# Patient Record
Sex: MALE | Race: WHITE | Employment: OTHER | URBAN - NONMETROPOLITAN AREA
[De-identification: names, ages, dates, MRNs, and addresses within clinical notes are randomized per-mention and may not be internally consistent; named-entity substitution may affect disease eponyms.]

---

## 2018-10-31 ENCOUNTER — APPOINTMENT (OUTPATIENT)
Dept: GENERAL RADIOLOGY | Age: 53
End: 2018-10-31
Payer: MEDICARE

## 2018-10-31 ENCOUNTER — HOSPITAL ENCOUNTER (EMERGENCY)
Age: 53
Discharge: LEFT BEFORE DISCHARGE | End: 2018-10-31
Payer: MEDICARE

## 2018-10-31 ENCOUNTER — APPOINTMENT (OUTPATIENT)
Dept: CT IMAGING | Age: 53
End: 2018-10-31
Payer: MEDICARE

## 2018-10-31 VITALS
RESPIRATION RATE: 20 BRPM | OXYGEN SATURATION: 95 % | DIASTOLIC BLOOD PRESSURE: 99 MMHG | HEIGHT: 75 IN | WEIGHT: 289 LBS | BODY MASS INDEX: 35.93 KG/M2 | SYSTOLIC BLOOD PRESSURE: 134 MMHG | HEART RATE: 108 BPM | TEMPERATURE: 97.5 F

## 2018-10-31 DIAGNOSIS — R25.1 SHAKINESS: Primary | ICD-10-CM

## 2018-10-31 DIAGNOSIS — G93.0 BRAIN CYST: ICD-10-CM

## 2018-10-31 DIAGNOSIS — F10.29 ALCOHOL DEPENDENCE WITH UNSPECIFIED ALCOHOL-INDUCED DISORDER (HCC): ICD-10-CM

## 2018-10-31 LAB
ALBUMIN SERPL-MCNC: 3 G/DL (ref 3.5–5.1)
ALP BLD-CCNC: 165 U/L (ref 38–126)
ALT SERPL-CCNC: 68 U/L (ref 11–66)
AMORPHOUS: ABNORMAL
ANION GAP SERPL CALCULATED.3IONS-SCNC: 9 MEQ/L (ref 8–16)
AST SERPL-CCNC: 151 U/L (ref 5–40)
BACTERIA: ABNORMAL /HPF
BASOPHILS # BLD: 1.1 %
BASOPHILS ABSOLUTE: 0.1 THOU/MM3 (ref 0–0.1)
BILIRUB SERPL-MCNC: 1 MG/DL (ref 0.3–1.2)
BILIRUBIN DIRECT: 0.3 MG/DL (ref 0–0.3)
BILIRUBIN URINE: ABNORMAL
BLOOD, URINE: NEGATIVE
BUN BLDV-MCNC: 10 MG/DL (ref 7–22)
CALCIUM SERPL-MCNC: 8.5 MG/DL (ref 8.5–10.5)
CASTS UA: ABNORMAL /LPF
CHARACTER, URINE: ABNORMAL
CHLORIDE BLD-SCNC: 103 MEQ/L (ref 98–111)
CO2: 24 MEQ/L (ref 23–33)
COLOR: YELLOW
CREAT SERPL-MCNC: 0.6 MG/DL (ref 0.4–1.2)
CRYSTALS, UA: ABNORMAL
EKG ATRIAL RATE: 99 BPM
EKG P AXIS: 43 DEGREES
EKG P-R INTERVAL: 140 MS
EKG Q-T INTERVAL: 366 MS
EKG QRS DURATION: 110 MS
EKG QTC CALCULATION (BAZETT): 469 MS
EKG R AXIS: 18 DEGREES
EKG T AXIS: 55 DEGREES
EKG VENTRICULAR RATE: 99 BPM
EOSINOPHIL # BLD: 2.1 %
EOSINOPHILS ABSOLUTE: 0.1 THOU/MM3 (ref 0–0.4)
EPITHELIAL CELLS, UA: ABNORMAL /HPF
ERYTHROCYTE [DISTWIDTH] IN BLOOD BY AUTOMATED COUNT: 17.1 % (ref 11.5–14.5)
ERYTHROCYTE [DISTWIDTH] IN BLOOD BY AUTOMATED COUNT: 62.4 FL (ref 35–45)
ETHYL ALCOHOL, SERUM: 0.37 %
GFR SERPL CREATININE-BSD FRML MDRD: > 90 ML/MIN/1.73M2
GLUCOSE BLD-MCNC: 185 MG/DL (ref 70–108)
GLUCOSE URINE: NEGATIVE MG/DL
HCT VFR BLD CALC: 38 % (ref 42–52)
HEMOGLOBIN: 13 GM/DL (ref 14–18)
ICTOTEST: NEGATIVE
IMMATURE GRANS (ABS): 0.02 THOU/MM3 (ref 0–0.07)
IMMATURE GRANULOCYTES: 0.3 %
KETONES, URINE: ABNORMAL
LEUKOCYTE ESTERASE, URINE: ABNORMAL
LIPASE: 68.1 U/L (ref 5.6–51.3)
LYMPHOCYTES # BLD: 47 %
LYMPHOCYTES ABSOLUTE: 3.3 THOU/MM3 (ref 1–4.8)
MAGNESIUM: 1.8 MG/DL (ref 1.6–2.4)
MCH RBC QN AUTO: 33.6 PG (ref 26–33)
MCHC RBC AUTO-ENTMCNC: 34.2 GM/DL (ref 32.2–35.5)
MCV RBC AUTO: 98.2 FL (ref 80–94)
MONOCYTES # BLD: 10.6 %
MONOCYTES ABSOLUTE: 0.8 THOU/MM3 (ref 0.4–1.3)
MUCUS: ABNORMAL
NITRITE, URINE: NEGATIVE
NUCLEATED RED BLOOD CELLS: 0 /100 WBC
OSMOLALITY CALCULATION: 275.8 MOSMOL/KG (ref 275–300)
PH UA: 6
PLATELET # BLD: 100 THOU/MM3 (ref 130–400)
PMV BLD AUTO: 10.9 FL (ref 9.4–12.4)
POTASSIUM SERPL-SCNC: 4.5 MEQ/L (ref 3.5–5.2)
PROTEIN UA: NEGATIVE
RBC # BLD: 3.87 MILL/MM3 (ref 4.7–6.1)
RBC URINE: ABNORMAL /HPF
SEG NEUTROPHILS: 38.9 %
SEGMENTED NEUTROPHILS ABSOLUTE COUNT: 2.8 THOU/MM3 (ref 1.8–7.7)
SODIUM BLD-SCNC: 136 MEQ/L (ref 135–145)
SPECIFIC GRAVITY, URINE: 1.01 (ref 1–1.03)
TOTAL PROTEIN: 8.4 G/DL (ref 6.1–8)
TROPONIN T: < 0.01 NG/ML
UROBILINOGEN, URINE: 2 EU/DL
WBC # BLD: 7.1 THOU/MM3 (ref 4.8–10.8)
WBC UA: ABNORMAL /HPF

## 2018-10-31 PROCEDURE — 71046 X-RAY EXAM CHEST 2 VIEWS: CPT

## 2018-10-31 PROCEDURE — 6370000000 HC RX 637 (ALT 250 FOR IP): Performed by: PHYSICIAN ASSISTANT

## 2018-10-31 PROCEDURE — 87086 URINE CULTURE/COLONY COUNT: CPT

## 2018-10-31 PROCEDURE — G0480 DRUG TEST DEF 1-7 CLASSES: HCPCS

## 2018-10-31 PROCEDURE — 82248 BILIRUBIN DIRECT: CPT

## 2018-10-31 PROCEDURE — 84484 ASSAY OF TROPONIN QUANT: CPT

## 2018-10-31 PROCEDURE — 36415 COLL VENOUS BLD VENIPUNCTURE: CPT

## 2018-10-31 PROCEDURE — 80053 COMPREHEN METABOLIC PANEL: CPT

## 2018-10-31 PROCEDURE — 81001 URINALYSIS AUTO W/SCOPE: CPT

## 2018-10-31 PROCEDURE — 83690 ASSAY OF LIPASE: CPT

## 2018-10-31 PROCEDURE — 2709999900 HC NON-CHARGEABLE SUPPLY

## 2018-10-31 PROCEDURE — 72125 CT NECK SPINE W/O DYE: CPT

## 2018-10-31 PROCEDURE — 6360000002 HC RX W HCPCS: Performed by: PHYSICIAN ASSISTANT

## 2018-10-31 PROCEDURE — 70450 CT HEAD/BRAIN W/O DYE: CPT

## 2018-10-31 PROCEDURE — 85025 COMPLETE CBC W/AUTO DIFF WBC: CPT

## 2018-10-31 PROCEDURE — 93005 ELECTROCARDIOGRAM TRACING: CPT | Performed by: PHYSICIAN ASSISTANT

## 2018-10-31 PROCEDURE — 99284 EMERGENCY DEPT VISIT MOD MDM: CPT

## 2018-10-31 PROCEDURE — 93010 ELECTROCARDIOGRAM REPORT: CPT | Performed by: INTERNAL MEDICINE

## 2018-10-31 PROCEDURE — 94640 AIRWAY INHALATION TREATMENT: CPT

## 2018-10-31 PROCEDURE — 83735 ASSAY OF MAGNESIUM: CPT

## 2018-10-31 RX ORDER — ACETAMINOPHEN 500 MG
1000 TABLET ORAL ONCE
Status: COMPLETED | OUTPATIENT
Start: 2018-10-31 | End: 2018-10-31

## 2018-10-31 RX ORDER — SODIUM CHLORIDE 9 MG/ML
INJECTION, SOLUTION INTRAVENOUS CONTINUOUS
Status: DISCONTINUED | OUTPATIENT
Start: 2018-10-31 | End: 2018-10-31 | Stop reason: HOSPADM

## 2018-10-31 RX ORDER — LORAZEPAM 1 MG/1
1 TABLET ORAL ONCE
Status: COMPLETED | OUTPATIENT
Start: 2018-10-31 | End: 2018-10-31

## 2018-10-31 RX ORDER — THIAMINE MONONITRATE (VIT B1) 100 MG
100 TABLET ORAL ONCE
Status: COMPLETED | OUTPATIENT
Start: 2018-10-31 | End: 2018-10-31

## 2018-10-31 RX ORDER — FOLIC ACID 1 MG/1
1 TABLET ORAL ONCE
Status: COMPLETED | OUTPATIENT
Start: 2018-10-31 | End: 2018-10-31

## 2018-10-31 RX ADMIN — LORAZEPAM 1 MG: 1 TABLET ORAL at 09:35

## 2018-10-31 RX ADMIN — Medication 100 MG: at 09:35

## 2018-10-31 RX ADMIN — ALBUTEROL SULFATE 2.5 MG: 2.5 SOLUTION RESPIRATORY (INHALATION) at 09:35

## 2018-10-31 RX ADMIN — FOLIC ACID 1 MG: 1 TABLET ORAL at 09:35

## 2018-10-31 RX ADMIN — ACETAMINOPHEN 1000 MG: 500 TABLET, FILM COATED ORAL at 09:35

## 2018-10-31 ASSESSMENT — ENCOUNTER SYMPTOMS
SORE THROAT: 0
VOMITING: 0
NAUSEA: 0
DIARRHEA: 0
COUGH: 1
WHEEZING: 1

## 2018-10-31 ASSESSMENT — PAIN SCALES - GENERAL
PAINLEVEL_OUTOF10: 10
PAINLEVEL_OUTOF10: 10

## 2018-10-31 ASSESSMENT — PAIN DESCRIPTION - LOCATION: LOCATION: HEAD

## 2018-10-31 NOTE — ED PROVIDER NOTES
below    Case discussed with attending physician. CRITICAL CARE:  None    CONSULTS:  None    PROCEDURES:  None    FINAL IMPRESSION      1. Shakiness    2. Alcohol dependence with unspecified alcohol-induced disorder ([facility]    3. Brain cyst          DISPOSITION/PLAN   AMA      PATIENT REFERRED TO:  No follow-up provider specified.     DISCHARGE MEDICATIONS:  Discharge Medication List as of 10/31/2018 11:09 AM          (Please note that portions of this note were completed with a voice recognitionprogram.  Efforts were made to edit the dictations but occasionally words are mis-transcribed.)    Mio Leon, [address] GABRIELA Johnson  10/31/18 8805

## 2018-11-01 LAB
ORGANISM: ABNORMAL
URINE CULTURE REFLEX: ABNORMAL

## 2018-11-30 ENCOUNTER — INITIAL CONSULT (OUTPATIENT)
Dept: NEUROLOGY | Age: 53
End: 2018-11-30
Payer: MEDICARE

## 2018-11-30 VITALS
BODY MASS INDEX: 36.04 KG/M2 | HEART RATE: 72 BPM | DIASTOLIC BLOOD PRESSURE: 68 MMHG | WEIGHT: 296 LBS | SYSTOLIC BLOOD PRESSURE: 124 MMHG | HEIGHT: 76 IN

## 2018-11-30 DIAGNOSIS — R56.9 SEIZURE (HCC): Primary | ICD-10-CM

## 2018-11-30 PROCEDURE — 1036F TOBACCO NON-USER: CPT | Performed by: PSYCHIATRY & NEUROLOGY

## 2018-11-30 PROCEDURE — 3017F COLORECTAL CA SCREEN DOC REV: CPT | Performed by: PSYCHIATRY & NEUROLOGY

## 2018-11-30 PROCEDURE — 99204 OFFICE O/P NEW MOD 45 MIN: CPT | Performed by: PSYCHIATRY & NEUROLOGY

## 2018-11-30 PROCEDURE — G8417 CALC BMI ABV UP PARAM F/U: HCPCS | Performed by: PSYCHIATRY & NEUROLOGY

## 2018-11-30 PROCEDURE — G8427 DOCREV CUR MEDS BY ELIG CLIN: HCPCS | Performed by: PSYCHIATRY & NEUROLOGY

## 2018-11-30 PROCEDURE — G8484 FLU IMMUNIZE NO ADMIN: HCPCS | Performed by: PSYCHIATRY & NEUROLOGY

## 2018-11-30 RX ORDER — PRIMIDONE 50 MG/1
50 TABLET ORAL 3 TIMES DAILY
COMMUNITY
End: 2020-09-17 | Stop reason: ALTCHOICE

## 2018-11-30 RX ORDER — CARVEDILOL 6.25 MG/1
6.25 TABLET ORAL 2 TIMES DAILY WITH MEALS
Status: ON HOLD | COMMUNITY
End: 2019-03-26 | Stop reason: HOSPADM

## 2018-11-30 RX ORDER — ALBUTEROL SULFATE 90 UG/1
2 AEROSOL, METERED RESPIRATORY (INHALATION) EVERY 6 HOURS PRN
Status: ON HOLD | COMMUNITY
End: 2020-12-08 | Stop reason: HOSPADM

## 2018-11-30 RX ORDER — LANOLIN ALCOHOL/MO/W.PET/CERES
100 CREAM (GRAM) TOPICAL DAILY
COMMUNITY
End: 2019-03-18

## 2018-11-30 RX ORDER — CIPROFLOXACIN 500 MG/1
500 TABLET, FILM COATED ORAL 2 TIMES DAILY
COMMUNITY
End: 2019-03-18

## 2018-11-30 RX ORDER — SULFAMETHOXAZOLE AND TRIMETHOPRIM 400; 80 MG/1; MG/1
1 TABLET ORAL 2 TIMES DAILY
COMMUNITY
End: 2019-03-18

## 2018-11-30 RX ORDER — DULOXETIN HYDROCHLORIDE 60 MG/1
60 CAPSULE, DELAYED RELEASE ORAL DAILY
Status: ON HOLD | COMMUNITY
End: 2021-08-03

## 2018-11-30 RX ORDER — FOLIC ACID 1 MG/1
1 TABLET ORAL DAILY
COMMUNITY
End: 2021-01-28

## 2018-11-30 RX ORDER — ROSUVASTATIN CALCIUM 20 MG/1
20 TABLET, COATED ORAL NIGHTLY
Status: ON HOLD | COMMUNITY
End: 2022-05-12 | Stop reason: HOSPADM

## 2018-11-30 RX ORDER — TRAZODONE HYDROCHLORIDE 150 MG/1
150 TABLET ORAL NIGHTLY
COMMUNITY
End: 2019-03-18

## 2018-11-30 NOTE — PROGRESS NOTES
reviewed, he will need to follow up at [address] [place] [address] as he was evaluated there, with no imaging studies available for us review here. He does reports no ETOH for at least one month, he continues to smoke however. After detailed discussion with patient and his two friends present with him we agreed on the following plan. Plan    1. Follow up with Neurology at [address] [place] [address] re seizure. 2. Follow up with [address] [place] [address] Neurosurgery re left sylvian fissure cyst.   3. No driving, swimming, operating heavy machinery or compromising heights until cleared. 4. Follow up as needed.      Danae Rdz MD

## 2018-11-30 NOTE — LETTER
[address] [facility]  [address]  Dept: [phone]  Dept Fax: [phone]  Loc: Efrem Ruff MD        11/30/2018      Patient:  Sebas Mckee  MRN:  [id number]  YOB: 1965  Date of Visit:  11/30/2018    Dear Dr. Marii Lacy,    Thank you for referring Sebas Mckee to me for consultation. Please see attached visit summary with my findings. If you have any questions, please do not hesitate to call me.       Sincerely,         Dank Macias MD

## 2019-03-18 ENCOUNTER — APPOINTMENT (OUTPATIENT)
Dept: CT IMAGING | Age: 54
DRG: 912 | End: 2019-03-18
Payer: MEDICARE

## 2019-03-18 ENCOUNTER — APPOINTMENT (OUTPATIENT)
Dept: GENERAL RADIOLOGY | Age: 54
DRG: 912 | End: 2019-03-18
Payer: MEDICARE

## 2019-03-18 ENCOUNTER — HOSPITAL ENCOUNTER (INPATIENT)
Age: 54
LOS: 8 days | Discharge: NURSING, REHAB OR LONG-TERM CARE | DRG: 912 | End: 2019-03-26
Attending: INTERNAL MEDICINE | Admitting: INTERNAL MEDICINE
Payer: MEDICARE

## 2019-03-18 DIAGNOSIS — M54.50 ACUTE MIDLINE LOW BACK PAIN WITHOUT SCIATICA: ICD-10-CM

## 2019-03-18 DIAGNOSIS — R76.8 HCV ANTIBODY POSITIVE: ICD-10-CM

## 2019-03-18 DIAGNOSIS — D69.6 THROMBOCYTOPENIA (HCC): Primary | ICD-10-CM

## 2019-03-18 DIAGNOSIS — M54.6 ACUTE MIDLINE THORACIC BACK PAIN: ICD-10-CM

## 2019-03-18 DIAGNOSIS — E87.1 HYPONATREMIA: ICD-10-CM

## 2019-03-18 DIAGNOSIS — E11.65 UNCONTROLLED TYPE 2 DIABETES MELLITUS WITH HYPERGLYCEMIA (HCC): ICD-10-CM

## 2019-03-18 DIAGNOSIS — R29.6 FALLS FREQUENTLY: ICD-10-CM

## 2019-03-18 DIAGNOSIS — S09.90XA INJURY OF HEAD, INITIAL ENCOUNTER: ICD-10-CM

## 2019-03-18 DIAGNOSIS — F10.921 ACUTE ALCOHOLIC INTOXICATION WITH DELIRIUM (HCC): ICD-10-CM

## 2019-03-18 DIAGNOSIS — S22.42XA CLOSED FRACTURE OF MULTIPLE RIBS OF LEFT SIDE, INITIAL ENCOUNTER: ICD-10-CM

## 2019-03-18 DIAGNOSIS — K70.30 ALCOHOLIC CIRRHOSIS OF LIVER WITHOUT ASCITES (HCC): ICD-10-CM

## 2019-03-18 DIAGNOSIS — R25.1 TREMOR: ICD-10-CM

## 2019-03-18 DIAGNOSIS — S82.832A CLOSED FRACTURE OF DISTAL END OF LEFT FIBULA, UNSPECIFIED FRACTURE MORPHOLOGY, INITIAL ENCOUNTER: ICD-10-CM

## 2019-03-18 PROBLEM — F10.930 ALCOHOL WITHDRAWAL, UNCOMPLICATED (HCC): Status: ACTIVE | Noted: 2019-03-18

## 2019-03-18 PROBLEM — F10.939 ALCOHOL WITHDRAWAL SEIZURE WITH COMPLICATION (HCC): Status: ACTIVE | Noted: 2019-03-18

## 2019-03-18 PROBLEM — J44.9 COPD (CHRONIC OBSTRUCTIVE PULMONARY DISEASE) (HCC): Status: ACTIVE | Noted: 2019-03-18

## 2019-03-18 PROBLEM — R56.9 SEIZURE (HCC): Status: ACTIVE | Noted: 2019-03-18

## 2019-03-18 PROBLEM — E78.5 HLD (HYPERLIPIDEMIA): Status: ACTIVE | Noted: 2019-03-18

## 2019-03-18 PROBLEM — I10 HTN (HYPERTENSION): Status: ACTIVE | Noted: 2019-03-18

## 2019-03-18 PROBLEM — D64.9 ANEMIA: Status: ACTIVE | Noted: 2019-03-18

## 2019-03-18 PROBLEM — E11.9 TYPE 2 DIABETES MELLITUS (HCC): Status: ACTIVE | Noted: 2019-03-18

## 2019-03-18 PROBLEM — D72.829 LEUKOCYTOSIS: Status: ACTIVE | Noted: 2019-03-18

## 2019-03-18 PROBLEM — R56.9 ALCOHOL WITHDRAWAL SEIZURE WITH COMPLICATION (HCC): Status: ACTIVE | Noted: 2019-03-18

## 2019-03-18 LAB
ALBUMIN SERPL-MCNC: 3 G/DL (ref 3.5–5.1)
ALBUMIN SERPL-MCNC: 3.1 G/DL (ref 3.5–5.1)
ALP BLD-CCNC: 215 U/L (ref 38–126)
ALT SERPL-CCNC: 38 U/L (ref 11–66)
AMMONIA: 42 UMOL/L (ref 11–60)
AMPHETAMINE+METHAMPHETAMINE URINE SCREEN: NEGATIVE
ANION GAP SERPL CALCULATED.3IONS-SCNC: 15 MEQ/L (ref 8–16)
ANION GAP SERPL CALCULATED.3IONS-SCNC: 15 MEQ/L (ref 8–16)
AST SERPL-CCNC: 64 U/L (ref 5–40)
BARBITURATE QUANTITATIVE URINE: NEGATIVE
BASOPHILS # BLD: 0.9 %
BASOPHILS ABSOLUTE: 0.1 THOU/MM3 (ref 0–0.1)
BENZODIAZEPINE QUANTITATIVE URINE: NEGATIVE
BILIRUB SERPL-MCNC: 0.8 MG/DL (ref 0.3–1.2)
BUN BLDV-MCNC: 4 MG/DL (ref 7–22)
BUN BLDV-MCNC: 5 MG/DL (ref 7–22)
CALCIUM SERPL-MCNC: 8.3 MG/DL (ref 8.5–10.5)
CALCIUM SERPL-MCNC: 9.1 MG/DL (ref 8.5–10.5)
CANNABINOID QUANTITATIVE URINE: NEGATIVE
CHLORIDE BLD-SCNC: 90 MEQ/L (ref 98–111)
CHLORIDE BLD-SCNC: 98 MEQ/L (ref 98–111)
CO2: 21 MEQ/L (ref 23–33)
CO2: 23 MEQ/L (ref 23–33)
COCAINE METABOLITE QUANTITATIVE URINE: NEGATIVE
CREAT SERPL-MCNC: 0.5 MG/DL (ref 0.4–1.2)
CREAT SERPL-MCNC: 0.5 MG/DL (ref 0.4–1.2)
EKG ATRIAL RATE: 68 BPM
EKG Q-T INTERVAL: 446 MS
EKG QRS DURATION: 114 MS
EKG QTC CALCULATION (BAZETT): 474 MS
EKG R AXIS: 38 DEGREES
EKG T AXIS: 30 DEGREES
EKG VENTRICULAR RATE: 68 BPM
EOSINOPHIL # BLD: 2.2 %
EOSINOPHILS ABSOLUTE: 0.1 THOU/MM3 (ref 0–0.4)
ERYTHROCYTE [DISTWIDTH] IN BLOOD BY AUTOMATED COUNT: 14.5 % (ref 11.5–14.5)
ERYTHROCYTE [DISTWIDTH] IN BLOOD BY AUTOMATED COUNT: 54.4 FL (ref 35–45)
ETHYL ALCOHOL, SERUM: 0.3 %
GFR SERPL CREATININE-BSD FRML MDRD: > 90 ML/MIN/1.73M2
GFR SERPL CREATININE-BSD FRML MDRD: > 90 ML/MIN/1.73M2
GLUCOSE BLD-MCNC: 239 MG/DL (ref 70–108)
GLUCOSE BLD-MCNC: 267 MG/DL (ref 70–108)
GLUCOSE BLD-MCNC: 389 MG/DL (ref 70–108)
HCT VFR BLD CALC: 37 % (ref 42–52)
HEMOGLOBIN: 12.3 GM/DL (ref 14–18)
IMMATURE GRANS (ABS): 0.01 THOU/MM3 (ref 0–0.07)
IMMATURE GRANULOCYTES: 0.2 %
LYMPHOCYTES # BLD: 44.8 %
LYMPHOCYTES ABSOLUTE: 2.6 THOU/MM3 (ref 1–4.8)
MCH RBC QN AUTO: 33.4 PG (ref 26–33)
MCHC RBC AUTO-ENTMCNC: 33.2 GM/DL (ref 32.2–35.5)
MCV RBC AUTO: 100.5 FL (ref 80–94)
MONOCYTES # BLD: 9.5 %
MONOCYTES ABSOLUTE: 0.6 THOU/MM3 (ref 0.4–1.3)
NUCLEATED RED BLOOD CELLS: 0 /100 WBC
OPIATES, URINE: NEGATIVE
OSMOLALITY CALCULATION: 266.8 MOSMOL/KG (ref 275–300)
OSMOLALITY: 347 MOSMOL/KG (ref 275–295)
OXYCODONE: NEGATIVE
PHENCYCLIDINE QUANTITATIVE URINE: NEGATIVE
PHOSPHORUS: 3.6 MG/DL (ref 2.4–4.7)
PLATELET # BLD: 79 THOU/MM3 (ref 130–400)
PMV BLD AUTO: 10.7 FL (ref 9.4–12.4)
POTASSIUM SERPL-SCNC: 4.2 MEQ/L (ref 3.5–5.2)
POTASSIUM SERPL-SCNC: 4.4 MEQ/L (ref 3.5–5.2)
RBC # BLD: 3.68 MILL/MM3 (ref 4.7–6.1)
SCAN OF BLOOD SMEAR: NORMAL
SEG NEUTROPHILS: 42.4 %
SEGMENTED NEUTROPHILS ABSOLUTE COUNT: 2.5 THOU/MM3 (ref 1.8–7.7)
SODIUM BLD-SCNC: 126 MEQ/L (ref 135–145)
SODIUM BLD-SCNC: 136 MEQ/L (ref 135–145)
TOTAL PROTEIN: 8 G/DL (ref 6.1–8)
TROPONIN T: < 0.01 NG/ML
WBC # BLD: 5.8 THOU/MM3 (ref 4.8–10.8)

## 2019-03-18 PROCEDURE — 82140 ASSAY OF AMMONIA: CPT

## 2019-03-18 PROCEDURE — APPSS60 APP SPLIT SHARED TIME 46-60 MINUTES: Performed by: NURSE PRACTITIONER

## 2019-03-18 PROCEDURE — 1200000003 HC TELEMETRY R&B

## 2019-03-18 PROCEDURE — 82948 REAGENT STRIP/BLOOD GLUCOSE: CPT

## 2019-03-18 PROCEDURE — 93010 ELECTROCARDIOGRAM REPORT: CPT | Performed by: INTERNAL MEDICINE

## 2019-03-18 PROCEDURE — 2709999900 HC NON-CHARGEABLE SUPPLY

## 2019-03-18 PROCEDURE — 93005 ELECTROCARDIOGRAM TRACING: CPT | Performed by: PHYSICIAN ASSISTANT

## 2019-03-18 PROCEDURE — 76376 3D RENDER W/INTRP POSTPROCES: CPT

## 2019-03-18 PROCEDURE — 72125 CT NECK SPINE W/O DYE: CPT

## 2019-03-18 PROCEDURE — 36415 COLL VENOUS BLD VENIPUNCTURE: CPT

## 2019-03-18 PROCEDURE — 6370000000 HC RX 637 (ALT 250 FOR IP): Performed by: INTERNAL MEDICINE

## 2019-03-18 PROCEDURE — 70450 CT HEAD/BRAIN W/O DYE: CPT

## 2019-03-18 PROCEDURE — 99253 IP/OBS CNSLTJ NEW/EST LOW 45: CPT | Performed by: SURGERY

## 2019-03-18 PROCEDURE — 2500000003 HC RX 250 WO HCPCS: Performed by: PHYSICIAN ASSISTANT

## 2019-03-18 PROCEDURE — 74177 CT ABD & PELVIS W/CONTRAST: CPT

## 2019-03-18 PROCEDURE — 85025 COMPLETE CBC W/AUTO DIFF WBC: CPT

## 2019-03-18 PROCEDURE — 2580000003 HC RX 258: Performed by: PHYSICIAN ASSISTANT

## 2019-03-18 PROCEDURE — 84484 ASSAY OF TROPONIN QUANT: CPT

## 2019-03-18 PROCEDURE — 73060 X-RAY EXAM OF HUMERUS: CPT

## 2019-03-18 PROCEDURE — 99285 EMERGENCY DEPT VISIT HI MDM: CPT

## 2019-03-18 PROCEDURE — 80069 RENAL FUNCTION PANEL: CPT

## 2019-03-18 PROCEDURE — G0480 DRUG TEST DEF 1-7 CLASSES: HCPCS

## 2019-03-18 PROCEDURE — 6360000004 HC RX CONTRAST MEDICATION: Performed by: PHYSICIAN ASSISTANT

## 2019-03-18 PROCEDURE — 71260 CT THORAX DX C+: CPT

## 2019-03-18 PROCEDURE — 82977 ASSAY OF GGT: CPT

## 2019-03-18 PROCEDURE — 80307 DRUG TEST PRSMV CHEM ANLYZR: CPT

## 2019-03-18 PROCEDURE — 83930 ASSAY OF BLOOD OSMOLALITY: CPT

## 2019-03-18 PROCEDURE — 80053 COMPREHEN METABOLIC PANEL: CPT

## 2019-03-18 RX ORDER — LATANOPROST 50 UG/ML
1 SOLUTION/ DROPS OPHTHALMIC NIGHTLY
Status: ON HOLD | COMMUNITY
End: 2019-03-22 | Stop reason: HOSPADM

## 2019-03-18 RX ORDER — FOLIC ACID 1 MG/1
1 TABLET ORAL DAILY
Status: DISCONTINUED | OUTPATIENT
Start: 2019-03-19 | End: 2019-03-18 | Stop reason: SDUPTHER

## 2019-03-18 RX ORDER — LIDOCAINE 4 G/G
3 PATCH TOPICAL DAILY
Status: DISCONTINUED | OUTPATIENT
Start: 2019-03-19 | End: 2019-03-26 | Stop reason: HOSPADM

## 2019-03-18 RX ORDER — PIOGLITAZONEHYDROCHLORIDE 15 MG/1
15 TABLET ORAL DAILY
Status: ON HOLD | COMMUNITY
End: 2019-03-26 | Stop reason: HOSPADM

## 2019-03-18 RX ORDER — DEXTROSE MONOHYDRATE 50 MG/ML
100 INJECTION, SOLUTION INTRAVENOUS PRN
Status: DISCONTINUED | OUTPATIENT
Start: 2019-03-18 | End: 2019-03-26 | Stop reason: HOSPADM

## 2019-03-18 RX ORDER — SODIUM CHLORIDE 0.9 % (FLUSH) 0.9 %
10 SYRINGE (ML) INJECTION EVERY 12 HOURS SCHEDULED
Status: DISCONTINUED | OUTPATIENT
Start: 2019-03-18 | End: 2019-03-21 | Stop reason: SDUPTHER

## 2019-03-18 RX ORDER — MULTIVITAMIN WITH FOLIC ACID 400 MCG
1 TABLET ORAL DAILY
Status: DISCONTINUED | OUTPATIENT
Start: 2019-03-19 | End: 2019-03-26 | Stop reason: HOSPADM

## 2019-03-18 RX ORDER — AMLODIPINE BESYLATE 5 MG/1
5 TABLET ORAL DAILY
Status: DISCONTINUED | OUTPATIENT
Start: 2019-03-19 | End: 2019-03-26 | Stop reason: HOSPADM

## 2019-03-18 RX ORDER — LORAZEPAM 2 MG/ML
4 INJECTION INTRAMUSCULAR
Status: DISCONTINUED | OUTPATIENT
Start: 2019-03-18 | End: 2019-03-22

## 2019-03-18 RX ORDER — LORAZEPAM 1 MG/1
2 TABLET ORAL
Status: DISCONTINUED | OUTPATIENT
Start: 2019-03-18 | End: 2019-03-22

## 2019-03-18 RX ORDER — ONDANSETRON 2 MG/ML
4 INJECTION INTRAMUSCULAR; INTRAVENOUS EVERY 6 HOURS PRN
Status: DISCONTINUED | OUTPATIENT
Start: 2019-03-18 | End: 2019-03-26 | Stop reason: HOSPADM

## 2019-03-18 RX ORDER — ROSUVASTATIN CALCIUM 20 MG/1
20 TABLET, COATED ORAL DAILY
Status: DISCONTINUED | OUTPATIENT
Start: 2019-03-19 | End: 2019-03-19

## 2019-03-18 RX ORDER — SODIUM CHLORIDE 0.9 % (FLUSH) 0.9 %
10 SYRINGE (ML) INJECTION EVERY 12 HOURS SCHEDULED
Status: DISCONTINUED | OUTPATIENT
Start: 2019-03-18 | End: 2019-03-18 | Stop reason: SDUPTHER

## 2019-03-18 RX ORDER — NICOTINE POLACRILEX 4 MG
15 LOZENGE BUCCAL PRN
Status: DISCONTINUED | OUTPATIENT
Start: 2019-03-18 | End: 2019-03-26 | Stop reason: HOSPADM

## 2019-03-18 RX ORDER — DIAZEPAM 5 MG/1
5 TABLET ORAL EVERY 8 HOURS
Status: DISCONTINUED | OUTPATIENT
Start: 2019-03-18 | End: 2019-03-19

## 2019-03-18 RX ORDER — CYCLOBENZAPRINE HCL 10 MG
10 TABLET ORAL 3 TIMES DAILY PRN
Status: DISCONTINUED | OUTPATIENT
Start: 2019-03-18 | End: 2019-03-26 | Stop reason: HOSPADM

## 2019-03-18 RX ORDER — AMLODIPINE BESYLATE 5 MG/1
5 TABLET ORAL DAILY
COMMUNITY
End: 2020-09-10

## 2019-03-18 RX ORDER — FOLIC ACID 1 MG/1
1 TABLET ORAL DAILY
Status: DISCONTINUED | OUTPATIENT
Start: 2019-03-19 | End: 2019-03-26 | Stop reason: HOSPADM

## 2019-03-18 RX ORDER — IBUPROFEN 800 MG/1
800 TABLET ORAL EVERY 6 HOURS PRN
Status: ON HOLD | COMMUNITY
End: 2019-03-26 | Stop reason: HOSPADM

## 2019-03-18 RX ORDER — LORAZEPAM 2 MG/ML
3 INJECTION INTRAMUSCULAR
Status: DISCONTINUED | OUTPATIENT
Start: 2019-03-18 | End: 2019-03-22

## 2019-03-18 RX ORDER — ASPIRIN 81 MG/1
81 TABLET, CHEWABLE ORAL DAILY
Status: DISCONTINUED | OUTPATIENT
Start: 2019-03-19 | End: 2019-03-26 | Stop reason: HOSPADM

## 2019-03-18 RX ORDER — THIAMINE MONONITRATE (VIT B1) 100 MG
100 TABLET ORAL DAILY
Status: DISCONTINUED | OUTPATIENT
Start: 2019-03-19 | End: 2019-03-26 | Stop reason: HOSPADM

## 2019-03-18 RX ORDER — SODIUM CHLORIDE 0.9 % (FLUSH) 0.9 %
10 SYRINGE (ML) INJECTION PRN
Status: DISCONTINUED | OUTPATIENT
Start: 2019-03-18 | End: 2019-03-21 | Stop reason: SDUPTHER

## 2019-03-18 RX ORDER — DULOXETIN HYDROCHLORIDE 60 MG/1
60 CAPSULE, DELAYED RELEASE ORAL DAILY
Status: DISCONTINUED | OUTPATIENT
Start: 2019-03-19 | End: 2019-03-26 | Stop reason: HOSPADM

## 2019-03-18 RX ORDER — CLONIDINE HYDROCHLORIDE 0.2 MG/1
0.2 TABLET ORAL 2 TIMES DAILY
COMMUNITY
End: 2020-09-10

## 2019-03-18 RX ORDER — DIAZEPAM 5 MG/1
5 TABLET ORAL EVERY 8 HOURS
Status: ON HOLD | COMMUNITY
End: 2019-03-19

## 2019-03-18 RX ORDER — SODIUM CHLORIDE 0.9 % (FLUSH) 0.9 %
10 SYRINGE (ML) INJECTION PRN
Status: DISCONTINUED | OUTPATIENT
Start: 2019-03-18 | End: 2019-03-18 | Stop reason: SDUPTHER

## 2019-03-18 RX ORDER — LORAZEPAM 2 MG/ML
2 INJECTION INTRAMUSCULAR
Status: DISCONTINUED | OUTPATIENT
Start: 2019-03-18 | End: 2019-03-22

## 2019-03-18 RX ORDER — LORAZEPAM 1 MG/1
1 TABLET ORAL
Status: DISCONTINUED | OUTPATIENT
Start: 2019-03-18 | End: 2019-03-22

## 2019-03-18 RX ORDER — LORAZEPAM 1 MG/1
4 TABLET ORAL
Status: DISCONTINUED | OUTPATIENT
Start: 2019-03-18 | End: 2019-03-22

## 2019-03-18 RX ORDER — 0.9 % SODIUM CHLORIDE 0.9 %
1000 INTRAVENOUS SOLUTION INTRAVENOUS ONCE
Status: COMPLETED | OUTPATIENT
Start: 2019-03-18 | End: 2019-03-18

## 2019-03-18 RX ORDER — DEXTROSE MONOHYDRATE 25 G/50ML
12.5 INJECTION, SOLUTION INTRAVENOUS PRN
Status: DISCONTINUED | OUTPATIENT
Start: 2019-03-18 | End: 2019-03-26 | Stop reason: HOSPADM

## 2019-03-18 RX ORDER — ALBUTEROL SULFATE 90 UG/1
2 AEROSOL, METERED RESPIRATORY (INHALATION) EVERY 6 HOURS PRN
Status: DISCONTINUED | OUTPATIENT
Start: 2019-03-18 | End: 2019-03-26 | Stop reason: HOSPADM

## 2019-03-18 RX ORDER — CARVEDILOL 6.25 MG/1
6.25 TABLET ORAL 2 TIMES DAILY WITH MEALS
Status: DISCONTINUED | OUTPATIENT
Start: 2019-03-19 | End: 2019-03-22

## 2019-03-18 RX ORDER — PRIMIDONE 50 MG/1
50 TABLET ORAL 3 TIMES DAILY
Status: DISCONTINUED | OUTPATIENT
Start: 2019-03-18 | End: 2019-03-26 | Stop reason: HOSPADM

## 2019-03-18 RX ORDER — LORAZEPAM 2 MG/ML
1 INJECTION INTRAMUSCULAR
Status: DISCONTINUED | OUTPATIENT
Start: 2019-03-18 | End: 2019-03-22

## 2019-03-18 RX ORDER — LORAZEPAM 1 MG/1
3 TABLET ORAL
Status: DISCONTINUED | OUTPATIENT
Start: 2019-03-18 | End: 2019-03-22

## 2019-03-18 RX ORDER — CLONIDINE HYDROCHLORIDE 0.2 MG/1
0.2 TABLET ORAL 2 TIMES DAILY
Status: DISCONTINUED | OUTPATIENT
Start: 2019-03-18 | End: 2019-03-26 | Stop reason: HOSPADM

## 2019-03-18 RX ORDER — LATANOPROST 50 UG/ML
1 SOLUTION/ DROPS OPHTHALMIC NIGHTLY
Status: DISCONTINUED | OUTPATIENT
Start: 2019-03-18 | End: 2019-03-26 | Stop reason: HOSPADM

## 2019-03-18 RX ADMIN — IOPAMIDOL 80 ML: 755 INJECTION, SOLUTION INTRAVENOUS at 16:16

## 2019-03-18 RX ADMIN — LATANOPROST 1 DROP: 50 SOLUTION OPHTHALMIC at 23:04

## 2019-03-18 RX ADMIN — INSULIN LISPRO 1 UNITS: 100 INJECTION, SOLUTION INTRAVENOUS; SUBCUTANEOUS at 23:05

## 2019-03-18 RX ADMIN — CLONIDINE HYDROCHLORIDE 0.2 MG: 0.2 TABLET ORAL at 23:04

## 2019-03-18 RX ADMIN — PRIMIDONE 50 MG: 50 TABLET ORAL at 23:04

## 2019-03-18 RX ADMIN — DIAZEPAM 5 MG: 5 TABLET ORAL at 23:04

## 2019-03-18 RX ADMIN — FOLIC ACID: 5 INJECTION, SOLUTION INTRAMUSCULAR; INTRAVENOUS; SUBCUTANEOUS at 15:31

## 2019-03-18 RX ADMIN — SODIUM CHLORIDE 1000 ML: 9 INJECTION, SOLUTION INTRAVENOUS at 14:33

## 2019-03-18 ASSESSMENT — PAIN DESCRIPTION - PAIN TYPE: TYPE: ACUTE PAIN

## 2019-03-18 ASSESSMENT — PAIN DESCRIPTION - ORIENTATION: ORIENTATION: LEFT

## 2019-03-18 ASSESSMENT — ENCOUNTER SYMPTOMS
ABDOMINAL DISTENTION: 0
FACIAL SWELLING: 0
BACK PAIN: 1
EYE PAIN: 0
WHEEZING: 0
SORE THROAT: 0
BLOOD IN STOOL: 0
CHOKING: 0
RHINORRHEA: 0
STRIDOR: 0
PHOTOPHOBIA: 0
SHORTNESS OF BREATH: 0
VOMITING: 0
VOICE CHANGE: 0
EYE DISCHARGE: 0
CONSTIPATION: 0
TROUBLE SWALLOWING: 0
EYE ITCHING: 0
ABDOMINAL PAIN: 0
COUGH: 0
SINUS PRESSURE: 0
CHEST TIGHTNESS: 0
EYE REDNESS: 0
DIARRHEA: 0
NAUSEA: 0
APNEA: 0
COLOR CHANGE: 0

## 2019-03-18 ASSESSMENT — PAIN DESCRIPTION - LOCATION: LOCATION: LEG

## 2019-03-18 ASSESSMENT — PAIN DESCRIPTION - FREQUENCY: FREQUENCY: CONTINUOUS

## 2019-03-18 ASSESSMENT — LIFESTYLE VARIABLES: HISTORY_ALCOHOL_USE: YES

## 2019-03-18 ASSESSMENT — PAIN SCALES - GENERAL: PAINLEVEL_OUTOF10: 7

## 2019-03-18 ASSESSMENT — PAIN DESCRIPTION - DESCRIPTORS: DESCRIPTORS: ACHING

## 2019-03-18 ASSESSMENT — PAIN DESCRIPTION - ONSET: ONSET: ON-GOING

## 2019-03-19 ENCOUNTER — APPOINTMENT (OUTPATIENT)
Dept: GENERAL RADIOLOGY | Age: 54
DRG: 912 | End: 2019-03-19
Payer: MEDICARE

## 2019-03-19 LAB
ALBUMIN SERPL-MCNC: 2.9 G/DL (ref 3.5–5.1)
ANION GAP SERPL CALCULATED.3IONS-SCNC: 13 MEQ/L (ref 8–16)
AVERAGE GLUCOSE: 222 MG/DL (ref 70–126)
BUN BLDV-MCNC: 5 MG/DL (ref 7–22)
CALCIUM SERPL-MCNC: 8.4 MG/DL (ref 8.5–10.5)
CHLORIDE BLD-SCNC: 98 MEQ/L (ref 98–111)
CHLORIDE, URINE: 40 MEQ/L
CO2: 24 MEQ/L (ref 23–33)
CREAT SERPL-MCNC: 0.5 MG/DL (ref 0.4–1.2)
ERYTHROCYTE [DISTWIDTH] IN BLOOD BY AUTOMATED COUNT: 14.6 % (ref 11.5–14.5)
ERYTHROCYTE [DISTWIDTH] IN BLOOD BY AUTOMATED COUNT: 53.1 FL (ref 35–45)
GAMMA GLUTAMYL TRANSFERASE: 111 U/L (ref 8–69)
GFR SERPL CREATININE-BSD FRML MDRD: > 90 ML/MIN/1.73M2
GLUCOSE BLD-MCNC: 258 MG/DL (ref 70–108)
GLUCOSE BLD-MCNC: 275 MG/DL (ref 70–108)
GLUCOSE BLD-MCNC: 284 MG/DL (ref 70–108)
GLUCOSE BLD-MCNC: 297 MG/DL (ref 70–108)
GLUCOSE BLD-MCNC: 319 MG/DL (ref 70–108)
HBA1C MFR BLD: 9.4 % (ref 4.4–6.4)
HCT VFR BLD CALC: 35.2 % (ref 42–52)
HEMOGLOBIN: 11.8 GM/DL (ref 14–18)
MCH RBC QN AUTO: 33.1 PG (ref 26–33)
MCHC RBC AUTO-ENTMCNC: 33.5 GM/DL (ref 32.2–35.5)
MCV RBC AUTO: 98.6 FL (ref 80–94)
OSMOLALITY URINE: 440 MOSMOL/KG (ref 250–750)
PHOSPHORUS: 3.1 MG/DL (ref 2.4–4.7)
PLATELET # BLD: 61 THOU/MM3 (ref 130–400)
PMV BLD AUTO: 11.2 FL (ref 9.4–12.4)
POTASSIUM SERPL-SCNC: 4.4 MEQ/L (ref 3.5–5.2)
POTASSIUM, URINE: 41.8 MEQ/L
RBC # BLD: 3.57 MILL/MM3 (ref 4.7–6.1)
SODIUM BLD-SCNC: 135 MEQ/L (ref 135–145)
SODIUM URINE: 30 MEQ/L
TROPONIN T: < 0.01 NG/ML
WBC # BLD: 4.7 THOU/MM3 (ref 4.8–10.8)

## 2019-03-19 PROCEDURE — 73590 X-RAY EXAM OF LOWER LEG: CPT

## 2019-03-19 PROCEDURE — 94640 AIRWAY INHALATION TREATMENT: CPT

## 2019-03-19 PROCEDURE — 84300 ASSAY OF URINE SODIUM: CPT

## 2019-03-19 PROCEDURE — 82948 REAGENT STRIP/BLOOD GLUCOSE: CPT

## 2019-03-19 PROCEDURE — 94761 N-INVAS EAR/PLS OXIMETRY MLT: CPT

## 2019-03-19 PROCEDURE — 85027 COMPLETE CBC AUTOMATED: CPT

## 2019-03-19 PROCEDURE — 83036 HEMOGLOBIN GLYCOSYLATED A1C: CPT

## 2019-03-19 PROCEDURE — 83935 ASSAY OF URINE OSMOLALITY: CPT

## 2019-03-19 PROCEDURE — 6370000000 HC RX 637 (ALT 250 FOR IP): Performed by: INTERNAL MEDICINE

## 2019-03-19 PROCEDURE — 1200000003 HC TELEMETRY R&B

## 2019-03-19 PROCEDURE — 6370000000 HC RX 637 (ALT 250 FOR IP): Performed by: NURSE PRACTITIONER

## 2019-03-19 PROCEDURE — 84484 ASSAY OF TROPONIN QUANT: CPT

## 2019-03-19 PROCEDURE — 73660 X-RAY EXAM OF TOE(S): CPT

## 2019-03-19 PROCEDURE — 99233 SBSQ HOSP IP/OBS HIGH 50: CPT | Performed by: INTERNAL MEDICINE

## 2019-03-19 PROCEDURE — 84133 ASSAY OF URINE POTASSIUM: CPT

## 2019-03-19 PROCEDURE — 82436 ASSAY OF URINE CHLORIDE: CPT

## 2019-03-19 PROCEDURE — 71045 X-RAY EXAM CHEST 1 VIEW: CPT

## 2019-03-19 PROCEDURE — 2709999900 HC NON-CHARGEABLE SUPPLY

## 2019-03-19 PROCEDURE — 2580000003 HC RX 258: Performed by: INTERNAL MEDICINE

## 2019-03-19 PROCEDURE — 36415 COLL VENOUS BLD VENIPUNCTURE: CPT

## 2019-03-19 PROCEDURE — 2700000000 HC OXYGEN THERAPY PER DAY

## 2019-03-19 PROCEDURE — 80069 RENAL FUNCTION PANEL: CPT

## 2019-03-19 PROCEDURE — 6360000002 HC RX W HCPCS: Performed by: INTERNAL MEDICINE

## 2019-03-19 RX ORDER — SENNA PLUS 8.6 MG/1
1 TABLET ORAL NIGHTLY
Status: DISCONTINUED | OUTPATIENT
Start: 2019-03-19 | End: 2019-03-23

## 2019-03-19 RX ORDER — SODIUM CHLORIDE AND POTASSIUM CHLORIDE .9; .15 G/100ML; G/100ML
SOLUTION INTRAVENOUS CONTINUOUS
Status: DISPENSED | OUTPATIENT
Start: 2019-03-19 | End: 2019-03-20

## 2019-03-19 RX ORDER — INSULIN GLARGINE 100 [IU]/ML
8 INJECTION, SOLUTION SUBCUTANEOUS NIGHTLY
Status: DISCONTINUED | OUTPATIENT
Start: 2019-03-19 | End: 2019-03-20

## 2019-03-19 RX ORDER — TADALAFIL 20 MG/1
20 TABLET ORAL PRN
COMMUNITY
End: 2021-02-15 | Stop reason: ALTCHOICE

## 2019-03-19 RX ORDER — POLYETHYLENE GLYCOL 3350 17 G/17G
17 POWDER, FOR SOLUTION ORAL DAILY
Status: DISCONTINUED | OUTPATIENT
Start: 2019-03-19 | End: 2019-03-20

## 2019-03-19 RX ORDER — DOCUSATE SODIUM 100 MG/1
100 CAPSULE, LIQUID FILLED ORAL 2 TIMES DAILY
Status: DISCONTINUED | OUTPATIENT
Start: 2019-03-19 | End: 2019-03-23

## 2019-03-19 RX ORDER — AMOXICILLIN AND CLAVULANATE POTASSIUM 875; 125 MG/1; MG/1
1 TABLET, FILM COATED ORAL 2 TIMES DAILY
Status: ON HOLD | COMMUNITY
Start: 2019-03-15 | End: 2019-03-26 | Stop reason: HOSPADM

## 2019-03-19 RX ADMIN — ROSUVASTATIN CALCIUM 20 MG: 20 TABLET, FILM COATED ORAL at 09:12

## 2019-03-19 RX ADMIN — CYCLOBENZAPRINE HYDROCHLORIDE 10 MG: 10 TABLET, FILM COATED ORAL at 09:15

## 2019-03-19 RX ADMIN — SENNOSIDES 8.6 MG: 8.6 TABLET, FILM COATED ORAL at 21:56

## 2019-03-19 RX ADMIN — LORAZEPAM 3 MG: 1 TABLET ORAL at 09:15

## 2019-03-19 RX ADMIN — INSULIN LISPRO 3 UNITS: 100 INJECTION, SOLUTION INTRAVENOUS; SUBCUTANEOUS at 14:14

## 2019-03-19 RX ADMIN — CLONIDINE HYDROCHLORIDE 0.2 MG: 0.2 TABLET ORAL at 09:12

## 2019-03-19 RX ADMIN — PRIMIDONE 50 MG: 50 TABLET ORAL at 09:12

## 2019-03-19 RX ADMIN — FOLIC ACID 1 MG: 1 TABLET ORAL at 09:12

## 2019-03-19 RX ADMIN — LORAZEPAM 2 MG: 1 TABLET ORAL at 17:16

## 2019-03-19 RX ADMIN — INSULIN LISPRO 4 UNITS: 100 INJECTION, SOLUTION INTRAVENOUS; SUBCUTANEOUS at 18:43

## 2019-03-19 RX ADMIN — LATANOPROST 1 DROP: 50 SOLUTION OPHTHALMIC at 22:03

## 2019-03-19 RX ADMIN — LORAZEPAM 2 MG: 1 TABLET ORAL at 03:38

## 2019-03-19 RX ADMIN — POTASSIUM CHLORIDE AND SODIUM CHLORIDE: 900; 150 INJECTION, SOLUTION INTRAVENOUS at 12:46

## 2019-03-19 RX ADMIN — PRIMIDONE 50 MG: 50 TABLET ORAL at 21:56

## 2019-03-19 RX ADMIN — LORAZEPAM 1 MG: 1 TABLET ORAL at 21:56

## 2019-03-19 RX ADMIN — THERA TABS 1 TABLET: TAB at 09:12

## 2019-03-19 RX ADMIN — AMLODIPINE BESYLATE 5 MG: 5 TABLET ORAL at 09:12

## 2019-03-19 RX ADMIN — INSULIN GLARGINE 8 UNITS: 100 INJECTION, SOLUTION SUBCUTANEOUS at 21:56

## 2019-03-19 RX ADMIN — Medication 100 MG: at 09:12

## 2019-03-19 RX ADMIN — CYCLOBENZAPRINE HYDROCHLORIDE 10 MG: 10 TABLET, FILM COATED ORAL at 17:16

## 2019-03-19 RX ADMIN — INSULIN LISPRO 3 UNITS: 100 INJECTION, SOLUTION INTRAVENOUS; SUBCUTANEOUS at 09:20

## 2019-03-19 RX ADMIN — CARVEDILOL 6.25 MG: 6.25 TABLET, FILM COATED ORAL at 17:12

## 2019-03-19 RX ADMIN — INSULIN LISPRO 2 UNITS: 100 INJECTION, SOLUTION INTRAVENOUS; SUBCUTANEOUS at 21:57

## 2019-03-19 RX ADMIN — CARVEDILOL 6.25 MG: 6.25 TABLET, FILM COATED ORAL at 09:12

## 2019-03-19 RX ADMIN — DOCUSATE SODIUM 100 MG: 100 CAPSULE, LIQUID FILLED ORAL at 21:56

## 2019-03-19 RX ADMIN — POLYETHYLENE GLYCOL 3350 17 G: 17 POWDER, FOR SOLUTION ORAL at 17:17

## 2019-03-19 RX ADMIN — TIOTROPIUM BROMIDE 18 MCG: 18 CAPSULE ORAL; RESPIRATORY (INHALATION) at 09:45

## 2019-03-19 RX ADMIN — ASPIRIN 81 MG 81 MG: 81 TABLET ORAL at 09:15

## 2019-03-19 RX ADMIN — DIAZEPAM 5 MG: 5 TABLET ORAL at 06:14

## 2019-03-19 RX ADMIN — DULOXETINE HYDROCHLORIDE 60 MG: 60 CAPSULE, DELAYED RELEASE ORAL at 09:12

## 2019-03-19 RX ADMIN — CLONIDINE HYDROCHLORIDE 0.2 MG: 0.2 TABLET ORAL at 21:56

## 2019-03-19 RX ADMIN — PRIMIDONE 50 MG: 50 TABLET ORAL at 14:14

## 2019-03-19 RX ADMIN — Medication 10 ML: at 09:13

## 2019-03-19 ASSESSMENT — PAIN DESCRIPTION - ONSET
ONSET: ON-GOING

## 2019-03-19 ASSESSMENT — PAIN DESCRIPTION - DESCRIPTORS
DESCRIPTORS: ACHING;SHARP
DESCRIPTORS: ACHING
DESCRIPTORS: ACHING;SHARP

## 2019-03-19 ASSESSMENT — PAIN DESCRIPTION - LOCATION
LOCATION: LEG

## 2019-03-19 ASSESSMENT — PAIN DESCRIPTION - FREQUENCY
FREQUENCY: CONTINUOUS

## 2019-03-19 ASSESSMENT — PAIN DESCRIPTION - PAIN TYPE
TYPE: ACUTE PAIN

## 2019-03-19 ASSESSMENT — PAIN DESCRIPTION - ORIENTATION
ORIENTATION: LEFT

## 2019-03-19 ASSESSMENT — PAIN SCALES - GENERAL
PAINLEVEL_OUTOF10: 8
PAINLEVEL_OUTOF10: 10
PAINLEVEL_OUTOF10: 8

## 2019-03-20 LAB
AMMONIA: 85 UMOL/L (ref 11–60)
ANION GAP SERPL CALCULATED.3IONS-SCNC: 11 MEQ/L (ref 8–16)
ANISOCYTOSIS: PRESENT
BASOPHILIA: ABNORMAL
BASOPHILS # BLD: 0.8 %
BASOPHILS ABSOLUTE: 0 THOU/MM3 (ref 0–0.1)
BUN BLDV-MCNC: 7 MG/DL (ref 7–22)
CALCIUM SERPL-MCNC: 8.8 MG/DL (ref 8.5–10.5)
CHLORIDE BLD-SCNC: 99 MEQ/L (ref 98–111)
CO2: 23 MEQ/L (ref 23–33)
CREAT SERPL-MCNC: 0.3 MG/DL (ref 0.4–1.2)
EOSINOPHIL # BLD: 2.6 %
EOSINOPHILS ABSOLUTE: 0.1 THOU/MM3 (ref 0–0.4)
ERYTHROCYTE [DISTWIDTH] IN BLOOD BY AUTOMATED COUNT: 14.5 % (ref 11.5–14.5)
ERYTHROCYTE [DISTWIDTH] IN BLOOD BY AUTOMATED COUNT: 14.7 % (ref 11.5–14.5)
ERYTHROCYTE [DISTWIDTH] IN BLOOD BY AUTOMATED COUNT: 53.1 FL (ref 35–45)
ERYTHROCYTE [DISTWIDTH] IN BLOOD BY AUTOMATED COUNT: 53.3 FL (ref 35–45)
GFR SERPL CREATININE-BSD FRML MDRD: > 90 ML/MIN/1.73M2
GLUCOSE BLD-MCNC: 244 MG/DL (ref 70–108)
GLUCOSE BLD-MCNC: 253 MG/DL (ref 70–108)
GLUCOSE BLD-MCNC: 278 MG/DL (ref 70–108)
GLUCOSE BLD-MCNC: 364 MG/DL (ref 70–108)
GLUCOSE BLD-MCNC: 365 MG/DL (ref 70–108)
GLUCOSE BLD-MCNC: 402 MG/DL (ref 70–108)
HCT VFR BLD CALC: 37.3 % (ref 42–52)
HCT VFR BLD CALC: 38 % (ref 42–52)
HEMOGLOBIN: 12.5 GM/DL (ref 14–18)
HEMOGLOBIN: 13 GM/DL (ref 14–18)
IMMATURE GRANS (ABS): 0.01 THOU/MM3 (ref 0–0.07)
IMMATURE GRANULOCYTES: 0.3 %
LYMPHOCYTES # BLD: 36.8 %
LYMPHOCYTES ABSOLUTE: 1.4 THOU/MM3 (ref 1–4.8)
MAGNESIUM: 1.5 MG/DL (ref 1.6–2.4)
MCH RBC QN AUTO: 33.2 PG (ref 26–33)
MCH RBC QN AUTO: 33.4 PG (ref 26–33)
MCHC RBC AUTO-ENTMCNC: 33.5 GM/DL (ref 32.2–35.5)
MCHC RBC AUTO-ENTMCNC: 34.2 GM/DL (ref 32.2–35.5)
MCV RBC AUTO: 96.9 FL (ref 80–94)
MCV RBC AUTO: 99.7 FL (ref 80–94)
MONOCYTES # BLD: 8.4 %
MONOCYTES ABSOLUTE: 0.3 THOU/MM3 (ref 0.4–1.3)
NUCLEATED RED BLOOD CELLS: 0 /100 WBC
PHOSPHORUS: 2.9 MG/DL (ref 2.4–4.7)
PLATELET # BLD: 47 THOU/MM3 (ref 130–400)
PLATELET # BLD: 64 THOU/MM3 (ref 130–400)
PLATELET ESTIMATE: ABNORMAL
PMV BLD AUTO: 10.9 FL (ref 9.4–12.4)
POTASSIUM SERPL-SCNC: 4.1 MEQ/L (ref 3.5–5.2)
RBC # BLD: 3.74 MILL/MM3 (ref 4.7–6.1)
RBC # BLD: 3.92 MILL/MM3 (ref 4.7–6.1)
REASON FOR REJECTION: NORMAL
REJECTED TEST: NORMAL
SCAN OF BLOOD SMEAR: NORMAL
SEG NEUTROPHILS: 51.1 %
SEGMENTED NEUTROPHILS ABSOLUTE COUNT: 1.9 THOU/MM3 (ref 1.8–7.7)
SODIUM BLD-SCNC: 133 MEQ/L (ref 135–145)
WBC # BLD: 3.8 THOU/MM3 (ref 4.8–10.8)
WBC # BLD: 4.5 THOU/MM3 (ref 4.8–10.8)

## 2019-03-20 PROCEDURE — 2709999900 HC NON-CHARGEABLE SUPPLY

## 2019-03-20 PROCEDURE — 6370000000 HC RX 637 (ALT 250 FOR IP): Performed by: INTERNAL MEDICINE

## 2019-03-20 PROCEDURE — 82948 REAGENT STRIP/BLOOD GLUCOSE: CPT

## 2019-03-20 PROCEDURE — 84100 ASSAY OF PHOSPHORUS: CPT

## 2019-03-20 PROCEDURE — 6370000000 HC RX 637 (ALT 250 FOR IP): Performed by: NURSE PRACTITIONER

## 2019-03-20 PROCEDURE — 6360000002 HC RX W HCPCS: Performed by: INTERNAL MEDICINE

## 2019-03-20 PROCEDURE — 80048 BASIC METABOLIC PNL TOTAL CA: CPT

## 2019-03-20 PROCEDURE — 83735 ASSAY OF MAGNESIUM: CPT

## 2019-03-20 PROCEDURE — 94640 AIRWAY INHALATION TREATMENT: CPT

## 2019-03-20 PROCEDURE — 82140 ASSAY OF AMMONIA: CPT

## 2019-03-20 PROCEDURE — 94010 BREATHING CAPACITY TEST: CPT

## 2019-03-20 PROCEDURE — 1200000003 HC TELEMETRY R&B

## 2019-03-20 PROCEDURE — 2580000003 HC RX 258: Performed by: INTERNAL MEDICINE

## 2019-03-20 PROCEDURE — 99232 SBSQ HOSP IP/OBS MODERATE 35: CPT | Performed by: INTERNAL MEDICINE

## 2019-03-20 PROCEDURE — 36415 COLL VENOUS BLD VENIPUNCTURE: CPT

## 2019-03-20 PROCEDURE — 92610 EVALUATE SWALLOWING FUNCTION: CPT

## 2019-03-20 PROCEDURE — 85025 COMPLETE CBC W/AUTO DIFF WBC: CPT

## 2019-03-20 RX ORDER — MAGNESIUM SULFATE IN WATER 40 MG/ML
2 INJECTION, SOLUTION INTRAVENOUS ONCE
Status: COMPLETED | OUTPATIENT
Start: 2019-03-20 | End: 2019-03-20

## 2019-03-20 RX ORDER — INSULIN GLARGINE 100 [IU]/ML
16 INJECTION, SOLUTION SUBCUTANEOUS NIGHTLY
Status: DISCONTINUED | OUTPATIENT
Start: 2019-03-20 | End: 2019-03-22

## 2019-03-20 RX ORDER — CLONIDINE HYDROCHLORIDE 0.1 MG/1
0.1 TABLET ORAL EVERY 4 HOURS PRN
Status: DISCONTINUED | OUTPATIENT
Start: 2019-03-20 | End: 2019-03-22

## 2019-03-20 RX ORDER — LACTULOSE 10 G/15ML
20 SOLUTION ORAL 2 TIMES DAILY
Status: DISCONTINUED | OUTPATIENT
Start: 2019-03-20 | End: 2019-03-25

## 2019-03-20 RX ADMIN — CARVEDILOL 6.25 MG: 6.25 TABLET, FILM COATED ORAL at 17:47

## 2019-03-20 RX ADMIN — Medication 10 ML: at 08:19

## 2019-03-20 RX ADMIN — LACTULOSE 20 G: 10 SOLUTION ORAL at 20:26

## 2019-03-20 RX ADMIN — DULOXETINE HYDROCHLORIDE 60 MG: 60 CAPSULE, DELAYED RELEASE ORAL at 08:19

## 2019-03-20 RX ADMIN — PRIMIDONE 50 MG: 50 TABLET ORAL at 13:37

## 2019-03-20 RX ADMIN — AMLODIPINE BESYLATE 5 MG: 5 TABLET ORAL at 04:46

## 2019-03-20 RX ADMIN — INSULIN GLARGINE 16 UNITS: 100 INJECTION, SOLUTION SUBCUTANEOUS at 20:31

## 2019-03-20 RX ADMIN — CARVEDILOL 6.25 MG: 6.25 TABLET, FILM COATED ORAL at 08:19

## 2019-03-20 RX ADMIN — CLONIDINE HYDROCHLORIDE 0.2 MG: 0.2 TABLET ORAL at 06:31

## 2019-03-20 RX ADMIN — SENNOSIDES 8.6 MG: 8.6 TABLET, FILM COATED ORAL at 20:25

## 2019-03-20 RX ADMIN — Medication 10 ML: at 20:25

## 2019-03-20 RX ADMIN — LATANOPROST 1 DROP: 50 SOLUTION OPHTHALMIC at 20:25

## 2019-03-20 RX ADMIN — CLONIDINE HYDROCHLORIDE 0.1 MG: 0.1 TABLET ORAL at 03:38

## 2019-03-20 RX ADMIN — TIOTROPIUM BROMIDE 18 MCG: 18 CAPSULE ORAL; RESPIRATORY (INHALATION) at 08:45

## 2019-03-20 RX ADMIN — PRIMIDONE 50 MG: 50 TABLET ORAL at 08:18

## 2019-03-20 RX ADMIN — Medication 100 MG: at 08:18

## 2019-03-20 RX ADMIN — THERA TABS 1 TABLET: TAB at 08:18

## 2019-03-20 RX ADMIN — POTASSIUM CHLORIDE AND SODIUM CHLORIDE: 900; 150 INJECTION, SOLUTION INTRAVENOUS at 08:39

## 2019-03-20 RX ADMIN — CLONIDINE HYDROCHLORIDE 0.2 MG: 0.2 TABLET ORAL at 20:25

## 2019-03-20 RX ADMIN — PRIMIDONE 50 MG: 50 TABLET ORAL at 20:25

## 2019-03-20 RX ADMIN — INSULIN LISPRO 10 UNITS: 100 INJECTION, SOLUTION INTRAVENOUS; SUBCUTANEOUS at 13:35

## 2019-03-20 RX ADMIN — MAGNESIUM SULFATE HEPTAHYDRATE 2 G: 40 INJECTION, SOLUTION INTRAVENOUS at 08:47

## 2019-03-20 RX ADMIN — FOLIC ACID 1 MG: 1 TABLET ORAL at 08:19

## 2019-03-20 RX ADMIN — DOCUSATE SODIUM 100 MG: 100 CAPSULE, LIQUID FILLED ORAL at 20:25

## 2019-03-20 RX ADMIN — INSULIN LISPRO 3 UNITS: 100 INJECTION, SOLUTION INTRAVENOUS; SUBCUTANEOUS at 08:19

## 2019-03-20 RX ADMIN — DOCUSATE SODIUM 100 MG: 100 CAPSULE, LIQUID FILLED ORAL at 08:19

## 2019-03-20 RX ADMIN — LACTULOSE 20 G: 10 SOLUTION ORAL at 08:22

## 2019-03-20 RX ADMIN — LORAZEPAM 2 MG: 2 INJECTION INTRAMUSCULAR; INTRAVENOUS at 00:37

## 2019-03-20 RX ADMIN — INSULIN LISPRO 12 UNITS: 100 INJECTION, SOLUTION INTRAVENOUS; SUBCUTANEOUS at 19:01

## 2019-03-20 ASSESSMENT — PAIN SCALES - GENERAL: PAINLEVEL_OUTOF10: 0

## 2019-03-21 ENCOUNTER — ANESTHESIA EVENT (OUTPATIENT)
Dept: OPERATING ROOM | Age: 54
DRG: 912 | End: 2019-03-21
Payer: MEDICARE

## 2019-03-21 ENCOUNTER — APPOINTMENT (OUTPATIENT)
Dept: GENERAL RADIOLOGY | Age: 54
DRG: 912 | End: 2019-03-21
Payer: MEDICARE

## 2019-03-21 ENCOUNTER — ANESTHESIA (OUTPATIENT)
Dept: OPERATING ROOM | Age: 54
DRG: 912 | End: 2019-03-21
Payer: MEDICARE

## 2019-03-21 VITALS
RESPIRATION RATE: 1 BRPM | OXYGEN SATURATION: 100 % | TEMPERATURE: 98.1 F | SYSTOLIC BLOOD PRESSURE: 114 MMHG | DIASTOLIC BLOOD PRESSURE: 68 MMHG

## 2019-03-21 LAB
AMMONIA: 102 UMOL/L (ref 11–60)
ANION GAP SERPL CALCULATED.3IONS-SCNC: 11 MEQ/L (ref 8–16)
BUN BLDV-MCNC: 9 MG/DL (ref 7–22)
CALCIUM SERPL-MCNC: 8.8 MG/DL (ref 8.5–10.5)
CHLORIDE BLD-SCNC: 96 MEQ/L (ref 98–111)
CO2: 26 MEQ/L (ref 23–33)
CREAT SERPL-MCNC: 0.4 MG/DL (ref 0.4–1.2)
GFR SERPL CREATININE-BSD FRML MDRD: > 90 ML/MIN/1.73M2
GLUCOSE BLD-MCNC: 195 MG/DL (ref 70–108)
GLUCOSE BLD-MCNC: 222 MG/DL (ref 70–108)
GLUCOSE BLD-MCNC: 229 MG/DL (ref 70–108)
GLUCOSE BLD-MCNC: 234 MG/DL (ref 70–108)
GLUCOSE BLD-MCNC: 309 MG/DL (ref 70–108)
MAGNESIUM: 1.5 MG/DL (ref 1.6–2.4)
POTASSIUM SERPL-SCNC: 3.7 MEQ/L (ref 3.5–5.2)
SODIUM BLD-SCNC: 133 MEQ/L (ref 135–145)

## 2019-03-21 PROCEDURE — 82140 ASSAY OF AMMONIA: CPT

## 2019-03-21 PROCEDURE — 6360000002 HC RX W HCPCS: Performed by: INTERNAL MEDICINE

## 2019-03-21 PROCEDURE — 2580000003 HC RX 258: Performed by: INTERNAL MEDICINE

## 2019-03-21 PROCEDURE — 83735 ASSAY OF MAGNESIUM: CPT

## 2019-03-21 PROCEDURE — 3600000014 HC SURGERY LEVEL 4 ADDTL 15MIN: Performed by: PODIATRIST

## 2019-03-21 PROCEDURE — 6370000000 HC RX 637 (ALT 250 FOR IP): Performed by: STUDENT IN AN ORGANIZED HEALTH CARE EDUCATION/TRAINING PROGRAM

## 2019-03-21 PROCEDURE — 6360000002 HC RX W HCPCS: Performed by: REGISTERED NURSE

## 2019-03-21 PROCEDURE — 2500000003 HC RX 250 WO HCPCS: Performed by: REGISTERED NURSE

## 2019-03-21 PROCEDURE — 2580000003 HC RX 258: Performed by: REGISTERED NURSE

## 2019-03-21 PROCEDURE — 6370000000 HC RX 637 (ALT 250 FOR IP): Performed by: INTERNAL MEDICINE

## 2019-03-21 PROCEDURE — 6370000000 HC RX 637 (ALT 250 FOR IP): Performed by: NURSE PRACTITIONER

## 2019-03-21 PROCEDURE — 82948 REAGENT STRIP/BLOOD GLUCOSE: CPT

## 2019-03-21 PROCEDURE — 2709999900 HC NON-CHARGEABLE SUPPLY

## 2019-03-21 PROCEDURE — 6370000000 HC RX 637 (ALT 250 FOR IP): Performed by: PHYSICIAN ASSISTANT

## 2019-03-21 PROCEDURE — 99232 SBSQ HOSP IP/OBS MODERATE 35: CPT | Performed by: INTERNAL MEDICINE

## 2019-03-21 PROCEDURE — 3600000004 HC SURGERY LEVEL 4 BASE: Performed by: PODIATRIST

## 2019-03-21 PROCEDURE — 73600 X-RAY EXAM OF ANKLE: CPT

## 2019-03-21 PROCEDURE — 80048 BASIC METABOLIC PNL TOTAL CA: CPT

## 2019-03-21 PROCEDURE — 36415 COLL VENOUS BLD VENIPUNCTURE: CPT

## 2019-03-21 PROCEDURE — 7100000000 HC PACU RECOVERY - FIRST 15 MIN: Performed by: PODIATRIST

## 2019-03-21 PROCEDURE — 94640 AIRWAY INHALATION TREATMENT: CPT

## 2019-03-21 PROCEDURE — 7100000001 HC PACU RECOVERY - ADDTL 15 MIN: Performed by: PODIATRIST

## 2019-03-21 PROCEDURE — 94760 N-INVAS EAR/PLS OXIMETRY 1: CPT

## 2019-03-21 PROCEDURE — 1200000003 HC TELEMETRY R&B

## 2019-03-21 PROCEDURE — C1713 ANCHOR/SCREW BN/BN,TIS/BN: HCPCS | Performed by: PODIATRIST

## 2019-03-21 PROCEDURE — 0QSK04Z REPOSITION LEFT FIBULA WITH INTERNAL FIXATION DEVICE, OPEN APPROACH: ICD-10-PCS | Performed by: PODIATRIST

## 2019-03-21 PROCEDURE — 3700000000 HC ANESTHESIA ATTENDED CARE: Performed by: PODIATRIST

## 2019-03-21 PROCEDURE — 2709999900 HC NON-CHARGEABLE SUPPLY: Performed by: PODIATRIST

## 2019-03-21 PROCEDURE — 87522 HEPATITIS C REVRS TRNSCRPJ: CPT

## 2019-03-21 PROCEDURE — 3700000001 HC ADD 15 MINUTES (ANESTHESIA): Performed by: PODIATRIST

## 2019-03-21 PROCEDURE — 3209999900 FLUORO FOR SURGICAL PROCEDURES

## 2019-03-21 PROCEDURE — 2700000000 HC OXYGEN THERAPY PER DAY

## 2019-03-21 DEVICE — PERI-LOC VLP 2.7MM X 14MM                                    PROVISIONAL FIXATION PIN
Type: IMPLANTABLE DEVICE | Site: ANKLE | Status: FUNCTIONAL
Brand: PERI-LOC VLP

## 2019-03-21 DEVICE — PERI-LOC 2.7MM SELF-TAPPING CORTEX                                    SCREW 20MM
Type: IMPLANTABLE DEVICE | Site: ANKLE | Status: FUNCTIONAL
Brand: PERI-LOC

## 2019-03-21 DEVICE — PERI-LOC 2.7MM SELF-TAPPING CORTEX                                    SCREW 18MM
Type: IMPLANTABLE DEVICE | Site: ANKLE | Status: FUNCTIONAL
Brand: PERI-LOC

## 2019-03-21 DEVICE — PERI-LOC VLP 3.5MM LATERAL DISTAL                                    FIBULA LOCKING PLATE 11H LEFT 155MM
Type: IMPLANTABLE DEVICE | Site: ANKLE | Status: FUNCTIONAL
Brand: PERI-LOC VLP

## 2019-03-21 DEVICE — PERI-LOC VLP 3.5MM X 12MM LOCKING                                    SCREW SELF TAPPING
Type: IMPLANTABLE DEVICE | Site: ANKLE | Status: FUNCTIONAL
Brand: PERI-LOC VLP

## 2019-03-21 RX ORDER — DEXAMETHASONE SODIUM PHOSPHATE 4 MG/ML
INJECTION, SOLUTION INTRA-ARTICULAR; INTRALESIONAL; INTRAMUSCULAR; INTRAVENOUS; SOFT TISSUE PRN
Status: DISCONTINUED | OUTPATIENT
Start: 2019-03-21 | End: 2019-03-21 | Stop reason: SDUPTHER

## 2019-03-21 RX ORDER — FENTANYL CITRATE 50 UG/ML
INJECTION, SOLUTION INTRAMUSCULAR; INTRAVENOUS PRN
Status: DISCONTINUED | OUTPATIENT
Start: 2019-03-21 | End: 2019-03-21 | Stop reason: SDUPTHER

## 2019-03-21 RX ORDER — ROCURONIUM BROMIDE 10 MG/ML
INJECTION, SOLUTION INTRAVENOUS PRN
Status: DISCONTINUED | OUTPATIENT
Start: 2019-03-21 | End: 2019-03-21 | Stop reason: SDUPTHER

## 2019-03-21 RX ORDER — ONDANSETRON 2 MG/ML
INJECTION INTRAMUSCULAR; INTRAVENOUS PRN
Status: DISCONTINUED | OUTPATIENT
Start: 2019-03-21 | End: 2019-03-21 | Stop reason: SDUPTHER

## 2019-03-21 RX ORDER — SODIUM CHLORIDE 9 MG/ML
INJECTION, SOLUTION INTRAVENOUS CONTINUOUS PRN
Status: DISCONTINUED | OUTPATIENT
Start: 2019-03-21 | End: 2019-03-21 | Stop reason: SDUPTHER

## 2019-03-21 RX ORDER — CEFAZOLIN SODIUM 1 G/3ML
INJECTION, POWDER, FOR SOLUTION INTRAMUSCULAR; INTRAVENOUS PRN
Status: DISCONTINUED | OUTPATIENT
Start: 2019-03-21 | End: 2019-03-21 | Stop reason: SDUPTHER

## 2019-03-21 RX ORDER — MEPERIDINE HYDROCHLORIDE 25 MG/ML
12.5 INJECTION INTRAMUSCULAR; INTRAVENOUS; SUBCUTANEOUS EVERY 5 MIN PRN
Status: DISCONTINUED | OUTPATIENT
Start: 2019-03-21 | End: 2019-03-21 | Stop reason: HOSPADM

## 2019-03-21 RX ORDER — MAGNESIUM SULFATE IN WATER 40 MG/ML
2 INJECTION, SOLUTION INTRAVENOUS ONCE
Status: COMPLETED | OUTPATIENT
Start: 2019-03-21 | End: 2019-03-21

## 2019-03-21 RX ORDER — METOCLOPRAMIDE HYDROCHLORIDE 5 MG/ML
10 INJECTION INTRAMUSCULAR; INTRAVENOUS
Status: DISCONTINUED | OUTPATIENT
Start: 2019-03-21 | End: 2019-03-21 | Stop reason: HOSPADM

## 2019-03-21 RX ORDER — PROPOFOL 10 MG/ML
INJECTION, EMULSION INTRAVENOUS PRN
Status: DISCONTINUED | OUTPATIENT
Start: 2019-03-21 | End: 2019-03-21 | Stop reason: SDUPTHER

## 2019-03-21 RX ORDER — KETOROLAC TROMETHAMINE 30 MG/ML
30 INJECTION, SOLUTION INTRAMUSCULAR; INTRAVENOUS ONCE
Status: COMPLETED | OUTPATIENT
Start: 2019-03-21 | End: 2019-03-21

## 2019-03-21 RX ORDER — HYDROCODONE BITARTRATE AND ACETAMINOPHEN 5; 325 MG/1; MG/1
2 TABLET ORAL EVERY 4 HOURS PRN
Status: DISCONTINUED | OUTPATIENT
Start: 2019-03-21 | End: 2019-03-26 | Stop reason: HOSPADM

## 2019-03-21 RX ORDER — NEOSTIGMINE METHYLSULFATE 5 MG/5 ML
SYRINGE (ML) INTRAVENOUS PRN
Status: DISCONTINUED | OUTPATIENT
Start: 2019-03-21 | End: 2019-03-21 | Stop reason: SDUPTHER

## 2019-03-21 RX ORDER — MIDAZOLAM HYDROCHLORIDE 1 MG/ML
INJECTION INTRAMUSCULAR; INTRAVENOUS PRN
Status: DISCONTINUED | OUTPATIENT
Start: 2019-03-21 | End: 2019-03-21 | Stop reason: SDUPTHER

## 2019-03-21 RX ORDER — MORPHINE SULFATE 2 MG/ML
2 INJECTION, SOLUTION INTRAMUSCULAR; INTRAVENOUS
Status: DISCONTINUED | OUTPATIENT
Start: 2019-03-21 | End: 2019-03-26 | Stop reason: HOSPADM

## 2019-03-21 RX ORDER — SODIUM CHLORIDE 0.9 % (FLUSH) 0.9 %
10 SYRINGE (ML) INJECTION PRN
Status: DISCONTINUED | OUTPATIENT
Start: 2019-03-21 | End: 2019-03-26 | Stop reason: HOSPADM

## 2019-03-21 RX ORDER — HYDROCODONE BITARTRATE AND ACETAMINOPHEN 5; 325 MG/1; MG/1
1 TABLET ORAL EVERY 4 HOURS PRN
Status: DISCONTINUED | OUTPATIENT
Start: 2019-03-21 | End: 2019-03-26 | Stop reason: HOSPADM

## 2019-03-21 RX ORDER — FENTANYL CITRATE 50 UG/ML
25 INJECTION, SOLUTION INTRAMUSCULAR; INTRAVENOUS EVERY 5 MIN PRN
Status: DISCONTINUED | OUTPATIENT
Start: 2019-03-21 | End: 2019-03-21 | Stop reason: HOSPADM

## 2019-03-21 RX ORDER — GLYCOPYRROLATE 1 MG/5 ML
SYRINGE (ML) INTRAVENOUS PRN
Status: DISCONTINUED | OUTPATIENT
Start: 2019-03-21 | End: 2019-03-21 | Stop reason: SDUPTHER

## 2019-03-21 RX ORDER — ACETAMINOPHEN 325 MG/1
650 TABLET ORAL EVERY 4 HOURS PRN
Status: DISCONTINUED | OUTPATIENT
Start: 2019-03-21 | End: 2019-03-26 | Stop reason: HOSPADM

## 2019-03-21 RX ORDER — DIPHENHYDRAMINE HYDROCHLORIDE 50 MG/ML
12.5 INJECTION INTRAMUSCULAR; INTRAVENOUS
Status: DISCONTINUED | OUTPATIENT
Start: 2019-03-21 | End: 2019-03-21 | Stop reason: HOSPADM

## 2019-03-21 RX ORDER — PROMETHAZINE HYDROCHLORIDE 25 MG/ML
12.5 INJECTION, SOLUTION INTRAMUSCULAR; INTRAVENOUS
Status: DISCONTINUED | OUTPATIENT
Start: 2019-03-21 | End: 2019-03-21 | Stop reason: HOSPADM

## 2019-03-21 RX ORDER — LABETALOL HYDROCHLORIDE 5 MG/ML
5 INJECTION, SOLUTION INTRAVENOUS EVERY 10 MIN PRN
Status: DISCONTINUED | OUTPATIENT
Start: 2019-03-21 | End: 2019-03-21 | Stop reason: HOSPADM

## 2019-03-21 RX ORDER — SODIUM CHLORIDE 0.9 % (FLUSH) 0.9 %
10 SYRINGE (ML) INJECTION EVERY 12 HOURS SCHEDULED
Status: DISCONTINUED | OUTPATIENT
Start: 2019-03-21 | End: 2019-03-26 | Stop reason: HOSPADM

## 2019-03-21 RX ORDER — FENTANYL CITRATE 50 UG/ML
50 INJECTION, SOLUTION INTRAMUSCULAR; INTRAVENOUS EVERY 5 MIN PRN
Status: DISCONTINUED | OUTPATIENT
Start: 2019-03-21 | End: 2019-03-21 | Stop reason: HOSPADM

## 2019-03-21 RX ADMIN — DEXAMETHASONE SODIUM PHOSPHATE 4 MG: 4 INJECTION, SOLUTION INTRAMUSCULAR; INTRAVENOUS at 17:15

## 2019-03-21 RX ADMIN — PRIMIDONE 50 MG: 50 TABLET ORAL at 15:01

## 2019-03-21 RX ADMIN — FENTANYL CITRATE 100 MCG: 50 INJECTION INTRAMUSCULAR; INTRAVENOUS at 17:13

## 2019-03-21 RX ADMIN — DOCUSATE SODIUM 100 MG: 100 CAPSULE, LIQUID FILLED ORAL at 08:31

## 2019-03-21 RX ADMIN — INSULIN LISPRO 8 UNITS: 100 INJECTION, SOLUTION INTRAVENOUS; SUBCUTANEOUS at 08:35

## 2019-03-21 RX ADMIN — LATANOPROST 1 DROP: 50 SOLUTION OPHTHALMIC at 21:54

## 2019-03-21 RX ADMIN — MIDAZOLAM HYDROCHLORIDE 2 MG: 1 INJECTION, SOLUTION INTRAMUSCULAR; INTRAVENOUS at 17:09

## 2019-03-21 RX ADMIN — PROPOFOL 200 MG: 10 INJECTION, EMULSION INTRAVENOUS at 17:13

## 2019-03-21 RX ADMIN — ONDANSETRON HYDROCHLORIDE 4 MG: 4 INJECTION, SOLUTION INTRAMUSCULAR; INTRAVENOUS at 17:55

## 2019-03-21 RX ADMIN — Medication 100 MG: at 08:30

## 2019-03-21 RX ADMIN — CLONIDINE HYDROCHLORIDE 0.2 MG: 0.2 TABLET ORAL at 08:31

## 2019-03-21 RX ADMIN — Medication 10 ML: at 08:44

## 2019-03-21 RX ADMIN — HYDROCODONE BITARTRATE AND ACETAMINOPHEN 2 TABLET: 5; 325 TABLET ORAL at 21:54

## 2019-03-21 RX ADMIN — DULOXETINE HYDROCHLORIDE 60 MG: 60 CAPSULE, DELAYED RELEASE ORAL at 08:30

## 2019-03-21 RX ADMIN — CLONIDINE HYDROCHLORIDE 0.2 MG: 0.2 TABLET ORAL at 20:49

## 2019-03-21 RX ADMIN — CARVEDILOL 6.25 MG: 6.25 TABLET, FILM COATED ORAL at 08:31

## 2019-03-21 RX ADMIN — Medication 3 MG: at 18:10

## 2019-03-21 RX ADMIN — PRIMIDONE 50 MG: 50 TABLET ORAL at 22:56

## 2019-03-21 RX ADMIN — INSULIN GLARGINE 16 UNITS: 100 INJECTION, SOLUTION SUBCUTANEOUS at 22:56

## 2019-03-21 RX ADMIN — SODIUM CHLORIDE: 9 INJECTION, SOLUTION INTRAVENOUS at 18:06

## 2019-03-21 RX ADMIN — LIDOCAINE HYDROCHLORIDE 100 MG: 20 INJECTION, SOLUTION INTRAVENOUS at 17:13

## 2019-03-21 RX ADMIN — LACTULOSE 20 G: 10 SOLUTION ORAL at 20:51

## 2019-03-21 RX ADMIN — FENTANYL CITRATE 50 MCG: 50 INJECTION INTRAMUSCULAR; INTRAVENOUS at 17:53

## 2019-03-21 RX ADMIN — ROCURONIUM BROMIDE 50 MG: 10 INJECTION INTRAVENOUS at 17:13

## 2019-03-21 RX ADMIN — CEFAZOLIN 2000 MG: 1 INJECTION, POWDER, FOR SOLUTION INTRAMUSCULAR; INTRAVENOUS; PARENTERAL at 17:16

## 2019-03-21 RX ADMIN — FOLIC ACID 1 MG: 1 TABLET ORAL at 08:30

## 2019-03-21 RX ADMIN — TIOTROPIUM BROMIDE 18 MCG: 18 CAPSULE ORAL; RESPIRATORY (INHALATION) at 08:57

## 2019-03-21 RX ADMIN — CLONIDINE HYDROCHLORIDE 0.1 MG: 0.1 TABLET ORAL at 04:29

## 2019-03-21 RX ADMIN — AMLODIPINE BESYLATE 5 MG: 5 TABLET ORAL at 08:30

## 2019-03-21 RX ADMIN — PRIMIDONE 50 MG: 50 TABLET ORAL at 08:30

## 2019-03-21 RX ADMIN — LACTULOSE 20 G: 10 SOLUTION ORAL at 08:31

## 2019-03-21 RX ADMIN — THERA TABS 1 TABLET: TAB at 08:31

## 2019-03-21 RX ADMIN — SODIUM CHLORIDE: 9 INJECTION, SOLUTION INTRAVENOUS at 17:07

## 2019-03-21 RX ADMIN — KETOROLAC TROMETHAMINE 30 MG: 30 INJECTION, SOLUTION INTRAMUSCULAR at 01:57

## 2019-03-21 RX ADMIN — Medication 0.4 MG: at 18:10

## 2019-03-21 RX ADMIN — INSULIN LISPRO 2 UNITS: 100 INJECTION, SOLUTION INTRAVENOUS; SUBCUTANEOUS at 12:03

## 2019-03-21 RX ADMIN — MAGNESIUM SULFATE HEPTAHYDRATE 2 G: 40 INJECTION, SOLUTION INTRAVENOUS at 08:45

## 2019-03-21 ASSESSMENT — PULMONARY FUNCTION TESTS
PIF_VALUE: 19
PIF_VALUE: 19
PIF_VALUE: 17
PIF_VALUE: 0
PIF_VALUE: 20
PIF_VALUE: 23
PIF_VALUE: 1
PIF_VALUE: 20
PIF_VALUE: 1
PIF_VALUE: 27
PIF_VALUE: 7
PIF_VALUE: 23
PIF_VALUE: 22
PIF_VALUE: 19
PIF_VALUE: 3
PIF_VALUE: 19
PIF_VALUE: 23
PIF_VALUE: 1
PIF_VALUE: 27
PIF_VALUE: 1
PIF_VALUE: 1
PIF_VALUE: 33
PIF_VALUE: 19
PIF_VALUE: 19
PIF_VALUE: 21
PIF_VALUE: 19
PIF_VALUE: 31
PIF_VALUE: 24
PIF_VALUE: 19
PIF_VALUE: 20
PIF_VALUE: 21
PIF_VALUE: 1
PIF_VALUE: 23
PIF_VALUE: 23
PIF_VALUE: 1
PIF_VALUE: 19
PIF_VALUE: 19
PIF_VALUE: 0
PIF_VALUE: 20
PIF_VALUE: 32
PIF_VALUE: 19
PIF_VALUE: 23
PIF_VALUE: 31
PIF_VALUE: 1
PIF_VALUE: 20
PIF_VALUE: 19
PIF_VALUE: 31
PIF_VALUE: 1
PIF_VALUE: 31
PIF_VALUE: 1
PIF_VALUE: 19
PIF_VALUE: 20
PIF_VALUE: 27
PIF_VALUE: 0
PIF_VALUE: 19
PIF_VALUE: 34
PIF_VALUE: 13
PIF_VALUE: 28
PIF_VALUE: 20
PIF_VALUE: 27
PIF_VALUE: 1
PIF_VALUE: 4
PIF_VALUE: 19
PIF_VALUE: 19
PIF_VALUE: 20
PIF_VALUE: 20
PIF_VALUE: 0
PIF_VALUE: 20
PIF_VALUE: 20
PIF_VALUE: 1
PIF_VALUE: 20
PIF_VALUE: 23
PIF_VALUE: 32
PIF_VALUE: 22
PIF_VALUE: 19
PIF_VALUE: 20
PIF_VALUE: 23
PIF_VALUE: 23
PIF_VALUE: 22
PIF_VALUE: 20

## 2019-03-21 ASSESSMENT — PAIN DESCRIPTION - LOCATION
LOCATION: LEG
LOCATION: HEAD
LOCATION: LEG

## 2019-03-21 ASSESSMENT — PAIN - FUNCTIONAL ASSESSMENT
PAIN_FUNCTIONAL_ASSESSMENT: ACTIVITIES ARE NOT PREVENTED
PAIN_FUNCTIONAL_ASSESSMENT: PREVENTS OR INTERFERES WITH ALL ACTIVE AND SOME PASSIVE ACTIVITIES
PAIN_FUNCTIONAL_ASSESSMENT: PREVENTS OR INTERFERES WITH ALL ACTIVE AND SOME PASSIVE ACTIVITIES

## 2019-03-21 ASSESSMENT — PAIN DESCRIPTION - ORIENTATION
ORIENTATION: LEFT
ORIENTATION: LEFT

## 2019-03-21 ASSESSMENT — PAIN SCALES - GENERAL
PAINLEVEL_OUTOF10: 8
PAINLEVEL_OUTOF10: 0
PAINLEVEL_OUTOF10: 0
PAINLEVEL_OUTOF10: 8
PAINLEVEL_OUTOF10: 8
PAINLEVEL_OUTOF10: 0

## 2019-03-21 ASSESSMENT — PAIN DESCRIPTION - DESCRIPTORS
DESCRIPTORS: HEADACHE
DESCRIPTORS: ACHING;THROBBING
DESCRIPTORS: ACHING;THROBBING

## 2019-03-21 ASSESSMENT — PAIN DESCRIPTION - PROGRESSION
CLINICAL_PROGRESSION: GRADUALLY WORSENING
CLINICAL_PROGRESSION: GRADUALLY WORSENING
CLINICAL_PROGRESSION: NOT CHANGED

## 2019-03-21 ASSESSMENT — PAIN DESCRIPTION - FREQUENCY
FREQUENCY: CONTINUOUS
FREQUENCY: CONTINUOUS
FREQUENCY: INTERMITTENT

## 2019-03-21 ASSESSMENT — PAIN DESCRIPTION - PAIN TYPE
TYPE: ACUTE PAIN;SURGICAL PAIN
TYPE: ACUTE PAIN;SURGICAL PAIN
TYPE: ACUTE PAIN

## 2019-03-21 ASSESSMENT — PAIN DESCRIPTION - ONSET
ONSET: ON-GOING

## 2019-03-22 LAB
ALBUMIN SERPL-MCNC: 2.7 G/DL (ref 3.5–5.1)
ALP BLD-CCNC: 156 U/L (ref 38–126)
ALT SERPL-CCNC: 38 U/L (ref 11–66)
AMMONIA: 48 UMOL/L (ref 11–60)
ANION GAP SERPL CALCULATED.3IONS-SCNC: 8 MEQ/L (ref 8–16)
AST SERPL-CCNC: 77 U/L (ref 5–40)
BILIRUB SERPL-MCNC: 1.7 MG/DL (ref 0.3–1.2)
BILIRUBIN DIRECT: 0.8 MG/DL (ref 0–0.3)
BUN BLDV-MCNC: 10 MG/DL (ref 7–22)
CALCIUM SERPL-MCNC: 8.4 MG/DL (ref 8.5–10.5)
CHLORIDE BLD-SCNC: 94 MEQ/L (ref 98–111)
CO2: 28 MEQ/L (ref 23–33)
CREAT SERPL-MCNC: 0.5 MG/DL (ref 0.4–1.2)
ERYTHROCYTE [DISTWIDTH] IN BLOOD BY AUTOMATED COUNT: 14 % (ref 11.5–14.5)
ERYTHROCYTE [DISTWIDTH] IN BLOOD BY AUTOMATED COUNT: 51.8 FL (ref 35–45)
GFR SERPL CREATININE-BSD FRML MDRD: > 90 ML/MIN/1.73M2
GLUCOSE BLD-MCNC: 279 MG/DL (ref 70–108)
GLUCOSE BLD-MCNC: 288 MG/DL (ref 70–108)
GLUCOSE BLD-MCNC: 289 MG/DL (ref 70–108)
GLUCOSE BLD-MCNC: 313 MG/DL (ref 70–108)
GLUCOSE BLD-MCNC: 322 MG/DL (ref 70–108)
HCT VFR BLD CALC: 35.5 % (ref 42–52)
HEMOGLOBIN: 11.8 GM/DL (ref 14–18)
INR BLD: 1.43 (ref 0.85–1.13)
MCH RBC QN AUTO: 33.1 PG (ref 26–33)
MCHC RBC AUTO-ENTMCNC: 33.2 GM/DL (ref 32.2–35.5)
MCV RBC AUTO: 99.7 FL (ref 80–94)
PLATELET # BLD: 71 THOU/MM3 (ref 130–400)
PMV BLD AUTO: 10.8 FL (ref 9.4–12.4)
POTASSIUM SERPL-SCNC: 4.5 MEQ/L (ref 3.5–5.2)
RBC # BLD: 3.56 MILL/MM3 (ref 4.7–6.1)
SCAN OF BLOOD SMEAR: NORMAL
SODIUM BLD-SCNC: 130 MEQ/L (ref 135–145)
TOTAL PROTEIN: 7.4 G/DL (ref 6.1–8)
WBC # BLD: 6.3 THOU/MM3 (ref 4.8–10.8)

## 2019-03-22 PROCEDURE — 2709999900 HC NON-CHARGEABLE SUPPLY

## 2019-03-22 PROCEDURE — 6370000000 HC RX 637 (ALT 250 FOR IP): Performed by: PHYSICIAN ASSISTANT

## 2019-03-22 PROCEDURE — 6370000000 HC RX 637 (ALT 250 FOR IP): Performed by: STUDENT IN AN ORGANIZED HEALTH CARE EDUCATION/TRAINING PROGRAM

## 2019-03-22 PROCEDURE — 97166 OT EVAL MOD COMPLEX 45 MIN: CPT

## 2019-03-22 PROCEDURE — 85027 COMPLETE CBC AUTOMATED: CPT

## 2019-03-22 PROCEDURE — 94640 AIRWAY INHALATION TREATMENT: CPT

## 2019-03-22 PROCEDURE — 82948 REAGENT STRIP/BLOOD GLUCOSE: CPT

## 2019-03-22 PROCEDURE — 1200000003 HC TELEMETRY R&B

## 2019-03-22 PROCEDURE — 2580000003 HC RX 258: Performed by: STUDENT IN AN ORGANIZED HEALTH CARE EDUCATION/TRAINING PROGRAM

## 2019-03-22 PROCEDURE — 82248 BILIRUBIN DIRECT: CPT

## 2019-03-22 PROCEDURE — 80053 COMPREHEN METABOLIC PANEL: CPT

## 2019-03-22 PROCEDURE — 97110 THERAPEUTIC EXERCISES: CPT

## 2019-03-22 PROCEDURE — 36415 COLL VENOUS BLD VENIPUNCTURE: CPT

## 2019-03-22 PROCEDURE — 97162 PT EVAL MOD COMPLEX 30 MIN: CPT

## 2019-03-22 PROCEDURE — 85610 PROTHROMBIN TIME: CPT

## 2019-03-22 PROCEDURE — 6370000000 HC RX 637 (ALT 250 FOR IP): Performed by: INTERNAL MEDICINE

## 2019-03-22 PROCEDURE — 94010 BREATHING CAPACITY TEST: CPT

## 2019-03-22 PROCEDURE — 6360000002 HC RX W HCPCS: Performed by: STUDENT IN AN ORGANIZED HEALTH CARE EDUCATION/TRAINING PROGRAM

## 2019-03-22 PROCEDURE — 99232 SBSQ HOSP IP/OBS MODERATE 35: CPT | Performed by: INTERNAL MEDICINE

## 2019-03-22 PROCEDURE — 82140 ASSAY OF AMMONIA: CPT

## 2019-03-22 RX ORDER — INSULIN GLARGINE 100 [IU]/ML
26 INJECTION, SOLUTION SUBCUTANEOUS NIGHTLY
Status: DISCONTINUED | OUTPATIENT
Start: 2019-03-22 | End: 2019-03-24

## 2019-03-22 RX ORDER — SPIRONOLACTONE 25 MG/1
50 TABLET ORAL DAILY
Status: DISCONTINUED | OUTPATIENT
Start: 2019-03-22 | End: 2019-03-26 | Stop reason: HOSPADM

## 2019-03-22 RX ORDER — CARVEDILOL 6.25 MG/1
12.5 TABLET ORAL 2 TIMES DAILY WITH MEALS
Status: DISCONTINUED | OUTPATIENT
Start: 2019-03-22 | End: 2019-03-26 | Stop reason: HOSPADM

## 2019-03-22 RX ADMIN — CLONIDINE HYDROCHLORIDE 0.2 MG: 0.2 TABLET ORAL at 21:44

## 2019-03-22 RX ADMIN — THERA TABS 1 TABLET: TAB at 09:40

## 2019-03-22 RX ADMIN — Medication 10 ML: at 10:01

## 2019-03-22 RX ADMIN — ASPIRIN 81 MG 81 MG: 81 TABLET ORAL at 09:39

## 2019-03-22 RX ADMIN — HYDROCODONE BITARTRATE AND ACETAMINOPHEN 2 TABLET: 5; 325 TABLET ORAL at 02:04

## 2019-03-22 RX ADMIN — CARVEDILOL 12.5 MG: 6.25 TABLET, FILM COATED ORAL at 18:58

## 2019-03-22 RX ADMIN — LATANOPROST 1 DROP: 50 SOLUTION OPHTHALMIC at 21:46

## 2019-03-22 RX ADMIN — SPIRONOLACTONE 50 MG: 25 TABLET ORAL at 12:53

## 2019-03-22 RX ADMIN — PRIMIDONE 50 MG: 50 TABLET ORAL at 21:44

## 2019-03-22 RX ADMIN — INSULIN GLARGINE 26 UNITS: 100 INJECTION, SOLUTION SUBCUTANEOUS at 21:45

## 2019-03-22 RX ADMIN — FOLIC ACID 1 MG: 1 TABLET ORAL at 09:40

## 2019-03-22 RX ADMIN — PRIMIDONE 50 MG: 50 TABLET ORAL at 09:40

## 2019-03-22 RX ADMIN — Medication 100 MG: at 09:40

## 2019-03-22 RX ADMIN — LORAZEPAM 2 MG: 2 INJECTION INTRAMUSCULAR; INTRAVENOUS at 01:22

## 2019-03-22 RX ADMIN — DULOXETINE HYDROCHLORIDE 60 MG: 60 CAPSULE, DELAYED RELEASE ORAL at 09:40

## 2019-03-22 RX ADMIN — HYDROCODONE BITARTRATE AND ACETAMINOPHEN 1 TABLET: 5; 325 TABLET ORAL at 13:50

## 2019-03-22 RX ADMIN — AMLODIPINE BESYLATE 5 MG: 5 TABLET ORAL at 09:48

## 2019-03-22 RX ADMIN — LACTULOSE 20 G: 10 SOLUTION ORAL at 09:39

## 2019-03-22 RX ADMIN — TIOTROPIUM BROMIDE 18 MCG: 18 CAPSULE ORAL; RESPIRATORY (INHALATION) at 08:09

## 2019-03-22 RX ADMIN — Medication 10 ML: at 21:47

## 2019-03-22 RX ADMIN — PRIMIDONE 50 MG: 50 TABLET ORAL at 15:37

## 2019-03-22 RX ADMIN — LACTULOSE 20 G: 10 SOLUTION ORAL at 21:43

## 2019-03-22 RX ADMIN — CARVEDILOL 6.25 MG: 6.25 TABLET, FILM COATED ORAL at 08:32

## 2019-03-22 RX ADMIN — INSULIN LISPRO 8 UNITS: 100 INJECTION, SOLUTION INTRAVENOUS; SUBCUTANEOUS at 09:39

## 2019-03-22 RX ADMIN — INSULIN LISPRO 8 UNITS: 100 INJECTION, SOLUTION INTRAVENOUS; SUBCUTANEOUS at 13:50

## 2019-03-22 RX ADMIN — HYDROCODONE BITARTRATE AND ACETAMINOPHEN 2 TABLET: 5; 325 TABLET ORAL at 08:35

## 2019-03-22 RX ADMIN — CLONIDINE HYDROCHLORIDE 0.2 MG: 0.2 TABLET ORAL at 09:40

## 2019-03-22 RX ADMIN — DOCUSATE SODIUM 100 MG: 100 CAPSULE, LIQUID FILLED ORAL at 09:39

## 2019-03-22 RX ADMIN — INSULIN LISPRO 6 UNITS: 100 INJECTION, SOLUTION INTRAVENOUS; SUBCUTANEOUS at 18:58

## 2019-03-22 ASSESSMENT — PAIN SCALES - GENERAL
PAINLEVEL_OUTOF10: 8
PAINLEVEL_OUTOF10: 5
PAINLEVEL_OUTOF10: 9
PAINLEVEL_OUTOF10: 5
PAINLEVEL_OUTOF10: 6
PAINLEVEL_OUTOF10: 8
PAINLEVEL_OUTOF10: 7

## 2019-03-22 ASSESSMENT — PAIN DESCRIPTION - PROGRESSION: CLINICAL_PROGRESSION: NOT CHANGED

## 2019-03-22 ASSESSMENT — PAIN DESCRIPTION - ORIENTATION
ORIENTATION: LEFT

## 2019-03-22 ASSESSMENT — PAIN DESCRIPTION - PAIN TYPE
TYPE: ACUTE PAIN;SURGICAL PAIN

## 2019-03-22 ASSESSMENT — PAIN - FUNCTIONAL ASSESSMENT: PAIN_FUNCTIONAL_ASSESSMENT: PREVENTS OR INTERFERES WITH ALL ACTIVE AND SOME PASSIVE ACTIVITIES

## 2019-03-22 ASSESSMENT — PAIN DESCRIPTION - DESCRIPTORS: DESCRIPTORS: ACHING;THROBBING

## 2019-03-22 ASSESSMENT — PAIN DESCRIPTION - LOCATION
LOCATION: LEG

## 2019-03-22 ASSESSMENT — PAIN DESCRIPTION - ONSET: ONSET: ON-GOING

## 2019-03-22 ASSESSMENT — PAIN DESCRIPTION - FREQUENCY: FREQUENCY: CONTINUOUS

## 2019-03-23 LAB
GLUCOSE BLD-MCNC: 226 MG/DL (ref 70–108)
GLUCOSE BLD-MCNC: 296 MG/DL (ref 70–108)
GLUCOSE BLD-MCNC: 310 MG/DL (ref 70–108)
GLUCOSE BLD-MCNC: 326 MG/DL (ref 70–108)

## 2019-03-23 PROCEDURE — 94760 N-INVAS EAR/PLS OXIMETRY 1: CPT

## 2019-03-23 PROCEDURE — 1200000003 HC TELEMETRY R&B

## 2019-03-23 PROCEDURE — 99232 SBSQ HOSP IP/OBS MODERATE 35: CPT | Performed by: INTERNAL MEDICINE

## 2019-03-23 PROCEDURE — 6370000000 HC RX 637 (ALT 250 FOR IP): Performed by: STUDENT IN AN ORGANIZED HEALTH CARE EDUCATION/TRAINING PROGRAM

## 2019-03-23 PROCEDURE — 6370000000 HC RX 637 (ALT 250 FOR IP): Performed by: INTERNAL MEDICINE

## 2019-03-23 PROCEDURE — 2709999900 HC NON-CHARGEABLE SUPPLY

## 2019-03-23 PROCEDURE — 82948 REAGENT STRIP/BLOOD GLUCOSE: CPT

## 2019-03-23 PROCEDURE — 94640 AIRWAY INHALATION TREATMENT: CPT

## 2019-03-23 PROCEDURE — 6370000000 HC RX 637 (ALT 250 FOR IP): Performed by: PHYSICIAN ASSISTANT

## 2019-03-23 PROCEDURE — 94010 BREATHING CAPACITY TEST: CPT

## 2019-03-23 PROCEDURE — 2580000003 HC RX 258: Performed by: STUDENT IN AN ORGANIZED HEALTH CARE EDUCATION/TRAINING PROGRAM

## 2019-03-23 RX ADMIN — INSULIN LISPRO 6 UNITS: 100 INJECTION, SOLUTION INTRAVENOUS; SUBCUTANEOUS at 14:23

## 2019-03-23 RX ADMIN — DOCUSATE SODIUM 100 MG: 100 CAPSULE, LIQUID FILLED ORAL at 10:03

## 2019-03-23 RX ADMIN — LACTULOSE 20 G: 10 SOLUTION ORAL at 21:50

## 2019-03-23 RX ADMIN — AMLODIPINE BESYLATE 5 MG: 5 TABLET ORAL at 10:03

## 2019-03-23 RX ADMIN — PRIMIDONE 50 MG: 50 TABLET ORAL at 21:50

## 2019-03-23 RX ADMIN — Medication 100 MG: at 10:03

## 2019-03-23 RX ADMIN — THERA TABS 1 TABLET: TAB at 10:03

## 2019-03-23 RX ADMIN — CARVEDILOL 12.5 MG: 6.25 TABLET, FILM COATED ORAL at 09:24

## 2019-03-23 RX ADMIN — DULOXETINE HYDROCHLORIDE 60 MG: 60 CAPSULE, DELAYED RELEASE ORAL at 10:03

## 2019-03-23 RX ADMIN — INSULIN LISPRO 8 UNITS: 100 INJECTION, SOLUTION INTRAVENOUS; SUBCUTANEOUS at 18:23

## 2019-03-23 RX ADMIN — CARVEDILOL 12.5 MG: 6.25 TABLET, FILM COATED ORAL at 18:24

## 2019-03-23 RX ADMIN — Medication 10 ML: at 10:04

## 2019-03-23 RX ADMIN — PRIMIDONE 50 MG: 50 TABLET ORAL at 14:23

## 2019-03-23 RX ADMIN — LATANOPROST 1 DROP: 50 SOLUTION OPHTHALMIC at 21:50

## 2019-03-23 RX ADMIN — PRIMIDONE 50 MG: 50 TABLET ORAL at 10:03

## 2019-03-23 RX ADMIN — FOLIC ACID 1 MG: 1 TABLET ORAL at 10:03

## 2019-03-23 RX ADMIN — SPIRONOLACTONE 50 MG: 25 TABLET ORAL at 10:03

## 2019-03-23 RX ADMIN — CLONIDINE HYDROCHLORIDE 0.2 MG: 0.2 TABLET ORAL at 10:02

## 2019-03-23 RX ADMIN — CLONIDINE HYDROCHLORIDE 0.2 MG: 0.2 TABLET ORAL at 21:50

## 2019-03-23 RX ADMIN — TIOTROPIUM BROMIDE 18 MCG: 18 CAPSULE ORAL; RESPIRATORY (INHALATION) at 07:53

## 2019-03-23 RX ADMIN — HYDROCODONE BITARTRATE AND ACETAMINOPHEN 2 TABLET: 5; 325 TABLET ORAL at 19:45

## 2019-03-23 RX ADMIN — HYDROCODONE BITARTRATE AND ACETAMINOPHEN 2 TABLET: 5; 325 TABLET ORAL at 09:24

## 2019-03-23 RX ADMIN — ASPIRIN 81 MG 81 MG: 81 TABLET ORAL at 10:03

## 2019-03-23 RX ADMIN — INSULIN GLARGINE 26 UNITS: 100 INJECTION, SOLUTION SUBCUTANEOUS at 22:15

## 2019-03-23 RX ADMIN — INSULIN LISPRO 4 UNITS: 100 INJECTION, SOLUTION INTRAVENOUS; SUBCUTANEOUS at 09:47

## 2019-03-23 RX ADMIN — INSULIN LISPRO 6 UNITS: 100 INJECTION, SOLUTION INTRAVENOUS; SUBCUTANEOUS at 22:16

## 2019-03-23 RX ADMIN — LACTULOSE 20 G: 10 SOLUTION ORAL at 10:01

## 2019-03-23 ASSESSMENT — PAIN SCALES - GENERAL
PAINLEVEL_OUTOF10: 8
PAINLEVEL_OUTOF10: 9

## 2019-03-23 ASSESSMENT — PAIN DESCRIPTION - LOCATION: LOCATION: ANKLE

## 2019-03-23 ASSESSMENT — PAIN DESCRIPTION - ORIENTATION: ORIENTATION: LEFT

## 2019-03-23 ASSESSMENT — PAIN DESCRIPTION - PAIN TYPE: TYPE: ACUTE PAIN;SURGICAL PAIN

## 2019-03-24 LAB
ALBUMIN SERPL-MCNC: 2.7 G/DL (ref 3.5–5.1)
ALP BLD-CCNC: 154 U/L (ref 38–126)
ALT SERPL-CCNC: 36 U/L (ref 11–66)
ANION GAP SERPL CALCULATED.3IONS-SCNC: 8 MEQ/L (ref 8–16)
AST SERPL-CCNC: 54 U/L (ref 5–40)
BILIRUB SERPL-MCNC: 1.1 MG/DL (ref 0.3–1.2)
BUN BLDV-MCNC: 9 MG/DL (ref 7–22)
CALCIUM SERPL-MCNC: 8.7 MG/DL (ref 8.5–10.5)
CHLORIDE BLD-SCNC: 95 MEQ/L (ref 98–111)
CO2: 30 MEQ/L (ref 23–33)
CREAT SERPL-MCNC: 0.5 MG/DL (ref 0.4–1.2)
ERYTHROCYTE [DISTWIDTH] IN BLOOD BY AUTOMATED COUNT: 14.3 % (ref 11.5–14.5)
ERYTHROCYTE [DISTWIDTH] IN BLOOD BY AUTOMATED COUNT: 51.9 FL (ref 35–45)
GFR SERPL CREATININE-BSD FRML MDRD: > 90 ML/MIN/1.73M2
GLUCOSE BLD-MCNC: 200 MG/DL (ref 70–108)
GLUCOSE BLD-MCNC: 245 MG/DL (ref 70–108)
GLUCOSE BLD-MCNC: 258 MG/DL (ref 70–108)
GLUCOSE BLD-MCNC: 344 MG/DL (ref 70–108)
GLUCOSE BLD-MCNC: 384 MG/DL (ref 70–108)
HCT VFR BLD CALC: 35 % (ref 42–52)
HCV QNT BY NAAT INTERPRETATION: DETECTED
HCV QNT BY NAAT IU/ML: ABNORMAL
HCV QNT BY NAAT LOG IU/ML: 6.3 LOG IU/ML
HEMOGLOBIN: 11.5 GM/DL (ref 14–18)
INR BLD: 1.44 (ref 0.85–1.13)
MCH RBC QN AUTO: 33 PG (ref 26–33)
MCHC RBC AUTO-ENTMCNC: 32.9 GM/DL (ref 32.2–35.5)
MCV RBC AUTO: 100.6 FL (ref 80–94)
PLATELET # BLD: 61 THOU/MM3 (ref 130–400)
PMV BLD AUTO: 11.2 FL (ref 9.4–12.4)
POTASSIUM SERPL-SCNC: 4.1 MEQ/L (ref 3.5–5.2)
RBC # BLD: 3.48 MILL/MM3 (ref 4.7–6.1)
SODIUM BLD-SCNC: 133 MEQ/L (ref 135–145)
TOTAL PROTEIN: 7.5 G/DL (ref 6.1–8)
WBC # BLD: 4.8 THOU/MM3 (ref 4.8–10.8)

## 2019-03-24 PROCEDURE — 85027 COMPLETE CBC AUTOMATED: CPT

## 2019-03-24 PROCEDURE — 6370000000 HC RX 637 (ALT 250 FOR IP): Performed by: STUDENT IN AN ORGANIZED HEALTH CARE EDUCATION/TRAINING PROGRAM

## 2019-03-24 PROCEDURE — 94640 AIRWAY INHALATION TREATMENT: CPT

## 2019-03-24 PROCEDURE — 6370000000 HC RX 637 (ALT 250 FOR IP): Performed by: INTERNAL MEDICINE

## 2019-03-24 PROCEDURE — 82948 REAGENT STRIP/BLOOD GLUCOSE: CPT

## 2019-03-24 PROCEDURE — 36415 COLL VENOUS BLD VENIPUNCTURE: CPT

## 2019-03-24 PROCEDURE — 6370000000 HC RX 637 (ALT 250 FOR IP): Performed by: PHYSICIAN ASSISTANT

## 2019-03-24 PROCEDURE — 99232 SBSQ HOSP IP/OBS MODERATE 35: CPT | Performed by: INTERNAL MEDICINE

## 2019-03-24 PROCEDURE — 85610 PROTHROMBIN TIME: CPT

## 2019-03-24 PROCEDURE — 80053 COMPREHEN METABOLIC PANEL: CPT

## 2019-03-24 PROCEDURE — 1200000003 HC TELEMETRY R&B

## 2019-03-24 PROCEDURE — 2709999900 HC NON-CHARGEABLE SUPPLY

## 2019-03-24 PROCEDURE — 94010 BREATHING CAPACITY TEST: CPT

## 2019-03-24 RX ORDER — INSULIN GLARGINE 100 [IU]/ML
30 INJECTION, SOLUTION SUBCUTANEOUS NIGHTLY
Status: DISCONTINUED | OUTPATIENT
Start: 2019-03-24 | End: 2019-03-26

## 2019-03-24 RX ADMIN — AMLODIPINE BESYLATE 5 MG: 5 TABLET ORAL at 08:15

## 2019-03-24 RX ADMIN — LACTULOSE 20 G: 10 SOLUTION ORAL at 08:17

## 2019-03-24 RX ADMIN — DULOXETINE HYDROCHLORIDE 60 MG: 60 CAPSULE, DELAYED RELEASE ORAL at 08:15

## 2019-03-24 RX ADMIN — HYDROCODONE BITARTRATE AND ACETAMINOPHEN 1 TABLET: 5; 325 TABLET ORAL at 22:02

## 2019-03-24 RX ADMIN — HYDROCODONE BITARTRATE AND ACETAMINOPHEN 2 TABLET: 5; 325 TABLET ORAL at 12:02

## 2019-03-24 RX ADMIN — SPIRONOLACTONE 50 MG: 25 TABLET ORAL at 08:15

## 2019-03-24 RX ADMIN — INSULIN LISPRO 15 UNITS: 100 INJECTION, SOLUTION INTRAVENOUS; SUBCUTANEOUS at 17:59

## 2019-03-24 RX ADMIN — INSULIN GLARGINE 30 UNITS: 100 INJECTION, SOLUTION SUBCUTANEOUS at 22:14

## 2019-03-24 RX ADMIN — LACTULOSE 20 G: 10 SOLUTION ORAL at 22:00

## 2019-03-24 RX ADMIN — CLONIDINE HYDROCHLORIDE 0.2 MG: 0.2 TABLET ORAL at 08:15

## 2019-03-24 RX ADMIN — PRIMIDONE 50 MG: 50 TABLET ORAL at 16:55

## 2019-03-24 RX ADMIN — PRIMIDONE 50 MG: 50 TABLET ORAL at 08:14

## 2019-03-24 RX ADMIN — INSULIN LISPRO 6 UNITS: 100 INJECTION, SOLUTION INTRAVENOUS; SUBCUTANEOUS at 09:19

## 2019-03-24 RX ADMIN — CARVEDILOL 12.5 MG: 6.25 TABLET, FILM COATED ORAL at 08:15

## 2019-03-24 RX ADMIN — CLONIDINE HYDROCHLORIDE 0.2 MG: 0.2 TABLET ORAL at 21:50

## 2019-03-24 RX ADMIN — LATANOPROST 1 DROP: 50 SOLUTION OPHTHALMIC at 21:51

## 2019-03-24 RX ADMIN — TIOTROPIUM BROMIDE 18 MCG: 18 CAPSULE ORAL; RESPIRATORY (INHALATION) at 08:18

## 2019-03-24 RX ADMIN — FOLIC ACID 1 MG: 1 TABLET ORAL at 08:15

## 2019-03-24 RX ADMIN — ASPIRIN 81 MG 81 MG: 81 TABLET ORAL at 08:17

## 2019-03-24 RX ADMIN — Medication 100 MG: at 08:15

## 2019-03-24 RX ADMIN — THERA TABS 1 TABLET: TAB at 08:15

## 2019-03-24 RX ADMIN — CARVEDILOL 12.5 MG: 6.25 TABLET, FILM COATED ORAL at 16:55

## 2019-03-24 RX ADMIN — HYDROCODONE BITARTRATE AND ACETAMINOPHEN 2 TABLET: 5; 325 TABLET ORAL at 05:39

## 2019-03-24 RX ADMIN — HYDROCODONE BITARTRATE AND ACETAMINOPHEN 2 TABLET: 5; 325 TABLET ORAL at 01:03

## 2019-03-24 RX ADMIN — PRIMIDONE 50 MG: 50 TABLET ORAL at 21:50

## 2019-03-24 RX ADMIN — INSULIN LISPRO 6 UNITS: 100 INJECTION, SOLUTION INTRAVENOUS; SUBCUTANEOUS at 22:14

## 2019-03-24 ASSESSMENT — PAIN DESCRIPTION - PAIN TYPE
TYPE: ACUTE PAIN;SURGICAL PAIN
TYPE: SURGICAL PAIN
TYPE: ACUTE PAIN;SURGICAL PAIN
TYPE: SURGICAL PAIN
TYPE: SURGICAL PAIN
TYPE: SURGICAL PAIN;ACUTE PAIN

## 2019-03-24 ASSESSMENT — PAIN DESCRIPTION - ONSET
ONSET: ON-GOING

## 2019-03-24 ASSESSMENT — PAIN DESCRIPTION - ORIENTATION
ORIENTATION: LEFT

## 2019-03-24 ASSESSMENT — PAIN DESCRIPTION - DESCRIPTORS
DESCRIPTORS: DULL;SHARP;STABBING
DESCRIPTORS: ACHING

## 2019-03-24 ASSESSMENT — PAIN DESCRIPTION - LOCATION
LOCATION: ANKLE
LOCATION: ANKLE
LOCATION: LEG
LOCATION: ANKLE
LOCATION: LEG

## 2019-03-24 ASSESSMENT — PAIN DESCRIPTION - PROGRESSION
CLINICAL_PROGRESSION: GRADUALLY IMPROVING
CLINICAL_PROGRESSION: NOT CHANGED

## 2019-03-24 ASSESSMENT — PAIN SCALES - GENERAL
PAINLEVEL_OUTOF10: 10
PAINLEVEL_OUTOF10: 10
PAINLEVEL_OUTOF10: 8
PAINLEVEL_OUTOF10: 10
PAINLEVEL_OUTOF10: 6
PAINLEVEL_OUTOF10: 10
PAINLEVEL_OUTOF10: 9

## 2019-03-24 ASSESSMENT — PAIN DESCRIPTION - FREQUENCY
FREQUENCY: CONTINUOUS

## 2019-03-25 LAB
ALBUMIN SERPL-MCNC: 2.7 G/DL (ref 3.5–5.1)
ALP BLD-CCNC: 182 U/L (ref 38–126)
ALT SERPL-CCNC: 45 U/L (ref 11–66)
ANION GAP SERPL CALCULATED.3IONS-SCNC: 9 MEQ/L (ref 8–16)
AST SERPL-CCNC: 75 U/L (ref 5–40)
BILIRUB SERPL-MCNC: 1.2 MG/DL (ref 0.3–1.2)
BUN BLDV-MCNC: 8 MG/DL (ref 7–22)
CALCIUM SERPL-MCNC: 8.6 MG/DL (ref 8.5–10.5)
CHLORIDE BLD-SCNC: 94 MEQ/L (ref 98–111)
CO2: 29 MEQ/L (ref 23–33)
CREAT SERPL-MCNC: 0.4 MG/DL (ref 0.4–1.2)
ERYTHROCYTE [DISTWIDTH] IN BLOOD BY AUTOMATED COUNT: 14.2 % (ref 11.5–14.5)
ERYTHROCYTE [DISTWIDTH] IN BLOOD BY AUTOMATED COUNT: 52.5 FL (ref 35–45)
GFR SERPL CREATININE-BSD FRML MDRD: > 90 ML/MIN/1.73M2
GLUCOSE BLD-MCNC: 227 MG/DL (ref 70–108)
GLUCOSE BLD-MCNC: 247 MG/DL (ref 70–108)
GLUCOSE BLD-MCNC: 284 MG/DL (ref 70–108)
GLUCOSE BLD-MCNC: 307 MG/DL (ref 70–108)
GLUCOSE BLD-MCNC: 324 MG/DL (ref 70–108)
GLUCOSE BLD-MCNC: 324 MG/DL (ref 70–108)
HCT VFR BLD CALC: 33.1 % (ref 42–52)
HEMOGLOBIN: 11 GM/DL (ref 14–18)
INR BLD: 1.42 (ref 0.85–1.13)
MCH RBC QN AUTO: 33.3 PG (ref 26–33)
MCHC RBC AUTO-ENTMCNC: 33.2 GM/DL (ref 32.2–35.5)
MCV RBC AUTO: 100.3 FL (ref 80–94)
PLATELET # BLD: 65 THOU/MM3 (ref 130–400)
PMV BLD AUTO: 11.8 FL (ref 9.4–12.4)
POTASSIUM SERPL-SCNC: 4.6 MEQ/L (ref 3.5–5.2)
RBC # BLD: 3.3 MILL/MM3 (ref 4.7–6.1)
SODIUM BLD-SCNC: 132 MEQ/L (ref 135–145)
TOTAL PROTEIN: 7.3 G/DL (ref 6.1–8)
WBC # BLD: 5.1 THOU/MM3 (ref 4.8–10.8)

## 2019-03-25 PROCEDURE — 97530 THERAPEUTIC ACTIVITIES: CPT

## 2019-03-25 PROCEDURE — 6370000000 HC RX 637 (ALT 250 FOR IP): Performed by: PHYSICIAN ASSISTANT

## 2019-03-25 PROCEDURE — 94010 BREATHING CAPACITY TEST: CPT

## 2019-03-25 PROCEDURE — 94640 AIRWAY INHALATION TREATMENT: CPT

## 2019-03-25 PROCEDURE — 97110 THERAPEUTIC EXERCISES: CPT

## 2019-03-25 PROCEDURE — 94760 N-INVAS EAR/PLS OXIMETRY 1: CPT

## 2019-03-25 PROCEDURE — 85027 COMPLETE CBC AUTOMATED: CPT

## 2019-03-25 PROCEDURE — 1200000003 HC TELEMETRY R&B

## 2019-03-25 PROCEDURE — 6370000000 HC RX 637 (ALT 250 FOR IP): Performed by: STUDENT IN AN ORGANIZED HEALTH CARE EDUCATION/TRAINING PROGRAM

## 2019-03-25 PROCEDURE — 80053 COMPREHEN METABOLIC PANEL: CPT

## 2019-03-25 PROCEDURE — 2580000003 HC RX 258: Performed by: STUDENT IN AN ORGANIZED HEALTH CARE EDUCATION/TRAINING PROGRAM

## 2019-03-25 PROCEDURE — 82948 REAGENT STRIP/BLOOD GLUCOSE: CPT

## 2019-03-25 PROCEDURE — 36415 COLL VENOUS BLD VENIPUNCTURE: CPT

## 2019-03-25 PROCEDURE — 85610 PROTHROMBIN TIME: CPT

## 2019-03-25 PROCEDURE — 99232 SBSQ HOSP IP/OBS MODERATE 35: CPT | Performed by: INTERNAL MEDICINE

## 2019-03-25 PROCEDURE — 6370000000 HC RX 637 (ALT 250 FOR IP): Performed by: INTERNAL MEDICINE

## 2019-03-25 RX ORDER — LACTULOSE 10 G/15ML
20 SOLUTION ORAL 3 TIMES DAILY
Status: DISCONTINUED | OUTPATIENT
Start: 2019-03-25 | End: 2019-03-26 | Stop reason: HOSPADM

## 2019-03-25 RX ADMIN — INSULIN LISPRO 4 UNITS: 100 INJECTION, SOLUTION INTRAVENOUS; SUBCUTANEOUS at 10:10

## 2019-03-25 RX ADMIN — AMLODIPINE BESYLATE 5 MG: 5 TABLET ORAL at 10:08

## 2019-03-25 RX ADMIN — INSULIN GLARGINE 30 UNITS: 100 INJECTION, SOLUTION SUBCUTANEOUS at 21:46

## 2019-03-25 RX ADMIN — PRIMIDONE 50 MG: 50 TABLET ORAL at 14:39

## 2019-03-25 RX ADMIN — ASPIRIN 81 MG 81 MG: 81 TABLET ORAL at 10:06

## 2019-03-25 RX ADMIN — CLONIDINE HYDROCHLORIDE 0.2 MG: 0.2 TABLET ORAL at 20:56

## 2019-03-25 RX ADMIN — SPIRONOLACTONE 50 MG: 25 TABLET ORAL at 10:08

## 2019-03-25 RX ADMIN — CARVEDILOL 12.5 MG: 6.25 TABLET, FILM COATED ORAL at 19:06

## 2019-03-25 RX ADMIN — LATANOPROST 1 DROP: 50 SOLUTION OPHTHALMIC at 20:56

## 2019-03-25 RX ADMIN — TIOTROPIUM BROMIDE 18 MCG: 18 CAPSULE ORAL; RESPIRATORY (INHALATION) at 06:22

## 2019-03-25 RX ADMIN — CARVEDILOL 12.5 MG: 6.25 TABLET, FILM COATED ORAL at 08:11

## 2019-03-25 RX ADMIN — FOLIC ACID 1 MG: 1 TABLET ORAL at 10:08

## 2019-03-25 RX ADMIN — PRIMIDONE 50 MG: 50 TABLET ORAL at 10:08

## 2019-03-25 RX ADMIN — INSULIN LISPRO 5 UNITS: 100 INJECTION, SOLUTION INTRAVENOUS; SUBCUTANEOUS at 10:05

## 2019-03-25 RX ADMIN — Medication 100 MG: at 10:08

## 2019-03-25 RX ADMIN — HYDROCODONE BITARTRATE AND ACETAMINOPHEN 1 TABLET: 5; 325 TABLET ORAL at 10:06

## 2019-03-25 RX ADMIN — INSULIN LISPRO 5 UNITS: 100 INJECTION, SOLUTION INTRAVENOUS; SUBCUTANEOUS at 19:06

## 2019-03-25 RX ADMIN — LACTULOSE 20 G: 20 SOLUTION ORAL at 10:07

## 2019-03-25 RX ADMIN — INSULIN LISPRO 5 UNITS: 100 INJECTION, SOLUTION INTRAVENOUS; SUBCUTANEOUS at 14:40

## 2019-03-25 RX ADMIN — THERA TABS 1 TABLET: TAB at 10:08

## 2019-03-25 RX ADMIN — INSULIN LISPRO 6 UNITS: 100 INJECTION, SOLUTION INTRAVENOUS; SUBCUTANEOUS at 19:07

## 2019-03-25 RX ADMIN — HYDROCODONE BITARTRATE AND ACETAMINOPHEN 1 TABLET: 5; 325 TABLET ORAL at 04:41

## 2019-03-25 RX ADMIN — PRIMIDONE 50 MG: 50 TABLET ORAL at 20:56

## 2019-03-25 RX ADMIN — LACTULOSE 20 G: 20 SOLUTION ORAL at 14:40

## 2019-03-25 RX ADMIN — LACTULOSE 20 G: 20 SOLUTION ORAL at 22:53

## 2019-03-25 RX ADMIN — INSULIN LISPRO 4 UNITS: 100 INJECTION, SOLUTION INTRAVENOUS; SUBCUTANEOUS at 14:41

## 2019-03-25 RX ADMIN — CLONIDINE HYDROCHLORIDE 0.2 MG: 0.2 TABLET ORAL at 10:08

## 2019-03-25 RX ADMIN — DULOXETINE HYDROCHLORIDE 60 MG: 60 CAPSULE, DELAYED RELEASE ORAL at 10:08

## 2019-03-25 RX ADMIN — Medication 10 ML: at 22:53

## 2019-03-25 ASSESSMENT — PAIN DESCRIPTION - LOCATION
LOCATION: LEG

## 2019-03-25 ASSESSMENT — PAIN DESCRIPTION - PROGRESSION
CLINICAL_PROGRESSION: NOT CHANGED

## 2019-03-25 ASSESSMENT — PAIN DESCRIPTION - ONSET: ONSET: ON-GOING

## 2019-03-25 ASSESSMENT — PAIN SCALES - GENERAL
PAINLEVEL_OUTOF10: 7
PAINLEVEL_OUTOF10: 10
PAINLEVEL_OUTOF10: 8
PAINLEVEL_OUTOF10: 10

## 2019-03-25 ASSESSMENT — PAIN DESCRIPTION - DESCRIPTORS
DESCRIPTORS: DULL
DESCRIPTORS: SHARP

## 2019-03-25 ASSESSMENT — PAIN DESCRIPTION - PAIN TYPE
TYPE: SURGICAL PAIN
TYPE: SURGICAL PAIN

## 2019-03-25 ASSESSMENT — PAIN DESCRIPTION - ORIENTATION
ORIENTATION: LEFT

## 2019-03-25 ASSESSMENT — PAIN DESCRIPTION - FREQUENCY: FREQUENCY: CONTINUOUS

## 2019-03-26 VITALS
DIASTOLIC BLOOD PRESSURE: 69 MMHG | HEIGHT: 76 IN | WEIGHT: 275 LBS | RESPIRATION RATE: 18 BRPM | OXYGEN SATURATION: 97 % | SYSTOLIC BLOOD PRESSURE: 133 MMHG | TEMPERATURE: 97.7 F | HEART RATE: 64 BPM | BODY MASS INDEX: 33.49 KG/M2

## 2019-03-26 LAB
GLUCOSE BLD-MCNC: 217 MG/DL (ref 70–108)
GLUCOSE BLD-MCNC: 225 MG/DL (ref 70–108)

## 2019-03-26 PROCEDURE — 94640 AIRWAY INHALATION TREATMENT: CPT

## 2019-03-26 PROCEDURE — 94010 BREATHING CAPACITY TEST: CPT

## 2019-03-26 PROCEDURE — 97530 THERAPEUTIC ACTIVITIES: CPT

## 2019-03-26 PROCEDURE — 6370000000 HC RX 637 (ALT 250 FOR IP): Performed by: INTERNAL MEDICINE

## 2019-03-26 PROCEDURE — 6370000000 HC RX 637 (ALT 250 FOR IP): Performed by: STUDENT IN AN ORGANIZED HEALTH CARE EDUCATION/TRAINING PROGRAM

## 2019-03-26 PROCEDURE — 6370000000 HC RX 637 (ALT 250 FOR IP): Performed by: PHYSICIAN ASSISTANT

## 2019-03-26 PROCEDURE — 97110 THERAPEUTIC EXERCISES: CPT

## 2019-03-26 PROCEDURE — 99238 HOSP IP/OBS DSCHRG MGMT 30/<: CPT | Performed by: INTERNAL MEDICINE

## 2019-03-26 PROCEDURE — 82948 REAGENT STRIP/BLOOD GLUCOSE: CPT

## 2019-03-26 RX ORDER — CYCLOBENZAPRINE HCL 10 MG
10 TABLET ORAL 3 TIMES DAILY PRN
DISCHARGE
Start: 2019-03-26 | End: 2019-04-05

## 2019-03-26 RX ORDER — LIDOCAINE 4 G/G
3 PATCH TOPICAL DAILY
Status: ON HOLD | DISCHARGE
Start: 2019-03-27 | End: 2019-05-02 | Stop reason: HOSPADM

## 2019-03-26 RX ORDER — LACTULOSE 10 G/15ML
20 SOLUTION ORAL 3 TIMES DAILY
Refills: 1 | DISCHARGE
Start: 2019-03-26 | End: 2019-04-22

## 2019-03-26 RX ORDER — INSULIN GLARGINE 100 [IU]/ML
40 INJECTION, SOLUTION SUBCUTANEOUS NIGHTLY
Qty: 1 VIAL | Refills: 3 | DISCHARGE
Start: 2019-03-26 | End: 2019-03-26

## 2019-03-26 RX ORDER — INSULIN GLARGINE 100 [IU]/ML
35 INJECTION, SOLUTION SUBCUTANEOUS NIGHTLY
Qty: 1 VIAL | Refills: 3 | Status: ON HOLD | DISCHARGE
Start: 2019-03-26 | End: 2020-12-08 | Stop reason: HOSPADM

## 2019-03-26 RX ORDER — LATANOPROST 50 UG/ML
1 SOLUTION/ DROPS OPHTHALMIC NIGHTLY
Qty: 1 BOTTLE | Refills: 3 | DISCHARGE
Start: 2019-03-26

## 2019-03-26 RX ORDER — SPIRONOLACTONE 50 MG/1
50 TABLET, FILM COATED ORAL DAILY
Qty: 30 TABLET | Refills: 3 | Status: ON HOLD | DISCHARGE
Start: 2019-03-27 | End: 2021-03-12 | Stop reason: HOSPADM

## 2019-03-26 RX ORDER — HYDROCODONE BITARTRATE AND ACETAMINOPHEN 5; 325 MG/1; MG/1
1 TABLET ORAL EVERY 4 HOURS PRN
Qty: 12 TABLET | Refills: 0 | Status: SHIPPED | OUTPATIENT
Start: 2019-03-26 | End: 2019-03-29

## 2019-03-26 RX ORDER — CARVEDILOL 12.5 MG/1
12.5 TABLET ORAL 2 TIMES DAILY WITH MEALS
Qty: 60 TABLET | Refills: 3 | DISCHARGE
Start: 2019-03-26 | End: 2020-09-17 | Stop reason: ALTCHOICE

## 2019-03-26 RX ORDER — MULTIVITAMIN WITH FOLIC ACID 400 MCG
1 TABLET ORAL DAILY
Refills: 0 | DISCHARGE
Start: 2019-03-27 | End: 2020-09-17 | Stop reason: ALTCHOICE

## 2019-03-26 RX ORDER — LANOLIN ALCOHOL/MO/W.PET/CERES
100 CREAM (GRAM) TOPICAL DAILY
DISCHARGE
Start: 2019-03-26 | End: 2021-02-15 | Stop reason: ALTCHOICE

## 2019-03-26 RX ORDER — INSULIN GLARGINE 100 [IU]/ML
40 INJECTION, SOLUTION SUBCUTANEOUS NIGHTLY
Status: DISCONTINUED | OUTPATIENT
Start: 2019-03-26 | End: 2019-03-26 | Stop reason: HOSPADM

## 2019-03-26 RX ADMIN — Medication 100 MG: at 10:57

## 2019-03-26 RX ADMIN — THERA TABS 1 TABLET: TAB at 10:57

## 2019-03-26 RX ADMIN — INSULIN LISPRO 5 UNITS: 100 INJECTION, SOLUTION INTRAVENOUS; SUBCUTANEOUS at 10:55

## 2019-03-26 RX ADMIN — HYDROCODONE BITARTRATE AND ACETAMINOPHEN 2 TABLET: 5; 325 TABLET ORAL at 03:46

## 2019-03-26 RX ADMIN — PRIMIDONE 50 MG: 50 TABLET ORAL at 14:13

## 2019-03-26 RX ADMIN — INSULIN LISPRO 5 UNITS: 100 INJECTION, SOLUTION INTRAVENOUS; SUBCUTANEOUS at 14:15

## 2019-03-26 RX ADMIN — LACTULOSE 20 G: 20 SOLUTION ORAL at 11:03

## 2019-03-26 RX ADMIN — LACTULOSE 20 G: 20 SOLUTION ORAL at 14:16

## 2019-03-26 RX ADMIN — CARVEDILOL 12.5 MG: 6.25 TABLET, FILM COATED ORAL at 17:51

## 2019-03-26 RX ADMIN — SPIRONOLACTONE 50 MG: 25 TABLET ORAL at 10:57

## 2019-03-26 RX ADMIN — INSULIN LISPRO 4 UNITS: 100 INJECTION, SOLUTION INTRAVENOUS; SUBCUTANEOUS at 10:54

## 2019-03-26 RX ADMIN — DULOXETINE HYDROCHLORIDE 60 MG: 60 CAPSULE, DELAYED RELEASE ORAL at 10:57

## 2019-03-26 RX ADMIN — CARVEDILOL 12.5 MG: 6.25 TABLET, FILM COATED ORAL at 07:41

## 2019-03-26 RX ADMIN — AMLODIPINE BESYLATE 5 MG: 5 TABLET ORAL at 10:57

## 2019-03-26 RX ADMIN — FOLIC ACID 1 MG: 1 TABLET ORAL at 10:57

## 2019-03-26 RX ADMIN — PRIMIDONE 50 MG: 50 TABLET ORAL at 10:57

## 2019-03-26 RX ADMIN — ASPIRIN 81 MG 81 MG: 81 TABLET ORAL at 11:03

## 2019-03-26 RX ADMIN — CLONIDINE HYDROCHLORIDE 0.2 MG: 0.2 TABLET ORAL at 10:57

## 2019-03-26 RX ADMIN — INSULIN LISPRO 4 UNITS: 100 INJECTION, SOLUTION INTRAVENOUS; SUBCUTANEOUS at 14:13

## 2019-03-26 ASSESSMENT — PAIN DESCRIPTION - ONSET: ONSET: ON-GOING

## 2019-03-26 ASSESSMENT — PAIN SCALES - GENERAL
PAINLEVEL_OUTOF10: 8
PAINLEVEL_OUTOF10: 8

## 2019-03-26 ASSESSMENT — PAIN DESCRIPTION - PAIN TYPE: TYPE: SURGICAL PAIN

## 2019-03-26 ASSESSMENT — PAIN DESCRIPTION - PROGRESSION: CLINICAL_PROGRESSION: NOT CHANGED

## 2019-03-26 ASSESSMENT — PAIN DESCRIPTION - FREQUENCY: FREQUENCY: CONTINUOUS

## 2019-03-26 ASSESSMENT — PAIN DESCRIPTION - DESCRIPTORS: DESCRIPTORS: ACHING

## 2019-03-26 ASSESSMENT — PAIN DESCRIPTION - ORIENTATION: ORIENTATION: LEFT

## 2019-03-26 ASSESSMENT — PAIN DESCRIPTION - LOCATION: LOCATION: LEG

## 2019-04-16 RX ORDER — SODIUM CHLORIDE 450 MG/100ML
INJECTION, SOLUTION INTRAVENOUS CONTINUOUS
Status: CANCELLED | OUTPATIENT
Start: 2019-04-16

## 2019-04-16 RX ORDER — FENTANYL CITRATE 50 UG/ML
50 INJECTION, SOLUTION INTRAMUSCULAR; INTRAVENOUS ONCE
Status: CANCELLED | OUTPATIENT
Start: 2019-04-16 | End: 2019-04-16

## 2019-04-16 RX ORDER — MIDAZOLAM HYDROCHLORIDE 1 MG/ML
1 INJECTION INTRAMUSCULAR; INTRAVENOUS ONCE
Status: CANCELLED | OUTPATIENT
Start: 2019-04-16 | End: 2019-04-16

## 2019-04-17 ENCOUNTER — HOSPITAL ENCOUNTER (OUTPATIENT)
Age: 54
Discharge: HOME | End: 2019-04-17
Payer: MEDICARE

## 2019-04-17 DIAGNOSIS — E72.20 HYPERAMMONEMIA (HCC): ICD-10-CM

## 2019-04-17 DIAGNOSIS — K70.30 ALCOHOLIC CIRRHOSIS OF LIVER WITHOUT ASCITES (HCC): ICD-10-CM

## 2019-04-17 DIAGNOSIS — D69.6 THROMBOCYTOPENIA (HCC): ICD-10-CM

## 2019-04-17 DIAGNOSIS — B18.2 CHRONIC HEPATITIS C WITHOUT HEPATIC COMA (HCC): ICD-10-CM

## 2019-04-17 DIAGNOSIS — D64.9 ANEMIA, UNSPECIFIED TYPE: ICD-10-CM

## 2019-04-17 DIAGNOSIS — R63.4 WEIGHT LOSS: ICD-10-CM

## 2019-04-17 LAB
AFP-TUMOR MARKER: 14 UG/L
ALBUMIN SERPL-MCNC: 3.2 G/DL (ref 3.5–5.1)
ALP BLD-CCNC: 194 U/L (ref 38–126)
ALT SERPL-CCNC: 53 U/L (ref 11–66)
AMMONIA: 75 UMOL/L (ref 11–60)
ANION GAP SERPL CALCULATED.3IONS-SCNC: 13 MEQ/L (ref 8–16)
AST SERPL-CCNC: 79 U/L (ref 5–40)
BILIRUB SERPL-MCNC: 1.4 MG/DL (ref 0.3–1.2)
BILIRUBIN DIRECT: 0.6 MG/DL (ref 0–0.3)
BUN BLDV-MCNC: 16 MG/DL (ref 7–22)
CALCIUM SERPL-MCNC: 9.4 MG/DL (ref 8.5–10.5)
CHLORIDE BLD-SCNC: 99 MEQ/L (ref 98–111)
CO2: 22 MEQ/L (ref 23–33)
CREAT SERPL-MCNC: 0.7 MG/DL (ref 0.4–1.2)
ERYTHROCYTE [DISTWIDTH] IN BLOOD BY AUTOMATED COUNT: 13.7 % (ref 11.5–14.5)
ERYTHROCYTE [DISTWIDTH] IN BLOOD BY AUTOMATED COUNT: 48.8 FL (ref 35–45)
ETHYL ALCOHOL, SERUM: < 0.01 %
FERRITIN: 808 NG/ML (ref 22–322)
GFR SERPL CREATININE-BSD FRML MDRD: > 90 ML/MIN/1.73M2
GLUCOSE BLD-MCNC: 168 MG/DL (ref 70–108)
HAV IGM SER IA-ACNC: NEGATIVE
HBV SURFACE AB TITR SER: POSITIVE {TITER}
HCT VFR BLD CALC: 38.7 % (ref 42–52)
HEMOGLOBIN: 13 GM/DL (ref 14–18)
HEPATITIS B CORE IGM ANTIBODY: NEGATIVE
HEPATITIS B SURFACE ANTIGEN: NEGATIVE
INR BLD: 1.18 (ref 0.85–1.13)
MCH RBC QN AUTO: 32.3 PG (ref 26–33)
MCHC RBC AUTO-ENTMCNC: 33.6 GM/DL (ref 32.2–35.5)
MCV RBC AUTO: 96 FL (ref 80–94)
PLATELET # BLD: 129 THOU/MM3 (ref 130–400)
PMV BLD AUTO: 12 FL (ref 9.4–12.4)
POTASSIUM SERPL-SCNC: 4 MEQ/L (ref 3.5–5.2)
RBC # BLD: 4.03 MILL/MM3 (ref 4.7–6.1)
SODIUM BLD-SCNC: 134 MEQ/L (ref 135–145)
TOTAL PROTEIN: 8.5 G/DL (ref 6.1–8)
WBC # BLD: 9.3 THOU/MM3 (ref 4.8–10.8)

## 2019-04-17 PROCEDURE — 86708 HEPATITIS A ANTIBODY: CPT

## 2019-04-17 PROCEDURE — G0480 DRUG TEST DEF 1-7 CLASSES: HCPCS

## 2019-04-17 PROCEDURE — 82105 ALPHA-FETOPROTEIN SERUM: CPT

## 2019-04-17 PROCEDURE — 87340 HEPATITIS B SURFACE AG IA: CPT

## 2019-04-17 PROCEDURE — 86705 HEP B CORE ANTIBODY IGM: CPT

## 2019-04-17 PROCEDURE — 85610 PROTHROMBIN TIME: CPT

## 2019-04-17 PROCEDURE — 86706 HEP B SURFACE ANTIBODY: CPT

## 2019-04-17 PROCEDURE — 80307 DRUG TEST PRSMV CHEM ANLYZR: CPT

## 2019-04-17 PROCEDURE — 87389 HIV-1 AG W/HIV-1&-2 AB AG IA: CPT

## 2019-04-17 PROCEDURE — 82140 ASSAY OF AMMONIA: CPT

## 2019-04-17 PROCEDURE — 36415 COLL VENOUS BLD VENIPUNCTURE: CPT

## 2019-04-17 PROCEDURE — 82728 ASSAY OF FERRITIN: CPT

## 2019-04-17 PROCEDURE — 82248 BILIRUBIN DIRECT: CPT

## 2019-04-17 PROCEDURE — 86709 HEPATITIS A IGM ANTIBODY: CPT

## 2019-04-17 PROCEDURE — 85027 COMPLETE CBC AUTOMATED: CPT

## 2019-04-17 PROCEDURE — 80053 COMPREHEN METABOLIC PANEL: CPT

## 2019-04-17 PROCEDURE — 87522 HEPATITIS C REVRS TRNSCRPJ: CPT

## 2019-04-17 PROCEDURE — 87902 NFCT AGT GNTYP ALYS HEP C: CPT

## 2019-04-19 LAB
HAV AB SERPL IA-ACNC: POSITIVE
HIV-2 AB: NEGATIVE

## 2019-04-20 LAB
HCV QNT BY NAAT INTERPRETATION: DETECTED
HCV QNT BY NAAT IU/ML: ABNORMAL IU/ML
HCV QNT BY NAAT LOG IU/ML: 6.38 LOG IU/ML

## 2019-04-21 LAB — HCV GENOTYPE: NORMAL

## 2019-04-24 LAB — URINE DRUG PROFILE: NORMAL

## 2019-04-25 ENCOUNTER — ANESTHESIA (OUTPATIENT)
Dept: ENDOSCOPY | Age: 54
End: 2019-04-25
Payer: MEDICARE

## 2019-04-25 ENCOUNTER — ANESTHESIA EVENT (OUTPATIENT)
Dept: ENDOSCOPY | Age: 54
End: 2019-04-25
Payer: MEDICARE

## 2019-04-25 ENCOUNTER — HOSPITAL ENCOUNTER (OUTPATIENT)
Age: 54
Setting detail: OUTPATIENT SURGERY
Discharge: HOME | End: 2019-04-25
Attending: INTERNAL MEDICINE | Admitting: INTERNAL MEDICINE
Payer: MEDICARE

## 2019-04-25 VITALS
BODY MASS INDEX: 32.88 KG/M2 | DIASTOLIC BLOOD PRESSURE: 59 MMHG | SYSTOLIC BLOOD PRESSURE: 100 MMHG | HEART RATE: 75 BPM | HEIGHT: 76 IN | OXYGEN SATURATION: 97 % | WEIGHT: 270 LBS | TEMPERATURE: 98 F | RESPIRATION RATE: 20 BRPM

## 2019-04-25 VITALS
SYSTOLIC BLOOD PRESSURE: 101 MMHG | RESPIRATION RATE: 19 BRPM | OXYGEN SATURATION: 98 % | DIASTOLIC BLOOD PRESSURE: 57 MMHG

## 2019-04-25 LAB — GLUCOSE BLD-MCNC: 204 MG/DL (ref 70–108)

## 2019-04-25 PROCEDURE — 3609012400 HC EGD TRANSORAL BIOPSY SINGLE/MULTIPLE: Performed by: INTERNAL MEDICINE

## 2019-04-25 PROCEDURE — 82948 REAGENT STRIP/BLOOD GLUCOSE: CPT

## 2019-04-25 PROCEDURE — 2500000003 HC RX 250 WO HCPCS: Performed by: REGISTERED NURSE

## 2019-04-25 PROCEDURE — 6360000002 HC RX W HCPCS: Performed by: REGISTERED NURSE

## 2019-04-25 PROCEDURE — 88305 TISSUE EXAM BY PATHOLOGIST: CPT

## 2019-04-25 PROCEDURE — 7100000000 HC PACU RECOVERY - FIRST 15 MIN: Performed by: INTERNAL MEDICINE

## 2019-04-25 PROCEDURE — 3700000000 HC ANESTHESIA ATTENDED CARE: Performed by: INTERNAL MEDICINE

## 2019-04-25 PROCEDURE — 2580000003 HC RX 258: Performed by: INTERNAL MEDICINE

## 2019-04-25 PROCEDURE — 7100000001 HC PACU RECOVERY - ADDTL 15 MIN: Performed by: INTERNAL MEDICINE

## 2019-04-25 PROCEDURE — 2709999900 HC NON-CHARGEABLE SUPPLY: Performed by: INTERNAL MEDICINE

## 2019-04-25 PROCEDURE — 3700000001 HC ADD 15 MINUTES (ANESTHESIA): Performed by: INTERNAL MEDICINE

## 2019-04-25 RX ORDER — LIDOCAINE HYDROCHLORIDE 20 MG/ML
INJECTION, SOLUTION INFILTRATION; PERINEURAL PRN
Status: DISCONTINUED | OUTPATIENT
Start: 2019-04-25 | End: 2019-04-25 | Stop reason: SDUPTHER

## 2019-04-25 RX ORDER — SODIUM CHLORIDE 450 MG/100ML
INJECTION, SOLUTION INTRAVENOUS CONTINUOUS
Status: DISCONTINUED | OUTPATIENT
Start: 2019-04-25 | End: 2019-04-25 | Stop reason: HOSPADM

## 2019-04-25 RX ORDER — FENTANYL CITRATE 50 UG/ML
INJECTION, SOLUTION INTRAMUSCULAR; INTRAVENOUS PRN
Status: DISCONTINUED | OUTPATIENT
Start: 2019-04-25 | End: 2019-04-25 | Stop reason: SDUPTHER

## 2019-04-25 RX ADMIN — LIDOCAINE HYDROCHLORIDE 200 MG: 20 INJECTION, SOLUTION INFILTRATION; PERINEURAL at 08:01

## 2019-04-25 RX ADMIN — PROPOFOL 100 MG: 10 INJECTION, EMULSION INTRAVENOUS at 08:01

## 2019-04-25 RX ADMIN — FENTANYL CITRATE 100 MCG: 50 INJECTION INTRAMUSCULAR; INTRAVENOUS at 08:01

## 2019-04-25 RX ADMIN — SODIUM CHLORIDE: 4.5 INJECTION, SOLUTION INTRAVENOUS at 07:00

## 2019-04-25 ASSESSMENT — PAIN - FUNCTIONAL ASSESSMENT: PAIN_FUNCTIONAL_ASSESSMENT: 0-10

## 2019-04-25 ASSESSMENT — COPD QUESTIONNAIRES: CAT_SEVERITY: MODERATE

## 2019-04-25 NOTE — BRIEF OP NOTE
Brief Postoperative Note  ______________________________________________________________    Patient: Checo Albrecht  YOB: 1965  MRN: [id number]  Date of Procedure: 4/25/2019    Pre-Op Diagnosis: 30WEIGHT LOSS, GASTROESLPHAGEAL REFLUX,, ALCOHOLIC CIRRHOSIS OF LIVER WITHOUT ASCITES, CHRONIC HEP C W/O HEPATIC COMA    Post-Op Diagnosis: GERD, gastritis        Procedure(s):  EGD BIOPSY    Anesthesia: Anesthesia type not filed in the log.     Surgeon(s):  Ck Jaimes MD    Assistant: Frances Parra RN     Estimated Blood Loss (mL): none    Complications: none    Specimens:   ID Type Source Tests Collected by Time Destination   A : stomach bx for gastritis Tissue Stomach SURGICAL PATHOLOGY Ck Jaimes MD 4/25/2019 0870        Implants:  * No implants in log *      Drains: * No LDAs found *    Findings: GERD and gastritis     Ck Jaimes MD  Date: 4/25/2019  Time: 8:10 AM

## 2019-04-25 NOTE — ANESTHESIA PRE PROCEDURE
disease) ([facility]     Diabetes mellitus ([facility]     Falling     Hepatitis C     Hyperlipidemia     Hypertension     Seizures ([facility]     Spinal headache        Past Surgical History:        Procedure Laterality Date    ENDOSCOPY, COLON, DIAGNOSTIC      FIBULA FRACTURE SURGERY Left 3/21/2019    LEFT FIBULAR SHAFT ORIF performed by Rosemary Estrada DPM at [address]     Steel pins in place       Social History:    Social History     Tobacco Use    Smoking status: Current Every Day Smoker     Packs/day: 1.00     Years: 30.00     Pack years: 30.00     Types: Cigarettes    Smokeless tobacco: Never Used    Tobacco comment: Currently smoking electronic cigarettes   Substance Use Topics    Alcohol use: Yes     Comment: 8 25 oz cans beer day                                Ready to quit: Not Answered  Counseling given: Not Answered  Comment: Currently smoking electronic cigarettes      Vital Signs (Current):   Vitals:    04/25/19 0637   BP: (!) 103/58   Pulse: 62   Resp: 17   Temp: 36.6 °C (97.8 °F)   TempSrc: Tympanic   SpO2: 97%   Weight: 270 lb (122.5 kg)   Height: 6' 4\" (1.93 m)                                              BP Readings from Last 3 Encounters:   04/25/19 (!) 103/58   04/25/19 121/70   04/10/19 (!) 93/51       NPO Status: Time of last liquid consumption: 0500(sip with pills)                        Time of last solid consumption: 1700                        Date of last liquid consumption: 04/25/19                        Date of last solid food consumption: 04/24/19    BMI:   Wt Readings from Last 3 Encounters:   04/25/19 270 lb (122.5 kg)   04/10/19 270 lb (122.5 kg)   03/26/19 275 lb (124.7 kg)     Body mass index is 32.87 kg/m².     CBC:   Lab Results   Component Value Date    WBC 9.3 04/17/2019    RBC 4.03 04/17/2019    HGB 13.0 04/17/2019    HCT 38.7 04/17/2019    MCV 96.0 04/17/2019     04/17/2019       CMP:   Lab Results   Component Value Date     04/17/2019    K 4.0 04/17/2019    CL 99 04/17/2019    CO2 22 04/17/2019    BUN 16 04/17/2019    CREATININE 0.7 04/17/2019    LABGLOM >90 04/17/2019    GLUCOSE 168 04/17/2019    PROT 8.5 04/17/2019    CALCIUM 9.4 04/17/2019    BILITOT 1.4 04/17/2019    ALKPHOS 194 04/17/2019    AST 79 04/17/2019    ALT 53 04/17/2019       POC Tests:   Recent Labs     04/25/19  0654   POCGLU 204*       Coags:   Lab Results   Component Value Date    INR 1.18 04/17/2019       HCG (If Applicable): No results found for: PREGTESTUR, PREGSERUM, HCG, HCGQUANT     ABGs: No results found for: PHART, PO2ART, LOW4DXY, SBG4MHY, BEART, R8JWOUXY     Type & Screen (If Applicable):  No results found for: LABABO, [address]    Anesthesia Evaluation  Patient summary reviewed and Nursing notes reviewed history of anesthetic complications:   Airway: Mallampati: II  TM distance: >3 FB   Neck ROM: full  Mouth opening: > = 3 FB Dental:    (+) edentulous      Pulmonary:normal exam    (+) COPD: moderate,  asthma: exercise-induced asthma,                            Cardiovascular:  Exercise tolerance: good (>4 METS),   (+) hypertension: moderate, hyperlipidemia      ECG reviewed                        Neuro/Psych:   (+) seizures: well controlled, headaches:, psychiatric history:            GI/Hepatic/Renal:   (+) hepatitis: C, liver disease: hepatic encephalopathy,           Endo/Other:    (+) DiabetesType II DM, poorly controlled, , .                 Abdominal:           Vascular:                                        Anesthesia Plan      MAC     ASA 3             Anesthetic plan and risks discussed with patient. Plan discussed with CRNA and attending.                   MEGHAN Reddy - CRNA   4/25/2019

## 2019-04-25 NOTE — ANESTHESIA POSTPROCEDURE EVALUATION
Department of Anesthesiology  Postprocedure Note    Patient: Abran Leo  MRN: [id number]  YOB: 1965  Date of evaluation: 4/25/2019  Time:  8:14 AM     Procedure Summary     Date:  04/25/19 Room / Location:  [facility] / [facility]    Anesthesia Start:  8925 Anesthesia Stop:  0813    Procedure:  EGD BIOPSY (N/A ) Diagnosis:  (30WEIGHT LOSS, GASTROESLPHAGEAL REFLUX,, ALCOHOLIC CIRRHOSIS OF LIVER WITHOUT ASCITES, CHRONIC HEP C W/O HEPATIC COMA)    Surgeon:  Ayah De La Paz MD Responsible Provider:  Noemy Mckeon DO    Anesthesia Type:  MAC ASA Status:  3          Anesthesia Type: No value filed. Rae Phase I:      Rae Phase II:      Last vitals: Reviewed and per EMR flowsheets.        Anesthesia Post Evaluation    Patient location during evaluation: PACU  Patient participation: complete - patient participated  Level of consciousness: awake and alert  Pain score: 0  Airway patency: patent  Nausea & Vomiting: no vomiting and no nausea  Complications: no  Cardiovascular status: blood pressure returned to baseline  Respiratory status: acceptable, spontaneous ventilation and room air  Hydration status: euvolemic

## 2019-04-25 NOTE — H&P
[address] [address]  Sedation/Analgesia History & Physical    Patient: Francesco McDermitt: 1965  [facility] Rec#: [id number] Acc#: [id number]   Provider Performing Procedure: Sascha George  Primary Care Physician: MEGHAN Lockwood CNP    PRE-PROCEDURE   Full CODE [x]Yes  DNR-CCA/DNR-CC []Yes   Brief History/Pre-Procedure Diagnosis:  GERD, abdominal pain , ETOH abuse          MEDICAL HISTORY  []CAD/Valve  []Liver Disease  []Lung Disease []Diabetes  []Hypertension []Renal Disease  []Additional information:       has a past medical history of Asthma, Brain tumor ([facility] COPD (chronic obstructive pulmonary disease) ([facility] [address] Diabetes mellitus ([facility] [facility] Falling, Hepatitis C, Hyperlipidemia, Hypertension, Seizures ([facility] and Spinal headache. SURGICAL HISTORY   has a past surgical history that includes Foot surgery (Right); Fibula Fracture Surgery (Left, 3/21/2019); and Endoscopy, colon, diagnostic. Additional information:       ALLERGIES   Allergies as of 04/10/2019    (No Known Allergies)     Additional information:       MEDICATIONS   Coumadin Use Last 7 Days [x]No []Yes  Antiplatelet drug therapy use last 7 days  [x]No []Yes  Other anticoagulant use last 7 days  [x]No []Yes    Current Facility-Administered Medications:     0.45 % sodium chloride infusion, , Intravenous, Continuous, Lodema MD Doris, Last Rate: 75 mL/hr at 04/25/19 0700  Prior to Admission medications    Medication Sig Start Date End Date Taking?  Authorizing Provider   lactulose (CHRONULAC) 10 GM/15ML solution Take 45 mLs by mouth 3 times daily 4/22/19 5/22/19 Yes MEGHAN Ascencio CNP   tiotropium (SPIRIVA) 18 MCG inhalation capsule Inhale 1 capsule into the lungs daily 3/27/19  Yes Edgardo Isabel MD   insulin lispro (HUMALOG) 100 UNIT/ML injection vial Inject 0-12 Units into the skin 3 times daily (with meals) 3/26/19  Yes Edgardo Isabel MD   insulin lispro (HUMALOG) 100 UNIT/ML injection vial Inject 5 Units into Status: [x]Alert & Oriented  []Other:      VITAL SIGNS   Patient Vitals for the past 24 hrs:   BP Temp Temp src Pulse Resp SpO2 Height Weight   04/25/19 0637 (!) 103/58 97.8 °F (36.6 °C) Tympanic 62 17 97 % 6' 4\" (1.93 m) 270 lb (122.5 kg)       PLANNED PROCEDURE   [x]EGD  []Colonoscopy []Flex Sigmoid  []ERCP []EUS   []Cystoscopy  [] CATH [] BRONCH   Consent: I have discussed with the patient and/or the patient representative the indication, alternatives, and the possible risks and/or complications of the planned procedure and the anesthesia methods. The patient and/or patient representative appear to understand and agree to proceed. SEDATION  Planned agent:[x]Midazolam []Meperidine [x]Sublimaze []Morphine  []Diazepam  []Other:     ASA Classification: Class 4 - A patient with an incapacitating systemic disease that is a constant threat to life    Airway Assessment: Mallampati Class II - (soft palate, fauces & uvula are visible)    Monitoring and Safety: The patient will be placed on a cardiac monitor and vital signs, pulse oximetry and level of consciousness will be continuously evaluated throughout the procedure. The patient will be closely monitored until recovery from the medications is complete and the patient has returned to baseline status. Respiratory therapy will be on standby during the procedure. [x]Pre-procedure diagnostic studies complete and results available. Comment:    [x]Previous sedation/anesthesia experiences assessed. Comment:    [x]The patient is an appropriate candidate to undergo the planned procedure sedation and anesthesia. (Refer to nursing sedation/analgesia documentation record)  [x]Formulation and discussion of sedation/procedure plan, risks, and expectations with patient and/or responsible adult completed. [x]Patient examined immediately prior to the procedure.  (Refer to nursing sedation/analgesia documentation record)    Erin Whiteside MD   Electronically signed 4/25/2019 at

## 2019-05-01 ENCOUNTER — APPOINTMENT (OUTPATIENT)
Dept: GENERAL RADIOLOGY | Age: 54
End: 2019-05-01
Attending: INTERNAL MEDICINE
Payer: MEDICARE

## 2019-05-01 ENCOUNTER — HOSPITAL ENCOUNTER (OUTPATIENT)
Age: 54
Setting detail: OBSERVATION
Discharge: HOME | End: 2019-05-02
Attending: INTERNAL MEDICINE | Admitting: INTERNAL MEDICINE
Payer: MEDICARE

## 2019-05-01 LAB
ANION GAP SERPL CALCULATED.3IONS-SCNC: 11 MEQ/L (ref 8–16)
BUN BLDV-MCNC: 10 MG/DL (ref 7–22)
CALCIUM SERPL-MCNC: 8.5 MG/DL (ref 8.5–10.5)
CHLORIDE BLD-SCNC: 103 MEQ/L (ref 98–111)
CO2: 23 MEQ/L (ref 23–33)
CREAT SERPL-MCNC: 0.5 MG/DL (ref 0.4–1.2)
GFR SERPL CREATININE-BSD FRML MDRD: > 90 ML/MIN/1.73M2
GLUCOSE BLD-MCNC: 235 MG/DL (ref 70–108)
GLUCOSE BLD-MCNC: 235 MG/DL (ref 70–108)
GLUCOSE BLD-MCNC: 248 MG/DL (ref 70–108)
GLUCOSE BLD-MCNC: 261 MG/DL (ref 70–108)
GLUCOSE BLD-MCNC: 270 MG/DL (ref 70–108)
GLUCOSE BLD-MCNC: 366 MG/DL (ref 70–108)
MAGNESIUM: 1.5 MG/DL (ref 1.6–2.4)
POTASSIUM REFLEX MAGNESIUM: 3.8 MEQ/L (ref 3.5–5.2)
SODIUM BLD-SCNC: 137 MEQ/L (ref 135–145)

## 2019-05-01 PROCEDURE — 2580000003 HC RX 258: Performed by: INTERNAL MEDICINE

## 2019-05-01 PROCEDURE — 90792 PSYCH DIAG EVAL W/MED SRVCS: CPT | Performed by: PSYCHIATRY & NEUROLOGY

## 2019-05-01 PROCEDURE — 99999 PR OFFICE/OUTPT VISIT,PROCEDURE ONLY: CPT | Performed by: INTERNAL MEDICINE

## 2019-05-01 PROCEDURE — 6370000000 HC RX 637 (ALT 250 FOR IP): Performed by: INTERNAL MEDICINE

## 2019-05-01 PROCEDURE — G0378 HOSPITAL OBSERVATION PER HR: HCPCS

## 2019-05-01 PROCEDURE — G0379 DIRECT REFER HOSPITAL OBSERV: HCPCS

## 2019-05-01 PROCEDURE — 73600 X-RAY EXAM OF ANKLE: CPT

## 2019-05-01 PROCEDURE — 82948 REAGENT STRIP/BLOOD GLUCOSE: CPT

## 2019-05-01 PROCEDURE — 80048 BASIC METABOLIC PNL TOTAL CA: CPT

## 2019-05-01 PROCEDURE — 6360000002 HC RX W HCPCS: Performed by: INTERNAL MEDICINE

## 2019-05-01 PROCEDURE — 96361 HYDRATE IV INFUSION ADD-ON: CPT

## 2019-05-01 PROCEDURE — 99222 1ST HOSP IP/OBS MODERATE 55: CPT | Performed by: INTERNAL MEDICINE

## 2019-05-01 PROCEDURE — 36415 COLL VENOUS BLD VENIPUNCTURE: CPT

## 2019-05-01 PROCEDURE — 2709999900 HC NON-CHARGEABLE SUPPLY

## 2019-05-01 PROCEDURE — 83735 ASSAY OF MAGNESIUM: CPT

## 2019-05-01 PROCEDURE — 96366 THER/PROPH/DIAG IV INF ADDON: CPT

## 2019-05-01 PROCEDURE — 96365 THER/PROPH/DIAG IV INF INIT: CPT

## 2019-05-01 PROCEDURE — 2060000000 HC ICU INTERMEDIATE R&B

## 2019-05-01 RX ORDER — SODIUM CHLORIDE 0.9 % (FLUSH) 0.9 %
10 SYRINGE (ML) INJECTION PRN
Status: DISCONTINUED | OUTPATIENT
Start: 2019-05-01 | End: 2019-05-02 | Stop reason: HOSPADM

## 2019-05-01 RX ORDER — DEXTROSE MONOHYDRATE 25 G/50ML
12.5 INJECTION, SOLUTION INTRAVENOUS PRN
Status: DISCONTINUED | OUTPATIENT
Start: 2019-05-01 | End: 2019-05-02 | Stop reason: HOSPADM

## 2019-05-01 RX ORDER — LACTULOSE 10 G/15ML
30 SOLUTION ORAL 3 TIMES DAILY
Status: DISCONTINUED | OUTPATIENT
Start: 2019-05-01 | End: 2019-05-02 | Stop reason: HOSPADM

## 2019-05-01 RX ORDER — ROSUVASTATIN CALCIUM 20 MG/1
20 TABLET, COATED ORAL NIGHTLY
Status: DISCONTINUED | OUTPATIENT
Start: 2019-05-01 | End: 2019-05-02 | Stop reason: HOSPADM

## 2019-05-01 RX ORDER — CYCLOBENZAPRINE HCL 10 MG
10 TABLET ORAL 3 TIMES DAILY PRN
Status: DISCONTINUED | OUTPATIENT
Start: 2019-05-01 | End: 2019-05-02 | Stop reason: HOSPADM

## 2019-05-01 RX ORDER — MULTIVITAMIN WITH FOLIC ACID 400 MCG
1 TABLET ORAL DAILY
Status: DISCONTINUED | OUTPATIENT
Start: 2019-05-01 | End: 2019-05-02 | Stop reason: HOSPADM

## 2019-05-01 RX ORDER — POTASSIUM CHLORIDE 7.45 MG/ML
10 INJECTION INTRAVENOUS PRN
Status: DISCONTINUED | OUTPATIENT
Start: 2019-05-01 | End: 2019-05-02 | Stop reason: HOSPADM

## 2019-05-01 RX ORDER — FAMOTIDINE 20 MG/1
20 TABLET, FILM COATED ORAL 2 TIMES DAILY
Status: DISCONTINUED | OUTPATIENT
Start: 2019-05-01 | End: 2019-05-02 | Stop reason: HOSPADM

## 2019-05-01 RX ORDER — MAGNESIUM SULFATE IN WATER 40 MG/ML
2 INJECTION, SOLUTION INTRAVENOUS ONCE
Status: COMPLETED | OUTPATIENT
Start: 2019-05-01 | End: 2019-05-01

## 2019-05-01 RX ORDER — SODIUM CHLORIDE 9 MG/ML
INJECTION, SOLUTION INTRAVENOUS CONTINUOUS
Status: ACTIVE | OUTPATIENT
Start: 2019-05-01 | End: 2019-05-01

## 2019-05-01 RX ORDER — IBUPROFEN 800 MG/1
800 TABLET ORAL EVERY 8 HOURS PRN
COMMUNITY
End: 2020-09-17 | Stop reason: ALTCHOICE

## 2019-05-01 RX ORDER — DEXTROSE MONOHYDRATE 50 MG/ML
100 INJECTION, SOLUTION INTRAVENOUS PRN
Status: DISCONTINUED | OUTPATIENT
Start: 2019-05-01 | End: 2019-05-02 | Stop reason: HOSPADM

## 2019-05-01 RX ORDER — LORAZEPAM 2 MG/ML
1 INJECTION INTRAMUSCULAR
Status: DISCONTINUED | OUTPATIENT
Start: 2019-05-01 | End: 2019-05-02 | Stop reason: HOSPADM

## 2019-05-01 RX ORDER — LORAZEPAM 2 MG/ML
4 INJECTION INTRAMUSCULAR
Status: DISCONTINUED | OUTPATIENT
Start: 2019-05-01 | End: 2019-05-02 | Stop reason: HOSPADM

## 2019-05-01 RX ORDER — HYDROCODONE BITARTRATE AND ACETAMINOPHEN 5; 325 MG/1; MG/1
1 TABLET ORAL EVERY 6 HOURS PRN
Status: ON HOLD | COMMUNITY
End: 2019-05-02 | Stop reason: HOSPADM

## 2019-05-01 RX ORDER — CLONIDINE HYDROCHLORIDE 0.2 MG/1
0.2 TABLET ORAL 2 TIMES DAILY
Status: DISCONTINUED | OUTPATIENT
Start: 2019-05-01 | End: 2019-05-02 | Stop reason: HOSPADM

## 2019-05-01 RX ORDER — PRIMIDONE 50 MG/1
50 TABLET ORAL 3 TIMES DAILY
Status: DISCONTINUED | OUTPATIENT
Start: 2019-05-01 | End: 2019-05-02 | Stop reason: HOSPADM

## 2019-05-01 RX ORDER — FOLIC ACID 1 MG/1
1 TABLET ORAL DAILY
Status: DISCONTINUED | OUTPATIENT
Start: 2019-05-01 | End: 2019-05-02 | Stop reason: HOSPADM

## 2019-05-01 RX ORDER — ALBUTEROL SULFATE 90 UG/1
2 AEROSOL, METERED RESPIRATORY (INHALATION) EVERY 6 HOURS PRN
Status: DISCONTINUED | OUTPATIENT
Start: 2019-05-01 | End: 2019-05-02 | Stop reason: HOSPADM

## 2019-05-01 RX ORDER — CYCLOBENZAPRINE HCL 10 MG
10 TABLET ORAL 3 TIMES DAILY PRN
COMMUNITY
End: 2020-09-17 | Stop reason: ALTCHOICE

## 2019-05-01 RX ORDER — THIAMINE MONONITRATE (VIT B1) 100 MG
100 TABLET ORAL DAILY
Status: DISCONTINUED | OUTPATIENT
Start: 2019-05-01 | End: 2019-05-02 | Stop reason: HOSPADM

## 2019-05-01 RX ORDER — LORAZEPAM 2 MG/ML
2 INJECTION INTRAMUSCULAR
Status: DISCONTINUED | OUTPATIENT
Start: 2019-05-01 | End: 2019-05-02 | Stop reason: HOSPADM

## 2019-05-01 RX ORDER — DULOXETIN HYDROCHLORIDE 60 MG/1
60 CAPSULE, DELAYED RELEASE ORAL DAILY
Status: DISCONTINUED | OUTPATIENT
Start: 2019-05-01 | End: 2019-05-02 | Stop reason: HOSPADM

## 2019-05-01 RX ORDER — LORAZEPAM 2 MG/1
4 TABLET ORAL
Status: DISCONTINUED | OUTPATIENT
Start: 2019-05-01 | End: 2019-05-02 | Stop reason: HOSPADM

## 2019-05-01 RX ORDER — NICOTINE POLACRILEX 4 MG
15 LOZENGE BUCCAL PRN
Status: DISCONTINUED | OUTPATIENT
Start: 2019-05-01 | End: 2019-05-02 | Stop reason: HOSPADM

## 2019-05-01 RX ORDER — AMLODIPINE BESYLATE 5 MG/1
5 TABLET ORAL DAILY
Status: DISCONTINUED | OUTPATIENT
Start: 2019-05-01 | End: 2019-05-01

## 2019-05-01 RX ORDER — LATANOPROST 50 UG/ML
1 SOLUTION/ DROPS OPHTHALMIC NIGHTLY
Status: DISCONTINUED | OUTPATIENT
Start: 2019-05-01 | End: 2019-05-02 | Stop reason: HOSPADM

## 2019-05-01 RX ORDER — SPIRONOLACTONE 25 MG/1
50 TABLET ORAL DAILY
Status: DISCONTINUED | OUTPATIENT
Start: 2019-05-01 | End: 2019-05-02 | Stop reason: HOSPADM

## 2019-05-01 RX ORDER — CARVEDILOL 6.25 MG/1
12.5 TABLET ORAL 2 TIMES DAILY WITH MEALS
Status: DISCONTINUED | OUTPATIENT
Start: 2019-05-01 | End: 2019-05-02 | Stop reason: HOSPADM

## 2019-05-01 RX ORDER — LORAZEPAM 1 MG/1
1 TABLET ORAL
Status: DISCONTINUED | OUTPATIENT
Start: 2019-05-01 | End: 2019-05-02 | Stop reason: HOSPADM

## 2019-05-01 RX ORDER — ASPIRIN 81 MG/1
81 TABLET ORAL DAILY
Status: DISCONTINUED | OUTPATIENT
Start: 2019-05-01 | End: 2019-05-02 | Stop reason: HOSPADM

## 2019-05-01 RX ORDER — ONDANSETRON 2 MG/ML
4 INJECTION INTRAMUSCULAR; INTRAVENOUS EVERY 6 HOURS PRN
Status: DISCONTINUED | OUTPATIENT
Start: 2019-05-01 | End: 2019-05-02 | Stop reason: HOSPADM

## 2019-05-01 RX ORDER — LORAZEPAM 2 MG/ML
3 INJECTION INTRAMUSCULAR
Status: DISCONTINUED | OUTPATIENT
Start: 2019-05-01 | End: 2019-05-02 | Stop reason: HOSPADM

## 2019-05-01 RX ORDER — INSULIN GLARGINE 100 [IU]/ML
35 INJECTION, SOLUTION SUBCUTANEOUS NIGHTLY
Status: DISCONTINUED | OUTPATIENT
Start: 2019-05-01 | End: 2019-05-02 | Stop reason: HOSPADM

## 2019-05-01 RX ORDER — LIDOCAINE 4 G/G
3 PATCH TOPICAL DAILY
Status: DISCONTINUED | OUTPATIENT
Start: 2019-05-01 | End: 2019-05-02 | Stop reason: HOSPADM

## 2019-05-01 RX ORDER — LORAZEPAM 2 MG/ML
1 INJECTION INTRAMUSCULAR EVERY 4 HOURS PRN
Status: DISCONTINUED | OUTPATIENT
Start: 2019-05-01 | End: 2019-05-02 | Stop reason: HOSPADM

## 2019-05-01 RX ORDER — SODIUM CHLORIDE 0.9 % (FLUSH) 0.9 %
10 SYRINGE (ML) INJECTION EVERY 12 HOURS SCHEDULED
Status: DISCONTINUED | OUTPATIENT
Start: 2019-05-01 | End: 2019-05-02 | Stop reason: HOSPADM

## 2019-05-01 RX ORDER — LORAZEPAM 2 MG/1
2 TABLET ORAL
Status: DISCONTINUED | OUTPATIENT
Start: 2019-05-01 | End: 2019-05-02 | Stop reason: HOSPADM

## 2019-05-01 RX ORDER — IBUPROFEN 800 MG/1
800 TABLET ORAL EVERY 8 HOURS PRN
Status: DISCONTINUED | OUTPATIENT
Start: 2019-05-01 | End: 2019-05-02 | Stop reason: HOSPADM

## 2019-05-01 RX ADMIN — Medication 10 ML: at 20:57

## 2019-05-01 RX ADMIN — FAMOTIDINE 20 MG: 20 TABLET ORAL at 20:57

## 2019-05-01 RX ADMIN — INSULIN LISPRO 6 UNITS: 100 INJECTION, SOLUTION INTRAVENOUS; SUBCUTANEOUS at 17:20

## 2019-05-01 RX ADMIN — ROSUVASTATIN CALCIUM 20 MG: 20 TABLET, FILM COATED ORAL at 20:57

## 2019-05-01 RX ADMIN — LATANOPROST 1 DROP: 50 SOLUTION OPHTHALMIC at 20:59

## 2019-05-01 RX ADMIN — INSULIN LISPRO 5 UNITS: 100 INJECTION, SOLUTION INTRAVENOUS; SUBCUTANEOUS at 17:23

## 2019-05-01 RX ADMIN — INSULIN GLARGINE 35 UNITS: 100 INJECTION, SOLUTION SUBCUTANEOUS at 20:59

## 2019-05-01 RX ADMIN — SODIUM CHLORIDE: 9 INJECTION, SOLUTION INTRAVENOUS at 04:31

## 2019-05-01 RX ADMIN — LACTULOSE 30 G: 20 SOLUTION ORAL at 20:54

## 2019-05-01 RX ADMIN — CLONIDINE HYDROCHLORIDE 0.2 MG: 0.2 TABLET ORAL at 20:57

## 2019-05-01 RX ADMIN — MAGNESIUM SULFATE HEPTAHYDRATE 2 G: 40 INJECTION, SOLUTION INTRAVENOUS at 08:00

## 2019-05-01 RX ADMIN — INSULIN LISPRO 8 UNITS: 100 INJECTION, SOLUTION INTRAVENOUS; SUBCUTANEOUS at 14:24

## 2019-05-01 RX ADMIN — INSULIN LISPRO 5 UNITS: 100 INJECTION, SOLUTION INTRAVENOUS; SUBCUTANEOUS at 14:25

## 2019-05-01 RX ADMIN — INSULIN LISPRO 2 UNITS: 100 INJECTION, SOLUTION INTRAVENOUS; SUBCUTANEOUS at 20:59

## 2019-05-01 RX ADMIN — PRIMIDONE 50 MG: 50 TABLET ORAL at 20:57

## 2019-05-01 RX ADMIN — CARVEDILOL 12.5 MG: 6.25 TABLET, FILM COATED ORAL at 17:18

## 2019-05-01 ASSESSMENT — PAIN DESCRIPTION - ORIENTATION
ORIENTATION: LEFT
ORIENTATION: LEFT

## 2019-05-01 ASSESSMENT — PAIN DESCRIPTION - PAIN TYPE
TYPE: CHRONIC PAIN
TYPE: CHRONIC PAIN

## 2019-05-01 ASSESSMENT — PAIN SCALES - GENERAL
PAINLEVEL_OUTOF10: 5
PAINLEVEL_OUTOF10: 4

## 2019-05-01 ASSESSMENT — PAIN DESCRIPTION - LOCATION
LOCATION: FOOT
LOCATION: FOOT

## 2019-05-01 ASSESSMENT — PAIN DESCRIPTION - ONSET
ONSET: ON-GOING
ONSET: ON-GOING

## 2019-05-01 ASSESSMENT — PAIN DESCRIPTION - FREQUENCY
FREQUENCY: CONTINUOUS
FREQUENCY: CONTINUOUS

## 2019-05-01 ASSESSMENT — PAIN DESCRIPTION - PROGRESSION: CLINICAL_PROGRESSION: NOT CHANGED

## 2019-05-01 NOTE — CARE COORDINATION
5/1/19, 10:47 AM      Epifanio Uriarte       Admitted from: [address] [place] [address] 5/1/2019/ [address] day: 0   Location: [facility]/015-A Reason for admit: Alcohol withdrawal seizure with complication ([facility] [facility] [V64.377, R56.9] Status: IP  Admit order signed?: yes  PMH:  has a past medical history of Asthma, Brain tumor ([facility] COPD (chronic obstructive pulmonary disease) ([facility] [address] [facility] Diabetes mellitus ([facility] Falling, Hepatitis C, Hyperlipidemia, Hypertension, Seizures ([facility] and Spinal headache. Procedure: none  Pertinent abnormal Imaging:none  Medications:  Scheduled Meds:   tiotropium  18 mcg Inhalation Daily    thiamine  100 mg Oral Daily    spironolactone  50 mg Oral Daily    rosuvastatin  20 mg Oral Nightly    primidone  50 mg Oral TID    multivitamin  1 tablet Oral Daily    lidocaine  3 patch Transdermal Daily    latanoprost  1 drop Both Eyes Nightly    lactulose  30 g Oral TID    insulin lispro  5 Units Subcutaneous TID WC    insulin glargine  35 Units Subcutaneous Nightly    folic acid  1 mg Oral Daily    DULoxetine  60 mg Oral Daily    cloNIDine  0.2 mg Oral BID    carvedilol  12.5 mg Oral BID WC    aspirin  81 mg Oral Daily    amLODIPine  5 mg Oral Daily    sodium chloride flush  10 mL Intravenous 2 times per day    famotidine  20 mg Oral BID    enoxaparin  40 mg Subcutaneous Daily    insulin lispro  0-12 Units Subcutaneous TID WC    insulin lispro  0-6 Units Subcutaneous Nightly     Continuous Infusions:   sodium chloride 100 mL/hr at 05/01/19 0431    dextrose        Pertinent Info/Orders/Treatment Plan:  IV fluids, diabetes management, telemetry, seizure precautions, suicide precautions, CIWA scale, Addiction Services consult. Diet: DIET CARB CONTROL; Safety Tray   Smoking status:  reports that he has been smoking cigarettes. He has a 30.00 pack-year smoking history.  He has never used smokeless tobacco.   PCP: MEGHAN Moon CNP  Readmission: no  Readmission Risk Score: 16%    Discharge Planning  Current Residence:  Private Residence  Living Arrangements:  Spouse/Significant Other   Support Systems:  Spouse/Significant Other  Current Services PTA:     Potential Assistance Needed:  [address] Medications:  Yes  Does patient want to participate in local refill/ meds to beds program?  Yes  Type of Home Care Services:  Nursing Services  Patient expects to be discharged to:  home  Expected Discharge date:  05/03/19  Follow Up Appointment: Best Day/ Time: Monday AM    Discharge Plan: Met with [facility]. He currently lives at home with hi wife. Plan is to return home at discharge. He uses a roll about s/p left foot fracture. States he is current with [facility]. Wishes to continue this at home. SW consult placed.      Evaluation: yes

## 2019-05-01 NOTE — PROGRESS NOTES
Confirmed with  office that the patient has been following up with all of his follow up visits and his next scheduled follow up is on May 8th at the [facility] office.

## 2019-05-01 NOTE — CONSULTS
Department of Psychiatry  Consult Service   Psychiatric Assessment      Thank you very much for allowing us to participate in the care of this patient. Reason for Consult:  R/o suicidal ideation, alcohol abuse    HISTORY OF PRESENT ILLNESS:          The patient is a 49 y.o. male with significant history of alcohol abuse who is admitted medically for alcohol withdrawal  Patient reports that he has been drinking fifth of Maylon Maha every day for last several years now. Patient reports yesterday he drank 3, fifths of Maylon Maha before presenting to the ED confused. Patient reports that he had severe withdrawal symptoms including seizures and at times hallucinations when he stops drinking. Patient has been taking larger amount of alcohol  He has desire to cut down drinking  Patient has feelings of guilt, related to drinking alcohol  Patient has been spending time, the getting alcohol or using alcohol  Patient has craving for alcohol. Recurrent use of alcohol has affected patient social life and relationships  Patient also has developed tolerance to alcohol. Patient currently reports going through mild withdrawal symptoms as tremors and shakes. He denies any suicidal or homicidal ideation plan or intent today. He does not remember making any suicidal statements. Patient reports that he might have been confused and drunk if he had made any statements in the ED and does not recollect them.   He denies any       PSYCHIATRIC HISTORY:      · Outpatient psychiatric provider:  DENIES  · Suicide attempts: denies  · Inpatient psychiatric admissions: denies    Past psychiatric medications includes:     denies  Adverse reactions from psychotropic medications:    denies      Lifetime Psychiatric Review of Systems      ·    Obsessions and Compulsions: Denies    ·    Liz or Hypomania: Denies  ·    Hallucinations: Denies  ·    Panic Attacks:  Denies  ·    Delusions:  Denies  ·    Phobias:  Denies  ·    Trauma: Denies    Prior to Admission medications    Medication Sig Start Date End Date Taking? Authorizing Provider   cyclobenzaprine (FLEXERIL) 10 MG tablet Take 10 mg by mouth 3 times daily as needed for Muscle spasms    Historical Provider, MD   HYDROcodone-acetaminophen (NORCO) 5-325 MG per tablet Take 1 tablet by mouth every 6 hours as needed for Pain.     Historical Provider, MD   ibuprofen (ADVIL;MOTRIN) 800 MG tablet Take 800 mg by mouth every 8 hours as needed for Pain    Historical Provider, MD   lactulose (CHRONULAC) 10 GM/15ML solution Take 45 mLs by mouth 3 times daily 4/22/19 5/22/19  Yves Hightower APRN - CNP   tiotropium (SPIRIVA) 18 MCG inhalation capsule Inhale 1 capsule into the lungs daily 3/27/19   Penelope Henao MD   insulin lispro (HUMALOG) 100 UNIT/ML injection vial Inject 0-12 Units into the skin 3 times daily (with meals) 3/26/19   Penelope Henao MD   insulin lispro (HUMALOG) 100 UNIT/ML injection vial Inject 5 Units into the skin 3 times daily (with meals) 3/26/19   Penelope Henao MD   carvedilol (COREG) 12.5 MG tablet Take 1 tablet by mouth 2 times daily (with meals) 3/26/19   Penelope Henao MD   latanoprost (XALATAN) 0.005 % ophthalmic solution Place 1 drop into both eyes nightly 3/26/19   Penelope Henao MD   Multiple Vitamin (MULTIVITAMIN) tablet Take 1 tablet by mouth daily 3/27/19   Penelope Henao MD   spironolactone (ALDACTONE) 50 MG tablet Take 1 tablet by mouth daily 3/27/19   Anisa Reinoso MD   thiamine 100 MG tablet Take 1 tablet by mouth daily 3/26/19   Penelope Henao MD   lidocaine 4 % external patch Place 3 patches onto the skin daily 3/27/19   Anisa Reinoso MD   insulin glargine (LANTUS) 100 UNIT/ML injection vial Inject 35 Units into the skin nightly 3/26/19   Anisa Reinoso MD   tadalafil (CIALIS) 20 MG tablet Take 20 mg by mouth as needed for Erectile Dysfunction    Historical Provider, MD   amLODIPine (NORVASC) 5 MG tablet Take 5 mg by mouth daily Historical Provider, MD   cloNIDine (CATAPRES) 0.2 MG tablet Take 0.2 mg by mouth 2 times daily    Historical Provider, MD   aspirin 81 MG tablet Take 81 mg by mouth daily    Historical Provider, MD   DULoxetine (CYMBALTA) 60 MG extended release capsule Take 60 mg by mouth daily    Historical Provider, MD   primidone (MYSOLINE) 50 MG tablet Take 50 mg by mouth 3 times daily    Historical Provider, MD   rosuvastatin (CRESTOR) 20 MG tablet Take 20 mg by mouth daily    Historical Provider, MD   folic acid (FOLVITE) 1 MG tablet Take 1 mg by mouth daily    Historical Provider, MD   albuterol sulfate  (90 Base) MCG/ACT inhaler Inhale 2 puffs into the lungs every 6 hours as needed for Wheezing    Historical Provider, MD        Medications:    Current Facility-Administered Medications: tiotropium (SPIRIVA) inhalation capsule 18 mcg, 18 mcg, Inhalation, Daily  vitamin B-1 (THIAMINE) tablet 100 mg, 100 mg, Oral, Daily  spironolactone (ALDACTONE) tablet 50 mg, 50 mg, Oral, Daily  rosuvastatin (CRESTOR) tablet 20 mg, 20 mg, Oral, Nightly  primidone (MYSOLINE) tablet 50 mg, 50 mg, Oral, TID  multivitamin 1 tablet, 1 tablet, Oral, Daily  lidocaine 4 % external patch 3 patch, 3 patch, Transdermal, Daily  latanoprost (XALATAN) 0.005 % ophthalmic solution 1 drop, 1 drop, Both Eyes, Nightly  lactulose (CHRONULAC) 10 GM/15ML solution 30 g, 30 g, Oral, TID  insulin lispro (HUMALOG) injection vial 5 Units, 5 Units, Subcutaneous, TID WC  insulin glargine (LANTUS) injection vial 35 Units, 35 Units, Subcutaneous, Nightly  ibuprofen (ADVIL;MOTRIN) tablet 800 mg, 800 mg, Oral, S2A PRN  folic acid (FOLVITE) tablet 1 mg, 1 mg, Oral, Daily  DULoxetine (CYMBALTA) extended release capsule 60 mg, 60 mg, Oral, Daily  cyclobenzaprine (FLEXERIL) tablet 10 mg, 10 mg, Oral, TID PRN  cloNIDine (CATAPRES) tablet 0.2 mg, 0.2 mg, Oral, BID  carvedilol (COREG) tablet 12.5 mg, 12.5 mg, Oral, BID WC  aspirin EC tablet 81 mg, 81 mg, Oral,  ENDOSCOPY, COLON, DIAGNOSTIC      FIBULA FRACTURE SURGERY Left 3/21/2019    LEFT FIBULAR SHAFT ORIF performed by Bryanna Newton DPM at [address]     Steel pins in place    UPPER GASTROINTESTINAL ENDOSCOPY N/A 4/25/2019    EGD BIOPSY performed by Nacni Healy MD at [facility]       Allergies: Adhesive tape      Social History:      RESIDENCE: [facility]  : YEs    CHILDREN: 2  OCCUPATION: unemployed  EDUCATION: GED    SUBSTANCE USE HISTORY: Reports extensive alcohol use, denies any other illicit drug use        Family Medical and Psychiatric History:     Denies any significant psychiatric history in family.  Denies any substance use history in family        Problem Relation Age of Onset    Heart Attack Father     Colon Cancer Neg Hx     Colon Polyps Neg Hx          Physical  BP (!) 154/80   Pulse 100   Temp 98.5 °F (36.9 °C) (Oral)   Resp 20   Ht 6' 4\" (1.93 m)   Wt 274 lb 4.8 oz (124.4 kg)   SpO2 92%   BMI 33.39 kg/m²       Mental Status Examination:  Level of consciousness: somnolent   Appearance: hospital attire, lying in bed, fair grooming  Behavior/Motor:  no abnormalities noted  Attitude toward examiner:  cooperative, attentive and good eye contact  Speech:  Spontaneous, normal rate and volume  Mood:  anxious  Affect: mood congruent  Thought processes:  Linear, goal directed, coherent  Thought content: denies suicidal ideations   denies homicidal ideations    denies hallucinations   denies delusions  Cognition:  Oriented to self, situation, location, date  Concentration clinically adequate  Memory age appropriate  Insight & Judgment:  fair    DSM-5 DIAGNOSIS:      Alcohol use disorder severe dependence     General Medical Condition  Patient Active Problem List   Diagnosis Code    HCV antibody positive R76.8    [facility], [facility] Y13.40    Alcohol withdrawal seizure with complication ([facility] C93.224, R56.9    Alcohol withdrawal, uncomplicated ([facility] F10.230    Type 2 diabetes mellitus (HCC) E11.9    Hyponatremia E87.1    Leukocytosis D72.829    Thrombocytopenia (HCC) D69.6    COPD (chronic obstructive pulmonary disease) (HCC) J44.9    HLD (hyperlipidemia) E78.5    HTN (hypertension) I10    Seizure ([address] R56.9    Recurrent falls R29.6    Fracture of multiple ribs of left side S22.42XA    Anemia D64.9    Alcohol abuse F10.10    Closed fracture of distal end of left fibula with routine healing S82.832D    Hyperammonemia (HCC) E72.20    Essential tremor G25.0       Stressors     Severity of stressors is mild  Source of stressors include: Other psychosocial and environmental stressors    PLAN:      Discontinue 1:1 sitter  Continue CIWA protocol  Recommend social work to connect him with outpatient clinical services   Additional recommendations will follow the clinical course. Thank you very much for allowing us to participate in the care of this patient. Time spent 45 min.       Electronically signed by Shanae Rosenberg MD on 5/1/19 at 12:31 PM

## 2019-05-01 NOTE — PROGRESS NOTES
Patient continues to refuse any medications this morning. Patient states he stopped taking his medications two days ago and doesn't plan on taking any after he is discharged home. Patient is also refusing his morning insulin even after explaining to him that his blood sugar level is 235. Patient is alert and aware of his current situation. Does continue to state that he wants to see  or visit the [facility], so a sitter is at the bedside currently. Patient denies any plan of self harm. Freddie Burt is aware and will see the patient today.

## 2019-05-01 NOTE — PROGRESS NOTES
Patient states he would like to go see the morgue. This RN tells patient to go back to sleep and get some rest.  Constant observer at bedside.

## 2019-05-01 NOTE — PROGRESS NOTES
Spoke to patients family member about delivering his CAM boot so podiatry can potentially remove his cast tomorrow. Sonam Guo is planning to bring it in this evening.

## 2019-05-01 NOTE — PROGRESS NOTES
Seen and examined the patient admitted by my colleague early hours of this morning. Patient transferred from [facility] emergency room for further evaluation and treatment. Patient was recently in hospital from 3/18-3/26 for alcohol withdrawal without complications and had multiple falls from intoxication and had open reduction internal fixation of the left fibula and was discharged to nursing home and subsequently a week ago was sent home. Apparently patient has been drinking at his friend's place more than a bottle of whiskey every day since going home for the last 1 week. Patient buys the alcohol and his friend drives. Patient's wife apparently noticed the patient having shaking and was confused and apparently passed out. There was concern of the patient had a withdrawal seizure or episodic tremors from withdrawal.  Patient was also verbalizing about being with Dasia Short or going to the [facility]. Has not been taking any medications for the last 1 week including insulins.      Labs magnesium 1.5 replaced  Patient complaining of pain in the left ankle and podiatry consulted along with psychiatrist.  Home medications resumed    Medical noncompliance    Alcohol intoxication     Alcohol withdrawal without complications    Hypomagnesemia    Diabetes mellitus type 2 with hyperglycemia,    Current smoker    Essential hypertension    Hyperlipidemia    Essential tremor    Distal left fibular shaft fracture, displaced mildly status post open reduction internal fixation on 3/21    Community displaced impacted angulated fracture of distal proximal phalanx of the right great toe     Multiple rib fractures on the left side- - mildly displaced anterior lateral 6, 7, 8 rib fractures on left, posterior 12th rib fracture on left, healing third and fourth and fifth rib fractures     Cirrhosis of liver, alcohol and hepatitis C, Thrombocytopenia  Hx of Hyperammonemia    Intracranial epidermoid cyst    HCV antibody positive    COPD without exacerbation    History of suspicion of seizure     obesity Body mass index is 33.39 kg/m².

## 2019-05-01 NOTE — CONSULTS
Podiatric Surgery Consult    Reason for Consult:  S/p left ankle ORIF  Requesting Physician:  Dr. Trinidad Born:  Alcohol withdrawal seizures, suicidal ideations    HISTORY OF PRESENT ILLNESS:                The patient is a 49 y.o. male with significant past medical history of COPD, alcoholic cirrhosis, alcohol withdrawal, HTN, HLD, and anemia who is being seen at bedside on behalf of Dr. Justin Joshua. Patient is 6 weeks s/p ORIF of left fibular fracture. Patient has been following up with Dr. Justin Joshua at his [facility] office. Patient currently has a below knee fiberglass cast intact to the left ankle. Cast appears as if patient has been walking on it has it has several areas that are worn down. Patient admits to some intermittent moderate pain to the left ankle. Patient does have a CAM boot at home and states his wife would be able to bring the boot to the hospital. Patient is groggy and tired during visit.      Past Medical History:        Diagnosis Date    Asthma     [facility]     COPD (chronic obstructive pulmonary disease) (HCC)     Diabetes mellitus ([facility]     Falling     Hepatitis C     Hyperlipidemia     Hypertension     Seizures ([facility] [facility]     Spinal headache      Past Surgical History:        Procedure Laterality Date    ENDOSCOPY, COLON, DIAGNOSTIC      FIBULA FRACTURE SURGERY Left 3/21/2019    LEFT FIBULAR SHAFT ORIF performed by Rosemary Estrada DPM at [address] Right     Steel pins in place    UPPER GASTROINTESTINAL ENDOSCOPY N/A 4/25/2019    EGD BIOPSY performed by Haylee Read MD at [address] [facility]     Current Medications:    Current Facility-Administered Medications: tiotropium (SPIRIVA) inhalation capsule 18 mcg, 18 mcg, Inhalation, Daily  vitamin B-1 (THIAMINE) tablet 100 mg, 100 mg, Oral, Daily  spironolactone (ALDACTONE) tablet 50 mg, 50 mg, Oral, Daily  rosuvastatin (CRESTOR) tablet 20 mg, 20 mg, Oral, Nightly  primidone (MYSOLINE) tablet 50 mg, 50 mg, Oral, TID  multivitamin 1 tablet, 1 tablet, Oral, Daily  lidocaine 4 % external patch 3 patch, 3 patch, Transdermal, Daily  latanoprost (XALATAN) 0.005 % ophthalmic solution 1 drop, 1 drop, Both Eyes, Nightly  lactulose (CHRONULAC) 10 GM/15ML solution 30 g, 30 g, Oral, TID  insulin lispro (HUMALOG) injection vial 5 Units, 5 Units, Subcutaneous, TID WC  insulin glargine (LANTUS) injection vial 35 Units, 35 Units, Subcutaneous, Nightly  ibuprofen (ADVIL;MOTRIN) tablet 800 mg, 800 mg, Oral, J9H PRN  folic acid (FOLVITE) tablet 1 mg, 1 mg, Oral, Daily  DULoxetine (CYMBALTA) extended release capsule 60 mg, 60 mg, Oral, Daily  cyclobenzaprine (FLEXERIL) tablet 10 mg, 10 mg, Oral, TID PRN  cloNIDine (CATAPRES) tablet 0.2 mg, 0.2 mg, Oral, BID  carvedilol (COREG) tablet 12.5 mg, 12.5 mg, Oral, BID WC  aspirin EC tablet 81 mg, 81 mg, Oral, Daily  albuterol sulfate  (90 Base) MCG/ACT inhaler 2 puff, 2 puff, Inhalation, Q6H PRN  sodium chloride flush 0.9 % injection 10 mL, 10 mL, Intravenous, 2 times per day  sodium chloride flush 0.9 % injection 10 mL, 10 mL, Intravenous, PRN  potassium chloride 10 mEq/100 mL IVPB (Peripheral Line), 10 mEq, Intravenous, PRN  magnesium sulfate 1 g in dextrose 5 % 100 mL IVPB, 1 g, Intravenous, PRN  ondansetron (ZOFRAN) injection 4 mg, 4 mg, Intravenous, Q6H PRN  famotidine (PEPCID) tablet 20 mg, 20 mg, Oral, BID  LORazepam (ATIVAN) tablet 1 mg, 1 mg, Oral, Q1H PRN **OR** LORazepam (ATIVAN) injection 1 mg, 1 mg, Intravenous, Q1H PRN **OR** LORazepam (ATIVAN) tablet 2 mg, 2 mg, Oral, Q1H PRN **OR** LORazepam (ATIVAN) injection 2 mg, 2 mg, Intravenous, Q1H PRN **OR** LORazepam (ATIVAN) tablet 3 mg, 3 mg, Oral, Q1H PRN **OR** LORazepam (ATIVAN) injection 3 mg, 3 mg, Intravenous, Q1H PRN **OR** LORazepam (ATIVAN) tablet 4 mg, 4 mg, Oral, Q1H PRN **OR** LORazepam (ATIVAN) injection 4 mg, 4 mg, Intravenous, Q1H PRN  LORazepam (ATIVAN) injection 1 mg, 1 mg, Intravenous, Q4H PRN  0.9 % sodium chloride infusion, , Intravenous, Continuous  enoxaparin (LOVENOX) injection 40 mg, 40 mg, Subcutaneous, Daily  glucose (GLUTOSE) 40 % oral gel 15 g, 15 g, Oral, PRN  dextrose 50 % solution 12.5 g, 12.5 g, Intravenous, PRN  glucagon (rDNA) injection 1 mg, 1 mg, Intramuscular, PRN  dextrose 5 % solution, 100 mL/hr, Intravenous, PRN  insulin lispro (HUMALOG) injection vial 0-12 Units, 0-12 Units, Subcutaneous, TID WC  insulin lispro (HUMALOG) injection vial 0-6 Units, 0-6 Units, Subcutaneous, Nightly  Allergies:  Adhesive tape    Social History:    TOBACCO:  Currently smokes. Type of tobacco used:  Cigarettes. Quantity per day:  30 pack-year history  ETOH:  Current alcohol usage. DRUGS:  Never used recreational drugs    Family History:       Problem Relation Age of Onset    Heart Attack Father     Colon Cancer Neg Hx     Colon Polyps Neg Hx      REVIEW OF SYSTEMS:    Review of Systems  Constitutional: Negative for weight change, fatigue, weakness, fever, chills, night sweats, anorexia, malaise  Head: Negative for trauma, headache, confusion, light-headedness  Eyes: Negative for pain, blurriness, itching, redness, acute visual changes  Ears: Negative for hearing loss, tinnitus, vertigo, discharge, earache  Nose/Sinuses: Negative for rhinorrhea, congestion, sneezing, itching, allergies, epistaxis   Mouth/Throat/Neck: Negative for bleeding gums, hoarseness, sore throat, swollen neck  Cardiac: Negative for chest pain, murmurs, angina, palpitations, dyspnea on exertion  Respiratory:  Negative for SOB, wheeze/cough, sputum, hemoptysis  GI: Negative for loss of appetite, nausea, vomiting, indigestion, dysphagia, diarrhea, constipation, abdominal pain, abdominal distension  : Negative for frequency, hesitancy, urgency, polyuria, dysuria, hematuria, nocturia, incontinence  Skin: Negative for rash, subcutaneous lesions  Musculoskeletal: Positive for left ankle pain.  Negative for myalgias, arthralgias, joint swelling, joint

## 2019-05-01 NOTE — H&P
times daily 4/22/19 5/22/19  MEGHAN Geronimo - CNP   tiotropium (SPIRIVA) 18 MCG inhalation capsule Inhale 1 capsule into the lungs daily 3/27/19   Penelope Velez MD   insulin lispro (HUMALOG) 100 UNIT/ML injection vial Inject 0-12 Units into the skin 3 times daily (with meals) 3/26/19   Penelope Velez MD   insulin lispro (HUMALOG) 100 UNIT/ML injection vial Inject 5 Units into the skin 3 times daily (with meals) 3/26/19   Penelope Velez MD   carvedilol (COREG) 12.5 MG tablet Take 1 tablet by mouth 2 times daily (with meals) 3/26/19   Penelope Velez MD   latanoprost (XALATAN) 0.005 % ophthalmic solution Place 1 drop into both eyes nightly 3/26/19   Penelope Velez MD   Multiple Vitamin (MULTIVITAMIN) tablet Take 1 tablet by mouth daily 3/27/19   Penelope Velez MD   spironolactone (ALDACTONE) 50 MG tablet Take 1 tablet by mouth daily 3/27/19   Tara Salas MD   thiamine 100 MG tablet Take 1 tablet by mouth daily 3/26/19   Tara Salas MD   lidocaine 4 % external patch Place 3 patches onto the skin daily 3/27/19   Tara Salas MD   insulin glargine (LANTUS) 100 UNIT/ML injection vial Inject 35 Units into the skin nightly 3/26/19   Tara Salas MD   tadalafil (CIALIS) 20 MG tablet Take 20 mg by mouth as needed for Erectile Dysfunction    Historical Provider, MD   amLODIPine (NORVASC) 5 MG tablet Take 5 mg by mouth daily    Historical Provider, MD   cloNIDine (CATAPRES) 0.2 MG tablet Take 0.2 mg by mouth 2 times daily    Historical Provider, MD   aspirin 81 MG tablet Take 81 mg by mouth daily    Historical Provider, MD   DULoxetine (CYMBALTA) 60 MG extended release capsule Take 60 mg by mouth daily    Historical Provider, MD   primidone (MYSOLINE) 50 MG tablet Take 50 mg by mouth 3 times daily    Historical Provider, MD   rosuvastatin (CRESTOR) 20 MG tablet Take 20 mg by mouth daily    Historical Provider, MD   folic acid (FOLVITE) 1 MG tablet Take 1 mg by mouth daily Historical Provider, MD   albuterol sulfate  (90 Base) MCG/ACT inhaler Inhale 2 puffs into the lungs every 6 hours as needed for Wheezing    Historical Provider, MD       Allergies:  Adhesive tape    Social History:      The patient currently lives at home    TOBACCO:   reports that he has been smoking cigarettes. He has a 30.00 pack-year smoking history. He has never used smokeless tobacco.  ETOH:   reports that he drinks alcohol. Family History:      Reviewed in detail. Positive as follows:        Problem Relation Age of Onset    Heart Attack Father     Colon Cancer Neg Hx     Colon Polyps Neg Hx        REVIEW OF SYSTEMS:   14 point review of system performed, pertinent positives as noted in the HPI, all other systems negative/at baseline. PHYSICAL EXAM:    /76   Pulse 96   Temp 98.2 °F (36.8 °C) (Oral)   Resp 22   Ht 6' 4\" (1.93 m)   Wt 274 lb 4.8 oz (124.4 kg)   SpO2 96%   BMI 33.39 kg/m²       General appearance:  No apparent distress, appears stated age and cooperative. HEENT:  Normal cephalic, atraumatic without obvious deformity. Pupils equal, round, and reactive to light. Extra ocular muscles intact. Conjunctivae/corneas clear. Neck: Supple, with full range of motion. No jugular venous distention. Trachea midline. Respiratory:  Normal respiratory effort. Clear to auscultation, bilaterally without Rales/Wheezes/Rhonchi. Cardiovascular:  Regular rate and rhythm with normal S1/S2 without murmurs, rubs or gallops. Abdomen: Soft, non-tender, non-distended with normal bowel sounds. Musculoskeletal:  No clubbing, cyanosis or edema bilaterally. Full range of motion without deformity. Skin: Skin color, texture, turgor normal.  No rashes or lesions. Has cast on foot  Neurologic:  Neurovascularly intact without any focal sensory/motor deficits.  Cranial nerves: II-XII intact, grossly non-focal.  Psychiatric:  Alert and oriented, thought content appropriate, normal medications      Thank you MEGHAN Camp CNP for the opportunity to be involved in this patient's care.     Electronically signed by Danial Retana DO on 5/1/2019 at 6:57 AM

## 2019-05-01 NOTE — PROGRESS NOTES
Patient had stated at [facility] that he wanted to go see Yuridia James. Patient had said the same statement here to this RN. Reinforced suicide precautions with patient. Reinforced that visitors will be screened and may be limited at the discretion of the nurse. Visitor belongings are subject to be searched. Belongings may not be allowed into the patient room. Reviewed any personal belongings from the previous shift believed to be a threat to patient safety removed from room. Belongings removed include: swim trunks and cell phone  Placed in secure area nurse station . Room screened for safety, items removed to create a safe environment include:   Blood pressure cuff   Loose or extra cords, tubing (not of medical necessity)   Extra Linens   Telephone   Toiletries   Trash Liners   Patient's cell phone and charging cord (must be removed from room)      Safety tray ordered: yes    Expectations were discussed with sitter (unit support aide). Sitter positioned near exit. Sitter reminded that patient should be observed at all times including toileting and bathing. Sitter has security radio and documentation sheet. Patient and sitter included in hourly rounds.

## 2019-05-01 NOTE — PLAN OF CARE
Problem: Pain:  Goal: Pain level will decrease  Description  Pain level will decrease  5/1/2019 1335 by Rizwana Delgado RN  Outcome: Ongoing  Note:   Pain Assessment: Faces  Pain Level: 4   Pain goal:  No pain   Is pain goal met at this time? NA     Additional interventions to be implemented: medications PRN tylenol, position change and rest  After offering a PRN medication for complaints of left ankle pain when returning to the patients room he is sleeping. Problem: Skin Integrity:  Goal: Skin integrity will stabilize  Description  Skin integrity will stabilize  5/1/2019 1335 by Rizwana Delgado RN  Outcome: Ongoing  Note:   No new skin breakdown noted. Left foot cast intact   Turning repositioning every two hours. Problem: Discharge Planning:  Goal: Patients continuum of care needs are met  Description  Patients continuum of care needs are met  5/1/2019 1335 by Rizwana Delgado RN  Outcome: Ongoing  Note:   Patient lives at home with spouse and has [facility]. Next follow up with Candy Bonner is May 8th for possible cast removal.      Problem: Falls - Risk of:  Goal: Will remain free from falls  Description  Will remain free from falls  Outcome: Ongoing  Note:   No falls this shift. Bed alarm in use. Call light within reach. Problem: Health Behavior:  Goal: Ability to manage health-related needs will improve  Description  Ability to manage health-related needs will improve  5/1/2019 1335 by Rizwana Delgado RN  Outcome: Ongoing  Note:   Patient continues to refuse home medications - he states \"its not Gods way\". Patient also stated that he stopped taking any medications two days ago and doesn't plan on taking any medications once he is discharged home.     Problem: Suicide risk  Goal: Provide patient with safe environment  Description  Provide patient with safe environment  5/1/2019 1335 by Rizwana Delgado RN  Outcome: Ongoing  Note:   Bedside sitter was discontinued at 1322 per . Patient has been calm and cooperative other than refusing his medications. No signs or indications of self harm noted.

## 2019-05-02 VITALS
SYSTOLIC BLOOD PRESSURE: 120 MMHG | OXYGEN SATURATION: 95 % | BODY MASS INDEX: 33.36 KG/M2 | WEIGHT: 274 LBS | RESPIRATION RATE: 16 BRPM | DIASTOLIC BLOOD PRESSURE: 70 MMHG | TEMPERATURE: 97.7 F | HEART RATE: 70 BPM | HEIGHT: 76 IN

## 2019-05-02 PROBLEM — F10.121 ALCOHOL INTOXICATION DELIRIUM (HCC): Status: ACTIVE | Noted: 2019-05-02

## 2019-05-02 LAB
ALBUMIN SERPL-MCNC: 2.7 G/DL (ref 3.5–5.1)
ALP BLD-CCNC: 166 U/L (ref 38–126)
ALT SERPL-CCNC: 51 U/L (ref 11–66)
AMMONIA: 54 UMOL/L (ref 11–60)
ANION GAP SERPL CALCULATED.3IONS-SCNC: 10 MEQ/L (ref 8–16)
AST SERPL-CCNC: 85 U/L (ref 5–40)
BILIRUB SERPL-MCNC: 1.4 MG/DL (ref 0.3–1.2)
BILIRUBIN DIRECT: 0.6 MG/DL (ref 0–0.3)
BUN BLDV-MCNC: 11 MG/DL (ref 7–22)
CALCIUM SERPL-MCNC: 8.4 MG/DL (ref 8.5–10.5)
CHLORIDE BLD-SCNC: 104 MEQ/L (ref 98–111)
CO2: 24 MEQ/L (ref 23–33)
CREAT SERPL-MCNC: 0.6 MG/DL (ref 0.4–1.2)
ERYTHROCYTE [DISTWIDTH] IN BLOOD BY AUTOMATED COUNT: 14.4 % (ref 11.5–14.5)
ERYTHROCYTE [DISTWIDTH] IN BLOOD BY AUTOMATED COUNT: 51.2 FL (ref 35–45)
GFR SERPL CREATININE-BSD FRML MDRD: > 90 ML/MIN/1.73M2
GLUCOSE BLD-MCNC: 254 MG/DL (ref 70–108)
GLUCOSE BLD-MCNC: 271 MG/DL (ref 70–108)
HCT VFR BLD CALC: 34 % (ref 42–52)
HEMOGLOBIN: 11.5 GM/DL (ref 14–18)
MAGNESIUM: 1.6 MG/DL (ref 1.6–2.4)
MCH RBC QN AUTO: 32.6 PG (ref 26–33)
MCHC RBC AUTO-ENTMCNC: 33.8 GM/DL (ref 32.2–35.5)
MCV RBC AUTO: 96.3 FL (ref 80–94)
PLATELET # BLD: 74 THOU/MM3 (ref 130–400)
PMV BLD AUTO: 11.4 FL (ref 9.4–12.4)
POTASSIUM SERPL-SCNC: 3.8 MEQ/L (ref 3.5–5.2)
RBC # BLD: 3.53 MILL/MM3 (ref 4.7–6.1)
SODIUM BLD-SCNC: 138 MEQ/L (ref 135–145)
TOTAL PROTEIN: 7.4 G/DL (ref 6.1–8)
WBC # BLD: 5.2 THOU/MM3 (ref 4.8–10.8)

## 2019-05-02 PROCEDURE — 82948 REAGENT STRIP/BLOOD GLUCOSE: CPT

## 2019-05-02 PROCEDURE — G0378 HOSPITAL OBSERVATION PER HR: HCPCS

## 2019-05-02 PROCEDURE — 99217 PR OBSERVATION CARE DISCHARGE MANAGEMENT: CPT | Performed by: INTERNAL MEDICINE

## 2019-05-02 PROCEDURE — 6370000000 HC RX 637 (ALT 250 FOR IP): Performed by: INTERNAL MEDICINE

## 2019-05-02 PROCEDURE — 94640 AIRWAY INHALATION TREATMENT: CPT

## 2019-05-02 PROCEDURE — 80053 COMPREHEN METABOLIC PANEL: CPT

## 2019-05-02 PROCEDURE — 82248 BILIRUBIN DIRECT: CPT

## 2019-05-02 PROCEDURE — 2580000003 HC RX 258: Performed by: INTERNAL MEDICINE

## 2019-05-02 PROCEDURE — 2709999900 HC NON-CHARGEABLE SUPPLY

## 2019-05-02 PROCEDURE — 82140 ASSAY OF AMMONIA: CPT

## 2019-05-02 PROCEDURE — 6360000002 HC RX W HCPCS: Performed by: INTERNAL MEDICINE

## 2019-05-02 PROCEDURE — 83735 ASSAY OF MAGNESIUM: CPT

## 2019-05-02 PROCEDURE — 96372 THER/PROPH/DIAG INJ SC/IM: CPT

## 2019-05-02 PROCEDURE — 36415 COLL VENOUS BLD VENIPUNCTURE: CPT

## 2019-05-02 PROCEDURE — 97530 THERAPEUTIC ACTIVITIES: CPT

## 2019-05-02 PROCEDURE — 96366 THER/PROPH/DIAG IV INF ADDON: CPT

## 2019-05-02 PROCEDURE — 97162 PT EVAL MOD COMPLEX 30 MIN: CPT

## 2019-05-02 PROCEDURE — 85027 COMPLETE CBC AUTOMATED: CPT

## 2019-05-02 RX ORDER — MAGNESIUM SULFATE IN WATER 40 MG/ML
2 INJECTION, SOLUTION INTRAVENOUS ONCE
Status: COMPLETED | OUTPATIENT
Start: 2019-05-02 | End: 2019-05-02

## 2019-05-02 RX ADMIN — Medication 100 MG: at 09:09

## 2019-05-02 RX ADMIN — TIOTROPIUM BROMIDE 18 MCG: 18 CAPSULE ORAL; RESPIRATORY (INHALATION) at 07:58

## 2019-05-02 RX ADMIN — ENOXAPARIN SODIUM 40 MG: 40 INJECTION SUBCUTANEOUS at 09:09

## 2019-05-02 RX ADMIN — Medication 10 ML: at 09:23

## 2019-05-02 RX ADMIN — DULOXETINE HYDROCHLORIDE 60 MG: 60 CAPSULE, DELAYED RELEASE ORAL at 09:09

## 2019-05-02 RX ADMIN — ASPIRIN 81 MG: 81 TABLET, COATED ORAL at 09:09

## 2019-05-02 RX ADMIN — MAGNESIUM SULFATE HEPTAHYDRATE 2 G: 40 INJECTION, SOLUTION INTRAVENOUS at 06:20

## 2019-05-02 RX ADMIN — FOLIC ACID 1 MG: 1 TABLET ORAL at 09:09

## 2019-05-02 RX ADMIN — FAMOTIDINE 20 MG: 20 TABLET ORAL at 09:09

## 2019-05-02 RX ADMIN — CLONIDINE HYDROCHLORIDE 0.2 MG: 0.2 TABLET ORAL at 09:09

## 2019-05-02 RX ADMIN — PRIMIDONE 50 MG: 50 TABLET ORAL at 09:09

## 2019-05-02 RX ADMIN — INSULIN LISPRO 6 UNITS: 100 INJECTION, SOLUTION INTRAVENOUS; SUBCUTANEOUS at 09:13

## 2019-05-02 RX ADMIN — INSULIN LISPRO 5 UNITS: 100 INJECTION, SOLUTION INTRAVENOUS; SUBCUTANEOUS at 09:12

## 2019-05-02 RX ADMIN — CARVEDILOL 12.5 MG: 6.25 TABLET, FILM COATED ORAL at 09:09

## 2019-05-02 RX ADMIN — THERA TABS 1 TABLET: TAB at 09:09

## 2019-05-02 RX ADMIN — SPIRONOLACTONE 50 MG: 25 TABLET ORAL at 09:09

## 2019-05-02 RX ADMIN — LACTULOSE 30 G: 20 SOLUTION ORAL at 09:09

## 2019-05-02 ASSESSMENT — PAIN SCALES - GENERAL: PAINLEVEL_OUTOF10: 0

## 2019-05-02 ASSESSMENT — PATIENT HEALTH QUESTIONNAIRE - PHQ9: SUM OF ALL RESPONSES TO PHQ QUESTIONS 1-9: 11

## 2019-05-02 NOTE — PROGRESS NOTES
Discharge teaching and instructions for diagnosis/procedure of alcohol withdraw with complications completed with patient using teachback method. AVS reviewed. Printed prescriptions given to patient. Patient voiced understanding regarding prescriptions, follow up appointments, and care of self at home. Discharged in a wheelchair to  home with support per family. Restricted activity also discussed, demonstrated and with teach back down with patient. Patient educated on cam boot. Patient voices understanding.

## 2019-05-02 NOTE — PLAN OF CARE
Return home with girlfriend and roommate. Resume current services with [facility] for RN, aid, PT and OT.

## 2019-05-02 NOTE — PROGRESS NOTES
Progress note: Podiatric Surgery    Patient - Guille Mackay,  Age - 49 y.o.    - 1965      Room Number - 4A-15/015-A   MRN -  [id number]   [facility]  Date of Admission -  2019  2:30 AM    SUBJECTIVE:   5..19  Patient seen at bedside on behalf of Dr. Robert Granados this am. XR of left ankle obtained, showing good healing of fibula fracture with stable internal fixation. Cast removed from left lower extremity and patient placed in a CAM walking boot. Patient to remain NWB, toe touch only for transfers to left lower extremity. Patient to follow up with Dr. Robert Granados in office in 2 weeks. Patient no longer wearing surgical shoe on the right foot for right hallux fracture. Planning surgical intervention on the right once left side is completely healed. Denies any N,V,F,C,SOB,CP. Okay for discharge from podiatry standpoint.    19  The patient is a 49 y.o. male with significant past medical history of COPD, alcoholic cirrhosis, alcohol withdrawal, HTN, HLD, and anemia who is being seen at bedside on behalf of Dr. Robert Granados. Patient is 6 weeks s/p ORIF of left fibular fracture. Patient has been following up with Dr. Robert Granados at his [facility] office. Patient currently has a below knee fiberglass cast intact to the left ankle. Cast appears as if patient has been walking on it has it has several areas that are worn down. Patient admits to some intermittent moderate pain to the left ankle. Patient does have a CAM boot at home and states his wife would be able to bring the boot to the hospital. Patient is groggy and tired during visit. OBJECTIVE   VITALS    height is 6' 4\" (1.93 m) and weight is 274 lb (124.3 kg). His oral temperature is 98.1 °F (36.7 °C). His blood pressure is 135/84 and his pulse is 73. His respiration is 18 and oxygen saturation is 94%.        Wt Readings from Last 3 Encounters:   19 274 lb (124.3 kg)   19 270 lb (122.5 kg)   04/10/19 270 lb Subcutaneous TID WC    insulin glargine  35 Units Subcutaneous Nightly    folic acid  1 mg Oral Daily    DULoxetine  60 mg Oral Daily    cloNIDine  0.2 mg Oral BID    carvedilol  12.5 mg Oral BID WC    aspirin  81 mg Oral Daily    sodium chloride flush  10 mL Intravenous 2 times per day    famotidine  20 mg Oral BID    enoxaparin  40 mg Subcutaneous Daily    insulin lispro  0-12 Units Subcutaneous TID WC    insulin lispro  0-6 Units Subcutaneous Nightly      dextrose       ibuprofen, cyclobenzaprine, albuterol sulfate HFA, sodium chloride flush, potassium chloride, magnesium sulfate, ondansetron, LORazepam **OR** LORazepam **OR** LORazepam **OR** LORazepam **OR** LORazepam **OR** LORazepam **OR** LORazepam **OR** LORazepam, LORazepam, glucose, dextrose, glucagon (rDNA), dextrose  LABS:   CBC   Recent Labs     05/02/19 0413   WBC 5.2   HGB 11.5*   HCT 34.0*   MCV 96.3*   PLT 74*     BMP:   Recent Labs     05/02/19 0413      K 3.8      CO2 24   BUN 11   CREATININE 0.6   GLUCOSE 271*   MG 1.6     ABG: No results for input(s): PHART, JLA2SRQ, PO2ART, LKB3CRK, H3CGVREI in the last 72 hours. LIVER PROFILE   Recent Labs     05/02/19 0413   AST 85*   ALT 51   BILIDIR 0.6*   BILITOT 1.4*   ALKPHOS 166*     INR No results for input(s): INR in the last 72 hours. PTT No results found for: APTT    CULTURES:   UA: No results for input(s): SPECGRAV, PHUR, COLORU, CLARITYU, MUCUS, PROTEINU, BLOODU, RBCUA, WBCUA, BACTERIA, NITRU, GLUCOSEU, BILIRUBINUR, UROBILINOGEN, KETUA, LABCAST, LABCASTTY, AMORPHOS in the last 72 hours. Invalid input(s): CRYSTALS  Micro: No results found for: BC      IMAGING:   Left Ankle XR 5.1.19: Impression   1. Evidence of prior open reduction internal fixation of a preceding demonstrated distal left fibular shaft fracture with a lateral surgical plate and surgical screws. The previously demonstrated fracture is not deftly seen which is consistent with    healing.  However, the overlying cast limits evaluation of the underlying fine bony detail. The osseous structures appear satisfactory in alignment. ASSESSMENT   Principle  S/p left ankle fracture  S/p Right hallux fracture    Chronic  Patient Active Problem List   Diagnosis    HCV antibody positive    [facility]osis, [facility] [address]    Alcohol withdrawal seizure with complication (HCC)    Alcohol withdrawal, uncomplicated (HCC)    Type 2 diabetes mellitus (HCC)    Hyponatremia    Leukocytosis    Thrombocytopenia (HCC)    COPD (chronic obstructive pulmonary disease) (HCC)    HLD (hyperlipidemia)    HTN (hypertension)    Seizure ([address]    Recurrent falls    Fracture of multiple ribs of left side    Anemia    Alcohol abuse    Closed fracture of distal end of left fibula with routine healing    Hyperammonemia ([facility] [address]    Essential tremor    Follow up       PLAN  Pt. is a 49 y.o. male   - Patient was examined and evaluated  - Cast removed from left lower extremity. Betadine applied to open wounds of left foot, covered with gauze, kerlix, and ace bandage. Patient placed in a CAM boot. Remain NWB to left lower extremity except toe touch for transfers.  - Okay with patient to work with physical therapy. - Betadine applied to eschars of right foot, covered with a clean dry sock  - Follow up with Dr. Hunter Eldridge in office in 2 weeks. Discharge: Okay for discharge from podiatry standpoint. Saul Miller D.P.M.   Podiatric Surgical Resident  5/2/2019   10:19 AM     Emily Frankel [address]

## 2019-05-02 NOTE — PLAN OF CARE
Problem: Falls - Risk of:  Goal: Will remain free from falls  Description  Will remain free from falls  Outcome: Met This Shift     Problem: Pain:  Goal: Pain level will decrease  Description  Pain level will decrease  Outcome: Ongoing     Problem: Coping:  Goal: Ability to cope will improve  Description  Ability to cope will improve  Outcome: Ongoing     Problem: Health Behavior:  Goal: Ability to manage health-related needs will improve  Description  Ability to manage health-related needs will improve  Outcome: Ongoing     Problem: Safety:  Goal: Ability to remain free from injury will improve  Description  Ability to remain free from injury will improve  Outcome: Ongoing  Note:   Compliant with call light usage. Bed alarm set. Needed items in easy reach. Care plan reviewed with patient. Patient verbalizes understanding of the care plan and contributed to goal setting.

## 2019-05-02 NOTE — PROGRESS NOTES
Multiple attempts to schedule appointment with patient's PCP made, unsuccessful. Patient states that he has an appointment with him on the 6th and that he does not have the phone number with him.

## 2019-05-02 NOTE — PROGRESS NOTES
CLINICAL PHARMACY: DISCHARGE MED RECONCILIATION/REVIEW    [facility]) Select Patient?: No  Total # of Interventions Recommended: 0   -   Total # Interventions Accepted: 0  Intervention Severity:   - Level 1 Intervention Present?: No   - Level 2 #: 0   - Level 3 #: 0   Time Spent (min): 5    Additional Documentation:

## 2019-05-02 NOTE — CONSULTS
Brief Intervention and Referral to Treatment Summary    Patient was provided PHQ-9, AUDIT and DAST Screening:      PHQ-9 Score: 11  AUDIT Score:  29  DAST Score:   N/A     Patients substance use is considered     Low Risk/Healthy  Moderate Risk  Harmful  Dependent  X    Patients depression is considered:     Minimal  Mild   Moderate  X  Moderately Severe  Severe    Brief Education Was Provided    Patient was receptive  X  Patient was not receptive      Brief Intervention Is Provided (Only for AUDIT or DAST)     Patient reports readiness to decrease and/or stop use and a plan was discussed  X  Patient denies readiness to decrease and/or stop use and a plan was not discussed      Recommendations/Referrals for Brief and/or Specialized Treatment Provided to Patient     Patient plans to follow up in his [place]. Patient also provided with inpatient, outpatient, AA and detox resources. Resources discussed.

## 2019-05-02 NOTE — PROGRESS NOTES
[facility]  INPATIENT PHYSICAL THERAPY  EVALUATION  [facility] 4A - 4A-15/015-A    Time In: 6822  Time Out: 1108  Timed Code Treatment Minutes: 11 Minutes  Minutes: 28          Date: 2019  Patient Name: Pablo Telles,  Gender:  male        MRN: [id number]  : 1965  (49 y.o.)      Referring Practitioner: Dr. Christin Jacobsen  Diagnosis: alcohol withdrawal seizure with complication  Additional Pertinent Hx: Patient transferred from [facility] emergency room for further evaluation and treatment. Patient was recently in hospital from 3/18-3/26 for alcohol withdrawal without complications and had multiple falls from intoxication and had open reduction internal fixation of the left fibula and was discharged to nursing home and subsequently a week ago was sent home. Apparently patient has been drinking at his friend's place more than a bottle of whiskey every day since going home for the last 1 week. Patient buys the alcohol and his friend drives. Patient's wife apparently noticed the patient having shaking and was confused and apparently passed out. There was concern of the patient had a withdrawal seizure or episodic tremors from withdrawal.  Patient was also verbalizing about being with [facility]  or going to the [facility]. Has not been taking any medications for the last 1 week including insulins.        Past Medical History:   Diagnosis Date    Asthma     Brain tumor ([address]     COPD (chronic obstructive pulmonary disease) ([address]     Diabetes mellitus ([address]     Falling     Hepatitis C     Hyperlipidemia     Hypertension     Seizures ([address]     Spinal headache      Past Surgical History:   Procedure Laterality Date    ENDOSCOPY, COLON, DIAGNOSTIC      FIBULA FRACTURE SURGERY Left 3/21/2019    LEFT FIBULAR SHAFT ORIF performed by Monika Copeland DPM at [address] Right     Steel pins in place    UPPER GASTROINTESTINAL ENDOSCOPY N/A 2019    EGD BIOPSY stated no questions or concerns for home mobility    Assessment:  Assessment: pt tolerated well and is MOD I with mobility with use of rollabout for NWB LLE in CAM boot, no further PT at this time  Prognosis: Excellent    Clinical Presentation: Moderate - Evolving with Changing Characteristics:    pt tolerated well and is MOD I with mobility with use of rollabout for NWB LLE in CAM boot, no further PT at this time    Decision Making: High Complexitybased on patient assessment and decision making process of determining plan of care and establishing reasonable expectations for measurable functional outcomes    REQUIRES PT FOLLOW UP: No  No Skilled PT: Independent with functional mobility     Discharge Recommendations:  Discharge Recommendations: Home independently    Patient Education:  Patient Education: POC    Equipment Recommendations:  Equipment Needed: No    Safety:  Type of devices: All fall risk precautions in place, Bed alarm in place, Call light within reach, Left in bed, Nurse notified    Plan:  Times per week: NA    Goals:  Patient goals : return home  Short term goals  Time Frame for Short term goals: NA  Long term goals  Time Frame for Long term goals : NA    Evaluation Complexity: Based on the findings of patient history, examination, clinical presentation, and decision making during this evaluation, the evaluation of Vandana Sal  is of medium complexity.             AM-PAC Inpatient Mobility without Stair Climbing Raw Score : 20  AM-PAC Inpatient without Stair Climbing T-Scale Score : 60.57  Mobility Inpatient CMS 0-100% Score: 0  Mobility Inpatient without Stair CMS G-Code Modifier : [address]

## 2019-05-02 NOTE — PROGRESS NOTES
Spoke with patient's cyndi Valdes regarding patient's orders for discharge. She states she is unable to pick patient up but Edward Avelar would be able to. She states she does not forsee anything else patient would need at discharge. 1152 Call placed to Edward Avelar, who states she can pick patient up in \"about 45 minutes to an hour\". Advised ok.

## 2019-05-02 NOTE — DISCHARGE SUMMARY
Hospital Medicine Discharge Summary      Patient Identification:   Iain Torres   : 1965  MRN: [id number]   Account: [de-identified]      Patient's PCP: MEGHAN Kahn CNP    Admit Date: 2019     Discharge Date:   2019     Admitting Physician: Larry Gregorio DO     Discharge Physician: Margarita Gil MD     Discharge Diagnoses:  Alcohol  intoxication with complication, passed out, delirium  Alcohol withdrawal without complications  Medical noncompliance  Hypomagnesemia  Diabetes mellitus type 2 with hyperglycemia,insulin dependant   Current smoker   Essential hypertension   Hyperlipidemia   Essential tremor  Distal left fibular shaft fracture, displaced mildly status post open reduction internal fixation on 3/21- stable on imaging  Community displaced impacted angulated fracture of distal proximal phalanx of the right great toe    Multiple rib fractures on the left side- - mildly displaced anterior lateral 6, 7, 8 rib fractures on left, posterior 12th rib fracture on left, healing third and fourth and fifth rib fractures , 2019  Cirrhosis of liver, alcohol and hepatitis C, Thrombocytopenia  Hx of Hyperammonemia  Intracranial epidermoid cyst  HCV antibody positive  COPD without exacerbation  History of suspicion of seizure  Obesity Body mass index is 33.39 kg/m². The patient was seen and examined on day of discharge and this discharge summary is in conjunction with any daily progress note from day of discharge. Hospital Course:   Iain Torres is a 49 y.o. male admitted to [facility] on 2019 for possible seizure from drinking vs shaking. Patient transferred from [facility] emergency room for further evaluation and treatment.   Patient was recently in hospital from 3/18-3/26 for alcohol withdrawal without complications and had multiple falls from intoxication and had open reduction internal fixation of the left fibula and was discharged to nursing home and subsequently a week ago was sent home.     Apparently patient has been drinking at his friend's place more than a bottle of whiskey every day since going home for the last 1 week. Patient buys the alcohol and his friend drives. Patient's wife apparently noticed the patient having shaking and was confused and apparently passed out. There was concern of the patient had a withdrawal seizure or episodic tremors from withdrawal.  Patient was also verbalizing about being with [facility] or going to the [facility]. Has not been taking any medications for the last 1 week including insulins.      Labs magnesium 1.5 replaced. Patient was also complaining of pain in the left ankle and podiatrist was consulted along with psychiatry as the patient was expressing some suicidal ideations. X-ray of the left fibula did not show any new fractures and is in fact stable and the cast was removed. Psychiatrist also evaluated the patient and did not feel any need for inpatient treatment and once the patient's alcohol was out of the system, patient wanted to go home. Patient denied any suicidal ideation once he was more alert and was able to tolerate food. Patient states that he was actually taking the medications only one night he didn't take and has all medications and doesn't need any prescriptions. Home health care has been arranged and red recommend the patient to abstain from drinking. Patient had very minimal withdrawal symptoms with only tremors and during the last admission to did not have any significant withdrawal symptoms. Was determined that the patient does not have seizures and was more of tremors.          Exam:     Vitals:  Vitals:    05/01/19 2356 05/02/19 0415 05/02/19 0438 05/02/19 0857   BP: (!) 140/83 134/72  135/84   Pulse: 82 67  73   Resp: 16 16  18   Temp: 98 °F (36.7 °C) 98 °F (36.7 °C)  98.1 °F (36.7 °C)   TempSrc: Oral Oral  Oral   SpO2: 94% 93%  94%   Weight:   274 lb (124.3 kg)    Height: Weight: Weight: 274 lb (124.3 kg)     24 hour intake/output:    Intake/Output Summary (Last 24 hours) at 5/2/2019 1140  Last data filed at 5/2/2019 0204  Gross per 24 hour   Intake 1050 ml   Output 1200 ml   Net -150 ml         General appearance:  No apparent distress, appears stated age and cooperative. HEENT:  Normal cephalic, atraumatic without obvious deformity. Pupils equal, round, and reactive to light. Extra ocular muscles intact. Conjunctivae/corneas clear. Neck: Supple, with full range of motion. No jugular venous distention. Trachea midline. Respiratory:  Normal respiratory effort. Clear to auscultation, bilaterally without Rales/Wheezes/Rhonchi. Cardiovascular:  Regular rate and rhythm with normal S1/S2 without murmurs, rubs or gallops. Abdomen: Soft, non-tender, non-distended with normal bowel sounds. Musculoskeletal:  No clubbing, cyanosis or edema bilaterally. Full range of motion without deformity. Skin: Skin color, texture, turgor normal.  No rashes or lesions. Neurologic:  Neurovascularly intact without any focal sensory/motor deficits. Cranial nerves: II-XII intact, grossly non-focal.  Psychiatric:  Alert and oriented, thought content appropriate, normal insight  Capillary Refill: Brisk,< 3 seconds   Peripheral Pulses: +2 palpable, equal bilaterally       Labs: For convenience and continuity at follow-up the following most recent labs are provided:      CBC:    Lab Results   Component Value Date    WBC 5.2 05/02/2019    HGB 11.5 05/02/2019    HCT 34.0 05/02/2019    PLT 74 05/02/2019       Renal:    Lab Results   Component Value Date     05/02/2019    K 3.8 05/02/2019    K 3.8 05/01/2019     05/02/2019    CO2 24 05/02/2019    BUN 11 05/02/2019    CREATININE 0.6 05/02/2019    CALCIUM 8.4 05/02/2019    PHOS 2.9 03/20/2019         Significant Diagnostic Studies    Radiology:   XR ANKLE LEFT (2 VIEWS)   Final Result   1.  Evidence of prior open reduction internal fixation of a preceding demonstrated distal left fibular shaft fracture with a lateral surgical plate and surgical screws. The previously demonstrated fracture is not deftly seen which is consistent with    healing. However, the overlying cast limits evaluation of the underlying fine bony detail. The osseous structures appear satisfactory in alignment. **This report has been created using voice recognition software. It may contain minor errors which are inherent in voice recognition technology. **      Final report electronically signed by Dr. Penny Zaman on 5/1/2019 3:28 PM             Consults:     [address] TO SOCIAL WORK  IP CONSULT TO PODIATRY  IP CONSULT TO PSYCHIATRY  IP CONSULT TO HOME CARE NEEDS    Disposition: Home  Condition at Discharge: Stable    Code Status:  Full Code     Patient Instructions:    Discharge lab work: non  Activity: activity as tolerated  Diet: DIET CARB CONTROL; Carb Control: 4 carb choices (60 gms)/meal; Safety Tray      Follow-up visits:   MEGHAN Stubbs CNP  [address] [place] [address] 600 [facility]  [phone]    In 1 week      [facility]  [address] [facility] [address]  [phone]    In 2 weeks           Discharge Medications:      Saadia Sheffield [facility]"   Home Medication Instructions KEYONNA:[id number]    Printed on:05/02/19 1140   Medication Information                      albuterol sulfate  (90 Base) MCG/ACT inhaler  Inhale 2 puffs into the lungs every 6 hours as needed for Wheezing             amLODIPine (NORVASC) 5 MG tablet  Take 5 mg by mouth daily             aspirin 81 MG tablet  Take 81 mg by mouth daily             carvedilol (COREG) 12.5 MG tablet  Take 1 tablet by mouth 2 times daily (with meals)             cloNIDine (CATAPRES) 0.2 MG tablet  Take 0.2 mg by mouth 2 times daily             cyclobenzaprine (FLEXERIL) 10 MG tablet  Take 10 mg by mouth 3 times daily as needed for Muscle spasms             DULoxetine (CYMBALTA) 60 MG extended release capsule  Take 60 mg by mouth daily             folic acid (FOLVITE) 1 MG tablet  Take 1 mg by mouth daily             ibuprofen (ADVIL;MOTRIN) 800 MG tablet  Take 800 mg by mouth every 8 hours as needed for Pain             insulin glargine (LANTUS) 100 UNIT/ML injection vial  Inject 35 Units into the skin nightly             insulin lispro (HUMALOG) 100 UNIT/ML injection vial  Inject 0-12 Units into the skin 3 times daily (with meals)             insulin lispro (HUMALOG) 100 UNIT/ML injection vial  Inject 5 Units into the skin 3 times daily (with meals)             lactulose (CHRONULAC) 10 GM/15ML solution  Take 45 mLs by mouth 3 times daily             latanoprost (XALATAN) 0.005 % ophthalmic solution  Place 1 drop into both eyes nightly             Multiple Vitamin (MULTIVITAMIN) tablet  Take 1 tablet by mouth daily             primidone (MYSOLINE) 50 MG tablet  Take 50 mg by mouth 3 times daily             rosuvastatin (CRESTOR) 20 MG tablet  Take 20 mg by mouth daily             spironolactone (ALDACTONE) 50 MG tablet  Take 1 tablet by mouth daily             tadalafil (CIALIS) 20 MG tablet  Take 20 mg by mouth as needed for Erectile Dysfunction             thiamine 100 MG tablet  Take 1 tablet by mouth daily             tiotropium (SPIRIVA) 18 MCG inhalation capsule  Inhale 1 capsule into the lungs daily                 Time Spent on discharge is more than 28 min in the examination, evaluation, counseling and review of medications and discharge plan. Signed: Thank you MEGHAN Smith CNP for the opportunity to be involved in this patient's care.     Electronically signed by Elva Rios MD on 5/2/2019 at 11:40 AM

## 2019-05-02 NOTE — CARE COORDINATION
5/2/19, 12:01 PM    Discharge plan discussed by  and . Discharge plan reviewed with patient/ family. Patient/ family verbalize understanding of discharge plan and are in agreement with plan. Understanding was demonstrated using the teach back method. Services After Discharge  Services At/After Discharge: Nursing Services, Skilled Therapy, Aide Services([facility])      Full assessment deferred as Felecia Zuniga will be discharged today. He will return home where he lives with his girlfriend Keisha and roommate Marco Antonio Sterling. They are his main support system. He has a ramp, wheelchair, 4 wheeled walker, seat in shower, high toilet with rails. He is current with [facility] for RN, PT, OT and aid services. KENZIE called Satish Payton at [facility] to make him aware of discharge. Felecia Zuniga denies other needs at this time.

## 2019-05-14 RX ORDER — SODIUM CHLORIDE 450 MG/100ML
INJECTION, SOLUTION INTRAVENOUS CONTINUOUS
Status: CANCELLED | OUTPATIENT
Start: 2019-05-14

## 2019-05-15 ENCOUNTER — HOSPITAL ENCOUNTER (OUTPATIENT)
Dept: ULTRASOUND IMAGING | Age: 54
Discharge: HOME | End: 2019-05-15
Payer: MEDICARE

## 2019-05-15 VITALS
OXYGEN SATURATION: 99 % | TEMPERATURE: 98.2 F | BODY MASS INDEX: 32.87 KG/M2 | SYSTOLIC BLOOD PRESSURE: 123 MMHG | RESPIRATION RATE: 16 BRPM | HEART RATE: 79 BPM | DIASTOLIC BLOOD PRESSURE: 66 MMHG | WEIGHT: 270 LBS

## 2019-05-15 LAB
APTT: 36.9 SECONDS (ref 22–38)
ERYTHROCYTE [DISTWIDTH] IN BLOOD BY AUTOMATED COUNT: 13.5 % (ref 11.5–14.5)
ERYTHROCYTE [DISTWIDTH] IN BLOOD BY AUTOMATED COUNT: 46.7 FL (ref 35–45)
HCT VFR BLD CALC: 38.9 % (ref 42–52)
HEMOGLOBIN: 13.5 GM/DL (ref 14–18)
INR BLD: 1.33 (ref 0.85–1.13)
MCH RBC QN AUTO: 32.6 PG (ref 26–33)
MCHC RBC AUTO-ENTMCNC: 34.7 GM/DL (ref 32.2–35.5)
MCV RBC AUTO: 94 FL (ref 80–94)
PLATELET # BLD: 151 THOU/MM3 (ref 130–400)
PMV BLD AUTO: 10.9 FL (ref 9.4–12.4)
RBC # BLD: 4.14 MILL/MM3 (ref 4.7–6.1)
WBC # BLD: 8.5 THOU/MM3 (ref 4.8–10.8)

## 2019-05-15 PROCEDURE — 85610 PROTHROMBIN TIME: CPT

## 2019-05-15 PROCEDURE — 85027 COMPLETE CBC AUTOMATED: CPT

## 2019-05-15 PROCEDURE — 76942 ECHO GUIDE FOR BIOPSY: CPT

## 2019-05-15 PROCEDURE — 2580000003 HC RX 258: Performed by: RADIOLOGY

## 2019-05-15 PROCEDURE — 36415 COLL VENOUS BLD VENIPUNCTURE: CPT

## 2019-05-15 PROCEDURE — 2709999900 HC NON-CHARGEABLE SUPPLY

## 2019-05-15 PROCEDURE — 6370000000 HC RX 637 (ALT 250 FOR IP)

## 2019-05-15 PROCEDURE — 85730 THROMBOPLASTIN TIME PARTIAL: CPT

## 2019-05-15 PROCEDURE — 88307 TISSUE EXAM BY PATHOLOGIST: CPT

## 2019-05-15 PROCEDURE — 6360000002 HC RX W HCPCS

## 2019-05-15 PROCEDURE — 88313 SPECIAL STAINS GROUP 2: CPT

## 2019-05-15 RX ORDER — DIAPER,BRIEF,INFANT-TODD,DISP
EACH MISCELLANEOUS ONCE
Status: COMPLETED | OUTPATIENT
Start: 2019-05-15 | End: 2019-05-15

## 2019-05-15 RX ORDER — MIDAZOLAM HYDROCHLORIDE 1 MG/ML
1 INJECTION INTRAMUSCULAR; INTRAVENOUS ONCE
Status: COMPLETED | OUTPATIENT
Start: 2019-05-15 | End: 2019-05-15

## 2019-05-15 RX ORDER — FENTANYL CITRATE 50 UG/ML
50 INJECTION, SOLUTION INTRAMUSCULAR; INTRAVENOUS ONCE
Status: COMPLETED | OUTPATIENT
Start: 2019-05-15 | End: 2019-05-15

## 2019-05-15 RX ORDER — SODIUM CHLORIDE 450 MG/100ML
INJECTION, SOLUTION INTRAVENOUS CONTINUOUS
Status: DISCONTINUED | OUTPATIENT
Start: 2019-05-15 | End: 2019-05-16 | Stop reason: HOSPADM

## 2019-05-15 RX ADMIN — SODIUM CHLORIDE: 4.5 INJECTION, SOLUTION INTRAVENOUS at 09:28

## 2019-05-15 RX ADMIN — Medication 1 G: at 11:18

## 2019-05-15 RX ADMIN — FENTANYL CITRATE 50 MCG: 50 INJECTION, SOLUTION INTRAMUSCULAR; INTRAVENOUS at 11:02

## 2019-05-15 RX ADMIN — MIDAZOLAM HYDROCHLORIDE 1 MG: 1 INJECTION INTRAMUSCULAR; INTRAVENOUS at 11:02

## 2019-05-15 ASSESSMENT — PAIN SCALES - GENERAL
PAINLEVEL_OUTOF10: 0
PAINLEVEL_OUTOF10: 2
PAINLEVEL_OUTOF10: 0
PAINLEVEL_OUTOF10: 8
PAINLEVEL_OUTOF10: 0

## 2019-05-15 NOTE — PROGRESS NOTES
1130:  BACK IN OPN. DENIES PAIN. GAUZE DRESSING TO RUQ DRY AND INTACT. 1150:  LUNCH TAKEN. RESTING ON RIGHT SIDE WITH EYES CLOSED.

## 2019-05-15 NOTE — PROGRESS NOTES
1040 Pt to ultrasound. Skin natural for race, warm, dry. Respirations even and unlabored. Pain  8/10 to left leg, chronic pain. 1054 Dr. Capo Pak in to evaluate pt and explain procedure. 1111 Procedure started. 1118 Procedure complete. Core samples x 3 . Paper tape and gauze to RUQ puncture site. 1123 Pt transported to [facility] via cart. Skin natural for race, warm, dry. Respirations even and unlabored. Pt denies c/o pain at this time. Report called to Mejia Olson RN.

## 2019-05-15 NOTE — H&P
[facility]  Sedation/Analgesia History & Physical    Pt Name: Zachariah Harris  MRN: [id number]  Date of Birth: @birthdate@  Provider Performing Procedure: SUPRIYA Mckeon MD  Primary Care Physician: MEGHAN Coronado CNP    PRE-PROCEDURE   DNR-CCA/DNR-CC []Yes []No  Brief History/Pre-Procedure Diagnosis: Hepatitis C          MEDICAL HISTORY  []CAD/Valve  [x]Liver Disease  []Lung Disease []Diabetes  [x]Hypertension []Renal Disease  []Additional information:       has a past medical history of Asthma, Brain tumor ([facility] [address] COPD (chronic obstructive pulmonary disease) ([facility] [address] Diabetes mellitus ([facility] [address] Falling, Hepatitis C, Hyperlipidemia, Hypertension, Seizures ([facility] [address] and Spinal headache. SURGICAL HISTORY   has a past surgical history that includes Foot surgery (Right); Fibula Fracture Surgery (Left, 3/21/2019); Endoscopy, colon, diagnostic; and Upper gastrointestinal endoscopy (N/A, 4/25/2019).   Additional information:       ALLERGIES   Allergies as of 05/15/2019 - Review Complete 05/15/2019   Allergen Reaction Noted    Adhesive tape  04/25/2019     Additional information:       MEDICATIONS   Coumadin Use Last 5 Days [x]No []Yes  Antiplatelet drug therapy use last 5 days  [x]No []Yes  Other anticoagulant use last 5 days  [x]No []Yes    Current Outpatient Medications:     amoxicillin (AMOXIL) 125 MG chewable tablet, Take 125 mg by mouth 2 times daily, Disp: , Rfl:     cyclobenzaprine (FLEXERIL) 10 MG tablet, Take 10 mg by mouth 3 times daily as needed for Muscle spasms, Disp: , Rfl:     ibuprofen (ADVIL;MOTRIN) 800 MG tablet, Take 800 mg by mouth every 8 hours as needed for Pain, Disp: , Rfl:     lactulose (CHRONULAC) 10 GM/15ML solution, Take 45 mLs by mouth 3 times daily, Disp: 4050 mL, Rfl: 6    tiotropium (SPIRIVA) 18 MCG inhalation capsule, Inhale 1 capsule into the lungs daily, Disp: 30 capsule, Rfl: 3    insulin lispro (HUMALOG) 100 UNIT/ML injection vial, Inject 0-12 Units into the skin 3 times daily (with meals), Disp: 1 vial, Rfl: 3    insulin lispro (HUMALOG) 100 UNIT/ML injection vial, Inject 5 Units into the skin 3 times daily (with meals), Disp: 1 vial, Rfl: 3    carvedilol (COREG) 12.5 MG tablet, Take 1 tablet by mouth 2 times daily (with meals), Disp: 60 tablet, Rfl: 3    latanoprost (XALATAN) 0.005 % ophthalmic solution, Place 1 drop into both eyes nightly, Disp: 1 Bottle, Rfl: 3    Multiple Vitamin (MULTIVITAMIN) tablet, Take 1 tablet by mouth daily, Disp: , Rfl: 0    spironolactone (ALDACTONE) 50 MG tablet, Take 1 tablet by mouth daily, Disp: 30 tablet, Rfl: 3    thiamine 100 MG tablet, Take 1 tablet by mouth daily, Disp: , Rfl:     insulin glargine (LANTUS) 100 UNIT/ML injection vial, Inject 35 Units into the skin nightly, Disp: 1 vial, Rfl: 3    tadalafil (CIALIS) 20 MG tablet, Take 20 mg by mouth as needed for Erectile Dysfunction, Disp: , Rfl:     amLODIPine (NORVASC) 5 MG tablet, Take 5 mg by mouth daily, Disp: , Rfl:     cloNIDine (CATAPRES) 0.2 MG tablet, Take 0.2 mg by mouth 2 times daily, Disp: , Rfl:     aspirin 81 MG tablet, Take 81 mg by mouth daily, Disp: , Rfl:     DULoxetine (CYMBALTA) 60 MG extended release capsule, Take 60 mg by mouth daily, Disp: , Rfl:     primidone (MYSOLINE) 50 MG tablet, Take 50 mg by mouth 3 times daily, Disp: , Rfl:     rosuvastatin (CRESTOR) 20 MG tablet, Take 20 mg by mouth daily, Disp: , Rfl:     folic acid (FOLVITE) 1 MG tablet, Take 1 mg by mouth daily, Disp: , Rfl:     albuterol sulfate  (90 Base) MCG/ACT inhaler, Inhale 2 puffs into the lungs every 6 hours as needed for Wheezing, Disp: , Rfl:     Current Facility-Administered Medications:     0.45 % sodium chloride infusion, , Intravenous, Continuous, BCatrachito Ortiz Gather, DO, Last Rate: 20 mL/hr at 05/15/19 9810  Prior to Admission medications    Medication Sig Start Date End Date Taking?  Authorizing Provider   amoxicillin (AMOXIL) 125 MG chewable tablet Take 125 mg by mouth 2 times daily    Historical Provider, MD   cyclobenzaprine (FLEXERIL) 10 MG tablet Take 10 mg by mouth 3 times daily as needed for Muscle spasms    Historical Provider, MD   ibuprofen (ADVIL;MOTRIN) 800 MG tablet Take 800 mg by mouth every 8 hours as needed for Pain    Historical Provider, MD   lactulose (CHRONULAC) 10 GM/15ML solution Take 45 mLs by mouth 3 times daily 4/22/19 5/22/19  Cristina Cutting, APRN - CNP   tiotropium (SPIRIVA) 18 MCG inhalation capsule Inhale 1 capsule into the lungs daily 3/27/19   Penelope Kamara MD   insulin lispro (HUMALOG) 100 UNIT/ML injection vial Inject 0-12 Units into the skin 3 times daily (with meals) 3/26/19   Penelope Kamara MD   insulin lispro (HUMALOG) 100 UNIT/ML injection vial Inject 5 Units into the skin 3 times daily (with meals) 3/26/19   Penelope Kamara MD   carvedilol (COREG) 12.5 MG tablet Take 1 tablet by mouth 2 times daily (with meals) 3/26/19   Penelope Kamara MD   latanoprost (XALATAN) 0.005 % ophthalmic solution Place 1 drop into both eyes nightly 3/26/19   Penelope Kamara MD   Multiple Vitamin (MULTIVITAMIN) tablet Take 1 tablet by mouth daily 3/27/19   Penelope Kamara MD   spironolactone (ALDACTONE) 50 MG tablet Take 1 tablet by mouth daily 3/27/19   Betty Louis MD   thiamine 100 MG tablet Take 1 tablet by mouth daily 3/26/19   Betty Louis MD   insulin glargine (LANTUS) 100 UNIT/ML injection vial Inject 35 Units into the skin nightly 3/26/19   Betty Louis MD   tadalafil (CIALIS) 20 MG tablet Take 20 mg by mouth as needed for Erectile Dysfunction    Historical Provider, MD   amLODIPine (NORVASC) 5 MG tablet Take 5 mg by mouth daily    Historical Provider, MD   cloNIDine (CATAPRES) 0.2 MG tablet Take 0.2 mg by mouth 2 times daily    Historical Provider, MD   aspirin 81 MG tablet Take 81 mg by mouth daily    Historical Provider, MD   DULoxetine (CYMBALTA) 60 MG extended release capsule Take 60 mg by mouth daily Historical Provider, MD   primidone (MYSOLINE) 50 MG tablet Take 50 mg by mouth 3 times daily    Historical Provider, MD   rosuvastatin (CRESTOR) 20 MG tablet Take 20 mg by mouth daily    Historical Provider, MD   folic acid (FOLVITE) 1 MG tablet Take 1 mg by mouth daily    Historical Provider, MD   albuterol sulfate  (90 Base) MCG/ACT inhaler Inhale 2 puffs into the lungs every 6 hours as needed for Wheezing    Historical Provider, MD     Additional information:       VITAL SIGNS   Vitals:    05/15/19 1048   BP: (!) 140/94   Pulse: 69   Resp: 17   Temp:    SpO2: 98%       PHYSICAL:   Heart:  [x]Regular rate and rhythm  []Other:    Lungs:  [x]Clear    []Other:    Abdomen: [x]Soft    []Other:    Mental Status: [x]Alert & Oriented  []Other:      PLANNED PROCEDURE   [x]Biospy []Arteriogram    []Drainage  []Fistulogram []IV access       []Dialysis catheter  []IVC filter []Dialysis catheter []Biliary drainage  []Other:  SEDATION  Planned agent:[x]Midazolam []Meperidine []Sublimaze []Morphine  []Diazepam  [x]Other: Fentanyl     ASA Classification:  []1 [x]2 []3 []4 []5  Class 1: A normal healthy patient  Class 2: Pt with mild to moderate systemic disease  Class 3: Severe systemic disease or disturbance  Class 4: Severe systemic disorders that are already life threatening. Class 5: Moribund pt with little chances of survival, for more than 24 hours. Mallampati I Airway Classification:   []1 [x]2 []3 []4    [x]Pre-procedure diagnostic studies complete and results available. Comment:    [x]Previous sedation/anesthesia experiences assessed. Comment:    [x]The patient is an appropriate candidate to undergo the planned procedure sedation and anesthesia. (Refer to nursing sedation/analgesia documentation record)  [x]Formulation and discussion of sedation/procedure plan, risks, and expectations with patient and/or responsible adult completed. [x]Patient examined immediately prior to the procedure.  (Refer to nursing sedation/analgesia documentation record)    SUPRIYA Ramos Chi, DO  Electronically signed 5/15/2019 at 11:03 AM

## 2019-05-16 NOTE — PROGRESS NOTES
5/16  1317 pt denies any bleeding, swelling to puncture site. Sonya Baez Resting quietly at home with minimal discomfort . bandaid dry and intact .

## 2019-06-21 ENCOUNTER — HOSPITAL ENCOUNTER (OUTPATIENT)
Dept: MRI IMAGING | Age: 54
Discharge: HOME | End: 2019-06-21
Payer: MEDICARE

## 2019-06-21 DIAGNOSIS — F10.10 ALCOHOL ABUSE: ICD-10-CM

## 2019-06-21 DIAGNOSIS — R77.2 ELEVATED AFP: ICD-10-CM

## 2019-06-21 DIAGNOSIS — B18.2 CHRONIC HEPATITIS C WITHOUT HEPATIC COMA (HCC): ICD-10-CM

## 2019-06-21 DIAGNOSIS — K70.30 ALCOHOLIC CIRRHOSIS OF LIVER WITHOUT ASCITES (HCC): ICD-10-CM

## 2019-06-21 DIAGNOSIS — Z80.0 FAMILY HISTORY OF LIVER CANCER: ICD-10-CM

## 2019-06-21 PROCEDURE — 74183 MRI ABD W/O CNTR FLWD CNTR: CPT

## 2019-06-21 PROCEDURE — A9579 GAD-BASE MR CONTRAST NOS,1ML: HCPCS | Performed by: NURSE PRACTITIONER

## 2019-06-21 PROCEDURE — 6360000004 HC RX CONTRAST MEDICATION: Performed by: NURSE PRACTITIONER

## 2019-06-21 RX ADMIN — GADOTERIDOL 20 ML: 279.3 INJECTION, SOLUTION INTRAVENOUS at 14:26

## 2020-06-03 LAB
METER GLUCOSE: 196 MG/DL (ref 70–110)
METER GLUCOSE: 198 MG/DL (ref 70–110)

## 2020-09-10 ENCOUNTER — APPOINTMENT (OUTPATIENT)
Dept: GENERAL RADIOLOGY | Age: 55
End: 2020-09-10
Payer: MEDICARE

## 2020-09-10 ENCOUNTER — HOSPITAL ENCOUNTER (EMERGENCY)
Age: 55
Discharge: HOME | End: 2020-09-10
Payer: MEDICARE

## 2020-09-10 VITALS
HEIGHT: 75 IN | TEMPERATURE: 97.7 F | DIASTOLIC BLOOD PRESSURE: 90 MMHG | SYSTOLIC BLOOD PRESSURE: 132 MMHG | RESPIRATION RATE: 18 BRPM | HEART RATE: 93 BPM | BODY MASS INDEX: 39.17 KG/M2 | OXYGEN SATURATION: 98 % | WEIGHT: 315 LBS

## 2020-09-10 LAB
ANION GAP SERPL CALCULATED.3IONS-SCNC: 10 MEQ/L (ref 8–16)
BASOPHILS # BLD: 1.2 %
BASOPHILS ABSOLUTE: 0.1 THOU/MM3 (ref 0–0.1)
BUN BLDV-MCNC: 21 MG/DL (ref 7–22)
C-REACTIVE PROTEIN: 0.06 MG/DL (ref 0–1)
CALCIUM SERPL-MCNC: 8.8 MG/DL (ref 8.5–10.5)
CHLORIDE BLD-SCNC: 103 MEQ/L (ref 98–111)
CO2: 22 MEQ/L (ref 23–33)
CREAT SERPL-MCNC: 1.3 MG/DL (ref 0.4–1.2)
EOSINOPHIL # BLD: 2.2 %
EOSINOPHILS ABSOLUTE: 0.1 THOU/MM3 (ref 0–0.4)
ERYTHROCYTE [DISTWIDTH] IN BLOOD BY AUTOMATED COUNT: 15.6 % (ref 11.5–14.5)
ERYTHROCYTE [DISTWIDTH] IN BLOOD BY AUTOMATED COUNT: 59.7 FL (ref 35–45)
GFR SERPL CREATININE-BSD FRML MDRD: 57 ML/MIN/1.73M2
GLUCOSE BLD-MCNC: 163 MG/DL (ref 70–108)
HCT VFR BLD CALC: 28.5 % (ref 42–52)
HEMOGLOBIN: 9.3 GM/DL (ref 14–18)
IMMATURE GRANS (ABS): 0.01 THOU/MM3 (ref 0–0.07)
IMMATURE GRANULOCYTES: 0.2 %
LYMPHOCYTES # BLD: 42 %
LYMPHOCYTES ABSOLUTE: 1.8 THOU/MM3 (ref 1–4.8)
MAGNESIUM: 1.6 MG/DL (ref 1.6–2.4)
MCH RBC QN AUTO: 34.3 PG (ref 26–33)
MCHC RBC AUTO-ENTMCNC: 32.6 GM/DL (ref 32.2–35.5)
MCV RBC AUTO: 105.2 FL (ref 80–94)
MONOCYTES # BLD: 10.3 %
MONOCYTES ABSOLUTE: 0.4 THOU/MM3 (ref 0.4–1.3)
NUCLEATED RED BLOOD CELLS: 0 /100 WBC
OSMOLALITY CALCULATION: 276.7 MOSMOL/KG (ref 275–300)
PLATELET # BLD: 72 THOU/MM3 (ref 130–400)
PMV BLD AUTO: 11.1 FL (ref 9.4–12.4)
POTASSIUM SERPL-SCNC: 4.5 MEQ/L (ref 3.5–5.2)
RBC # BLD: 2.71 MILL/MM3 (ref 4.7–6.1)
SCAN OF BLOOD SMEAR: NORMAL
SEDIMENTATION RATE, ERYTHROCYTE: 113 MM/HR (ref 0–10)
SEG NEUTROPHILS: 44.1 %
SEGMENTED NEUTROPHILS ABSOLUTE COUNT: 1.9 THOU/MM3 (ref 1.8–7.7)
SODIUM BLD-SCNC: 135 MEQ/L (ref 135–145)
WBC # BLD: 4.2 THOU/MM3 (ref 4.8–10.8)

## 2020-09-10 PROCEDURE — 85651 RBC SED RATE NONAUTOMATED: CPT

## 2020-09-10 PROCEDURE — 87070 CULTURE OTHR SPECIMN AEROBIC: CPT

## 2020-09-10 PROCEDURE — 87040 BLOOD CULTURE FOR BACTERIA: CPT

## 2020-09-10 PROCEDURE — 96365 THER/PROPH/DIAG IV INF INIT: CPT

## 2020-09-10 PROCEDURE — 87205 SMEAR GRAM STAIN: CPT

## 2020-09-10 PROCEDURE — 99284 EMERGENCY DEPT VISIT MOD MDM: CPT

## 2020-09-10 PROCEDURE — 83735 ASSAY OF MAGNESIUM: CPT

## 2020-09-10 PROCEDURE — 36415 COLL VENOUS BLD VENIPUNCTURE: CPT

## 2020-09-10 PROCEDURE — 87077 CULTURE AEROBIC IDENTIFY: CPT

## 2020-09-10 PROCEDURE — 85025 COMPLETE CBC W/AUTO DIFF WBC: CPT

## 2020-09-10 PROCEDURE — 86140 C-REACTIVE PROTEIN: CPT

## 2020-09-10 PROCEDURE — 87186 SC STD MICRODIL/AGAR DIL: CPT

## 2020-09-10 PROCEDURE — 73630 X-RAY EXAM OF FOOT: CPT

## 2020-09-10 PROCEDURE — 2580000003 HC RX 258: Performed by: PHYSICIAN ASSISTANT

## 2020-09-10 PROCEDURE — 80048 BASIC METABOLIC PNL TOTAL CA: CPT

## 2020-09-10 PROCEDURE — 6360000002 HC RX W HCPCS: Performed by: PHYSICIAN ASSISTANT

## 2020-09-10 PROCEDURE — 6370000000 HC RX 637 (ALT 250 FOR IP)

## 2020-09-10 PROCEDURE — 99283 EMERGENCY DEPT VISIT LOW MDM: CPT

## 2020-09-10 RX ORDER — HYDROCODONE BITARTRATE AND ACETAMINOPHEN 5; 325 MG/1; MG/1
1 TABLET ORAL ONCE
Status: COMPLETED | OUTPATIENT
Start: 2020-09-10 | End: 2020-09-10

## 2020-09-10 RX ORDER — TAMSULOSIN HYDROCHLORIDE 0.4 MG/1
0.4 CAPSULE ORAL DAILY
COMMUNITY
End: 2020-09-17 | Stop reason: ALTCHOICE

## 2020-09-10 RX ORDER — HYDROCODONE BITARTRATE AND ACETAMINOPHEN 5; 325 MG/1; MG/1
TABLET ORAL
Status: COMPLETED
Start: 2020-09-10 | End: 2020-09-10

## 2020-09-10 RX ADMIN — CEFTRIAXONE SODIUM 1 G: 1 INJECTION, POWDER, FOR SOLUTION INTRAMUSCULAR; INTRAVENOUS at 11:04

## 2020-09-10 RX ADMIN — HYDROCODONE BITARTRATE AND ACETAMINOPHEN 1 TABLET: 5; 325 TABLET ORAL at 11:14

## 2020-09-10 ASSESSMENT — PAIN DESCRIPTION - ORIENTATION
ORIENTATION: RIGHT
ORIENTATION: RIGHT

## 2020-09-10 ASSESSMENT — PAIN DESCRIPTION - LOCATION
LOCATION: FOOT
LOCATION: FOOT

## 2020-09-10 ASSESSMENT — ENCOUNTER SYMPTOMS
VOMITING: 0
SORE THROAT: 0
BLOOD IN STOOL: 0
COUGH: 0
CONSTIPATION: 0
ABDOMINAL PAIN: 0
NAUSEA: 1
RHINORRHEA: 0
SHORTNESS OF BREATH: 0
EYE PAIN: 0
DIARRHEA: 0

## 2020-09-10 ASSESSMENT — PAIN DESCRIPTION - PAIN TYPE
TYPE: ACUTE PAIN
TYPE: ACUTE PAIN

## 2020-09-10 ASSESSMENT — PAIN SCALES - GENERAL
PAINLEVEL_OUTOF10: 7
PAINLEVEL_OUTOF10: 10

## 2020-09-10 ASSESSMENT — PAIN SCALES - WONG BAKER: WONGBAKER_NUMERICALRESPONSE: 6

## 2020-09-10 NOTE — CONSULTS
Podiatric Surgery Consult    Reason for Consult:  Right great toe pain  Requesting Physician:  GABRIELA Frances    CHIEF COMPLAINT:  Right great toe and foot pain    HISTORY OF PRESENT ILLNESS:                The patient is a 48 y.o. male with significant past medical history of diabetes mellitus, blindness, COPD, hyperlipidemia, hypertension, and brain tumor who is being seen at bedside on behalf of Dr. Sue Pierre with complaints of right great toe and foot pain. Patient and his wife are present for interview. Patient states that about 3 months ago, he had a procedure on his left hallux to correct a crooked hallux toe. In the post-operative period, the wound had trouble closing, and the patient and his wife elected to continue care with another physician. The patient was instructed by this other physician to come to the ER today for evaluation of this foot for infection. As of today, patient states that he has pain in his left foot from his hallux extending over the entire foot excluding the ankle joint. He states that the pain has been present for roughly the past week. Patient is neuropathic in the bilateral lower extremities, so pain is abnormal for him. Patient denies any nausea, vomiting, fever, or chills. No other pedal complaints at this time.      Past Medical History:        Diagnosis Date    Asthma     Brain tumor ([address]     COPD (chronic obstructive pulmonary disease) ([address]     Diabetes mellitus ([address]     Falling     Hepatitis C     Hyperlipidemia     Hypertension     Seizures ([address]     Spinal headache      Past Surgical History:        Procedure Laterality Date    ENDOSCOPY, COLON, DIAGNOSTIC      FIBULA FRACTURE SURGERY Left 3/21/2019    LEFT FIBULAR SHAFT ORIF performed by Satnam Manuel DPM at [address] Right     Steel pins in place    UPPER GASTROINTESTINAL ENDOSCOPY N/A 4/25/2019    EGD BIOPSY performed by Oneil Duran MD at [facility]     Current Medications:    No current facility-administered medications for this encounter. Allergies:  Adhesive tape    Social History:    TOBACCO:   reports that he has been smoking cigarettes. He has a 30.00 pack-year smoking history. He has never used smokeless tobacco.  ETOH:   reports current alcohol use. DRUGS:  Patient endorses use of medical marijuana for pain control  Family History:       Problem Relation Age of Onset    Heart Attack Father     Colon Cancer Neg Hx     Colon Polyps Neg Hx      REVIEW OF SYSTEMS:    Pertinent ROS in HPI  PHYSICAL EXAM:      Vitals:    BP (!) 132/90   Pulse 93   Temp 97.7 °F (36.5 °C) (Oral)   Resp 18   Ht 6' 3\" (1.905 m)   Wt (!) 321 lb (145.6 kg)   SpO2 98%   BMI 40.12 kg/m²     Exam:     Vascular: Dorsalis pedis and posterior tibial pulses are palpable bilaterally. Skin temperature is warm to warm from proximal tibial tuberosity to distal digits. CFT brisk to exposed digits. Edema present in right foot. Hair growth negative. Quality of skin normal    Dermatologic: Patient lacks toenail on right hallux. At proximal edge of nail bed, small ulceration is noted, and scant amount of pus is able to be expressed from wound. Hardware from previous surgery visible at base of wound. Right hallux is edematous, erythema extends to the 1st metatarsophalangeal joint. Malodor present. Previous incision has healed. Swelling present to right foot. Neurovascular: Light touch sensation absent to the level of the distal tibia     Musculoskeletal: Muscle strength testing deferred due to pain in right foot. Patient has pain on palpation to right hallux, pain is more mild on palpation to rest of foot. No gross osseous abnormalities in right foot or ankle. XRAY: Xr Foot Right (min 3 Views)    Result Date: 9/10/2020  PROCEDURE: XR FOOT RIGHT (MIN 3 VIEWS) CLINICAL INFORMATION: infection possible osteo. COMPARISON: No prior study. TECHNIQUE: 4 views of the left foot.  FINDINGS:  Postsurgical changes first digit. Postsurgical changes with hardware distal tibia and fibula. Soft tissue swelling over the dorsum of the foot. Interphalangeal joint spaces are preserved. No acute fracture or dislocation. Soft tissue swelling over the dorsum of the foot. No acute fracture or dislocation. **This report has been created using voice recognition software. It may contain minor errors which are inherent in voice recognition technology. ** Final report electronically signed by Dr. Salo Lee on 9/10/2020 12:19 PM      LABS:    Recent Labs     09/10/20  1035   WBC 4.2*   HGB 9.3*   HCT 28.5*   PLT 72*        Recent Labs     09/10/20  1035      K 4.5      CO2 22*   BUN 21   CREATININE 1.3*      No results for input(s): PROT, INR, APTT in the last 72 hours. No results for input(s): CKTOTAL, CKMB, CKMBINDEX, TROPONINI in the last 72 hours. Assessment  Principle  1) Cellulitis, right foot  2) Hardware failure, right hallux    Plan  - Patient initially examined and evaluated  - WBC 4.2, patient afebrile  - X-rays reviewed, see report above  - Wound dressed with betadine, 4x4 gauze, cely, and ACE wrap  - WBAT to right foot in surgical shoe  - Discussed with patient that this condition did not require admission, although future surgical management for hardware removal would be necessary. Patient and wife verbalize understanding and are in agreement with this plan. - Patient will follow-up with Dr. Sissy Boogie outpatient this afternoon for culture of wound and antibiotic selection    Michelle Colunga, thank you for the consultation, allowing podiatry to assist in the medical welfare of this patient. Podiatry will continue to follow this patient throughout the duration of hospitalization.    [address] DPM  9/10/2020 2:48 PM

## 2020-09-10 NOTE — ED PROVIDER NOTES
Vadim Butler 13 COMPLAINT       Chief Complaint   Patient presents with    Foot Problem     right foot infection       Nurses Notes reviewed and I agree except as notedin the HPI. HISTORY OF PRESENT ILLNESS    Elisha Schafer is a 48 y.o. male who presents R foot pain following a surgery due to fracture 2 months ago. This injury was sustained from a fall injury of 4 feet height. Pt reports constant pain following surgery, described as sharp. The pain is located in surgical site of R great toe and extends diffusely coming up to his R knee. He also noted swelling and foul odor coming from  his R great toe and mild swelling in bilateral ankles since the last week. Pt reports decreased sensation to his lower extremities due to the neuropathy. Pt normally unable to feel pain due to his neuropathy but pain is significant. Pt mentions percocet does not work at all while medical [place] provides great relief. He admits standing on his R foot or bearing weight gives \"pain through the roof\" while standing off alleviate symptoms mildly. Pt's foot surgery was performed by orthopedic surgeon while pt sees other regular foot doctor, Dr. Tori Kwon. Pt visited Dr. Aydee Douglas yesterday and was told that his toe with surgery \"looks like a mess with staples hanging loose with probable infection\". Pt was further advised to come in to the ER for evaluation, stating potential necessity to start IV antibiotic treatment. Pt reports recently finishing 2 oral antibiotics in which is unable to recall at this time. Pt takes baby aspirin once daily. Admits nausea and vomiting where he was given prescription for it as well as chronic muscle spasm. Denies fever, chills, numbness, tingling, or weakness.      Location/Symptom: R great toe   Timing/Onset: post surgery  Context/Setting: falling a fall injury  Quality: sharp  Duration: consatnt  Modifying Factors: medical codie, standing off  Severity: 10/10 with standing     REVIEW OF SYSTEMS     Review of Systems   Constitutional: Negative for chills and fever. HENT: Negative for congestion, ear pain, rhinorrhea and sore throat. Eyes: Negative for pain. Respiratory: Negative for cough and shortness of breath. Cardiovascular: Positive for leg swelling. Negative for chest pain. Gastrointestinal: Positive for nausea. Negative for abdominal pain, blood in stool, constipation, diarrhea and vomiting. Genitourinary: Negative for difficulty urinating and frequency. Musculoskeletal: Positive for arthralgias and joint swelling. Skin: Negative for rash. Neurological: Negative for dizziness and headaches. PAST MEDICAL HISTORY    has a past medical history of Asthma, Brain tumor ([facility] [address] COPD (chronic obstructive pulmonary disease) ([facility] [address] Diabetes mellitus ([facility] [address] Falling, Hepatitis C, Hyperlipidemia, Hypertension, Seizures ([facility] [address] and Spinal headache. SURGICAL HISTORY      has a past surgical history that includes Foot surgery (Right); Fibula Fracture Surgery (Left, 3/21/2019); Endoscopy, colon, diagnostic; and Upper gastrointestinal endoscopy (N/A, 4/25/2019). CURRENT MEDICATIONS       Discharge Medication List as of 9/10/2020  2:06 PM      CONTINUE these medications which have NOT CHANGED    Details   tamsulosin (FLOMAX) 0.4 MG capsule Take 0.4 mg by mouth dailyHistorical Med      LORazepam (ATIVAN) 1 MG tablet TAKE 1 TABLET ONE HOUR PRIOR TO MRI FOR CLAUSTROPHOBIA-DO NOT DRINK ALCOHOL WITH THIS MEDICATION MAY CAUSE DROWSINESS, R-0Historical Med      metFORMIN (GLUCOPHAGE) 500 MG tablet TAKE ONE TABLET TWO (2) TIMES A DAY WITH MEALS TO HELP CONTROL BLOOD SUGAR.  TAKE WITH FOOD, R-5Historical Med      pioglitazone (ACTOS) 15 MG tablet TAKE ONE TABLET EACH DAY TO HELP CONTROL BLOOD SUGAR., R-5Historical Med      cyclobenzaprine (FLEXERIL) 10 MG tablet Take 10 mg by mouth 3 times daily as needed for Muscle spasmsHistorical Med      tiotropium (SPIRIVA) 18 MCG inhalation capsule Inhale 1 capsule into the lungs daily, Disp-30 capsule, R-3DC to SNF      carvedilol (COREG) 12.5 MG tablet Take 1 tablet by mouth 2 times daily (with meals), Disp-60 tablet, R-3DC to SNF      latanoprost (XALATAN) 0.005 % ophthalmic solution Place 1 drop into both eyes nightly, Disp-1 Bottle, R-3DC to SNF      spironolactone (ALDACTONE) 50 MG tablet Take 1 tablet by mouth daily, Disp-30 tablet, R-3DC to SNF      thiamine 100 MG tablet Take 1 tablet by mouth dailyDC to SNF      insulin glargine (LANTUS) 100 UNIT/ML injection vial Inject 35 Units into the skin nightly, Disp-1 vial, R-3DC to SNF      aspirin 81 MG tablet Take 81 mg by mouth dailyHistorical Med      DULoxetine (CYMBALTA) 60 MG extended release capsule Take 60 mg by mouth dailyHistorical Med      rosuvastatin (CRESTOR) 20 MG tablet Take 20 mg by mouth dailyHistorical Med      folic acid (FOLVITE) 1 MG tablet Take 1 mg by mouth dailyHistorical Med      albuterol sulfate  (90 Base) MCG/ACT inhaler Inhale 2 puffs into the lungs every 6 hours as needed for WheezingHistorical Med      Blood Glucose Monitoring Suppl (PRODIGY VOICE BLOOD GLUCOSE) w/Device KIT Historical Med      Elastic Bandages & Supports (MEDICAL COMPRESSION STOCKINGS) MISC Historical Med      TRUETRACK TEST strip TEAST UP TO 4 TIMES A DAY AS INSTRUCTED, R-3, DAWHistorical Med      !! Insulin Pen Needle (ULTICARE MICRO PEN NEEDLES) 31G X 6 MM MISC Historical Med      !! ULTICARE MINI PEN NEEDLES 31G X 6 MM MISC R-11, JEFE, Historical Med      !! B-D UF III MINI PEN NEEDLES 31G X 5 MM MISC R-11, JEFE, Historical Med      TRUEPLUS LANCETS 28G MISC R-3, Historical Med      traMADol (ULTRAM) 50 MG tablet Take 50 mg by mouth. Historical Med      oxyCODONE-acetaminophen (PERCOCET) 7.5-325 MG per tablet TAKE ONE TABLET UP TO EVERY EIGHT (8) HOURS WHEN NEEDED TO HELP CONTROL PAIN.  MAY CAUSE DROWSINESS, R-0Historical Med ibuprofen (ADVIL;MOTRIN) 800 MG tablet Take 800 mg by mouth every 8 hours as needed for PainHistorical Med      Multiple Vitamin (MULTIVITAMIN) tablet Take 1 tablet by mouth daily, R-0DC to SNF      tadalafil (CIALIS) 20 MG tablet Take 20 mg by mouth as needed for Erectile DysfunctionHistorical Med      primidone (MYSOLINE) 50 MG tablet Take 50 mg by mouth 3 times dailyHistorical Med       !! - Potential duplicate medications found. Please discuss with provider. ALLERGIES     is allergic to adhesive tape. HISTORY     He indicated that his father is . He indicated that his brother is alive. He indicated that the status of his neg hx is unknown.   family history includes Heart Attack in his father. SOCIALHISTORY      reports that he has been smoking cigarettes. He has a 30.00 pack-year smoking history. He has never used smokeless tobacco. He reports current alcohol use. He reports that he does not use drugs. PHYSICAL EXAM     INITIAL VITALS:  height is 6' 3\" (1.905 m) and weight is 321 lb (145.6 kg) (abnormal). His oral temperature is 97.7 °F (36.5 °C). His blood pressure is 132/90 (abnormal) and his pulse is 93. His respiration is 18 and oxygen saturation is 98%. Physical Exam  Vitals signs and nursing note reviewed. Constitutional:       Appearance: He is well-developed. Comments: Non Toxic   HENT:      Head: Normocephalic and atraumatic. Right Ear: External ear normal.      Left Ear: External ear normal.   Eyes:      Pupils: Pupils are equal, round, and reactive to light. Neck:      Musculoskeletal: Normal range of motion and neck supple. Cardiovascular:      Rate and Rhythm: Normal rate and regular rhythm. Pulses: Normal pulses. Heart sounds: Normal heart sounds. Pulmonary:      Effort: Pulmonary effort is normal.      Breath sounds: Normal breath sounds. No wheezing. Abdominal:      General: Bowel sounds are normal. There is no distension. Palpations: Abdomen is soft. Tenderness: There is no abdominal tenderness. Musculoskeletal: Normal range of motion. Feet:       Comments: Tenderness to palpation in R great toe radiating to diffusely throughout his R foot and calf. No palpable cord noted. FROM. Dorsalis pedis 2 +b/l. Strength 5+ b/l. Sensation decreased consistent with reported history of neuropathy. Skin:     General: Skin is warm and dry. Neurological:      Mental Status: He is alert and oriented to person, place, and time. Cranial Nerves: No cranial nerve deficit. Sensory: Sensory deficit present. Motor: No weakness. Coordination: Coordination normal.      Deep Tendon Reflexes: Reflexes normal.   Psychiatric:         Behavior: Behavior normal.         DIFFERENTIAL DIAGNOSIS:   Surgical complication, infection in R great toe, osteomyelitis. DIAGNOSTIC RESULTS     EKG: All EKG's are interpreted by the Emergency Department Physician who either signs or Co-signs this chart in the absence of a cardiologist.      RADIOLOGY: non-plain film images(s) such as CT, Ultrasound and MRI are read by the radiologist.  XR FOOT RIGHT (MIN 3 VIEWS) (Final result)   Result time 09/10/20 12:19:30   Final result by Howard Patrick MD (09/10/20 12:19:30)                 Impression:     Soft tissue swelling over the dorsum of the foot. No acute fracture or dislocation. **This report has been created using voice recognition software. It may contain minor errors which are inherent in voice recognition technology. **     Final report electronically signed by Dr. Monserrat Banda on 9/10/2020 12:19 PM             Narrative:     PROCEDURE: XR FOOT RIGHT (MIN 3 VIEWS)     CLINICAL INFORMATION: infection possible osteo. COMPARISON: No prior study. TECHNIQUE: 4 views of the left foot. FINDINGS:    Postsurgical changes first digit. Postsurgical changes with hardware distal tibia and fibula.  Soft tissue swelling over the dorsum of the foot. Interphalangeal joint spaces are preserved. No acute fracture or dislocation. LABS:   Labs Reviewed   CBC WITH AUTO DIFFERENTIAL - Abnormal; Notable for the following components:       Result Value    WBC 4.2 (*)     RBC 2.71 (*)     Hemoglobin 9.3 (*)     Hematocrit 28.5 (*)     .2 (*)     MCH 34.3 (*)     RDW-CV 15.6 (*)     RDW-SD 59.7 (*)     Platelets 72 (*)     All other components within normal limits   BASIC METABOLIC PANEL - Abnormal; Notable for the following components:    CO2 22 (*)     Glucose 163 (*)     CREATININE 1.3 (*)     All other components within normal limits   SEDIMENTATION RATE - Abnormal; Notable for the following components:    Sed Rate 113 (*)     All other components within normal limits   GLOMERULAR FILTRATION RATE, ESTIMATED - Abnormal; Notable for the following components:    Est, Glom Filt Rate 57 (*)     All other components within normal limits   CULTURE, BLOOD 1   CULTURE, BLOOD 2   CULTURE, AEROBIC   MAGNESIUM   C-REACTIVE PROTEIN   ANION GAP   OSMOLALITY   SCAN OF BLOOD SMEAR       EMERGENCY DEPARTMENT COURSE:   :    Vitals:    09/10/20 1016 09/10/20 1254 09/10/20 1358   BP: 124/75 (!) 127/91 (!) 132/90   Pulse: 94 93 93   Resp: 20 16 18   Temp: 97.7 °F (36.5 °C)     TempSrc: Oral     SpO2: 94% 96% 98%   Weight: (!) 321 lb (145.6 kg)     Height: 6' 3\" (1.905 m)       Patient was seen history physical exam was performed. We did ask podiatrist to come see the patient. He did evaluate the patient. They will arrange for outpatient antibiotic therapy for the patient. Dr. Uriel Buck so to see the patient in the office today. See disposition below    CRITICAL CARE:  None    CONSULTS:  None    PROCEDURES:  None    FINAL IMPRESSION      1.  Right foot pain          DISPOSITION/PLAN   Discharge    PATIENT REFERRED TO:  MEGHAN Onofre - CNP  [address] [facility] [address]  [phone]    In 2 days      [address] [place] [id number]  [phone]    Today  Go to office now      DISCHARGE MEDICATIONS:  Discharge Medication List as of 9/10/2020  2:06 PM          (Please note that portions of this note were completed with a voice recognitionprogram.  Efforts were made to edit the dictations but occasionally words are mis-transcribed.)    GABRIELA Quiroz [place], [place]  09/10/20 1558

## 2020-09-10 NOTE — ED NOTES
Patient presents to ED with complaint of possible right foot infection. Patient states foot surgery about 2 months ago for fracture. Patient states he has increased swelling and odor from right great toe with some brown discharge noted on right foot bandaging. GABRIELA Frances at bedside.      Kevin Ortiz RN  09/10/20 8210

## 2020-09-10 NOTE — ED NOTES
Patient resting on cart. Patient denies right foot pain at this time. Right foot has dry dressing and ace wrap in place at this time. Patient updated on plan of care. Respirations unlabored. Call light in reach.      Neri Simms RN  09/10/20 8611

## 2020-09-13 LAB
AEROBIC CULTURE: ABNORMAL
AEROBIC CULTURE: ABNORMAL
GRAM STAIN RESULT: ABNORMAL
ORGANISM: ABNORMAL

## 2020-09-14 NOTE — PROGRESS NOTES
Pharmacy Note  ED Culture Follow-up    Winston Sung is a 48 y.o. male. Allergies: Adhesive tape     Labs:  Lab Results   Component Value Date    BUN 21 09/10/2020    CREATININE 1.3 (H) 09/10/2020    WBC 4.2 (L) 09/10/2020     Estimated Creatinine Clearance: 100 mL/min (A) (based on SCr of 1.3 mg/dL (H)). Current antimicrobials:   Per podiatry    ASSESSMENT:  Micro results:   Wound culture: positive for morganella morganii     PLAN:  Need for intervention: Yes, but will defer to specialist  Discussed with: Toy Jin PA-C  Chosen treatment:    Patient will be treated by specialist; Fax result to Podiatry    Patient response:   No need to contact patient    Called/sent in prescription to: Not applicable    Please call with any questions.  Ruddy Box, PharmD 7:54 PM 9/14/2020

## 2020-09-15 ENCOUNTER — HOSPITAL ENCOUNTER (OUTPATIENT)
Age: 55
Discharge: HOME | End: 2020-09-15
Payer: MEDICARE

## 2020-09-15 LAB
BLOOD CULTURE, ROUTINE: NORMAL
BLOOD CULTURE, ROUTINE: NORMAL
EKG ATRIAL RATE: 113 BPM
EKG P AXIS: 59 DEGREES
EKG P-R INTERVAL: 136 MS
EKG Q-T INTERVAL: 336 MS
EKG QRS DURATION: 100 MS
EKG QTC CALCULATION (BAZETT): 460 MS
EKG R AXIS: 12 DEGREES
EKG T AXIS: 48 DEGREES
EKG VENTRICULAR RATE: 113 BPM

## 2020-09-15 PROCEDURE — 93005 ELECTROCARDIOGRAM TRACING: CPT | Performed by: PODIATRIST

## 2020-09-15 PROCEDURE — 93010 ELECTROCARDIOGRAM REPORT: CPT | Performed by: NUCLEAR MEDICINE

## 2020-09-17 ENCOUNTER — HOSPITAL ENCOUNTER (OUTPATIENT)
Age: 55
Discharge: HOME | End: 2020-09-17
Payer: COMMERCIAL

## 2020-09-17 PROCEDURE — U0003 INFECTIOUS AGENT DETECTION BY NUCLEIC ACID (DNA OR RNA); SEVERE ACUTE RESPIRATORY SYNDROME CORONAVIRUS 2 (SARS-COV-2) (CORONAVIRUS DISEASE [COVID-19]), AMPLIFIED PROBE TECHNIQUE, MAKING USE OF HIGH THROUGHPUT TECHNOLOGIES AS DESCRIBED BY CMS-2020-01-R: HCPCS

## 2020-09-17 RX ORDER — ONDANSETRON 4 MG/1
4 TABLET, FILM COATED ORAL EVERY 8 HOURS PRN
Status: ON HOLD | COMMUNITY
End: 2021-08-03

## 2020-09-17 RX ORDER — TAMSULOSIN HYDROCHLORIDE 0.4 MG/1
0.4 CAPSULE ORAL DAILY
Status: ON HOLD | COMMUNITY
End: 2021-08-03

## 2020-09-17 RX ORDER — PENICILLIN V POTASSIUM 500 MG/1
500 TABLET ORAL 4 TIMES DAILY
Status: ON HOLD | COMMUNITY
End: 2020-12-08 | Stop reason: HOSPADM

## 2020-09-17 NOTE — PROGRESS NOTES
EKG clearance form given to Dr. Rhona Allen  for review. OK to proceed with surgery as planned at surgery center.

## 2020-09-18 LAB — SARS-COV-2: NOT DETECTED

## 2020-09-23 ENCOUNTER — HOSPITAL ENCOUNTER (OUTPATIENT)
Age: 55
Setting detail: OUTPATIENT SURGERY
Discharge: HOME | End: 2020-09-23
Attending: PODIATRIST | Admitting: PODIATRIST
Payer: COMMERCIAL

## 2020-09-23 ENCOUNTER — ANESTHESIA (OUTPATIENT)
Dept: OPERATING ROOM | Age: 55
End: 2020-09-23
Payer: COMMERCIAL

## 2020-09-23 ENCOUNTER — ANESTHESIA EVENT (OUTPATIENT)
Dept: OPERATING ROOM | Age: 55
End: 2020-09-23
Payer: COMMERCIAL

## 2020-09-23 VITALS
RESPIRATION RATE: 11 BRPM | SYSTOLIC BLOOD PRESSURE: 114 MMHG | OXYGEN SATURATION: 96 % | DIASTOLIC BLOOD PRESSURE: 61 MMHG

## 2020-09-23 VITALS
TEMPERATURE: 97.7 F | SYSTOLIC BLOOD PRESSURE: 137 MMHG | WEIGHT: 315 LBS | RESPIRATION RATE: 22 BRPM | OXYGEN SATURATION: 96 % | HEIGHT: 75 IN | HEART RATE: 102 BPM | DIASTOLIC BLOOD PRESSURE: 65 MMHG | BODY MASS INDEX: 39.17 KG/M2

## 2020-09-23 LAB — GLUCOSE BLD-MCNC: 195 MG/DL (ref 70–108)

## 2020-09-23 PROCEDURE — 2500000003 HC RX 250 WO HCPCS: Performed by: PODIATRIST

## 2020-09-23 PROCEDURE — 6370000000 HC RX 637 (ALT 250 FOR IP): Performed by: STUDENT IN AN ORGANIZED HEALTH CARE EDUCATION/TRAINING PROGRAM

## 2020-09-23 PROCEDURE — 3600000003 HC SURGERY LEVEL 3 BASE: Performed by: PODIATRIST

## 2020-09-23 PROCEDURE — 7100000011 HC PHASE II RECOVERY - ADDTL 15 MIN: Performed by: PODIATRIST

## 2020-09-23 PROCEDURE — 82948 REAGENT STRIP/BLOOD GLUCOSE: CPT

## 2020-09-23 PROCEDURE — 2709999900 HC NON-CHARGEABLE SUPPLY: Performed by: PODIATRIST

## 2020-09-23 PROCEDURE — 2580000003 HC RX 258: Performed by: STUDENT IN AN ORGANIZED HEALTH CARE EDUCATION/TRAINING PROGRAM

## 2020-09-23 PROCEDURE — 6360000002 HC RX W HCPCS: Performed by: NURSE ANESTHETIST, CERTIFIED REGISTERED

## 2020-09-23 PROCEDURE — 7100000000 HC PACU RECOVERY - FIRST 15 MIN: Performed by: PODIATRIST

## 2020-09-23 PROCEDURE — 7100000010 HC PHASE II RECOVERY - FIRST 15 MIN: Performed by: PODIATRIST

## 2020-09-23 PROCEDURE — 3600000013 HC SURGERY LEVEL 3 ADDTL 15MIN: Performed by: PODIATRIST

## 2020-09-23 PROCEDURE — 7100000001 HC PACU RECOVERY - ADDTL 15 MIN: Performed by: PODIATRIST

## 2020-09-23 PROCEDURE — 3700000001 HC ADD 15 MINUTES (ANESTHESIA): Performed by: PODIATRIST

## 2020-09-23 PROCEDURE — 3700000000 HC ANESTHESIA ATTENDED CARE: Performed by: PODIATRIST

## 2020-09-23 RX ORDER — SODIUM CHLORIDE 0.9 % (FLUSH) 0.9 %
10 SYRINGE (ML) INJECTION PRN
Status: DISCONTINUED | OUTPATIENT
Start: 2020-09-23 | End: 2020-09-23 | Stop reason: HOSPADM

## 2020-09-23 RX ORDER — CEFAZOLIN SODIUM 1 G/3ML
INJECTION, POWDER, FOR SOLUTION INTRAMUSCULAR; INTRAVENOUS PRN
Status: DISCONTINUED | OUTPATIENT
Start: 2020-09-23 | End: 2020-09-23 | Stop reason: SDUPTHER

## 2020-09-23 RX ORDER — ACETAMINOPHEN 325 MG/1
650 TABLET ORAL EVERY 6 HOURS PRN
Status: DISCONTINUED | OUTPATIENT
Start: 2020-09-23 | End: 2020-09-23 | Stop reason: HOSPADM

## 2020-09-23 RX ORDER — POLYETHYLENE GLYCOL 3350 17 G/17G
17 POWDER, FOR SOLUTION ORAL DAILY PRN
Status: DISCONTINUED | OUTPATIENT
Start: 2020-09-23 | End: 2020-09-23 | Stop reason: HOSPADM

## 2020-09-23 RX ORDER — SODIUM CHLORIDE 9 MG/ML
INJECTION, SOLUTION INTRAVENOUS CONTINUOUS
Status: DISCONTINUED | OUTPATIENT
Start: 2020-09-23 | End: 2020-09-23 | Stop reason: HOSPADM

## 2020-09-23 RX ORDER — OXYCODONE HYDROCHLORIDE AND ACETAMINOPHEN 5; 325 MG/1; MG/1
1 TABLET ORAL EVERY 4 HOURS PRN
Status: DISCONTINUED | OUTPATIENT
Start: 2020-09-23 | End: 2020-09-23 | Stop reason: HOSPADM

## 2020-09-23 RX ORDER — PROPOFOL 10 MG/ML
INJECTION, EMULSION INTRAVENOUS PRN
Status: DISCONTINUED | OUTPATIENT
Start: 2020-09-23 | End: 2020-09-23 | Stop reason: SDUPTHER

## 2020-09-23 RX ORDER — BUPIVACAINE HYDROCHLORIDE 5 MG/ML
INJECTION, SOLUTION PERINEURAL PRN
Status: DISCONTINUED | OUTPATIENT
Start: 2020-09-23 | End: 2020-09-23 | Stop reason: ALTCHOICE

## 2020-09-23 RX ORDER — FENTANYL CITRATE 50 UG/ML
INJECTION, SOLUTION INTRAMUSCULAR; INTRAVENOUS PRN
Status: DISCONTINUED | OUTPATIENT
Start: 2020-09-23 | End: 2020-09-23 | Stop reason: SDUPTHER

## 2020-09-23 RX ORDER — SODIUM CHLORIDE 0.9 % (FLUSH) 0.9 %
10 SYRINGE (ML) INJECTION EVERY 12 HOURS SCHEDULED
Status: DISCONTINUED | OUTPATIENT
Start: 2020-09-23 | End: 2020-09-23 | Stop reason: HOSPADM

## 2020-09-23 RX ADMIN — PROPOFOL 150 MG: 10 INJECTION, EMULSION INTRAVENOUS at 14:07

## 2020-09-23 RX ADMIN — CEFAZOLIN 3000 MG: 1 INJECTION, POWDER, FOR SOLUTION INTRAMUSCULAR; INTRAVENOUS; PARENTERAL at 14:11

## 2020-09-23 RX ADMIN — FENTANYL CITRATE 100 MCG: 50 INJECTION, SOLUTION INTRAMUSCULAR; INTRAVENOUS at 14:07

## 2020-09-23 RX ADMIN — SODIUM CHLORIDE: 9 INJECTION, SOLUTION INTRAVENOUS at 14:03

## 2020-09-23 RX ADMIN — OXYCODONE HYDROCHLORIDE AND ACETAMINOPHEN 1 TABLET: 5; 325 TABLET ORAL at 15:27

## 2020-09-23 ASSESSMENT — PULMONARY FUNCTION TESTS
PIF_VALUE: 4
PIF_VALUE: 4
PIF_VALUE: 8
PIF_VALUE: 3
PIF_VALUE: 1
PIF_VALUE: 1
PIF_VALUE: 5
PIF_VALUE: 3
PIF_VALUE: 4
PIF_VALUE: 5
PIF_VALUE: 4
PIF_VALUE: 3
PIF_VALUE: 2
PIF_VALUE: 2
PIF_VALUE: 5
PIF_VALUE: 5
PIF_VALUE: 4
PIF_VALUE: 5
PIF_VALUE: 4
PIF_VALUE: 4
PIF_VALUE: 2
PIF_VALUE: 4
PIF_VALUE: 2
PIF_VALUE: 5
PIF_VALUE: 5
PIF_VALUE: 3
PIF_VALUE: 4
PIF_VALUE: 4
PIF_VALUE: 5
PIF_VALUE: 5
PIF_VALUE: 3
PIF_VALUE: 4
PIF_VALUE: 3
PIF_VALUE: 5
PIF_VALUE: 5
PIF_VALUE: 3
PIF_VALUE: 3
PIF_VALUE: 0
PIF_VALUE: 4
PIF_VALUE: 2
PIF_VALUE: 3
PIF_VALUE: 5
PIF_VALUE: 3
PIF_VALUE: 5
PIF_VALUE: 21
PIF_VALUE: 2
PIF_VALUE: 4

## 2020-09-23 ASSESSMENT — PAIN - FUNCTIONAL ASSESSMENT: PAIN_FUNCTIONAL_ASSESSMENT: 0-10

## 2020-09-23 ASSESSMENT — PAIN DESCRIPTION - DESCRIPTORS: DESCRIPTORS: ACHING

## 2020-09-23 ASSESSMENT — PAIN SCALES - GENERAL: PAINLEVEL_OUTOF10: 4

## 2020-09-23 NOTE — BRIEF OP NOTE
Brief Postoperative Note      Patient: Heather Connor  YOB: 1965  MRN: [id number]    Date of Procedure: 9/23/2020    Pre-Op Diagnosis: CHRONIC OSTEOMYELITIS WITH DRAINING SINUS, RIGHT ANKLE AND FOOT, NON-PRESSURE CHRONIC ULCER WITH NECROSIS OF BONE    Post-Op Diagnosis: Same       Procedure(s):  AMPUTATION DISTAL APSECT RIGHT HALLUX, REMOVAL OF HARDWARE RIGHT HALLUX    Surgeon(s):  Jordan Edmonds DPM    Assistant:  Resident: Stephanie Ch DPM    Anesthesia: General    Estimated Blood Loss (mL): Minimal    Complications: None    Hemostasis: 250 mmHg    Materials: 3-0 and 4-0 prolene    Injectables: 18 cc 1:1 0.5% marcaine plain and 1% lidocaine with epi    Specimens:   * No specimens in log *    Implants:  * No implants in log *      Drains: * No LDAs found *    Findings: inflammatory tissue    Electronically signed by Stephanie Ch DPM on 9/23/2020 at 2:59 PM

## 2020-09-23 NOTE — PROGRESS NOTES
1457- Pt arrived to PACU, Lissette Chin CRNA and Winston Courtney at bedside for report. Pt hooked up to monitor HR tachy at first begins to settle down. Pt has ace wrap to right foot dressing dry and intact post op shoe on. Whole foot is wrapped unable to assess color or cap refill. 1504- PT denies pain, numbness and tingling states doesn't feel anything. 1505- Pt sitting up wide awake, pt states  he's starved updated has about 20 min left. 1510- Pt given cleaned hardware. 1- Dr Naz Vale updated pt wide awake, VSS, Dr Naz Vale ok with early discharge from phase 1. Pt meets criteria. Dressing remains dry and intact. 1516- Pt to phase 2  1517- Pt given snack pepsi and muffin he was starving he states. 1526- Pt trying to take his gown off to get dressed and leave, pt updated needs to stay little bit longer  1527- PT given 1 percocet minimal pain of 4 in his foot, pt has chronic back pain which he states is a 10.  1551- Pt and saranya POA given discharge instructions. Verbalized understanding, stressed pt does have a drinking problem warned dangers of alcohol and anesthesia.    1552-Pt IV removed, Chris Bazan helping get dressed  [phone]- Pt discharged taken to car in his own wheelchair with this RN

## 2020-09-23 NOTE — OP NOTE
Operative Note  Operative Report  Patient: Francisco Solis  YOB: 1965  MRN: [id number]     Date of Procedure: 9/23/2020     Pre-Op Diagnosis: CHRONIC OSTEOMYELITIS WITH DRAINING SINUS, RIGHT ANKLE AND FOOT, NON-PRESSURE CHRONIC ULCER WITH NECROSIS OF BONE     Post-Op Diagnosis: Same       Procedure(s):  AMPUTATION DISTAL APSECT RIGHT HALLUX, REMOVAL OF HARDWARE RIGHT HALLUX     Surgeon(s):  Jordan Johnson DPM     Assistant:  Resident: Cheryle Dc DPM     Anesthesia: General     Estimated Blood Loss (mL): Minimal     Complications: None     Hemostasis: 250 mmHg     Materials: 3-0 and 4-0 prolene     Injectables: 18 cc 1:1 0.5% marcaine plain and 1% lidocaine with epi     Specimens:   * No specimens in log *     Implants:  * No implants in log *      Drains: * No LDAs found *     Findings: inflammatory tissue     Electronically signed by Cheryle Dc DPM on 9/23/2020 at 2:59 PM                Indications: Patient is a 48 y.o. male with a chief complaint of right great toe nonunion who is being seen by Dr. Josiane Gaitan and being treated for said great toe. At this time all conservative options have been exhausted and surgical intervention is necessary. The procedure has been explained to the patient and they understand the risks, benefits and possible complications including infection, dehiscence, loss of limb, loss of life. No promises have been made as to surgical outcome. Procedure: The patient was transported from the pre-op holding to the operating room and placed in a supine position. A pneumatic ankle tourniquet was applied to the right ankle. The right foot was then prepped and draped in the normal aspetic manner. The right foot was then exsanguinated manually with her hands and the tourniquet was inflated to 250 mmHg. Attention was directed to the IPJ of the right great toe.   A skin marker was used to make a fishmouth type incision with the apices located medially and laterally. Perforating towel clamp was used to clasp the distal phalanx of the hallux. A 10 blade was used to make a full-thickness incision along the skin marker lines down to bone and metal.  A key elevator was used to elevate soft tissue off of the dorsal plate. Austin Nan was then placed underneath the plate and the proximal screw within the proximal portion of the proximal phalanx was lifted out. Is important to note that this shows that there was significant loosening of the hardware. At this time a 15 blade was used to stay against the plantar aspect of the distal fragment of the proximal phalanx and the distal toe was disarticulated at the nonunion site. A 15 blade was used to clean out any inflammatory tissue and nonviable appearing tissue. Bone rongeur was used to remove any soft appearing inflammatory tissue that was not excised with the blade. A Tyesha forcep was used to grasp the long flexor tendon and cut which retracted proximally. It is important to note at this time that the tissue remaining was healthy and viable appearing. The incision was flushed with copious amounts of Irricept. A post-op injection of 18 cc's of one-to-one mixture of 0.5% Marcaine plain and 1% lidocaine with epinephrine was injected. Skin was closed using 3-0 Prolene. Upon closing from lateral to medial a dogear is noted in a 15 blade with iris scissors was used to fix the dogear so that the skin was coapted together well. The incision was dressed with adaptic, 4x4's, kerlix, and Ace bandage. The pneumatic ankle tourniquet was then deflated and an immediate hyperemic response was noted to all digits of the right foot. A postop shoe was then applied. The patient was transported to the PACU with VSS and NVS intact to all digits of the right foot. No complications were noted throughout the procedure. The patient is to be discharged per PACU protocol.   The patient is to follow-up with Dr. Shelley Gaviria in the office.     MACY Carcamo Arm, MACY  Foot and Ankle Surgical Resident  9/23/2020  3:02 PM

## 2020-09-23 NOTE — ANESTHESIA PRE PROCEDURE
Department of Anesthesiology  Preprocedure Note       Name:  Chance Lui   Age:  48 y.o.  :  1965                                          MRN:  [id number]         Date:  2020      Surgeon: Francois Buckley):  Jordan Cronin DPM    Procedure: Procedure(s):  AMPUTATION DISTAL APSECT RIGHT HALLUX, REMOVAL OF HARDWARE RIGHT HALLUX    Medications prior to admission:   Prior to Admission medications    Medication Sig Start Date End Date Taking? Authorizing Provider   penicillin v potassium (VEETID) 500 MG tablet Take 500 mg by mouth 4 times daily   Yes Historical Provider, MD   MIRTAZAPINE PO Take 1 tablet by mouth nightly   Yes Historical Provider, MD   ondansetron (ZOFRAN) 4 MG tablet Take 4 mg by mouth every 8 hours as needed for Nausea or Vomiting   Yes Historical Provider, MD   tamsulosin (FLOMAX) 0.4 MG capsule Take 0.4 mg by mouth daily   Yes Historical Provider, MD Dimas Greene TEST strip TEAST UP TO 4 TIMES A DAY AS INSTRUCTED 19  Yes Historical Provider, MD   metFORMIN (GLUCOPHAGE) 500 MG tablet TAKE ONE TABLET TWO (2) TIMES A DAY WITH MEALS TO HELP CONTROL BLOOD SUGAR. TAKE WITH FOOD 19  Yes Historical Provider, MD   pioglitazone (ACTOS) 15 MG tablet TAKE ONE TABLET EACH DAY TO HELP CONTROL BLOOD SUGAR. 19  Yes Historical Provider, MD   oxyCODONE-acetaminophen (PERCOCET) 7.5-325 MG per tablet TAKE ONE TABLET UP TO EVERY EIGHT (8) HOURS WHEN NEEDED TO HELP CONTROL PAIN.  MAY CAUSE DROWSINESS 19  Yes Historical Provider, MD   tiotropium (SPIRIVA) 18 MCG inhalation capsule Inhale 1 capsule into the lungs daily 3/27/19  Yes Jahaira Vazquez MD   latanoprost (XALATAN) 0.005 % ophthalmic solution Place 1 drop into both eyes nightly 3/26/19  Yes Jahaira Vazquez MD   spironolactone (ALDACTONE) 50 MG tablet Take 1 tablet by mouth daily 3/27/19  Yes Jahaira Vazquez MD   thiamine 100 MG tablet Take 1 tablet by mouth daily 3/26/19  Yes Jahaira Vazquez MD   insulin glargine (LANTUS) 100 UNIT/ML injection vial Inject 35 Units into the skin nightly  Patient taking differently: Inject 60 Units into the skin nightly  3/26/19  Yes Trey Mishra MD   aspirin 81 MG tablet Take 81 mg by mouth daily   Yes Historical Provider, MD   DULoxetine (CYMBALTA) 60 MG extended release capsule Take 60 mg by mouth daily   Yes Historical Provider, MD   rosuvastatin (CRESTOR) 20 MG tablet Take 20 mg by mouth daily   Yes Historical Provider, MD   folic acid (FOLVITE) 1 MG tablet Take 1 mg by mouth daily   Yes Historical Provider, MD   albuterol sulfate  (90 Base) MCG/ACT inhaler Inhale 2 puffs into the lungs every 6 hours as needed for Wheezing   Yes Historical Provider, MD   Blood Glucose Monitoring Suppl (PRODIGY VOICE BLOOD GLUCOSE) w/Device KIT by NOT APPLICABLE route    Historical Provider, MD   Elastic Bandages & Supports (MEDICAL COMPRESSION STOCKINGS) MISC Provide jopstock waist-high compression stockings. To be worn while ambulating to prevent syncope. Thigh high insufficient.  Dx Code I95.1 11/16/18   Historical Provider, MD   Insulin Pen Needle (ULTICARE MICRO PEN NEEDLES) 31G X 6 MM MISC by NOT APPLICABLE route    Historical Provider, MD   ULTICARE MINI PEN NEEDLES 31G X 6 MM MISC USE TO INJECT INSULINS PER INSTRUCTION UP TO 3 TIMES A DAY 4/15/19   Historical ProviderMD MORRISON UF III MINI PEN NEEDLES 31G X 5 MM MISC USE TO INJECT INSULINS PER INSTRUCTION UP TO 3 TIMES A DAY 6/12/19   Historical Provider, MD   TRUEPLUS LANCETS 28G MISC TEST AS INSTRUCTED UP TO 4 TIMES A DAY 4/19/19   Historical Provider, MD   tadalafil (CIALIS) 20 MG tablet Take 20 mg by mouth as needed for Erectile Dysfunction    Historical Provider, MD       Current medications:    Current Facility-Administered Medications   Medication Dose Route Frequency Provider Last Rate Last Dose    0.9 % sodium chloride infusion   Intravenous Continuous Israel Hewitt DPM        sodium chloride flush 0.9 % injection 10 mL  10 mL Intravenous 2 times per day Migue Holcomb DPM        sodium chloride flush 0.9 % injection 10 mL  10 mL Intravenous PRN Migue Holcomb DPM        ceFAZolin (ANCEF) 3 g in dextrose 5 % 100 mL IVPB  3 g Intravenous On Call to Price Day DPM           Allergies:     Allergies   Allergen Reactions    Adhesive Tape Rash     Bandaid       Problem List:    Patient Active Problem List   Diagnosis Code    HCV antibody positive R76.8    Cirrhosis, alcoholic ([facility] N05.57    Alcohol withdrawal seizure with complication ([facility] C75.078, R56.9    Alcohol withdrawal, uncomplicated ([facility] D82.453    Type 2 diabetes mellitus ([facility] E11.9    Hyponatremia E87.1    Leukocytosis D72.829    Thrombocytopenia (HCC) D69.6    COPD (chronic obstructive pulmonary disease) (HCC) J44.9    HLD (hyperlipidemia) E78.5    HTN (hypertension) I10    Seizure ([facility] R56.9    Recurrent falls R29.6    Fracture of multiple ribs of left side S22.42XA    Anemia D64.9    Alcohol abuse F10.10    Closed fracture of distal end of left fibula with routine healing S82.832D    Hyperammonemia (HCC) E72.20    Essential tremor G25.0    Alcohol intoxication delirium (HCC) F10.121       Past Medical History:        Diagnosis Date    Asthma     Brain tumor ([facility]     Cirrhosis ([facility]     COPD (chronic obstructive pulmonary disease) ([facility]     Diabetes mellitus ([facility]     Falling     Hepatitis C     Hyperlipidemia     Hypertension     Seizures ([facility]        Past Surgical History:        Procedure Laterality Date    ENDOSCOPY, COLON, DIAGNOSTIC      FIBULA FRACTURE SURGERY Left 3/21/2019    LEFT FIBULAR SHAFT ORIF performed by Cleo Mohan DPM at [address] [place] [address] Rd Right     Steel pins in place    NECK SURGERY  01/2020    UPPER GASTROINTESTINAL ENDOSCOPY N/A 4/25/2019    EGD BIOPSY performed by Armando Joseph MD at [facility]       Social History:    Social History     Tobacco Use    Smoking status: Current Every Day Smoker     Packs/day: 1.00     Years: 30.00     Pack years: 30.00     Types: Cigarettes    Smokeless tobacco: Never Used    Tobacco comment: Currently smoking electronic cigarettes   Substance Use Topics    Alcohol use: Yes     Comment: 8   25oz cans nightly                                Ready to quit: Not Answered  Counseling given: Not Answered  Comment: Currently smoking electronic cigarettes      Vital Signs (Current):   Vitals:    09/17/20 0929 09/23/20 1303   BP:  (!) 145/91   Pulse:  113   Resp:  16   Temp:  96.7 °F (35.9 °C)   TempSrc:  Temporal   SpO2:  99%   Weight: (!) 321 lb (145.6 kg) (!) 319 lb (144.7 kg)   Height: 6' 4\" (1.93 m) 6' 3\" (1.905 m)                                              BP Readings from Last 3 Encounters:   09/23/20 (!) 145/91   09/10/20 (!) 132/90   07/12/19 118/81       NPO Status: Time of last liquid consumption: 2330                        Time of last solid consumption: 2330                        Date of last liquid consumption: 09/22/20                        Date of last solid food consumption: 09/22/20    BMI:   Wt Readings from Last 3 Encounters:   09/23/20 (!) 319 lb (144.7 kg)   09/10/20 (!) 321 lb (145.6 kg)   07/12/19 270 lb (122.5 kg)     Body mass index is 39.87 kg/m².     CBC:   Lab Results   Component Value Date    WBC 4.2 09/10/2020    RBC 2.71 09/10/2020    HGB 9.3 09/10/2020    HCT 28.5 09/10/2020    .2 09/10/2020    RDW 16.6 06/03/2020    PLT 72 09/10/2020       CMP:   Lab Results   Component Value Date     09/10/2020    K 4.5 09/10/2020    K 3.8 05/01/2019     09/10/2020    CO2 22 09/10/2020    BUN 21 09/10/2020    CREATININE 1.3 09/10/2020    LABGLOM 57 09/10/2020    GLUCOSE 163 09/10/2020    PROT 7.4 05/02/2019    CALCIUM 8.8 09/10/2020    BILITOT 1.4 05/02/2019    ALKPHOS 166 05/02/2019    AST 85 05/02/2019    ALT 51 05/02/2019       POC Tests: No results for input(s): POCGLU, POCNA, POCK, POCCL, POCBUN, POCHEMO, POCHCT in the last 72 hours.    Coags:   Lab Results   Component Value Date    INR 1.33 05/15/2019    APTT 36.9 05/15/2019       HCG (If Applicable): No results found for: PREGTESTUR, PREGSERUM, HCG, HCGQUANT     ABGs: No results found for: PHART, PO2ART, YMD2LGM, FSM2FXE, BEART, I3AJADQN     Type & Screen (If Applicable):  No results found for: LABABO, LABRH    Drug/Infectious Status (If Applicable):  No results found for: HIV, HEPCAB    COVID-19 Screening (If Applicable):   Lab Results   Component Value Date    COVID19 Not Detected 09/17/2020         Anesthesia Evaluation    Airway: Mallampati: III       Mouth opening: > = 3 FB Dental:          Pulmonary:   (+) COPD:  asthma:                            Cardiovascular:    (+) hypertension:,       ECG reviewed                        Neuro/Psych:   (+) psychiatric history:            GI/Hepatic/Renal:   (+) hepatitis: C, liver disease:,           Endo/Other:    (+) Diabetes, . Abdominal:   (+) obese,         Vascular:                                        Anesthesia Plan      general     ASA 4       Induction: intravenous. Anesthetic plan and risks discussed with patient. Plan discussed with CRNA.                   Evita Ratliff MD   9/23/2020

## 2020-09-23 NOTE — ANESTHESIA POSTPROCEDURE EVALUATION
Department of Anesthesiology  Postprocedure Note    Patient: Winston Sung  MRN: [id number]  YOB: 1965  Date of evaluation: 9/23/2020  Time:  3:40 PM     Procedure Summary     Date:  09/23/20 Room / Location:  [facility] / [facility]    Anesthesia Start:  1524 Anesthesia Stop:  0652    Procedure:  AMPUTATION DISTAL APSECT RIGHT HALLUX, REMOVAL OF HARDWARE RIGHT HALLUX (Right Toes) Diagnosis:  (CHRONIC OSTEOMYELITIS WITH DRAINING SINUS, RIGHT ANKLE AND FOOT, NON-PRESSURE CHRONIC ULCER WITH NECROSIS OF BONE)    Surgeon:  Luis Alberto Perez DPM Responsible Provider:  Zina Palma MD    Anesthesia Type:  general ASA Status:  4          Anesthesia Type: general    Rae Phase I: Rae Score: 10    Rae Phase II: Rae Score: 10    Last vitals: Reviewed and per EMR flowsheets.        Anesthesia Post Evaluation

## 2020-11-25 ENCOUNTER — HOSPITAL ENCOUNTER (INPATIENT)
Age: 55
LOS: 13 days | Discharge: HOME HEALTH | DRG: 720 | End: 2020-12-08
Attending: PHYSICIAN ASSISTANT | Admitting: INTERNAL MEDICINE
Payer: COMMERCIAL

## 2020-11-25 PROBLEM — N17.9 AKI (ACUTE KIDNEY INJURY) (HCC): Status: ACTIVE | Noted: 2020-11-25

## 2020-11-25 LAB — GLUCOSE BLD-MCNC: 305 MG/DL (ref 70–108)

## 2020-11-25 PROCEDURE — 82948 REAGENT STRIP/BLOOD GLUCOSE: CPT

## 2020-11-25 PROCEDURE — 1200000003 HC TELEMETRY R&B

## 2020-11-25 NOTE — PROGRESS NOTES
Transfer  Report from [facility]   Referring Physician Dr. Judy Bains  Accepting Physician [address]  Patient Condition:54yo at [facility] unit X 3 days. He came in with a 3 days history of n/v/d which Dr. Judy Bains has attributed to alcohol withdraw (was on day 7 of no ETOH on day admitted). He also complained of neck pain. CT cervical was negative. The reason for transfer is need for nephrology consult. He has been given nearly 6L IVF. Creat has continued to climb. Was 1.24 now 2.29. Also noted was blood cultures positive with gram + cocci. Given vanc and Zosyn. CXR showed pneumonia. Pt has wheezing but no cough or rhonchi. COVID neg x 2. Vitals stable.  97.6, 95, 18, 137/87, 95%RA    Code Status          Potential floor/room:  Vitals B/P              P             R            P02             T            Weight if greater than 500 pounds  Oxygen needs  IV Drips  Fax Face Sheet

## 2020-11-26 ENCOUNTER — APPOINTMENT (OUTPATIENT)
Dept: ULTRASOUND IMAGING | Age: 55
DRG: 720 | End: 2020-11-26
Attending: PHYSICIAN ASSISTANT
Payer: COMMERCIAL

## 2020-11-26 LAB
ANION GAP SERPL CALCULATED.3IONS-SCNC: 12 MEQ/L (ref 8–16)
AVERAGE GLUCOSE: 117 MG/DL (ref 70–126)
BUN BLDV-MCNC: 26 MG/DL (ref 7–22)
CALCIUM SERPL-MCNC: 7.4 MG/DL (ref 8.5–10.5)
CHLORIDE BLD-SCNC: 103 MEQ/L (ref 98–111)
CO2: 17 MEQ/L (ref 23–33)
CREAT SERPL-MCNC: 2.2 MG/DL (ref 0.4–1.2)
CREATININE URINE: 42.5 MG/DL
ERYTHROCYTE [DISTWIDTH] IN BLOOD BY AUTOMATED COUNT: 13.8 % (ref 11.5–14.5)
ERYTHROCYTE [DISTWIDTH] IN BLOOD BY AUTOMATED COUNT: 53.8 FL (ref 35–45)
GFR SERPL CREATININE-BSD FRML MDRD: 31 ML/MIN/1.73M2
GLUCOSE BLD-MCNC: 259 MG/DL (ref 70–108)
GLUCOSE BLD-MCNC: 263 MG/DL (ref 70–108)
GLUCOSE BLD-MCNC: 265 MG/DL (ref 70–108)
GLUCOSE BLD-MCNC: 285 MG/DL (ref 70–108)
GLUCOSE BLD-MCNC: 404 MG/DL (ref 70–108)
HBA1C MFR BLD: 5.9 % (ref 4.4–6.4)
HCT VFR BLD CALC: 26 % (ref 42–52)
HEMOGLOBIN: 8.3 GM/DL (ref 14–18)
MCH RBC QN AUTO: 34 PG (ref 26–33)
MCHC RBC AUTO-ENTMCNC: 31.9 GM/DL (ref 32.2–35.5)
MCV RBC AUTO: 106.6 FL (ref 80–94)
PLATELET # BLD: 61 THOU/MM3 (ref 130–400)
PMV BLD AUTO: 11.6 FL (ref 9.4–12.4)
POTASSIUM REFLEX MAGNESIUM: 4.1 MEQ/L (ref 3.5–5.2)
PROTEIN, URINE: 11.4 MG/DL
RBC # BLD: 2.44 MILL/MM3 (ref 4.7–6.1)
SCAN OF BLOOD SMEAR: NORMAL
SODIUM BLD-SCNC: 132 MEQ/L (ref 135–145)
VANCOMYCIN RANDOM: 24.7 UG/ML (ref 0.1–39.9)
VANCOMYCIN TROUGH: 21.5 UG/ML (ref 5–15)
WBC # BLD: 7.3 THOU/MM3 (ref 4.8–10.8)

## 2020-11-26 PROCEDURE — 6370000000 HC RX 637 (ALT 250 FOR IP): Performed by: INTERNAL MEDICINE

## 2020-11-26 PROCEDURE — 99254 IP/OBS CNSLTJ NEW/EST MOD 60: CPT | Performed by: INTERNAL MEDICINE

## 2020-11-26 PROCEDURE — 2580000003 HC RX 258: Performed by: INTERNAL MEDICINE

## 2020-11-26 PROCEDURE — 6360000002 HC RX W HCPCS: Performed by: INTERNAL MEDICINE

## 2020-11-26 PROCEDURE — 6370000000 HC RX 637 (ALT 250 FOR IP): Performed by: FAMILY MEDICINE

## 2020-11-26 PROCEDURE — 84156 ASSAY OF PROTEIN URINE: CPT

## 2020-11-26 PROCEDURE — 94760 N-INVAS EAR/PLS OXIMETRY 1: CPT

## 2020-11-26 PROCEDURE — 99223 1ST HOSP IP/OBS HIGH 75: CPT | Performed by: INTERNAL MEDICINE

## 2020-11-26 PROCEDURE — 85027 COMPLETE CBC AUTOMATED: CPT

## 2020-11-26 PROCEDURE — 83036 HEMOGLOBIN GLYCOSYLATED A1C: CPT

## 2020-11-26 PROCEDURE — 1200000003 HC TELEMETRY R&B

## 2020-11-26 PROCEDURE — 82948 REAGENT STRIP/BLOOD GLUCOSE: CPT

## 2020-11-26 PROCEDURE — 94640 AIRWAY INHALATION TREATMENT: CPT

## 2020-11-26 PROCEDURE — 80048 BASIC METABOLIC PNL TOTAL CA: CPT

## 2020-11-26 PROCEDURE — 82570 ASSAY OF URINE CREATININE: CPT

## 2020-11-26 PROCEDURE — 80202 ASSAY OF VANCOMYCIN: CPT

## 2020-11-26 PROCEDURE — 36415 COLL VENOUS BLD VENIPUNCTURE: CPT

## 2020-11-26 PROCEDURE — 76770 US EXAM ABDO BACK WALL COMP: CPT

## 2020-11-26 RX ORDER — ACETAMINOPHEN 325 MG/1
650 TABLET ORAL EVERY 6 HOURS PRN
Status: DISCONTINUED | OUTPATIENT
Start: 2020-11-26 | End: 2020-12-08 | Stop reason: HOSPADM

## 2020-11-26 RX ORDER — POTASSIUM CHLORIDE 7.45 MG/ML
10 INJECTION INTRAVENOUS PRN
Status: DISCONTINUED | OUTPATIENT
Start: 2020-11-26 | End: 2020-12-08 | Stop reason: HOSPADM

## 2020-11-26 RX ORDER — NICOTINE POLACRILEX 4 MG
15 LOZENGE BUCCAL PRN
Status: DISCONTINUED | OUTPATIENT
Start: 2020-11-26 | End: 2020-12-08 | Stop reason: HOSPADM

## 2020-11-26 RX ORDER — DEXTROSE MONOHYDRATE 50 MG/ML
100 INJECTION, SOLUTION INTRAVENOUS PRN
Status: DISCONTINUED | OUTPATIENT
Start: 2020-11-26 | End: 2020-12-08 | Stop reason: HOSPADM

## 2020-11-26 RX ORDER — POLYETHYLENE GLYCOL 3350 17 G/17G
17 POWDER, FOR SOLUTION ORAL DAILY PRN
Status: DISCONTINUED | OUTPATIENT
Start: 2020-11-26 | End: 2020-12-08 | Stop reason: HOSPADM

## 2020-11-26 RX ORDER — THIAMINE MONONITRATE (VIT B1) 100 MG
100 TABLET ORAL DAILY
Status: DISCONTINUED | OUTPATIENT
Start: 2020-11-26 | End: 2020-12-08 | Stop reason: HOSPADM

## 2020-11-26 RX ORDER — ALBUTEROL SULFATE 90 UG/1
2 AEROSOL, METERED RESPIRATORY (INHALATION) EVERY 6 HOURS PRN
Status: DISCONTINUED | OUTPATIENT
Start: 2020-11-26 | End: 2020-12-08 | Stop reason: HOSPADM

## 2020-11-26 RX ORDER — POTASSIUM CHLORIDE 20 MEQ/1
40 TABLET, EXTENDED RELEASE ORAL PRN
Status: DISCONTINUED | OUTPATIENT
Start: 2020-11-26 | End: 2020-12-08 | Stop reason: HOSPADM

## 2020-11-26 RX ORDER — SODIUM BICARBONATE 650 MG/1
1300 TABLET ORAL 3 TIMES DAILY
Status: DISCONTINUED | OUTPATIENT
Start: 2020-11-26 | End: 2020-12-01

## 2020-11-26 RX ORDER — HYDRALAZINE HYDROCHLORIDE 25 MG/1
25 TABLET, FILM COATED ORAL EVERY 8 HOURS SCHEDULED
Status: DISCONTINUED | OUTPATIENT
Start: 2020-11-26 | End: 2020-12-01

## 2020-11-26 RX ORDER — PROMETHAZINE HYDROCHLORIDE 25 MG/1
12.5 TABLET ORAL EVERY 6 HOURS PRN
Status: DISCONTINUED | OUTPATIENT
Start: 2020-11-26 | End: 2020-11-29 | Stop reason: SDUPTHER

## 2020-11-26 RX ORDER — ONDANSETRON 2 MG/ML
4 INJECTION INTRAMUSCULAR; INTRAVENOUS EVERY 6 HOURS PRN
Status: DISCONTINUED | OUTPATIENT
Start: 2020-11-26 | End: 2020-11-29 | Stop reason: SDUPTHER

## 2020-11-26 RX ORDER — LATANOPROST 50 UG/ML
1 SOLUTION/ DROPS OPHTHALMIC NIGHTLY
Status: DISCONTINUED | OUTPATIENT
Start: 2020-11-26 | End: 2020-12-08 | Stop reason: HOSPADM

## 2020-11-26 RX ORDER — FOLIC ACID 1 MG/1
1 TABLET ORAL DAILY
Status: DISCONTINUED | OUTPATIENT
Start: 2020-11-26 | End: 2020-12-08 | Stop reason: HOSPADM

## 2020-11-26 RX ORDER — DULOXETIN HYDROCHLORIDE 60 MG/1
60 CAPSULE, DELAYED RELEASE ORAL DAILY
Status: DISCONTINUED | OUTPATIENT
Start: 2020-11-26 | End: 2020-12-08 | Stop reason: HOSPADM

## 2020-11-26 RX ORDER — ONDANSETRON 4 MG/1
4 TABLET, FILM COATED ORAL EVERY 8 HOURS PRN
Status: DISCONTINUED | OUTPATIENT
Start: 2020-11-26 | End: 2020-11-26 | Stop reason: SDUPTHER

## 2020-11-26 RX ORDER — ASPIRIN 81 MG/1
81 TABLET ORAL DAILY
Status: DISCONTINUED | OUTPATIENT
Start: 2020-11-26 | End: 2020-12-08 | Stop reason: HOSPADM

## 2020-11-26 RX ORDER — SODIUM CHLORIDE 0.9 % (FLUSH) 0.9 %
10 SYRINGE (ML) INJECTION EVERY 12 HOURS SCHEDULED
Status: DISCONTINUED | OUTPATIENT
Start: 2020-11-26 | End: 2020-11-29 | Stop reason: SDUPTHER

## 2020-11-26 RX ORDER — ACETAMINOPHEN 650 MG/1
650 SUPPOSITORY RECTAL EVERY 6 HOURS PRN
Status: DISCONTINUED | OUTPATIENT
Start: 2020-11-26 | End: 2020-12-08 | Stop reason: HOSPADM

## 2020-11-26 RX ORDER — AMLODIPINE BESYLATE 10 MG/1
10 TABLET ORAL DAILY
Status: DISCONTINUED | OUTPATIENT
Start: 2020-11-26 | End: 2020-12-08 | Stop reason: HOSPADM

## 2020-11-26 RX ORDER — DEXTROSE MONOHYDRATE 25 G/50ML
12.5 INJECTION, SOLUTION INTRAVENOUS PRN
Status: DISCONTINUED | OUTPATIENT
Start: 2020-11-26 | End: 2020-12-08 | Stop reason: HOSPADM

## 2020-11-26 RX ORDER — TAMSULOSIN HYDROCHLORIDE 0.4 MG/1
0.4 CAPSULE ORAL DAILY
Status: DISCONTINUED | OUTPATIENT
Start: 2020-11-26 | End: 2020-12-08 | Stop reason: HOSPADM

## 2020-11-26 RX ORDER — HYDRALAZINE HYDROCHLORIDE 20 MG/ML
10 INJECTION INTRAMUSCULAR; INTRAVENOUS EVERY 6 HOURS PRN
Status: DISCONTINUED | OUTPATIENT
Start: 2020-11-26 | End: 2020-11-26 | Stop reason: RX

## 2020-11-26 RX ORDER — ROSUVASTATIN CALCIUM 20 MG/1
20 TABLET, COATED ORAL DAILY
Status: DISCONTINUED | OUTPATIENT
Start: 2020-11-26 | End: 2020-12-08 | Stop reason: HOSPADM

## 2020-11-26 RX ORDER — FUROSEMIDE 10 MG/ML
60 INJECTION INTRAMUSCULAR; INTRAVENOUS ONCE
Status: COMPLETED | OUTPATIENT
Start: 2020-11-26 | End: 2020-11-26

## 2020-11-26 RX ORDER — OXYCODONE AND ACETAMINOPHEN 7.5; 325 MG/1; MG/1
1 TABLET ORAL EVERY 8 HOURS PRN
Status: DISCONTINUED | OUTPATIENT
Start: 2020-11-26 | End: 2020-12-08 | Stop reason: HOSPADM

## 2020-11-26 RX ORDER — MIRTAZAPINE 15 MG/1
15 TABLET, FILM COATED ORAL NIGHTLY
Status: DISCONTINUED | OUTPATIENT
Start: 2020-11-26 | End: 2020-12-08 | Stop reason: HOSPADM

## 2020-11-26 RX ORDER — SPIRONOLACTONE 25 MG/1
50 TABLET ORAL DAILY
Status: DISCONTINUED | OUTPATIENT
Start: 2020-11-26 | End: 2020-12-08 | Stop reason: HOSPADM

## 2020-11-26 RX ORDER — SODIUM CHLORIDE 0.9 % (FLUSH) 0.9 %
10 SYRINGE (ML) INJECTION PRN
Status: DISCONTINUED | OUTPATIENT
Start: 2020-11-26 | End: 2020-11-29 | Stop reason: SDUPTHER

## 2020-11-26 RX ADMIN — CEFEPIME 2 G: 2 INJECTION, POWDER, FOR SOLUTION INTRAVENOUS at 17:13

## 2020-11-26 RX ADMIN — HYDRALAZINE HYDROCHLORIDE 25 MG: 25 TABLET, FILM COATED ORAL at 08:49

## 2020-11-26 RX ADMIN — Medication 100 MG: at 07:57

## 2020-11-26 RX ADMIN — OXYCODONE HYDROCHLORIDE AND ACETAMINOPHEN 1 TABLET: 7.5; 325 TABLET ORAL at 15:16

## 2020-11-26 RX ADMIN — ROSUVASTATIN CALCIUM 20 MG: 20 TABLET, FILM COATED ORAL at 07:57

## 2020-11-26 RX ADMIN — SODIUM BICARBONATE 1300 MG: 650 TABLET ORAL at 21:39

## 2020-11-26 RX ADMIN — SPIRONOLACTONE 50 MG: 25 TABLET ORAL at 07:56

## 2020-11-26 RX ADMIN — ASPIRIN 81 MG: 81 TABLET, COATED ORAL at 07:57

## 2020-11-26 RX ADMIN — TAMSULOSIN HYDROCHLORIDE 0.4 MG: 0.4 CAPSULE ORAL at 07:56

## 2020-11-26 RX ADMIN — LATANOPROST 1 DROP: 50 SOLUTION OPHTHALMIC at 21:39

## 2020-11-26 RX ADMIN — FOLIC ACID 1 MG: 1 TABLET ORAL at 07:57

## 2020-11-26 RX ADMIN — TIOTROPIUM BROMIDE INHALATION SPRAY 2 PUFF: 3.12 SPRAY, METERED RESPIRATORY (INHALATION) at 08:38

## 2020-11-26 RX ADMIN — AMLODIPINE BESYLATE 10 MG: 10 TABLET ORAL at 08:04

## 2020-11-26 RX ADMIN — INSULIN LISPRO 3 UNITS: 100 INJECTION, SOLUTION INTRAVENOUS; SUBCUTANEOUS at 21:40

## 2020-11-26 RX ADMIN — MIRTAZAPINE 15 MG: 15 TABLET, FILM COATED ORAL at 21:39

## 2020-11-26 RX ADMIN — SODIUM BICARBONATE 1300 MG: 650 TABLET ORAL at 17:12

## 2020-11-26 RX ADMIN — FUROSEMIDE 60 MG: 10 INJECTION, SOLUTION INTRAMUSCULAR; INTRAVENOUS at 07:57

## 2020-11-26 RX ADMIN — ALBUTEROL SULFATE 2 PUFF: 90 AEROSOL, METERED RESPIRATORY (INHALATION) at 21:26

## 2020-11-26 RX ADMIN — SODIUM CHLORIDE, PRESERVATIVE FREE 10 ML: 5 INJECTION INTRAVENOUS at 07:57

## 2020-11-26 RX ADMIN — CEFEPIME 2 G: 2 INJECTION, POWDER, FOR SOLUTION INTRAVENOUS at 07:57

## 2020-11-26 RX ADMIN — SODIUM CHLORIDE, PRESERVATIVE FREE 10 ML: 5 INJECTION INTRAVENOUS at 21:57

## 2020-11-26 RX ADMIN — INSULIN LISPRO 12 UNITS: 100 INJECTION, SOLUTION INTRAVENOUS; SUBCUTANEOUS at 17:23

## 2020-11-26 RX ADMIN — INSULIN LISPRO 6 UNITS: 100 INJECTION, SOLUTION INTRAVENOUS; SUBCUTANEOUS at 12:00

## 2020-11-26 RX ADMIN — INSULIN LISPRO 3 UNITS: 100 INJECTION, SOLUTION INTRAVENOUS; SUBCUTANEOUS at 04:21

## 2020-11-26 RX ADMIN — DULOXETINE HYDROCHLORIDE 60 MG: 60 CAPSULE, DELAYED RELEASE ORAL at 07:57

## 2020-11-26 ASSESSMENT — PAIN SCALES - GENERAL: PAINLEVEL_OUTOF10: 8

## 2020-11-26 ASSESSMENT — PAIN DESCRIPTION - LOCATION: LOCATION: BACK

## 2020-11-26 ASSESSMENT — PAIN DESCRIPTION - ORIENTATION: ORIENTATION: MID;LOWER

## 2020-11-26 ASSESSMENT — PAIN DESCRIPTION - PAIN TYPE: TYPE: CHRONIC PAIN

## 2020-11-26 NOTE — PROGRESS NOTES
Pt admitted to  6K53 via direct admit and via cart/stretcher. Complaints: None. IV none infusing into the forearm left, condition patent and no redness at a rate of 0 mls/ hour. IV site free of s/s of infection or infiltration. Vital signs obtained. Assessment and data collection initiated. Two nurse skin assessment performed by Nkechi ALLAN and Angela العلي. Oriented to room. Policies and procedures for 6K explained. Nkechi ALLAN discussed hourly rounding with patient addressing 5 P's. Fall prevention and safety brochure discussed with patient. Bed alarm on. Call light in reach.

## 2020-11-26 NOTE — PLAN OF CARE
Patient admitted after midnight. Examined at bedside. Labs, vitals reviewed. Per notes, pt was on vanc and zosyn. Only vanc ordered. Will discuss with RN and start zosyn for PNA  G+cocci bacteremia, continue vanco. Final culture pending and needs to be followed up on.

## 2020-11-26 NOTE — H&P
History & Physical        Patient:  Terra Mata  YOB: 1965    MRN: [id number]     Acct: [de-identified]    PCP: MEGHAN Lewis CNP    Date of Admission: 11/25/2020    Date of Service: Pt seen/examined on 11/26/20  and Admitted to Inpatient with expected LOS greater than two midnights due to medical therapy. Chief Complaint:   Acute renal failure      History Of Present Illness:       48 y.o. male who presented to [facility] with with above complain. Patient is a transfer from [place] where he had been admitted for about 4 days after complain of vomiting, incontinence, left shoulder pain and diarrhea. He was found to have sepsis secondary to pneumonia and during fluid resuscitation, it was noted that patient had MATTIE. He did develop fever during stay and blood cultures obtained during triage grew gram + cocci. Patient was started on Zosyn and vanco only for renal function to continue worsening from normal to 2.29. renal US was done showing no hydronephrosis or other kidney abnormalities. It was felt that patient needed nephrology consult to review worsening kidney status despite getting clinically better with abx. At bedside, patient reports feeling well and no longer confused.  He dose have diabetes that is poorly controlled per records    Past Medical History:          Diagnosis Date    Asthma     Brain tumor ([address]     Cirrhosis ([address]     COPD (chronic obstructive pulmonary disease) ([address]     Diabetes mellitus ([address]     Falling     Hepatitis C     Hyperlipidemia     Hypertension     Seizures ([address]        Past Surgical History:          Procedure Laterality Date    ENDOSCOPY, COLON, DIAGNOSTIC      FIBULA FRACTURE SURGERY Left 3/21/2019    LEFT FIBULAR SHAFT ORIF performed by Cinthia Le DPM at [address] Right     Steel pins in place    NECK SURGERY  01/2020    TOE AMPUTATION Right 9/23/2020    AMPUTATION DISTAL APSECT RIGHT HALLUX, REMOVAL OF HARDWARE RIGHT HALLUX performed by Genesis Heredia DPM at [facility] N/A 4/25/2019    EGD BIOPSY performed by Renny Aguirre MD at [facility]       Medications Prior to Admission:      Prior to Admission medications    Medication Sig Start Date End Date Taking? Authorizing Provider   MIRTAZAPINE PO Take 1 tablet by mouth nightly   Yes Historical Provider, MD   pioglitazone (ACTOS) 15 MG tablet TAKE ONE TABLET EACH DAY TO HELP CONTROL BLOOD SUGAR. 6/14/19  Yes Historical Provider, MD   spironolactone (ALDACTONE) 50 MG tablet Take 1 tablet by mouth daily 3/27/19  Yes Ludwig Montes MD   thiamine 100 MG tablet Take 1 tablet by mouth daily 3/26/19  Yes Ludwig Montes MD   insulin glargine (LANTUS) 100 UNIT/ML injection vial Inject 35 Units into the skin nightly  Patient taking differently: Inject 80 Units into the skin nightly  3/26/19  Yes Ludwig Montes MD   aspirin 81 MG tablet Take 81 mg by mouth daily   Yes Historical Provider, MD   DULoxetine (CYMBALTA) 60 MG extended release capsule Take 60 mg by mouth daily   Yes Historical Provider, MD   rosuvastatin (CRESTOR) 20 MG tablet Take 20 mg by mouth daily   Yes Historical Provider, MD   folic acid (FOLVITE) 1 MG tablet Take 1 mg by mouth daily   Yes Historical Provider, MD   penicillin v potassium (VEETID) 500 MG tablet Take 500 mg by mouth 4 times daily    Historical Provider, MD   ondansetron (ZOFRAN) 4 MG tablet Take 4 mg by mouth every 8 hours as needed for Nausea or Vomiting    Historical Provider, MD   tamsulosin (FLOMAX) 0.4 MG capsule Take 0.4 mg by mouth daily    Historical Provider, MD   Blood Glucose Monitoring Suppl (PRODIGY VOICE BLOOD GLUCOSE) w/Device KIT by NOT APPLICABLE route    Historical Provider, MD   Elastic Bandages & Supports (MEDICAL COMPRESSION STOCKINGS) MISC Provide jopstock waist-high compression stockings. To be worn while ambulating to prevent syncope. Thigh high insufficient. Dx Code I95.1 11/16/18   Historical Provider, MD [place] Median TEST strip TEAST UP TO 4 TIMES A DAY AS INSTRUCTED 4/19/19   Historical Provider, MD   Insulin Pen Needle (ULTICARE MICRO PEN NEEDLES) 31G X 6 MM MISC by NOT APPLICABLE route    Historical Provider, MD   ULTICARE MINI PEN NEEDLES 31G X 6 MM MISC USE TO INJECT INSULINS PER INSTRUCTION UP TO 3 TIMES A DAY 4/15/19   Historical Provider, MD MORRISON UF III MINI PEN NEEDLES 31G X 5 MM MISC USE TO INJECT INSULINS PER INSTRUCTION UP TO 3 TIMES A DAY 6/12/19   Historical Provider, MD   TRUEPLUS LANCETS 28G MISC TEST AS INSTRUCTED UP TO 4 TIMES A DAY 4/19/19   Historical Provider, MD   metFORMIN (GLUCOPHAGE) 500 MG tablet TAKE ONE TABLET TWO (2) TIMES A DAY WITH MEALS TO HELP CONTROL BLOOD SUGAR. TAKE WITH FOOD 6/17/19   Historical Provider, MD   oxyCODONE-acetaminophen (PERCOCET) 7.5-325 MG per tablet TAKE ONE TABLET UP TO EVERY EIGHT (8) HOURS WHEN NEEDED TO HELP CONTROL PAIN. MAY CAUSE DROWSINESS 6/17/19   Historical Provider, MD   tiotropium (SPIRIVA) 18 MCG inhalation capsule Inhale 1 capsule into the lungs daily 3/27/19   Johan Aguirre MD   latanoprost (XALATAN) 0.005 % ophthalmic solution Place 1 drop into both eyes nightly 3/26/19   Johan Aguirre MD   tadalafil (CIALIS) 20 MG tablet Take 20 mg by mouth as needed for Erectile Dysfunction    Historical Provider, MD   albuterol sulfate  (90 Base) MCG/ACT inhaler Inhale 2 puffs into the lungs every 6 hours as needed for Wheezing    Historical Provider, MD       Allergies:  Adhesive tape    Social History:      The patient currently lives wife    TOBACCO:   reports that he has been smoking cigarettes. He has a 30.00 pack-year smoking history. He has never used smokeless tobacco.  ETOH:   reports current alcohol use. Family History:      Reviewed in detail and negative for DM, CAD, Cancer, CVA.  Positive as follows:        Problem Relation Age of Onset    Heart Attack Father    Roberts Self Colon Cancer Neg Hx     Colon Polyps Neg Hx        Diet:  No diet orders on file    REVIEW OF SYSTEMS:   Pertinent positives as noted in the HPI. All other systems reviewed and negative. PHYSICAL EXAM:    /65   Pulse 94   Temp 97.5 °F (36.4 °C) (Oral)   Resp 16   Wt (!) 348 lb (157.9 kg)   SpO2 98%   BMI 43.50 kg/m²     General appearance:  morbidly obese  HEENT:  Normal cephalic, atraumatic without obvious deformity. Pupils equal, round, and reactive to light. Extra ocular muscles intact. Conjunctivae/corneas clear. Neck: Supple, with full range of motion. No jugular venous distention. Trachea midline. Respiratory:  Normal respiratory effort. Clear to auscultation, bilaterally without Rales/Wheezes/Rhonchi. Cardiovascular:  Regular rate and rhythm with normal S1/S2 without murmurs, rubs or gallops. Abdomen: Soft, non-tender, non-distended with normal bowel sounds. Musculoskeletal:  No clubbing, cyanosis or edema bilaterally. Full range of motion without deformity. Skin: Skin color, texture, turgor normal.  No rashes or lesions. Pedal edema  Neurologic:  Neurovascularly intact without any focal sensory/motor deficits. Cranial nerves: II-XII intact, grossly non-focal.  Psychiatric:  Alert and oriented, thought content appropriate, normal insight  Capillary Refill: Brisk,< 3 seconds   Peripheral Pulses: +2 palpable, equal bilaterally       Labs:       Labs from [facility]    Creatine 2.29    Hb- 8.9  Sodium 128    No results for input(s): WBC, HGB, HCT, PLT in the last 72 hours. No results for input(s): NA, K, CL, CO2, BUN, CREATININE, CALCIUM, PHOS in the last 72 hours. Invalid input(s): MAGNES  No results for input(s): AST, ALT, BILIDIR, BILITOT, ALKPHOS in the last 72 hours. No results for input(s): INR in the last 72 hours. No results for input(s): Martha Belts in the last 72 hours.     Urinalysis:      Lab Results   Component Value Date    NITRU NEGATIVE 10/31/2018    WBCUA 0-2 10/31/2018    BACTERIA FEW 10/31/2018    RBCUA NONE 10/31/2018    BLOODU NEGATIVE 10/31/2018    GLUCOSEU NEGATIVE 10/31/2018       Intake & Output:  No intake/output data recorded. No intake/output data recorded. Radiology:     CXR: I have reviewed the CXR with the following interpretation:   EKG:  I have reviewed the EKG with the following interpretation:     No orders to display        DVT prophylaxis: lovenox    Code Status: Prior      PT/OT Eval Status: yes    [place] Problems    Diagnosis Date Noted    MATTIE (acute kidney injury) ([facility] [N17.9] 11/25/2020       PLAN:    1. Acute kidney injury- most likely post sepsis ATN that is on progress to getting better. Renal US report from MR kevin. Will consult nephrology to review and make recommendations. Continue IVF  2. Gram positive cocci bacteriemia final speciation pending. patient started on abx on 11/24. To continue during this admission  3. Anemia- possibly from chronic disease. Start iron supplementation. Check iron profile  4. L Pneumonia - on zosyn and vanco. Continue and monitor level  5. Diabetes mellitus- poorly controlled. Diet and check HA1c. ISS for now  6. PAD-         Thank you MEGHAN Rodriguez - RONALDO for the opportunity to be involved in this patient's care.     Electronically signed by Henry Eastman MD on 11/26/2020 at 12:45 AM

## 2020-11-26 NOTE — CONSULTS
and was able to actually sleep very well last night. No chest pain. No shortness of breath. No more nausea vomiting. No more diarrhea. No abdominal pain. No fever or chills. Kidney ultrasound was said to be normal at the another facility. I could not find the report. Past Medical History:   Diagnosis Date    Asthma     [facility] ([facility]     Cirrhosis ([facility]     COPD (chronic obstructive pulmonary disease) ([facility]     [facility]     Falling     Hepatitis C     Hyperlipidemia     Hypertension     Seizures ([facility]        Past Surgical History:   Procedure Laterality Date    ENDOSCOPY, COLON, DIAGNOSTIC      FIBULA FRACTURE SURGERY Left 3/21/2019    LEFT FIBULAR SHAFT ORIF performed by Guille Asencio DPM at [address] Right     Steel pins in place    NECK SURGERY  01/2020    TOE AMPUTATION Right 9/23/2020    AMPUTATION DISTAL APSECT RIGHT HALLUX, REMOVAL OF HARDWARE RIGHT HALLUX performed by Otilia Quiñonez DPM at [facility] N/A 4/25/2019    EGD BIOPSY performed by Daily Ayoub MD at [facility]       Family History   Problem Relation Age of Onset    Heart Attack Father     Colon Cancer Neg Hx     Colon Polyps Neg Hx         reports that he has been smoking cigarettes. He has a 30.00 pack-year smoking history. He has never used smokeless tobacco. He reports current alcohol use. He reports current drug use. Drug: Marijuana.     Allergies:  Adhesive tape    Current Medications:    albuterol sulfate  (90 Base) MCG/ACT inhaler 2 puff, Q6H PRN  aspirin EC tablet 81 mg, Daily  DULoxetine (CYMBALTA) extended release capsule 60 mg, Daily  folic acid (FOLVITE) tablet 1 mg, Daily  latanoprost (XALATAN) 0.005 % ophthalmic solution 1 drop, Nightly  mirtazapine (REMERON) tablet 15 mg, Nightly  rosuvastatin (CRESTOR) tablet 20 mg, Daily  [Held by provider] spironolactone (ALDACTONE) tablet 50 mg, Daily  tamsulosin (FLOMAX) capsule 0.4 mg, Daily  vitamin B-1 (THIAMINE) tablet 100 mg, Daily  sodium chloride flush 0.9 % injection 10 mL, 2 times per day  sodium chloride flush 0.9 % injection 10 mL, PRN  acetaminophen (TYLENOL) tablet 650 mg, Q6H PRN    Or  acetaminophen (TYLENOL) suppository 650 mg, Q6H PRN  polyethylene glycol (GLYCOLAX) packet 17 g, Daily PRN  promethazine (PHENERGAN) tablet 12.5 mg, Q6H PRN    Or  ondansetron (ZOFRAN) injection 4 mg, Q6H PRN  potassium chloride (KLOR-CON M) extended release tablet 40 mEq, PRN    Or  potassium bicarb-citric acid (EFFER-K) effervescent tablet 40 mEq, PRN    Or  potassium chloride 10 mEq/100 mL IVPB (Peripheral Line), PRN  glucose (GLUTOSE) 40 % oral gel 15 g, PRN  dextrose 50 % IV solution, PRN  glucagon (rDNA) injection 1 mg, PRN  dextrose 5 % solution, PRN  insulin lispro (HUMALOG) injection vial 0-12 Units, TID WC  insulin lispro (HUMALOG) injection vial 0-6 Units, Nightly  cefepime (MAXIPIME) 2 g IVPB minibag, Q12H  tiotropium (SPIRIVA RESPIMAT) 2.5 MCG/ACT inhaler 2 puff, Daily  vancomycin (VANCOCIN) intermittent dosing (placeholder), RX Placeholder  amLODIPine (NORVASC) tablet 10 mg, Daily  hydrALAZINE (APRESOLINE) tablet 25 mg, 3 times per day  oxyCODONE-acetaminophen (PERCOCET) 7.5-325 MG per tablet 1 tablet, Q8H PRN        Review of Systems:   Pertinent positives stated above in HPI. All other systems were reviewed and were negative.   ROS:Constitutional: negative  Eyes: negative  Ears, nose, mouth, throat, and face: negative  Respiratory: positive for dyspnea on exertion  Cardiovascular: positive for lower extremity edema  Gastrointestinal: positive for diarrhea, nausea and vomiting  Genitourinary:negative  Integument/breast: negative  Hematologic/lymphatic: positive for easy bruising  Musculoskeletal:positive for arthralgias and stiff joints  Neurological: negative  Behavioral/Psych: positive for anxiety  Endocrine: negative  Allergic/Immunologic: negative    Physical exam:   Constitutional: Well-developed middle-aged gentleman in no obvious distress. Vitals:   Vitals:    11/26/20 1313   BP: 115/69   Pulse:    Resp:    Temp:    SpO2:        Skin: no rash, turgor is normal  Heent: Pupils are reactive to light. Throat is clear. Oral mucosa is moist.  Neck: no bruits or jvd noted   Cardiovascular:  S1, S2 without murmur   Respiratory: Decreased breath sound  Abdomen: soft,non tender,good bowel sound and no palpable mass. Obese. Ext: 2+ bilateral lower extremity edema  Psychiatric: mood and affect appropriate  Musculoskeletal:  Rom, muscular strength intact   CNS: Very awake and very alert. Well-oriented. Normal speech. Good motor strength. No obvious focal deficit.     Data:   Labs:  Lab Results   Component Value Date     (L) 11/26/2020     09/10/2020     05/02/2019    K 4.1 11/26/2020    K 4.5 09/10/2020    K 4.3 06/03/2020     11/26/2020    CO2 17 (L) 11/26/2020    CO2 22 (L) 09/10/2020    CO2 24 05/02/2019    CREATININE 2.2 (H) 11/26/2020    CREATININE 1.3 (H) 09/10/2020    CREATININE 0.6 05/02/2019    BUN 26 (H) 11/26/2020    BUN 21 09/10/2020    BUN 11 05/02/2019    GLUCOSE 263 (H) 11/26/2020    GLUCOSE 163 (H) 09/10/2020    GLUCOSE 271 (H) 05/02/2019    PHOS 2.9 03/20/2019    PHOS 3.1 03/19/2019    PHOS 3.6 03/18/2019    WBC 7.3 11/26/2020    WBC 4.2 (L) 09/10/2020    WBC 5.9 06/03/2020    HGB 8.3 (L) 11/26/2020    HGB 9.3 (L) 09/10/2020    HGB 12.4 (L) 06/03/2020    HCT 26.0 (L) 11/26/2020    HCT 28.5 (L) 09/10/2020    HCT 36.8 (L) 06/03/2020    .6 (H) 11/26/2020    PLT 61 (L) 11/26/2020     {Labs reviewed    Imaging:  CXR results:  @ studies        Thank you Dr. Chandler Steele, APRN - CNP for allowing us to participate in care of Juan Kothari       Electronically signed by Stanley Wolf MD on 11/26/2020 at 3:01 PM

## 2020-11-27 LAB
ANION GAP SERPL CALCULATED.3IONS-SCNC: 13 MEQ/L (ref 8–16)
BASOPHILIA: ABNORMAL
BASOPHILS # BLD: 0.6 %
BASOPHILS ABSOLUTE: 0 THOU/MM3 (ref 0–0.1)
BUN BLDV-MCNC: 28 MG/DL (ref 7–22)
CALCIUM SERPL-MCNC: 7.6 MG/DL (ref 8.5–10.5)
CHLORIDE BLD-SCNC: 103 MEQ/L (ref 98–111)
CO2: 19 MEQ/L (ref 23–33)
CREAT SERPL-MCNC: 2.3 MG/DL (ref 0.4–1.2)
EOSINOPHIL # BLD: 2.1 %
EOSINOPHILS ABSOLUTE: 0.1 THOU/MM3 (ref 0–0.4)
ERYTHROCYTE [DISTWIDTH] IN BLOOD BY AUTOMATED COUNT: 14.5 % (ref 11.5–14.5)
ERYTHROCYTE [DISTWIDTH] IN BLOOD BY AUTOMATED COUNT: 56.2 FL (ref 35–45)
GFR SERPL CREATININE-BSD FRML MDRD: 30 ML/MIN/1.73M2
GLUCOSE BLD-MCNC: 324 MG/DL (ref 70–108)
GLUCOSE BLD-MCNC: 333 MG/DL (ref 70–108)
GLUCOSE BLD-MCNC: 334 MG/DL (ref 70–108)
GLUCOSE BLD-MCNC: 355 MG/DL (ref 70–108)
GLUCOSE BLD-MCNC: 370 MG/DL (ref 70–108)
HCT VFR BLD CALC: 26.9 % (ref 42–52)
HEMOGLOBIN: 8.6 GM/DL (ref 14–18)
IMMATURE GRANS (ABS): 0.07 THOU/MM3 (ref 0–0.07)
IMMATURE GRANULOCYTES: 1 %
LV EF: 55 %
LVEF MODALITY: NORMAL
LYMPHOCYTES # BLD: 35.1 %
LYMPHOCYTES ABSOLUTE: 2.4 THOU/MM3 (ref 1–4.8)
MCH RBC QN AUTO: 34.3 PG (ref 26–33)
MCHC RBC AUTO-ENTMCNC: 32 GM/DL (ref 32.2–35.5)
MCV RBC AUTO: 107.2 FL (ref 80–94)
MONOCYTES # BLD: 10.3 %
MONOCYTES ABSOLUTE: 0.7 THOU/MM3 (ref 0.4–1.3)
NUCLEATED RED BLOOD CELLS: 0 /100 WBC
PLATELET # BLD: 87 THOU/MM3 (ref 130–400)
PLATELET ESTIMATE: ABNORMAL
PMV BLD AUTO: 11.5 FL (ref 9.4–12.4)
POTASSIUM REFLEX MAGNESIUM: 4.4 MEQ/L (ref 3.5–5.2)
POTASSIUM SERPL-SCNC: 4.4 MEQ/L (ref 3.5–5.2)
PRO-BNP: 2824 PG/ML (ref 0–900)
RBC # BLD: 2.51 MILL/MM3 (ref 4.7–6.1)
SCAN OF BLOOD SMEAR: NORMAL
SEG NEUTROPHILS: 50.9 %
SEGMENTED NEUTROPHILS ABSOLUTE COUNT: 3.4 THOU/MM3 (ref 1.8–7.7)
SODIUM BLD-SCNC: 135 MEQ/L (ref 135–145)
VANCOMYCIN RANDOM: 17.4 UG/ML (ref 0.1–39.9)
WBC # BLD: 6.7 THOU/MM3 (ref 4.8–10.8)

## 2020-11-27 PROCEDURE — 99232 SBSQ HOSP IP/OBS MODERATE 35: CPT | Performed by: INTERNAL MEDICINE

## 2020-11-27 PROCEDURE — 85025 COMPLETE CBC W/AUTO DIFF WBC: CPT

## 2020-11-27 PROCEDURE — 6370000000 HC RX 637 (ALT 250 FOR IP): Performed by: INTERNAL MEDICINE

## 2020-11-27 PROCEDURE — 80048 BASIC METABOLIC PNL TOTAL CA: CPT

## 2020-11-27 PROCEDURE — 97110 THERAPEUTIC EXERCISES: CPT

## 2020-11-27 PROCEDURE — 6370000000 HC RX 637 (ALT 250 FOR IP): Performed by: FAMILY MEDICINE

## 2020-11-27 PROCEDURE — 6360000002 HC RX W HCPCS: Performed by: INTERNAL MEDICINE

## 2020-11-27 PROCEDURE — 87040 BLOOD CULTURE FOR BACTERIA: CPT

## 2020-11-27 PROCEDURE — 1200000003 HC TELEMETRY R&B

## 2020-11-27 PROCEDURE — 94640 AIRWAY INHALATION TREATMENT: CPT

## 2020-11-27 PROCEDURE — 99233 SBSQ HOSP IP/OBS HIGH 50: CPT | Performed by: FAMILY MEDICINE

## 2020-11-27 PROCEDURE — 94760 N-INVAS EAR/PLS OXIMETRY 1: CPT

## 2020-11-27 PROCEDURE — 82948 REAGENT STRIP/BLOOD GLUCOSE: CPT

## 2020-11-27 PROCEDURE — 93306 TTE W/DOPPLER COMPLETE: CPT

## 2020-11-27 PROCEDURE — 36415 COLL VENOUS BLD VENIPUNCTURE: CPT

## 2020-11-27 PROCEDURE — 80202 ASSAY OF VANCOMYCIN: CPT

## 2020-11-27 PROCEDURE — 97162 PT EVAL MOD COMPLEX 30 MIN: CPT

## 2020-11-27 PROCEDURE — 2580000003 HC RX 258: Performed by: INTERNAL MEDICINE

## 2020-11-27 PROCEDURE — 6360000002 HC RX W HCPCS: Performed by: FAMILY MEDICINE

## 2020-11-27 PROCEDURE — 2580000003 HC RX 258: Performed by: FAMILY MEDICINE

## 2020-11-27 PROCEDURE — 83880 ASSAY OF NATRIURETIC PEPTIDE: CPT

## 2020-11-27 RX ORDER — INSULIN GLARGINE 100 [IU]/ML
35 INJECTION, SOLUTION SUBCUTANEOUS NIGHTLY
Status: DISCONTINUED | OUTPATIENT
Start: 2020-11-27 | End: 2020-12-05

## 2020-11-27 RX ADMIN — TIOTROPIUM BROMIDE INHALATION SPRAY 2 PUFF: 3.12 SPRAY, METERED RESPIRATORY (INHALATION) at 10:37

## 2020-11-27 RX ADMIN — INSULIN LISPRO 8 UNITS: 100 INJECTION, SOLUTION INTRAVENOUS; SUBCUTANEOUS at 11:12

## 2020-11-27 RX ADMIN — SODIUM BICARBONATE 1300 MG: 650 TABLET ORAL at 08:55

## 2020-11-27 RX ADMIN — SODIUM BICARBONATE 1300 MG: 650 TABLET ORAL at 14:14

## 2020-11-27 RX ADMIN — ALBUTEROL SULFATE 2 PUFF: 90 AEROSOL, METERED RESPIRATORY (INHALATION) at 04:03

## 2020-11-27 RX ADMIN — INSULIN LISPRO 8 UNITS: 100 INJECTION, SOLUTION INTRAVENOUS; SUBCUTANEOUS at 16:53

## 2020-11-27 RX ADMIN — ASPIRIN 81 MG: 81 TABLET, COATED ORAL at 08:54

## 2020-11-27 RX ADMIN — AMPICILLIN SODIUM 2 G: 2 INJECTION, POWDER, FOR SOLUTION INTRAMUSCULAR; INTRAVENOUS at 17:01

## 2020-11-27 RX ADMIN — HYDRALAZINE HYDROCHLORIDE 25 MG: 25 TABLET, FILM COATED ORAL at 20:31

## 2020-11-27 RX ADMIN — AMLODIPINE BESYLATE 10 MG: 10 TABLET ORAL at 08:54

## 2020-11-27 RX ADMIN — FOLIC ACID 1 MG: 1 TABLET ORAL at 08:55

## 2020-11-27 RX ADMIN — TAMSULOSIN HYDROCHLORIDE 0.4 MG: 0.4 CAPSULE ORAL at 08:54

## 2020-11-27 RX ADMIN — HYDRALAZINE HYDROCHLORIDE 25 MG: 25 TABLET, FILM COATED ORAL at 05:25

## 2020-11-27 RX ADMIN — OXYCODONE HYDROCHLORIDE AND ACETAMINOPHEN 1 TABLET: 7.5; 325 TABLET ORAL at 22:06

## 2020-11-27 RX ADMIN — SODIUM CHLORIDE, PRESERVATIVE FREE 10 ML: 5 INJECTION INTRAVENOUS at 20:37

## 2020-11-27 RX ADMIN — INSULIN LISPRO 4 UNITS: 100 INJECTION, SOLUTION INTRAVENOUS; SUBCUTANEOUS at 20:29

## 2020-11-27 RX ADMIN — INSULIN LISPRO 10 UNITS: 100 INJECTION, SOLUTION INTRAVENOUS; SUBCUTANEOUS at 08:54

## 2020-11-27 RX ADMIN — LATANOPROST 1 DROP: 50 SOLUTION OPHTHALMIC at 20:31

## 2020-11-27 RX ADMIN — ROSUVASTATIN CALCIUM 20 MG: 20 TABLET, FILM COATED ORAL at 08:55

## 2020-11-27 RX ADMIN — ALBUTEROL SULFATE 2 PUFF: 90 AEROSOL, METERED RESPIRATORY (INHALATION) at 17:10

## 2020-11-27 RX ADMIN — DULOXETINE HYDROCHLORIDE 60 MG: 60 CAPSULE, DELAYED RELEASE ORAL at 08:54

## 2020-11-27 RX ADMIN — SODIUM BICARBONATE 1300 MG: 650 TABLET ORAL at 20:31

## 2020-11-27 RX ADMIN — HYDRALAZINE HYDROCHLORIDE 25 MG: 25 TABLET, FILM COATED ORAL at 14:14

## 2020-11-27 RX ADMIN — INSULIN GLARGINE 35 UNITS: 100 INJECTION, SOLUTION SUBCUTANEOUS at 20:29

## 2020-11-27 RX ADMIN — ACETAMINOPHEN 650 MG: 325 TABLET ORAL at 23:34

## 2020-11-27 RX ADMIN — AMPICILLIN SODIUM 2 G: 2 INJECTION, POWDER, FOR SOLUTION INTRAMUSCULAR; INTRAVENOUS at 23:51

## 2020-11-27 RX ADMIN — AMPICILLIN SODIUM 2 G: 2 INJECTION, POWDER, FOR SOLUTION INTRAMUSCULAR; INTRAVENOUS at 11:12

## 2020-11-27 RX ADMIN — SODIUM CHLORIDE, PRESERVATIVE FREE 10 ML: 5 INJECTION INTRAVENOUS at 08:59

## 2020-11-27 RX ADMIN — AMPICILLIN SODIUM 2 G: 2 INJECTION, POWDER, FOR SOLUTION INTRAMUSCULAR; INTRAVENOUS at 20:31

## 2020-11-27 RX ADMIN — OXYCODONE HYDROCHLORIDE AND ACETAMINOPHEN 1 TABLET: 7.5; 325 TABLET ORAL at 03:27

## 2020-11-27 RX ADMIN — OXYCODONE HYDROCHLORIDE AND ACETAMINOPHEN 1 TABLET: 7.5; 325 TABLET ORAL at 14:17

## 2020-11-27 RX ADMIN — MIRTAZAPINE 15 MG: 15 TABLET, FILM COATED ORAL at 22:06

## 2020-11-27 RX ADMIN — CEFEPIME 2 G: 2 INJECTION, POWDER, FOR SOLUTION INTRAVENOUS at 05:25

## 2020-11-27 RX ADMIN — ALBUTEROL SULFATE 2 PUFF: 90 AEROSOL, METERED RESPIRATORY (INHALATION) at 10:37

## 2020-11-27 RX ADMIN — Medication 100 MG: at 08:55

## 2020-11-27 ASSESSMENT — PAIN SCALES - GENERAL
PAINLEVEL_OUTOF10: 3
PAINLEVEL_OUTOF10: 8
PAINLEVEL_OUTOF10: 0
PAINLEVEL_OUTOF10: 0
PAINLEVEL_OUTOF10: 7
PAINLEVEL_OUTOF10: 7
PAINLEVEL_OUTOF10: 4
PAINLEVEL_OUTOF10: 0
PAINLEVEL_OUTOF10: 6

## 2020-11-27 NOTE — PROGRESS NOTES
Pharmacy Vancomycin Consult     Vancomycin Day: 2  Current Dosing: last dose 11/25 at 1913 was 1250mg    Temp max:  afebrile    Recent Labs     11/26/20  0334   BUN 26*       Recent Labs     11/26/20  0334   CREATININE 2.2*       Recent Labs     11/26/20  0334   WBC 7.3         Intake/Output Summary (Last 24 hours) at 11/26/2020 1959  Last data filed at 11/26/2020 1535  Gross per 24 hour   Intake 1221.56 ml   Output 2400 ml   Net -1178.44 ml       No labs recorded in Epic    Ht Readings from Last 1 Encounters:   09/23/20 6' 3\" (1.905 m)        Wt Readings from Last 1 Encounters:   11/25/20 (!) 348 lb (157.9 kg)         Body mass index is 43.5 kg/m². CrCl cannot be calculated (Unknown ideal weight.). Trough: 21.5 (approx 24 hours after last dose recorded)    Assessment/Plan:  Will continue to hold vancomycin and check level tomorrow am.  Patient is here for sepsis so I would not wait until trough falls too low to restart.     Jaqueline Lynn [facility], BCPS, BCGP  11/26/2020     8:02 PM

## 2020-11-27 NOTE — PROGRESS NOTES
Hospitalist Progress Note    Patient:  Rodger Macedo      Unit/Bed:6K-18/018-A    YOB: 1965    MRN: [id number]       Acct: [de-identified]     PCP: MEGHAN Linares CNP    Date of Admission: 11/25/2020    Assessment/Plan:    1. Enterococcus Bacteremia / Left sided pneumonia  - blood cx sensitive to ampicillin --> vanc/zosyn- cefepime abx switched over to amp. ID consulted for G+ bacteremia  - repeat blood cultures ordered    2. MATTIE  - IVF, Likely 2/2 vaco/zosyn vs sepsis vs diabetic nephropathy  - nephro consulted and following    3. Chronic pain  - continue home regimen  -pt/ot    4. DM II/PAD  - continue home meds, Hyperglycemia as long acting insulin was held on admit  - resume at lower dose of 35u and titrate up as needed. - ISS, DM diet, hypoglycemia protocol. 5. Pitting edema  - states he should be 300lbs and not 347  - sig b/l lower ext edema with rales on exam  - bnp, echo, cxr ordered. 6. Macrocytic Anemia  - b12 and folate ordered      Expected discharge date:  2-3 days    Disposition:    [x] Home       [] TCU       [x] Rehab       [] Psych       [] SNF       [] [place]       [] Other-    Chief Complaint: MATTIE    Hospital Course: Transferred from [facility] for worsening MATTIE     Subjective (past 24 hours):   Doing well.  No acute complaints overnight      Medications:  Reviewed    Infusion Medications    dextrose       Scheduled Medications    ampicillin IV  2 g Intravenous 6 times per day    aspirin  81 mg Oral Daily    DULoxetine  60 mg Oral Daily    folic acid  1 mg Oral Daily    latanoprost  1 drop Both Eyes Nightly    mirtazapine  15 mg Oral Nightly    rosuvastatin  20 mg Oral Daily    [Held by provider] spironolactone  50 mg Oral Daily    tamsulosin  0.4 mg Oral Daily    thiamine  100 mg Oral Daily    sodium chloride flush  10 mL Intravenous 2 times per day    insulin lispro  0-12 Units Subcutaneous TID     insulin lispro  0-6 Units Subcutaneous Nightly    tiotropium  2 puff Inhalation Daily    amLODIPine  10 mg Oral Daily    hydrALAZINE  25 mg Oral 3 times per day    sodium bicarbonate  1,300 mg Oral TID     PRN Meds: albuterol sulfate HFA, sodium chloride flush, acetaminophen **OR** acetaminophen, polyethylene glycol, promethazine **OR** ondansetron, potassium chloride **OR** potassium alternative oral replacement **OR** potassium chloride, glucose, dextrose, glucagon (rDNA), dextrose, oxyCODONE-acetaminophen      Intake/Output Summary (Last 24 hours) at 11/27/2020 1735  Last data filed at 11/27/2020 1646  Gross per 24 hour   Intake 439.52 ml   Output 750 ml   Net -310.48 ml       Diet:  DIET GENERAL; No Added Salt (3-4 GM)    Exam:  BP (!) 120/57   Pulse 98   Temp 97.5 °F (36.4 °C) (Oral)   Resp 22   Wt (!) 347 lb 4.8 oz (157.5 kg)   SpO2 98%   BMI 43.41 kg/m²     General appearance: No apparent distress, appears stated age and cooperative. HEENT: Pupils equal, round, and reactive to light. Conjunctivae/corneas clear. Neck: Supple, with full range of motion. No jugular venous distention. Trachea midline. Respiratory:  Rales on exam LLL  Cardiovascular: Regular rate and rhythm , b/l LE pitting edema R>L  Abdomen: Soft, non-tender, non-distended with normal bowel sounds. Musculoskeletal: passive and active ROM x 4 extremities. Skin: Skin color, texture, turgor normal.    Neurologic: grossly non-focal.  Psychiatric: Alert and oriented, thought content appropriate  Capillary Refill: Brisk,< 3 seconds   Peripheral Pulses: +2 palpable, equal bilaterally       Labs:   Recent Labs     11/26/20  0334 11/27/20  1023   WBC 7.3 6.7   HGB 8.3* 8.6*   HCT 26.0* 26.9*   PLT 61* 87*     Recent Labs     11/26/20  0334 11/27/20  1023   * 135   K 4.1 4.4  4.4    103   CO2 17* 19*   BUN 26* 28*   CREATININE 2.2* 2.3*   CALCIUM 7.4* 7.6*     No results for input(s): AST, ALT, BILIDIR, BILITOT, ALKPHOS in the last 72 hours.   No results for input(s): INR in the last 72 hours. No results for input(s): Yoan Seed in the last 72 hours. No results for input(s): PROCAL in the last 72 hours. Microbiology:      Urinalysis:      Lab Results   Component Value Date    NITRU NEGATIVE 10/31/2018    WBCUA 0-2 10/31/2018    BACTERIA FEW 10/31/2018    RBCUA NONE 10/31/2018    BLOODU NEGATIVE 10/31/2018    GLUCOSEU NEGATIVE 10/31/2018       Radiology:  US RENAL COMPLETE   Final Result   1. Possible left renal cyst but otherwise unremarkable renal ultrasound. 2. Unremarkable urinary bladder.       Final report electronically signed by Dr. Lolita Bridges on 11/26/2020 5:12 PM      XR CHEST (2 VW)    (Results Pending)       DVT prophylaxis: [] Lovenox                                 [x] SCDs                                 [] SQ Heparin                                 [] Encourage ambulation           [] Already on Anticoagulation     Code Status: Full Code    Tele:   [x] yes             [] no    Active Hospital Problems    Diagnosis Date Noted    MATT[facility] [N17.9] 11/25/2020       Electronically signed by Lashanda Delgado MD on 11/27/2020 at 5:35 PM

## 2020-11-27 NOTE — PROGRESS NOTES
Renal Progress Note    Assessment and Plan:    1. Acute kidney injury with serum creatinine slightly increased today. 2.  Metabolic acidosis improved  3. Diabetes mellitus type 2  4. Hepatic cirrhosis  5. Alcohol misuse  6. Hypertension primary  7. Macrocytic anemia  8.   Thrombocytopenia  PLAN:   Labs reviewed  Serum creatinine slightly increased today  Kidney ultrasound report reviewed  No hydronephrosis  Medications reviewed  No changes  Check urine eosinophils in view of antibiotic  Labs in the morning  We will follow      Patient Active Problem List:     HCV antibody positive     Cirrhosis, alcoholic ([facility]     Alcohol withdrawal seizure with complication ([facility]     Alcohol withdrawal, uncomplicated (HCC)     Type 2 diabetes mellitus ([facility]     Hyponatremia     Leukocytosis     Thrombocytopenia (HCC)     COPD (chronic obstructive pulmonary disease) (HCC)     HLD (hyperlipidemia)     HTN (hypertension)     Seizure (HCC)     Recurrent falls     Fracture of multiple ribs of left side     Anemia     Alcohol abuse     Closed fracture of distal end of left fibula with routine healing     Hyperammonemia ([facility]     Essential tremor     Alcohol intoxication delirium ([facility]     [facility]      Subjective:   Admit Date: 11/25/2020    Interval History:   Seen for acute kidney injury  Comfortably asleep this morning  Updated by the staff  No issues  Stable blood pressure  Good urine output      Medications:   Scheduled Meds:   ampicillin IV  2 g Intravenous 6 times per day    aspirin  81 mg Oral Daily    DULoxetine  60 mg Oral Daily    folic acid  1 mg Oral Daily    latanoprost  1 drop Both Eyes Nightly    mirtazapine  15 mg Oral Nightly    rosuvastatin  20 mg Oral Daily    [Held by provider] spironolactone  50 mg Oral Daily    tamsulosin  0.4 mg Oral Daily    thiamine  100 mg Oral Daily    sodium chloride flush  10 mL Intravenous 2 times per day    insulin lispro  0-12 Units Subcutaneous TID WC    insulin lispro  0-6 Units Subcutaneous Nightly    tiotropium  2 puff Inhalation Daily    amLODIPine  10 mg Oral Daily    hydrALAZINE  25 mg Oral 3 times per day    sodium bicarbonate  1,300 mg Oral TID     Continuous Infusions:   dextrose         CBC:   Recent Labs     11/26/20  0334 11/27/20  1023   WBC 7.3 6.7   HGB 8.3* 8.6*   PLT 61* 87*     CMP:    Recent Labs     11/26/20  0334 11/27/20  1023   * 135   K 4.1 4.4  4.4    103   CO2 17* 19*   BUN 26* 28*   CREATININE 2.2* 2.3*   GLUCOSE 263* 355*   CALCIUM 7.4* 7.6*   LABGLOM 31* 30*     Troponin: No results for input(s): TROPONINI in the last 72 hours. BNP: No results for input(s): BNP in the last 72 hours. INR: No results for input(s): INR in the last 72 hours. Lipids: No results for input(s): CHOL, LDLDIRECT, TRIG, HDL, AMYLASE, LIPASE in the last 72 hours. Liver: No results for input(s): AST, ALT, ALKPHOS, PROT, LABALBU, BILITOT in the last 72 hours. Invalid input(s): BILDIR  Iron:  No results for input(s): IRONS, FERRITIN in the last 72 hours. Invalid input(s): LABIRONS  US RENAL COMPLETE   Final Result   1. Possible left renal cyst but otherwise unremarkable renal ultrasound. 2. Unremarkable urinary bladder. Final report electronically signed by Dr. Harmony Joseph on 11/26/2020 5:12 PM      XR CHEST (2 VW)    (Results Pending)         Objective:   Vitals: /72   Pulse 98   Temp 97.7 °F (36.5 °C) (Oral)   Resp 20   Wt (!) 347 lb 4.8 oz (157.5 kg)   SpO2 100%   BMI 43.41 kg/m²    Wt Readings from Last 3 Encounters:   11/27/20 (!) 347 lb 4.8 oz (157.5 kg)   09/23/20 (!) 319 lb (144.7 kg)   09/10/20 (!) 321 lb (145.6 kg)      24HR INTAKE/OUTPUT:      Intake/Output Summary (Last 24 hours) at 11/27/2020 1443  Last data filed at 11/27/2020 0902  Gross per 24 hour   Intake 1081.56 ml   Output 1450 ml   Net -368.44 ml       Constitutional: Comfortably asleep  Skin:normal with no rash or lesions.   HEENT:Pupils are reactive . Throat is clear . Oral mucosa is moist   Neck:supple with no carotid bruit  Cardiovascular:  S1, S2 without murmur or rubs   Respiratory:  Clear to ausculation without wheezes, rhonchi or rales in the anterior  Abdomen: Soft. Obese. Good bowel sounds.   Ext: 2+ bilateral LE edema  Musculoskeletal:Intact  Neuro: Deferred      Electronically signed by Lili Rivera MD on 11/27/2020 at 2:43 PM

## 2020-11-27 NOTE — PROGRESS NOTES
[facility]  INPATIENT PHYSICAL THERAPY  EVALUATION  [facility] RENAL TELEMETRY 6K - 6K-18/018-A    Time In: 9459  Time Out: 1028  Timed Code Treatment Minutes: 10 Minutes  Minutes: 30          Date: 2020  Patient Name: Gill Westbrook,  Gender:  male        MRN: [id number]  : 1965  (48 y.o.)      Referring Practitioner: Karleen Favre, MD  Diagnosis: MATTIE  Additional Pertinent Hx: 48 y.o. male who presented to [facility] with with above complain. Patient is a transfer from [facility] where he had been admitted for about 4 days after complain of vomiting, incontinence, left shoulder pain and diarrhea. He was found to have sepsis secondary to pneumonia and during fluid resuscitation, it was noted that patient had MATTIE. He did develop fever during stay and blood cultures obtained during triage grew gram + cocci. Patient was started on Zosyn and vanco only for renal function to continue worsening from normal to 2.29. renal US was done showing no hydronephrosis or other kidney abnormalities. It was felt that patient needed nephrology consult to review worsening kidney status despite getting clinically better with abx. At bedside, patient reports feeling well and no longer confused. He dose have diabetes that is poorly controlled per records. Restrictions/Precautions:  Restrictions/Precautions: Fall Risk    Subjective:  Chart Reviewed: Yes  Patient assessed for rehabilitation services?: Yes  Family / Caregiver Present: No  Subjective: RN approved session, pt is seated on EOB upon arrival to room, pleasant and agreeable to PT.     General:  Overall Orientation Status: Within Functional Limits  Follows Commands: Within Functional Limits    Vision: Impaired  Vision Exceptions: Legally blind    Hearing: Within functional limits         Pain: denies    Social/Functional History:    Lives With: Significant other  Type of Home: House  Home Layout: Two level, Able to Live on Main level with bedroom/bathroom, Bed/Bath upstairs(Full bathroom upstairs, has bedside commode he uses; goes upstairs ~1x/week for shower)  Home Equipment: Rolling walker      Ambulation Assistance: Independent  Transfer Assistance: Independent    Active : No     Additional Comments: Pt amb with RW, significant other home 24/7 to assist    OBJECTIVE:  Range of Motion:  Bilateral Lower Extremity: WFL    Strength:  Bilateral Lower Extremity: Impaired - grossly 4/5    Balance:  Static Standing Balance: Contact Guard Assistance  Dynamic Standing Balance: Contact Guard Assistance, Minimal Assistance    Bed Mobility:  Not Tested    Transfers:  Sit to Stand: Stand By Assistance  Stand to Sit:Stand By Assistance    Ambulation:  Contact Guard Assistance, Minimal Assistance  Distance: 250 feet  Surface: Level Tile  Device:Rolling Walker  Gait Deviations: Forward Flexed Posture, Slow Joanna, Decreased Step Length Bilaterally and Decreased Gait Speed  **Pt with 2 minor LOB episodes requiring min A to correct, pt taking B hands off walker to adjust mask, cues to keep at least 1 hand on walker for balance; amb with slow pace    Exercise:  Patient was guided in 1 set(s) 10 reps of exercise to both lower extremities. Ankle pumps, Seated marches and Long arc quads. Exercises were completed for increased independence with functional mobility. Functional Outcome Measures: Not completed     ASSESSMENT:  Activity Tolerance:  Patient tolerance of  treatment: good. Treatment Initiated: Treatment and education initiated within context of evaluation. Evaluation time included review of current medical information, gathering information related to past medical, social and functional history, completion of standardized testing, formal and informal observation of tasks, assessment of data and development of plan of care and goals.   Treatment time included skilled education and facilitation of tasks to increase safety and independence with functional mobility for improved independence and quality of life. See above exercises. Assessment: Body structures, Functions, Activity limitations: Decreased functional mobility , Decreased balance, Decreased strength, Decreased endurance  Assessment: Pt tolerates session well, limited by generalized weakness, impaired endurance. PT to continue to progress strength and functional mobility. Prognosis: Good    REQUIRES PT FOLLOW UP: Yes    Discharge Recommendations:  Discharge Recommendations: Home with assist PRN    Patient Education:  PT Education: PT Role, Plan of Care    Equipment Recommendations:  Equipment Needed: No  Other: has RW    Plan:  Times per week: 3-5x GM  Current Treatment Recommendations: Strengthening, Home Exercise Program, Endurance Training, Balance Training, Functional Mobility Training, Transfer Training, Gait Training, Stair training, Patient/Caregiver Education & Training    Goals:  Patient goals : to go home by Monday  Short term goals  Time Frame for Short term goals: by discharge  Short term goal 1: Pt to transfer supine <--> sit S to enable pt to get in/out of bed. Short term goal 2: Pt to transfer sit <--> stand S for increased functional mobility. Short term goal 3: Pt to ambulate >250 feet with RW SBA for household ambulation. Long term goals  Time Frame for Long term goals : NA due to short length of stay. Following session, patient left in safe position with all fall risk precautions in place.

## 2020-11-27 NOTE — CONSULTS
[address] [place] [address]                                  CONSULTATION    PATIENT NAME: Cecil Page                  :        1965  MED REC NO:   [id number]                           ROOM:       0018  ACCOUNT NO:   [de-identified]                           ADMIT DATE: 2020  PROVIDER:     Nikita Barrios. Haylee Botello M.D.    Lidia Keys:  2020    REASON FOR CONSULTATION:  Positive blood culture. REASON FOR CONSULTATION:  He is a 78-year-old morbidly obese male  patient, transferred from [facility].  He was admitted to the  hospital two days ago. HISTORY OF PRESENT ILLNESS:  This patient is a 78-year-old male patient  with past medical history significant for alcohol-related liver disease,  who initially presented to [facility] with neck pain after he  had a fall. He had associated nausea, vomiting, diarrhea and urinary  incontinence. Diagnostic tests were negative for head injury or  fracture; however, he was noted to have positive blood culture for  gram-positive cocci, later identified to be Enterococcus. The patient  did report that he had been vomiting at home and had also watery  diarrhea. He had urinary incontinence. He had chest x-ray, which was  negative. He had a stable cervical fusion. He has history of alcohol  abuse. He drinks alcohol every day. He is diabetic with neuropathy. There was not any central line, pacemaker or artificial prosthesis. He  had previous history of right foot surgery. No previous history of  endocarditis. He was also noted to have acute kidney injury. There was  not any hydronephrosis or obstructing stone. Because of his poor  progress, he was transferred here. PAST MEDICAL HISTORY:  Significant for asthma, brain tumor, liver  cirrhosis, COPD, diabetes, hepatitis C, hyperlipidemia, hypertension,  and seizure disorder.     PAST SURGICAL HISTORY: Includes, he had colonoscopy, fibula fracture,  foot surgery, neck surgery. He had right foot surgery. ALLERGIES:  He has allergic to ADHESIVE TAPE. SOCIAL HISTORY:  He lives with family. He has history of smoking and  continues to drink alcohol. FAMILY HISTORY:  Noncontributory. CURRENT MEDICATIONS:  Include Tylenol as needed, albuterol, amlodipine,  ampicillin 2 gm every 4 hours, aspirin, duloxetine, folic acid,  hydralazine, insulin, mirtazapine, Percocet, polyethylene glycol,  rosuvastatin, vitamin B1.    REVIEW OF SYSTEMS:  Noncontributory. PHYSICAL EXAMINATION:  GENERAL:  He is obese. VITAL SIGNS:  Temperature 97.7, respirations 20, pulse 98, blood  pressure 136/72. HEENT:  He has slightly pale conjunctivae. Anicteric sclerae. CHEST:  Bilateral diminished breath sounds. CARDIOVASCULAR SYSTEM:  Regular. ABDOMEN:  Obese, nontender. EXTREMITIES:  He has bruising on his upper arm. Edema was noted on  lower extremities. DIAGNOSTICS:  His chemistry showed sodium of 135, potassium 4.4,  chloride 103, bicarb 19, BUN 28, creatinine 2.3, blood glucose was 355. His ALT was 51, ammonia was 54, AST was 85, total protein was 7.4. His  INR was 1.33. His lab work from outside hospital was reviewed. IMPRESSION:  He is a 78-year-old male patient admitted to the hospital  with acute kidney injury; bacteremia due to Enterococcus, sensitive to  penicillin and ampicillin; history of incontinence; liver cirrhosis. RECOMMENDATION:  We will continue current ampicillin. Vancomycin has  been stopped. We will do limited echocardiogram.  We will send  urinalysis to see if there is any associated urine infection  contributing to the bacteremia. We will continue to follow the patient.         Anthony Best M.D.    D: 11/27/2020 13:56:56       T: 11/27/2020 15:06:49     [email or web address]  Job#: [id number]     Doc#: [id number]    CC:

## 2020-11-28 ENCOUNTER — APPOINTMENT (OUTPATIENT)
Dept: GENERAL RADIOLOGY | Age: 55
DRG: 720 | End: 2020-11-28
Attending: PHYSICIAN ASSISTANT
Payer: COMMERCIAL

## 2020-11-28 LAB
AMORPHOUS: ABNORMAL
ANION GAP SERPL CALCULATED.3IONS-SCNC: 16 MEQ/L (ref 8–16)
BACTERIA: ABNORMAL
BASOPHILS # BLD: 0.7 %
BASOPHILS ABSOLUTE: 0 THOU/MM3 (ref 0–0.1)
BILIRUBIN URINE: NEGATIVE
BLOOD, URINE: ABNORMAL
BUN BLDV-MCNC: 29 MG/DL (ref 7–22)
CALCIUM SERPL-MCNC: 7.5 MG/DL (ref 8.5–10.5)
CASTS: ABNORMAL /LPF
CASTS: ABNORMAL /LPF
CHARACTER, URINE: CLEAR
CHLORIDE BLD-SCNC: 102 MEQ/L (ref 98–111)
CO2: 17 MEQ/L (ref 23–33)
COLOR: YELLOW
CREAT SERPL-MCNC: 2.3 MG/DL (ref 0.4–1.2)
CRYSTALS: ABNORMAL
EOSINOPHIL # BLD: 2.1 %
EOSINOPHIL SMEAR: NORMAL
EOSINOPHILS ABSOLUTE: 0.1 THOU/MM3 (ref 0–0.4)
EPITHELIAL CELLS, UA: ABNORMAL /HPF
ERYTHROCYTE [DISTWIDTH] IN BLOOD BY AUTOMATED COUNT: 14.5 % (ref 11.5–14.5)
ERYTHROCYTE [DISTWIDTH] IN BLOOD BY AUTOMATED COUNT: 55.7 FL (ref 35–45)
GFR SERPL CREATININE-BSD FRML MDRD: 30 ML/MIN/1.73M2
GLUCOSE BLD-MCNC: 293 MG/DL (ref 70–108)
GLUCOSE BLD-MCNC: 296 MG/DL (ref 70–108)
GLUCOSE BLD-MCNC: 315 MG/DL (ref 70–108)
GLUCOSE BLD-MCNC: 341 MG/DL (ref 70–108)
GLUCOSE BLD-MCNC: 377 MG/DL (ref 70–108)
GLUCOSE, URINE: 100 MG/DL
HCT VFR BLD CALC: 24.6 % (ref 42–52)
HEMOGLOBIN: 7.9 GM/DL (ref 14–18)
IMMATURE GRANS (ABS): 0.05 THOU/MM3 (ref 0–0.07)
IMMATURE GRANULOCYTES: 0.9 %
KETONES, URINE: NEGATIVE
LEUKOCYTE ESTERASE, URINE: NEGATIVE
LYMPHOCYTES # BLD: 34.9 %
LYMPHOCYTES ABSOLUTE: 2 THOU/MM3 (ref 1–4.8)
MAGNESIUM: 1.6 MG/DL (ref 1.6–2.4)
MCH RBC QN AUTO: 33.9 PG (ref 26–33)
MCHC RBC AUTO-ENTMCNC: 32.1 GM/DL (ref 32.2–35.5)
MCV RBC AUTO: 105.6 FL (ref 80–94)
MISCELLANEOUS LAB TEST RESULT: ABNORMAL
MONOCYTES # BLD: 11.1 %
MONOCYTES ABSOLUTE: 0.6 THOU/MM3 (ref 0.4–1.3)
NITRITE, URINE: NEGATIVE
NUCLEATED RED BLOOD CELLS: 0 /100 WBC
PH UA: 5 (ref 5–9)
PLATELET # BLD: 78 THOU/MM3 (ref 130–400)
PMV BLD AUTO: 11.5 FL (ref 9.4–12.4)
POTASSIUM REFLEX MAGNESIUM: 3.7 MEQ/L (ref 3.5–5.2)
POTASSIUM SERPL-SCNC: 3.7 MEQ/L (ref 3.5–5.2)
PROTEIN UA: 30 MG/DL
RBC # BLD: 2.33 MILL/MM3 (ref 4.7–6.1)
RBC URINE: ABNORMAL /HPF
RENAL EPITHELIAL, UA: ABNORMAL
SEG NEUTROPHILS: 50.3 %
SEGMENTED NEUTROPHILS ABSOLUTE COUNT: 2.8 THOU/MM3 (ref 1.8–7.7)
SODIUM BLD-SCNC: 135 MEQ/L (ref 135–145)
SPECIFIC GRAVITY UA: 1.02 (ref 1–1.03)
SPECIMEN: NORMAL
UROBILINOGEN, URINE: 0.2 EU/DL (ref 0–1)
WBC # BLD: 5.6 THOU/MM3 (ref 4.8–10.8)
WBC UA: ABNORMAL /HPF
YEAST: ABNORMAL

## 2020-11-28 PROCEDURE — 6370000000 HC RX 637 (ALT 250 FOR IP): Performed by: INTERNAL MEDICINE

## 2020-11-28 PROCEDURE — 82948 REAGENT STRIP/BLOOD GLUCOSE: CPT

## 2020-11-28 PROCEDURE — 81001 URINALYSIS AUTO W/SCOPE: CPT

## 2020-11-28 PROCEDURE — 94760 N-INVAS EAR/PLS OXIMETRY 1: CPT

## 2020-11-28 PROCEDURE — 71046 X-RAY EXAM CHEST 2 VIEWS: CPT

## 2020-11-28 PROCEDURE — 85025 COMPLETE CBC W/AUTO DIFF WBC: CPT

## 2020-11-28 PROCEDURE — 6360000002 HC RX W HCPCS: Performed by: FAMILY MEDICINE

## 2020-11-28 PROCEDURE — 83735 ASSAY OF MAGNESIUM: CPT

## 2020-11-28 PROCEDURE — 99232 SBSQ HOSP IP/OBS MODERATE 35: CPT | Performed by: INTERNAL MEDICINE

## 2020-11-28 PROCEDURE — 1200000003 HC TELEMETRY R&B

## 2020-11-28 PROCEDURE — 2580000003 HC RX 258: Performed by: INTERNAL MEDICINE

## 2020-11-28 PROCEDURE — 6370000000 HC RX 637 (ALT 250 FOR IP): Performed by: FAMILY MEDICINE

## 2020-11-28 PROCEDURE — 36415 COLL VENOUS BLD VENIPUNCTURE: CPT

## 2020-11-28 PROCEDURE — 94640 AIRWAY INHALATION TREATMENT: CPT

## 2020-11-28 PROCEDURE — 99233 SBSQ HOSP IP/OBS HIGH 50: CPT | Performed by: FAMILY MEDICINE

## 2020-11-28 PROCEDURE — 6360000002 HC RX W HCPCS: Performed by: INTERNAL MEDICINE

## 2020-11-28 PROCEDURE — 89190 NASAL SMEAR FOR EOSINOPHILS: CPT

## 2020-11-28 PROCEDURE — 2580000003 HC RX 258: Performed by: FAMILY MEDICINE

## 2020-11-28 PROCEDURE — 80048 BASIC METABOLIC PNL TOTAL CA: CPT

## 2020-11-28 RX ORDER — FUROSEMIDE 10 MG/ML
40 INJECTION INTRAMUSCULAR; INTRAVENOUS 2 TIMES DAILY
Status: DISCONTINUED | OUTPATIENT
Start: 2020-11-28 | End: 2020-11-29

## 2020-11-28 RX ORDER — MAGNESIUM SULFATE IN WATER 40 MG/ML
2 INJECTION, SOLUTION INTRAVENOUS ONCE
Status: COMPLETED | OUTPATIENT
Start: 2020-11-28 | End: 2020-11-28

## 2020-11-28 RX ORDER — DIPHENHYDRAMINE HYDROCHLORIDE 50 MG/ML
25 INJECTION INTRAMUSCULAR; INTRAVENOUS EVERY 6 HOURS PRN
Status: DISCONTINUED | OUTPATIENT
Start: 2020-11-28 | End: 2020-12-08 | Stop reason: HOSPADM

## 2020-11-28 RX ORDER — POTASSIUM CHLORIDE 20 MEQ/1
20 TABLET, EXTENDED RELEASE ORAL 2 TIMES DAILY WITH MEALS
Status: COMPLETED | OUTPATIENT
Start: 2020-11-28 | End: 2020-11-29

## 2020-11-28 RX ADMIN — AMPICILLIN SODIUM 2 G: 2 INJECTION, POWDER, FOR SOLUTION INTRAMUSCULAR; INTRAVENOUS at 05:22

## 2020-11-28 RX ADMIN — MIRTAZAPINE 15 MG: 15 TABLET, FILM COATED ORAL at 22:05

## 2020-11-28 RX ADMIN — INSULIN LISPRO 5 UNITS: 100 INJECTION, SOLUTION INTRAVENOUS; SUBCUTANEOUS at 22:10

## 2020-11-28 RX ADMIN — OXYCODONE HYDROCHLORIDE AND ACETAMINOPHEN 1 TABLET: 7.5; 325 TABLET ORAL at 09:00

## 2020-11-28 RX ADMIN — Medication 100 MG: at 09:01

## 2020-11-28 RX ADMIN — MAGNESIUM SULFATE HEPTAHYDRATE 2 G: 40 INJECTION, SOLUTION INTRAVENOUS at 17:08

## 2020-11-28 RX ADMIN — TIOTROPIUM BROMIDE INHALATION SPRAY 2 PUFF: 3.12 SPRAY, METERED RESPIRATORY (INHALATION) at 09:28

## 2020-11-28 RX ADMIN — AMPICILLIN SODIUM 2 G: 2 INJECTION, POWDER, FOR SOLUTION INTRAMUSCULAR; INTRAVENOUS at 22:26

## 2020-11-28 RX ADMIN — AMPICILLIN SODIUM 2 G: 2 INJECTION, POWDER, FOR SOLUTION INTRAMUSCULAR; INTRAVENOUS at 09:01

## 2020-11-28 RX ADMIN — INSULIN LISPRO 6 UNITS: 100 INJECTION, SOLUTION INTRAVENOUS; SUBCUTANEOUS at 09:01

## 2020-11-28 RX ADMIN — AMPICILLIN SODIUM 2 G: 2 INJECTION, POWDER, FOR SOLUTION INTRAMUSCULAR; INTRAVENOUS at 13:20

## 2020-11-28 RX ADMIN — SODIUM BICARBONATE 1300 MG: 650 TABLET ORAL at 09:01

## 2020-11-28 RX ADMIN — DULOXETINE HYDROCHLORIDE 60 MG: 60 CAPSULE, DELAYED RELEASE ORAL at 09:00

## 2020-11-28 RX ADMIN — HYDRALAZINE HYDROCHLORIDE 25 MG: 25 TABLET, FILM COATED ORAL at 13:20

## 2020-11-28 RX ADMIN — HYDRALAZINE HYDROCHLORIDE 25 MG: 25 TABLET, FILM COATED ORAL at 22:05

## 2020-11-28 RX ADMIN — SODIUM BICARBONATE 1300 MG: 650 TABLET ORAL at 13:20

## 2020-11-28 RX ADMIN — ROSUVASTATIN CALCIUM 20 MG: 20 TABLET, FILM COATED ORAL at 09:01

## 2020-11-28 RX ADMIN — OXYCODONE HYDROCHLORIDE AND ACETAMINOPHEN 1 TABLET: 7.5; 325 TABLET ORAL at 22:08

## 2020-11-28 RX ADMIN — DIPHENHYDRAMINE HYDROCHLORIDE 25 MG: 50 INJECTION, SOLUTION INTRAMUSCULAR; INTRAVENOUS at 22:26

## 2020-11-28 RX ADMIN — SODIUM BICARBONATE 1300 MG: 650 TABLET ORAL at 22:05

## 2020-11-28 RX ADMIN — LATANOPROST 1 DROP: 50 SOLUTION OPHTHALMIC at 22:05

## 2020-11-28 RX ADMIN — SODIUM CHLORIDE, PRESERVATIVE FREE 10 ML: 5 INJECTION INTRAVENOUS at 22:27

## 2020-11-28 RX ADMIN — ASPIRIN 81 MG: 81 TABLET, COATED ORAL at 09:00

## 2020-11-28 RX ADMIN — FOLIC ACID 1 MG: 1 TABLET ORAL at 09:00

## 2020-11-28 RX ADMIN — SODIUM CHLORIDE, PRESERVATIVE FREE 10 ML: 5 INJECTION INTRAVENOUS at 10:41

## 2020-11-28 RX ADMIN — POTASSIUM CHLORIDE 20 MEQ: 1500 TABLET, EXTENDED RELEASE ORAL at 17:09

## 2020-11-28 RX ADMIN — INSULIN LISPRO 8 UNITS: 100 INJECTION, SOLUTION INTRAVENOUS; SUBCUTANEOUS at 17:10

## 2020-11-28 RX ADMIN — TAMSULOSIN HYDROCHLORIDE 0.4 MG: 0.4 CAPSULE ORAL at 09:01

## 2020-11-28 RX ADMIN — INSULIN GLARGINE 35 UNITS: 100 INJECTION, SOLUTION SUBCUTANEOUS at 22:10

## 2020-11-28 RX ADMIN — AMLODIPINE BESYLATE 10 MG: 10 TABLET ORAL at 09:01

## 2020-11-28 RX ADMIN — HYDRALAZINE HYDROCHLORIDE 25 MG: 25 TABLET, FILM COATED ORAL at 05:23

## 2020-11-28 RX ADMIN — FUROSEMIDE 40 MG: 10 INJECTION, SOLUTION INTRAMUSCULAR; INTRAVENOUS at 17:09

## 2020-11-28 RX ADMIN — INSULIN LISPRO 8 UNITS: 100 INJECTION, SOLUTION INTRAVENOUS; SUBCUTANEOUS at 13:20

## 2020-11-28 RX ADMIN — AMPICILLIN SODIUM 2 G: 2 INJECTION, POWDER, FOR SOLUTION INTRAMUSCULAR; INTRAVENOUS at 17:08

## 2020-11-28 ASSESSMENT — PAIN DESCRIPTION - ORIENTATION
ORIENTATION: MID
ORIENTATION: MID

## 2020-11-28 ASSESSMENT — PAIN DESCRIPTION - DESCRIPTORS: DESCRIPTORS: SHARP;STABBING

## 2020-11-28 ASSESSMENT — PAIN DESCRIPTION - FREQUENCY: FREQUENCY: CONTINUOUS

## 2020-11-28 ASSESSMENT — PAIN DESCRIPTION - LOCATION
LOCATION: NECK
LOCATION: NECK

## 2020-11-28 ASSESSMENT — PAIN SCALES - GENERAL
PAINLEVEL_OUTOF10: 0
PAINLEVEL_OUTOF10: 8
PAINLEVEL_OUTOF10: 8

## 2020-11-28 ASSESSMENT — PAIN DESCRIPTION - PAIN TYPE
TYPE: CHRONIC PAIN
TYPE: CHRONIC PAIN

## 2020-11-28 ASSESSMENT — PAIN DESCRIPTION - ONSET: ONSET: ON-GOING

## 2020-11-28 NOTE — PROGRESS NOTES
Hospitalist Progress Note    Patient:  Jonny Dickerson      Unit/Bed:6K-18/018-A    YOB: 1965    MRN: [id number]       Acct: [de-identified]     PCP: Debbrah Hamman, APRN - CNP    Date of Admission: 11/25/2020    Assessment/Plan:    1. Enterococcus Bacteremia / Left sided pneumonia  - blood cx sensitive to ampicillin --> vanc/zosyn- cefepime abx switched over to amp. ID consulted for G+ bacteremia  - continue abx  - repeat blood cultures ordered    2. MATTIE  - IVF, Likely 2/2 vaco/zosyn vs sepsis vs diabetic nephropathy  - nephro consulted and following    3. Chronic pain  - continue home regimen  -pt/ot    4. DM II/PAD  - continue home meds, Hyperglycemia as long acting insulin was held on admit  - resume at lower dose of 35u and titrate up as needed. - ISS, DM diet, hypoglycemia protocol. 5. Pitting edema  - states he should be 300lbs and not 347  - sig b/l lower ext edema with rales on exam  - bnp 2824, cxr with ? Fibrotic changes vs early pulm edema, echo pending. 6. Macrocytic Anemia  - b12 and folate ordered    7. Hypomagnesemia  - 2gm iv given  - recheck in am    Expected discharge date:  2-3 days    Disposition:    [x] Home       [] TCU       [x] Rehab       [] Psych       [] SNF       [] [facility]       [] Other-    Chief Complaint: MATTIE    Hospital Course: Transferred from [facility] for worsening MATTIE     Subjective (past 24 hours):   Doing well.  No acute complaints overnight      Medications:  Reviewed    Infusion Medications    dextrose       Scheduled Medications    ampicillin IV  2 g Intravenous 6 times per day    insulin glargine  35 Units Subcutaneous Nightly    aspirin  81 mg Oral Daily    DULoxetine  60 mg Oral Daily    folic acid  1 mg Oral Daily    latanoprost  1 drop Both Eyes Nightly    mirtazapine  15 mg Oral Nightly    rosuvastatin  20 mg Oral Daily    [Held by provider] spironolactone  50 mg Oral Daily    tamsulosin  0.4 mg Oral Daily    thiamine  100 mg Oral Daily    sodium chloride flush  10 mL Intravenous 2 times per day    insulin lispro  0-12 Units Subcutaneous TID WC    insulin lispro  0-6 Units Subcutaneous Nightly    tiotropium  2 puff Inhalation Daily    amLODIPine  10 mg Oral Daily    hydrALAZINE  25 mg Oral 3 times per day    sodium bicarbonate  1,300 mg Oral TID     PRN Meds: albuterol sulfate HFA, sodium chloride flush, acetaminophen **OR** acetaminophen, polyethylene glycol, promethazine **OR** ondansetron, potassium chloride **OR** potassium alternative oral replacement **OR** potassium chloride, glucose, dextrose, glucagon (rDNA), dextrose, oxyCODONE-acetaminophen      Intake/Output Summary (Last 24 hours) at 11/28/2020 1216  Last data filed at 11/28/2020 0658  Gross per 24 hour   Intake 699.52 ml   Output 1000 ml   Net -300.48 ml       Diet:  DIET GENERAL; No Added Salt (3-4 GM)    Exam:  /60   Pulse 100   Temp 97.8 °F (36.6 °C) (Axillary)   Resp 18   Wt (!) 353 lb 1.6 oz (160.2 kg)   SpO2 100%   BMI 44.13 kg/m²     General appearance: No apparent distress, appears stated age and cooperative. HEENT: Pupils equal, round, and reactive to light. Conjunctivae/corneas clear. Neck: Supple, with full range of motion. No jugular venous distention. Trachea midline. Respiratory:  Rales on exam LLL  Cardiovascular: Regular rate and rhythm , b/l LE pitting edema R>L  Abdomen: Soft, non-tender, non-distended with normal bowel sounds. Musculoskeletal: passive and active ROM x 4 extremities.   Skin: Skin color, texture, turgor normal.    Neurologic: grossly non-focal.  Psychiatric: Alert and oriented, thought content appropriate  Capillary Refill: Brisk,< 3 seconds   Peripheral Pulses: +2 palpable, equal bilaterally       Labs:   Recent Labs     11/26/20 0334 11/27/20  1023 11/28/20  0616   WBC 7.3 6.7 5.6   HGB 8.3* 8.6* 7.9*   HCT 26.0* 26.9* 24.6*   PLT 61* 87* 78*     Recent Labs     11/26/20 0334 11/27/20  1023

## 2020-11-28 NOTE — PROGRESS NOTES
Renal Progress Note  [facility]    Patient :  Mariama Cooper; 48 y.o. MRN# [id number]  [facility]  Attending:  Don Holguin MD  Admit Date:  11/25/2020   Hospital Day: 3      Subjective:     Nephrology is following the patient for MATTIE. Patient seen and examined. Overall feeling ok but feels very swollen. Outpatient Medications:     Medications Prior to Admission: MIRTAZAPINE PO, Take 1 tablet by mouth nightly  pioglitazone (ACTOS) 15 MG tablet, TAKE ONE TABLET EACH DAY TO HELP CONTROL BLOOD SUGAR.  spironolactone (ALDACTONE) 50 MG tablet, Take 1 tablet by mouth daily  thiamine 100 MG tablet, Take 1 tablet by mouth daily  insulin glargine (LANTUS) 100 UNIT/ML injection vial, Inject 35 Units into the skin nightly (Patient taking differently: Inject 80 Units into the skin nightly )  aspirin 81 MG tablet, Take 81 mg by mouth daily  DULoxetine (CYMBALTA) 60 MG extended release capsule, Take 60 mg by mouth daily  rosuvastatin (CRESTOR) 20 MG tablet, Take 20 mg by mouth daily  folic acid (FOLVITE) 1 MG tablet, Take 1 mg by mouth daily  penicillin v potassium (VEETID) 500 MG tablet, Take 500 mg by mouth 4 times daily  ondansetron (ZOFRAN) 4 MG tablet, Take 4 mg by mouth every 8 hours as needed for Nausea or Vomiting  tamsulosin (FLOMAX) 0.4 MG capsule, Take 0.4 mg by mouth daily  Blood Glucose Monitoring Suppl (PRODIGY VOICE BLOOD GLUCOSE) w/Device KIT, by NOT APPLICABLE route  Elastic Bandages & Supports (MEDICAL COMPRESSION STOCKINGS) MISC, Provide jopstock waist-high compression stockings. To be worn while ambulating to prevent syncope. Thigh high insufficient.  Dx Code I95.1  TRUETRACK TEST strip, TEAST UP TO 4 TIMES A DAY AS INSTRUCTED  Insulin Pen Needle (ULTICARE MICRO PEN NEEDLES) 31G X 6 MM MISC, by NOT APPLICABLE route  ULTICARE MINI PEN NEEDLES 31G X 6 MM MISC, USE TO INJECT INSULINS PER INSTRUCTION UP TO 3 TIMES A DAY  B-D UF III MINI PEN NEEDLES 31G X 5 MM MISC, USE TO INJECT INSULINS PER INSTRUCTION UP TO 3 TIMES A DAY  TRUEPLUS LANCETS 28G MISC, TEST AS INSTRUCTED UP TO 4 TIMES A DAY  metFORMIN (GLUCOPHAGE) 500 MG tablet, TAKE ONE TABLET TWO (2) TIMES A DAY WITH MEALS TO HELP CONTROL BLOOD SUGAR. TAKE WITH FOOD  oxyCODONE-acetaminophen (PERCOCET) 7.5-325 MG per tablet, TAKE ONE TABLET UP TO EVERY EIGHT (8) HOURS WHEN NEEDED TO HELP CONTROL PAIN.  MAY CAUSE DROWSINESS  tiotropium (SPIRIVA) 18 MCG inhalation capsule, Inhale 1 capsule into the lungs daily  latanoprost (XALATAN) 0.005 % ophthalmic solution, Place 1 drop into both eyes nightly  tadalafil (CIALIS) 20 MG tablet, Take 20 mg by mouth as needed for Erectile Dysfunction  albuterol sulfate  (90 Base) MCG/ACT inhaler, Inhale 2 puffs into the lungs every 6 hours as needed for Wheezing    Current Medications:     Scheduled Meds:    magnesium sulfate  2 g Intravenous Once    ampicillin IV  2 g Intravenous 6 times per day    insulin glargine  35 Units Subcutaneous Nightly    aspirin  81 mg Oral Daily    DULoxetine  60 mg Oral Daily    folic acid  1 mg Oral Daily    latanoprost  1 drop Both Eyes Nightly    mirtazapine  15 mg Oral Nightly    rosuvastatin  20 mg Oral Daily    [Held by provider] spironolactone  50 mg Oral Daily    tamsulosin  0.4 mg Oral Daily    thiamine  100 mg Oral Daily    sodium chloride flush  10 mL Intravenous 2 times per day    insulin lispro  0-12 Units Subcutaneous TID WC    insulin lispro  0-6 Units Subcutaneous Nightly    tiotropium  2 puff Inhalation Daily    amLODIPine  10 mg Oral Daily    hydrALAZINE  25 mg Oral 3 times per day    sodium bicarbonate  1,300 mg Oral TID     Continuous Infusions:    dextrose       PRN Meds:  albuterol sulfate HFA, sodium chloride flush, acetaminophen **OR** acetaminophen, polyethylene glycol, promethazine **OR** ondansetron, potassium chloride **OR** potassium alternative oral replacement **OR** potassium chloride, glucose, dextrose, glucagon (rDNA), dextrose, oxyCODONE-acetaminophen    Input/Output:       I/O last 3 completed shifts: In: 699.5 [P.O.:420; I.V.:279.5]  Out: 1250 [Urine:1250]. Patient Vitals for the past 96 hrs (Last 3 readings):   Weight   20 0304 (!) 353 lb 1.6 oz (160.2 kg)   20 0316 (!) 347 lb 4.8 oz (157.5 kg)   20 204 (!) 348 lb (157.9 kg)       Vital Signs:   Temperature:  Temp: 97.8 °F (36.6 °C)  TMax:   Temp (24hrs), Av.6 °F (36.4 °C), Min:97.3 °F (36.3 °C), Max:97.9 °F (36.6 °C)    Respirations:  Resp: 18  Pulse:   Pulse: 100  BP:    BP: 132/60  BP Range: Systolic (95QBL), CHANTEL:832 , Min:112 , YYM:659       Diastolic (95EOK), PZU:38, Min:57, Max:95      Physical Examination:     General:  Awake, alert, no acute distress  HEENT: NC/AT/ MMM  Chest:               Clear B/L no W/R/R  Cardiac:  S1 S2   Abdomen: Soft, non-tender,  Neuro:  No facial droop, No Asterixis  SKIN:  No rashes, good skin turgor. Extremities:  3+ LE edema    Labs:       Recent Labs     20  0334 20  1023 20  0616   WBC 7.3 6.7 5.6   RBC 2.44* 2.51* 2.33*   HGB 8.3* 8.6* 7.9*   HCT 26.0* 26.9* 24.6*   .6* 107.2* 105.6*   MCH 34.0* 34.3* 33.9*   MCHC 31.9* 32.0* 32.1*   PLT 61* 87* 78*   MPV 11.6 11.5 11.5      BMP:   Recent Labs     20  0334 20  1023 20  0616   * 135 135   K 4.1 4.4  4.4 3.7  3.7    103 102   CO2 17* 19* 17*   BUN 26* 28* 29*   CREATININE 2.2* 2.3* 2.3*   GLUCOSE 263* 355* 293*   CALCIUM 7.4* 7.6* 7.5*      Phosphorus:   No results for input(s): PHOS in the last 72 hours. Magnesium:    Recent Labs     20  0616   MG 1.6     Albumin:  No results for input(s): LABALBU in the last 72 hours.   BNP:    No results found for: BNP  JANA:    No results found for: JANA  SPEP:  Lab Results   Component Value Date    PROT 7.4 2019     UPEP:   No results found for: LABPE  C3:   No results found for: C3  C4:   No results found for: C4  MPO ANCA:   No results found for: MPO  PR3 ANCA:   No results found for: PR3  Anti-GBM:   No results found for: GBMABIGG  Hep BsAg:         Lab Results   Component Value Date    HEPBSAG Negative 2019     Hep C AB:        No results found for: HEPCAB    Urinalysis/Chemistries:      Lab Results   Component Value Date    NITRU NEGATIVE 10/31/2018    COLORU YELLOW 10/31/2018    PHUR 6.0 10/31/2018    WBCUA 0-2 10/31/2018    RBCUA NONE 10/31/2018    MUCUS THREADS 10/31/2018    BACTERIA FEW 10/31/2018    LEUKOCYTESUR SMALL 10/31/2018    UROBILINOGEN 2.0 10/31/2018    BILIRUBINUR SMALL 10/31/2018    BLOODU NEGATIVE 10/31/2018    GLUCOSEU NEGATIVE 10/31/2018    KETUA TRACE 10/31/2018    AMORPHOUS URATES 10/31/2018     Urine Sodium:   No results found for: JENNIFER  Urine Potassium:  No results found for: KUR  Urine Chloride:  No results found for: CLUR  Urine Osmolarity:   Lab Results   Component Value Date    OSMOU 440 2019     Urine Protein:   No components found for: TOTALPROTEIN, URINE   Urine Creatinine:   No results found for: LABCREA  Urine Eosinophils:  No components found for: UEOS        Impression and Plan:  1. MATTIE:   -I don't see labs available from [place] [address] but according to H&P creatinine was \"normal\"  -overall stable within last few days.    -could have infection related GN vs AIN vs prerenal.  Check microscopic UA, urine eos  -for now he is definitely fluid overloaded. Will give IV lasix 40 mg x 2 doses today and monitor response    2. Mild hypokalemia: 20 meq KCl x 2 with lasix  3. Metabolic acidosis: worse. Continue po bicarb, should improve with loop diuretic. Check venous blood gas in the morning  4. Bacteremia  5. Cirrhosis  6. Alcohol use  7. DM  8. Anemia  9. Thrombocytopenia  10. Hypoma grams Mag Sulfate        Please don't hesitate to call with any questions.   Electronically signed by Mike Saleh DO on 2020 at 1:45 PM

## 2020-11-28 NOTE — PLAN OF CARE
Problem: Impaired respiratory status  Goal: Clear lung sounds  Outcome: Ongoing  Note: Spiriva Daily to improve breath sounds.

## 2020-11-29 ENCOUNTER — APPOINTMENT (OUTPATIENT)
Dept: GENERAL RADIOLOGY | Age: 55
DRG: 720 | End: 2020-11-29
Attending: PHYSICIAN ASSISTANT
Payer: COMMERCIAL

## 2020-11-29 ENCOUNTER — ANESTHESIA EVENT (OUTPATIENT)
Dept: OPERATING ROOM | Age: 55
DRG: 720 | End: 2020-11-29
Payer: COMMERCIAL

## 2020-11-29 ENCOUNTER — ANESTHESIA (OUTPATIENT)
Dept: OPERATING ROOM | Age: 55
DRG: 720 | End: 2020-11-29
Payer: COMMERCIAL

## 2020-11-29 ENCOUNTER — CONSENT FORM (OUTPATIENT)
Dept: OTOLARYNGOLOGY | Age: 55
End: 2020-11-29

## 2020-11-29 VITALS
OXYGEN SATURATION: 95 % | SYSTOLIC BLOOD PRESSURE: 116 MMHG | RESPIRATION RATE: 9 BRPM | DIASTOLIC BLOOD PRESSURE: 68 MMHG

## 2020-11-29 PROBLEM — N19 RENAL FAILURE: Status: ACTIVE | Noted: 2020-11-29

## 2020-11-29 LAB
ABO: NORMAL
ANION GAP SERPL CALCULATED.3IONS-SCNC: 10 MEQ/L (ref 8–16)
ANTIBODY SCREEN: NORMAL
BASOPHILS # BLD: 0.6 %
BASOPHILS ABSOLUTE: 0 THOU/MM3 (ref 0–0.1)
BUN BLDV-MCNC: 27 MG/DL (ref 7–22)
CALCIUM SERPL-MCNC: 7.7 MG/DL (ref 8.5–10.5)
CHLORIDE BLD-SCNC: 101 MEQ/L (ref 98–111)
CO2: 22 MEQ/L (ref 23–33)
CREAT SERPL-MCNC: 2.4 MG/DL (ref 0.4–1.2)
EOSINOPHIL # BLD: 2.2 %
EOSINOPHILS ABSOLUTE: 0.1 THOU/MM3 (ref 0–0.4)
ERYTHROCYTE [DISTWIDTH] IN BLOOD BY AUTOMATED COUNT: 14.6 % (ref 11.5–14.5)
ERYTHROCYTE [DISTWIDTH] IN BLOOD BY AUTOMATED COUNT: 56.5 FL (ref 35–45)
FOLATE: 15.2 NG/ML (ref 4.8–24.2)
GFR SERPL CREATININE-BSD FRML MDRD: 28 ML/MIN/1.73M2
GLUCOSE BLD-MCNC: 247 MG/DL (ref 70–108)
GLUCOSE BLD-MCNC: 249 MG/DL (ref 70–108)
GLUCOSE BLD-MCNC: 268 MG/DL (ref 70–108)
GLUCOSE BLD-MCNC: 269 MG/DL (ref 70–108)
GLUCOSE BLD-MCNC: 288 MG/DL (ref 70–108)
GLUCOSE BLD-MCNC: 292 MG/DL (ref 70–108)
HCT VFR BLD CALC: 23.7 % (ref 42–52)
HEMOGLOBIN: 7.5 GM/DL (ref 14–18)
IMMATURE GRANS (ABS): 0.03 THOU/MM3 (ref 0–0.07)
IMMATURE GRANULOCYTES: 0.5 %
LYMPHOCYTES # BLD: 20.1 %
LYMPHOCYTES ABSOLUTE: 1.3 THOU/MM3 (ref 1–4.8)
MAGNESIUM: 1.9 MG/DL (ref 1.6–2.4)
MCH RBC QN AUTO: 33.5 PG (ref 26–33)
MCHC RBC AUTO-ENTMCNC: 31.6 GM/DL (ref 32.2–35.5)
MCV RBC AUTO: 105.8 FL (ref 80–94)
MONOCYTES # BLD: 9.4 %
MONOCYTES ABSOLUTE: 0.6 THOU/MM3 (ref 0.4–1.3)
NUCLEATED RED BLOOD CELLS: 0 /100 WBC
PLATELET # BLD: 84 THOU/MM3 (ref 130–400)
PMV BLD AUTO: 11.2 FL (ref 9.4–12.4)
POTASSIUM SERPL-SCNC: 4.4 MEQ/L (ref 3.5–5.2)
RBC # BLD: 2.24 MILL/MM3 (ref 4.7–6.1)
RH FACTOR: NORMAL
SEG NEUTROPHILS: 67.2 %
SEGMENTED NEUTROPHILS ABSOLUTE COUNT: 4.3 THOU/MM3 (ref 1.8–7.7)
SODIUM BLD-SCNC: 133 MEQ/L (ref 135–145)
VITAMIN B-12: 1205 PG/ML (ref 211–911)
WBC # BLD: 6.4 THOU/MM3 (ref 4.8–10.8)

## 2020-11-29 PROCEDURE — 2580000003 HC RX 258: Performed by: FAMILY MEDICINE

## 2020-11-29 PROCEDURE — 99255 IP/OBS CONSLTJ NEW/EST HI 80: CPT | Performed by: OTOLARYNGOLOGY

## 2020-11-29 PROCEDURE — 86923 COMPATIBILITY TEST ELECTRIC: CPT

## 2020-11-29 PROCEDURE — 87500 VANOMYCIN DNA AMP PROBE: CPT

## 2020-11-29 PROCEDURE — 99232 SBSQ HOSP IP/OBS MODERATE 35: CPT | Performed by: INTERNAL MEDICINE

## 2020-11-29 PROCEDURE — 71046 X-RAY EXAM CHEST 2 VIEWS: CPT

## 2020-11-29 PROCEDURE — 6360000002 HC RX W HCPCS: Performed by: OTOLARYNGOLOGY

## 2020-11-29 PROCEDURE — 6360000002 HC RX W HCPCS: Performed by: FAMILY MEDICINE

## 2020-11-29 PROCEDURE — 71045 X-RAY EXAM CHEST 1 VIEW: CPT

## 2020-11-29 PROCEDURE — 99253 IP/OBS CNSLTJ NEW/EST LOW 45: CPT | Performed by: NURSE PRACTITIONER

## 2020-11-29 PROCEDURE — 3700000000 HC ANESTHESIA ATTENDED CARE: Performed by: OTOLARYNGOLOGY

## 2020-11-29 PROCEDURE — 093K8ZZ CONTROL BLEEDING IN NASAL MUCOSA AND SOFT TISSUE, VIA NATURAL OR ARTIFICIAL OPENING ENDOSCOPIC: ICD-10-PCS | Performed by: OTOLARYNGOLOGY

## 2020-11-29 PROCEDURE — 0BD38ZX EXTRACTION OF RIGHT MAIN BRONCHUS, VIA NATURAL OR ARTIFICIAL OPENING ENDOSCOPIC, DIAGNOSTIC: ICD-10-PCS | Performed by: OTOLARYNGOLOGY

## 2020-11-29 PROCEDURE — 0B9M8ZX DRAINAGE OF BILATERAL LUNGS, VIA NATURAL OR ARTIFICIAL OPENING ENDOSCOPIC, DIAGNOSTIC: ICD-10-PCS | Performed by: OTOLARYNGOLOGY

## 2020-11-29 PROCEDURE — 6370000000 HC RX 637 (ALT 250 FOR IP): Performed by: INTERNAL MEDICINE

## 2020-11-29 PROCEDURE — 2500000003 HC RX 250 WO HCPCS: Performed by: NURSE ANESTHETIST, CERTIFIED REGISTERED

## 2020-11-29 PROCEDURE — 86901 BLOOD TYPING SEROLOGIC RH(D): CPT

## 2020-11-29 PROCEDURE — 3600000012 HC SURGERY LEVEL 2 ADDTL 15MIN: Performed by: OTOLARYNGOLOGY

## 2020-11-29 PROCEDURE — 0BD78ZX EXTRACTION OF LEFT MAIN BRONCHUS, VIA NATURAL OR ARTIFICIAL OPENING ENDOSCOPIC, DIAGNOSTIC: ICD-10-PCS | Performed by: OTOLARYNGOLOGY

## 2020-11-29 PROCEDURE — 7100000001 HC PACU RECOVERY - ADDTL 15 MIN: Performed by: OTOLARYNGOLOGY

## 2020-11-29 PROCEDURE — 94640 AIRWAY INHALATION TREATMENT: CPT

## 2020-11-29 PROCEDURE — 86900 BLOOD TYPING SEROLOGIC ABO: CPT

## 2020-11-29 PROCEDURE — 2580000003 HC RX 258: Performed by: OTOLARYNGOLOGY

## 2020-11-29 PROCEDURE — 3600000002 HC SURGERY LEVEL 2 BASE: Performed by: OTOLARYNGOLOGY

## 2020-11-29 PROCEDURE — 36415 COLL VENOUS BLD VENIPUNCTURE: CPT

## 2020-11-29 PROCEDURE — 82607 VITAMIN B-12: CPT

## 2020-11-29 PROCEDURE — 82948 REAGENT STRIP/BLOOD GLUCOSE: CPT

## 2020-11-29 PROCEDURE — 2000000000 HC ICU R&B

## 2020-11-29 PROCEDURE — 31624 DX BRONCHOSCOPE/LAVAGE: CPT | Performed by: OTOLARYNGOLOGY

## 2020-11-29 PROCEDURE — 6370000000 HC RX 637 (ALT 250 FOR IP): Performed by: FAMILY MEDICINE

## 2020-11-29 PROCEDURE — 85025 COMPLETE CBC W/AUTO DIFF WBC: CPT

## 2020-11-29 PROCEDURE — 87081 CULTURE SCREEN ONLY: CPT

## 2020-11-29 PROCEDURE — 83735 ASSAY OF MAGNESIUM: CPT

## 2020-11-29 PROCEDURE — 87205 SMEAR GRAM STAIN: CPT

## 2020-11-29 PROCEDURE — 30903 CONTROL OF NOSEBLEED: CPT | Performed by: OTOLARYNGOLOGY

## 2020-11-29 PROCEDURE — 7100000000 HC PACU RECOVERY - FIRST 15 MIN: Performed by: OTOLARYNGOLOGY

## 2020-11-29 PROCEDURE — 30905 CONTROL OF NOSEBLEED: CPT | Performed by: OTOLARYNGOLOGY

## 2020-11-29 PROCEDURE — 94667 MNPJ CHEST WALL 1ST: CPT

## 2020-11-29 PROCEDURE — 6370000000 HC RX 637 (ALT 250 FOR IP): Performed by: OTOLARYNGOLOGY

## 2020-11-29 PROCEDURE — 99223 1ST HOSP IP/OBS HIGH 75: CPT | Performed by: INTERNAL MEDICINE

## 2020-11-29 PROCEDURE — 80048 BASIC METABOLIC PNL TOTAL CA: CPT

## 2020-11-29 PROCEDURE — 2709999900 HC NON-CHARGEABLE SUPPLY: Performed by: OTOLARYNGOLOGY

## 2020-11-29 PROCEDURE — 86850 RBC ANTIBODY SCREEN: CPT

## 2020-11-29 PROCEDURE — 2580000003 HC RX 258: Performed by: INTERNAL MEDICINE

## 2020-11-29 PROCEDURE — 2580000003 HC RX 258: Performed by: NURSE ANESTHETIST, CERTIFIED REGISTERED

## 2020-11-29 PROCEDURE — 88300 SURGICAL PATH GROSS: CPT

## 2020-11-29 PROCEDURE — 94760 N-INVAS EAR/PLS OXIMETRY 1: CPT

## 2020-11-29 PROCEDURE — 99233 SBSQ HOSP IP/OBS HIGH 50: CPT | Performed by: FAMILY MEDICINE

## 2020-11-29 PROCEDURE — 94660 CPAP INITIATION&MGMT: CPT

## 2020-11-29 PROCEDURE — 6370000000 HC RX 637 (ALT 250 FOR IP): Performed by: ANESTHESIOLOGY

## 2020-11-29 PROCEDURE — 82746 ASSAY OF FOLIC ACID SERUM: CPT

## 2020-11-29 PROCEDURE — 87070 CULTURE OTHR SPECIMN AEROBIC: CPT

## 2020-11-29 PROCEDURE — 6360000002 HC RX W HCPCS: Performed by: NURSE ANESTHETIST, CERTIFIED REGISTERED

## 2020-11-29 PROCEDURE — 3700000001 HC ADD 15 MINUTES (ANESTHESIA): Performed by: OTOLARYNGOLOGY

## 2020-11-29 PROCEDURE — P9016 RBC LEUKOCYTES REDUCED: HCPCS

## 2020-11-29 PROCEDURE — 87075 CULTR BACTERIA EXCEPT BLOOD: CPT

## 2020-11-29 RX ORDER — FUROSEMIDE 10 MG/ML
40 INJECTION INTRAMUSCULAR; INTRAVENOUS ONCE
Status: COMPLETED | OUTPATIENT
Start: 2020-11-29 | End: 2020-11-29

## 2020-11-29 RX ORDER — SODIUM CHLORIDE, SODIUM LACTATE, POTASSIUM CHLORIDE, CALCIUM CHLORIDE 600; 310; 30; 20 MG/100ML; MG/100ML; MG/100ML; MG/100ML
INJECTION, SOLUTION INTRAVENOUS CONTINUOUS
Status: DISCONTINUED | OUTPATIENT
Start: 2020-11-29 | End: 2020-11-29

## 2020-11-29 RX ORDER — LORAZEPAM 2 MG/ML
0.5 INJECTION INTRAMUSCULAR EVERY 10 MIN PRN
Status: COMPLETED | OUTPATIENT
Start: 2020-11-29 | End: 2020-11-29

## 2020-11-29 RX ORDER — LIDOCAINE HCL/PF 100 MG/5ML
SYRINGE (ML) INJECTION PRN
Status: DISCONTINUED | OUTPATIENT
Start: 2020-11-29 | End: 2020-11-29 | Stop reason: SDUPTHER

## 2020-11-29 RX ORDER — PROMETHAZINE HYDROCHLORIDE 25 MG/ML
12.5 INJECTION, SOLUTION INTRAMUSCULAR; INTRAVENOUS
Status: DISCONTINUED | OUTPATIENT
Start: 2020-11-29 | End: 2020-11-29 | Stop reason: HOSPADM

## 2020-11-29 RX ORDER — ONDANSETRON 2 MG/ML
4 INJECTION INTRAMUSCULAR; INTRAVENOUS EVERY 6 HOURS PRN
Status: DISCONTINUED | OUTPATIENT
Start: 2020-11-29 | End: 2020-12-08 | Stop reason: HOSPADM

## 2020-11-29 RX ORDER — SODIUM CHLORIDE 0.9 % (FLUSH) 0.9 %
10 SYRINGE (ML) INJECTION PRN
Status: DISCONTINUED | OUTPATIENT
Start: 2020-11-29 | End: 2020-12-08 | Stop reason: HOSPADM

## 2020-11-29 RX ORDER — PHENOBARBITAL SODIUM 65 MG/ML
260 INJECTION INTRAMUSCULAR
Status: DISCONTINUED | OUTPATIENT
Start: 2020-11-29 | End: 2020-12-08 | Stop reason: HOSPADM

## 2020-11-29 RX ORDER — FENTANYL CITRATE 50 UG/ML
25 INJECTION, SOLUTION INTRAMUSCULAR; INTRAVENOUS EVERY 5 MIN PRN
Status: DISCONTINUED | OUTPATIENT
Start: 2020-11-29 | End: 2020-11-29 | Stop reason: HOSPADM

## 2020-11-29 RX ORDER — PROPOFOL 10 MG/ML
INJECTION, EMULSION INTRAVENOUS PRN
Status: DISCONTINUED | OUTPATIENT
Start: 2020-11-29 | End: 2020-11-29 | Stop reason: SDUPTHER

## 2020-11-29 RX ORDER — LABETALOL 20 MG/4 ML (5 MG/ML) INTRAVENOUS SYRINGE
10 EVERY 10 MIN PRN
Status: DISCONTINUED | OUTPATIENT
Start: 2020-11-29 | End: 2020-11-29 | Stop reason: HOSPADM

## 2020-11-29 RX ORDER — SUCCINYLCHOLINE/SOD CL,ISO/PF 200MG/10ML
SYRINGE (ML) INTRAVENOUS PRN
Status: DISCONTINUED | OUTPATIENT
Start: 2020-11-29 | End: 2020-11-29 | Stop reason: SDUPTHER

## 2020-11-29 RX ORDER — OXYMETAZOLINE HYDROCHLORIDE 0.05 G/100ML
2 SPRAY NASAL 2 TIMES DAILY PRN
Status: DISCONTINUED | OUTPATIENT
Start: 2020-11-29 | End: 2020-12-01

## 2020-11-29 RX ORDER — FENTANYL CITRATE 50 UG/ML
INJECTION, SOLUTION INTRAMUSCULAR; INTRAVENOUS PRN
Status: DISCONTINUED | OUTPATIENT
Start: 2020-11-29 | End: 2020-11-29 | Stop reason: SDUPTHER

## 2020-11-29 RX ORDER — LIDOCAINE 40 MG/G
CREAM TOPICAL PRN
Status: DISCONTINUED | OUTPATIENT
Start: 2020-11-29 | End: 2020-12-08 | Stop reason: HOSPADM

## 2020-11-29 RX ORDER — MIDAZOLAM HYDROCHLORIDE 1 MG/ML
INJECTION INTRAMUSCULAR; INTRAVENOUS PRN
Status: DISCONTINUED | OUTPATIENT
Start: 2020-11-29 | End: 2020-11-29 | Stop reason: SDUPTHER

## 2020-11-29 RX ORDER — PHENOBARBITAL SODIUM 65 MG/ML
130 INJECTION INTRAMUSCULAR
Status: DISCONTINUED | OUTPATIENT
Start: 2020-11-29 | End: 2020-12-08 | Stop reason: HOSPADM

## 2020-11-29 RX ORDER — SODIUM CHLORIDE 9 MG/ML
INJECTION, SOLUTION INTRAVENOUS CONTINUOUS PRN
Status: DISCONTINUED | OUTPATIENT
Start: 2020-11-29 | End: 2020-11-29 | Stop reason: SDUPTHER

## 2020-11-29 RX ORDER — PROMETHAZINE HYDROCHLORIDE 25 MG/1
12.5 TABLET ORAL EVERY 6 HOURS PRN
Status: DISCONTINUED | OUTPATIENT
Start: 2020-11-29 | End: 2020-12-08 | Stop reason: HOSPADM

## 2020-11-29 RX ORDER — ONDANSETRON 2 MG/ML
INJECTION INTRAMUSCULAR; INTRAVENOUS PRN
Status: DISCONTINUED | OUTPATIENT
Start: 2020-11-29 | End: 2020-11-29 | Stop reason: SDUPTHER

## 2020-11-29 RX ORDER — PREDNISONE 20 MG/1
60 TABLET ORAL DAILY
Status: COMPLETED | OUTPATIENT
Start: 2020-11-29 | End: 2020-12-01

## 2020-11-29 RX ORDER — SODIUM CHLORIDE 0.9 % (FLUSH) 0.9 %
10 SYRINGE (ML) INJECTION EVERY 12 HOURS SCHEDULED
Status: DISCONTINUED | OUTPATIENT
Start: 2020-11-29 | End: 2020-12-08 | Stop reason: HOSPADM

## 2020-11-29 RX ORDER — FENTANYL CITRATE 50 UG/ML
50 INJECTION, SOLUTION INTRAMUSCULAR; INTRAVENOUS EVERY 5 MIN PRN
Status: DISCONTINUED | OUTPATIENT
Start: 2020-11-29 | End: 2020-11-29 | Stop reason: HOSPADM

## 2020-11-29 RX ORDER — FUROSEMIDE 10 MG/ML
40 INJECTION INTRAMUSCULAR; INTRAVENOUS 2 TIMES DAILY
Status: DISCONTINUED | OUTPATIENT
Start: 2020-11-29 | End: 2020-11-30

## 2020-11-29 RX ORDER — IPRATROPIUM BROMIDE AND ALBUTEROL SULFATE 2.5; .5 MG/3ML; MG/3ML
1 SOLUTION RESPIRATORY (INHALATION)
Status: DISCONTINUED | OUTPATIENT
Start: 2020-11-29 | End: 2020-11-29

## 2020-11-29 RX ORDER — AMPICILLIN AND SULBACTAM 2; 1 G/1; G/1
INJECTION, POWDER, FOR SOLUTION INTRAMUSCULAR; INTRAVENOUS PRN
Status: DISCONTINUED | OUTPATIENT
Start: 2020-11-29 | End: 2020-11-29 | Stop reason: SDUPTHER

## 2020-11-29 RX ORDER — SODIUM CHLORIDE FOR INHALATION 3 %
3 VIAL, NEBULIZER (ML) INHALATION 2 TIMES DAILY
Status: DISCONTINUED | OUTPATIENT
Start: 2020-11-29 | End: 2020-11-30

## 2020-11-29 RX ADMIN — FENTANYL CITRATE 100 MCG: 50 INJECTION, SOLUTION INTRAMUSCULAR; INTRAVENOUS at 16:48

## 2020-11-29 RX ADMIN — SODIUM CHLORIDE, POTASSIUM CHLORIDE, SODIUM LACTATE AND CALCIUM CHLORIDE: 600; 310; 30; 20 INJECTION, SOLUTION INTRAVENOUS at 23:01

## 2020-11-29 RX ADMIN — PREDNISONE 60 MG: 20 TABLET ORAL at 14:21

## 2020-11-29 RX ADMIN — SODIUM CHLORIDE 3 G: 900 INJECTION INTRAVENOUS at 23:01

## 2020-11-29 RX ADMIN — INSULIN LISPRO 3 UNITS: 100 INJECTION, SOLUTION INTRAVENOUS; SUBCUTANEOUS at 23:23

## 2020-11-29 RX ADMIN — PROPOFOL 50 MG: 10 INJECTION, EMULSION INTRAVENOUS at 17:32

## 2020-11-29 RX ADMIN — SODIUM CHLORIDE 3 G: 900 INJECTION INTRAVENOUS at 11:31

## 2020-11-29 RX ADMIN — PROPOFOL 100 MG: 10 INJECTION, EMULSION INTRAVENOUS at 16:48

## 2020-11-29 RX ADMIN — LORAZEPAM 0.5 MG: 2 INJECTION INTRAMUSCULAR; INTRAVENOUS at 19:30

## 2020-11-29 RX ADMIN — ONDANSETRON HYDROCHLORIDE 4 MG: 4 INJECTION, SOLUTION INTRAMUSCULAR; INTRAVENOUS at 17:05

## 2020-11-29 RX ADMIN — FOLIC ACID 1 MG: 1 TABLET ORAL at 10:44

## 2020-11-29 RX ADMIN — LORAZEPAM 0.5 MG: 2 INJECTION INTRAMUSCULAR; INTRAVENOUS at 19:55

## 2020-11-29 RX ADMIN — AMPICILLIN SODIUM AND SULBACTAM SODIUM 3 G: 2; 1 INJECTION, POWDER, FOR SOLUTION INTRAMUSCULAR; INTRAVENOUS at 17:01

## 2020-11-29 RX ADMIN — MIDAZOLAM HYDROCHLORIDE 2 MG: 1 INJECTION, SOLUTION INTRAMUSCULAR; INTRAVENOUS at 16:48

## 2020-11-29 RX ADMIN — ROSUVASTATIN CALCIUM 20 MG: 20 TABLET, FILM COATED ORAL at 10:43

## 2020-11-29 RX ADMIN — SODIUM BICARBONATE 1300 MG: 650 TABLET ORAL at 10:44

## 2020-11-29 RX ADMIN — Medication 180 MG: at 16:48

## 2020-11-29 RX ADMIN — AMPICILLIN SODIUM 2 G: 2 INJECTION, POWDER, FOR SOLUTION INTRAMUSCULAR; INTRAVENOUS at 05:53

## 2020-11-29 RX ADMIN — SODIUM CHLORIDE: 9 INJECTION, SOLUTION INTRAVENOUS at 16:43

## 2020-11-29 RX ADMIN — POTASSIUM CHLORIDE 20 MEQ: 1500 TABLET, EXTENDED RELEASE ORAL at 10:48

## 2020-11-29 RX ADMIN — FUROSEMIDE 40 MG: 10 INJECTION, SOLUTION INTRAMUSCULAR; INTRAVENOUS at 10:39

## 2020-11-29 RX ADMIN — PROPOFOL 50 MG: 10 INJECTION, EMULSION INTRAVENOUS at 17:24

## 2020-11-29 RX ADMIN — OXYCODONE HYDROCHLORIDE AND ACETAMINOPHEN 1 TABLET: 7.5; 325 TABLET ORAL at 10:51

## 2020-11-29 RX ADMIN — LATANOPROST 1 DROP: 50 SOLUTION OPHTHALMIC at 23:21

## 2020-11-29 RX ADMIN — INSULIN GLARGINE 35 UNITS: 100 INJECTION, SOLUTION SUBCUTANEOUS at 23:23

## 2020-11-29 RX ADMIN — SODIUM CHLORIDE SOLN NEBU 3% 3 ML: 3 NEBU SOLN at 12:52

## 2020-11-29 RX ADMIN — DULOXETINE HYDROCHLORIDE 60 MG: 60 CAPSULE, DELAYED RELEASE ORAL at 10:45

## 2020-11-29 RX ADMIN — SODIUM BICARBONATE 1300 MG: 650 TABLET ORAL at 14:22

## 2020-11-29 RX ADMIN — Medication 100 MG: at 10:44

## 2020-11-29 RX ADMIN — Medication 100 MG: at 16:48

## 2020-11-29 RX ADMIN — INSULIN LISPRO 4 UNITS: 100 INJECTION, SOLUTION INTRAVENOUS; SUBCUTANEOUS at 11:40

## 2020-11-29 RX ADMIN — AMPICILLIN SODIUM 2 G: 2 INJECTION, POWDER, FOR SOLUTION INTRAMUSCULAR; INTRAVENOUS at 03:01

## 2020-11-29 RX ADMIN — AMLODIPINE BESYLATE 10 MG: 10 TABLET ORAL at 10:43

## 2020-11-29 RX ADMIN — INSULIN HUMAN 6 UNITS: 100 INJECTION, SOLUTION PARENTERAL at 20:37

## 2020-11-29 RX ADMIN — LORAZEPAM 0.5 MG: 2 INJECTION INTRAMUSCULAR; INTRAVENOUS at 18:25

## 2020-11-29 RX ADMIN — LORAZEPAM 0.5 MG: 2 INJECTION INTRAMUSCULAR; INTRAVENOUS at 19:00

## 2020-11-29 RX ADMIN — IPRATROPIUM BROMIDE AND ALBUTEROL SULFATE 1 AMPULE: .5; 3 SOLUTION RESPIRATORY (INHALATION) at 12:52

## 2020-11-29 RX ADMIN — HYDRALAZINE HYDROCHLORIDE 25 MG: 25 TABLET, FILM COATED ORAL at 05:01

## 2020-11-29 RX ADMIN — SODIUM CHLORIDE, PRESERVATIVE FREE 10 ML: 5 INJECTION INTRAVENOUS at 22:00

## 2020-11-29 RX ADMIN — TAMSULOSIN HYDROCHLORIDE 0.4 MG: 0.4 CAPSULE ORAL at 10:43

## 2020-11-29 RX ADMIN — SODIUM CHLORIDE, PRESERVATIVE FREE 10 ML: 5 INJECTION INTRAVENOUS at 10:42

## 2020-11-29 ASSESSMENT — PAIN SCALES - GENERAL
PAINLEVEL_OUTOF10: 0
PAINLEVEL_OUTOF10: 8
PAINLEVEL_OUTOF10: 0
PAINLEVEL_OUTOF10: 0
PAINLEVEL_OUTOF10: 8
PAINLEVEL_OUTOF10: 0

## 2020-11-29 ASSESSMENT — PULMONARY FUNCTION TESTS
PIF_VALUE: 32
PIF_VALUE: 29
PIF_VALUE: 30
PIF_VALUE: 1
PIF_VALUE: 26
PIF_VALUE: 26
PIF_VALUE: 29
PIF_VALUE: 26
PIF_VALUE: 30
PIF_VALUE: 17
PIF_VALUE: 24
PIF_VALUE: 27
PIF_VALUE: 31
PIF_VALUE: 5
PIF_VALUE: 26
PIF_VALUE: 34
PIF_VALUE: 18
PIF_VALUE: 19
PIF_VALUE: 32
PIF_VALUE: 28
PIF_VALUE: 27
PIF_VALUE: 28
PIF_VALUE: 26
PIF_VALUE: 29
PIF_VALUE: 26
PIF_VALUE: 15
PIF_VALUE: 2
PIF_VALUE: 27
PIF_VALUE: 26
PIF_VALUE: 15
PIF_VALUE: 31
PIF_VALUE: 26
PIF_VALUE: 27
PIF_VALUE: 1
PIF_VALUE: 35
PIF_VALUE: 29
PIF_VALUE: 31
PIF_VALUE: 33
PIF_VALUE: 29
PIF_VALUE: 15
PIF_VALUE: 0
PIF_VALUE: 30
PIF_VALUE: 22
PIF_VALUE: 26
PIF_VALUE: 31
PIF_VALUE: 26
PIF_VALUE: 26
PIF_VALUE: 28
PIF_VALUE: 26
PIF_VALUE: 9
PIF_VALUE: 30
PIF_VALUE: 32
PIF_VALUE: 29
PIF_VALUE: 24
PIF_VALUE: 2
PIF_VALUE: 33
PIF_VALUE: 33
PIF_VALUE: 31
PIF_VALUE: 29
PIF_VALUE: 26
PIF_VALUE: 33
PIF_VALUE: 31
PIF_VALUE: 4
PIF_VALUE: 0
PIF_VALUE: 3
PIF_VALUE: 30

## 2020-11-29 ASSESSMENT — PAIN DESCRIPTION - LOCATION: LOCATION: NECK

## 2020-11-29 ASSESSMENT — PAIN DESCRIPTION - ORIENTATION: ORIENTATION: MID

## 2020-11-29 ASSESSMENT — PAIN DESCRIPTION - FREQUENCY: FREQUENCY: CONTINUOUS

## 2020-11-29 ASSESSMENT — PAIN - FUNCTIONAL ASSESSMENT: PAIN_FUNCTIONAL_ASSESSMENT: ACTIVITIES ARE NOT PREVENTED

## 2020-11-29 ASSESSMENT — PAIN DESCRIPTION - PROGRESSION: CLINICAL_PROGRESSION: NOT CHANGED

## 2020-11-29 ASSESSMENT — PAIN DESCRIPTION - DESCRIPTORS: DESCRIPTORS: SHARP

## 2020-11-29 ASSESSMENT — PAIN DESCRIPTION - PAIN TYPE: TYPE: CHRONIC PAIN

## 2020-11-29 ASSESSMENT — PAIN DESCRIPTION - ONSET: ONSET: ON-GOING

## 2020-11-29 NOTE — PROGRESS NOTES
COMPARISON: Chest x-ray 11/29/2020. TECHNIQUE: AP upright view of the chest and a lateral view were obtained. FINDINGS: The heart size is at the upper limits of normal. This is stable. The mediastinum is not widened. On the lateral view, there is blunting of both costophrenic angles consistent with small bilateral pleural effusions. There is some fluid along the minor fissure. There is mild interstitial infiltrates in the mid and lower lung zones. This was not present on a chest CT dated 10/31/2018. The pulmonary vessels are prominent. Mild pulmonary edema versus interstitial infiltrates with very small bilateral pleural effusions. **This report has been created using voice recognition software. It may contain minor errors which are inherent in voice recognition technology. ** Final report electronically signed by Dr. Mason Yee on 11/29/2020 2:27 PM    Xr Chest (2 Vw)    Result Date: 11/28/2020  PROCEDURE: XR CHEST (2 VW) CLINICAL INFORMATION: rales. Hypertension. Abnormal lung sounds. COMPARISON: Chest x-ray 3/19/2019. Chest CT 3/18/2019 TECHNIQUE: PA and lateral views the chest. FINDINGS: The heart size is normal.  The mediastinum is not widened. There is some mild thickening along the minor fissure. There are no focal infiltrates. There are no effusions. There is some mild prominence of the interstitium in the lung bases. The pulmonary vascularity is normal. There is a new compression fracture of an upper thoracic vertebral body. This is age indeterminate. 1. Mild prominence of the basilar interstitium which can represent some fibrotic changes versus early pulmonary edema. 2. New age indeterminate compression fracture of an upper thoracic vertebral body. **This report has been created using voice recognition software. It may contain minor errors which are inherent in voice recognition technology. ** Final report electronically signed by Dr. Mason Yee on 11/28/2020 8:47 AM    Xr Chest Portable    Result Date: 11/29/2020  PROCEDURE: XR CHEST PORTABLE CLINICAL INFORMATION: hypoxia. COPD. Asthma. Hypertension. COMPARISON: Chest x-ray 11/28/2020. TECHNIQUE: AP upright view of the chest. FINDINGS: There is stable mild cardiomegaly. The mediastinum is not widened. There is increased density in the pulmonary interstitium in the left mid and lower lung zones. This is also present in the right lung base. The pulmonary vessels are prominent. There are no pleural effusions. 1. Engorged pulmonary vessels. 2. Prominent lung markings in the mid and lower lung zones. This can represent early pulmonary edema. Interstitial infiltrates are not excluded. **This report has been created using voice recognition software. It may contain minor errors which are inherent in voice recognition technology. ** Final report electronically signed by Dr. Rayne Lundborg on 11/29/2020 8:39 AM    Assessment//Plan           Hospital Problems           Last Modified POA    [facility]2020 Yes        Assessment & Plan     The patient's history and these physical findings, I recommended to the patient that we proceed to the operating room sometime this afternoon to perform a flexible bronchoscopy with bronchoalveolar lavage to remove as much of the aspirated blood and oral secretions as possible and take cultures that may guide further antibiotic therapy. In addition I recommended that I perform a nasal endoscopy to look for sources of bleeding and to cauterize them followed by repacking with hopes of controlling his epistaxis as definitively as possible. I had his nurse hold the speaker phone in his room to go over the indications benefits limitations and risks of proceeding in this manner to his satisfaction.   The risks I described include but not limited to continued nasal bleeding, continued tendency towards aspiration pneumonia, need for further surgical treatment of both, and medical complications from his medical problems including congestive heart failure and renal failure among others. This was the basis of his informed consent. I have contacted the operating room to post this case. I will verify the consent process through the EMR and will see the patient in holding to contact his family while at his bedside. Lakesha Hubbard.  Peg Roland MD  Cell: [phone]    Electronically signed by Althea Elam MD on 11/29/20 at 2:53 PM EST

## 2020-11-29 NOTE — PROGRESS NOTES
Renal Progress Note  [facility]    Patient :  Sidney Castillo; 48 y.o. MRN# [id number]  Location:  [facility]18/018-A  Attending:  Arnold Guzman MD  Admit Date:  11/25/2020   Hospital Day: 4      Subjective:     Nephrology is following the patient for MATTIE. Patient seen and examined. Had severe nosebleed overnight. He was seen by ENT and had packing. Likely aspirated some blood. His breathing is worse. CXR shows pulmonary edema. Outpatient Medications:     Medications Prior to Admission: MIRTAZAPINE PO, Take 1 tablet by mouth nightly  pioglitazone (ACTOS) 15 MG tablet, TAKE ONE TABLET EACH DAY TO HELP CONTROL BLOOD SUGAR.  spironolactone (ALDACTONE) 50 MG tablet, Take 1 tablet by mouth daily  thiamine 100 MG tablet, Take 1 tablet by mouth daily  insulin glargine (LANTUS) 100 UNIT/ML injection vial, Inject 35 Units into the skin nightly (Patient taking differently: Inject 80 Units into the skin nightly )  aspirin 81 MG tablet, Take 81 mg by mouth daily  DULoxetine (CYMBALTA) 60 MG extended release capsule, Take 60 mg by mouth daily  rosuvastatin (CRESTOR) 20 MG tablet, Take 20 mg by mouth daily  folic acid (FOLVITE) 1 MG tablet, Take 1 mg by mouth daily  penicillin v potassium (VEETID) 500 MG tablet, Take 500 mg by mouth 4 times daily  ondansetron (ZOFRAN) 4 MG tablet, Take 4 mg by mouth every 8 hours as needed for Nausea or Vomiting  tamsulosin (FLOMAX) 0.4 MG capsule, Take 0.4 mg by mouth daily  Blood Glucose Monitoring Suppl (PRODIGY VOICE BLOOD GLUCOSE) w/Device KIT, by NOT APPLICABLE route  Elastic Bandages & Supports (MEDICAL COMPRESSION STOCKINGS) MISC, Provide jopstock waist-high compression stockings. To be worn while ambulating to prevent syncope. Thigh high insufficient.  Dx Code I95.1  TRUETRACK TEST strip, TEAST UP TO 4 TIMES A DAY AS INSTRUCTED  Insulin Pen Needle (ULTICARE MICRO PEN NEEDLES) 31G X 6 MM MISC, by NOT APPLICABLE route  ULTICARE MINI PEN NEEDLES 31G X 6 MM MISC, USE TO INJECT INSULINS PER INSTRUCTION UP TO 3 TIMES A DAY  B-D UF III MINI PEN NEEDLES 31G X 5 MM MISC, USE TO INJECT INSULINS PER INSTRUCTION UP TO 3 TIMES A DAY  TRUEPLUS LANCETS 28G MISC, TEST AS INSTRUCTED UP TO 4 TIMES A DAY  metFORMIN (GLUCOPHAGE) 500 MG tablet, TAKE ONE TABLET TWO (2) TIMES A DAY WITH MEALS TO HELP CONTROL BLOOD SUGAR. TAKE WITH FOOD  oxyCODONE-acetaminophen (PERCOCET) 7.5-325 MG per tablet, TAKE ONE TABLET UP TO EVERY EIGHT (8) HOURS WHEN NEEDED TO HELP CONTROL PAIN.  MAY CAUSE DROWSINESS  tiotropium (SPIRIVA) 18 MCG inhalation capsule, Inhale 1 capsule into the lungs daily  latanoprost (XALATAN) 0.005 % ophthalmic solution, Place 1 drop into both eyes nightly  tadalafil (CIALIS) 20 MG tablet, Take 20 mg by mouth as needed for Erectile Dysfunction  albuterol sulfate  (90 Base) MCG/ACT inhaler, Inhale 2 puffs into the lungs every 6 hours as needed for Wheezing    Current Medications:     Scheduled Meds:    furosemide  40 mg Intravenous BID    sodium chloride (Inhalant)  3 mL Nebulization BID    ampicillin-sulbactam  3 g Intravenous Q6H    predniSONE  60 mg Oral Daily    insulin glargine  35 Units Subcutaneous Nightly    [Held by provider] aspirin  81 mg Oral Daily    DULoxetine  60 mg Oral Daily    folic acid  1 mg Oral Daily    latanoprost  1 drop Both Eyes Nightly    mirtazapine  15 mg Oral Nightly    rosuvastatin  20 mg Oral Daily    [Held by provider] spironolactone  50 mg Oral Daily    tamsulosin  0.4 mg Oral Daily    thiamine  100 mg Oral Daily    sodium chloride flush  10 mL Intravenous 2 times per day    insulin lispro  0-12 Units Subcutaneous TID     insulin lispro  0-6 Units Subcutaneous Nightly    tiotropium  2 puff Inhalation Daily    amLODIPine  10 mg Oral Daily    hydrALAZINE  25 mg Oral 3 times per day    sodium bicarbonate  1,300 mg Oral TID     Continuous Infusions:    dextrose       PRN Meds:  lidocaine, oxymetazoline, diphenhydrAMINE, albuterol sulfate HFA, sodium chloride flush, acetaminophen **OR** acetaminophen, polyethylene glycol, promethazine **OR** ondansetron, potassium chloride **OR** potassium alternative oral replacement **OR** potassium chloride, glucose, dextrose, glucagon (rDNA), dextrose, oxyCODONE-acetaminophen    Input/Output:       I/O last 3 completed shifts: In: 2673 [P.O.:1610; I.V.:1063]  Out: 1700 [Urine:1700]. Patient Vitals for the past 96 hrs (Last 3 readings):   Weight   20 0447 (!) 357 lb 4.8 oz (162.1 kg)   20 0304 (!) 353 lb 1.6 oz (160.2 kg)   20 0316 (!) 347 lb 4.8 oz (157.5 kg)       Vital Signs:   Temperature:  Temp: 97.8 °F (36.6 °C)  TMax:   Temp (24hrs), Av.9 °F (36.6 °C), Min:97 °F (36.1 °C), Max:98.4 °F (36.9 °C)    Respirations:  Resp: 18  Pulse:   Pulse: 101  BP:    BP: 124/60  BP Range: Systolic (03IUS), NVB:468 , Min:102 , XET:534       Diastolic (61VQP), UET:76, Min:53, Max:72      Physical Examination:     General:  Awake, alert, no acute distress  HEENT: NC/AT/ MMM  Chest:              Tachypneic, B/L rales and wheezes  Cardiac:  S1 S2   Abdomen: Soft, non-tender,  Neuro:  No facial droop, No Asterixis  SKIN:  No rashes, good skin turgor. Extremities:  3+ LE edema    Labs:       Recent Labs     20  1023 20  0616 20  0946   WBC 6.7 5.6 6.4   RBC 2.51* 2.33* 2.24*   HGB 8.6* 7.9* 7.5*   HCT 26.9* 24.6* 23.7*   .2* 105.6* 105.8*   MCH 34.3* 33.9* 33.5*   MCHC 32.0* 32.1* 31.6*   PLT 87* 78* 84*   MPV 11.5 11.5 11.2      BMP:   Recent Labs     20  1023 20  0616 20  0946    135 133*   K 4.4  4.4 3.7  3.7 4.4    102 101   CO2 19* 17* 22*   BUN 28* 29* 27*   CREATININE 2.3* 2.3* 2.4*   GLUCOSE 355* 293* 268*   CALCIUM 7.6* 7.5* 7.7*      Phosphorus:   No results for input(s): PHOS in the last 72 hours.   Magnesium:    Recent Labs     20  0616 20  0946   MG 1.6 1.9     Albumin:  No results for input(s): LABALBU in the last questions.   Electronically signed by Thad Rao DO on 11/29/2020 at 11:17 AM

## 2020-11-29 NOTE — BRIEF OP NOTE
Brief Postoperative Note      Patient: Luis Longo  YOB: 1965  MRN: [id number]    Date of Procedure: 11/29/2020    Pre-Op Diagnosis: Epistaxis    Post-Op Diagnosis: Same and clinical coagulopathy; alcoholism; COPD; EDAC; pneumonia       Procedure(s): FLEXIBLE BRONCHOSCOPY WITH BRONCHOALVEOLAR LAVAGE WITH CULTURES. NASAL ENDOSCOPY WITH POSSIBLE CAUTERY ADN NASAL PACKING    Surgeon(s):  Yulissa Durant MD    Assistant:  Surgical Assistant: Tammy Foote    Anesthesia: General    Estimated Blood Loss (mL): less than 50     Complications: None    Specimens:   ID Type Source Tests Collected by Time Destination   1 : Bilateral Bronchoelveolar Lavage Body Fluid Lung GRAM STAIN, CULTURE, AEROBIC Yulissa Durant MD 11/29/2020 1728    A : Nasal Foreign Body Hardware [facility] Yulissa Durant MD 11/29/2020 1730        Implants:  * No implants in log *      Drains:   NG/OG/NJ/NE Tube Orogastric 18 fr Center mouth (Active)       Findings: 1. Blood obstructing both mainstem and multiple lobar/segmental bronchi; thoroughly cleaned out  2. Bronchoalveolar lavage for cultures taken from both mainstem  3. Numerous bleeding sites in left nasal airway detected and coagulated; thrombin paste instilled into left nasal airway with Merisel Nair nasal pack  4. Needed BiPAP in recovery to sustain oxygenation; needed Ativan to tolerate BiPAP  5.   According to wife, patient only stopped alcohol between 3 and 4 days ago so may be going into DTs    Electronically signed by Yulissa Durant MD on 11/29/2020 at 6:45 PM

## 2020-11-29 NOTE — CONSULTS
NEEDLES) 31G X 6 MM MISC by NOT APPLICABLE route    Historical Provider, MD   ULTICARE MINI PEN NEEDLES 31G X 6 MM MISC USE TO INJECT INSULINS PER INSTRUCTION UP TO 3 TIMES A DAY 4/15/19   Historical Provider, MD MORRISON UF III MINI PEN NEEDLES 31G X 5 MM MISC USE TO INJECT INSULINS PER INSTRUCTION UP TO 3 TIMES A DAY 6/12/19   Historical Provider, MD   TRUEPLUS LANCETS 28G MISC TEST AS INSTRUCTED UP TO 4 TIMES A DAY 4/19/19   Historical Provider, MD   metFORMIN (GLUCOPHAGE) 500 MG tablet TAKE ONE TABLET TWO (2) TIMES A DAY WITH MEALS TO HELP CONTROL BLOOD SUGAR. TAKE WITH FOOD 6/17/19   Historical Provider, MD   oxyCODONE-acetaminophen (PERCOCET) 7.5-325 MG per tablet TAKE ONE TABLET UP TO EVERY EIGHT (8) HOURS WHEN NEEDED TO HELP CONTROL PAIN.  MAY CAUSE DROWSINESS 6/17/19   Historical Provider, MD   tiotropium (SPIRIVA) 18 MCG inhalation capsule Inhale 1 capsule into the lungs daily 3/27/19   Vishal Alexandre MD   latanoprost (XALATAN) 0.005 % ophthalmic solution Place 1 drop into both eyes nightly 3/26/19   Vishal Alexandre MD   tadalafil (CIALIS) 20 MG tablet Take 20 mg by mouth as needed for Erectile Dysfunction    Historical Provider, MD   albuterol sulfate  (90 Base) MCG/ACT inhaler Inhale 2 puffs into the lungs every 6 hours as needed for Wheezing    Historical Provider, MD        furosemide (LASIX) injection 40 mg, Once  lidocaine (LMX) 4 % cream, PRN  oxymetazoline (AFRIN) 0.05 % nasal spray 2 spray, BID PRN  furosemide (LASIX) injection 40 mg, BID  sodium chloride (Inhalant) 3 % nebulizer solution 3 mL, BID  potassium chloride (KLOR-CON M) extended release tablet 20 mEq, BID WC  diphenhydrAMINE (BENADRYL) injection 25 mg, Q6H PRN  ampicillin 2 g ivpb mini bag, 6 times per day  insulin glargine (LANTUS) injection vial 35 Units, Nightly  albuterol sulfate  (90 Base) MCG/ACT inhaler 2 puff, Q6H PRN  [Held by provider] aspirin EC tablet 81 mg, Daily  DULoxetine (CYMBALTA) extended release capsule 60 mg, Daily  folic acid (FOLVITE) tablet 1 mg, Daily  latanoprost (XALATAN) 0.005 % ophthalmic solution 1 drop, Nightly  mirtazapine (REMERON) tablet 15 mg, Nightly  rosuvastatin (CRESTOR) tablet 20 mg, Daily  [Held by provider] spironolactone (ALDACTONE) tablet 50 mg, Daily  tamsulosin (FLOMAX) capsule 0.4 mg, Daily  vitamin B-1 (THIAMINE) tablet 100 mg, Daily  sodium chloride flush 0.9 % injection 10 mL, 2 times per day  sodium chloride flush 0.9 % injection 10 mL, PRN  acetaminophen (TYLENOL) tablet 650 mg, Q6H PRN    Or  acetaminophen (TYLENOL) suppository 650 mg, Q6H PRN  polyethylene glycol (GLYCOLAX) packet 17 g, Daily PRN  promethazine (PHENERGAN) tablet 12.5 mg, Q6H PRN    Or  ondansetron (ZOFRAN) injection 4 mg, Q6H PRN  potassium chloride (KLOR-CON M) extended release tablet 40 mEq, PRN    Or  potassium bicarb-citric acid (EFFER-K) effervescent tablet 40 mEq, PRN    Or  potassium chloride 10 mEq/100 mL IVPB (Peripheral Line), PRN  glucose (GLUTOSE) 40 % oral gel 15 g, PRN  dextrose 50 % IV solution, PRN  glucagon (rDNA) injection 1 mg, PRN  dextrose 5 % solution, PRN  insulin lispro (HUMALOG) injection vial 0-12 Units, TID WC  insulin lispro (HUMALOG) injection vial 0-6 Units, Nightly  tiotropium (SPIRIVA RESPIMAT) 2.5 MCG/ACT inhaler 2 puff, Daily  amLODIPine (NORVASC) tablet 10 mg, Daily  hydrALAZINE (APRESOLINE) tablet 25 mg, 3 times per day  oxyCODONE-acetaminophen (PERCOCET) 7.5-325 MG per tablet 1 tablet, Q8H PRN  sodium bicarbonate tablet 1,300 mg, TID        Allergies   Adhesive tape    Social History     Social History     Tobacco History     Smoking Status  Current Every Day Smoker Smoking Frequency  1 pack/day for 30 years (30 pk yrs) Smoking Tobacco Type  Cigarettes    Smokeless Tobacco Use  Never Used    Tobacco Comment  Currently smoking electronic cigarettes          Alcohol History     Alcohol Use Status  Yes Comment  8   25oz cans nightly          Drug Use     Drug Use Status  Yes Types  Marijuana Comment  medical marijuana 2 times weekly last used 3 weeks ago          Sexual Activity     Sexually Active  Not Asked                Family History     Family History   Problem Relation Age of Onset    Heart Attack Father     Colon Cancer Neg Hx     Colon Polyps Neg Hx        Review of Systems   Review of Systems   Noncontributory except as stated in history of present illness    Physical Exam   BP (!) 154/72   Pulse 118   Temp 98 °F (36.7 °C) (Oral)   Resp 18   Wt (!) 357 lb 4.8 oz (162.1 kg)   SpO2 95%   BMI 44.66 kg/m²      Physical Exam   General physical exam the patient is a morbidly obese man with a BMI of 44.6. He was breathing primarily through his mouth and had a slow flow of relatively dark blood from his left nostril. None from the right. His right nostril appeared clear. He had blood staining of his tongue of his pharynx. Periodic coughing would bring up relatively bright red blood from his lungs in the form of a mixture with saliva and respiratory secretions. I set up a suction canister for him to allow me to suction him and for him to use the Yankauer to help him bring up posterior secretions. I leaning forward in preparation for my nasal packing. On auscultation the patient's lungs were diffusely abnormal for loud polyphonic exhalational wheezing and rhonchi. His heart had distant tones but appeared to have a regular rate and rhythm with occasional tachycardia. No murmur or gallops were heard. Procedure: Control of epistaxis with left-sided nasal packing  After getting the patient's verbal consent for me to proceed I sprayed his left nostril with atomized Afrin followed by atomized 4% lidocaine. I then obtained a 10 cm Nair Merocel nasal pack and coated it with surgical lube. I then passed it through his left nasal airway until the sponge was all the way in so that the tip was at the nostril.   I then instilled additional amounts of Afrin to promote vasoconstriction. Labs    CBC:  Recent Labs     11/27/20  1023 11/28/20  0616   WBC 6.7 5.6   RBC 2.51* 2.33*   HGB 8.6* 7.9*   HCT 26.9* 24.6*   .2* 105.6*   PLT 87* 78*     CHEMISTRIES:  Recent Labs     11/27/20  1023 11/28/20  0616    135   K 4.4  4.4 3.7  3.7    102   CO2 19* 17*   BUN 28* 29*   CREATININE 2.3* 2.3*   GLUCOSE 355* 293*   MG  --  1.6     PT/INR:No results for input(s): PROTIME, INR in the last 72 hours. APTT:No results for input(s): APTT in the last 72 hours. LIVER PROFILE:No results for input(s): AST, ALT, BILIDIR, BILITOT, ALKPHOS in the last 72 hours. Imaging/Diagnostics   Xr Chest (2 Vw)    Result Date: 11/28/2020  1. Mild prominence of the basilar interstitium which can represent some fibrotic changes versus early pulmonary edema. 2. New age indeterminate compression fracture of an upper thoracic vertebral body. **This report has been created using voice recognition software. It may contain minor errors which are inherent in voice recognition technology. ** Final report electronically signed by Dr. Mason Yee on 11/28/2020 8:47 AM    Us Renal Complete    Result Date: 11/26/2020  1. Possible left renal cyst but otherwise unremarkable renal ultrasound. 2. Unremarkable urinary bladder. Final report electronically signed by Dr. Itzel Joyner on 11/26/2020 5:12 PM    Xr Chest Portable    Result Date: 11/29/2020  1. Engorged pulmonary vessels. 2. Prominent lung markings in the mid and lower lung zones. This can represent early pulmonary edema. Interstitial infiltrates are not excluded. **This report has been created using voice recognition software. It may contain minor errors which are inherent in voice recognition technology. ** Final report electronically signed by Dr. Mason Yee on 11/29/2020 8:39 AM      Assessment      Hospital Problems           Last Modified POA    MATTIE (acute kidney injury) ([facility] [address] 11/25/2020 Yes        I observed the patient for approximately 30 minutes following the packing looking posteriorly with a headlight and having him lean forward with no additional bleeding for the present. I did observe him bring up additional amounts of fresh looking blood in his sputum prompted me to order a stat portable chest x-ray. The reason to make it portable was to avoid having the patient go through multiple postural changes while his nasal packing is still very fresh and is hemostasis potentially tenuous. His chest x-ray was very abnormal with vascular congestion and the potential for lingular and right middle lobe infiltrates though they were not overtly called on the radiology read. The patient has new onset epistaxis with his only likely risk factor being his renal failure and platelet dysfunction. His hypertension may also be a cofactor although it seems as diastolic values have been within normal limits and his systolics have not been particularly high. Plan   1. I will make the patient n.p.o. with the possibility that he will need a therapeutic bronchoscopy sometime later today to remove aspirated blood and oral secretions. If his wheezing clears and a PA and lateral chest x-ray later this afternoon shows no infiltrates, we may be able to avoid this. I have taken the liberty to order nebulizer therapy and chest physiotherapy. The 3% saline being used while hypertonic is a very small volume so hopefully will not add meaningfully to his salt burden relative to his MATTIE. If that is a problem I would accept changing it to 0.9% saline but the effect of the hypertonic saline is to produce a therapeutic bronchorrhea which will help wash his tracheobronchial tree and may avoid the need for general anesthetic and bronchoscopy. I believe he needs empiric antibiotic therapy for blood aspiration. I will start Unasyn every 6 hours with his first dose stat.     Please do not hesitate to contact me with any questions comments or course corrections as you wish. Sanjay Shay.  Jose Fulton MD  Cell: [phone]    Electronically signed by Pasha Plunkett MD on 11/29/20 at 9:30 AM EST

## 2020-11-29 NOTE — PROGRESS NOTES
Pt arrives to the pre-op holding area. Nasal swabbing is performed one right nostril, left has packing in so it can't be done. Pt's temperature is 98.2°F. Pt's tele cords from the floor are placed in a bag and will go with him to PACU.

## 2020-11-29 NOTE — ANESTHESIA POSTPROCEDURE EVALUATION
Department of Anesthesiology  Postprocedure Note    Patient: Mariama Cooper  MRN: [id number]  YOB: 1965  Date of evaluation: 11/29/2020  Time:  6:36 PM     Procedure Summary     Date:  11/29/20 Room / Location:  [address]    Anesthesia Start:  1642 Anesthesia Stop:  1274    Procedure:  FLEXIBLE BRONCHOSCOPY WITH BRONCHOALVEOLAR LAVAGE WITH CULTURES. NASAL ENDOSCOPY WITH POSSIBLE CAUTERY ADN NASAL PACKING (Bilateral ) Diagnosis:  (Epistaxis)    Surgeon:  Melissa Aleman MD Responsible Provider:  Medardo Silva DO    Anesthesia Type:  general ASA Status:  3 - Emergent          Anesthesia Type: No value filed. Rae Phase I:      Rae Phase II:      Last vitals: Reviewed and per EMR flowsheets. Anesthesia Post Evaluation    Patient location during evaluation: PACU  Patient participation: complete - patient participated  Level of consciousness: responsive to verbal stimuli  Airway patency: patent  Nausea & Vomiting: no vomiting and no nausea  Cardiovascular status: hemodynamically stable  Respiratory status: BIPAP  Hydration status: stable  Comments: Going to monitored floor with BiPap.

## 2020-11-29 NOTE — PROGRESS NOTES
OR called about COVID test needed prior to procedure. Noted patient had negative COVID test at [facility] on 11/22/20 on transfer paperwork.

## 2020-11-29 NOTE — FLOWSHEET NOTE
11/29/20 0843   Provider Notification   Reason for Communication Evaluate   Provider Name Dr. Sd Saenz    Provider Notification Physician   Method of Communication Call   Response See orders   Notification Time 0740     Consult for epistaxis.  See new orders

## 2020-11-29 NOTE — PROGRESS NOTES
Hospitalist Progress Note    Patient:  Radha Mercado      Unit/Bed:6K-18/018-A    YOB: 1965    MRN: [id number]       Acct: [de-identified]     PCP: MEGHAN Payne CNP    Date of Admission: 11/25/2020    Assessment/Plan:    Enterococcus Bacteremia / Left sided pneumonia  - blood cx sensitive to ampicillin --> vanc/zosyn- cefepime abx switched over to amp. ID consulted for G+ bacteremia  - continue abx, some rash after amp dose yesterday, b/l upper arms. Benadryl given. Doing well. Continue amp and monitor  - repeat blood cultures NTD    MATTIE/Metabolic Acidosis  - IVF, Likely 2/2 vaco/zosyn vs sepsis vs diabetic nephropathy  - nephro consulted and following  - bicarb     Epistaxis  - overnight pt had multiple episodes of nose bleeding  - has had some bleeds in the past, but last night was worse  - nasal packing, ENT consult. Hold ASA. CBC stat. - sounds congested on exam. 40mg iv lasix ordered. - chest xray ordered. Will need to monitor for aspiration. Chronic pain  - continue home regimen  -pt/ot    DM II/PAD  - continue home meds, Hyperglycemia as long acting insulin was held on admit  - resume at lower dose of 35u and titrate up as needed. - ISS, DM diet, hypoglycemia protocol. Acute Hypoxic Respiratory Failure 2/2 Diastolic CHF exacerbation  - overnight found to be hypoxic, put on NC.   - states he should be 300lbs and not 347  - sig b/l lower ext edema with rales on exam  - bnp 2824, cxr with ? Fibrotic changes vs early pulm edema, with  Normal left ventricle size and systolic function. Ejection fraction was   estimated at 55 %. There were no regional left ventricular wall motion   abnormalities and wall thickness was within normal limits. The left atrium is Mildly dilated. - continue IV lasix 40mg bid. - monitor daily weights, fluids restriction. Moderate Aortic Stenosis  -There is moderate aortic stenosis with valve area of 1.2 sq cm.   -The maximum aortic valve gradient is 48 mmHg, the mean gradient is 33 mmHg, and the peak velocity is 3.5 cm/s.  - will require outpatient follow up with cardio. Macrocytic Anemia  - b12 and folate pending    Hypomagnesemia  - 2gm iv given  - recheck in am    Expected discharge date:  2-3 days    Disposition:    [x] Home       [] TCU       [x] Rehab       [] Psych       [] SNF       [] [place]       [] Other-    Chief Complaint: MATTIE    Hospital Course: Transferred from [facility] for worsening MATTIE     Subjective (past 24 hours):   Patient with nosebleed overnight, currently bleeding from nose, nurse holding pressure.      Medications:  Reviewed    Infusion Medications    dextrose       Scheduled Medications    furosemide  40 mg Intravenous Once    furosemide  40 mg Intravenous BID    potassium chloride  20 mEq Oral BID WC    ampicillin IV  2 g Intravenous 6 times per day    insulin glargine  35 Units Subcutaneous Nightly    [Held by provider] aspirin  81 mg Oral Daily    DULoxetine  60 mg Oral Daily    folic acid  1 mg Oral Daily    latanoprost  1 drop Both Eyes Nightly    mirtazapine  15 mg Oral Nightly    rosuvastatin  20 mg Oral Daily    [Held by provider] spironolactone  50 mg Oral Daily    tamsulosin  0.4 mg Oral Daily    thiamine  100 mg Oral Daily    sodium chloride flush  10 mL Intravenous 2 times per day    insulin lispro  0-12 Units Subcutaneous TID WC    insulin lispro  0-6 Units Subcutaneous Nightly    tiotropium  2 puff Inhalation Daily    amLODIPine  10 mg Oral Daily    hydrALAZINE  25 mg Oral 3 times per day    sodium bicarbonate  1,300 mg Oral TID     PRN Meds: diphenhydrAMINE, albuterol sulfate HFA, sodium chloride flush, acetaminophen **OR** acetaminophen, polyethylene glycol, promethazine **OR** ondansetron, potassium chloride **OR** potassium alternative oral replacement **OR** potassium chloride, glucose, dextrose, glucagon (rDNA), dextrose, oxyCODONE-acetaminophen      Intake/Output Summary (Last 24 hours) at 11/29/2020 0752  Last data filed at 11/29/2020 0447  Gross per 24 hour   Intake 2673.03 ml   Output 1700 ml   Net 973.03 ml       Diet:  DIET GENERAL; No Added Salt (3-4 GM)    Exam:  BP (!) 154/72   Pulse 118   Temp 98 °F (36.7 °C) (Oral)   Resp 18   Wt (!) 357 lb 4.8 oz (162.1 kg)   SpO2 95%   BMI 44.66 kg/m²     General appearance: +ve distress, appears stated age and cooperative. HEENT: EOMI, Nose with pressure on it, Nosebleeds overnight,   Neck: Supple, with full range of motion. No jugular venous distention. Trachea midline. Respiratory:  Rales on exam   Cardiovascular: Regular rate and rhythm , b/l LE pitting edema R>L  Abdomen: Soft, non-tender, non-distended  Musculoskeletal: passive and active ROM x 4 extremities. Skin: Skin color, texture, turgor normal.    Neurologic: grossly non-focal.  Psychiatric: Alert and oriented, thought content appropriate  Capillary Refill: Brisk,< 3 seconds   Peripheral Pulses: +2 palpable, equal bilaterally       Labs:   Recent Labs     11/27/20  1023 11/28/20  0616   WBC 6.7 5.6   HGB 8.6* 7.9*   HCT 26.9* 24.6*   PLT 87* 78*     Recent Labs     11/27/20  1023 11/28/20  0616    135   K 4.4  4.4 3.7  3.7    102   CO2 19* 17*   BUN 28* 29*   CREATININE 2.3* 2.3*   CALCIUM 7.6* 7.5*     No results for input(s): AST, ALT, BILIDIR, BILITOT, ALKPHOS in the last 72 hours. No results for input(s): INR in the last 72 hours. No results for input(s): Henok Guillermo in the last 72 hours. No results for input(s): PROCAL in the last 72 hours. Microbiology:      Urinalysis:      Lab Results   Component Value Date    NITRU NEGATIVE 11/28/2020    WBCUA 0-2 11/28/2020    BACTERIA FEW 11/28/2020    RBCUA 5-10 11/28/2020    BLOODU LARGE 11/28/2020    SPECGRAV 1.018 11/28/2020    GLUCOSEU NEGATIVE 10/31/2018       Radiology:  XR CHEST (2 VW)   Final Result   1.  Mild prominence of the basilar interstitium which can represent some fibrotic changes versus early pulmonary edema. 2. New age indeterminate compression fracture of an upper thoracic vertebral body. **This report has been created using voice recognition software. It may contain minor errors which are inherent in voice recognition technology. **      Final report electronically signed by Dr. Mimi Sin on 11/28/2020 8:47 AM      US RENAL COMPLETE   Final Result   1. Possible left renal cyst but otherwise unremarkable renal ultrasound. 2. Unremarkable urinary bladder.       Final report electronically signed by Dr. Kristi Payan on 11/26/2020 5:12 PM      XR CHEST 1 VIEW    (Results Pending)       DVT prophylaxis: [] Lovenox                                 [x] SCDs                                 [] SQ Heparin                                 [] Encourage ambulation           [] Already on Anticoagulation     Code Status: Full Code    Tele:   [x] yes             [] no    Active Hospital Problems    Diagnosis Date Noted    MATTIE (acute kidney injury) ([facility] [address] [N17.9] 11/25/2020       Electronically signed by Rogerio Nicole MD on 11/29/2020 at 7:52 AM

## 2020-11-29 NOTE — OP NOTE
Operative Note      Patient: Juan Kothari  YOB: 1965  MRN: [id number]    Date of Procedure: 11/29/2020    Pre-Op Diagnosis: Epistaxis    Post-Op Diagnosis: Same; aspiration pneumonia; coagulopathy; COPD; EDAC; alcoholism; liver failure; MATTIE       Procedure(s): FLEXIBLE BRONCHOSCOPY WITH BRONCHOALVEOLAR LAVAGE WITH CULTURES. NASAL ENDOSCOPY WITH POSSIBLE CAUTERY ADN NASAL PACKING    Surgeon(s):  Kristen Luna MD    Assistant:   Surgical Assistant: Steven Braga    Anesthesia: General    Estimated Blood Loss (mL): less than 50     Complications: None    Specimens:   ID Type Source Tests Collected by Time Destination   1 : Bilateral Bronchoelveolar Lavage Body Fluid Lung GRAM STAIN, CULTURE, AEROBIC Kristen Luna MD 11/29/2020 1728    A : Nasal Foreign Body Hardware [facility] Kristen Luna MD 11/29/2020 1730        Implants:  * No implants in log *      Drains:   NG/OG/NJ/NE Tube Orogastric 18 fr Center mouth (Active)       Findings: 1. Blood obstructing both mainstem and multiple lobar/segmental bronchi; thoroughly cleaned out  2. Bronchoalveolar lavage for cultures taken from both mainstem  3. Numerous bleeding sites in left nasal airway detected and coagulated; thrombin paste instilled into left nasal airway with Merisel Nair nasal pack  4. Needed BiPAP in recovery to sustain oxygenation; needed Ativan to tolerate BiPAP  5. According to wife, patient only stopped alcohol between 3 and 4 days ago so may be going into DTs    Detailed Description of Procedure: The patient was taken to the operating room on an emergency basis for bronchoscopy with bronchoalveolar lavage to clean out aspirated blood and to get cultures for anticipated aspiration pneumonia. At the same time next dictation was to endoscopically examine his left nasal airway and coagulate any areas that needed hemorrhage control and repacking.   I reviewed with him and with his wife by phone the indications benefits limitations and risks of proceeding in this manner. Risks of continued bleeding, continued aspiration pneumonia, need for reintubation, need for repeat cautery and packing, and respiratory complications of general anesthesia were all carefully discussed with the patient and reviewed with his wife by me prior to gaining the patient's informed consent. He reported understanding these and other risks and wished to proceed. Informed consent was based on these conversations. He was then taken to the operating room awake and placed in supine position. General anesthesia was induced and the patient was intubated with a MAC 3 blade and a grade 1 view. No vital sign instability was encountered at the time. No active bleeding was noted in the pharynx. An orogastric tube was placed by anesthesia in his stomach was aspirated finding bilious fluid but no blood. I then performed a timeout verifying the patient's identity and planned procedure. Therapeutic bronchoscopy with cleanout of extensive obstructive blood matter and bronchial alveolar lavage for cultures: I turned the bed 90 degrees for the procedure. Attaching a side-port to the patient's endotracheal tube, I passed a video bronchoscope through the endotracheal tube and immediately found blood within the trachea. Just passed this I saw nearly obstructed blood in both mainstem bronchi. It was still quite liquid and I was able to suction it away using saline to ease its passage into the suction port. I entered both mainstem and examined all his lobar bronchi and primary segmental bronchi cleaning each out followed by a saline lavage of both mainstem and segmental bronchi which I captured into a Luken's trap for Gram stain and aerobic cultures. I ran through all of his segmental bronchi again and cleaned out small amounts of additional bloody mucus and then consider this portion the operation concluded.     Left nasal airway endoscopy for coagulation of anterior and posterior bleeding; complex: Following the removal of the Rodriguez packing that I had placed on the floor, I passed a 30 degree optical magnifying telescope through his nostril and used suction cautery to remove blood clots finding bleeding sources on the septum and primarily the inferior turbinate through its entire length. I cauterized numerous of these sites but most required multiple cautery efforts to gain hemostasis. I then found bleeding sites all the way into the nasopharynx including the end of the septum and the posterior nasopharyngeal wall. I suction cauterized every bleeding source that I found. I suctioned away the blood in the nasopharynx and found no active bleeding. I placed fibrin/thrombin paste into the nasal vault and then passed a 10 cm Rodriguez nasal pack into the nasopharynx. I placed Afrin liquid into the rodriguez followed by additional amounts of the thrombin/fibrin. No active bleeding was encountered posteriorly or anteriorly after this was completed. As such I consider the operation concluded. Return the patient back to anesthesia for reversal and extubation. This was carried out without incident and patient was taken recovery. In recovery the patient desaturated and was having trouble maintaining his oxygenation even with high oxygen flow rates. As such respiratory therapy was requested to bring BiPAP to the bedside. It was set up at a pressure differential of 14/8 and this markedly improved the patient's breathing ability. He was somewhat combative so 0.5 mg of Ativan was given at 2 intervals to calm the patient down. A postop portable chest was taken and demonstrated bilateral infiltrates that were significantly worse than his preop films indicating that he was clinically progressing from the blood aspiration to his current condition. I will place him in the ICU if a bed can be found for him for critical management.     Electronically signed by Jeffrey Baca

## 2020-11-29 NOTE — FLOWSHEET NOTE
11/29/20 0839   Provider Notification   Reason for Communication Evaluate   Provider Name Dr. Arun Jackson    Provider Notification Physician   Method of Communication Face to face   Response See orders   Notification Time 0715     2 small nose bleeds overnight and one large nose bleed this morning. hgb down from 8.6 to 7.9 this am. Lungs from diminished to rhonchi throughout.  See new orders

## 2020-11-29 NOTE — ANESTHESIA PRE PROCEDURE
Department of Anesthesiology  Preprocedure Note       Name:  Ortiz Harris   Age:  48 y.o.  :  1965                                          MRN:  [id number]         Date:  2020      Surgeon: Valery Melgar):  Izzy Zaldivar MD    Procedure: Procedure(s):  NASAL CAUTERY ENDOSCOPIC    Medications prior to admission:   Prior to Admission medications    Medication Sig Start Date End Date Taking?  Authorizing Provider   MIRTAZAPINE PO Take 1 tablet by mouth nightly   Yes Historical Provider, MD   pioglitazone (ACTOS) 15 MG tablet TAKE ONE TABLET EACH DAY TO HELP CONTROL BLOOD SUGAR. 19  Yes Historical Provider, MD   spironolactone (ALDACTONE) 50 MG tablet Take 1 tablet by mouth daily 3/27/19  Yes Vishal Alexandre MD   thiamine 100 MG tablet Take 1 tablet by mouth daily 3/26/19  Yes Vishal Alexandre MD   insulin glargine (LANTUS) 100 UNIT/ML injection vial Inject 35 Units into the skin nightly  Patient taking differently: Inject 80 Units into the skin nightly  3/26/19  Yes Vishal Alexandre MD   aspirin 81 MG tablet Take 81 mg by mouth daily   Yes Historical Provider, MD   DULoxetine (CYMBALTA) 60 MG extended release capsule Take 60 mg by mouth daily   Yes Historical Provider, MD   rosuvastatin (CRESTOR) 20 MG tablet Take 20 mg by mouth daily   Yes Historical Provider, MD   folic acid (FOLVITE) 1 MG tablet Take 1 mg by mouth daily   Yes Historical Provider, MD   penicillin v potassium (VEETID) 500 MG tablet Take 500 mg by mouth 4 times daily    Historical Provider, MD   ondansetron (ZOFRAN) 4 MG tablet Take 4 mg by mouth every 8 hours as needed for Nausea or Vomiting    Historical Provider, MD   tamsulosin (FLOMAX) 0.4 MG capsule Take 0.4 mg by mouth daily    Historical Provider, MD   Blood Glucose Monitoring Suppl (PRODIGY VOICE BLOOD GLUCOSE) w/Device KIT by NOT APPLICABLE route    Historical Provider, MD   Elastic Bandages & Supports ([address]) [facility] Provide jopstock waist-high compression stockings. To be worn while ambulating to prevent syncope. Thigh high insufficient. Dx Code I95.1 11/16/18   Historical Provider, MD Kj Resendez TEST strip TEAST UP TO 4 TIMES A DAY AS INSTRUCTED 4/19/19   Historical Provider, MD   Insulin Pen Needle (ULTICARE MICRO PEN NEEDLES) 31G X 6 MM MISC by NOT APPLICABLE route    Historical Provider, MD   ULTICARE MINI PEN NEEDLES 31G X 6 MM MISC USE TO INJECT INSULINS PER INSTRUCTION UP TO 3 TIMES A DAY 4/15/19   Historical Provider, MD MORRISON UF III MINI PEN NEEDLES 31G X 5 MM MISC USE TO INJECT INSULINS PER INSTRUCTION UP TO 3 TIMES A DAY 6/12/19   Historical Provider, MD   TRUEPLUS LANCETS 28G MISC TEST AS INSTRUCTED UP TO 4 TIMES A DAY 4/19/19   Historical Provider, MD   metFORMIN (GLUCOPHAGE) 500 MG tablet TAKE ONE TABLET TWO (2) TIMES A DAY WITH MEALS TO HELP CONTROL BLOOD SUGAR. TAKE WITH FOOD 6/17/19   Historical Provider, MD   oxyCODONE-acetaminophen (PERCOCET) 7.5-325 MG per tablet TAKE ONE TABLET UP TO EVERY EIGHT (8) HOURS WHEN NEEDED TO HELP CONTROL PAIN.  MAY CAUSE DROWSINESS 6/17/19   Historical Provider, MD   tiotropium (SPIRIVA) 18 MCG inhalation capsule Inhale 1 capsule into the lungs daily 3/27/19   Lilo Dinh MD   latanoprost (XALATAN) 0.005 % ophthalmic solution Place 1 drop into both eyes nightly 3/26/19   Lilo Dinh MD   tadalafil (CIALIS) 20 MG tablet Take 20 mg by mouth as needed for Erectile Dysfunction    Historical Provider, MD   albuterol sulfate  (90 Base) MCG/ACT inhaler Inhale 2 puffs into the lungs every 6 hours as needed for Wheezing    Historical Provider, MD       Current medications:    Current Facility-Administered Medications   Medication Dose Route Frequency Provider Last Rate Last Dose    lidocaine (LMX) 4 % cream   Topical PRN Romero Crawford MD        oxymetazoline (AFRIN) 0.05 % nasal spray 2 spray  2 spray Each Nostril BID PRN Romero Crawford MD        furosemide (LASIX) injection 40 mg  40 mg injection 10 mL  10 mL Intravenous 2 times per day Chata Crowe MD   10 mL at 11/29/20 1042    sodium chloride flush 0.9 % injection 10 mL  10 mL Intravenous PRN Chata Crowe MD        acetaminophen (TYLENOL) tablet 650 mg  650 mg Oral Q6H PRN Chata Crowe MD   650 mg at 11/27/20 2334    Or    acetaminophen (TYLENOL) suppository 650 mg  650 mg Rectal Q6H PRN Chata Crowe MD        polyethylene glycol [facility]) packet 17 g  17 g Oral Daily PRN Chata Crowe MD        promethazine (PHENERGAN) tablet 12.5 mg  12.5 mg Oral Q6H PRN Chata Crowe MD        Or    ondansetron [facility]) injection 4 mg  4 mg Intravenous Q6H PRN Chata Crowe MD        potassium chloride (KLOR-CON M) extended release tablet 40 mEq  40 mEq Oral PRN Chata Crowe MD        Or    potassium bicarb-citric acid (EFFER-K) effervescent tablet 40 mEq  40 mEq Oral PRN Chata Crowe MD        Or    potassium chloride 10 mEq/100 mL IVPB (Peripheral Line)  10 mEq Intravenous PRN Chata Crowe MD        glucose (GLUTOSE) 40 % oral gel 15 g  15 g Oral PRN Chata Crowe MD        dextrose 50 % IV solution  12.5 g Intravenous PRN Chata Crowe MD        glucagon (rDNA) injection 1 mg  1 mg Intramuscular PRN Chata Crowe MD        dextrose 5 % solution  100 mL/hr Intravenous PRN Chata Crowe MD        insulin lispro (HUMALOG) injection vial 0-12 Units  0-12 Units Subcutaneous TID WC Chata Crowe MD   4 Units at 11/29/20 1140    insulin lispro (HUMALOG) injection vial 0-6 Units  0-6 Units Subcutaneous Nightly Chata Crowe MD   5 Units at 11/28/20 2210    amLODIPine (NORVASC) tablet 10 mg  10 mg Oral Daily Chata Crowe MD   10 mg at 11/29/20 1043    hydrALAZINE (APRESOLINE) tablet 25 mg  25 mg Oral 3 times per day Chata Crowe MD   25 mg at 11/29/20 0501    oxyCODONE-acetaminophen (PERCOCET) 7.5-325 MG per tablet 1 tablet  1 tablet Oral Q8H PRN Rachel Wilkins MD   1 tablet at 11/29/20 1051    sodium bicarbonate tablet 1,300 mg  1,300 mg Oral TID Peace Durant MD   1,300 mg at 11/29/20 1422       Allergies:     Allergies   Allergen Reactions    Adhesive Tape Rash     Bandaid       Problem List:    Patient Active Problem List   Diagnosis Code    HCV antibody positive R76.8    Cirrhosis, alcoholic ([facility] Q49.05    Alcohol withdrawal seizure with complication ([facility] U88.469, R56.9    Alcohol withdrawal, uncomplicated ([address] W03.287    Type 2 diabetes mellitus ([address] E11.9    Hyponatremia E87.1    Leukocytosis D72.829    Thrombocytopenia (HCC) D69.6    COPD (chronic obstructive pulmonary disease) (HCC) J44.9    HLD (hyperlipidemia) E78.5    HTN (hypertension) I10    Seizure ([facility] R56.9    Recurrent falls R29.6    Fracture of multiple ribs of left side S22.42XA    Anemia D64.9    Alcohol abuse F10.10    Closed fracture of distal end of left fibula with routine healing S82.832D    Hyperammonemia (HCC) E72.20    Essential tremor G25.0    Alcohol intoxication delirium (HCC) F10.121    MATTIE (acute kidney injury) ([facility] N17.9       Past Medical History:        Diagnosis Date    Asthma     Brain tumor ([facility]     Cirrhosis ([facility]     COPD (chronic obstructive pulmonary disease) ([facility]     Diabetes mellitus ([facility]     Falling     Hepatitis C     Hyperlipidemia     Hypertension     Seizures ([facility]        Past Surgical History:        Procedure Laterality Date    ENDOSCOPY, COLON, DIAGNOSTIC      FIBULA FRACTURE SURGERY Left 3/21/2019    LEFT FIBULAR SHAFT ORIF performed by Tong Ha DPM at [address] Right     Steel pins in place    NECK SURGERY  01/2020    TOE AMPUTATION Right 9/23/2020    AMPUTATION DISTAL APSECT RIGHT HALLUX, REMOVAL OF HARDWARE RIGHT HALLUX performed by Brett Leos DPM at [address] [facility] [facility] N/A 4/25/2019    EGD BIOPSY performed by Teetee Kidd MD at [facility]       Social History: Social History     Tobacco Use    Smoking status: Current Every Day Smoker     Packs/day: 1.00     Years: 30.00     Pack years: 30.00     Types: Cigarettes    Smokeless tobacco: Never Used    Tobacco comment: Currently smoking electronic cigarettes   Substance Use Topics    Alcohol use: Yes     Comment: 8   25oz cans nightly                                Ready to quit: Not Answered  Counseling given: Not Answered  Comment: Currently smoking electronic cigarettes      Vital Signs (Current):   Vitals:    11/29/20 1026 11/29/20 1203 11/29/20 1252 11/29/20 1423   BP: 124/60 (!) 115/59  (!) 100/50   Pulse: 101 100     Resp: 18 18     Temp: 97.8 °F (36.6 °C) 97.9 °F (36.6 °C)     TempSrc: Axillary Axillary     SpO2: 96% 93% 93%    Weight:                                                  BP Readings from Last 3 Encounters:   11/29/20 (!) 100/50   09/23/20 137/65   09/23/20 114/61       NPO Status:                                                                                 BMI:   Wt Readings from Last 3 Encounters:   11/29/20 (!) 357 lb 4.8 oz (162.1 kg)   09/23/20 (!) 319 lb (144.7 kg)   09/10/20 (!) 321 lb (145.6 kg)     Body mass index is 44.66 kg/m².     CBC:   Lab Results   Component Value Date    WBC 6.4 11/29/2020    RBC 2.24 11/29/2020    HGB 7.5 11/29/2020    HCT 23.7 11/29/2020    .8 11/29/2020    RDW 16.6 06/03/2020    PLT 84 11/29/2020       CMP:   Lab Results   Component Value Date     11/29/2020    K 4.4 11/29/2020    K 3.7 11/28/2020     11/29/2020    CO2 22 11/29/2020    BUN 27 11/29/2020    CREATININE 2.4 11/29/2020    LABGLOM 28 11/29/2020    GLUCOSE 268 11/29/2020    PROT 7.4 05/02/2019    CALCIUM 7.7 11/29/2020    BILITOT 1.4 05/02/2019    ALKPHOS 166 05/02/2019    AST 85 05/02/2019    ALT 51 05/02/2019       POC Tests:   Recent Labs     11/29/20  1115   POCGLU 249*       Coags:   Lab Results   Component Value Date    INR 1.33 05/15/2019    APTT 36.9 05/15/2019       HCG (If Applicable): No results found for: PREGTESTUR, PREGSERUM, HCG, HCGQUANT     ABGs: No results found for: PHART, PO2ART, NCC1DBR, ZXS5OJB, BEART, K8OBXHBN     Type & Screen (If Applicable):  Lab Results   Component Value Date    LABRH POS 11/29/2020       Drug/Infectious Status (If Applicable):  No results found for: HIV, HEPCAB    COVID-19 Screening (If Applicable):   Lab Results   Component Value Date    COVID19 Not Detected 09/17/2020         Anesthesia Evaluation  Patient summary reviewed  Airway: Mallampati: III  TM distance: >3 FB   Neck ROM: full  Mouth opening: > = 3 FB Dental:    (+) edentulous      Pulmonary:   (+) COPD:  asthma:                            Cardiovascular:    (+) hypertension:,       ECG reviewed      Echocardiogram reviewed                  Neuro/Psych:   (+) psychiatric history:            GI/Hepatic/Renal:   (+) hepatitis:, liver disease:,           Endo/Other:    (+) Diabetes, . Abdominal:           Vascular:                                        Anesthesia Plan      general     ASA 3 - emergent       Induction: intravenous. MIPS: Postoperative opioids intended and Prophylactic antiemetics administered. Anesthetic plan and risks discussed with patient. Plan discussed with APOLONIA. Diedra Fleischer.  [facility]   11/29/2020

## 2020-11-30 ENCOUNTER — APPOINTMENT (OUTPATIENT)
Dept: CT IMAGING | Age: 55
DRG: 720 | End: 2020-11-30
Attending: PHYSICIAN ASSISTANT
Payer: COMMERCIAL

## 2020-11-30 ENCOUNTER — APPOINTMENT (OUTPATIENT)
Dept: INTERVENTIONAL RADIOLOGY/VASCULAR | Age: 55
DRG: 720 | End: 2020-11-30
Attending: PHYSICIAN ASSISTANT
Payer: COMMERCIAL

## 2020-11-30 ENCOUNTER — APPOINTMENT (OUTPATIENT)
Dept: ULTRASOUND IMAGING | Age: 55
DRG: 720 | End: 2020-11-30
Attending: PHYSICIAN ASSISTANT
Payer: COMMERCIAL

## 2020-11-30 LAB
ALBUMIN SERPL-MCNC: 2.6 G/DL (ref 3.5–5.1)
ALP BLD-CCNC: 60 U/L (ref 38–126)
ALT SERPL-CCNC: 31 U/L (ref 11–66)
ANION GAP SERPL CALCULATED.3IONS-SCNC: 11 MEQ/L (ref 8–16)
ANION GAP SERPL CALCULATED.3IONS-SCNC: 12 MEQ/L (ref 8–16)
ANION GAP SERPL CALCULATED.3IONS-SCNC: 12 MEQ/L (ref 8–16)
ANION GAP SERPL CALCULATED.3IONS-SCNC: 15 MEQ/L (ref 8–16)
APTT: 28.5 SECONDS (ref 22–38)
APTT: 31.2 SECONDS (ref 22–38)
AST SERPL-CCNC: 45 U/L (ref 5–40)
BASOPHILS # BLD: 0.2 %
BASOPHILS ABSOLUTE: 0 THOU/MM3 (ref 0–0.1)
BILIRUB SERPL-MCNC: 0.8 MG/DL (ref 0.3–1.2)
BILIRUBIN DIRECT: 0.5 MG/DL (ref 0–0.3)
BUN BLDV-MCNC: 27 MG/DL (ref 7–22)
BUN BLDV-MCNC: 29 MG/DL (ref 7–22)
BUN BLDV-MCNC: 30 MG/DL (ref 7–22)
BUN BLDV-MCNC: 30 MG/DL (ref 7–22)
CALCIUM IONIZED: 1.03 MMOL/L (ref 1.12–1.32)
CALCIUM SERPL-MCNC: 7.3 MG/DL (ref 8.5–10.5)
CALCIUM SERPL-MCNC: 7.3 MG/DL (ref 8.5–10.5)
CALCIUM SERPL-MCNC: 7.7 MG/DL (ref 8.5–10.5)
CALCIUM SERPL-MCNC: 7.8 MG/DL (ref 8.5–10.5)
CHLORIDE BLD-SCNC: 101 MEQ/L (ref 98–111)
CHLORIDE BLD-SCNC: 103 MEQ/L (ref 98–111)
CO2: 19 MEQ/L (ref 23–33)
CO2: 20 MEQ/L (ref 23–33)
CO2: 21 MEQ/L (ref 23–33)
CO2: 21 MEQ/L (ref 23–33)
CREAT SERPL-MCNC: 2.3 MG/DL (ref 0.4–1.2)
CREAT SERPL-MCNC: 2.4 MG/DL (ref 0.4–1.2)
CREAT SERPL-MCNC: 2.5 MG/DL (ref 0.4–1.2)
CREAT SERPL-MCNC: 2.7 MG/DL (ref 0.4–1.2)
CREATININE URINE: 86.6 MG/DL
DIFFERENTIAL TYPE: ABNORMAL
EOSINOPHIL # BLD: 0 %
EOSINOPHIL SMEAR: NORMAL
EOSINOPHILS ABSOLUTE: 0 THOU/MM3 (ref 0–0.4)
ERYTHROCYTE [DISTWIDTH] IN BLOOD BY AUTOMATED COUNT: 14.6 % (ref 11.5–14.5)
ERYTHROCYTE [DISTWIDTH] IN BLOOD BY AUTOMATED COUNT: 14.6 % (ref 11.5–14.5)
ERYTHROCYTE [DISTWIDTH] IN BLOOD BY AUTOMATED COUNT: 56.5 FL (ref 35–45)
ERYTHROCYTE [DISTWIDTH] IN BLOOD BY AUTOMATED COUNT: 56.9 FL (ref 35–45)
GFR SERPL CREATININE-BSD FRML MDRD: 25 ML/MIN/1.73M2
GFR SERPL CREATININE-BSD FRML MDRD: 27 ML/MIN/1.73M2
GFR SERPL CREATININE-BSD FRML MDRD: 28 ML/MIN/1.73M2
GFR SERPL CREATININE-BSD FRML MDRD: 30 ML/MIN/1.73M2
GLUCOSE BLD-MCNC: 132 MG/DL (ref 70–108)
GLUCOSE BLD-MCNC: 133 MG/DL (ref 70–108)
GLUCOSE BLD-MCNC: 158 MG/DL (ref 70–108)
GLUCOSE BLD-MCNC: 166 MG/DL (ref 70–108)
GLUCOSE BLD-MCNC: 247 MG/DL (ref 70–108)
GLUCOSE BLD-MCNC: 274 MG/DL (ref 70–108)
GLUCOSE BLD-MCNC: 275 MG/DL (ref 70–108)
GLUCOSE BLD-MCNC: 338 MG/DL (ref 70–108)
GLUCOSE BLD-MCNC: 351 MG/DL (ref 70–108)
HCT VFR BLD CALC: 21.3 % (ref 42–52)
HCT VFR BLD CALC: 22.1 % (ref 42–52)
HCT VFR BLD CALC: 24.1 % (ref 42–52)
HCT VFR BLD CALC: 24.7 % (ref 42–52)
HCT VFR BLD CALC: 26.9 % (ref 42–52)
HEMOGLOBIN: 6.8 GM/DL (ref 14–18)
HEMOGLOBIN: 7 GM/DL (ref 14–18)
HEMOGLOBIN: 7.7 GM/DL (ref 14–18)
HEMOGLOBIN: 7.8 GM/DL (ref 14–18)
HEMOGLOBIN: 8.5 GM/DL (ref 14–18)
IMMATURE GRANS (ABS): 0.02 THOU/MM3 (ref 0–0.07)
IMMATURE GRANULOCYTES: 0.3 %
LYMPHOCYTES # BLD: 8.6 %
LYMPHOCYTES ABSOLUTE: 0.5 THOU/MM3 (ref 1–4.8)
MCH RBC QN AUTO: 33.3 PG (ref 26–33)
MCH RBC QN AUTO: 33.7 PG (ref 26–33)
MCHC RBC AUTO-ENTMCNC: 31.7 GM/DL (ref 32.2–35.5)
MCHC RBC AUTO-ENTMCNC: 31.9 GM/DL (ref 32.2–35.5)
MCV RBC AUTO: 104.4 FL (ref 80–94)
MCV RBC AUTO: 106.3 FL (ref 80–94)
MONOCYTES # BLD: 4.3 %
MONOCYTES ABSOLUTE: 0.2 THOU/MM3 (ref 0.4–1.3)
NUCLEATED RED BLOOD CELLS: 0 /100 WBC
OSMOLALITY URINE: 335 MOSMOL/KG (ref 250–750)
PATHOLOGIST REVIEW: ABNORMAL
PLATELET # BLD: 79 THOU/MM3 (ref 130–400)
PLATELET # BLD: 79 THOU/MM3 (ref 130–400)
PLATELET ESTIMATE: ABNORMAL
PMV BLD AUTO: 11.1 FL (ref 9.4–12.4)
PMV BLD AUTO: 11.1 FL (ref 9.4–12.4)
POTASSIUM SERPL-SCNC: 4.1 MEQ/L (ref 3.5–5.2)
POTASSIUM SERPL-SCNC: 4.2 MEQ/L (ref 3.5–5.2)
POTASSIUM SERPL-SCNC: 4.7 MEQ/L (ref 3.5–5.2)
POTASSIUM SERPL-SCNC: 5 MEQ/L (ref 3.5–5.2)
PRO-BNP: 1769 PG/ML (ref 0–900)
PROCALCITONIN: 0.19 NG/ML (ref 0.01–0.09)
RBC # BLD: 2.04 MILL/MM3 (ref 4.7–6.1)
RBC # BLD: 2.08 MILL/MM3 (ref 4.7–6.1)
SCAN OF BLOOD SMEAR: NORMAL
SEG NEUTROPHILS: 86.6 %
SEGMENTED NEUTROPHILS ABSOLUTE COUNT: 5 THOU/MM3 (ref 1.8–7.7)
SODIUM BLD-SCNC: 133 MEQ/L (ref 135–145)
SODIUM BLD-SCNC: 133 MEQ/L (ref 135–145)
SODIUM BLD-SCNC: 135 MEQ/L (ref 135–145)
SODIUM BLD-SCNC: 136 MEQ/L (ref 135–145)
SODIUM URINE: 48 MEQ/L
SPECIMEN: NORMAL
TOTAL CK: 54 U/L (ref 55–170)
TOTAL PROTEIN: 6.6 G/DL (ref 6.1–8)
TSH SERPL DL<=0.05 MIU/L-ACNC: 0.59 UIU/ML (ref 0.4–4.2)
VANCOMYCIN RESISTANT ENTEROCOCCUS: NEGATIVE
WBC # BLD: 5.4 THOU/MM3 (ref 4.8–10.8)
WBC # BLD: 5.8 THOU/MM3 (ref 4.8–10.8)

## 2020-11-30 PROCEDURE — 6370000000 HC RX 637 (ALT 250 FOR IP): Performed by: INTERNAL MEDICINE

## 2020-11-30 PROCEDURE — 6360000002 HC RX W HCPCS: Performed by: OTOLARYNGOLOGY

## 2020-11-30 PROCEDURE — 80053 COMPREHEN METABOLIC PANEL: CPT

## 2020-11-30 PROCEDURE — 6370000000 HC RX 637 (ALT 250 FOR IP): Performed by: NURSE PRACTITIONER

## 2020-11-30 PROCEDURE — 97530 THERAPEUTIC ACTIVITIES: CPT

## 2020-11-30 PROCEDURE — 6360000002 HC RX W HCPCS: Performed by: NURSE PRACTITIONER

## 2020-11-30 PROCEDURE — 99233 SBSQ HOSP IP/OBS HIGH 50: CPT | Performed by: INTERNAL MEDICINE

## 2020-11-30 PROCEDURE — P9016 RBC LEUKOCYTES REDUCED: HCPCS

## 2020-11-30 PROCEDURE — 2580000003 HC RX 258: Performed by: NURSE PRACTITIONER

## 2020-11-30 PROCEDURE — 97110 THERAPEUTIC EXERCISES: CPT

## 2020-11-30 PROCEDURE — 87086 URINE CULTURE/COLONY COUNT: CPT

## 2020-11-30 PROCEDURE — 30233N1 TRANSFUSION OF NONAUTOLOGOUS RED BLOOD CELLS INTO PERIPHERAL VEIN, PERCUTANEOUS APPROACH: ICD-10-PCS | Performed by: INTERNAL MEDICINE

## 2020-11-30 PROCEDURE — 83880 ASSAY OF NATRIURETIC PEPTIDE: CPT

## 2020-11-30 PROCEDURE — 2580000003 HC RX 258: Performed by: OTOLARYNGOLOGY

## 2020-11-30 PROCEDURE — 94761 N-INVAS EAR/PLS OXIMETRY MLT: CPT

## 2020-11-30 PROCEDURE — 85025 COMPLETE CBC W/AUTO DIFF WBC: CPT

## 2020-11-30 PROCEDURE — 76705 ECHO EXAM OF ABDOMEN: CPT

## 2020-11-30 PROCEDURE — 82248 BILIRUBIN DIRECT: CPT

## 2020-11-30 PROCEDURE — 2700000000 HC OXYGEN THERAPY PER DAY

## 2020-11-30 PROCEDURE — 83935 ASSAY OF URINE OSMOLALITY: CPT

## 2020-11-30 PROCEDURE — 97166 OT EVAL MOD COMPLEX 45 MIN: CPT

## 2020-11-30 PROCEDURE — 89190 NASAL SMEAR FOR EOSINOPHILS: CPT

## 2020-11-30 PROCEDURE — 71250 CT THORAX DX C-: CPT

## 2020-11-30 PROCEDURE — 84300 ASSAY OF URINE SODIUM: CPT

## 2020-11-30 PROCEDURE — 99232 SBSQ HOSP IP/OBS MODERATE 35: CPT | Performed by: OTOLARYNGOLOGY

## 2020-11-30 PROCEDURE — 85018 HEMOGLOBIN: CPT

## 2020-11-30 PROCEDURE — 85730 THROMBOPLASTIN TIME PARTIAL: CPT

## 2020-11-30 PROCEDURE — 99232 SBSQ HOSP IP/OBS MODERATE 35: CPT | Performed by: INTERNAL MEDICINE

## 2020-11-30 PROCEDURE — 82948 REAGENT STRIP/BLOOD GLUCOSE: CPT

## 2020-11-30 PROCEDURE — 82550 ASSAY OF CK (CPK): CPT

## 2020-11-30 PROCEDURE — 84443 ASSAY THYROID STIM HORMONE: CPT

## 2020-11-30 PROCEDURE — 6370000000 HC RX 637 (ALT 250 FOR IP): Performed by: FAMILY MEDICINE

## 2020-11-30 PROCEDURE — 82570 ASSAY OF URINE CREATININE: CPT

## 2020-11-30 PROCEDURE — 36415 COLL VENOUS BLD VENIPUNCTURE: CPT

## 2020-11-30 PROCEDURE — 2060000000 HC ICU INTERMEDIATE R&B

## 2020-11-30 PROCEDURE — 51702 INSERT TEMP BLADDER CATH: CPT

## 2020-11-30 PROCEDURE — 85027 COMPLETE CBC AUTOMATED: CPT

## 2020-11-30 PROCEDURE — 36430 TRANSFUSION BLD/BLD COMPNT: CPT

## 2020-11-30 PROCEDURE — 85014 HEMATOCRIT: CPT

## 2020-11-30 PROCEDURE — 84145 PROCALCITONIN (PCT): CPT

## 2020-11-30 PROCEDURE — 93970 EXTREMITY STUDY: CPT

## 2020-11-30 PROCEDURE — 82330 ASSAY OF CALCIUM: CPT

## 2020-11-30 RX ORDER — 0.9 % SODIUM CHLORIDE 0.9 %
20 INTRAVENOUS SOLUTION INTRAVENOUS ONCE
Status: DISCONTINUED | OUTPATIENT
Start: 2020-11-30 | End: 2020-12-03

## 2020-11-30 RX ORDER — MULTIVITAMIN WITH IRON
1 TABLET ORAL DAILY
Status: DISCONTINUED | OUTPATIENT
Start: 2020-11-30 | End: 2020-12-08 | Stop reason: HOSPADM

## 2020-11-30 RX ADMIN — TAMSULOSIN HYDROCHLORIDE 0.4 MG: 0.4 CAPSULE ORAL at 10:53

## 2020-11-30 RX ADMIN — FUROSEMIDE 20 MG/HR: 10 INJECTION, SOLUTION INTRAMUSCULAR; INTRAVENOUS at 17:14

## 2020-11-30 RX ADMIN — FUROSEMIDE 20 MG/HR: 10 INJECTION, SOLUTION INTRAMUSCULAR; INTRAVENOUS at 09:06

## 2020-11-30 RX ADMIN — SODIUM CHLORIDE 3 G: 900 INJECTION INTRAVENOUS at 23:03

## 2020-11-30 RX ADMIN — SODIUM CHLORIDE 3 G: 900 INJECTION INTRAVENOUS at 17:34

## 2020-11-30 RX ADMIN — HYDRALAZINE HYDROCHLORIDE 25 MG: 25 TABLET, FILM COATED ORAL at 05:17

## 2020-11-30 RX ADMIN — SODIUM BICARBONATE 1300 MG: 650 TABLET ORAL at 14:24

## 2020-11-30 RX ADMIN — DULOXETINE HYDROCHLORIDE 60 MG: 60 CAPSULE, DELAYED RELEASE ORAL at 10:53

## 2020-11-30 RX ADMIN — SODIUM CHLORIDE 3 G: 900 INJECTION INTRAVENOUS at 10:53

## 2020-11-30 RX ADMIN — INSULIN GLARGINE 35 UNITS: 100 INJECTION, SOLUTION SUBCUTANEOUS at 20:51

## 2020-11-30 RX ADMIN — PHENOBARBITAL SODIUM 260 MG: 65 INJECTION INTRAMUSCULAR; INTRAVENOUS at 05:24

## 2020-11-30 RX ADMIN — FUROSEMIDE 20 MG/HR: 10 INJECTION, SOLUTION INTRAMUSCULAR; INTRAVENOUS at 03:17

## 2020-11-30 RX ADMIN — SODIUM BICARBONATE 1300 MG: 650 TABLET ORAL at 20:48

## 2020-11-30 RX ADMIN — MIRTAZAPINE 15 MG: 15 TABLET, FILM COATED ORAL at 20:48

## 2020-11-30 RX ADMIN — OXYCODONE HYDROCHLORIDE AND ACETAMINOPHEN 1 TABLET: 7.5; 325 TABLET ORAL at 20:52

## 2020-11-30 RX ADMIN — LATANOPROST 1 DROP: 50 SOLUTION OPHTHALMIC at 20:48

## 2020-11-30 RX ADMIN — SPIRONOLACTONE 50 MG: 25 TABLET ORAL at 09:04

## 2020-11-30 RX ADMIN — ROSUVASTATIN CALCIUM 20 MG: 20 TABLET, FILM COATED ORAL at 09:04

## 2020-11-30 RX ADMIN — PHENOBARBITAL SODIUM 260 MG: 65 INJECTION INTRAMUSCULAR; INTRAVENOUS at 01:38

## 2020-11-30 RX ADMIN — PREDNISONE 60 MG: 20 TABLET ORAL at 10:52

## 2020-11-30 RX ADMIN — SODIUM CHLORIDE 3 G: 900 INJECTION INTRAVENOUS at 05:16

## 2020-11-30 RX ADMIN — HYDRALAZINE HYDROCHLORIDE 25 MG: 25 TABLET, FILM COATED ORAL at 14:24

## 2020-11-30 RX ADMIN — FOLIC ACID 1 MG: 1 TABLET ORAL at 10:53

## 2020-11-30 RX ADMIN — Medication 100 MG: at 09:04

## 2020-11-30 RX ADMIN — THERA TABS 1 TABLET: TAB at 09:05

## 2020-11-30 RX ADMIN — AMLODIPINE BESYLATE 10 MG: 10 TABLET ORAL at 09:05

## 2020-11-30 RX ADMIN — SODIUM BICARBONATE 1300 MG: 650 TABLET ORAL at 09:05

## 2020-11-30 RX ADMIN — OXYCODONE HYDROCHLORIDE AND ACETAMINOPHEN 1 TABLET: 7.5; 325 TABLET ORAL at 05:19

## 2020-11-30 ASSESSMENT — PAIN SCALES - GENERAL
PAINLEVEL_OUTOF10: 8
PAINLEVEL_OUTOF10: 8

## 2020-11-30 NOTE — PROGRESS NOTES
Anabelle Sales [address]  INPATIENT OCCUPATIONAL THERAPY  STR[facility] ICU STEPDOWN TELEMETRY 4K  EVALUATION    Time:    Time In: 8743  Time Out: 2300  Timed Code Treatment Minutes: 24 Minutes  Minutes: 503          Date: 2020  Patient Name: Binh Trejo,   Gender: male      MRN: [id number]  : 1965  (48 y.o.)  Referring Practitioner: Dr. Geetha Fernandes  Diagnosis: MATTIE  Additional Pertinent Hx: Hitesh Guzman was admitted under the hospitalist service on 2020 with chief complaint of acute kidney injury. Patient was transferred from [facility] after having been at their facility for 4 days. Patient's chief complaint was vomiting incontinence left shoulder pain and diarrhea. Pain patient was found to have sepsis secondary to pneumonia and acute kidney injury. Blood cultures were positive for gram-positive cocci-Enterococcus. With worsening in kidney function patient was transferred to [facility] for further care. 2020 Positive for nose bleed with drop in H/H 8.6-7.9-ENT Dr. Edwina Quintanilla was consulted. Patient required OR intervention. Patient was transferred to the ICU post procedure    Restrictions/Precautions:  Restrictions/Precautions: Fall Risk  Position Activity Restriction  Other position/activity restrictions: 6L of O2     Subjective  Chart Reviewed: Yes, Orders, Progress Notes, History and Physical  Patient assessed for rehabilitation services?: Yes    Subjective: Pt just transfrred floors and agreeable to OT session. Pt alkative. RT swithcd pt from high flow to 6L of O2. Pt very particular on placement of objects in room and how things are completed.     Pain:  Pain Assessment  Patient Currently in Pain: Denies    Social/Functional History:  Lives With: Significant other  Type of Home: House  Home Layout: Two level, Bed/Bath upstairs, Able to Live on Main level with bedroom/bathroom  Home Access: Stairs to enter with rails  Home Equipment: Rolling walker, Reacher   Bathroom Shower/Tub: Tub/Shower

## 2020-11-30 NOTE — CARE COORDINATION
DISASTER CHARTING    11/30/20, 1:01 PM EST    DISCHARGE ONGOING EVALUATION:     [facility] day: 5  Location: -05/005-A Reason for admit: MATTIE (acute kidney injury) ([facility] [N17.9]  Renal failure [N19]     11/29 FLEXIBLE BRONCHOSCOPY WITH BRONCHOALVEOLAR LAVAGE WITH CULTURES. NASAL ENDOSCOPY WITH POSSIBLE CAUTERY ADN NASAL PACKING    Barriers to Discharge: on vapotherm  PCP: MEGHAN Mcneal - CNP  Patient Goals/Plan/Treatment Preferences: Phani Dilling to Surgery yesterday with ENT - Dr. Johnson Bottom - see procedure above. ICU post-op on bipap and then switched to vapotherm on ICU. Intensivist, Nephrology, and ID also following. Received 1 PRBC today. Remains on vapotherm 15L, 40% FIO2, sats 94%. Afebrile. NSR. Ox4. PT/OT. [place]. Lasix @ 20 mg/hr, IV unasyn, lantus, SSI Q4H, prn percocet, prn phenobarbital, prednisone, sodium bicarb tabs, Electrolyte replacement protocols. CO2 21, BUN 30, Creat 2.7, CK 54, pro-bnp 1769, alb 2.6, ast 45, direct bili 0.5, Hgb 7.8, plt 79. Bronch washings sent. Urine culture sent. Home with S.O. Has DME. Declines HH. Pt transferring to 4K20. Handoff report given to Maureen Mohan, 4K CM.

## 2020-11-30 NOTE — CONSULTS
CRITICAL CARE CONSULT NOTE      Patient:  Radha Mercado    Unit/Bed:4D-05/005-A  YOB: 1965  MRN: [id number]   PCP: MEGHAN Payne CNP  Date of Admission: 11/25/2020  Chief Complaint:-Acute respiratory failure    Assessment and Plan:    1. Respiratory failure, hypoxia: Patient is currently on BiPAP, this will be switched to HFNC and monitor response. 11/30/2020 Bipap was required to sustain oxygenation in PACU. 2. Severe pneumonia likely secondary to aspiration:  Unasyn will be continued, patient is currently on Prednisone will continue and monitor. Documentation 11/29/2020 ? rash upper arms improved with Benadryl? None noted at time of admission to the ICU ? monitor. 3. Epistaxis: 11/29/2020 status post flexible bronchoscopy with bronchoalveolar lavage with cultures. Nasal endoscopy with possible cautery and nasal packing. Blood obstructing both mainstem and multiple lobar segmental bronchi thoroughly cleaned out. Numerous bleeding sites in left nasal airway detected and coagulated thrombin paste instilled into left nasal area with nasal pack. ENT, Dr. Rhona Bennett will manage. ASA was placed on Hold prior to OR ? restart  4. Sepsis:  Currently hemodynamically stable, will monitor need for Levophed support., maintain MAP>65  5. Acute kidney injury:  Nonoliguric, Urine was positive for large amount of blood and 30 protein. Patient is volume expanded. Nephrology is following ? AIN secondary to Zosyn and Vancomycin given at outlying facility, empiric steroids were started 11/29/2020. ? Cardiorenal syndrome. 6. Acute alcohol withdrawal:  PAWSS=2, Wife identified last ETOH 4-5 days ago. Phenobarbital PRN has been started. MVI, Thiamine and Folic Acid will be added. 7. Bacteremia:  Blood cultures from outlying facility positive for Enterococcus-Ampicillin sensitivity Unasyn will be continued.  ECHO 11/27/2020 LVEF 55% with moderate AS with a valve area 1.2.   8. HFpEF, acute on chronic: Patient is volume overloaded, anasarca. Home Aldactone was stopped at time of admission will restart. Noted ECHO with moderate AS, Lasix gtt will be started to decongest. Patient identifies weight gain approximately 40-45#.   9. Hyponatremia: Pseudo recalculated for glucose=137.   10. Morbid Obesity:  BMI 45.82      INITIAL H AND P AND ICU COURSE:  Amada Medrano is a 20-year-old  male transferred to [address] ICU on 2020 with acute respiratory failure currently on BiPAP. Patient has past medical history significant for current everyday smoker, essential hypertension, dyslipidemia, alcohol abuse, hepatic cirrhosis, nicotine dependency, diabetes mellitus type 2, COPD, anemia, hepatitis C, brain tumor,    Orma Lat was admitted under the hospitalist service on 2020 with chief complaint of acute kidney injury. Patient was transferred from [facility] after having been at their facility for 4 days. Patient's chief complaint was vomiting incontinence left shoulder pain and diarrhea. Pain patient was found to have sepsis secondary to pneumonia and acute kidney injury. Blood cultures were positive for gram-positive cocci-Enterococcus. With worsening in kidney function patient was transferred to [facility] for further care. 2020 Nephrology Dr Alejo Scales did evaluate the patient. 2020 ID Dr. Gloria Malcolm did evaluate secondary to positive blood culture Unasyn has been started. 2020 IVP Loop was started per Nephrology  2020 Positive for nose bleed with drop in H/H 8.6-7.9-ENT Dr. Ashkan Caicedo was consulted. Patient required OR intervention. Patient was transferred to the ICU post procedure. Past Medical History: See HPI. Family History: Mother unknown, father -CAD. Social History: Current everyday smoker, positive for alcohol use, positive for marijuana use    ROS   GENERAL: Positive for chills fatigue, rigors  SKN: No lesions or rashes.   HEAD: No headaches or recent ill  HEENT:  normocephalic and atraumatic. No scleral icterus. PERR  Neck: supple. No Thyromegaly. Lungs: clear to auscultation, diminished in bases, . No retractions  Cardiac: RRR-tachycardia. No JVD. Abdomen: soft. Nontender, large bowel sounds present  Extremities:  No clubbing, cyanosis, positive for anasarca. Vasculature: capillary refill < 3 seconds. Palpable dorsalis pedis pulses. Skin:  warm and dry. Psych:  Alert and oriented x3. Affect appropriate  Lymph:  No supraclavicular adenopathy. Neurologic:  No focal deficit. No seizures. Positive for agitation    Data: (All radiographs, tracings, PFTs, and imaging are personally viewed and interpreted unless otherwise noted).  11/29/2020 0940 sodium 133 potassium 3.4 chlorides 101 CO2 22 BUN is 27 creatinine is 2.4 magnesium is 1.9 glucose is 268   White count 6.4 hemoglobin 7.5 hematocrit 23.7 platelet count 84   B12 1/2/2005 folate 15.2   11/28/2020 urinalysis large amount of blood, 30 of protein negative for leukocytes 0-2 WBCs few bacteria   11/29/2020 2211 chest x-ray-patchy bilateral infiltrates no pneumothorax   1/26/2020 renal ultrasound possible left renal cyst otherwise unremarkable. No hydronephrosis or renal calculi. There is suggestion of a avascular anechoic structure measuring 1.7 cm at the upper left kidney   11/27/2020 echo EF 55%, moderate AS with valve area 1.2 cm   Cardiac telemetry normal sinus rhythm      Seen with multidisciplinary ICU team yes. Meets Continued ICU Level Care Criteria:    [x] Yes   [] No - Transfer Planned to listed location:  [] HOSPITALIST CONTACTED-      Case and plan discussed with Dr. Jaylan Oh. Electronically signed by MEGHAN Manzo - CNP  CRITICAL CARE SPECIALIST  Patient seen by me. Case discussed with nurse practitioner. Case discussed with Dr. Jules Rivero. Patient gone through withdrawal from alcohol. Patient hypoxemic secondary to aspiration of epistaxis. Coagulopathy secondary to liver failure from alcoholic liver disease. .  Time was discontiguous. Time does not include procedures. Time does include my direct assessment of the patient and coordination of care.   Electronically signed by Cole Eid MD on 11/30/2020 at 7:32 AM

## 2020-11-30 NOTE — PROGRESS NOTES
Nightly    [Held by provider] aspirin  81 mg Oral Daily    DULoxetine  60 mg Oral Daily    folic acid  1 mg Oral Daily    latanoprost  1 drop Both Eyes Nightly    mirtazapine  15 mg Oral Nightly    rosuvastatin  20 mg Oral Daily    spironolactone  50 mg Oral Daily    tamsulosin  0.4 mg Oral Daily    thiamine  100 mg Oral Daily    amLODIPine  10 mg Oral Daily    hydrALAZINE  25 mg Oral 3 times per day    sodium bicarbonate  1,300 mg Oral TID      furosemide (LASIX) 1mg/ml infusion 20 mg/hr (11/30/20 0906)    dextrose       lidocaine, oxymetazoline, sodium chloride flush, promethazine **OR** ondansetron, PHENobarbital **OR** PHENobarbital **OR** PHENobarbital IVPB, diphenhydrAMINE, albuterol sulfate HFA, acetaminophen **OR** acetaminophen, polyethylene glycol, potassium chloride **OR** potassium alternative oral replacement **OR** potassium chloride, glucose, dextrose, glucagon (rDNA), dextrose, oxyCODONE-acetaminophen      LABS:     CBC:   Recent Labs     11/29/20  0946 11/30/20  0025 11/30/20  0330 11/30/20  0757   WBC 6.4 5.4 5.8  --    HGB 7.5* 7.0* 6.8* 7.8*   PLT 84* 79* 79*  --      BMP:    Recent Labs     11/30/20  0025 11/30/20  0330 11/30/20  0757   * 133* 135   K 4.7 5.0 4.2    101 101   CO2 20* 21* 19*   BUN 27* 29* 30*   CREATININE 2.4* 2.3* 2.5*   GLUCOSE 275* 247* 166*     Calcium:  Recent Labs     11/30/20  0757   CALCIUM 7.7*     Ionized Calcium:No results for input(s): IONCA in the last 72 hours. Magnesium:  Recent Labs     11/29/20  0946   MG 1.9     Phosphorus:No results for input(s): PHOS in the last 72 hours. BNP:No results for input(s): BNP in the last 72 hours. Glucose:  Recent Labs     11/29/20  2031 11/29/20  2323 11/30/20  0847   POCGLU 288* 292* 158*     HgbA1C: No results for input(s): LABA1C in the last 72 hours. INR: No results for input(s): INR in the last 72 hours.   Hepatic: No results for input(s): ALKPHOS, ALT, AST, PROT, BILITOT, BILIDIR, LABALBU in the last 72 hours. Amylase and Lipase:No results for input(s): LACTA, AMYLASE in the last 72 hours. Lactic Acid: No results for input(s): LACTA in the last 72 hours. Troponin:   Recent Labs     11/30/20  0330   CKTOTAL 54*     BNP: No results for input(s): BNP in the last 72 hours. CULTURES:   UA:   Recent Labs     11/28/20  1340   SPECGRAV 1.018   PHUR 5.0   COLORU YELLOW   PROTEINU 30*   BLOODU LARGE*   RBCUA 5-10   WBCUA 0-2   BACTERIA FEW   NITRU NEGATIVE   BILIRUBINUR NEGATIVE   UROBILINOGEN 0.2   KETUA NEGATIVE   LABCAST 0-4 C. GRAN  NONE SEEN     Micro:   Lab Results   Component Value Date    BC No growth-preliminary  11/27/2020         IMAGING:         Problem list of patient:     Patient Active Problem List   Diagnosis Code    HCV antibody positive R76.8    Cirrhosis, alcoholic ([facility] Z42.37    Alcohol withdrawal seizure with complication ([facility] [facility] G18.511, R56.9    Alcohol withdrawal, uncomplicated ([address] P65.491    Type 2 diabetes mellitus ([address] E11.9    Hyponatremia E87.1    Leukocytosis D72.829    Thrombocytopenia ([address] D69.6    COPD (chronic obstructive pulmonary disease) ([facility] [facility] J44.9    HLD (hyperlipidemia) E78.5    HTN (hypertension) I10    Seizure ([facility] [address] [facility] R56.9    Recurrent falls R29.6    Fracture of multiple ribs of left side S22.42XA    Anemia D64.9    Alcohol abuse F10.10    Closed fracture of distal end of left fibula with routine healing S82.832D    Hyperammonemia (HCC) E72.20    Essential tremor G25.0    Alcohol intoxication delirium (HCC) F10.121    MATTIE (acute kidney injury) ([address] N17.9    Renal failure N19    Epistaxis R04.0    Pneumonitis due to aspiration of blood (HCC) J69.8         ASSESSMENT/PLAN   Enterococcus bacteremia on treatment  Acute kidney injury  Epistaxis with possible aspiration of blood  Liver cirrhosis  History of alcohol abuse  Continue current supportive treatment.       Jade Diallo MD, [address] 11/30/2020 9:21 AM

## 2020-11-30 NOTE — PROGRESS NOTES
Renal Progress Note    Assessment and Plan:    1. Acute kidney injury with serum creatinine progressively worsening. One has to be concerned with the possibility of type I hepatorenal syndrome. 2.  Diabetes mellitus type 2  3. Alcohol misuse  4. Hepatic cirrhosis from alcohol misuse  5. Hepatitis C positive antibody  6. Alcohol withdrawal seizures  7. Metabolic acidosis improving  8. Hypocalcemia corrects for low serum   albumin level  9. Hypertension with blood pressure on the low end of normal sometimes  PLAN:  Labs reviewed  Serum creatinine is increased today  Medications reviewed  Hold hydralazine for now  Labs in the morning  We will continue to follow        Patient Active Problem List:     HCV antibody positive     Cirrhosis, alcoholic ([address]     Alcohol withdrawal seizure with complication ([address]     Alcohol withdrawal, uncomplicated ([address]     Type 2 diabetes mellitus ([address]     Hyponatremia     Leukocytosis     Thrombocytopenia (HCC)     COPD (chronic obstructive pulmonary disease) (HCC)     HLD (hyperlipidemia)     HTN (hypertension)     Seizure (HCC)     Recurrent falls     Fracture of multiple ribs of left side     Anemia     Alcohol abuse     Closed fracture of distal end of left fibula with routine healing     Hyperammonemia ([address]     Essential tremor     Alcohol intoxication delirium ([address]     MATTIE (acute kidney injury) ([address]     Renal failure     Epistaxis     Pneumonitis due to aspiration of blood ([address]      Subjective:   Admit Date: 11/25/2020    Interval History:   Follow-up visit for Mr. Luis Longo. Seen for acute kidney injury on chronic kidney disease. Comfortably asleep this morning. Tolerated by the staff. No issues  Transferred to critical care block due to possible alcohol withdrawal seizures and after bronchoscopy  Blood pressure is stable. Urine output is good.       Medications:   Scheduled Meds:   multivitamin  1 tablet Oral Daily    insulin lispro  0-12 Units Subcutaneous Q4H    sodium chloride  20 mL Intravenous Once    ampicillin-sulbactam  3 g Intravenous Q6H    predniSONE  60 mg Oral Daily    sodium chloride flush  10 mL Intravenous 2 times per day    insulin glargine  35 Units Subcutaneous Nightly    [Held by provider] aspirin  81 mg Oral Daily    DULoxetine  60 mg Oral Daily    folic acid  1 mg Oral Daily    latanoprost  1 drop Both Eyes Nightly    mirtazapine  15 mg Oral Nightly    rosuvastatin  20 mg Oral Daily    spironolactone  50 mg Oral Daily    tamsulosin  0.4 mg Oral Daily    thiamine  100 mg Oral Daily    amLODIPine  10 mg Oral Daily    hydrALAZINE  25 mg Oral 3 times per day    sodium bicarbonate  1,300 mg Oral TID     Continuous Infusions:   furosemide (LASIX) 1mg/ml infusion 20 mg/hr (11/30/20 0906)    dextrose         CBC:   Recent Labs     11/29/20  0946 11/30/20  0025 11/30/20  0330 11/30/20  0757   WBC 6.4 5.4 5.8  --    HGB 7.5* 7.0* 6.8* 7.8*   PLT 84* 79* 79*  --      CMP:    Recent Labs     11/30/20  0330 11/30/20  0757 11/30/20  1053   * 135 136   K 5.0 4.2 4.1    101 103   CO2 21* 19* 21*   BUN 29* 30* 30*   CREATININE 2.3* 2.5* 2.7*   GLUCOSE 247* 166* 133*   CALCIUM 7.3* 7.7* 7.8*   LABGLOM 30* 27* 25*     Troponin: No results for input(s): TROPONINI in the last 72 hours. BNP: No results for input(s): BNP in the last 72 hours. INR: No results for input(s): INR in the last 72 hours. Lipids: No results for input(s): CHOL, LDLDIRECT, TRIG, HDL, AMYLASE, LIPASE in the last 72 hours. Liver: No results for input(s): AST, ALT, ALKPHOS, PROT, LABALBU, BILITOT in the last 72 hours. Invalid input(s): BILDIR  Iron:  No results for input(s): IRONS, FERRITIN in the last 72 hours. Invalid input(s): LABIRONS  VL DUP LOWER EXTREMITY VENOUS BILATERAL   Final Result   No sonographic evidence of lower extremity DVT. **This report has been created using voice recognition software.   It may contain minor errors which are inherent in voice recognition technology. **      Final report electronically signed by Dr. Alejandro Magaña on 11/30/2020 9:25 AM      US GALLBLADDER RUQ   Final Result   Hepatomegaly. Otherwise limited but unremarkable study. This document has been electronically signed by: Rob Hernandez MD    on 11/30/2020 07:21 AM         CT CHEST WO CONTRAST   Final Result   Bibasilar atelectasis and trace pleural effusions. Superimposed right    lower lobe pneumonia is not excluded. Sequelae of prior granulomatous infection. Cholelithiasis. Cirrhosis. This document has been electronically signed by: Talib Reynolds MD on    11/30/2020 01:31 AM      All CT scans at this facility use dose modulation, iterative    reconstruction, and/or weight-based   dosing when appropriate to reduce radiation dose to as low as reasonably    achievable. XR CHEST PORTABLE   Final Result   Impression:   Patchy bilateral infiltrates, mildly improved. No pneumothorax. This document has been electronically signed by: Talib Reynolds MD on    11/29/2020 10:33 PM         XR CHEST PORTABLE   Final Result   1. Worsening bilateral lower lung atelectasis/infiltrate. 2. Pulmonary edema. 3. Possible small bilateral pleural effusions. 4. Mild stable cardiomegaly. **This report has been created using voice recognition software. It may contain minor errors which are inherent in voice recognition technology. **      Final report electronically signed by Dr. Harmony Joseph on 11/29/2020 6:43 PM      XR CHEST (2 VW)   Final Result   Mild pulmonary edema versus interstitial infiltrates with very small bilateral pleural effusions. **This report has been created using voice recognition software. It may contain minor errors which are inherent in voice recognition technology. **      Final report electronically signed by Dr. Grace Mathias on 11/29/2020 2:27 PM      XR CHEST PORTABLE   Final Result 1. Engorged pulmonary vessels. 2. Prominent lung markings in the mid and lower lung zones. This can represent early pulmonary edema. Interstitial infiltrates are not excluded. **This report has been created using voice recognition software. It may contain minor errors which are inherent in voice recognition technology. **      Final report electronically signed by Dr. Bryan Jarquin on 11/29/2020 8:39 AM      XR CHEST (2 VW)   Final Result   1. Mild prominence of the basilar interstitium which can represent some fibrotic changes versus early pulmonary edema. 2. New age indeterminate compression fracture of an upper thoracic vertebral body. **This report has been created using voice recognition software. It may contain minor errors which are inherent in voice recognition technology. **      Final report electronically signed by Dr. Bryan Jarquin on 11/28/2020 8:47 AM      US RENAL COMPLETE   Final Result   1. Possible left renal cyst but otherwise unremarkable renal ultrasound. 2. Unremarkable urinary bladder. Final report electronically signed by Dr. Jenelle Jones on 11/26/2020 5:12 PM            Objective:   Vitals: /75   Pulse 78   Temp 98.2 °F (36.8 °C) (Bladder)   Resp 19   Ht 6' 3\" (1.905 m)   Wt (!) 366 lb 10 oz (166.3 kg)   SpO2 95%   BMI 45.82 kg/m²    Wt Readings from Last 3 Encounters:   11/30/20 (!) 366 lb 10 oz (166.3 kg)   09/23/20 (!) 319 lb (144.7 kg)   09/10/20 (!) 321 lb (145.6 kg)      24HR INTAKE/OUTPUT:      Intake/Output Summary (Last 24 hours) at 11/30/2020 1123  Last data filed at 11/30/2020 0700  Gross per 24 hour   Intake 1173 ml   Output 850 ml   Net 323 ml       Constitutional: Comfortably asleep  Skin:normal with no rash or lesions. HEENT:Pupils are reactive . Throat is clear . Oral mucosa is moist   Neck:supple with no thyromegaly or carotid bruit  Cardiovascular:  S1, S2 without murmur or rubs   Respiratory: Decreased sounds in the anterior  Abdomen: Soft. Good bowel sounds. Obese.   Ext: 2-3+ bilateral LE edema  Musculoskeletal:Intact  Neuro: Deferred      Electronically signed by Laine Solis MD on 11/30/2020 at 11:23 AM

## 2020-11-30 NOTE — PROGRESS NOTES
Patient arrived to unit from PACU via bed. Patient transferred to ICU bed and placed on continuous ICU bedside monitor. Patient admitted for MATTIE (acute kidney injury) ([facility] [N17.9]  Renal failure [N19]. Vitals obtained. See flowsheets. Patient's IV access includes 20g RFA. Current infusions and rates of infusion include NS @ 100  Ml/hr. Assessment completed by Renée Becker RN. Two nurse skin assessment completed by Link Main and Chata Bansal RN. See flowsheets for assessment details. Policies and procedures of ICU able to be explained to patient at this time. Family member(s)/representative(s) present at time of admission include N/A. Patient rights explained to family member(s)/representatives and patient, as able. Patient/patient's family member(s)/representative(s) Declined to have physician notified of their admission. All questions posed by patient's family member(s)/representative(s) and patient answered at this time.

## 2020-11-30 NOTE — PROGRESS NOTES
CRITICAL CARE PROGRESS NOTE      Patient:  Sidney Castillo    Unit/Bed:4D-05/005-A  YOB: 1965  MRN: [id number]   PCP: MEGHAN Copeland CNP  Date of Admission: 11/25/2020  Chief Complaint:- Acute respiratory failure    Assessment and Plan:    Acute hypoxic respiratory failure: Patient does not use oxygen at home and needed BiPAP to maintain adequate oxygen saturation. He was switched to HFNC however BiPAP was restarted after postsurgical nasal cautery in the PACU on 11/30/2020. Within the ICU the patient was weaned to HFNC.  - Continue to wean HFNC and maintain SpO2 >90%     Severe pneumonia likely secondary to aspiration: Chart review 11/29/2020 ? rash upper arms improved with Benadryl? None noted at time of admission to the ICU.  - Continue Unasyn & Prednisone  - Continue to monitor for rash     Liver cirrhosis: Seen on CT chest 11/30/2020. Secondary to EtOH abuse and large contributor to coagulopathy. ALT=31, AST=45    Epistaxis: Secondary to liver failure from alcoholic liver disease and cirrhosis. 11/29/2020 status post flexible bronchoscopy with bronchoalveolar lavage with cultures.  Nasal endoscopy with cautery and nasal packing.  Blood obstructing both mainstem and multiple lobar segmental bronchi thoroughly cleaned out.  Numerous bleeding sites in left nasal airway detected and coagulated thrombin paste instilled into left nasal area with nasal pack followed by cautery by Dr. Madai Valencia. ASA was placed on Hold prior to OR.  - Continue holding ASA  - H&H Q6H     Sepsis:  Currently hemodynamically stable, will monitor need for Levophed support.  - maintain MAP>65     Acute kidney injury:  Nonoliguric, Urine was positive for large amount of blood and 30 protein. Patient is volume expanded. Nephrology is following. ?AIN secondary to Zosyn and Vancomycin given at outlying facility, empiric steroids were started 11/29/2020. ? Cardiorenal syndrome.  - Avoid nephrotoxic agents  - Renally dose medications  - Continue Lasix drip     Acute alcohol withdrawal:  PAWSS=2, Wife identified last ETOH 4-5 days ago. - Continue Phenobarbitol  - Continue Thiamine, Folic acid     Bacteremia:  Blood cultures from outlying facility positive for Enterococcus; - Ampicillin sensitivity. ECHO 11/27/2020 LVEF 55% with moderate AS with a valve area 1.2.  - Continue Unasyn     HFpEF, acute on chronic:  Patient is volume overloaded, anasarca. Home Aldactone was stopped at time of admission will restart. Noted ECHO with moderate AS, Lasix gtt will be started to decongest. Patient identifies weight gain approximately 40-45#. - Continue spironolactone     Hyponatremia: Pseudo recalculated for glucose=137. Morbid Obesity:  BMI 45.82    INITIAL H AND P AND ICU COURSE:  Brittny Zavaleta is a 48 y.o. male with PMHx of everyday smoker, HTN, HLD, EtOH abuse, hepatic cirrhosis, tobacco abuse, diabetes mellitus type 2, COPD, anemia, hepatitis C, and brain tumor who transferred to [facility] ICU on 11/29/2020 with acute respiratory failure while on BiPAP. The patient was admitted under the hospitalist service on 11/25/2020 with complaint of acute kidney injury. Patient was transferred from [facility] after having been at their facility for 4 days. Prior to admission to [facility] the patient had complaint of vomiting, incontinence, left shoulder pain, and diarrhea. The patient was found to have sepsis secondary to pneumonia and MATTIE. Blood cultures were positive for gram-positive cocci Enterococcus. Patient continued to have worsening kidney function and was transferred to [facility] for further care. 11/26/2020 - nephrology (Dr. Chance Anglin) evaluated the patient  11/27/2020 -infectious disease (Dr. Becca Cruz) evaluated secondary to positive blood cultures and Unasyn was started. 11/28/2020 - IV Loop diuretic started  11/29/2020 - required ENT OR intervention secondary to epistaxis.  The patient was transferred to the ICU postprocedure Score: 15      Input/Output  In: 1173   Out: 50     BMI:  Body mass index is 45.82 kg/m². Oxygenation/Ventilation  Vent Information  FiO2 : 40 %     Vent Patient Data  Vt Exhaled: 1254 mL  Minute Volume: 25.6 Liters      General: Obese, acutely-ill  HEENT: Normocephalic and atraumatic. No scleral icterus. PERR  Neck: Supple. No Thyromegaly. Lungs: clear to auscultation. No retractions  Cardiac: RRR. No JVD. Abdomen: Soft. RUQ tender to palpation. BS present x4  Extremities:  No clubbing, cyanosis, and pitting edema x 4. Vasculature: Capillary refill < 3 seconds. Palpable dorsalis pedis pulses. Skin: Warm and dry. Psych: Alert and oriented to self only. Lymph: No supraclavicular adenopathy. Neurologic: No focal deficit. No seizures. Data: (All radiographs, tracings, PFTs, and imaging are personally viewed and interpreted unless otherwise noted).  Na= 136, K= 4.1, Chloride= 103, CO2= 21, BUN= 30, Cr= 2.7, Glucose= 133, Anion Gap= 12   WBC= 5.8, Hgb= 7.8, HCT= 94.7, Platelets= 79   Telemetry shows NSR   11/29/2020 22:33 -patchy bilateral infiltrates, mildly improved compared to previous. No pneumothorax.  11/30/2020 CT chest -basilar atelectasis and trace pleural effusions. Superimposed right lower lobe pneumonia, earlier prior granulomatous infection, cholelithiasis, cirrhosis.  11/30/2020 US gallbladder -hepatomegaly   11/30/2020 venous duplex lower extremity bilateral - no evidence of lower extremity DVT. Seen with multidisciplinary ICU team.  Meets Continued ICU Level Care Criteria:    [] Yes   [x] No - Transfer Planned to listed location: ICU step down  [] HOSPITALIST CONTACTED-      Case and plan discussed with Dr. Kalia Griffin. Electronically signed by Patti Galaviz DO on 11/30/2020 at 11:07 AM  Josesito Howard    Patient seen by me. Case discussed with resident physician. Case discussed with Dr. Cathleen Benson.   Patient on as needed phenobarbital for alcohol withdrawal.  CT scan chest shows significant improvement in infiltrates with only mild atelectasis. Hypoxemia significantly improved. Okay to transfer to medical floor. Patient awake and interactive. He is pleasant. He complains of cough with bloody mucus. He has mild shortness of breath and wheezing. No fever. Denies any alcohol consumption in 6 weeks  Time was discontiguous. Time does not include procedures. Time does include my direct assessment of the patient and coordination of care.   Electronically signed by Petra Sanders MD on 11/30/2020 at 12:39 PM

## 2020-12-01 LAB
ANION GAP SERPL CALCULATED.3IONS-SCNC: 10 MEQ/L (ref 8–16)
BASOPHILS # BLD: 0.2 %
BASOPHILS ABSOLUTE: 0 THOU/MM3 (ref 0–0.1)
BUN BLDV-MCNC: 36 MG/DL (ref 7–22)
CALCIUM SERPL-MCNC: 7.6 MG/DL (ref 8.5–10.5)
CHLORIDE BLD-SCNC: 99 MEQ/L (ref 98–111)
CO2: 24 MEQ/L (ref 23–33)
CREAT SERPL-MCNC: 3.1 MG/DL (ref 0.4–1.2)
EOSINOPHIL # BLD: 0.3 %
EOSINOPHILS ABSOLUTE: 0 THOU/MM3 (ref 0–0.4)
ERYTHROCYTE [DISTWIDTH] IN BLOOD BY AUTOMATED COUNT: 15.2 % (ref 11.5–14.5)
ERYTHROCYTE [DISTWIDTH] IN BLOOD BY AUTOMATED COUNT: 59.9 FL (ref 35–45)
GFR SERPL CREATININE-BSD FRML MDRD: 21 ML/MIN/1.73M2
GLUCOSE BLD-MCNC: 292 MG/DL (ref 70–108)
GLUCOSE BLD-MCNC: 306 MG/DL (ref 70–108)
GLUCOSE BLD-MCNC: 310 MG/DL (ref 70–108)
GLUCOSE BLD-MCNC: 324 MG/DL (ref 70–108)
GLUCOSE BLD-MCNC: 392 MG/DL (ref 70–108)
GLUCOSE BLD-MCNC: 443 MG/DL (ref 70–108)
HCT VFR BLD CALC: 24.2 % (ref 42–52)
HCT VFR BLD CALC: 24.5 % (ref 42–52)
HEMOGLOBIN: 7.6 GM/DL (ref 14–18)
HEMOGLOBIN: 7.7 GM/DL (ref 14–18)
IMMATURE GRANS (ABS): 0.07 THOU/MM3 (ref 0–0.07)
IMMATURE GRANULOCYTES: 0.8 %
INR BLD: 1.23 (ref 0.85–1.13)
LYMPHOCYTES # BLD: 11.2 %
LYMPHOCYTES ABSOLUTE: 1 THOU/MM3 (ref 1–4.8)
MCH RBC QN AUTO: 33.5 PG (ref 26–33)
MCHC RBC AUTO-ENTMCNC: 31.4 GM/DL (ref 32.2–35.5)
MCV RBC AUTO: 106.5 FL (ref 80–94)
MONOCYTES # BLD: 9.8 %
MONOCYTES ABSOLUTE: 0.8 THOU/MM3 (ref 0.4–1.3)
NUCLEATED RED BLOOD CELLS: 0 /100 WBC
PLATELET # BLD: 100 THOU/MM3 (ref 130–400)
PMV BLD AUTO: 11.1 FL (ref 9.4–12.4)
POTASSIUM REFLEX MAGNESIUM: 4.5 MEQ/L (ref 3.5–5.2)
RBC # BLD: 2.3 MILL/MM3 (ref 4.7–6.1)
SEG NEUTROPHILS: 77.7 %
SEGMENTED NEUTROPHILS ABSOLUTE COUNT: 6.7 THOU/MM3 (ref 1.8–7.7)
SODIUM BLD-SCNC: 133 MEQ/L (ref 135–145)
WBC # BLD: 8.6 THOU/MM3 (ref 4.8–10.8)

## 2020-12-01 PROCEDURE — 99232 SBSQ HOSP IP/OBS MODERATE 35: CPT | Performed by: NURSE PRACTITIONER

## 2020-12-01 PROCEDURE — 2700000000 HC OXYGEN THERAPY PER DAY

## 2020-12-01 PROCEDURE — 6370000000 HC RX 637 (ALT 250 FOR IP): Performed by: STUDENT IN AN ORGANIZED HEALTH CARE EDUCATION/TRAINING PROGRAM

## 2020-12-01 PROCEDURE — 82948 REAGENT STRIP/BLOOD GLUCOSE: CPT

## 2020-12-01 PROCEDURE — 85018 HEMOGLOBIN: CPT

## 2020-12-01 PROCEDURE — 2580000003 HC RX 258: Performed by: OTOLARYNGOLOGY

## 2020-12-01 PROCEDURE — 36415 COLL VENOUS BLD VENIPUNCTURE: CPT

## 2020-12-01 PROCEDURE — 6370000000 HC RX 637 (ALT 250 FOR IP): Performed by: INTERNAL MEDICINE

## 2020-12-01 PROCEDURE — 97530 THERAPEUTIC ACTIVITIES: CPT

## 2020-12-01 PROCEDURE — 6370000000 HC RX 637 (ALT 250 FOR IP): Performed by: NURSE PRACTITIONER

## 2020-12-01 PROCEDURE — 85025 COMPLETE CBC W/AUTO DIFF WBC: CPT

## 2020-12-01 PROCEDURE — 2580000003 HC RX 258: Performed by: NURSE PRACTITIONER

## 2020-12-01 PROCEDURE — 6370000000 HC RX 637 (ALT 250 FOR IP): Performed by: FAMILY MEDICINE

## 2020-12-01 PROCEDURE — 99233 SBSQ HOSP IP/OBS HIGH 50: CPT | Performed by: INTERNAL MEDICINE

## 2020-12-01 PROCEDURE — 2060000000 HC ICU INTERMEDIATE R&B

## 2020-12-01 PROCEDURE — 85610 PROTHROMBIN TIME: CPT

## 2020-12-01 PROCEDURE — 6360000002 HC RX W HCPCS: Performed by: STUDENT IN AN ORGANIZED HEALTH CARE EDUCATION/TRAINING PROGRAM

## 2020-12-01 PROCEDURE — P9047 ALBUMIN (HUMAN), 25%, 50ML: HCPCS | Performed by: STUDENT IN AN ORGANIZED HEALTH CARE EDUCATION/TRAINING PROGRAM

## 2020-12-01 PROCEDURE — 85014 HEMATOCRIT: CPT

## 2020-12-01 PROCEDURE — 80048 BASIC METABOLIC PNL TOTAL CA: CPT

## 2020-12-01 PROCEDURE — 6360000002 HC RX W HCPCS: Performed by: OTOLARYNGOLOGY

## 2020-12-01 PROCEDURE — 94761 N-INVAS EAR/PLS OXIMETRY MLT: CPT

## 2020-12-01 PROCEDURE — 6360000002 HC RX W HCPCS: Performed by: NURSE PRACTITIONER

## 2020-12-01 PROCEDURE — 99232 SBSQ HOSP IP/OBS MODERATE 35: CPT | Performed by: PHYSICIAN ASSISTANT

## 2020-12-01 PROCEDURE — 6370000000 HC RX 637 (ALT 250 FOR IP): Performed by: PHYSICIAN ASSISTANT

## 2020-12-01 RX ORDER — ALBUMIN (HUMAN) 12.5 G/50ML
12.5 SOLUTION INTRAVENOUS EVERY 6 HOURS
Status: COMPLETED | OUTPATIENT
Start: 2020-12-01 | End: 2020-12-02

## 2020-12-01 RX ORDER — INSULIN GLARGINE 100 [IU]/ML
10 INJECTION, SOLUTION SUBCUTANEOUS ONCE
Status: COMPLETED | OUTPATIENT
Start: 2020-12-01 | End: 2020-12-01

## 2020-12-01 RX ORDER — OXYMETAZOLINE HYDROCHLORIDE 0.05 G/100ML
2 SPRAY NASAL 2 TIMES DAILY
Status: COMPLETED | OUTPATIENT
Start: 2020-12-01 | End: 2020-12-02

## 2020-12-01 RX ADMIN — ALBUMIN (HUMAN) 12.5 G: 0.25 INJECTION, SOLUTION INTRAVENOUS at 13:50

## 2020-12-01 RX ADMIN — Medication 100 MG: at 09:24

## 2020-12-01 RX ADMIN — FOLIC ACID 1 MG: 1 TABLET ORAL at 09:24

## 2020-12-01 RX ADMIN — AMLODIPINE BESYLATE 10 MG: 10 TABLET ORAL at 09:24

## 2020-12-01 RX ADMIN — MIRTAZAPINE 15 MG: 15 TABLET, FILM COATED ORAL at 22:03

## 2020-12-01 RX ADMIN — FUROSEMIDE 20 MG/HR: 10 INJECTION, SOLUTION INTRAMUSCULAR; INTRAVENOUS at 14:53

## 2020-12-01 RX ADMIN — PREDNISONE 60 MG: 20 TABLET ORAL at 09:24

## 2020-12-01 RX ADMIN — HYDRALAZINE HYDROCHLORIDE 25 MG: 25 TABLET, FILM COATED ORAL at 05:31

## 2020-12-01 RX ADMIN — SPIRONOLACTONE 50 MG: 25 TABLET ORAL at 09:27

## 2020-12-01 RX ADMIN — FUROSEMIDE 20 MG/HR: 10 INJECTION, SOLUTION INTRAMUSCULAR; INTRAVENOUS at 04:52

## 2020-12-01 RX ADMIN — LATANOPROST 1 DROP: 50 SOLUTION OPHTHALMIC at 22:03

## 2020-12-01 RX ADMIN — DULOXETINE HYDROCHLORIDE 60 MG: 60 CAPSULE, DELAYED RELEASE ORAL at 09:24

## 2020-12-01 RX ADMIN — INSULIN GLARGINE 10 UNITS: 100 INJECTION, SOLUTION SUBCUTANEOUS at 12:50

## 2020-12-01 RX ADMIN — OXYCODONE HYDROCHLORIDE AND ACETAMINOPHEN 1 TABLET: 7.5; 325 TABLET ORAL at 09:24

## 2020-12-01 RX ADMIN — THERA TABS 1 TABLET: TAB at 09:24

## 2020-12-01 RX ADMIN — FUROSEMIDE 20 MG/HR: 10 INJECTION, SOLUTION INTRAMUSCULAR; INTRAVENOUS at 10:15

## 2020-12-01 RX ADMIN — SODIUM CHLORIDE 3 G: 900 INJECTION INTRAVENOUS at 05:30

## 2020-12-01 RX ADMIN — INSULIN GLARGINE 35 UNITS: 100 INJECTION, SOLUTION SUBCUTANEOUS at 22:10

## 2020-12-01 RX ADMIN — TAMSULOSIN HYDROCHLORIDE 0.4 MG: 0.4 CAPSULE ORAL at 09:27

## 2020-12-01 RX ADMIN — OXYMETAZOLINE HYDROCHLORIDE 2 SPRAY: 0.05 SPRAY NASAL at 22:03

## 2020-12-01 RX ADMIN — ROSUVASTATIN CALCIUM 20 MG: 20 TABLET, FILM COATED ORAL at 09:24

## 2020-12-01 RX ADMIN — SODIUM CHLORIDE 3 G: 900 INJECTION INTRAVENOUS at 14:49

## 2020-12-01 ASSESSMENT — PAIN SCALES - GENERAL
PAINLEVEL_OUTOF10: 8
PAINLEVEL_OUTOF10: 8

## 2020-12-01 ASSESSMENT — PAIN DESCRIPTION - LOCATION: LOCATION: NECK

## 2020-12-01 ASSESSMENT — PAIN DESCRIPTION - PAIN TYPE: TYPE: CHRONIC PAIN

## 2020-12-01 ASSESSMENT — PAIN DESCRIPTION - ORIENTATION: ORIENTATION: MID

## 2020-12-01 NOTE — PROGRESS NOTES
Nephrology Progress Note    Patient Sena Menendez   MRN -  [id number]   Acct # - [de-identified]      - 1965    48 y.o. Admit Date: 2020  Hospital Day: 6  Location: --A  Date of evaluation -  2020    Subjective:   CC: vomiting,   Shortness of breath with talking  I don't feel good  UOP 4650/24h  BP borderline low  BP Range: Systolic (78MQC), UMY:402 , Min:110 , NICOL:829      Diastolic (63MLO), ELISA:75, Min:63, Max:78    Objective:   VITALS:  /72   Pulse 106   Temp 98.3 °F (36.8 °C) (Oral)   Resp 18   Ht 6' 3\" (1.905 m)   Wt (!) 363 lb 9 oz (164.9 kg)   SpO2 100%   BMI 45.44 kg/m²    Patient Vitals for the past 24 hrs:   BP Temp Temp src Pulse Resp SpO2 Weight   20 0445 130/72 98.3 °F (36.8 °C) Oral 106 18 100 % (!) 363 lb 9 oz (164.9 kg)   20 2302 110/64 97.6 °F (36.4 °C) Oral 109 18 95 % --   20 2039 110/72 98.1 °F (36.7 °C) Axillary 111 20 96 % --   20 1726 -- -- -- -- -- 95 % --   20 1545 138/72 98.1 °F (36.7 °C) Axillary 107 14 98 % --   20 1440 -- -- -- -- -- 99 % --   20 1400 (!) 144/78 -- -- 100 15 95 % --   20 1300 134/72 -- -- 105 19 -- --   20 1254 -- -- -- -- 14 94 % --   20 1200 127/63 97.9 °F (36.6 °C) Bladder 99 13 90 % --   20 1023 -- -- -- -- 19 95 % --   20 0908 134/75 -- -- 78 20 100 % --     6 L/min    Intake/Output Summary (Last 24 hours) at 2020 0816  Last data filed at 2020 0445  Gross per 24 hour   Intake 1911.99 ml   Output 4650 ml   Net -2738.01 ml       Admission weight: (!) 348 lb (157.9 kg)  Patient Vitals for the past 96 hrs (Last 3 readings):   Weight   20 0445 (!) 363 lb 9 oz (164.9 kg)   20 0100 (!) 366 lb 10 oz (166.3 kg)   20 0447 (!) 357 lb 4.8 oz (162.1 kg)     Body mass index is 45.44 kg/m². EXAM:  CONSTITUTIONAL:  No acute distress. Pleasant  HEENT:  Head is normocephalic, Extraocular movement intact. Neck is supple.  Voice is clear.  CARDIOVASCULAR:  S1, S2  regular rate and rhythm. RESPIRATORY: Clear to ausculation bilaterally. Equal breath sounds. No wheezes. No shortness of breath noted at rest.  ABDOMEN: soft, non tender  NEUROLOGICAL: Patient is alert and oriented to person, place, and time. Recent and remote memory intact. Thought is coherant. SKIN: no rash, No significant bruises on exposed surfaces  MUSCULOSKELETAL: Movement is coordinated. Moves all extremities   EXTREMITIES: Distal lower extremity temp is warm, Generalized body edema  PSYCHIATRIC: mood and affect appropriate.     Medications:   Med reviewed  Scheduled Meds:   insulin lispro  0-12 Units Subcutaneous 4x Daily AC & HS    insulin glargine  10 Units Subcutaneous Once    multivitamin  1 tablet Oral Daily    sodium chloride  20 mL Intravenous Once    ampicillin-sulbactam  3 g Intravenous Q6H    predniSONE  60 mg Oral Daily    sodium chloride flush  10 mL Intravenous 2 times per day    insulin glargine  35 Units Subcutaneous Nightly    [Held by provider] aspirin  81 mg Oral Daily    DULoxetine  60 mg Oral Daily    folic acid  1 mg Oral Daily    latanoprost  1 drop Both Eyes Nightly    mirtazapine  15 mg Oral Nightly    rosuvastatin  20 mg Oral Daily    spironolactone  50 mg Oral Daily    tamsulosin  0.4 mg Oral Daily    thiamine  100 mg Oral Daily    amLODIPine  10 mg Oral Daily    hydrALAZINE  25 mg Oral 3 times per day    sodium bicarbonate  1,300 mg Oral TID     Continuous Infusions:   furosemide (LASIX) 1mg/ml infusion 20 mg/hr (12/01/20 1038)    dextrose       PRN Meds lidocaine, oxymetazoline, sodium chloride flush, promethazine **OR** ondansetron, PHENobarbital **OR** PHENobarbital **OR** PHENobarbital IVPB, diphenhydrAMINE, albuterol sulfate HFA, acetaminophen **OR** acetaminophen, polyethylene glycol, potassium chloride **OR** potassium alternative oral replacement **OR** potassium chloride, glucose, dextrose, glucagon (rDNA), dextrose, lasix drip and spironolactone. Check BMP, I lyla and Mag in AM  2. Metabolic acidosis, resolved. Stop POP bicarb  3. Acute on chronic HFpEF with anasarca  4. Hypoxic resp failure, s/p HFNC  5. Pseudohyponatremia 2nd to hyperglycemia  6. Pneumonia  7. Sepsis with Enterococcus bacteremia  8. Liver cirrhosis with history of alcohol abuse  9. Epistaxis s/p nasal endoscopy with cautery, nasal pkg  10. Morbid obesity    Active Problems:    MATTIE (acute kidney injury) ([facility] [address]    Renal failure    Epistaxis    Pneumonitis due to aspiration of blood ([address]  Resolved Problems:    * No resolved hospital problems.  Jenna Bartlett, MEGHAN - CNP 8:16 AM 12/1/2020

## 2020-12-01 NOTE — PROGRESS NOTES
Hospitalist Progress Note    Patient:  Faheem Rangel      Unit/Bed:4K-20/020-A    YOB: 1965    MRN: [id number]       Acct: [de-identified]     PCP: MEGHAN Basilio - CNP    Date of Admission: 11/25/2020    Assessment/Plan:  1. Acute kidney injury, worsening: Likely multifactorial AIN vs. Sepsis vs. Hypovolemia  - Continue management per nephrology: Lasix drip and spironolactone  - Started Albumin 25% IV 12.5g q6hrs x4 doses  2. Severe Pneumonia 2/2 Aspiration, improving  - Continue Unasyn  3. Acute hypoxic respiratory failure, improving: Postsurgical nasal cautery requiring respiratory support. - Was transferred to ICU on 11/29/20 after procedure and was put on BiPAP, then subsequently HFNC. He was transferred out of ICU on 11/30/20 after he was able to be weaned to HFNC. Has been tolerating 6L O2 via NC.  4. Liver Cirrhosis 2/2 EtOH abuse: Significant other states 1-2 weeks before hospitalization he drinks 6-8, 24 oz beers per day  - Check INR  - Phenobarbital PRN for alcohol withdrawal  5. Fluid overload: Multifactorial due to MATTIE and liver cirrhosis. Questionable diastolic heart dysfunction, however not noted on recent echocardiogram.  - Plan as above  6. Epistaxis s/p left nasal cautery and packing, stable - POD #2: No further episodes of epistaxis. Nasal packing in place.  - Monitor  7. Enterococcus Bacteremia: No growth on recent blood cultures  - Continue Unasyn  8. Moderate Aortic Stenosis: Valve area 1.2 sq cm, mean gradient 33 mmHg, peak velocity 3.5 cm/s  - Outpatient cardiology follow up  9. IDDM Type 2: HgbA1C 5.9% on 11/26/2020.   - Changed SSI frequency to four times daily ACHS  - Continue Lantus 35 units QHS  - Given Lantus 10 units once today due to -300s in past few days. Patient has been receiving prednisone. Will reassess need to change insulin dosing once prednisone complete.     Chief Complaint: MATTIE    Hospital Course:  Per HPI, \"49 y.o. male who presented to [facility] Nightly    mirtazapine  15 mg Oral Nightly    rosuvastatin  20 mg Oral Daily    spironolactone  50 mg Oral Daily    tamsulosin  0.4 mg Oral Daily    thiamine  100 mg Oral Daily    amLODIPine  10 mg Oral Daily     PRN Meds: lidocaine, oxymetazoline, sodium chloride flush, promethazine **OR** ondansetron, PHENobarbital **OR** PHENobarbital **OR** PHENobarbital IVPB, diphenhydrAMINE, albuterol sulfate HFA, acetaminophen **OR** acetaminophen, polyethylene glycol, potassium chloride **OR** potassium alternative oral replacement **OR** potassium chloride, glucose, dextrose, glucagon (rDNA), dextrose, oxyCODONE-acetaminophen      Intake/Output Summary (Last 24 hours) at 12/1/2020 1327  Last data filed at 12/1/2020 1131  Gross per 24 hour   Intake 2151.99 ml   Output 4650 ml   Net -2498.01 ml       Diet:  DIET GENERAL; Carb Control: 3 carb choices (45 gms)/meal; No Added Salt (3-4 GM); Dental Soft    Exam:  /65   Pulse 104   Temp 97.9 °F (36.6 °C) (Oral)   Resp 20   Ht 6' 3\" (1.905 m)   Wt (!) 363 lb 9 oz (164.9 kg)   SpO2 95%   BMI 45.44 kg/m²     General appearance: No apparent distress, appears stated age and cooperative. HEENT: Pupils equal, round, and reactive to light. Conjunctivae/corneas clear. Nasal packing to left nare. Neck: Supple, with full range of motion. No jugular venous distention. Trachea midline. Respiratory:  Normal respiratory effort. Diminished lung sounds, but clear to auscultation, bilaterally without Rales/Wheezes/Rhonchi. Cardiovascular: Regular rate and rhythm with normal S1/S2 without murmurs, rubs or gallops. Abdomen: Soft, non-tender, non-distended with normal bowel sounds. Musculoskeletal: passive and active ROM x 4 extremities. +3 pitting edema to BLE, nontender to palpation  Skin: Skin color, texture, turgor normal.  No rashes or lesions. Neurologic:  Neurovascularly intact without any focal sensory/motor deficits.  Grossly non-focal.  Psychiatric: Alert and signed by: Lesly Scott MD on    11/30/2020 01:31 AM      All CT scans at this facility use dose modulation, iterative    reconstruction, and/or weight-based   dosing when appropriate to reduce radiation dose to as low as reasonably    achievable. XR CHEST PORTABLE   Final Result   Impression:   Patchy bilateral infiltrates, mildly improved. No pneumothorax. This document has been electronically signed by: Lesly Scott MD on    11/29/2020 10:33 PM         XR CHEST PORTABLE   Final Result   1. Worsening bilateral lower lung atelectasis/infiltrate. 2. Pulmonary edema. 3. Possible small bilateral pleural effusions. 4. Mild stable cardiomegaly. **This report has been created using voice recognition software. It may contain minor errors which are inherent in voice recognition technology. **      Final report electronically signed by Dr. Anthony Leonard on 11/29/2020 6:43 PM      XR CHEST (2 VW)   Final Result   Mild pulmonary edema versus interstitial infiltrates with very small bilateral pleural effusions. **This report has been created using voice recognition software. It may contain minor errors which are inherent in voice recognition technology. **      Final report electronically signed by Dr. Andrade Hylton on 11/29/2020 2:27 PM      XR CHEST PORTABLE   Final Result   1. Engorged pulmonary vessels. 2. Prominent lung markings in the mid and lower lung zones. This can represent early pulmonary edema. Interstitial infiltrates are not excluded. **This report has been created using voice recognition software. It may contain minor errors which are inherent in voice recognition technology. **      Final report electronically signed by Dr. Andrade Hylton on 11/29/2020 8:39 AM      XR CHEST (2 VW)   Final Result   1. Mild prominence of the basilar interstitium which can represent some fibrotic changes versus early pulmonary edema.    2. New age indeterminate compression fracture of an upper thoracic vertebral body. **This report has been created using voice recognition software. It may contain minor errors which are inherent in voice recognition technology. **      Final report electronically signed by Dr. Amanda Trejo on 11/28/2020 8:47 AM      US RENAL COMPLETE   Final Result   1. Possible left renal cyst but otherwise unremarkable renal ultrasound. 2. Unremarkable urinary bladder.       Final report electronically signed by Dr. Lazaro Beard on 11/26/2020 5:12 PM        DVT prophylaxis: [] Lovenox                                 [x] SCDs                                 [] SQ Heparin                                 [x] Encourage ambulation           [] Already on Anticoagulation     Code Status: Full Code    PT/OT Eval Status: Started    Tele:   [x] yes             [] no    Electronically signed by Wagner , DO on 12/1/2020 at 1:27 PM

## 2020-12-01 NOTE — PROGRESS NOTES
Progress note: Infectious diseases    Patient - Ortiz Harris,  Age - 48 y.o.    - 1965      Room Number - 4K-20/020-A   MRN -  [id number]   Acct # - [de-identified]  Date of Admission -  2020  9:04 PM    SUBJECTIVE:   No new issues. No bleeding. OBJECTIVE   VITALS    height is 6' 3\" (1.905 m) and weight is 363 lb 9 oz (164.9 kg) (abnormal). His oral temperature is 98.3 °F (36.8 °C). His blood pressure is 130/72 and his pulse is 106. His respiration is 18 and oxygen saturation is 100%. Wt Readings from Last 3 Encounters:   20 (!) 363 lb 9 oz (164.9 kg)   20 (!) 319 lb (144.7 kg)   09/10/20 (!) 321 lb (145.6 kg)       I/O (24 Hours)    Intake/Output Summary (Last 24 hours) at 2020 0843  Last data filed at 2020 0445  Gross per 24 hour   Intake 1911.99 ml   Output 4650 ml   Net -2738.01 ml       General Appearance  Awake, alert, oriented. HEENT - normocephalic, atraumatic, pale conjunctiva,  anicteric sclera, packed nostrils  Neck - Supple, no mass  Lungs -  Bilateral  air entry, diminished breath sounds. Rales. Cardiovascular - Heart sounds are normal.  Regular rate and rhythm without murmur, gallop or rub. Abdomen - soft, not distended, nontender,   Neurologic -oriented to person place and time. Skin - No bruising or bleeding.   Extremities -+ edema, no cyanosis, clubbing     MEDICATIONS:      insulin lispro  0-12 Units Subcutaneous 4x Daily AC & HS    insulin glargine  10 Units Subcutaneous Once    multivitamin  1 tablet Oral Daily    sodium chloride  20 mL Intravenous Once    ampicillin-sulbactam  3 g Intravenous Q6H    predniSONE  60 mg Oral Daily    sodium chloride flush  10 mL Intravenous 2 times per day    insulin glargine  35 Units Subcutaneous Nightly    [Held by provider] aspirin  81 mg Oral Daily    DULoxetine  60 mg Oral Daily    folic acid  1 mg Oral Daily  latanoprost  1 drop Both Eyes Nightly    mirtazapine  15 mg Oral Nightly    rosuvastatin  20 mg Oral Daily    spironolactone  50 mg Oral Daily    tamsulosin  0.4 mg Oral Daily    thiamine  100 mg Oral Daily    amLODIPine  10 mg Oral Daily      furosemide (LASIX) 1mg/ml infusion 20 mg/hr (12/01/20 0452)    dextrose       lidocaine, oxymetazoline, sodium chloride flush, promethazine **OR** ondansetron, PHENobarbital **OR** PHENobarbital **OR** PHENobarbital IVPB, diphenhydrAMINE, albuterol sulfate HFA, acetaminophen **OR** acetaminophen, polyethylene glycol, potassium chloride **OR** potassium alternative oral replacement **OR** potassium chloride, glucose, dextrose, glucagon (rDNA), dextrose, oxyCODONE-acetaminophen      LABS:     CBC:   Recent Labs     11/30/20  0025 11/30/20  0330  11/30/20  1553 11/30/20  2237 12/01/20  0523   WBC 5.4 5.8  --   --   --  8.6   HGB 7.0* 6.8*   < > 8.5* 7.7* 7.7*   PLT 79* 79*  --   --   --  100*    < > = values in this interval not displayed. BMP:    Recent Labs     11/30/20  0757 11/30/20  1053 12/01/20  0523    136 133*   K 4.2 4.1 4.5    103 99   CO2 19* 21* 24   BUN 30* 30* 36*   CREATININE 2.5* 2.7* 3.1*   GLUCOSE 166* 133* 324*     Calcium:  Recent Labs     12/01/20  0523   CALCIUM 7.6*     Ionized Calcium:No results for input(s): IONCA in the last 72 hours. Magnesium:  Recent Labs     11/29/20  0946   MG 1.9     Phosphorus:No results for input(s): PHOS in the last 72 hours. BNP:No results for input(s): BNP in the last 72 hours. Glucose:  Recent Labs     11/30/20  2307 12/01/20  0452 12/01/20  0802   POCGLU 338* 310* 306*     HgbA1C: No results for input(s): LABA1C in the last 72 hours. INR: No results for input(s): INR in the last 72 hours.   Hepatic:   Recent Labs     11/30/20  1053   ALKPHOS 60   ALT 31   AST 45*   PROT 6.6   BILITOT 0.8   BILIDIR 0.5*   LABALBU 2.6*     Amylase and Lipase:No results for input(s): LACTA, AMYLASE in the last 72

## 2020-12-01 NOTE — PROGRESS NOTES
Department of Otolaryngology  Progress Note    Chief Complaint:  Epistaxis    SUBJECTIVE: Patient reports that he has been doing relatively well since the packing was placed. He does report that he continues to have issues breathing through his right nare. He states he feels slightly short of breath with ambulation around the unit, which he just did prior to my arrival.  He denies any further episodes of bleeding, dizziness/lightheadedness. He reports that he has coughed up a very mild amount of pink sputum intermittently. He states his biggest concern is getting the swelling down in his legs at this point. OBJECTIVE      Physical  VITALS:  /78   Pulse 105   Temp 98.2 °F (36.8 °C) (Oral)   Resp 20   Ht 6' 3\" (1.905 m)   Wt (!) 363 lb 9 oz (164.9 kg)   SpO2 95%   BMI 45.44 kg/m²     This is a 48 y.o. male. Patient is alert and oriented to person, place and time. Patient is obese. Mood is happy with normal affect. Not obviously hearing impaired. Head:   Normocephalic, atraumatic. No obvious masses or lesions noted. Nose:    External nose: Appears midline. No obvious deformity or masses. Septum:  deviated. Packing remains in place in the left nare. The packing does not appear significantly saturated. No evidence of recent bleeding. No septal hematoma. No perforation. Mucosa:  clear  Turbinates: normal and pink            Discharge: There is a mild amount of dried crusting and old blood in the right nare which is the likely source of his congestion on the right. Mouth/Throat:  Lips, tongue and oral cavity: Normal. No masses or lesions noted   Dentition: Edentulous, no malocclusion  Oral mucosa: moist  Tonsils: present, not acutely enlarged and no erythema or exudates  Oropharynx: normal-appearing mucosa. No bloody post nasal drainage noted  Hard and soft palates: symmetrical and intact. Salivary glands: not enlarged and no tenderness to palpation.   Uvula: midline, no obvious lesions   Gag reflex is present. Neck: Trachea midline. Thyroid not enlarged, no palpable masses or tenderness. Lymphatic: No cervical lymphadenopathy noted. Eyes: CINTIA, EOM intact. Conjunctiva moist without discharge. Lungs: Patient receiving supplemental oxygen via nasal cannula. Normal effort of breathing, not obviously distressed. Neuro: Cranial nerves II-XII grossly intact. Extremities: Obvious edema of lower extremities.   No cyanosis of upper and lower extremities noted    Data  CBC:   Lab Results   Component Value Date    WBC 8.6 12/01/2020    RBC 2.30 12/01/2020    HGB 7.7 12/01/2020    HCT 24.5 12/01/2020    .5 12/01/2020    MCH 33.5 12/01/2020    MCHC 31.4 12/01/2020    RDW 16.6 06/03/2020     12/01/2020    MPV 11.1 12/01/2020     CMP:    Lab Results   Component Value Date     12/01/2020    K 4.5 12/01/2020    CL 99 12/01/2020    CO2 24 12/01/2020    BUN 36 12/01/2020    CREATININE 3.1 12/01/2020    LABGLOM 21 12/01/2020    GLUCOSE 324 12/01/2020    PROT 6.6 11/30/2020    LABALBU 2.6 11/30/2020    CALCIUM 7.6 12/01/2020    BILITOT 0.8 11/30/2020    ALKPHOS 60 11/30/2020    AST 45 11/30/2020    ALT 31 11/30/2020     PT/INR:    Lab Results   Component Value Date    INR 1.23 12/01/2020       Inpatient Medications  Current Facility-Administered Medications: insulin lispro (HUMALOG) injection vial 0-12 Units, 0-12 Units, Subcutaneous, 4x Daily AC & HS  albumin human 25 % IV solution 12.5 g, 12.5 g, Intravenous, Q6H  furosemide (LASIX) 100 mg in dextrose 5 % 100 mL infusion, 20 mg/hr, Intravenous, Continuous  multivitamin 1 tablet, 1 tablet, Oral, Daily  0.9 % sodium chloride bolus, 20 mL, Intravenous, Once  lidocaine (LMX) 4 % cream, , Topical, PRN  oxymetazoline (AFRIN) 0.05 % nasal spray 2 spray, 2 spray, Each Nostril, BID PRN  ampicillin-sulbactam (UNASYN) 3 g ivpb minibag, 3 g, Intravenous, Q6H  sodium chloride flush 0.9 % injection 10 mL, 10 mL, Intravenous, 2 times per day  sodium chloride flush 0.9 % injection 10 mL, 10 mL, Intravenous, PRN  promethazine (PHENERGAN) tablet 12.5 mg, 12.5 mg, Oral, Q6H PRN **OR** ondansetron (ZOFRAN) injection 4 mg, 4 mg, Intravenous, Q6H PRN  PHENobarbital (LUMINAL) injection 130 mg, 130 mg, Intravenous, Q1H PRN **OR** PHENobarbital (LUMINAL) injection 260 mg, 260 mg, Intravenous, Q1H PRN **OR** PHENobarbital (LUMINAL) 1,040 mg in sodium chloride 0.9 % 100 mL IVPB, 1,040 mg, Intravenous, Daily PRN  diphenhydrAMINE (BENADRYL) injection 25 mg, 25 mg, Intravenous, Q6H PRN  insulin glargine (LANTUS) injection vial 35 Units, 35 Units, Subcutaneous, Nightly  albuterol sulfate  (90 Base) MCG/ACT inhaler 2 puff, 2 puff, Inhalation, Q6H PRN  [Held by provider] aspirin EC tablet 81 mg, 81 mg, Oral, Daily  DULoxetine (CYMBALTA) extended release capsule 60 mg, 60 mg, Oral, Daily  folic acid (FOLVITE) tablet 1 mg, 1 mg, Oral, Daily  latanoprost (XALATAN) 0.005 % ophthalmic solution 1 drop, 1 drop, Both Eyes, Nightly  mirtazapine (REMERON) tablet 15 mg, 15 mg, Oral, Nightly  rosuvastatin (CRESTOR) tablet 20 mg, 20 mg, Oral, Daily  spironolactone (ALDACTONE) tablet 50 mg, 50 mg, Oral, Daily  tamsulosin (FLOMAX) capsule 0.4 mg, 0.4 mg, Oral, Daily  vitamin B-1 (THIAMINE) tablet 100 mg, 100 mg, Oral, Daily  acetaminophen (TYLENOL) tablet 650 mg, 650 mg, Oral, Q6H PRN **OR** acetaminophen (TYLENOL) suppository 650 mg, 650 mg, Rectal, Q6H PRN  polyethylene glycol (GLYCOLAX) packet 17 g, 17 g, Oral, Daily PRN  potassium chloride (KLOR-CON M) extended release tablet 40 mEq, 40 mEq, Oral, PRN **OR** potassium bicarb-citric acid (EFFER-K) effervescent tablet 40 mEq, 40 mEq, Oral, PRN **OR** potassium chloride 10 mEq/100 mL IVPB (Peripheral Line), 10 mEq, Intravenous, PRN  glucose (GLUTOSE) 40 % oral gel 15 g, 15 g, Oral, PRN  dextrose 50 % IV solution, 12.5 g, Intravenous, PRN  glucagon (rDNA) injection 1 mg, 1 mg, Intramuscular, PRN  dextrose 5 % solution, 100 mL/hr, Intravenous, PRN  amLODIPine (NORVASC) tablet 10 mg, 10 mg, Oral, Daily  oxyCODONE-acetaminophen (PERCOCET) 7.5-325 MG per tablet 1 tablet, 1 tablet, Oral, Q8H PRN    ASSESSMENT AND PLAN    Epistaxis    -Packing appears appropriately in place without evidence of further bleeding  -Order for Afrin (BID scheduled for 2 days) and nasal saline (PRN) placed to assist with nasal congestion on the right  -Discussed the plan with Dr. Rogelio Phillips.   He would like to remove the packing bedside on 12/3/2020.   -Dr. Rogelio Phillips would like the patient to receive FFP the morning of 12/3/2020 in hopes of limiting chance of rebleed with packing removal  -Contact ENT with further bleeding or other concerns    Electronically signed by GABRIELA Nielsen on 12/1/2020 at 4:14 PM

## 2020-12-01 NOTE — PROGRESS NOTES
[facility]  Occupational Therapy  Daily Note  Time:   Time In: 3958  Time Out: 1211  Timed Code Treatment Minutes: 45 Minutes  Minutes: 38          Date: 2020  Patient Name: Sidney Castillo,   Gender: male      Room: [facility]020-A  MRN: [id number]  : 1965  (48 y.o.)  Referring Practitioner: Dr. Miguel Damon  Diagnosis: MATTIE  Additional Pertinent Hx: Judy Burkett was admitted under the hospitalist service on 2020 with chief complaint of acute kidney injury. Patient was transferred from [facility] after having been at their facility for 4 days. Patient's chief complaint was vomiting incontinence left shoulder pain and diarrhea. Pain patient was found to have sepsis secondary to pneumonia and acute kidney injury. Blood cultures were positive for gram-positive cocci-Enterococcus. With worsening in kidney function patient was transferred to [facility] for further care. 2020 Positive for nose bleed with drop in H/H 8.6-7.9-ENT Dr. Madai Valencia was consulted. Patient required OR intervention. Patient was transferred to the ICU post procedure    Restrictions/Precautions:  Restrictions/Precautions: Fall Risk  Position Activity Restriction  Other position/activity restrictions: 6L of O2     SUBJECTIVE: Pt seated in bed upon arrival, agreeable to OT session and motivated to participate. Pt stating \"I want to walk, but the doctor was just in and said I need to be on strict bedrest for 2 days until the swelling in my legs goes down. \"   Spoke with RN who then clarified with MD that pt does not need to remain on bedrest and encouraged activity. PAIN: 0/10:     COGNITION: Decreased Insight, Impaired Memory, Decreased Problem Solving and Impulsive   Pt initially reported did not need to wear O2 for mobility in hallway (despite being on 6L), although with education was agreeable to keeping 6L O2 on throughout. ADL:   Feeding: Contact Guard Assistance.   for taking drink of water while standing. BALANCE:  Sitting Balance:  Supervision. Standing Balance: Contact Guard Assistance. Occasionally minimal assist    BED MOBILITY:  Supine to Sit: Stand By Assistance, with head of bed raised, with increased time for completion    Sit to Supine: Stand By Assistance, with head of bed raised, with increased time for completion    Scooting: Stand By Assistance advancing hips forward to EOB    TRANSFERS:  Sit to Stand:  [employer], with increased time for completion, cues for hand placement. from EOB  Stand to Sit: Stand By Assistance, cues for hand placement. to EOB (pt declined sitting up in chair in prep for lunch, despite encouragement)    FUNCTIONAL MOBILITY:  Assistive Device: Rolling Walker  Assist Level:  Contact Guard Assistance and Minimal Assistance. Distance: in room, around nursing module  Slightly unsteady throughout, requires moderate cues for pursed lip breathing throughout. Minimal cues for upright gaze/posture and safety with RW. 2 standing rest breaks required with pt demoing increased fatigue upon returning to room. Functional mobility completed to increase overall activity tolerance needed for ADL tasks. Sp02 95% on 6L on arrival, remained 92-93% on 6L O2 during mobility although increased SOB was noted. Increased time to complete all tasks throughout session. ASSESSMENT:     Activity Tolerance:  Patient tolerance of  treatment: fair. Discharge Recommendations: Subacute/Skilled Nursing Facility(if declines, recommend 24/7 assist with [place] OT)    Equipment Recommendations:  Other: continue to assess  Plan: Times per week: 5x  Current Treatment Recommendations: Strengthening, Patient/Caregiver Education & Training, Balance Training, Functional Mobility Training, Endurance Training, Safety Education & Training, Self-Care / ADL    Patient Education  Patient Education: Plan of Care, Energy Conservation, Importance of Increasing

## 2020-12-01 NOTE — PROGRESS NOTES
Progress Note  Date:2020       [id number]  Patient Name:Scottie Lizarraga     YOB: 1965     Age:55 y.o. Subjective    Subjective: Patient reports feeling and breathing much better today  He is complaining that \"I cannot swallow\"  Review of Systems  Objective         Vitals Last 24 Hours:  TEMPERATURE:  Temp  Av °F (36.7 °C)  Min: 97.5 °F (36.4 °C)  Max: 98.2 °F (36.8 °C)  RESPIRATIONS RANGE: Resp  Av.4  Min: 13  Max: 30  PULSE OXIMETRY RANGE: SpO2  Av.4 %  Min: 90 %  Max: 100 %  PULSE RANGE: Pulse  Av.5  Min: 78  Max: 115  BLOOD PRESSURE RANGE: Systolic (85AKB), QJV:174 , Min:108 , OOZ:425   ; Diastolic (20AAV), FEA:20, Min:58, Max:93    I/O (24Hr): Intake/Output Summary (Last 24 hours) at 2020 1908  Last data filed at 2020 1603  Gross per 24 hour   Intake 1528.94 ml   Output 2000 ml   Net -471.06 ml     Objective:     Nose: Nasal packing in place on the left side without signs of any fresh bleeding  8 mm nasal trumpet in the right side with no air flowing within it; contains high flow nasal cannula  Lungs: The patient is breathing without labor  He has diffuse scattered faint wheezing  No rhonchi or rales were heard    I removed his nasal trumpet and had him swallow ice chips. He did so without difficulty.     Labs/Imaging/Diagnostics    Labs:  CBC:  Recent Labs     20  0946 20  0025 20  0330 20  0757 20  1553   WBC 6.4 5.4 5.8  --   --    RBC 2.24* 2.08* 2.04*  --   --    HGB 7.5* 7.0* 6.8* 7.8* 8.5*   HCT 23.7* 22.1* 21.3* 24.7* 26.9*   .8* 106.3* 104.4*  --   --    PLT 84* 79* 79*  --   --      CHEMISTRIES:  Recent Labs     20  0616 20  0946  20  0330 20  0757 20  1053    133*   < > 133* 135 136   K 3.7  3.7 4.4   < > 5.0 4.2 4.1    101   < > 101 101 103   CO2 17* 22*   < > 21* 19* 21*   BUN 29* 27*   < > 29* 30* 30*   CREATININE 2.3* 2.4*   < > 2.3* 2.5* 2.7* GLUCOSE 293* 268*   < > 247* 166* 133*   MG 1.6 1.9  --   --   --   --     < > = values in this interval not displayed. PT/INR:No results for input(s): PROTIME, INR in the last 72 hours. APTT:  Recent Labs     11/30/20  0050 11/30/20  1053   APTT 31.2 28.5     LIVER PROFILE:  Recent Labs     11/30/20  1053   AST 45*   ALT 31   BILIDIR 0.5*   BILITOT 0.8   ALKPHOS 60       Imaging Last 24 Hours:  Xr Chest (2 Vw)    Result Date: 11/29/2020  PROCEDURE: XR CHEST (2 VW) CLINICAL INFORMATION: Image for evaluation of possible infiltrates seen on PCXR. COMPARISON: Chest x-ray 11/29/2020. TECHNIQUE: AP upright view of the chest and a lateral view were obtained. FINDINGS: The heart size is at the upper limits of normal. This is stable. The mediastinum is not widened. On the lateral view, there is blunting of both costophrenic angles consistent with small bilateral pleural effusions. There is some fluid along the minor fissure. There is mild interstitial infiltrates in the mid and lower lung zones. This was not present on a chest CT dated 10/31/2018. The pulmonary vessels are prominent. Mild pulmonary edema versus interstitial infiltrates with very small bilateral pleural effusions. **This report has been created using voice recognition software. It may contain minor errors which are inherent in voice recognition technology. ** Final report electronically signed by Dr. Bryan Jarquin on 11/29/2020 2:27 PM    Ct Chest Wo Contrast    Result Date: 11/30/2020  CT chest without contrast: Comparison:  CR,SR  - XR CHEST PORTABLE  - 11/29/2020 09:58 PM EST FINDINGS: Lungs/pleura: Decreased lung volumes. Bilateral lower lobe atelectasis with air bronchograms. Mild lingular atelectasis or scarring. Trace pleural effusions. Clear upper lobes. Mediastinum/heart/aorta: Heart size is normal.  No pericardial effusion. No enlarged mediastinal lymph nodes. Calcified azygoesophageal recess lymph node.   Calcified right hilar lymph lung fields, worse at the medial left base. Heart size is normal.  No mass or congestion. No pneumothorax or sizable pleural effusion. Impression: Patchy bilateral infiltrates, mildly improved. No pneumothorax. This document has been electronically signed by: Jose Sheikh MD on 11/29/2020 10:33 PM     Xr Chest Portable    Result Date: 11/29/2020  PROCEDURE: XR CHEST PORTABLE CLINICAL INFORMATION: Hypoxemia TECHNIQUE: Mobile AP chest radiograph. COMPARISON: AP and lateral chest radiographs 11/29/2020 at 2:18 PM FINDINGS: There is mild stable enlargement of the cardiac silhouette. There is suggestion of bilateral costophrenic angle blunting. Pulmonary interstitium is prominent. Alveolar and reticular opacities in the bilateral lower lobes are more extensive. Degenerative changes in the thoracic spine are poorly visualized. 1. Worsening bilateral lower lung atelectasis/infiltrate. 2. Pulmonary edema. 3. Possible small bilateral pleural effusions. 4. Mild stable cardiomegaly. **This report has been created using voice recognition software. It may contain minor errors which are inherent in voice recognition technology. ** Final report electronically signed by Dr. Carmen Tidwell on 11/29/2020 6:43 PM    Xr Chest Portable    Result Date: 11/29/2020  PROCEDURE: XR CHEST PORTABLE CLINICAL INFORMATION: hypoxia. COPD. Asthma. Hypertension. COMPARISON: Chest x-ray 11/28/2020. TECHNIQUE: AP upright view of the chest. FINDINGS: There is stable mild cardiomegaly. The mediastinum is not widened. There is increased density in the pulmonary interstitium in the left mid and lower lung zones. This is also present in the right lung base. The pulmonary vessels are prominent. There are no pleural effusions. 1. Engorged pulmonary vessels. 2. Prominent lung markings in the mid and lower lung zones. This can represent early pulmonary edema. Interstitial infiltrates are not excluded.  **This report has been created using voice recognition software. It may contain minor errors which are inherent in voice recognition technology. ** Final report electronically signed by Dr. Veto Kaplan on 11/29/2020 8:39 AM    Vl Dup Lower Extremity Venous Bilateral    Result Date: 11/30/2020  PROCEDURE: Bilateral lower extremity venous duplex examination CLINICAL INFORMATION: r/o dvt COMPARISON: No prior study. TECHNIQUE: Grayscale, color-flow, and spectral waveform ultrasound images were obtained through the bilateral lower extremity venous structures. Augmentation and compression maneuvers were performed at multiple levels. FINDINGS: RIGHT SIDE: The common femoral vein demonstrates normal flow, augmentation and compressibility. The saphenofemoral junction appears patent and compressible. The greater saphenous vein demonstrates normal flow proximally. The superficial femoral and popliteal veins demonstrate normal flow, compressibility and augmentation. The deep veins of the calf appear patent including the gastrocnemius, posterior tibial, peroneal and anterior tibial veins. LEFT SIDE: The common femoral vein demonstrates normal flow, augmentation and compressibility. The saphenofemoral junction appears patent and compressible. The greater saphenous vein demonstrates normal flow proximally. The superficial femoral and popliteal veins demonstrate normal flow, compressibility and augmentation. The deep veins of the calf appear patent including the gastrocnemius, posterior tibial, peroneal and anterior tibial veins. No sonographic evidence of lower extremity DVT. **This report has been created using voice recognition software. It may contain minor errors which are inherent in voice recognition technology. ** Final report electronically signed by Dr. Viral Almaraz on 11/30/2020 9:25 AM    Assessment//Plan           Hospital Problems           Last Modified POA    MATTIE (acute kidney injury) ([facility] [address] 11/25/2020 Yes    Renal failure 11/29/2020 Yes    Epistaxis 11/29/2020 Yes    Pneumonitis due to aspiration of blood ([address] 11/29/2020 Yes        Assessment & Plan  The patient is cleared for advancement of his diet. His nasal trumpet was blocking his esophageal inlet and not providing for improved respiration. I removed it and replaced his high flow nasal cannula into his right nasal airway which is now patent. I discussed his care with Dr. Washington Muse. I appreciate very much the critical care at the patient received last night and today. I agree with his inclination to advance the patient's diet and see about transferring him back to stepdown bed on the floor when he feels it is appropriate. I will continue to follow the patient in-house.       Electronically signed by Velvet Blake MD on 11/30/20 at 7:08 PM EST

## 2020-12-02 ENCOUNTER — APPOINTMENT (OUTPATIENT)
Dept: INTERVENTIONAL RADIOLOGY/VASCULAR | Age: 55
DRG: 720 | End: 2020-12-02
Attending: PHYSICIAN ASSISTANT
Payer: COMMERCIAL

## 2020-12-02 LAB
ABO: NORMAL
ALBUMIN SERPL-MCNC: 2.9 G/DL (ref 3.5–5.1)
ALP BLD-CCNC: 95 U/L (ref 38–126)
ALT SERPL-CCNC: 37 U/L (ref 11–66)
ANION GAP SERPL CALCULATED.3IONS-SCNC: 13 MEQ/L (ref 8–16)
ANTIBODY SCREEN: NORMAL
AST SERPL-CCNC: 56 U/L (ref 5–40)
BILIRUB SERPL-MCNC: 0.7 MG/DL (ref 0.3–1.2)
BILIRUBIN DIRECT: 0.4 MG/DL (ref 0–0.3)
BLOOD CULTURE, ROUTINE: NORMAL
BUN BLDV-MCNC: 41 MG/DL (ref 7–22)
CALCIUM IONIZED: 0.95 MMOL/L (ref 1.12–1.32)
CALCIUM SERPL-MCNC: 7.8 MG/DL (ref 8.5–10.5)
CHLORIDE BLD-SCNC: 97 MEQ/L (ref 98–111)
CO2: 23 MEQ/L (ref 23–33)
CREAT SERPL-MCNC: 3 MG/DL (ref 0.4–1.2)
ERYTHROCYTE [DISTWIDTH] IN BLOOD BY AUTOMATED COUNT: 14.7 % (ref 11.5–14.5)
ERYTHROCYTE [DISTWIDTH] IN BLOOD BY AUTOMATED COUNT: 56.4 FL (ref 35–45)
GFR SERPL CREATININE-BSD FRML MDRD: 22 ML/MIN/1.73M2
GLUCOSE BLD-MCNC: 217 MG/DL (ref 70–108)
GLUCOSE BLD-MCNC: 247 MG/DL (ref 70–108)
GLUCOSE BLD-MCNC: 252 MG/DL (ref 70–108)
GLUCOSE BLD-MCNC: 257 MG/DL (ref 70–108)
GLUCOSE BLD-MCNC: 274 MG/DL (ref 70–108)
GLUCOSE BLD-MCNC: 297 MG/DL (ref 70–108)
HCT VFR BLD CALC: 22.9 % (ref 42–52)
HEMOGLOBIN: 7.3 GM/DL (ref 14–18)
INR BLD: 1.27 (ref 0.85–1.13)
MAGNESIUM: 1.9 MG/DL (ref 1.6–2.4)
MCH RBC QN AUTO: 33.2 PG (ref 26–33)
MCHC RBC AUTO-ENTMCNC: 31.9 GM/DL (ref 32.2–35.5)
MCV RBC AUTO: 104.1 FL (ref 80–94)
MRSA SCREEN: NORMAL
PLATELET # BLD: 86 THOU/MM3 (ref 130–400)
PMV BLD AUTO: 11 FL (ref 9.4–12.4)
POTASSIUM SERPL-SCNC: 3.5 MEQ/L (ref 3.5–5.2)
RBC # BLD: 2.2 MILL/MM3 (ref 4.7–6.1)
RH FACTOR: NORMAL
SODIUM BLD-SCNC: 133 MEQ/L (ref 135–145)
TOTAL PROTEIN: 6.8 G/DL (ref 6.1–8)
URINE CULTURE, ROUTINE: NORMAL
WBC # BLD: 7.6 THOU/MM3 (ref 4.8–10.8)

## 2020-12-02 PROCEDURE — 99232 SBSQ HOSP IP/OBS MODERATE 35: CPT | Performed by: NURSE PRACTITIONER

## 2020-12-02 PROCEDURE — 82248 BILIRUBIN DIRECT: CPT

## 2020-12-02 PROCEDURE — 97530 THERAPEUTIC ACTIVITIES: CPT

## 2020-12-02 PROCEDURE — 86900 BLOOD TYPING SEROLOGIC ABO: CPT

## 2020-12-02 PROCEDURE — 6360000002 HC RX W HCPCS: Performed by: NURSE PRACTITIONER

## 2020-12-02 PROCEDURE — 2700000000 HC OXYGEN THERAPY PER DAY

## 2020-12-02 PROCEDURE — 94669 MECHANICAL CHEST WALL OSCILL: CPT

## 2020-12-02 PROCEDURE — 6370000000 HC RX 637 (ALT 250 FOR IP): Performed by: PHYSICIAN ASSISTANT

## 2020-12-02 PROCEDURE — 83735 ASSAY OF MAGNESIUM: CPT

## 2020-12-02 PROCEDURE — 6370000000 HC RX 637 (ALT 250 FOR IP): Performed by: FAMILY MEDICINE

## 2020-12-02 PROCEDURE — 2580000003 HC RX 258: Performed by: OTOLARYNGOLOGY

## 2020-12-02 PROCEDURE — 6370000000 HC RX 637 (ALT 250 FOR IP): Performed by: NURSE PRACTITIONER

## 2020-12-02 PROCEDURE — 82330 ASSAY OF CALCIUM: CPT

## 2020-12-02 PROCEDURE — 94760 N-INVAS EAR/PLS OXIMETRY 1: CPT

## 2020-12-02 PROCEDURE — 2580000003 HC RX 258: Performed by: NURSE PRACTITIONER

## 2020-12-02 PROCEDURE — 2060000000 HC ICU INTERMEDIATE R&B

## 2020-12-02 PROCEDURE — P9017 PLASMA 1 DONOR FRZ W/IN 8 HR: HCPCS

## 2020-12-02 PROCEDURE — 6370000000 HC RX 637 (ALT 250 FOR IP): Performed by: STUDENT IN AN ORGANIZED HEALTH CARE EDUCATION/TRAINING PROGRAM

## 2020-12-02 PROCEDURE — 93970 EXTREMITY STUDY: CPT

## 2020-12-02 PROCEDURE — 80053 COMPREHEN METABOLIC PANEL: CPT

## 2020-12-02 PROCEDURE — 82948 REAGENT STRIP/BLOOD GLUCOSE: CPT

## 2020-12-02 PROCEDURE — 6360000002 HC RX W HCPCS: Performed by: STUDENT IN AN ORGANIZED HEALTH CARE EDUCATION/TRAINING PROGRAM

## 2020-12-02 PROCEDURE — 85027 COMPLETE CBC AUTOMATED: CPT

## 2020-12-02 PROCEDURE — 86901 BLOOD TYPING SEROLOGIC RH(D): CPT

## 2020-12-02 PROCEDURE — 6360000002 HC RX W HCPCS: Performed by: OTOLARYNGOLOGY

## 2020-12-02 PROCEDURE — 85610 PROTHROMBIN TIME: CPT

## 2020-12-02 PROCEDURE — 99231 SBSQ HOSP IP/OBS SF/LOW 25: CPT | Performed by: PHYSICIAN ASSISTANT

## 2020-12-02 PROCEDURE — 86850 RBC ANTIBODY SCREEN: CPT

## 2020-12-02 PROCEDURE — 94640 AIRWAY INHALATION TREATMENT: CPT

## 2020-12-02 PROCEDURE — P9047 ALBUMIN (HUMAN), 25%, 50ML: HCPCS | Performed by: STUDENT IN AN ORGANIZED HEALTH CARE EDUCATION/TRAINING PROGRAM

## 2020-12-02 PROCEDURE — 30233L1 TRANSFUSION OF NONAUTOLOGOUS FRESH PLASMA INTO PERIPHERAL VEIN, PERCUTANEOUS APPROACH: ICD-10-PCS | Performed by: INTERNAL MEDICINE

## 2020-12-02 PROCEDURE — 36415 COLL VENOUS BLD VENIPUNCTURE: CPT

## 2020-12-02 PROCEDURE — 6370000000 HC RX 637 (ALT 250 FOR IP): Performed by: INTERNAL MEDICINE

## 2020-12-02 PROCEDURE — 99233 SBSQ HOSP IP/OBS HIGH 50: CPT | Performed by: INTERNAL MEDICINE

## 2020-12-02 RX ORDER — LATANOPROST 50 UG/ML
1 SOLUTION/ DROPS OPHTHALMIC 3 TIMES DAILY
Status: DISCONTINUED | OUTPATIENT
Start: 2020-12-02 | End: 2020-12-02

## 2020-12-02 RX ORDER — ALBUMIN (HUMAN) 12.5 G/50ML
12.5 SOLUTION INTRAVENOUS EVERY 6 HOURS
Status: DISCONTINUED | OUTPATIENT
Start: 2020-12-02 | End: 2020-12-02

## 2020-12-02 RX ORDER — LATANOPROST 50 UG/ML
1 SOLUTION/ DROPS OPHTHALMIC NIGHTLY
Status: DISCONTINUED | OUTPATIENT
Start: 2020-12-16 | End: 2020-12-02

## 2020-12-02 RX ORDER — IPRATROPIUM BROMIDE AND ALBUTEROL SULFATE 2.5; .5 MG/3ML; MG/3ML
1 SOLUTION RESPIRATORY (INHALATION)
Status: DISCONTINUED | OUTPATIENT
Start: 2020-12-02 | End: 2020-12-03

## 2020-12-02 RX ORDER — 0.9 % SODIUM CHLORIDE 0.9 %
20 INTRAVENOUS SOLUTION INTRAVENOUS ONCE
Status: DISCONTINUED | OUTPATIENT
Start: 2020-12-02 | End: 2020-12-03

## 2020-12-02 RX ORDER — PREDNISOLONE ACETATE 10 MG/ML
1 SUSPENSION/ DROPS OPHTHALMIC 3 TIMES DAILY
Status: DISPENSED | OUTPATIENT
Start: 2020-12-02 | End: 2020-12-08

## 2020-12-02 RX ORDER — LATANOPROST 50 UG/ML
1 SOLUTION/ DROPS OPHTHALMIC 2 TIMES DAILY
Status: DISCONTINUED | OUTPATIENT
Start: 2020-12-08 | End: 2020-12-02

## 2020-12-02 RX ORDER — ALBUMIN (HUMAN) 12.5 G/50ML
12.5 SOLUTION INTRAVENOUS EVERY 6 HOURS
Status: COMPLETED | OUTPATIENT
Start: 2020-12-02 | End: 2020-12-03

## 2020-12-02 RX ADMIN — FUROSEMIDE 20 MG/HR: 10 INJECTION, SOLUTION INTRAMUSCULAR; INTRAVENOUS at 21:41

## 2020-12-02 RX ADMIN — FUROSEMIDE 20 MG/HR: 10 INJECTION, SOLUTION INTRAMUSCULAR; INTRAVENOUS at 16:28

## 2020-12-02 RX ADMIN — ALBUTEROL SULFATE 2 PUFF: 90 AEROSOL, METERED RESPIRATORY (INHALATION) at 08:45

## 2020-12-02 RX ADMIN — ALBUMIN (HUMAN) 12.5 G: 0.25 INJECTION, SOLUTION INTRAVENOUS at 06:50

## 2020-12-02 RX ADMIN — SPIRONOLACTONE 50 MG: 25 TABLET ORAL at 08:14

## 2020-12-02 RX ADMIN — ALBUMIN (HUMAN) 12.5 G: 0.25 INJECTION, SOLUTION INTRAVENOUS at 11:30

## 2020-12-02 RX ADMIN — PREDNISOLONE ACETATE 1 DROP: 10 SUSPENSION/ DROPS OPHTHALMIC at 17:27

## 2020-12-02 RX ADMIN — SODIUM CHLORIDE 3 G: 900 INJECTION INTRAVENOUS at 00:11

## 2020-12-02 RX ADMIN — FOLIC ACID 1 MG: 1 TABLET ORAL at 08:15

## 2020-12-02 RX ADMIN — SODIUM CHLORIDE 3 G: 900 INJECTION INTRAVENOUS at 06:11

## 2020-12-02 RX ADMIN — AMLODIPINE BESYLATE 10 MG: 10 TABLET ORAL at 08:14

## 2020-12-02 RX ADMIN — ALBUMIN (HUMAN) 12.5 G: 0.25 INJECTION, SOLUTION INTRAVENOUS at 21:44

## 2020-12-02 RX ADMIN — POTASSIUM BICARBONATE 40 MEQ: 782 TABLET, EFFERVESCENT ORAL at 11:23

## 2020-12-02 RX ADMIN — INSULIN GLARGINE 35 UNITS: 100 INJECTION, SOLUTION SUBCUTANEOUS at 21:50

## 2020-12-02 RX ADMIN — TAMSULOSIN HYDROCHLORIDE 0.4 MG: 0.4 CAPSULE ORAL at 08:15

## 2020-12-02 RX ADMIN — THERA TABS 1 TABLET: TAB at 08:15

## 2020-12-02 RX ADMIN — SALINE NASAL SPRAY 2 SPRAY: 1.5 SOLUTION NASAL at 16:30

## 2020-12-02 RX ADMIN — FUROSEMIDE 20 MG/HR: 10 INJECTION, SOLUTION INTRAMUSCULAR; INTRAVENOUS at 02:10

## 2020-12-02 RX ADMIN — LATANOPROST 1 DROP: 50 SOLUTION/ DROPS OPHTHALMIC at 15:13

## 2020-12-02 RX ADMIN — ROSUVASTATIN CALCIUM 20 MG: 20 TABLET, FILM COATED ORAL at 08:15

## 2020-12-02 RX ADMIN — IPRATROPIUM BROMIDE AND ALBUTEROL SULFATE 1 AMPULE: .5; 3 SOLUTION RESPIRATORY (INHALATION) at 17:06

## 2020-12-02 RX ADMIN — OXYMETAZOLINE HYDROCHLORIDE 2 SPRAY: 0.05 SPRAY NASAL at 08:16

## 2020-12-02 RX ADMIN — ALBUMIN (HUMAN) 12.5 G: 0.25 INJECTION, SOLUTION INTRAVENOUS at 14:36

## 2020-12-02 RX ADMIN — DULOXETINE HYDROCHLORIDE 60 MG: 60 CAPSULE, DELAYED RELEASE ORAL at 08:15

## 2020-12-02 RX ADMIN — SODIUM CHLORIDE, PRESERVATIVE FREE 10 ML: 5 INJECTION INTRAVENOUS at 08:16

## 2020-12-02 RX ADMIN — LATANOPROST 1 DROP: 50 SOLUTION OPHTHALMIC at 21:43

## 2020-12-02 RX ADMIN — SALINE NASAL SPRAY 2 SPRAY: 1.5 SOLUTION NASAL at 21:44

## 2020-12-02 RX ADMIN — SODIUM CHLORIDE 3 G: 900 INJECTION INTRAVENOUS at 11:24

## 2020-12-02 RX ADMIN — SODIUM CHLORIDE, PRESERVATIVE FREE 10 ML: 5 INJECTION INTRAVENOUS at 21:45

## 2020-12-02 RX ADMIN — IPRATROPIUM BROMIDE AND ALBUTEROL SULFATE 1 AMPULE: .5; 3 SOLUTION RESPIRATORY (INHALATION) at 20:55

## 2020-12-02 RX ADMIN — SODIUM CHLORIDE 3 G: 900 INJECTION INTRAVENOUS at 17:15

## 2020-12-02 RX ADMIN — OXYCODONE HYDROCHLORIDE AND ACETAMINOPHEN 1 TABLET: 7.5; 325 TABLET ORAL at 08:22

## 2020-12-02 RX ADMIN — PREDNISOLONE ACETATE 1 DROP: 10 SUSPENSION/ DROPS OPHTHALMIC at 21:44

## 2020-12-02 RX ADMIN — MIRTAZAPINE 15 MG: 15 TABLET, FILM COATED ORAL at 21:45

## 2020-12-02 RX ADMIN — FUROSEMIDE 20 MG/HR: 10 INJECTION, SOLUTION INTRAMUSCULAR; INTRAVENOUS at 11:25

## 2020-12-02 RX ADMIN — Medication 100 MG: at 08:14

## 2020-12-02 RX ADMIN — IPRATROPIUM BROMIDE AND ALBUTEROL SULFATE 1 AMPULE: .5; 3 SOLUTION RESPIRATORY (INHALATION) at 13:38

## 2020-12-02 RX ADMIN — ALBUMIN (HUMAN) 12.5 G: 0.25 INJECTION, SOLUTION INTRAVENOUS at 00:06

## 2020-12-02 ASSESSMENT — PAIN SCALES - GENERAL: PAINLEVEL_OUTOF10: 8

## 2020-12-02 NOTE — PROGRESS NOTES
[facility] ICU STEPDOWN TELEMETRY 4K  Occupational Therapy  Daily Note  Time:   Time In: 4818  Time Out: 1220  Timed Code Treatment Minutes: 27 Minutes  Minutes: 27          Date: 2020  Patient Name: Roddie Landau,   Gender: male      Room: [facility]-A  MRN: [id number]  : 1965  (48 y.o.)  Referring Practitioner: Dr. Era Hammans  Diagnosis: MATTIE  Additional Pertinent Hx: Wendy Garcia was admitted under the hospitalist service on 2020 with chief complaint of acute kidney injury. Patient was transferred from [facility] after having been at their facility for 4 days. Patient's chief complaint was vomiting incontinence left shoulder pain and diarrhea. Pain patient was found to have sepsis secondary to pneumonia and acute kidney injury. Blood cultures were positive for gram-positive cocci-Enterococcus. With worsening in kidney function patient was transferred to [facility] for further care. 2020 Positive for nose bleed with drop in H/H 8.6-7.9-ENT Dr. Clementine Love was consulted. Patient required OR intervention. Patient was transferred to the ICU post procedure    Restrictions/Precautions:  Restrictions/Precautions: Fall Risk  Position Activity Restriction  Other position/activity restrictions: 6L of O2      SUBJECTIVE: Pt. Lying supine in bed agreeable to OT treatment session  Pleasant and cooperative. Nursing okay'd OOB treatment date. PAIN: Pt. Reports pain in UB/LB did not rate pain at this time. COGNITION: Decreased Insight and Decreased Safety Awareness    ADL:   No ADL's completed this session. Shell Yuan BALANCE:  Sitting Balance:  Stand By Assistance. Pt. sat at EOB with SBA for 5 mins prior to mobility and seated 5 mins after fxl  mobility task. Standing Balance: CGA      BED MOBILITY:  Supine to Sit: Stand By Assistance    Sit to Supine: Stand By Assistance      TRANSFERS:  Sit to Stand:  Minimal Assistance. VC's for hand placement.    Stand to Sit: Bassam Granger Assistance, X 1, cues for hand placement, with verbal cues. FUNCTIONAL MOBILITY:  Assistive Device: Rolling Walker  Assist Level:  Contact Guard Assistance and with verbal cues . Distance: Completed functional mobility within unit gillette at slow pace, no LOB noted. Pt requires min cues for walker safety-  Extended seated rest break after trial of mobility,  moderate fatigue noted. Pt.s O2 greater than 90% throughout session. ASSESSMENT:     Activity Tolerance:  Patient tolerance of  treatment: fair. Pt. Limited by fatigue. Discharge Recommendations: Subacute/Skilled Nursing Facility(if declines, recommend 24/7 assist with [place] OT) Equipment Recommendations: Other: continue to assess  Plan: Times per week: 5x  Current Treatment Recommendations: Strengthening, Patient/Caregiver Education & Training, Balance Training, Functional Mobility Training, Endurance Training, Safety Education & Training, Self-Care / ADL    Patient Education  Patient Education: Importance of Increasing Activity, Assistive Device Safety and Safety with transfer and fxl mobility with AD. Goals  Short term goals  Time Frame for Short term goals: by discharge  Short term goal 1: Pt to complete LB ADL tasks using LHAE with SBA to be indep wtih dressing tasks  Short term goal 2: Pt to complete toileting tasks and tranfser wit CGA and no vcs for safety  Short term goal 3: Pt to navigate to/from bathroom using RW with CGA and no increase in SOB or decrease in O2 sats during to complete ADL tasks  Short term goal 4: pt to increase standing tolerance > 3 min with CGA and 0-1 UE support to allow for completion of ADL tasks    Following session, patient left in safe position with all fall risk precautions in place.

## 2020-12-02 NOTE — PROGRESS NOTES
rhythm. RESPIRATORY: Clear to ausculation bilaterally. Equal breath sounds. No wheezes. No shortness of breath noted at rest.  ABDOMEN: soft, non tender  NEUROLOGICAL: Patient is alert and oriented to person, place, and time. Recent and remote memory intact. Thought is coherant. SKIN: no rash, No significant bruises on exposed surfaces  MUSCULOSKELETAL: Movement is coordinated. Moves all extremities   EXTREMITIES: Distal lower extremity temp is warm, Generalized body edema. Ecchymosis bilateral posterior upper extremites   PSYCHIATRIC: mood and affect appropriate.     Medications:   Med reviewed  Scheduled Meds:   ipratropium-albuterol  1 ampule Inhalation Q4H WA    albumin human  12.5 g Intravenous Q6H    insulin lispro  0-18 Units Subcutaneous TID WC    insulin lispro  0-9 Units Subcutaneous Nightly    multivitamin  1 tablet Oral Daily    sodium chloride  20 mL Intravenous Once    ampicillin-sulbactam  3 g Intravenous Q6H    sodium chloride flush  10 mL Intravenous 2 times per day    insulin glargine  35 Units Subcutaneous Nightly    [Held by provider] aspirin  81 mg Oral Daily    DULoxetine  60 mg Oral Daily    folic acid  1 mg Oral Daily    latanoprost  1 drop Both Eyes Nightly    mirtazapine  15 mg Oral Nightly    rosuvastatin  20 mg Oral Daily    spironolactone  50 mg Oral Daily    tamsulosin  0.4 mg Oral Daily    thiamine  100 mg Oral Daily    amLODIPine  10 mg Oral Daily     Continuous Infusions:   furosemide (LASIX) 1mg/ml infusion 20 mg/hr (12/02/20 0210)    dextrose       PRN Meds sodium chloride, lidocaine, sodium chloride flush, promethazine **OR** ondansetron, PHENobarbital **OR** PHENobarbital **OR** PHENobarbital IVPB, diphenhydrAMINE, albuterol sulfate HFA, acetaminophen **OR** acetaminophen, polyethylene glycol, potassium chloride **OR** potassium alternative oral replacement **OR** potassium chloride, glucose, dextrose, glucagon (rDNA), dextrose, oxyCODONE-acetaminophen Labs:   Labs reviewed  Recent Labs     11/30/20  0330  12/01/20  0523 12/01/20 2121 12/02/20  0449   WBC 5.8  --  8.6  --  7.6   RBC 2.04*  --  2.30*  --  2.20*   HGB 6.8*   < > 7.7* 7.6* 7.3*   HCT 21.3*   < > 24.5* 24.2* 22.9*   .4*  --  106.5*  --  104.1*   MCH 33.3*  --  33.5*  --  33.2*   PLT 79*  --  100*  --  86*    < > = values in this interval not displayed. Lab Results   Component Value Date    LABA1C 5.9 11/26/2020     Recent Labs     11/29/20  0946  11/30/20 0330 11/30/20  1053 12/01/20 0523 12/02/20 0449   *   < > 133*   < > 136 133* 133*   K 4.4   < > 5.0   < > 4.1 4.5 3.5      < > 101   < > 103 99 97*   CO2 22*   < > 21*   < > 21* 24 23   BUN 27*   < > 29*   < > 30* 36* 41*   CREATININE 2.4*   < > 2.3*   < > 2.7* 3.1* 3.0*   LABGLOM 28*   < > 30*   < > 25* 21* 22*   GLUCOSE 268*   < > 247*   < > 133* 324* 297*   MG 1.9  --   --   --   --   --  1.9   CALCIUM 7.7*   < > 7.3*   < > 7.8* 7.6* 7.8*   CAION  --   --  1.03*  --   --   --  0.95*    < > = values in this interval not displayed. Summary 11/27/2020   Technically difficult examination. Normal left ventricle size and systolic function. Ejection fraction was   estimated at 55 %. There were no regional left ventricular wall motion   abnormalities and wall thickness was within normal limits. The left atrium is Mildly dilated. There is moderate aortic stenosis with valve area of 1.2 sq cm. The maximum aortic valve gradient is 48 mmHg, the mean gradient is 33   mmHg, and the peak velocity is 3.5 cm/s. ASSESSMENT:  1. Acute Kidney Injury likely multifactorial including sepsis, borderline hypotension and diuretic therapy. Ok to continue lasix drip and spironolactone. K 3.5. Give K 40 meq PO x 1. Check BMP, I lyla and Mag in AM. Discussed fluid restriction with pt and spouse. 2. Acute on chronic HFpEF with anasarca  3. Hypoxic resp failure, s/p HFNC  4.  Pseudohyponatremia 2nd to hyperglycemia  5. Pneumonia  6. Sepsis with Enterococcus bacteremia  7. Liver cirrhosis with history of alcohol abuse  8. Epistaxis s/p nasal endoscopy with cautery, Planned removal nasal pkg 12/3  9. Morbid obesity    Active Problems:    MATTIE (acute kidney injury) ([facility] [address]    Renal failure    Epistaxis    Pneumonitis due to aspiration of blood (HCC)    Hepatic cirrhosis (HCC)    Hyperglycemia  Resolved Problems:    * No resolved hospital problems.  Ulyess Cushing, APRN - CNP 9:16 AM 12/2/2020

## 2020-12-02 NOTE — PROGRESS NOTES
Progress note: Infectious diseases    Patient - Marita Ramirez,  Age - 48 y.o.    - 1965      Room Number - 4K-20/020-A   MRN -  [id number]   Acct # - [de-identified]  Date of Admission -  2020  9:04 PM    SUBJECTIVE:   No new issues. No bleeding. OBJECTIVE   VITALS    height is 6' 3\" (1.905 m) and weight is 370 lb 3 oz (167.9 kg) (abnormal). His oral temperature is 97.4 °F (36.3 °C). His blood pressure is 113/61 and his pulse is 101. His respiration is 20 and oxygen saturation is 93%. Wt Readings from Last 3 Encounters:   20 (!) 370 lb 3 oz (167.9 kg)   20 (!) 319 lb (144.7 kg)   09/10/20 (!) 321 lb (145.6 kg)       I/O (24 Hours)    Intake/Output Summary (Last 24 hours) at 2020 1308  Last data filed at 2020 1031  Gross per 24 hour   Intake 1770.82 ml   Output 7300 ml   Net -5529.18 ml       General Appearance  Awake, alert, oriented. HEENT - normocephalic, atraumatic, pale conjunctiva,  anicteric sclera, packed left nostril. Neck - Supple, no mass  Lungs -  Bilateral  air entry, diminished breath sounds. gynecomastia   Cardiovascular - Heart sounds are normal.    Abdomen - soft, not distended, nontender,   Neurologic -oriented to person place and time. Skin - No bruising or bleeding.   Extremities -+ edema, spider angiomas     MEDICATIONS:      ipratropium-albuterol  1 ampule Inhalation Q4H WA    sodium chloride  2 spray Nasal BID    insulin lispro  0-18 Units Subcutaneous TID WC    insulin lispro  0-9 Units Subcutaneous Nightly    multivitamin  1 tablet Oral Daily    sodium chloride  20 mL Intravenous Once    ampicillin-sulbactam  3 g Intravenous Q6H    sodium chloride flush  10 mL Intravenous 2 times per day    insulin glargine  35 Units Subcutaneous Nightly    [Held by provider] aspirin  81 mg Oral Daily    DULoxetine  60 mg Oral Daily    folic acid  1 mg Oral Daily  latanoprost  1 drop Both Eyes Nightly    mirtazapine  15 mg Oral Nightly    rosuvastatin  20 mg Oral Daily    spironolactone  50 mg Oral Daily    tamsulosin  0.4 mg Oral Daily    thiamine  100 mg Oral Daily    amLODIPine  10 mg Oral Daily      furosemide (LASIX) 1mg/ml infusion 20 mg/hr (12/02/20 1125)    dextrose       lidocaine, sodium chloride flush, promethazine **OR** ondansetron, PHENobarbital **OR** PHENobarbital **OR** PHENobarbital IVPB, diphenhydrAMINE, albuterol sulfate HFA, acetaminophen **OR** acetaminophen, polyethylene glycol, potassium chloride **OR** potassium alternative oral replacement **OR** potassium chloride, glucose, dextrose, glucagon (rDNA), dextrose, oxyCODONE-acetaminophen      LABS:     CBC:   Recent Labs     11/30/20  0330  12/01/20  0523 12/01/20  2121 12/02/20  0449   WBC 5.8  --  8.6  --  7.6   HGB 6.8*   < > 7.7* 7.6* 7.3*   PLT 79*  --  100*  --  86*    < > = values in this interval not displayed. BMP:    Recent Labs     11/30/20  1053 12/01/20  0523 12/02/20  0449    133* 133*   K 4.1 4.5 3.5    99 97*   CO2 21* 24 23   BUN 30* 36* 41*   CREATININE 2.7* 3.1* 3.0*   GLUCOSE 133* 324* 297*     Calcium:  Recent Labs     12/02/20  0449   CALCIUM 7.8*     Ionized Calcium:No results for input(s): IONCA in the last 72 hours. Magnesium:  Recent Labs     12/02/20  0449   MG 1.9      Recent Labs     12/01/20  2201 12/02/20  0629 12/02/20  1053   POCGLU 443* 252* 257*     HgbA1C: No results for input(s): LABA1C in the last 72 hours.   INR:   Recent Labs     12/01/20  1024 12/02/20  0448   INR 1.23* 1.27*     Hepatic:   Recent Labs     11/30/20  1053 12/02/20  0449   ALKPHOS 60 95   ALT 31 37   AST 45* 56*   PROT 6.6 6.8   BILITOT 0.8 0.7   BILIDIR 0.5* 0.4*   LABALBU 2.6* 2.9*      Recent Labs     11/30/20  0330   CKTOTAL 54*           Problem list of patient:     Patient Active Problem List   Diagnosis Code    HCV antibody positive R76.8    [facility] K70.30    Alcohol withdrawal seizure with complication (HCC) U66.466, R56.9    Alcohol withdrawal, uncomplicated ([facility] H48.641    Type 2 diabetes mellitus ([facility] E11.9    Hyponatremia E87.1    Leukocytosis D72.829    Thrombocytopenia (HCC) D69.6    COPD (chronic obstructive pulmonary disease) (HCC) J44.9    HLD (hyperlipidemia) E78.5    HTN (hypertension) I10    Seizure ([facility] [address] R56.9    Recurrent falls R29.6    Fracture of multiple ribs of left side S22.42XA    Anemia D64.9    Alcohol abuse F10.10    Closed fracture of distal end of left fibula with routine healing S82.832D    Hyperammonemia (HCC) E72.20    Essential tremor G25.0    Alcohol intoxication delirium (HCC) F10.121    MATTIE (acute kidney injury) ([facility] N17.9    Renal failure N19    Epistaxis R04.0    Pneumonitis due to aspiration of blood (HCC) J69.8    Hepatic cirrhosis (HCC) K74.60    Hyperglycemia R73.9         ASSESSMENT/PLAN   Enterococcus bacteremia on treatment  Acute kidney injury  Epistaxis :packing to be removed today  Liver cirrhosis  History of alcohol abuse  Continue current treatment.       Renato Hu MD, FACP 12/2/2020 1:08 PM

## 2020-12-02 NOTE — PROGRESS NOTES
Hospitalist Progress Note    Patient:  Abby Close      Unit/Bed:4K-20/020-A    YOB: 1965    MRN: [id number]       Acct: [de-identified]     PCP: MEGHAN Leung - CNP    Date of Admission: 11/25/2020    Assessment/Plan:  1. Acute kidney injury: Likely multifactorial AIN vs. Sepsis vs. Hypovolemia. Creatinine very mildly improved from yesterday after IV Albumin. Will plan to do four more doses of IV Albumin and monitor BMP tomorrow. - Continue management per nephrology: Lasix drip and spironolactone  - Albumin 25% IV 12.5g q6hrs x4 doses  2. Severe Pneumonia 2/2 Aspiration, improving  - Continue Unasyn  3. Acute hypoxic respiratory failure: Postsurgical nasal cautery requiring respiratory support. - Was transferred to ICU on 11/29/20 after procedure and was put on BiPAP, then subsequently HFNC. He was transferred out of ICU on 11/30/20 after he was able to be weaned to HFNC. Has been tolerating 6L O2 via NC.  - 12/2: Started scheduled Duonebs q4hrs WA and acapella as patient as increasing expiratory wheezing and bibasilar rhonchi  4. Liver Cirrhosis 2/2 EtOH abuse: Significant other states 1-2 weeks before hospitalization he drinks 6-8, 24 oz beers per day  - Phenobarbital PRN for alcohol withdrawal  5. Fluid overload: Multifactorial due to MATTIE and liver cirrhosis. Questionable diastolic heart dysfunction, however not noted on recent echocardiogram. Improved output yesterday after IV albumin.  - Concerned for upper extremity DVT overnight (12/1-12/2) - Bilateral venous ultrasound negative for DVT  - Plan as above  6. Epistaxis s/p left nasal cautery and packing, stable - POD #2: No further episodes of epistaxis. Nasal packing in place.  - Plan for FFP 1 unit in the tomorrow AM per ENT with nasal packing removal tomorrow  7. Enterococcus Bacteremia: No growth on recent blood cultures  - Continue Unasyn  8.  Moderate Aortic Stenosis: Valve area 1.2 sq cm, mean gradient 33 mmHg, peak velocity 3.5 cm/s  - Outpatient cardiology follow up  9. IDDM Type 2: HgbA1C 5.9% on 11/26/2020.   - High dose SSI ACHS  - Continue Lantus 35 units QHS  - Given Lantus 10 units once today due to -300s in past few days. Patient has been receiving prednisone. Will reassess need to change insulin dosing once prednisone complete. Chief Complaint: MATTIE    Hospital Course:  Per HPI, \"49 y.o. male who presented to [facility] with with above complain. Patient is a transfer from [place] where he had been admitted for about 4 days after complain of vomiting, incontinence, left shoulder pain and diarrhea. He was found to have sepsis secondary to pneumonia and during fluid resuscitation, it was noted that patient had MATTIE. He did develop fever during stay and blood cultures obtained during triage grew gram + cocci. Patient was started on Zosyn and vanco only for renal function to continue worsening from normal to 2.29. renal US was done showing no hydronephrosis or other kidney abnormalities. It was felt that patient needed nephrology consult to review worsening kidney status despite getting clinically better with abx. At bedside, patient reports feeling well and no longer confused. He dose have diabetes that is poorly controlled per records\"    Subjective (past 24 hours):   Overnight patient was complaining of bilateral pain. A bilateral extremity venous ultrasound was ordered for concern of DVT - results show no evidence of DVT. Patient states he is not doing well today. He states his full body swelling is worsening. Nursing states patient's wife has been bringing in outside food high in sodium - [employer] chicken, chips - and patient has been drinking a lot of Gatorade as well. Patient was educated on importance of low salt diet. He agrees to stop drinking gatoride and eating outside food. ROS (12 point review of systems completed. Pertinent positives noted. Otherwise ROS is negative).     Medications: Reviewed    Infusion Medications    furosemide (LASIX) 1mg/ml infusion 20 mg/hr (12/02/20 0210)    dextrose       Scheduled Medications    ipratropium-albuterol  1 ampule Inhalation Q4H WA    albumin human  12.5 g Intravenous Q6H    insulin lispro  0-18 Units Subcutaneous TID WC    insulin lispro  0-9 Units Subcutaneous Nightly    multivitamin  1 tablet Oral Daily    sodium chloride  20 mL Intravenous Once    ampicillin-sulbactam  3 g Intravenous Q6H    sodium chloride flush  10 mL Intravenous 2 times per day    insulin glargine  35 Units Subcutaneous Nightly    [Held by provider] aspirin  81 mg Oral Daily    DULoxetine  60 mg Oral Daily    folic acid  1 mg Oral Daily    latanoprost  1 drop Both Eyes Nightly    mirtazapine  15 mg Oral Nightly    rosuvastatin  20 mg Oral Daily    spironolactone  50 mg Oral Daily    tamsulosin  0.4 mg Oral Daily    thiamine  100 mg Oral Daily    amLODIPine  10 mg Oral Daily     PRN Meds: sodium chloride, lidocaine, sodium chloride flush, promethazine **OR** ondansetron, PHENobarbital **OR** PHENobarbital **OR** PHENobarbital IVPB, diphenhydrAMINE, albuterol sulfate HFA, acetaminophen **OR** acetaminophen, polyethylene glycol, potassium chloride **OR** potassium alternative oral replacement **OR** potassium chloride, glucose, dextrose, glucagon (rDNA), dextrose, oxyCODONE-acetaminophen      Intake/Output Summary (Last 24 hours) at 12/2/2020 0927  Last data filed at 12/2/2020 0320  Gross per 24 hour   Intake 2250.82 ml   Output 4700 ml   Net -2449.18 ml     Diet:  DIET GENERAL; Carb Control: 3 carb choices (45 gms)/meal; No Added Salt (3-4 GM); Dental Soft    Exam:  BP (!) 137/45   Pulse 104   Temp 97.7 °F (36.5 °C) (Oral)   Resp 20   Ht 6' 3\" (1.905 m)   Wt (!) 370 lb 3 oz (167.9 kg)   SpO2 97%   BMI 46.27 kg/m²     General appearance: No apparent distress, appears stated age and cooperative. On 6L O2 via NC. Anasarca.   HEENT: Pupils equal, round, and BILATERAL   Final Result   No sonographic evidence for upper extremity DVT. **This report has been created using voice recognition software. It may contain minor errors which are inherent in voice recognition technology. **      Final report electronically signed by Dr. Jordan Fong on 12/2/2020 8:05 AM      VL DUP LOWER EXTREMITY VENOUS BILATERAL   Final Result   No sonographic evidence of lower extremity DVT. **This report has been created using voice recognition software. It may contain minor errors which are inherent in voice recognition technology. **      Final report electronically signed by Dr. Jordan Fong on 11/30/2020 9:25 AM      US GALLBLADDER RUQ   Final Result   Hepatomegaly. Otherwise limited but unremarkable study. This document has been electronically signed by: Dolores Whitehead MD    on 11/30/2020 07:21 AM         CT CHEST WO CONTRAST   Final Result   Bibasilar atelectasis and trace pleural effusions. Superimposed right    lower lobe pneumonia is not excluded. Sequelae of prior granulomatous infection. Cholelithiasis. Cirrhosis. This document has been electronically signed by: Magdalena Rosales MD on    11/30/2020 01:31 AM      All CT scans at this facility use dose modulation, iterative    reconstruction, and/or weight-based   dosing when appropriate to reduce radiation dose to as low as reasonably    achievable. XR CHEST PORTABLE   Final Result   Impression:   Patchy bilateral infiltrates, mildly improved. No pneumothorax. This document has been electronically signed by: Magdalena Rosales MD on    11/29/2020 10:33 PM         XR CHEST PORTABLE   Final Result   1. Worsening bilateral lower lung atelectasis/infiltrate. 2. Pulmonary edema. 3. Possible small bilateral pleural effusions. 4. Mild stable cardiomegaly. **This report has been created using voice recognition software.  It may contain minor errors which are inherent in Started    Tele:   [x] yes             [] no    Electronically signed by Ton Herzog DO on 12/2/2020 at 9:27 AM

## 2020-12-02 NOTE — PROGRESS NOTES
Department of Otolaryngology  Progress Note    Chief Complaint: Epistaxis    SUBJECTIVE: The patient reports that he is doing relatively well today. He reports that he had a very small amount of pink-tinged mucus from the right nare now. He states that his right nare still feels plugged up despite medications. He denies any bloody postnasal drainage and hemoptysis. Patient's INR is stable. OBJECTIVE      Physical  VITALS:  /61   Pulse 101   Temp 97.4 °F (36.3 °C) (Oral)   Resp 20   Ht 6' 3\" (1.905 m)   Wt (!) 370 lb 3 oz (167.9 kg)   SpO2 93%   BMI 46.27 kg/m²     This is a 48 y.o. male. Patient is alert and oriented to person, place and time. Patient is obese. Mood is happy with normal affect. Not obviously hearing impaired. Head:   Normocephalic, atraumatic. No obvious masses or lesions noted. Nose:    External nose: Appears midline. No obvious deformity or masses. Septum:  deviated. Packing in place in the left nare and does not appear significantly saturated. Patient now has a small focal sore spot on the right anterior septum. The area of soreness appears to be directly where the nasal cannula sits. The area is not bleeding  Mucosa:  clear  Turbinates: normal and pink            Discharge:  Overall, less dried blood and crusting in right nare    Mouth/Throat:  Lips, tongue and oral cavity: Normal. No masses or lesions noted   Dentition: Edentulous, no malocclusion  Oral mucosa: moist  Tonsils: present, not acutely enlarged and no erythema or exudates  Oropharynx: normal-appearing mucosa. No bloody postnasal drainage noted  Hard and soft palates: symmetrical and intact. Salivary glands: not enlarged and no tenderness to palpation. Uvula: midline, no obvious lesions    Neck: Trachea midline. Thyroid not enlarged, no palpable masses or tenderness. Lymphatic: No cervical lymphadenopathy noted. Eyes: CINTIA, EOM intact. Conjunctiva moist without discharge.   Lungs: Normal effort of breathing, not obviously distressed. Neuro: Cranial nerves II-XII grossly intact. Extremities: No clubbing, edema, or cyanosis noted.     Data  CBC:   Lab Results   Component Value Date    WBC 7.6 12/02/2020    RBC 2.20 12/02/2020    HGB 7.3 12/02/2020    HCT 22.9 12/02/2020    .1 12/02/2020    MCH 33.2 12/02/2020    MCHC 31.9 12/02/2020    RDW 16.6 06/03/2020    PLT 86 12/02/2020    MPV 11.0 12/02/2020     CMP:    Lab Results   Component Value Date     12/02/2020    K 3.5 12/02/2020    K 4.5 12/01/2020    CL 97 12/02/2020    CO2 23 12/02/2020    BUN 41 12/02/2020    CREATININE 3.0 12/02/2020    LABGLOM 22 12/02/2020    GLUCOSE 297 12/02/2020    PROT 6.8 12/02/2020    LABALBU 2.9 12/02/2020    CALCIUM 7.8 12/02/2020    BILITOT 0.7 12/02/2020    ALKPHOS 95 12/02/2020    AST 56 12/02/2020    ALT 37 12/02/2020     PT/INR:    Lab Results   Component Value Date    INR 1.27 12/02/2020       Inpatient Medications  Current Facility-Administered Medications: ipratropium-albuterol (DUONEB) nebulizer solution 1 ampule, 1 ampule, Inhalation, Q4H WA  sodium chloride (OCEAN, BABY AYR) 0.65 % nasal spray 2 spray, 2 spray, Nasal, PRN  insulin lispro (HUMALOG) injection vial 0-18 Units, 0-18 Units, Subcutaneous, TID WC  insulin lispro (HUMALOG) injection vial 0-9 Units, 0-9 Units, Subcutaneous, Nightly  furosemide (LASIX) 100 mg in dextrose 5 % 100 mL infusion, 20 mg/hr, Intravenous, Continuous  multivitamin 1 tablet, 1 tablet, Oral, Daily  0.9 % sodium chloride bolus, 20 mL, Intravenous, Once  lidocaine (LMX) 4 % cream, , Topical, PRN  ampicillin-sulbactam (UNASYN) 3 g ivpb minibag, 3 g, Intravenous, Q6H  sodium chloride flush 0.9 % injection 10 mL, 10 mL, Intravenous, 2 times per day  sodium chloride flush 0.9 % injection 10 mL, 10 mL, Intravenous, PRN  promethazine (PHENERGAN) tablet 12.5 mg, 12.5 mg, Oral, Q6H PRN **OR** ondansetron (ZOFRAN) injection 4 mg, 4 mg, Intravenous, Q6H PRN  PHENobarbital (LUMINAL) injection 130 mg, 130 mg, Intravenous, Q1H PRN **OR** PHENobarbital (LUMINAL) injection 260 mg, 260 mg, Intravenous, Q1H PRN **OR** PHENobarbital (LUMINAL) 1,040 mg in sodium chloride 0.9 % 100 mL IVPB, 1,040 mg, Intravenous, Daily PRN  diphenhydrAMINE (BENADRYL) injection 25 mg, 25 mg, Intravenous, Q6H PRN  insulin glargine (LANTUS) injection vial 35 Units, 35 Units, Subcutaneous, Nightly  albuterol sulfate  (90 Base) MCG/ACT inhaler 2 puff, 2 puff, Inhalation, Q6H PRN  [Held by provider] aspirin EC tablet 81 mg, 81 mg, Oral, Daily  DULoxetine (CYMBALTA) extended release capsule 60 mg, 60 mg, Oral, Daily  folic acid (FOLVITE) tablet 1 mg, 1 mg, Oral, Daily  latanoprost (XALATAN) 0.005 % ophthalmic solution 1 drop, 1 drop, Both Eyes, Nightly  mirtazapine (REMERON) tablet 15 mg, 15 mg, Oral, Nightly  rosuvastatin (CRESTOR) tablet 20 mg, 20 mg, Oral, Daily  spironolactone (ALDACTONE) tablet 50 mg, 50 mg, Oral, Daily  tamsulosin (FLOMAX) capsule 0.4 mg, 0.4 mg, Oral, Daily  vitamin B-1 (THIAMINE) tablet 100 mg, 100 mg, Oral, Daily  acetaminophen (TYLENOL) tablet 650 mg, 650 mg, Oral, Q6H PRN **OR** acetaminophen (TYLENOL) suppository 650 mg, 650 mg, Rectal, Q6H PRN  polyethylene glycol (GLYCOLAX) packet 17 g, 17 g, Oral, Daily PRN  potassium chloride (KLOR-CON M) extended release tablet 40 mEq, 40 mEq, Oral, PRN **OR** potassium bicarb-citric acid (EFFER-K) effervescent tablet 40 mEq, 40 mEq, Oral, PRN **OR** potassium chloride 10 mEq/100 mL IVPB (Peripheral Line), 10 mEq, Intravenous, PRN  glucose (GLUTOSE) 40 % oral gel 15 g, 15 g, Oral, PRN  dextrose 50 % IV solution, 12.5 g, Intravenous, PRN  glucagon (rDNA) injection 1 mg, 1 mg, Intramuscular, PRN  dextrose 5 % solution, 100 mL/hr, Intravenous, PRN  amLODIPine (NORVASC) tablet 10 mg, 10 mg, Oral, Daily  oxyCODONE-acetaminophen (PERCOCET) 7.5-325 MG per tablet 1 tablet, 1 tablet, Oral, Q8H PRN    ASSESSMENT AND PLAN    Epistaxis    -Packing continues to be appropriately in place without any evidence of persistent bleeding  -The patient appears to have a small sore on the right anterior septum now likely related to supplemental oxygen. Nasal saline spray modified to twice daily scheduled use in the right nare  -Discussed plan with Dr. Chente Armstrong. He would like to remove the packing bedside on 12/3/2020.   Dr. Chente Armstrong would like the patient to receive FFP the morning of 12/3/2020 in hopes of limiting the chance of rebleeding  -Contact ENT with further bleeding or other concerns    Electronically signed by GABRIELA Hugo on 12/2/2020 at 12:43 PM

## 2020-12-03 LAB
ALBUMIN SERPL-MCNC: 3.1 G/DL (ref 3.5–5.1)
ANION GAP SERPL CALCULATED.3IONS-SCNC: 11 MEQ/L (ref 8–16)
BASE EXCESS MIXED: 5.6 MMOL/L (ref -2–3)
BUN BLDV-MCNC: 40 MG/DL (ref 7–22)
CALCIUM IONIZED: 0.99 MMOL/L (ref 1.12–1.32)
CALCIUM SERPL-MCNC: 8.5 MG/DL (ref 8.5–10.5)
CHLORIDE BLD-SCNC: 95 MEQ/L (ref 98–111)
CO2: 29 MEQ/L (ref 23–33)
COLLECTED BY:: ABNORMAL
CREAT SERPL-MCNC: 2.9 MG/DL (ref 0.4–1.2)
GFR SERPL CREATININE-BSD FRML MDRD: 23 ML/MIN/1.73M2
GLUCOSE BLD-MCNC: 136 MG/DL (ref 70–108)
GLUCOSE BLD-MCNC: 144 MG/DL (ref 70–108)
GLUCOSE BLD-MCNC: 213 MG/DL (ref 70–108)
GLUCOSE BLD-MCNC: 218 MG/DL (ref 70–108)
GLUCOSE BLD-MCNC: 258 MG/DL (ref 70–108)
HCO3, MIXED: 30 MMOL/L (ref 23–28)
HCT VFR BLD CALC: 23.7 % (ref 42–52)
HEMOGLOBIN: 7.6 GM/DL (ref 14–18)
INR BLD: 1.34 (ref 0.85–1.13)
MAGNESIUM: 1.6 MG/DL (ref 1.6–2.4)
O2 SAT, MIXED: 80 %
PCO2, MIXED VENOUS: 43 MMHG (ref 41–51)
PH, MIXED: 7.45 (ref 7.31–7.41)
PO2 MIXED: 43 MMHG (ref 25–40)
POTASSIUM SERPL-SCNC: 3.4 MEQ/L (ref 3.5–5.2)
SITE: ABNORMAL
SODIUM BLD-SCNC: 135 MEQ/L (ref 135–145)

## 2020-12-03 PROCEDURE — 51702 INSERT TEMP BLADDER CATH: CPT

## 2020-12-03 PROCEDURE — 97530 THERAPEUTIC ACTIVITIES: CPT

## 2020-12-03 PROCEDURE — P9017 PLASMA 1 DONOR FRZ W/IN 8 HR: HCPCS

## 2020-12-03 PROCEDURE — 2060000000 HC ICU INTERMEDIATE R&B

## 2020-12-03 PROCEDURE — 6370000000 HC RX 637 (ALT 250 FOR IP): Performed by: STUDENT IN AN ORGANIZED HEALTH CARE EDUCATION/TRAINING PROGRAM

## 2020-12-03 PROCEDURE — P9047 ALBUMIN (HUMAN), 25%, 50ML: HCPCS | Performed by: STUDENT IN AN ORGANIZED HEALTH CARE EDUCATION/TRAINING PROGRAM

## 2020-12-03 PROCEDURE — 6370000000 HC RX 637 (ALT 250 FOR IP): Performed by: INTERNAL MEDICINE

## 2020-12-03 PROCEDURE — 85610 PROTHROMBIN TIME: CPT

## 2020-12-03 PROCEDURE — 94760 N-INVAS EAR/PLS OXIMETRY 1: CPT

## 2020-12-03 PROCEDURE — 6370000000 HC RX 637 (ALT 250 FOR IP): Performed by: PHYSICIAN ASSISTANT

## 2020-12-03 PROCEDURE — 2580000003 HC RX 258: Performed by: OTOLARYNGOLOGY

## 2020-12-03 PROCEDURE — 82330 ASSAY OF CALCIUM: CPT

## 2020-12-03 PROCEDURE — 85018 HEMOGLOBIN: CPT

## 2020-12-03 PROCEDURE — 6370000000 HC RX 637 (ALT 250 FOR IP): Performed by: NURSE PRACTITIONER

## 2020-12-03 PROCEDURE — 99233 SBSQ HOSP IP/OBS HIGH 50: CPT | Performed by: INTERNAL MEDICINE

## 2020-12-03 PROCEDURE — 6360000002 HC RX W HCPCS: Performed by: STUDENT IN AN ORGANIZED HEALTH CARE EDUCATION/TRAINING PROGRAM

## 2020-12-03 PROCEDURE — 36430 TRANSFUSION BLD/BLD COMPNT: CPT

## 2020-12-03 PROCEDURE — 83735 ASSAY OF MAGNESIUM: CPT

## 2020-12-03 PROCEDURE — 2700000000 HC OXYGEN THERAPY PER DAY

## 2020-12-03 PROCEDURE — 6360000002 HC RX W HCPCS: Performed by: OTOLARYNGOLOGY

## 2020-12-03 PROCEDURE — 6370000000 HC RX 637 (ALT 250 FOR IP): Performed by: FAMILY MEDICINE

## 2020-12-03 PROCEDURE — 82040 ASSAY OF SERUM ALBUMIN: CPT

## 2020-12-03 PROCEDURE — 82803 BLOOD GASES ANY COMBINATION: CPT

## 2020-12-03 PROCEDURE — 85014 HEMATOCRIT: CPT

## 2020-12-03 PROCEDURE — 36415 COLL VENOUS BLD VENIPUNCTURE: CPT

## 2020-12-03 PROCEDURE — 94640 AIRWAY INHALATION TREATMENT: CPT

## 2020-12-03 PROCEDURE — 99232 SBSQ HOSP IP/OBS MODERATE 35: CPT | Performed by: INTERNAL MEDICINE

## 2020-12-03 PROCEDURE — 6360000002 HC RX W HCPCS: Performed by: NURSE PRACTITIONER

## 2020-12-03 PROCEDURE — APPSS30 APP SPLIT SHARED TIME 16-30 MINUTES: Performed by: NURSE PRACTITIONER

## 2020-12-03 PROCEDURE — 94762 N-INVAS EAR/PLS OXIMTRY CONT: CPT

## 2020-12-03 PROCEDURE — 82948 REAGENT STRIP/BLOOD GLUCOSE: CPT

## 2020-12-03 PROCEDURE — 2580000003 HC RX 258: Performed by: STUDENT IN AN ORGANIZED HEALTH CARE EDUCATION/TRAINING PROGRAM

## 2020-12-03 PROCEDURE — 2580000003 HC RX 258: Performed by: NURSE PRACTITIONER

## 2020-12-03 PROCEDURE — 6360000002 HC RX W HCPCS: Performed by: INTERNAL MEDICINE

## 2020-12-03 PROCEDURE — 97110 THERAPEUTIC EXERCISES: CPT

## 2020-12-03 PROCEDURE — 80048 BASIC METABOLIC PNL TOTAL CA: CPT

## 2020-12-03 RX ORDER — MAGNESIUM SULFATE IN WATER 40 MG/ML
2 INJECTION, SOLUTION INTRAVENOUS ONCE
Status: COMPLETED | OUTPATIENT
Start: 2020-12-03 | End: 2020-12-03

## 2020-12-03 RX ORDER — OXYMETAZOLINE HYDROCHLORIDE 0.05 G/100ML
2 SPRAY NASAL 3 TIMES DAILY
Status: DISPENSED | OUTPATIENT
Start: 2020-12-03 | End: 2020-12-06

## 2020-12-03 RX ORDER — IPRATROPIUM BROMIDE AND ALBUTEROL SULFATE 2.5; .5 MG/3ML; MG/3ML
1 SOLUTION RESPIRATORY (INHALATION) EVERY 4 HOURS
Status: DISCONTINUED | OUTPATIENT
Start: 2020-12-03 | End: 2020-12-05

## 2020-12-03 RX ADMIN — SPIRONOLACTONE 50 MG: 25 TABLET ORAL at 10:09

## 2020-12-03 RX ADMIN — FOLIC ACID 1 MG: 1 TABLET ORAL at 10:09

## 2020-12-03 RX ADMIN — SODIUM CHLORIDE 3 G: 900 INJECTION INTRAVENOUS at 22:28

## 2020-12-03 RX ADMIN — OXYMETAZOLINE HYDROCHLORIDE 2 SPRAY: 0.05 SPRAY NASAL at 23:03

## 2020-12-03 RX ADMIN — POTASSIUM CHLORIDE 40 MEQ: 1500 TABLET, EXTENDED RELEASE ORAL at 06:17

## 2020-12-03 RX ADMIN — SODIUM CHLORIDE 3 G: 900 INJECTION INTRAVENOUS at 17:16

## 2020-12-03 RX ADMIN — THERA TABS 1 TABLET: TAB at 10:09

## 2020-12-03 RX ADMIN — FUROSEMIDE 20 MG/HR: 10 INJECTION, SOLUTION INTRAMUSCULAR; INTRAVENOUS at 03:37

## 2020-12-03 RX ADMIN — IPRATROPIUM BROMIDE AND ALBUTEROL SULFATE 1 AMPULE: .5; 3 SOLUTION RESPIRATORY (INHALATION) at 16:41

## 2020-12-03 RX ADMIN — ALBUMIN (HUMAN) 12.5 G: 0.25 INJECTION, SOLUTION INTRAVENOUS at 03:37

## 2020-12-03 RX ADMIN — FUROSEMIDE 20 MG/HR: 10 INJECTION, SOLUTION INTRAMUSCULAR; INTRAVENOUS at 23:03

## 2020-12-03 RX ADMIN — AMLODIPINE BESYLATE 10 MG: 10 TABLET ORAL at 10:09

## 2020-12-03 RX ADMIN — SODIUM CHLORIDE 3 G: 900 INJECTION INTRAVENOUS at 05:48

## 2020-12-03 RX ADMIN — FUROSEMIDE 20 MG/HR: 10 INJECTION, SOLUTION INTRAMUSCULAR; INTRAVENOUS at 17:26

## 2020-12-03 RX ADMIN — POTASSIUM BICARBONATE 40 MEQ: 782 TABLET, EFFERVESCENT ORAL at 10:14

## 2020-12-03 RX ADMIN — IPRATROPIUM BROMIDE AND ALBUTEROL SULFATE 1 AMPULE: .5; 3 SOLUTION RESPIRATORY (INHALATION) at 20:29

## 2020-12-03 RX ADMIN — SALINE NASAL SPRAY 2 SPRAY: 1.5 SOLUTION NASAL at 10:11

## 2020-12-03 RX ADMIN — MIRTAZAPINE 15 MG: 15 TABLET, FILM COATED ORAL at 21:33

## 2020-12-03 RX ADMIN — IPRATROPIUM BROMIDE AND ALBUTEROL SULFATE 1 AMPULE: .5; 3 SOLUTION RESPIRATORY (INHALATION) at 12:08

## 2020-12-03 RX ADMIN — SODIUM CHLORIDE 3 G: 900 INJECTION INTRAVENOUS at 00:02

## 2020-12-03 RX ADMIN — SODIUM CHLORIDE 20 ML: 9 INJECTION, SOLUTION INTRAVENOUS at 12:09

## 2020-12-03 RX ADMIN — SODIUM CHLORIDE 3 G: 900 INJECTION INTRAVENOUS at 13:00

## 2020-12-03 RX ADMIN — DULOXETINE HYDROCHLORIDE 60 MG: 60 CAPSULE, DELAYED RELEASE ORAL at 10:09

## 2020-12-03 RX ADMIN — LATANOPROST 1 DROP: 50 SOLUTION OPHTHALMIC at 20:04

## 2020-12-03 RX ADMIN — INSULIN GLARGINE 35 UNITS: 100 INJECTION, SOLUTION SUBCUTANEOUS at 20:06

## 2020-12-03 RX ADMIN — TAMSULOSIN HYDROCHLORIDE 0.4 MG: 0.4 CAPSULE ORAL at 10:09

## 2020-12-03 RX ADMIN — POTASSIUM BICARBONATE 40 MEQ: 782 TABLET, EFFERVESCENT ORAL at 20:04

## 2020-12-03 RX ADMIN — ROSUVASTATIN CALCIUM 20 MG: 20 TABLET, FILM COATED ORAL at 10:09

## 2020-12-03 RX ADMIN — OXYCODONE HYDROCHLORIDE AND ACETAMINOPHEN 1 TABLET: 7.5; 325 TABLET ORAL at 10:08

## 2020-12-03 RX ADMIN — PREDNISOLONE ACETATE 1 DROP: 10 SUSPENSION/ DROPS OPHTHALMIC at 10:12

## 2020-12-03 RX ADMIN — PREDNISOLONE ACETATE 1 DROP: 10 SUSPENSION/ DROPS OPHTHALMIC at 20:04

## 2020-12-03 RX ADMIN — SODIUM CHLORIDE, PRESERVATIVE FREE 10 ML: 5 INJECTION INTRAVENOUS at 20:04

## 2020-12-03 RX ADMIN — MAGNESIUM SULFATE HEPTAHYDRATE 2 G: 40 INJECTION, SOLUTION INTRAVENOUS at 14:55

## 2020-12-03 RX ADMIN — PREDNISOLONE ACETATE 1 DROP: 10 SUSPENSION/ DROPS OPHTHALMIC at 17:06

## 2020-12-03 RX ADMIN — IPRATROPIUM BROMIDE AND ALBUTEROL SULFATE 1 AMPULE: .5; 3 SOLUTION RESPIRATORY (INHALATION) at 08:31

## 2020-12-03 RX ADMIN — Medication 100 MG: at 10:09

## 2020-12-03 RX ADMIN — SALINE NASAL SPRAY 2 SPRAY: 1.5 SOLUTION NASAL at 20:04

## 2020-12-03 RX ADMIN — OXYCODONE HYDROCHLORIDE AND ACETAMINOPHEN 1 TABLET: 7.5; 325 TABLET ORAL at 20:12

## 2020-12-03 ASSESSMENT — PAIN SCALES - GENERAL
PAINLEVEL_OUTOF10: 8
PAINLEVEL_OUTOF10: 8

## 2020-12-03 NOTE — CARE COORDINATION
DISCHARGE/PLANNING EVALUATION  12/3/20, 3:37 PM EST    Reason for Referral:  \"discharge planning\"  Mental Status:  Alert and oriented   Decision Making:  Makes decisions with his SO  Family/Social/Home Environment:  SW spoke to patient. He and his girlfriend, Kenrick Floyd, live in a 2 story home without a basement. He sleeps on the first floor but the bathroom is on the second floor. He has a bed side commode for his bathroom needs. He states Keisha does not work and takes care of all housekeeping and is able to drive. He states he is ok to be home alone if she runs errands. He has a walker with wheels, cane and wheelchair. He does not have any children. He never had [facility]  Current Services including food security, transportation and housekeeping:  See above  Current Equipment:  See above  Payment Source: [facility]  Concerns or Barriers to Discharge: We discussed the need for [facility] but did not think he needed this but was ok with SW speaking to Keisha about this  Post acute provider list with quality measures, geographic area and applicable managed care information provided. Questions regarding selection process answered: not a this time    Teach Back Method used with patient regarding care plan and needs  Patient verbalize understanding of the plan of care and contribute to goal setting.        Patient goals, treatment preferences and discharge plan:  Plan discharge to home, may agree to [facility]    Electronically signed by CHON Mathur on 12/3/2020 at 3:37 PM

## 2020-12-03 NOTE — PROGRESS NOTES
Hospitalist Progress Note    Patient:  Brittny Zavaleta      Unit/Bed:4K-20/020-A    YOB: 1965    MRN: [id number]       Acct: [de-identified]     PCP: MEGHAN Weir - RONALDO    Date of Admission: 11/25/2020    Assessment/Plan:  1. Acute kidney injury: Likely multifactorial AIN vs. Sepsis vs. Hypovolemia.   - Continue management per nephrology: Lasix drip and spironolactone  - Albumin 25% IV 12.5g q6hrs x4 doses  - 12/3 - Creatinine very mildly improving and had significant urinary output since yesterday. Has received a total of 7 doses of Albumin IV 12.5g 25%. Will consult dietician for protein malnutrition. 2. Severe Pneumonia 2/2 Aspiration, improving  - Continue Unasyn  3. Acute hypoxic respiratory failure: Postsurgical nasal cautery requiring respiratory support. - Was transferred to ICU on 11/29/20 after procedure and was put on BiPAP, then subsequently HFNC. He was transferred out of ICU on 11/30/20 after he was able to be weaned to HFNC. Has been tolerating 6L O2 via NC.  - 12/2: Started scheduled Duonebs q4hrs WA and acapella as patient as increasing expiratory wheezing and bibasilar rhonchi  - 12/3: Continues to have expiratory wheezing. Continue DuoNebs q4hrs and acapella as tolerated. Patient is currently on 6L NC, however, he's not keeping his cannula in. Will attempt trial of room air today and monitor. 4. Liver Cirrhosis 2/2 EtOH abuse: Significant other states 1-2 weeks before hospitalization he drinks 6-8, 24 oz beers per day  - Phenobarbital PRN for alcohol withdrawal  5. Fluid overload, improving: Multifactorial due to MATTIE and liver cirrhosis. Questionable diastolic heart dysfunction, however not noted on recent echocardiogram. Improved output yesterday after IV albumin.  - Concerned for upper extremity DVT overnight (12/1-12/2) - Bilateral venous ultrasound negative for DVT  - 12/3 - Total of 9.7L urinary output since yesterday. Pitting edema/anasarca mildly improving.  Apply ACE wraps to BLE.  - Plan as above  6. Epistaxis s/p left nasal cautery and packing, stable - POD #2: No further episodes of epistaxis. Nasal packing in place.  - Plan for FFP 1 unit in the today at 12PM per ENT with nasal packing removal in afternoon  7. Enterococcus Bacteremia: No growth on recent blood cultures. - Continue Unasyn  8. Moderate Aortic Stenosis: Valve area 1.2 sq cm, mean gradient 33 mmHg, peak velocity 3.5 cm/s  - Outpatient cardiology follow up  9. IDDM Type 2: HgbA1C 5.9% on 11/26/2020.   - High dose SSI ACHS  - Continue Lantus 35 units QHS  - 12/1 - Given Lantus 10 units once due to -300s in past few days. Patient has been receiving prednisone. Will reassess need to change insulin dosing once prednisone complete.  - 12/3 - Blood sugars steadily improving since finishing prednisone. Chief Complaint: MATTIE    Hospital Course:  Per HPI, \"49 y.o. male who presented to [address] [place] [address] with with above complain. Patient is a transfer from [place] where he had been admitted for about 4 days after complain of vomiting, incontinence, left shoulder pain and diarrhea. He was found to have sepsis secondary to pneumonia and during fluid resuscitation, it was noted that patient had MATTIE. He did develop fever during stay and blood cultures obtained during triage grew gram + cocci. Patient was started on Zosyn and vanco only for renal function to continue worsening from normal to 2.29. renal US was done showing no hydronephrosis or other kidney abnormalities. It was felt that patient needed nephrology consult to review worsening kidney status despite getting clinically better with abx. At bedside, patient reports feeling well and no longer confused. He dose have diabetes that is poorly controlled per records\"    Subjective (past 24 hours):   Patient states he is doing ok today. He still complains of his full body swelling, but his pain from the swelling is improving.  He denies fever, chills, chest pain, nausea, vomiting, abdominal pain. He does endorse shortness of breath and wheezing. ROS (12 point review of systems completed. Pertinent positives noted. Otherwise ROS is negative). Medications:  Reviewed    Infusion Medications    furosemide (LASIX) 1mg/ml infusion 20 mg/hr (12/03/20 0337)    dextrose       Scheduled Medications    potassium bicarb-citric acid  40 mEq Oral BID    ipratropium-albuterol  1 ampule Inhalation Q4H    sodium chloride  2 spray Nasal BID    prednisoLONE acetate  1 drop Left Eye TID    insulin lispro  0-18 Units Subcutaneous TID WC    insulin lispro  0-9 Units Subcutaneous Nightly    multivitamin  1 tablet Oral Daily    ampicillin-sulbactam  3 g Intravenous Q6H    sodium chloride flush  10 mL Intravenous 2 times per day    insulin glargine  35 Units Subcutaneous Nightly    [Held by provider] aspirin  81 mg Oral Daily    DULoxetine  60 mg Oral Daily    folic acid  1 mg Oral Daily    latanoprost  1 drop Both Eyes Nightly    mirtazapine  15 mg Oral Nightly    rosuvastatin  20 mg Oral Daily    spironolactone  50 mg Oral Daily    tamsulosin  0.4 mg Oral Daily    thiamine  100 mg Oral Daily    amLODIPine  10 mg Oral Daily     PRN Meds: lidocaine, sodium chloride flush, promethazine **OR** ondansetron, PHENobarbital **OR** PHENobarbital **OR** PHENobarbital IVPB, diphenhydrAMINE, albuterol sulfate HFA, acetaminophen **OR** acetaminophen, polyethylene glycol, potassium chloride **OR** potassium alternative oral replacement **OR** potassium chloride, glucose, dextrose, glucagon (rDNA), dextrose, oxyCODONE-acetaminophen      Intake/Output Summary (Last 24 hours) at 12/3/2020 0905  Last data filed at 12/3/2020 0349  Gross per 24 hour   Intake 1996.89 ml   Output 9775 ml   Net -7778.11 ml     Diet:  DIET GENERAL; Carb Control: 3 carb choices (45 gms)/meal; No Added Salt (3-4 GM);  Dental Soft; Daily Fluid Restriction: 1500 ml    Exam:  /73   Pulse 100 Temp 98.6 °F (37 °C) (Oral)   Resp 24   Ht 6' 3\" (1.905 m)   Wt (!) 363 lb 7 oz (164.9 kg)   SpO2 95%   BMI 45.43 kg/m²     General appearance: No apparent distress, appears stated age and cooperative. On 6L O2 via NC. Anasarca, mildly improving. HEENT: Pupils equal, round, and reactive to light. Conjunctivae/corneas clear. Nasal packing to left nare. Neck: Supple, with full range of motion. No jugular venous distention. Trachea midline. Respiratory:  Normal respiratory effort. Diminished lung sounds with diffuse expiratory wheezing. No rales. Cardiovascular: Regular rate and rhythm with normal S1/S2 without murmurs, rubs or gallops. Abdomen: Soft, non-tender, non-distended with normal bowel sounds. Musculoskeletal: passive and active ROM x 4 extremities. +3 pitting edema to BLE, pitting up bilateral thighs, nontender to palpation  Skin: Skin color, texture, turgor normal.  No rashes or lesions. Neurologic:  Neurovascularly intact without any focal sensory/motor deficits. Grossly non-focal.  Psychiatric: Alert and oriented, thought content appropriate, normal insight  Capillary Refill: Brisk,< 3 seconds   Peripheral Pulses: +2 palpable, equal bilaterally     Labs:   Recent Labs     12/01/20  0523 12/01/20 2121 12/02/20 0449 12/03/20  0331   WBC 8.6  --  7.6  --    HGB 7.7* 7.6* 7.3* 7.6*   HCT 24.5* 24.2* 22.9* 23.7*   *  --  86*  --      Recent Labs     12/01/20  0523 12/02/20  0449 12/03/20  0331   * 133* 135   K 4.5 3.5 3.4*   CL 99 97* 95*   CO2 24 23 29   BUN 36* 41* 40*   CREATININE 3.1* 3.0* 2.9*   CALCIUM 7.6* 7.8* 8.5     Recent Labs     11/30/20  1053 12/02/20 0449   AST 45* 56*   ALT 31 37   BILIDIR 0.5* 0.4*   BILITOT 0.8 0.7   ALKPHOS 60 95     Recent Labs     12/01/20  1024 12/02/20  0448 12/03/20  0331   INR 1.23* 1.27* 1.34*     No results for input(s): CKTOTAL, TROPONINI in the last 72 hours.   Microbiology:      Urinalysis:      Lab Results   Component Value Date    NITRU NEGATIVE 11/28/2020    WBCUA 0-2 11/28/2020    BACTERIA FEW 11/28/2020    RBCUA 5-10 11/28/2020    BLOODU LARGE 11/28/2020    SPECGRAV 1.018 11/28/2020    GLUCOSEU NEGATIVE 10/31/2018       Radiology:  VL DUP UPPER EXTREMITY VENOUS BILATERAL   Final Result   No sonographic evidence for upper extremity DVT. **This report has been created using voice recognition software. It may contain minor errors which are inherent in voice recognition technology. **      Final report electronically signed by Dr. Errol Bansal on 12/2/2020 8:05 AM      VL DUP LOWER EXTREMITY VENOUS BILATERAL   Final Result   No sonographic evidence of lower extremity DVT. **This report has been created using voice recognition software. It may contain minor errors which are inherent in voice recognition technology. **      Final report electronically signed by Dr. Errol Bansal on 11/30/2020 9:25 AM      US GALLBLADDER RUQ   Final Result   Hepatomegaly. Otherwise limited but unremarkable study. This document has been electronically signed by: Elva Kellogg MD    on 11/30/2020 07:21 AM         CT CHEST WO CONTRAST   Final Result   Bibasilar atelectasis and trace pleural effusions. Superimposed right    lower lobe pneumonia is not excluded. Sequelae of prior granulomatous infection. Cholelithiasis. Cirrhosis. This document has been electronically signed by: Yovana Richardson MD on    11/30/2020 01:31 AM      All CT scans at this facility use dose modulation, iterative    reconstruction, and/or weight-based   dosing when appropriate to reduce radiation dose to as low as reasonably    achievable. XR CHEST PORTABLE   Final Result   Impression:   Patchy bilateral infiltrates, mildly improved. No pneumothorax. This document has been electronically signed by: Yovana Richardson MD on    11/29/2020 10:33 PM         XR CHEST PORTABLE   Final Result   1. Worsening bilateral lower lung atelectasis/infiltrate. 2. Pulmonary edema. 3. Possible small bilateral pleural effusions. 4. Mild stable cardiomegaly. **This report has been created using voice recognition software. It may contain minor errors which are inherent in voice recognition technology. **      Final report electronically signed by Dr. Eladia Tinajero on 11/29/2020 6:43 PM      XR CHEST (2 VW)   Final Result   Mild pulmonary edema versus interstitial infiltrates with very small bilateral pleural effusions. **This report has been created using voice recognition software. It may contain minor errors which are inherent in voice recognition technology. **      Final report electronically signed by Dr. Dino Vincent on 11/29/2020 2:27 PM      XR CHEST PORTABLE   Final Result   1. Engorged pulmonary vessels. 2. Prominent lung markings in the mid and lower lung zones. This can represent early pulmonary edema. Interstitial infiltrates are not excluded. **This report has been created using voice recognition software. It may contain minor errors which are inherent in voice recognition technology. **      Final report electronically signed by Dr. Dino Vincent on 11/29/2020 8:39 AM      XR CHEST (2 VW)   Final Result   1. Mild prominence of the basilar interstitium which can represent some fibrotic changes versus early pulmonary edema. 2. New age indeterminate compression fracture of an upper thoracic vertebral body. **This report has been created using voice recognition software. It may contain minor errors which are inherent in voice recognition technology. **      Final report electronically signed by Dr. Dino Vincent on 11/28/2020 8:47 AM      US RENAL COMPLETE   Final Result   1. Possible left renal cyst but otherwise unremarkable renal ultrasound. 2. Unremarkable urinary bladder.       Final report electronically signed by Dr. Eladia Tinajero on 11/26/2020 5:12 PM        DVT prophylaxis: [] Lovenox [x] SCDs                                 [] SQ Heparin                                 [x] Encourage ambulation           [] Already on Anticoagulation     Code Status: Full Code    PT/OT Eval Status: Started    Tele:   [x] yes             [] no    Electronically signed by Anupam Aleman DO on 12/3/2020 at 9:05 AM

## 2020-12-03 NOTE — CARE COORDINATION
DISASTER CHARTING    12/3/20, 2:02 PM EST    DISCHARGE ONGOING EVALUATION:     [facility] day: 8  Location: -20/020-A Reason for admit: MATTIE (acute kidney injury) ([address] [N17.9]  Renal failure [N19]   Barriers to Discharge: Creatinine 2.9, H 7.6; monitor. Oxygen 6L continued. Nephrology/ID following.  IV AB/Lasix gtt continued  PCP: MEGHAN Mcneal - CNP  Patient Goals/Plan/Treatment Preferences: plans home with SO; therapy recommends SNF versus 24h help and HH (therapy, aide, nursing); collaborated with Micaela, [address]

## 2020-12-03 NOTE — PROGRESS NOTES
Comprehensive Nutrition Assessment    Type and Reason for Visit:  Initial, Consult(protein malnutrition)    Nutrition Recommendations/Plan:   Continue current diet  ONS start (ensure high protein) TID  Continue B-1 as per MD order    Nutrition Assessment:  Pt. nutritionally compromised AEB MATTIE. At risk for further nutrition compromise r/t obese patient, full body swelling, consisently hungry, and underlying medical condition (hx: asthma,brain tumor, cirrhosis, COPD, DM, hepatitis C, HLD, HTN, seizures, bronch 11/29/20, amp of distal right hallux). Nutrition recommendations/interventions as per above. Malnutrition Assessment:  Malnutrition Status: At risk for malnutrition (Comment)(alcohol abuse)    Context:  Acute Illness     Findings of the 6 clinical characteristics of malnutrition:  Energy Intake:  No significant decrease in energy intake  Weight Loss:  No significant weight loss     Body Fat Loss:  No significant body fat loss     Muscle Mass Loss:  No significant muscle mass loss    Fluid Accumulation:  7 - Moderate to Severe Extremities   Strength:  Not Performed    Nutrition Related Findings:  Patient seen and reports that he is hungry all the time. Patient says that he did have a BM this morning and also reports that his full body swelling is down some; both of these are helping him feel better. Offered protein drink (ensure high protein which is low calorie) and patient agrees to try. Labs: potassium: 3.4, BUN: 40, Creatinine: 2.9, POC: 136. Meds: Humalog, Lantus, Humalog, Remeron, B-1, IV Lasix, prn Phenergan, Zofran, Glycolax. Wounds:  None       Current Nutrition Therapies:    DIET GENERAL; Carb Control: 3 carb choices (45 gms)/meal; No Added Salt (3-4 GM);  Dental Soft; Daily Fluid Restriction: 1500 ml  Dietary Nutrition Supplements: Low Calorie High Protein Supplement    Anthropometric Measures:  · Height: 6' 3\" (190.5 cm)  · Current Body Weight: 363 lb (164.7 kg)(12/3 bedscale with

## 2020-12-03 NOTE — PROGRESS NOTES
Progress note: Infectious diseases    Patient - Roddie Landau,  Age - 48 y.o.    - 1965      Room Number - 4K-20/020-A   MRN -  [id number]   Acct # - [de-identified]  Date of Admission -  2020  9:04 PM    SUBJECTIVE:   No new issues. He is still has packing to the left nostril. OBJECTIVE   VITALS    height is 6' 3\" (1.905 m) and weight is 363 lb 7 oz (164.9 kg) (abnormal). His axillary temperature is 98.3 °F (36.8 °C). His blood pressure is 104/55 (abnormal) and his pulse is 98. His respiration is 22 and oxygen saturation is 99%. Wt Readings from Last 3 Encounters:   20 (!) 363 lb 7 oz (164.9 kg)   20 (!) 319 lb (144.7 kg)   09/10/20 (!) 321 lb (145.6 kg)       I/O (24 Hours)    Intake/Output Summary (Last 24 hours) at 12/3/2020 1742  Last data filed at 12/3/2020 1635  Gross per 24 hour   Intake 2886.89 ml   Output 19062 ml   Net -8513.11 ml       General Appearance  Awake, alert, oriented. obese  HEENT - normocephalic, atraumatic, pale conjunctiva,  anicteric sclera, packed left nostril. Neck - Supple, no mass  Lungs -  Bilateral  air entry, diminished breath sounds. gynecomastia   Cardiovascular - Heart sounds are normal.    Abdomen - soft, not distended, nontender,   Neurologic -oriented to person place and time. Skin - No bruising or bleeding.   Extremities -+ edema, spider angiomas     MEDICATIONS:      potassium bicarb-citric acid  40 mEq Oral BID    ipratropium-albuterol  1 ampule Inhalation Q4H    magnesium replacement protocol   Other RX Placeholder    sodium chloride  2 spray Nasal BID    prednisoLONE acetate  1 drop Left Eye TID    insulin lispro  0-18 Units Subcutaneous TID WC    insulin lispro  0-9 Units Subcutaneous Nightly    multivitamin  1 tablet Oral Daily    ampicillin-sulbactam  3 g Intravenous Q6H    sodium chloride flush  10 mL Intravenous 2 times per day    insulin glargine  35 Units Subcutaneous Nightly    [Held by provider] aspirin  81 mg Oral Daily    DULoxetine  60 mg Oral Daily    folic acid  1 mg Oral Daily    latanoprost  1 drop Both Eyes Nightly    mirtazapine  15 mg Oral Nightly    rosuvastatin  20 mg Oral Daily    spironolactone  50 mg Oral Daily    tamsulosin  0.4 mg Oral Daily    thiamine  100 mg Oral Daily    amLODIPine  10 mg Oral Daily      furosemide (LASIX) 1mg/ml infusion 20 mg/hr (12/03/20 1726)    dextrose       lidocaine, sodium chloride flush, promethazine **OR** ondansetron, PHENobarbital **OR** PHENobarbital **OR** PHENobarbital IVPB, diphenhydrAMINE, albuterol sulfate HFA, acetaminophen **OR** acetaminophen, polyethylene glycol, potassium chloride **OR** potassium alternative oral replacement **OR** potassium chloride, glucose, dextrose, glucagon (rDNA), dextrose, oxyCODONE-acetaminophen      LABS:     CBC:   Recent Labs     12/01/20  0523 12/01/20  2121 12/02/20  0449 12/03/20  0331   WBC 8.6  --  7.6  --    HGB 7.7* 7.6* 7.3* 7.6*   *  --  86*  --      BMP:    Recent Labs     12/01/20  0523 12/02/20  0449 12/03/20  0331   * 133* 135   K 4.5 3.5 3.4*   CL 99 97* 95*   CO2 24 23 29   BUN 36* 41* 40*   CREATININE 3.1* 3.0* 2.9*   GLUCOSE 324* 297* 144*     Calcium:  Recent Labs     12/03/20  0331   CALCIUM 8.5     Ionized Calcium:No results for input(s): IONCA in the last 72 hours. Magnesium:  Recent Labs     12/03/20  0331   MG 1.6      Recent Labs     12/03/20  0617 12/03/20  1238 12/03/20  1629   POCGLU 136* 258* 218*     HgbA1C: No results for input(s): LABA1C in the last 72 hours.   INR:   Recent Labs     12/01/20  1024 12/02/20  0448 12/03/20  0331   INR 1.23* 1.27* 1.34*     Hepatic:   Recent Labs     12/02/20  0449 12/03/20  0331   ALKPHOS 95  --    ALT 37  --    AST 56*  --    PROT 6.8  --    BILITOT 0.7  --    BILIDIR 0.4*  --    LABALBU 2.9* 3.1*      No results for input(s): CKTOTAL, CKMB, TROPONINI in the last 72 hours. Problem list of patient:     Patient Active Problem List   Diagnosis Code    HCV antibody positive R76.8    Cirrhosis, alcoholic ([facility] J44.74    Alcohol withdrawal seizure with complication ([facility] X44.655, R56.9    Alcohol withdrawal, uncomplicated ([facility] A48.216    Type 2 diabetes mellitus ([facility] E11.9    Hyponatremia E87.1    Leukocytosis D72.829    Thrombocytopenia ([facility] D69.6    COPD (chronic obstructive pulmonary disease) ([facility] J44.9    HLD (hyperlipidemia) E78.5    HTN (hypertension) I10    Seizure ([facility] R56.9    Recurrent falls R29.6    Fracture of multiple ribs of left side S22.42XA    Anemia D64.9    Alcohol abuse F10.10    Closed fracture of distal end of left fibula with routine healing S82.832D    Hyperammonemia (HCC) E72.20    Essential tremor G25.0    Alcohol intoxication delirium (HCC) F10.121    MATTIE (acute kidney injury) ([facility] N17.9    Renal failure N19    Epistaxis R04.0    Pneumonitis due to aspiration of blood (HCC) J69.8    Hepatic cirrhosis (HCC) K74.60    Hyperglycemia R73.9    Swelling I76.1    Metabolic acidosis J57.9    Hypokalemia E87.6         ASSESSMENT/PLAN   Enterococcus bacteremia on treatment  Acute kidney injury  Epistaxis :still has packing. Will consider removing the packing tomorrow  Liver cirrhosis  History of alcohol abuse  Continue current treatment.       David Finney MD, FACP 12/3/2020 5:42 PM

## 2020-12-03 NOTE — PROGRESS NOTES
Nephrology Progress Note    Patient Delbert Malone   MRN -  [id number]   Acct # - [de-identified]      - 1965    48 y.o. Admit Date: 2020  Hospital Day: 8  Location: --A  Date of evaluation -  12/3/2020    Subjective:   CC: vomiting,   Shortness of breath with talking 6 lpm,   Pt reports complying with fluid restriction  UOP 9750/24h  BP overall improved, Heart rate 100  BP Range: Systolic (91ZZT), VJT:651 , Min:113 , KML:801      Diastolic (39BZS), SAILAJA:01, Min:45, Max:91    Objective:   VITALS:  /73   Pulse 100   Temp 98.6 °F (37 °C) (Oral)   Resp 20   Ht 6' 3\" (1.905 m)   Wt (!) 363 lb 7 oz (164.9 kg)   SpO2 95%   BMI 45.43 kg/m²    Patient Vitals for the past 24 hrs:   BP Temp Temp src Pulse Resp SpO2 Weight   20 0345 132/73 98.6 °F (37 °C) Oral 100 20 95 % (!) 363 lb 7 oz (164.9 kg)   20 2312 (!) 157/73 98.3 °F (36.8 °C) Oral 102 18 95 % --   20 2114 (!) 156/91 98 °F (36.7 °C) Oral 103 20 96 % --   20 2055 -- -- -- -- 22 93 % --   20 1625 (!) 113/55 97.6 °F (36.4 °C) Oral 104 20 92 % --   20 1339 -- -- -- -- -- 96 % --   20 1046 113/61 97.4 °F (36.3 °C) Oral 101 20 93 % --   20 0800 (!) 137/45 97.7 °F (36.5 °C) Oral 104 20 -- --     6 L/min    Intake/Output Summary (Last 24 hours) at 12/3/2020 0758  Last data filed at 12/3/2020 0349  Gross per 24 hour   Intake 2236.89 ml   Output 9775 ml   Net -7538.11 ml       Admission weight: (!) 348 lb (157.9 kg)  Patient Vitals for the past 96 hrs (Last 3 readings):   Weight   20 0345 (!) 363 lb 7 oz (164.9 kg)   20 0320 (!) 370 lb 3 oz (167.9 kg)   20 0445 (!) 363 lb 9 oz (164.9 kg)     Body mass index is 45.43 kg/m². EXAM:  CONSTITUTIONAL:  No acute distress. Pleasant  HEENT:  Head is normocephalic, Extraocular movement intact. Neck is supple. Voice is clear. CARDIOVASCULAR:  S1, S2  regular rate and rhythm. RESPIRATORY: Clear to ausculation bilaterally.  Equal breath sounds. No wheezes. No shortness of breath noted at rest.  ABDOMEN: soft, non tender  NEUROLOGICAL: Patient is alert and oriented to person, place, and time. Recent and remote memory intact. Thought is coherant. SKIN: no rash, No significant bruises on exposed surfaces  MUSCULOSKELETAL: Movement is coordinated. Moves all extremities   EXTREMITIES: Distal lower extremity temp is warm, Generalized body edema. Ecchymosis bilateral posterior upper extremites   PSYCHIATRIC: mood and affect appropriate.     Medications:   Med reviewed  Scheduled Meds:   potassium bicarb-citric acid  40 mEq Oral BID    ipratropium-albuterol  1 ampule Inhalation Q4H WA    sodium chloride  2 spray Nasal BID    sodium chloride  20 mL Intravenous Once    prednisoLONE acetate  1 drop Left Eye TID    insulin lispro  0-18 Units Subcutaneous TID WC    insulin lispro  0-9 Units Subcutaneous Nightly    multivitamin  1 tablet Oral Daily    sodium chloride  20 mL Intravenous Once    ampicillin-sulbactam  3 g Intravenous Q6H    sodium chloride flush  10 mL Intravenous 2 times per day    insulin glargine  35 Units Subcutaneous Nightly    [Held by provider] aspirin  81 mg Oral Daily    DULoxetine  60 mg Oral Daily    folic acid  1 mg Oral Daily    latanoprost  1 drop Both Eyes Nightly    mirtazapine  15 mg Oral Nightly    rosuvastatin  20 mg Oral Daily    spironolactone  50 mg Oral Daily    tamsulosin  0.4 mg Oral Daily    thiamine  100 mg Oral Daily    amLODIPine  10 mg Oral Daily     Continuous Infusions:   furosemide (LASIX) 1mg/ml infusion 20 mg/hr (12/03/20 0337)    dextrose       PRN Meds lidocaine, sodium chloride flush, promethazine **OR** ondansetron, PHENobarbital **OR** PHENobarbital **OR** PHENobarbital IVPB, diphenhydrAMINE, albuterol sulfate HFA, acetaminophen **OR** acetaminophen, polyethylene glycol, potassium chloride **OR** potassium alternative oral replacement **OR** potassium chloride, glucose, dextrose, glucagon (rDNA), dextrose, oxyCODONE-acetaminophen   Labs:   Labs reviewed  Recent Labs     12/01/20 0523 12/01/20 2121 12/02/20 0449 12/03/20  0331   WBC 8.6  --  7.6  --    RBC 2.30*  --  2.20*  --    HGB 7.7* 7.6* 7.3* 7.6*   HCT 24.5* 24.2* 22.9* 23.7*   .5*  --  104.1*  --    MCH 33.5*  --  33.2*  --    *  --  86*  --      Recent Labs     12/01/20 0523 12/02/20 0449 12/03/20 0331   * 133* 135   K 4.5 3.5 3.4*   CL 99 97* 95*   CO2 24 23 29   BUN 36* 41* 40*   CREATININE 3.1* 3.0* 2.9*   LABGLOM 21* 22* 23*   GLUCOSE 324* 297* 144*   MG  --  1.9 1.6   CALCIUM 7.6* 7.8* 8.5   CAION  --  0.95* 0.99*     Summary 11/27/2020   Technically difficult examination. Normal left ventricle size and systolic function. Ejection fraction was   estimated at 55 %. There were no regional left ventricular wall motion   abnormalities and wall thickness was within normal limits. The left atrium is Mildly dilated. There is moderate aortic stenosis with valve area of 1.2 sq cm. The maximum aortic valve gradient is 48 mmHg, the mean gradient is 33   mmHg, and the peak velocity is 3.5 cm/s. ASSESSMENT:  1. Acute Kidney Injury likely multifactorial including sepsis, borderline hypotension and diuretic therapy. Ok to continue lasix drip and spironolactone. K 3.4 Start K 40 meq PO 2x/d. Christina Noel Check BMP, I lyla and Mag in AM. Discussed fluid restriction with pt  2. Chronic Kidney Disease Stage IIIB  3. Acute on chronic HFpEF with anasarca  4. Hypoxic resp failure, s/p HFNC  5. Pneumonia  6. Sepsis with Enterococcus bacteremia  7. Liver cirrhosis with history of alcohol abuse   8. Diabetes Mellitus Type II with nephrosclerosis with long term use of insulin   9.  Epistaxis s/p nasal endoscopy with cautery, Planned removal nasal pkg today  10.  Morbid obesity    Active Problems:    MATTIE (acute kidney injury) ([facility] [address]    Renal failure    Epistaxis    Pneumonitis due to aspiration of blood (HCC)    Hepatic cirrhosis (HCC)    Hyperglycemia    Swelling  Resolved Problems:    * No resolved hospital problems.  Torrey Joseph, APRN - CNP 7:58 AM 12/3/2020

## 2020-12-04 LAB
ANAEROBIC CULTURE: NORMAL
ANION GAP SERPL CALCULATED.3IONS-SCNC: 13 MEQ/L (ref 8–16)
BODY FLUID CULTURE, STERILE: NORMAL
BUN BLDV-MCNC: 41 MG/DL (ref 7–22)
CALCIUM IONIZED: 1.08 MMOL/L (ref 1.12–1.32)
CALCIUM SERPL-MCNC: 8.8 MG/DL (ref 8.5–10.5)
CHLORIDE BLD-SCNC: 92 MEQ/L (ref 98–111)
CO2: 29 MEQ/L (ref 23–33)
CREAT SERPL-MCNC: 2.6 MG/DL (ref 0.4–1.2)
GFR SERPL CREATININE-BSD FRML MDRD: 26 ML/MIN/1.73M2
GLUCOSE BLD-MCNC: 232 MG/DL (ref 70–108)
GLUCOSE BLD-MCNC: 237 MG/DL (ref 70–108)
GLUCOSE BLD-MCNC: 265 MG/DL (ref 70–108)
GLUCOSE BLD-MCNC: 299 MG/DL (ref 70–108)
GLUCOSE BLD-MCNC: 341 MG/DL (ref 70–108)
GRAM STAIN RESULT: NORMAL
HCT VFR BLD CALC: 25.4 % (ref 42–52)
HEMOGLOBIN: 8.2 GM/DL (ref 14–18)
MAGNESIUM: 1.7 MG/DL (ref 1.6–2.4)
POTASSIUM REFLEX MAGNESIUM: 3.9 MEQ/L (ref 3.5–5.2)
SODIUM BLD-SCNC: 134 MEQ/L (ref 135–145)

## 2020-12-04 PROCEDURE — 85018 HEMOGLOBIN: CPT

## 2020-12-04 PROCEDURE — 80048 BASIC METABOLIC PNL TOTAL CA: CPT

## 2020-12-04 PROCEDURE — 36415 COLL VENOUS BLD VENIPUNCTURE: CPT

## 2020-12-04 PROCEDURE — 6360000002 HC RX W HCPCS: Performed by: STUDENT IN AN ORGANIZED HEALTH CARE EDUCATION/TRAINING PROGRAM

## 2020-12-04 PROCEDURE — 99231 SBSQ HOSP IP/OBS SF/LOW 25: CPT | Performed by: PHYSICIAN ASSISTANT

## 2020-12-04 PROCEDURE — 2580000003 HC RX 258: Performed by: OTOLARYNGOLOGY

## 2020-12-04 PROCEDURE — 6370000000 HC RX 637 (ALT 250 FOR IP): Performed by: NURSE PRACTITIONER

## 2020-12-04 PROCEDURE — 82330 ASSAY OF CALCIUM: CPT

## 2020-12-04 PROCEDURE — 6360000002 HC RX W HCPCS: Performed by: NURSE PRACTITIONER

## 2020-12-04 PROCEDURE — 6360000002 HC RX W HCPCS: Performed by: OTOLARYNGOLOGY

## 2020-12-04 PROCEDURE — 6370000000 HC RX 637 (ALT 250 FOR IP): Performed by: STUDENT IN AN ORGANIZED HEALTH CARE EDUCATION/TRAINING PROGRAM

## 2020-12-04 PROCEDURE — 94640 AIRWAY INHALATION TREATMENT: CPT

## 2020-12-04 PROCEDURE — 2580000003 HC RX 258: Performed by: STUDENT IN AN ORGANIZED HEALTH CARE EDUCATION/TRAINING PROGRAM

## 2020-12-04 PROCEDURE — 97535 SELF CARE MNGMENT TRAINING: CPT

## 2020-12-04 PROCEDURE — 82948 REAGENT STRIP/BLOOD GLUCOSE: CPT

## 2020-12-04 PROCEDURE — 6370000000 HC RX 637 (ALT 250 FOR IP): Performed by: INTERNAL MEDICINE

## 2020-12-04 PROCEDURE — 2580000003 HC RX 258: Performed by: NURSE PRACTITIONER

## 2020-12-04 PROCEDURE — 94761 N-INVAS EAR/PLS OXIMETRY MLT: CPT

## 2020-12-04 PROCEDURE — 83735 ASSAY OF MAGNESIUM: CPT

## 2020-12-04 PROCEDURE — 6370000000 HC RX 637 (ALT 250 FOR IP): Performed by: FAMILY MEDICINE

## 2020-12-04 PROCEDURE — 99233 SBSQ HOSP IP/OBS HIGH 50: CPT | Performed by: INTERNAL MEDICINE

## 2020-12-04 PROCEDURE — 2060000000 HC ICU INTERMEDIATE R&B

## 2020-12-04 PROCEDURE — 97530 THERAPEUTIC ACTIVITIES: CPT

## 2020-12-04 PROCEDURE — 99232 SBSQ HOSP IP/OBS MODERATE 35: CPT | Performed by: NURSE PRACTITIONER

## 2020-12-04 PROCEDURE — 85014 HEMATOCRIT: CPT

## 2020-12-04 RX ORDER — AMOXICILLIN AND CLAVULANATE POTASSIUM 875; 125 MG/1; MG/1
1 TABLET, FILM COATED ORAL EVERY 12 HOURS SCHEDULED
Status: DISCONTINUED | OUTPATIENT
Start: 2020-12-05 | End: 2020-12-08 | Stop reason: HOSPADM

## 2020-12-04 RX ADMIN — SODIUM CHLORIDE 3 G: 900 INJECTION INTRAVENOUS at 16:55

## 2020-12-04 RX ADMIN — LATANOPROST 1 DROP: 50 SOLUTION OPHTHALMIC at 21:15

## 2020-12-04 RX ADMIN — SODIUM CHLORIDE 3 G: 900 INJECTION INTRAVENOUS at 04:17

## 2020-12-04 RX ADMIN — TAMSULOSIN HYDROCHLORIDE 0.4 MG: 0.4 CAPSULE ORAL at 09:14

## 2020-12-04 RX ADMIN — POTASSIUM BICARBONATE 40 MEQ: 782 TABLET, EFFERVESCENT ORAL at 21:17

## 2020-12-04 RX ADMIN — SALINE NASAL SPRAY 2 SPRAY: 1.5 SOLUTION NASAL at 21:17

## 2020-12-04 RX ADMIN — PREDNISOLONE ACETATE 1 DROP: 10 SUSPENSION/ DROPS OPHTHALMIC at 09:16

## 2020-12-04 RX ADMIN — IPRATROPIUM BROMIDE AND ALBUTEROL SULFATE 1 AMPULE: .5; 3 SOLUTION RESPIRATORY (INHALATION) at 09:08

## 2020-12-04 RX ADMIN — SPIRONOLACTONE 50 MG: 25 TABLET ORAL at 09:14

## 2020-12-04 RX ADMIN — IPRATROPIUM BROMIDE AND ALBUTEROL SULFATE 1 AMPULE: .5; 3 SOLUTION RESPIRATORY (INHALATION) at 17:22

## 2020-12-04 RX ADMIN — INSULIN GLARGINE 35 UNITS: 100 INJECTION, SOLUTION SUBCUTANEOUS at 23:39

## 2020-12-04 RX ADMIN — SODIUM CHLORIDE, PRESERVATIVE FREE 10 ML: 5 INJECTION INTRAVENOUS at 21:16

## 2020-12-04 RX ADMIN — FUROSEMIDE 20 MG/HR: 10 INJECTION, SOLUTION INTRAMUSCULAR; INTRAVENOUS at 12:26

## 2020-12-04 RX ADMIN — IPRATROPIUM BROMIDE AND ALBUTEROL SULFATE 1 AMPULE: .5; 3 SOLUTION RESPIRATORY (INHALATION) at 04:16

## 2020-12-04 RX ADMIN — IPRATROPIUM BROMIDE AND ALBUTEROL SULFATE 1 AMPULE: .5; 3 SOLUTION RESPIRATORY (INHALATION) at 21:34

## 2020-12-04 RX ADMIN — SODIUM CHLORIDE 3 G: 900 INJECTION INTRAVENOUS at 22:35

## 2020-12-04 RX ADMIN — Medication 100 MG: at 09:14

## 2020-12-04 RX ADMIN — SODIUM CHLORIDE, PRESERVATIVE FREE 10 ML: 5 INJECTION INTRAVENOUS at 09:17

## 2020-12-04 RX ADMIN — DULOXETINE HYDROCHLORIDE 60 MG: 60 CAPSULE, DELAYED RELEASE ORAL at 09:14

## 2020-12-04 RX ADMIN — FUROSEMIDE 20 MG/HR: 10 INJECTION, SOLUTION INTRAMUSCULAR; INTRAVENOUS at 21:13

## 2020-12-04 RX ADMIN — POTASSIUM BICARBONATE 40 MEQ: 782 TABLET, EFFERVESCENT ORAL at 09:13

## 2020-12-04 RX ADMIN — OXYCODONE HYDROCHLORIDE AND ACETAMINOPHEN 1 TABLET: 7.5; 325 TABLET ORAL at 09:14

## 2020-12-04 RX ADMIN — PREDNISOLONE ACETATE 1 DROP: 10 SUSPENSION/ DROPS OPHTHALMIC at 21:17

## 2020-12-04 RX ADMIN — AMLODIPINE BESYLATE 10 MG: 10 TABLET ORAL at 09:14

## 2020-12-04 RX ADMIN — FUROSEMIDE 20 MG/HR: 10 INJECTION, SOLUTION INTRAMUSCULAR; INTRAVENOUS at 04:42

## 2020-12-04 RX ADMIN — SALINE NASAL SPRAY 2 SPRAY: 1.5 SOLUTION NASAL at 09:16

## 2020-12-04 RX ADMIN — OXYMETAZOLINE HYDROCHLORIDE 2 SPRAY: 0.05 SPRAY NASAL at 09:14

## 2020-12-04 RX ADMIN — OXYMETAZOLINE HYDROCHLORIDE 2 SPRAY: 0.05 SPRAY NASAL at 21:17

## 2020-12-04 RX ADMIN — ROSUVASTATIN CALCIUM 20 MG: 20 TABLET, FILM COATED ORAL at 09:14

## 2020-12-04 RX ADMIN — SODIUM CHLORIDE 3 G: 900 INJECTION INTRAVENOUS at 11:16

## 2020-12-04 RX ADMIN — IPRATROPIUM BROMIDE AND ALBUTEROL SULFATE 1 AMPULE: .5; 3 SOLUTION RESPIRATORY (INHALATION) at 13:29

## 2020-12-04 RX ADMIN — THERA TABS 1 TABLET: TAB at 09:13

## 2020-12-04 RX ADMIN — FOLIC ACID 1 MG: 1 TABLET ORAL at 09:14

## 2020-12-04 RX ADMIN — MIRTAZAPINE 15 MG: 15 TABLET, FILM COATED ORAL at 21:15

## 2020-12-04 ASSESSMENT — PAIN SCALES - GENERAL: PAINLEVEL_OUTOF10: 8

## 2020-12-04 NOTE — PROGRESS NOTES
Progress Note  Date:12/3/2020       FYDF:[id number]  Patient Name:Scottie Lizarraga     YOB: 1965     Age:55 y.o. Subjective    Subjective Denies tasting blood  Review of Systems  Objective         Vitals Last 24 Hours:  TEMPERATURE:  Temp  Av.6 °F (37 °C)  Min: 97.9 °F (36.6 °C)  Max: 100.5 °F (38.1 °C)  RESPIRATIONS RANGE: Resp  Av.8  Min: 18  Max: 24  PULSE OXIMETRY RANGE: SpO2  Av.1 %  Min: 91 %  Max: 99 %  PULSE RANGE: Pulse  Av.8  Min: 95  Max: 110  BLOOD PRESSURE RANGE: Systolic (32DLQ), IWN:648 , Min:104 , OMAR:412   ; Diastolic (79CYW), IVQ:26, Min:55, Max:91    I/O (24Hr): Intake/Output Summary (Last 24 hours) at 12/3/2020 2042  Last data filed at 12/3/2020 1940  Gross per 24 hour   Intake 3642.89 ml   Output 06074 ml   Net -9207.11 ml     Objective   No sxs of active or recent bleeding. Labs/Imaging/Diagnostics    Labs:  CBC:  Recent Labs     20  033   WBC 8.6  --  7.6  --    RBC 2.30*  --  2.20*  --    HGB 7.7* 7.6* 7.3* 7.6*   HCT 24.5* 24.2* 22.9* 23.7*   .5*  --  104.1*  --    *  --  86*  --      CHEMISTRIES:  Recent Labs     20  0520  033   * 133* 135   K 4.5 3.5 3.4*   CL 99 97* 95*   CO2 24 23 29   BUN 36* 41* 40*   CREATININE 3.1* 3.0* 2.9*   GLUCOSE 324* 297* 144*   MG  --  1.9 1.6     PT/INR:  Recent Labs     20  1024 20  0331   INR 1.23* 1.27* 1.34*     APTT:No results for input(s): APTT in the last 72 hours. LIVER PROFILE:  Recent Labs     20  0449   AST 56*   ALT 37   BILIDIR 0.4*   BILITOT 0.7   ALKPHOS 95       Imaging Last 24 Hours:  Vl Dup Upper Extremity Venous Bilateral    Result Date: 2020  PROCEDURE: VL DUP UPPER EXTREMITY VENOUS BILATERAL CLINICAL INFORMATION: .Swelling. COMPARISON: No prior study.  TECHNIQUE: Venous doppler ultrasound was performed of the bilateral upper extremities using gray scale, color flow and spectral doppler imaging. FINDINGS: There is normal color flow, spectral analysis and compressibility of the internal jugular vein, axillary vein, brachial and basilic vein bilaterally . There is normal color flow and compressibility in the antecubital vein, radial and ulnar veins. The cephalic vein is patent and compressible bilaterally. There is normal color flow and spectral analysis in both subclavian veins. No sonographic evidence for upper extremity DVT. **This report has been created using voice recognition software. It may contain minor errors which are inherent in voice recognition technology. ** Final report electronically signed by Dr. Kan Gallegos on 12/2/2020 8:05 AM    Assessment//Plan           Hospital Problems           Last Modified POA    MATTIE (acute kidney injury) ([facility] [address] 11/25/2020 Yes    Renal failure 11/29/2020 Yes    Epistaxis 11/29/2020 Yes    Pneumonitis due to aspiration of blood ([address] 11/29/2020 Yes    Hepatic cirrhosis ([address] 12/1/2020 Yes    Hyperglycemia 12/1/2020 Yes    Swelling 24/0/5524 Yes    Metabolic acidosis 06/3/9946 Yes    Hypokalemia 12/3/2020 Yes        Assessment & Plan     Severe epistaxis and coagulopathic    Pulled packing post afrin spray. OK to use Afrin TID for 3 days. Agus Mckinley.  [facility], [address] [place] [address]  Cell [phone]      Electronically signed by Alyce Carrington MD on 12/3/20 at 8:42 PM EST

## 2020-12-04 NOTE — PROGRESS NOTES
Department of Otolaryngology  Progress Note    Chief Complaint:  Lower extremity edema    SUBJECTIVE: The patient is status post packing removal by Dr. Sd Saenz yesterday evening. Patient reports that he has been doing very well since the packing was removed. He denies any active bleeding from his nose, sensation of postnasal drainage, and hemoptysis. He states he blew his nose prior to my arrival and dislodge some \"nasty mucus,\" but he noted no bright red blood. he states his biggest concern is the swelling in his legs and his diet. He reports that he constantly feels hungry and is interested in increasing his diet. I explained that there is concern that his diet is likely related/contributing to his edema. He denies any other concerns at this time. OBJECTIVE      Physical  VITALS:  BP (!) 143/68   Pulse 97   Temp 98 °F (36.7 °C) (Axillary)   Resp 18   Ht 6' 3\" (1.905 m)   Wt (!) 344 lb 11.2 oz (156.4 kg)   SpO2 95%   BMI 43.08 kg/m²     This is a 48 y.o. male. Patient is alert and oriented to person, place and time. Patient is obese. Mood is happy with normal affect. Not obviously hearing impaired. Head:   Normocephalic, atraumatic. No obvious masses or lesions noted. Nose:    External nose: Appears midline. No obvious deformity or masses. Septum:  deviated. Previous area of irritation on the right anterior septum appears mildly improved. No septal hematoma. No perforation. Mucosa:  inflamed  Turbinates: normal and pink            Discharge:  A mild amount of dried blood in left nare. No evidence of active/recent bleeding    Mouth/Throat:  Lips, tongue and oral cavity: Normal. No masses or lesions noted   Dentition: Edentulous, no malocclusion  Oral mucosa: moist  Tonsils: present, not acutely enlarged and no erythema or exudates  Oropharynx: normal-appearing mucosa  Hard and soft palates: symmetrical and intact. Salivary glands: not enlarged and no tenderness to palpation.   Uvula: midline, no obvious lesions   Gag reflex is present. Neck: Trachea midline. Thyroid not enlarged, no palpable masses or tenderness. Lymphatic: No cervical lymphadenopathy noted. Eyes: CINTIA, EOM intact. Conjunctiva moist without discharge. Lungs: Normal effort of breathing, not obviously distressed. Neuro: Cranial nerves II-XII grossly intact. Extremities: Obvious edema of bilateral lower extremities. No cyanosis of upper and lower extremities noted.     Data  CBC:   Lab Results   Component Value Date    WBC 7.6 12/02/2020    RBC 2.20 12/02/2020    HGB 8.2 12/04/2020    HCT 25.4 12/04/2020    .1 12/02/2020    MCH 33.2 12/02/2020    MCHC 31.9 12/02/2020    RDW 16.6 06/03/2020    PLT 86 12/02/2020    MPV 11.0 12/02/2020     BMP:    Lab Results   Component Value Date     12/04/2020    K 3.9 12/04/2020    CL 92 12/04/2020    CO2 29 12/04/2020    BUN 41 12/04/2020    LABALBU 3.1 12/03/2020    CREATININE 2.6 12/04/2020    CALCIUM 8.8 12/04/2020    LABGLOM 26 12/04/2020    GLUCOSE 265 12/04/2020       Inpatient Medications  Current Facility-Administered Medications: [START ON 12/5/2020] amoxicillin-clavulanate (AUGMENTIN) 875-125 MG per tablet 1 tablet, 1 tablet, Oral, 2 times per day  potassium bicarb-citric acid (EFFER-K) effervescent tablet 40 mEq, 40 mEq, Oral, BID  ipratropium-albuterol (DUONEB) nebulizer solution 1 ampule, 1 ampule, Inhalation, Q4H  magnesium replacement protocol, , Other, RX Placeholder  oxymetazoline (AFRIN) 0.05 % nasal spray 2 spray, 2 spray, Each Nostril, TID  sodium chloride (OCEAN, BABY AYR) 0.65 % nasal spray 2 spray, 2 spray, Nasal, BID  prednisoLONE acetate (PRED FORTE) 1 % ophthalmic suspension 1 drop, 1 drop, Left Eye, TID  insulin lispro (HUMALOG) injection vial 0-18 Units, 0-18 Units, Subcutaneous, TID WC  insulin lispro (HUMALOG) injection vial 0-9 Units, 0-9 Units, Subcutaneous, Nightly  furosemide (LASIX) 100 mg in dextrose 5 % 100 mL infusion, 20 mg/hr, Intravenous, Continuous  multivitamin 1 tablet, 1 tablet, Oral, Daily  lidocaine (LMX) 4 % cream, , Topical, PRN  ampicillin-sulbactam (UNASYN) 3 g ivpb minibag, 3 g, Intravenous, Q6H  sodium chloride flush 0.9 % injection 10 mL, 10 mL, Intravenous, 2 times per day  sodium chloride flush 0.9 % injection 10 mL, 10 mL, Intravenous, PRN  promethazine (PHENERGAN) tablet 12.5 mg, 12.5 mg, Oral, Q6H PRN **OR** ondansetron (ZOFRAN) injection 4 mg, 4 mg, Intravenous, Q6H PRN  PHENobarbital (LUMINAL) injection 130 mg, 130 mg, Intravenous, Q1H PRN **OR** PHENobarbital (LUMINAL) injection 260 mg, 260 mg, Intravenous, Q1H PRN **OR** PHENobarbital (LUMINAL) 1,040 mg in sodium chloride 0.9 % 100 mL IVPB, 1,040 mg, Intravenous, Daily PRN  diphenhydrAMINE (BENADRYL) injection 25 mg, 25 mg, Intravenous, Q6H PRN  insulin glargine (LANTUS) injection vial 35 Units, 35 Units, Subcutaneous, Nightly  albuterol sulfate  (90 Base) MCG/ACT inhaler 2 puff, 2 puff, Inhalation, Q6H PRN  [Held by provider] aspirin EC tablet 81 mg, 81 mg, Oral, Daily  DULoxetine (CYMBALTA) extended release capsule 60 mg, 60 mg, Oral, Daily  folic acid (FOLVITE) tablet 1 mg, 1 mg, Oral, Daily  latanoprost (XALATAN) 0.005 % ophthalmic solution 1 drop, 1 drop, Both Eyes, Nightly  mirtazapine (REMERON) tablet 15 mg, 15 mg, Oral, Nightly  rosuvastatin (CRESTOR) tablet 20 mg, 20 mg, Oral, Daily  spironolactone (ALDACTONE) tablet 50 mg, 50 mg, Oral, Daily  tamsulosin (FLOMAX) capsule 0.4 mg, 0.4 mg, Oral, Daily  vitamin B-1 (THIAMINE) tablet 100 mg, 100 mg, Oral, Daily  acetaminophen (TYLENOL) tablet 650 mg, 650 mg, Oral, Q6H PRN **OR** acetaminophen (TYLENOL) suppository 650 mg, 650 mg, Rectal, Q6H PRN  polyethylene glycol (GLYCOLAX) packet 17 g, 17 g, Oral, Daily PRN  potassium chloride (KLOR-CON M) extended release tablet 40 mEq, 40 mEq, Oral, PRN **OR** potassium bicarb-citric acid (EFFER-K) effervescent tablet 40 mEq, 40 mEq, Oral, PRN **OR** potassium

## 2020-12-04 NOTE — CARE COORDINATION
DISASTER CHARTING    12/4/20, 12:24 PM EST    DISCHARGE ONGOING EVALUATION:     [facility] day: 9  Location: -20/020-A Reason for admit: MATTIE (acute kidney injury) ([address] [N17.9]  Renal failure [N19]   Barriers to Discharge: GLUC 341, H 8.2; monitor.  Oxygen 3L, IV AB, Lasix gtt continued  PCP: MEGHAN Friedman CNP  Patient Goals/Plan/Treatment Preferences: client plans home with SO/GF Keisha who is there 24h and new [facility] (nsg, therapy) after list of choices offered, has cane, walker, WC; monitor for home oxygen eval if not weaned off; new payer as of 12/1 is [address]; only DME provider is [facility], left message for Nanci Espinoza

## 2020-12-04 NOTE — PROGRESS NOTES
[facility] ICU STEPDOWN TELEMETRY 4K  Occupational Therapy  Daily Note  Time:   Time In: 08  Time Out: 3029  Timed Code Treatment Minutes: 28 Minutes  Minutes: 32          Date: 2020  Patient Name: Evelia Field,   Gender: male      Room: [facility]020-A  MRN: [id number]  : 1965  (48 y.o.)  Referring Practitioner: Dr. Gigi Lauren  Diagnosis: MATTIE  Additional Pertinent Hx: Louise Reeves was admitted under the hospitalist service on 2020 with chief complaint of acute kidney injury. Patient was transferred from [facility] after having been at their facility for 4 days. Patient's chief complaint was vomiting incontinence left shoulder pain and diarrhea. Pain patient was found to have sepsis secondary to pneumonia and acute kidney injury. Blood cultures were positive for gram-positive cocci-Enterococcus. With worsening in kidney function patient was transferred to [facility] for further care. 2020 Positive for nose bleed with drop in H/H 8.6-7.9-ENT Dr. Marisa Lees was consulted. Patient required OR intervention. Patient was transferred to the ICU post procedure    Restrictions/Precautions:  Restrictions/Precautions: Fall Risk  Position Activity Restriction  Other position/activity restrictions: 3L of O2      SUBJECTIVE: Pt. In bed upon entry in good spirits agreeable to OT treatment session. PAIN: No pain reported at this time. COGNITION: Decreased Safety Awareness and Impulsive    ADL:   Upper Extremity Dressing: Minimal Assistance. Min A to [place]. Lower Extremity Dressing: Minimal Assistance, Moderate Assistance, with set-up and with verbal cues . Min-Mod A with donning socks, set up provided with vcs to doff socks with AE training. [facility] BALANCE:  Sitting Balance:  Supervision. S provided from supine to sit at EOB. Standing Balance: Contact Guard Assistance, with cues for safety. CGA/SBA to complete standing ax. with FWW utilized for UB support. [facility] MOBILITY:  Supine to Sit: Supervision S from supine to sit at EOB. TRANSFERS:  Sit to Stand:  Stand By Assistance. SBA to complete sit to stand from EOB and bedside chair. Stand to Sit: Stand By Assistance. SBA with FWW support to complete stand to sit with min vcs for walker placement. FUNCTIONAL MOBILITY:  Assistive Device: Rolling Walker  Assist Level:  Contact Guard Assistance. Distance: Pt. completed fxl mobility in room and gillette with FWW CGA provided min vcs issued for safe walker placement. Pt. demo some impulsiveness at times when initiating transfer and/or mobility. ADDITIONAL ACTIVITIES:  Pt. Educated on AE use consisting of long handled sponge, sock aid, long handled reacher and LH shoe horn. Pt. Is familiar with long handeled reacher use and already has one for home going. Pt. Reports he will consider purchase of these items prior to returning home or have spouse complete. ASSESSMENT:     Activity Tolerance:  Patient tolerance of  treatment: fair, Pt. Limited by decreased ax. Tolerance. Discharge Recommendations: Subacute/Skilled Nursing Facility(if declines, recommend 24/7 assist with [facility] OT)   Equipment Recommendations: Other: continue to assess  Plan: Times per week: 5x  Current Treatment Recommendations: Strengthening, Patient/Caregiver Education & Training, Balance Training, Functional Mobility Training, Endurance Training, Safety Education & Training, Self-Care / ADL    Patient Education  Patient Education: ADL's, Equipment Education, Importance of Increasing Activity and Assistive Device Safety, and safety with fxl transfers and mobility with AD.      Goals  Short term goals  Time Frame for Short term goals: by discharge  Short term goal 1: Pt to complete LB ADL tasks using LHAE with SBA to be indep wtih dressing tasks  Short term goal 2: Pt to complete toileting tasks and tranfser wit CGA and no vcs for safety  Short term goal 3: Pt to navigate to/from bathroom using RW with CGA and no increase in SOB or decrease in O2 sats during to complete ADL tasks  Short term goal 4: pt to increase standing tolerance > 3 min with CGA and 0-1 UE support to allow for completion of ADL tasks    Following session, patient left in safe position with all fall risk precautions in place.

## 2020-12-04 NOTE — PROGRESS NOTES
Hospitalist Progress Note    Patient:  Binh Trejo      Unit/Bed:4K-20/020-A    YOB: 1965    MRN: [id number]       Acct: [de-identified]     PCP: MEGHAN Madison - CNP    Date of Admission: 11/25/2020    Assessment/Plan:  1. Acute kidney injury, improving: Likely multifactorial AIN vs. Sepsis vs. Hypovolemia vs. Hypoalbuminemia   - Continue management per nephrology: Lasix drip and spironolactone  - Albumin 25% IV 12.5g q6hrs x4 doses  - 12/3 - Creatinine very mildly improving and had significant urinary output since yesterday. Has received a total of 7 doses of Albumin IV 12.5g 25%. Will consult dietician for protein malnutrition. - 12/4 - Creatinine 2.6 this AM. Dietician added high protein supplements to meals. Output of about 7L since yesterday. Disposition possibly Monday if his MATTIE and edema continues to improve. 2. Severe Pneumonia 2/2 Aspiration  - Continue Unasyn  - 12/4 - Weaning off oxygen. On 3L, but patient does not even have it on most of the time. 3. Acute hypoxic respiratory failure, improving: Postsurgical nasal cautery requiring respiratory support. - Was transferred to ICU on 11/29/20 after procedure and was put on BiPAP, then subsequently HFNC. He was transferred out of ICU on 11/30/20 after he was able to be weaned to HFNC. Has been tolerating 6L O2 via NC.  - 12/2: Started scheduled Duonebs q4hrs WA and acapella as patient as increasing expiratory wheezing and bibasilar rhonchi  - 12/3: Continues to have expiratory wheezing. Continue DuoNebs q4hrs and acapella as tolerated. Patient is currently on 6L NC, however, he's not keeping his cannula in. Will attempt trial of room air today and monitor. - 12/4: Still has expiratory wheezing, worse on left. Continue DuoNebs q4hrs and acapella as tolerated.   4. Liver Cirrhosis 2/2 EtOH abuse, stable: Significant other states 1-2 weeks before hospitalization he drinks 6-8, 24 oz beers per day  - Phenobarbital PRN for alcohol withdrawal  5. Fluid overload, improving: Multifactorial due to MATTIE and liver cirrhosis. Questionable diastolic heart dysfunction, however not noted on recent echocardiogram. Improved output yesterday after IV albumin.  - Concerned for upper extremity DVT overnight (12/1-12/2) - Bilateral venous ultrasound negative for DVT  - 12/3 - Total of 9.7L urinary output since yesterday. Pitting edema/anasarca mildly improving. Apply ACE wraps to BLE.  - 12/4 - Total of -7L output since yesterday. Pitting edema to BLE improving. Continue ACE wraps as tolerated. - Plan as above  6. Epistaxis s/p left nasal cautery and packing, stable - POD #3: No further episodes of epistaxis. Nasal packing in place. - 12/3 - Plan for FFP 1 unit at 12PM per ENT with nasal packing removal in afternoon  - 12/4 - Nasal packing removed yesterday. No further episodes of epistaxis. 7. Enterococcus Bacteremia: No growth on recent blood cultures. - Continue Unasyn  8. Moderate Aortic Stenosis: Valve area 1.2 sq cm, mean gradient 33 mmHg, peak velocity 3.5 cm/s  - Outpatient cardiology follow up  9. IDDM Type 2: HgbA1C 5.9% on 11/26/2020.   - High dose SSI ACHS  - Continue Lantus 35 units QHS  - 12/1 - Given Lantus 10 units once due to -300s in past few days. Patient has been receiving prednisone. Will reassess need to change insulin dosing once prednisone complete.  - 12/3 - Blood sugars steadily improving since finishing prednisone. Chief Complaint: MATTIE    Hospital Course:  Per HPI, \"49 y.o. male who presented to [facility] with with above complain. Patient is a transfer from [place] where he had been admitted for about 4 days after complain of vomiting, incontinence, left shoulder pain and diarrhea. He was found to have sepsis secondary to pneumonia and during fluid resuscitation, it was noted that patient had MATTIE. He did develop fever during stay and blood cultures obtained during triage grew gram + cocci.  Patient was started on Zosyn and vanco only for renal function to continue worsening from normal to 2.29. renal US was done showing no hydronephrosis or other kidney abnormalities. It was felt that patient needed nephrology consult to review worsening kidney status despite getting clinically better with abx. At bedside, patient reports feeling well and no longer confused. He dose have diabetes that is poorly controlled per records\"    Subjective (past 24 hours):   Patient states he feels a lot better today. He still has wheezing, but states his breathing treatments helps with it a lot. He has oxygen via NC, but states he does not even keep it on most of the time. He is compliant with his nutritional supplements. He has no concerns or complaints today. ROS (12 point review of systems completed. Pertinent positives noted. Otherwise ROS is negative).     Medications:  Reviewed    Infusion Medications    furosemide (LASIX) 1mg/ml infusion 20 mg/hr (12/04/20 7742)    dextrose       Scheduled Medications    potassium bicarb-citric acid  40 mEq Oral BID    ipratropium-albuterol  1 ampule Inhalation Q4H    magnesium replacement protocol   Other RX Placeholder    oxymetazoline  2 spray Each Nostril TID    sodium chloride  2 spray Nasal BID    prednisoLONE acetate  1 drop Left Eye TID    insulin lispro  0-18 Units Subcutaneous TID WC    insulin lispro  0-9 Units Subcutaneous Nightly    multivitamin  1 tablet Oral Daily    ampicillin-sulbactam  3 g Intravenous Q6H    sodium chloride flush  10 mL Intravenous 2 times per day    insulin glargine  35 Units Subcutaneous Nightly    [Held by provider] aspirin  81 mg Oral Daily    DULoxetine  60 mg Oral Daily    folic acid  1 mg Oral Daily    latanoprost  1 drop Both Eyes Nightly    mirtazapine  15 mg Oral Nightly    rosuvastatin  20 mg Oral Daily    spironolactone  50 mg Oral Daily    tamsulosin  0.4 mg Oral Daily    thiamine  100 mg Oral Daily    amLODIPine  10 mg Oral Daily     PRN Meds: lidocaine, sodium chloride flush, promethazine **OR** ondansetron, PHENobarbital **OR** PHENobarbital **OR** PHENobarbital IVPB, diphenhydrAMINE, albuterol sulfate HFA, acetaminophen **OR** acetaminophen, polyethylene glycol, potassium chloride **OR** potassium alternative oral replacement **OR** potassium chloride, glucose, dextrose, glucagon (rDNA), dextrose, oxyCODONE-acetaminophen      Intake/Output Summary (Last 24 hours) at 12/4/2020 0813  Last data filed at 12/4/2020 0310  Gross per 24 hour   Intake 2282.77 ml   Output 9350 ml   Net -7067.23 ml     Diet:  DIET GENERAL; Carb Control: 3 carb choices (45 gms)/meal; No Added Salt (3-4 GM); Dental Soft; Daily Fluid Restriction: 1500 ml  Dietary Nutrition Supplements: Low Calorie High Protein Supplement    Exam:  /69   Pulse 92   Temp 98 °F (36.7 °C) (Oral)   Resp 18   Ht 6' 3\" (1.905 m)   Wt (!) 344 lb 11.2 oz (156.4 kg)   SpO2 97%   BMI 43.08 kg/m²     General appearance: No apparent distress, appears stated age and cooperative. Anasarca, improving. HEENT: Pupils equal, round, and reactive to light. Conjunctivae/corneas clear. Nasal packing to left nare. Neck: Supple, with full range of motion. No jugular venous distention. Trachea midline. Respiratory:  Normal respiratory effort. Expiratory wheezing to left side and right middle/lower lobe. No rales. Cardiovascular: Regular rate and rhythm with normal S1/S2 without murmurs, rubs or gallops. Abdomen: Soft, non-tender, non-distended with normal bowel sounds. Musculoskeletal: passive and active ROM x 4 extremities. +3 pitting edema to BLE, pitting up bilateral thighs, nontender to palpation  Skin: Skin color, texture, turgor normal.  No rashes or lesions. Neurologic:  Neurovascularly intact without any focal sensory/motor deficits.  Grossly non-focal.  Psychiatric: Alert and oriented, thought content appropriate, normal insight  Capillary Refill: Brisk,< 3 seconds Peripheral Pulses: +2 palpable, equal bilaterally     Labs:   Recent Labs     12/01/20  2121 12/02/20  0449 12/03/20  0331   WBC  --  7.6  --    HGB 7.6* 7.3* 7.6*   HCT 24.2* 22.9* 23.7*   PLT  --  86*  --      Recent Labs     12/02/20  0449 12/03/20  0331 12/04/20  0525   * 135 134*   K 3.5 3.4* 3.9   CL 97* 95* 92*   CO2 23 29 29   BUN 41* 40* 41*   CREATININE 3.0* 2.9* 2.6*   CALCIUM 7.8* 8.5 8.8     Recent Labs     12/02/20 0449   AST 56*   ALT 37   BILIDIR 0.4*   BILITOT 0.7   ALKPHOS 95     Recent Labs     12/01/20  1024 12/02/20  0448 12/03/20  0331   INR 1.23* 1.27* 1.34*     No results for input(s): Juanita Roderick in the last 72 hours. Microbiology:      Urinalysis:      Lab Results   Component Value Date    NITRU NEGATIVE 11/28/2020    WBCUA 0-2 11/28/2020    BACTERIA FEW 11/28/2020    RBCUA 5-10 11/28/2020    BLOODU LARGE 11/28/2020    SPECGRAV 1.018 11/28/2020    GLUCOSEU NEGATIVE 10/31/2018       Radiology:  VL DUP UPPER EXTREMITY VENOUS BILATERAL   Final Result   No sonographic evidence for upper extremity DVT. **This report has been created using voice recognition software. It may contain minor errors which are inherent in voice recognition technology. **      Final report electronically signed by Dr. Helio Reinoso on 12/2/2020 8:05 AM      VL DUP LOWER EXTREMITY VENOUS BILATERAL   Final Result   No sonographic evidence of lower extremity DVT. **This report has been created using voice recognition software. It may contain minor errors which are inherent in voice recognition technology. **      Final report electronically signed by Dr. Helio Reinoso on 11/30/2020 9:25 AM      US GALLBLADDER RUQ   Final Result   Hepatomegaly. Otherwise limited but unremarkable study. This document has been electronically signed by: Chiara Navarrete MD    on 11/30/2020 07:21 AM         CT CHEST WO CONTRAST   Final Result   Bibasilar atelectasis and trace pleural effusions. Superimposed right    lower lobe pneumonia is not excluded. Sequelae of prior granulomatous infection. Cholelithiasis. Cirrhosis. This document has been electronically signed by: Amara Cortes MD on    11/30/2020 01:31 AM      All CT scans at this facility use dose modulation, iterative    reconstruction, and/or weight-based   dosing when appropriate to reduce radiation dose to as low as reasonably    achievable. XR CHEST PORTABLE   Final Result   Impression:   Patchy bilateral infiltrates, mildly improved. No pneumothorax. This document has been electronically signed by: Amara Cortes MD on    11/29/2020 10:33 PM         XR CHEST PORTABLE   Final Result   1. Worsening bilateral lower lung atelectasis/infiltrate. 2. Pulmonary edema. 3. Possible small bilateral pleural effusions. 4. Mild stable cardiomegaly. **This report has been created using voice recognition software. It may contain minor errors which are inherent in voice recognition technology. **      Final report electronically signed by Dr. Macarean Carpenter on 11/29/2020 6:43 PM      XR CHEST (2 VW)   Final Result   Mild pulmonary edema versus interstitial infiltrates with very small bilateral pleural effusions. **This report has been created using voice recognition software. It may contain minor errors which are inherent in voice recognition technology. **      Final report electronically signed by Dr. Perry Clayton on 11/29/2020 2:27 PM      XR CHEST PORTABLE   Final Result   1. Engorged pulmonary vessels. 2. Prominent lung markings in the mid and lower lung zones. This can represent early pulmonary edema. Interstitial infiltrates are not excluded. **This report has been created using voice recognition software. It may contain minor errors which are inherent in voice recognition technology. **      Final report electronically signed by Dr. Perry Clayton on 11/29/2020 8:39 AM      XR CHEST (2 VW)   Final Result   1. Mild prominence of the basilar interstitium which can represent some fibrotic changes versus early pulmonary edema. 2. New age indeterminate compression fracture of an upper thoracic vertebral body. **This report has been created using voice recognition software. It may contain minor errors which are inherent in voice recognition technology. **      Final report electronically signed by Dr. Mason Madsen on 11/28/2020 8:47 AM      US RENAL COMPLETE   Final Result   1. Possible left renal cyst but otherwise unremarkable renal ultrasound. 2. Unremarkable urinary bladder.       Final report electronically signed by Dr. Sanna Montaño on 11/26/2020 5:12 PM        DVT prophylaxis: [] Lovenox                                 [x] SCDs                                 [] SQ Heparin                                 [x] Encourage ambulation           [] Already on Anticoagulation     Code Status: Full Code    PT/OT Eval Status: Started    Tele:   [x] yes             [] no    Electronically signed by Ricky Owens DO on 12/4/2020 at 8:13 AM

## 2020-12-04 NOTE — FLOWSHEET NOTE
12/04/20 1454   Encounter Summary   Services provided to: Patient   Referral/Consult From: Rounding   Continue Visiting Yes  (12/4)   Complexity of Encounter Moderate   Length of Encounter 15 minutes   Routine   Type Follow up   Assessment Approachable   Intervention Nurtured hope   Outcome Comfort   Assessment: In my encounter with the 48 yr old patient, while rounding  the unit 4K, I provided spiritual care to patient through conversation, I also came to assess the patients spiritual needs present. The pt has kidney problems and had breathing treatments earlier today. Interventions:  I provided prayer, emotional support and words of comfort. Outcomes: The patient was encouraged and didnt share any further spiritual needs at this time. The pt remains optimistic and hopeful. The pt shared that they were appreciative for the support. Plan:  1. Chaplains will follow-up at a later time for assessment of any spiritual care needs present.         2.    The Chaplains will be available to provide further emotional support per request.

## 2020-12-04 NOTE — PROGRESS NOTES
Nephrology Progress Note    Patient Roxanna Grace   MRN -  [id number]   Acct # - [de-identified]      - 1965    48 y.o.   Admit Date: 2020  Hospital Day: 9  Location: --A  Date of evaluation -  2020    Subjective:   CC: vomiting,   Less Shortness of breath, O2 weaned down to 4 lpm, Up in chair, States that he walked in the gillette this morning  Pt reports complying with fluid restriction  UOP 9350/24h  BP Range: Systolic (31ILH), HDF:811 , Min:104 , ERS:449      Diastolic (18PRD), GJL:50, Min:55, Max:70    Objective:   VITALS:  /69   Pulse 92   Temp 98 °F (36.7 °C) (Oral)   Resp 18   Ht 6' 3\" (1.905 m)   Wt (!) 344 lb 11.2 oz (156.4 kg)   SpO2 97%   BMI 43.08 kg/m²    Patient Vitals for the past 24 hrs:   BP Temp Temp src Pulse Resp SpO2 Weight   20 0416 -- -- -- -- 18 97 % --   20 0345 127/69 98 °F (36.7 °C) Oral 92 18 96 % (!) 344 lb 11.2 oz (156.4 kg)   20 2313 -- -- -- -- 20 96 % --   20 2300 (!) 115/59 98.1 °F (36.7 °C) Oral 99 22 97 % --   20 -- -- -- -- -- 98 % --   20 1945 114/60 98.5 °F (36.9 °C) Oral 95 22 95 % --   20 1644 -- -- -- -- 22 99 % --   20 1548 (!) 104/55 98.3 °F (36.8 °C) Axillary 98 18 91 % --   20 1431 119/64 98.8 °F (37.1 °C) Axillary 97 20 98 % --   20 1417 -- -- -- -- -- 93 % --   20 1239 (!) 141/70 97.9 °F (36.6 °C) Oral 101 22 96 % --   20 1209 -- -- -- -- 24 95 % --   20 0831 -- -- -- -- 24 95 % --     4 L/min    Intake/Output Summary (Last 24 hours) at 2020 0819  Last data filed at 2020 0310  Gross per 24 hour   Intake 2282.77 ml   Output 9350 ml   Net -7067.23 ml       Admission weight: (!) 348 lb (157.9 kg)  Patient Vitals for the past 96 hrs (Last 3 readings):   Weight   20 0345 (!) 344 lb 11.2 oz (156.4 kg)   20 0345 (!) 363 lb 7 oz (164.9 kg)   20 0320 (!) 370 lb 3 oz (167.9 kg)     Body mass index is 43.08 ondansetron, PHENobarbital **OR** PHENobarbital **OR** PHENobarbital IVPB, diphenhydrAMINE, albuterol sulfate HFA, acetaminophen **OR** acetaminophen, polyethylene glycol, potassium chloride **OR** potassium alternative oral replacement **OR** potassium chloride, glucose, dextrose, glucagon (rDNA), dextrose, oxyCODONE-acetaminophen   Labs:   Labs reviewed  Recent Labs     12/01/20 2121 12/02/20 0449 12/03/20 0331   WBC  --  7.6  --    RBC  --  2.20*  --    HGB 7.6* 7.3* 7.6*   HCT 24.2* 22.9* 23.7*   MCV  --  104.1*  --    MCH  --  33.2*  --    PLT  --  86*  --      Recent Labs     12/02/20 0449 12/03/20 0331 12/04/20  0525   * 135 134*   K 3.5 3.4* 3.9   CL 97* 95* 92*   CO2 23 29 29   BUN 41* 40* 41*   CREATININE 3.0* 2.9* 2.6*   LABGLOM 22* 23* 26*   GLUCOSE 297* 144* 265*   MG 1.9 1.6 1.7   CALCIUM 7.8* 8.5 8.8   CAION 0.95* 0.99* 1.08*     Summary 11/27/2020   Technically difficult examination. Normal left ventricle size and systolic function. Ejection fraction was   estimated at 55 %. There were no regional left ventricular wall motion   abnormalities and wall thickness was within normal limits. The left atrium is Mildly dilated. There is moderate aortic stenosis with valve area of 1.2 sq cm. The maximum aortic valve gradient is 48 mmHg, the mean gradient is 33   mmHg, and the peak velocity is 3.5 cm/s. ASSESSMENT:  1. Acute Kidney Injury likely multifactorial including sepsis, borderline hypotension and diuretic therapy. BUN and Creatinine stable. Ok to continue lasix drip and spironolactone and K supplement. Check BMP, I lyla and Mag in AM. Discussed fluid restriction with pt  2. Chronic Kidney Disease Stage IIIB  3. Acute on chronic HFpEF with anasarca, slowly improving  4. Hypoxic resp failure, s/p HFNC  5. Pneumonia  6. Sepsis with Enterococcus bacteremia  7. Liver cirrhosis with history of alcohol abuse   8.  Diabetes Mellitus Type II with nephrosclerosis with long term use of insulin 9.  Epistaxis s/p nasal endoscopy with cautery. Nasal pkg removed 12/3 per ENT  10. Morbid obesity    Active Problems:    MATTIE (acute kidney injury) ([facility] [address]    Renal failure    Epistaxis    Pneumonitis due to aspiration of blood (HCC)    Hepatic cirrhosis (HCC)    Hyperglycemia    Swelling    Metabolic acidosis    Hypokalemia  Resolved Problems:    * No resolved hospital problems.  Masoud Serna, MEGHAN - CNP 8:19 AM 12/4/2020

## 2020-12-04 NOTE — PROGRESS NOTES
Progress note: Infectious diseases    Patient - German Roa,  Age - 48 y.o.    - 1965      Room Number - 4K-20/020-A   MRN -  [id number]   Acct # - [de-identified]  Date of Admission -  2020  9:04 PM    SUBJECTIVE:   No new issues. No epistaxis. OBJECTIVE   VITALS    height is 6' 3\" (1.905 m) and weight is 344 lb 11.2 oz (156.4 kg) (abnormal). His axillary temperature is 98 °F (36.7 °C). His blood pressure is 143/68 (abnormal) and his pulse is 97. His respiration is 18 and oxygen saturation is 95%. Wt Readings from Last 3 Encounters:   20 (!) 344 lb 11.2 oz (156.4 kg)   20 (!) 319 lb (144.7 kg)   09/10/20 (!) 321 lb (145.6 kg)       I/O (24 Hours)    Intake/Output Summary (Last 24 hours) at 2020 1427  Last data filed at 2020 1228  Gross per 24 hour   Intake 2139.77 ml   Output 9350 ml   Net -7210.23 ml       General Appearance  Awake, alert, oriented. obese  HEENT - normocephalic, atraumatic, pale conjunctiva,  anicteric sclera,    Neck - Supple, no mass  Lungs -  Bilateral  air entry, diminished breath sounds. gynecomastia   Cardiovascular - Heart sounds are normal.    Abdomen - soft, not distended, nontender,   Neurologic -oriented to person place and time. Skin - No bruising or bleeding.   Extremities -+ edema, spider angiomas     MEDICATIONS:      [START ON 2020] amoxicillin-clavulanate  1 tablet Oral 2 times per day    potassium bicarb-citric acid  40 mEq Oral BID    ipratropium-albuterol  1 ampule Inhalation Q4H    magnesium replacement protocol   Other RX Placeholder    oxymetazoline  2 spray Each Nostril TID    sodium chloride  2 spray Nasal BID    prednisoLONE acetate  1 drop Left Eye TID    insulin lispro  0-18 Units Subcutaneous TID WC    insulin lispro  0-9 Units Subcutaneous Nightly    multivitamin  1 tablet Oral Daily    ampicillin-sulbactam  3 g CKTOTAL, CKMB, TROPONINI in the last 72 hours. Problem list of patient:     Patient Active Problem List   Diagnosis Code    HCV antibody positive R76.8    Cirrhosis, alcoholic ([facility] [address] O29.75    [facility] withdrawal seizure [facility] complication [facility] G14.772, R56.9    Alcohol withdrawal, uncomplicated ([facility] [address] Y25.049    Type 2 diabetes mellitus ([facility] [address] E11.9    Hyponatremia E87.1    Leukocytosis D72.829    Thrombocytopenia ([facility] [address] D69.6    COPD (chronic obstructive pulmonary disease) ([facility] [address] J44.9    HLD (hyperlipidemia) E78.5    HTN (hypertension) I10    Seizure ([facility] [address] R56.9    Recurrent falls R29.6    Fracture of multiple ribs of left side S22.42XA    Anemia D64.9    Alcohol abuse F10.10    Closed fracture of distal end of left fibula with routine healing S82.832D    Hyperammonemia (HCC) E72.20    Essential tremor G25.0    Alcohol intoxication delirium (HCC) F10.121    MATTIE (acute kidney injury) ([facility] [address] N17.9    Renal failure N19    Epistaxis R04.0    Pneumonitis due to aspiration of blood (HCC) J69.8    Hepatic cirrhosis (HCC) K74.60    Hyperglycemia R73.9    Swelling A56.6    Metabolic acidosis A72.4    Hypokalemia E87.6         ASSESSMENT/PLAN   -Enterococcus bacteremia: antibiotic chagned to oral augmentin  -Acute kidney injury  -Epistaxis :resolved  -Liver cirrhosis  -History of alcohol abuse: discussed on alcohol cessation  -Continue therapy.       Julissa Beatty MD, Juan Toscano 12/4/2020 2:27 PM

## 2020-12-05 ENCOUNTER — APPOINTMENT (OUTPATIENT)
Dept: GENERAL RADIOLOGY | Age: 55
DRG: 720 | End: 2020-12-05
Attending: PHYSICIAN ASSISTANT
Payer: COMMERCIAL

## 2020-12-05 PROBLEM — I50.31 DIASTOLIC CHF, ACUTE (HCC): Status: ACTIVE | Noted: 2020-12-05

## 2020-12-05 LAB
ANION GAP SERPL CALCULATED.3IONS-SCNC: 10 MEQ/L (ref 8–16)
BUN BLDV-MCNC: 44 MG/DL (ref 7–22)
CALCIUM IONIZED: 1.11 MMOL/L (ref 1.12–1.32)
CALCIUM SERPL-MCNC: 9.5 MG/DL (ref 8.5–10.5)
CHLORIDE BLD-SCNC: 91 MEQ/L (ref 98–111)
CO2: 32 MEQ/L (ref 23–33)
CREAT SERPL-MCNC: 2.4 MG/DL (ref 0.4–1.2)
GFR SERPL CREATININE-BSD FRML MDRD: 28 ML/MIN/1.73M2
GLUCOSE BLD-MCNC: 286 MG/DL (ref 70–108)
GLUCOSE BLD-MCNC: 299 MG/DL (ref 70–108)
GLUCOSE BLD-MCNC: 328 MG/DL (ref 70–108)
GLUCOSE BLD-MCNC: 373 MG/DL (ref 70–108)
GLUCOSE BLD-MCNC: 430 MG/DL (ref 70–108)
MAGNESIUM: 1.8 MG/DL (ref 1.6–2.4)
POTASSIUM REFLEX MAGNESIUM: 4.4 MEQ/L (ref 3.5–5.2)
SODIUM BLD-SCNC: 133 MEQ/L (ref 135–145)

## 2020-12-05 PROCEDURE — 6370000000 HC RX 637 (ALT 250 FOR IP): Performed by: FAMILY MEDICINE

## 2020-12-05 PROCEDURE — 2580000003 HC RX 258: Performed by: NURSE PRACTITIONER

## 2020-12-05 PROCEDURE — 82948 REAGENT STRIP/BLOOD GLUCOSE: CPT

## 2020-12-05 PROCEDURE — 6370000000 HC RX 637 (ALT 250 FOR IP): Performed by: NURSE PRACTITIONER

## 2020-12-05 PROCEDURE — 80048 BASIC METABOLIC PNL TOTAL CA: CPT

## 2020-12-05 PROCEDURE — 6370000000 HC RX 637 (ALT 250 FOR IP): Performed by: STUDENT IN AN ORGANIZED HEALTH CARE EDUCATION/TRAINING PROGRAM

## 2020-12-05 PROCEDURE — 99232 SBSQ HOSP IP/OBS MODERATE 35: CPT | Performed by: INTERNAL MEDICINE

## 2020-12-05 PROCEDURE — 99233 SBSQ HOSP IP/OBS HIGH 50: CPT | Performed by: INTERNAL MEDICINE

## 2020-12-05 PROCEDURE — 94640 AIRWAY INHALATION TREATMENT: CPT

## 2020-12-05 PROCEDURE — 94669 MECHANICAL CHEST WALL OSCILL: CPT

## 2020-12-05 PROCEDURE — 82330 ASSAY OF CALCIUM: CPT

## 2020-12-05 PROCEDURE — 6370000000 HC RX 637 (ALT 250 FOR IP): Performed by: INTERNAL MEDICINE

## 2020-12-05 PROCEDURE — 36415 COLL VENOUS BLD VENIPUNCTURE: CPT

## 2020-12-05 PROCEDURE — 6360000002 HC RX W HCPCS: Performed by: INTERNAL MEDICINE

## 2020-12-05 PROCEDURE — 6360000002 HC RX W HCPCS: Performed by: NURSE PRACTITIONER

## 2020-12-05 PROCEDURE — 83735 ASSAY OF MAGNESIUM: CPT

## 2020-12-05 PROCEDURE — 2060000000 HC ICU INTERMEDIATE R&B

## 2020-12-05 PROCEDURE — 94760 N-INVAS EAR/PLS OXIMETRY 1: CPT

## 2020-12-05 PROCEDURE — 71046 X-RAY EXAM CHEST 2 VIEWS: CPT

## 2020-12-05 PROCEDURE — 2580000003 HC RX 258: Performed by: OTOLARYNGOLOGY

## 2020-12-05 RX ORDER — IPRATROPIUM BROMIDE AND ALBUTEROL SULFATE 2.5; .5 MG/3ML; MG/3ML
1 SOLUTION RESPIRATORY (INHALATION) 4 TIMES DAILY
Status: DISCONTINUED | OUTPATIENT
Start: 2020-12-05 | End: 2020-12-07

## 2020-12-05 RX ORDER — INSULIN GLARGINE 100 [IU]/ML
5 INJECTION, SOLUTION SUBCUTANEOUS ONCE
Status: COMPLETED | OUTPATIENT
Start: 2020-12-05 | End: 2020-12-05

## 2020-12-05 RX ORDER — FUROSEMIDE 40 MG/1
80 TABLET ORAL 2 TIMES DAILY
Status: DISCONTINUED | OUTPATIENT
Start: 2020-12-05 | End: 2020-12-05

## 2020-12-05 RX ORDER — BUMETANIDE 1 MG/1
2 TABLET ORAL 2 TIMES DAILY
Status: DISCONTINUED | OUTPATIENT
Start: 2020-12-05 | End: 2020-12-06

## 2020-12-05 RX ORDER — INSULIN GLARGINE 100 [IU]/ML
40 INJECTION, SOLUTION SUBCUTANEOUS NIGHTLY
Status: DISCONTINUED | OUTPATIENT
Start: 2020-12-05 | End: 2020-12-06

## 2020-12-05 RX ADMIN — AMOXICILLIN AND CLAVULANATE POTASSIUM 1 TABLET: 875; 125 TABLET, FILM COATED ORAL at 05:51

## 2020-12-05 RX ADMIN — POTASSIUM BICARBONATE 40 MEQ: 782 TABLET, EFFERVESCENT ORAL at 08:17

## 2020-12-05 RX ADMIN — BUMETANIDE 2 MG: 1 TABLET ORAL at 16:09

## 2020-12-05 RX ADMIN — OXYMETAZOLINE HYDROCHLORIDE 2 SPRAY: 0.05 SPRAY NASAL at 08:13

## 2020-12-05 RX ADMIN — ENOXAPARIN SODIUM 40 MG: 40 INJECTION SUBCUTANEOUS at 09:49

## 2020-12-05 RX ADMIN — DULOXETINE HYDROCHLORIDE 60 MG: 60 CAPSULE, DELAYED RELEASE ORAL at 08:13

## 2020-12-05 RX ADMIN — OXYMETAZOLINE HYDROCHLORIDE 2 SPRAY: 0.05 SPRAY NASAL at 13:24

## 2020-12-05 RX ADMIN — LATANOPROST 1 DROP: 50 SOLUTION OPHTHALMIC at 20:05

## 2020-12-05 RX ADMIN — SODIUM CHLORIDE, PRESERVATIVE FREE 10 ML: 5 INJECTION INTRAVENOUS at 20:05

## 2020-12-05 RX ADMIN — FUROSEMIDE 20 MG/HR: 10 INJECTION, SOLUTION INTRAMUSCULAR; INTRAVENOUS at 08:20

## 2020-12-05 RX ADMIN — PREDNISOLONE ACETATE 1 DROP: 10 SUSPENSION/ DROPS OPHTHALMIC at 08:14

## 2020-12-05 RX ADMIN — FOLIC ACID 1 MG: 1 TABLET ORAL at 08:13

## 2020-12-05 RX ADMIN — IPRATROPIUM BROMIDE AND ALBUTEROL SULFATE 1 AMPULE: .5; 3 SOLUTION RESPIRATORY (INHALATION) at 09:52

## 2020-12-05 RX ADMIN — OXYCODONE HYDROCHLORIDE AND ACETAMINOPHEN 1 TABLET: 7.5; 325 TABLET ORAL at 03:45

## 2020-12-05 RX ADMIN — ROSUVASTATIN CALCIUM 20 MG: 20 TABLET, FILM COATED ORAL at 08:13

## 2020-12-05 RX ADMIN — TAMSULOSIN HYDROCHLORIDE 0.4 MG: 0.4 CAPSULE ORAL at 08:13

## 2020-12-05 RX ADMIN — PREDNISOLONE ACETATE 1 DROP: 10 SUSPENSION/ DROPS OPHTHALMIC at 13:24

## 2020-12-05 RX ADMIN — POTASSIUM BICARBONATE 40 MEQ: 782 TABLET, EFFERVESCENT ORAL at 20:05

## 2020-12-05 RX ADMIN — PREDNISOLONE ACETATE 1 DROP: 10 SUSPENSION/ DROPS OPHTHALMIC at 20:05

## 2020-12-05 RX ADMIN — IPRATROPIUM BROMIDE AND ALBUTEROL SULFATE 1 AMPULE: .5; 3 SOLUTION RESPIRATORY (INHALATION) at 17:08

## 2020-12-05 RX ADMIN — OXYMETAZOLINE HYDROCHLORIDE 2 SPRAY: 0.05 SPRAY NASAL at 20:05

## 2020-12-05 RX ADMIN — ENOXAPARIN SODIUM 40 MG: 40 INJECTION SUBCUTANEOUS at 20:04

## 2020-12-05 RX ADMIN — INSULIN GLARGINE 5 UNITS: 100 INJECTION, SOLUTION SUBCUTANEOUS at 12:16

## 2020-12-05 RX ADMIN — IPRATROPIUM BROMIDE AND ALBUTEROL SULFATE 1 AMPULE: .5; 3 SOLUTION RESPIRATORY (INHALATION) at 13:30

## 2020-12-05 RX ADMIN — IPRATROPIUM BROMIDE AND ALBUTEROL SULFATE 1 AMPULE: .5; 3 SOLUTION RESPIRATORY (INHALATION) at 20:54

## 2020-12-05 RX ADMIN — THERA TABS 1 TABLET: TAB at 08:13

## 2020-12-05 RX ADMIN — SALINE NASAL SPRAY 2 SPRAY: 1.5 SOLUTION NASAL at 20:05

## 2020-12-05 RX ADMIN — SPIRONOLACTONE 50 MG: 25 TABLET ORAL at 08:12

## 2020-12-05 RX ADMIN — OXYCODONE HYDROCHLORIDE AND ACETAMINOPHEN 1 TABLET: 7.5; 325 TABLET ORAL at 20:04

## 2020-12-05 RX ADMIN — FUROSEMIDE 20 MG/HR: 10 INJECTION, SOLUTION INTRAMUSCULAR; INTRAVENOUS at 03:39

## 2020-12-05 RX ADMIN — AMLODIPINE BESYLATE 10 MG: 10 TABLET ORAL at 08:13

## 2020-12-05 RX ADMIN — IPRATROPIUM BROMIDE AND ALBUTEROL SULFATE 1 AMPULE: .5; 3 SOLUTION RESPIRATORY (INHALATION) at 04:43

## 2020-12-05 RX ADMIN — Medication 100 MG: at 08:13

## 2020-12-05 RX ADMIN — AMOXICILLIN AND CLAVULANATE POTASSIUM 1 TABLET: 875; 125 TABLET, FILM COATED ORAL at 20:05

## 2020-12-05 RX ADMIN — MIRTAZAPINE 15 MG: 15 TABLET, FILM COATED ORAL at 20:05

## 2020-12-05 RX ADMIN — SALINE NASAL SPRAY 2 SPRAY: 1.5 SOLUTION NASAL at 08:13

## 2020-12-05 RX ADMIN — INSULIN GLARGINE 40 UNITS: 100 INJECTION, SOLUTION SUBCUTANEOUS at 20:26

## 2020-12-05 RX ADMIN — IPRATROPIUM BROMIDE AND ALBUTEROL SULFATE 1 AMPULE: .5; 3 SOLUTION RESPIRATORY (INHALATION) at 00:36

## 2020-12-05 ASSESSMENT — PAIN DESCRIPTION - LOCATION: LOCATION: HAND

## 2020-12-05 ASSESSMENT — PAIN SCALES - GENERAL
PAINLEVEL_OUTOF10: 8
PAINLEVEL_OUTOF10: 9
PAINLEVEL_OUTOF10: 8

## 2020-12-05 ASSESSMENT — PAIN DESCRIPTION - ORIENTATION: ORIENTATION: LEFT

## 2020-12-05 ASSESSMENT — PAIN DESCRIPTION - PAIN TYPE: TYPE: ACUTE PAIN

## 2020-12-05 ASSESSMENT — PAIN DESCRIPTION - FREQUENCY: FREQUENCY: INTERMITTENT

## 2020-12-05 ASSESSMENT — PAIN DESCRIPTION - DESCRIPTORS: DESCRIPTORS: SHARP

## 2020-12-05 NOTE — PROGRESS NOTES
Assessment and Plan:        1. MATTIE- creatinine gradually improving; continue to monitor  2. Acute diastolic chf; continues to diurese; he says normal wgt is 320. His wgts here do not correlate with fluid losses; he feels better. Still on 02; will try to wean. 3. Dm- sugars high; adjust insulin. 4. Enterococcal bacteremia- ?source. On po antibiotic now. CC:  mattie  HPI:  Pt transferred from OSH with mattie after rx for sepsis and pneumonia. He is diuresing well and creat improving. ROS (12 point review of systems completed. Pertinent positives noted.  Otherwise ROS is negative) :   PMH:  Per HPI  SHX: single, smoker, heavy etoh use  FHX: colon cancer  Allergies: See above    Medications:     furosemide (LASIX) 1mg/ml infusion 20 mg/hr (12/05/20 0820)    dextrose        insulin glargine  40 Units Subcutaneous Nightly    insulin glargine  5 Units Subcutaneous Once    ipratropium-albuterol  1 ampule Inhalation 4x daily    enoxaparin  40 mg Subcutaneous BID    amoxicillin-clavulanate  1 tablet Oral 2 times per day    potassium bicarb-citric acid  40 mEq Oral BID    magnesium replacement protocol   Other RX Placeholder    oxymetazoline  2 spray Each Nostril TID    sodium chloride  2 spray Nasal BID    prednisoLONE acetate  1 drop Left Eye TID    insulin lispro  0-18 Units Subcutaneous TID WC    insulin lispro  0-9 Units Subcutaneous Nightly    multivitamin  1 tablet Oral Daily    sodium chloride flush  10 mL Intravenous 2 times per day    [Held by provider] aspirin  81 mg Oral Daily    DULoxetine  60 mg Oral Daily    folic acid  1 mg Oral Daily    latanoprost  1 drop Both Eyes Nightly    mirtazapine  15 mg Oral Nightly    rosuvastatin  20 mg Oral Daily    spironolactone  50 mg Oral Daily    tamsulosin  0.4 mg Oral Daily    thiamine  100 mg Oral Daily    amLODIPine  10 mg Oral Daily       Vital Signs:   /76   Pulse 88   Temp 97.7 °F (36.5 °C) (Oral)   Resp 20   Ht 6' 3\" (1.905 m) Wt (!) 336 lb 4.8 oz (152.5 kg)   SpO2 93%   BMI 42.03 kg/m²      Intake/Output Summary (Last 24 hours) at 12/5/2020 0844  Last data filed at 12/5/2020 0515  Gross per 24 hour   Intake 2141.5 ml   Output 9300 ml   Net -7158.5 ml        Physical Examination: General appearance - alert, well appearing, and in no distress and overweight  Mental status - alert, oriented to person, place, and time  Neck - supple, no significant adenopathy, no JVD, or carotid bruits  Chest - clear to auscultation, no wheezes, rales or rhonchi, symmetric air entry  Heart - normal rate, regular rhythm, normal S1, S2, no murmurs, rubs, clicks or gallops  Abdomen - soft, nontender, nondistended, no masses or organomegaly  large  Neurological - alert, oriented, normal speech, no focal findings or movement disorder noted  Musculoskeletal - no muscular tenderness noted  Extremities - legs much improved  Skin - normal coloration and turgor, no rashes, no suspicious skin lesions noted    Data: (All radiographs, tracings, PFTs, and imaging are personally viewed and interpreted unless otherwise noted).     Reviewed labs      Electronically signed by Cristine Hithccock MD on 12/5/2020 at 8:44 AM

## 2020-12-05 NOTE — PROGRESS NOTES
[facility]   Nephrology progress note  12/5/2020, 1:07 PM      Pt Name:    Gilbert Steinberg  MRN:     [id number]     YOB: 1965  Admit Date:    11/25/2020  9:04 PM  Primary Care Physician:  MEGHAN Nesbitt CNP   Room number  4K-20/020-A    Chief Complaint: Nephrology following for MATTIE/CKD    Subjective:  Patient seen and examined  No chest pain or shortness of breath  On Lasix drip  Input and output reviewed  Excellent Urine output  Patient put out 12.8 L of urine in last 24 hours    Objective:  24HR INTAKE/OUTPUT:      Intake/Output Summary (Last 24 hours) at 12/5/2020 1307  Last data filed at 12/5/2020 0515  Gross per 24 hour   Intake 1464.5 ml   Output 7800 ml   Net -6335.5 ml     I/O last 3 completed shifts: In: 2141.5 [P.O.:1430; I.V.:711.5]  Out: 00486 [Urine:87286]  No intake/output data recorded. Admission weight: (!) 348 lb (157.9 kg)  Wt Readings from Last 3 Encounters:   12/05/20 (!) 336 lb 4.8 oz (152.5 kg)   09/23/20 (!) 319 lb (144.7 kg)   09/10/20 (!) 321 lb (145.6 kg)     Body mass index is 42.03 kg/m².     Physical examination  VITALS:     Vitals:    12/05/20 0444 12/05/20 0545 12/05/20 0746 12/05/20 1203   BP:   136/76 132/72   Pulse:   88 94   Resp: 20 20 20   Temp:   97.7 °F (36.5 °C) 97.8 °F (36.6 °C)   TempSrc:   Oral Oral   SpO2: 93%  93% 92%   Weight:  (!) 336 lb 4.8 oz (152.5 kg)     Height:         General Appearance: appears comfortable, no distress  Mouth/Throat: Oral mucosa moist  Neck: No JVD  Lungs: Diminished, no use of accessory muscles  Heart:  S1, S2 heard  GI: soft, non-tender, no guarding      Lab Data  CBC:   Recent Labs     12/03/20  0331 12/04/20  0911   HGB 7.6* 8.2*   HCT 23.7* 25.4*     BMP:  Recent Labs     12/03/20  0331 12/04/20  0525 12/05/20  0506    134* 133*   K 3.4* 3.9 4.4   CL 95* 92* 91*   CO2 29 29 32   BUN 40* 41* 44*   CREATININE 2.9* 2.6* 2.4*   GLUCOSE 144* 265* 299*   CALCIUM 8.5 8.8 9.5   MG 1.6 1.7 1.8 Hepatic:   Recent Labs     12/03/20  0331   LABALBU 3.1*         Meds:  Infusion:    furosemide (LASIX) 1mg/ml infusion 20 mg/hr (12/05/20 0820)    dextrose       Meds:    insulin glargine  40 Units Subcutaneous Nightly    ipratropium-albuterol  1 ampule Inhalation 4x daily    enoxaparin  40 mg Subcutaneous BID    amoxicillin-clavulanate  1 tablet Oral 2 times per day    potassium bicarb-citric acid  40 mEq Oral BID    magnesium replacement protocol   Other RX Placeholder    oxymetazoline  2 spray Each Nostril TID    sodium chloride  2 spray Nasal BID    prednisoLONE acetate  1 drop Left Eye TID    insulin lispro  0-18 Units Subcutaneous TID WC    insulin lispro  0-9 Units Subcutaneous Nightly    multivitamin  1 tablet Oral Daily    sodium chloride flush  10 mL Intravenous 2 times per day    [Held by provider] aspirin  81 mg Oral Daily    DULoxetine  60 mg Oral Daily    folic acid  1 mg Oral Daily    latanoprost  1 drop Both Eyes Nightly    mirtazapine  15 mg Oral Nightly    rosuvastatin  20 mg Oral Daily    spironolactone  50 mg Oral Daily    tamsulosin  0.4 mg Oral Daily    thiamine  100 mg Oral Daily    amLODIPine  10 mg Oral Daily     Meds prn: lidocaine, sodium chloride flush, promethazine **OR** ondansetron, PHENobarbital **OR** PHENobarbital **OR** PHENobarbital IVPB, diphenhydrAMINE, albuterol sulfate HFA, acetaminophen **OR** acetaminophen, polyethylene glycol, potassium chloride **OR** potassium alternative oral replacement **OR** potassium chloride, glucose, dextrose, glucagon (rDNA), dextrose, oxyCODONE-acetaminophen       Impression and Plan:  1. MATTIE on CKD 3  Renal function overall improved  Baseline creatinine appears to be 1.3 as of September 2020  Peak 3.1, down to 2.4 today    2. Volume overload/anasarca. Improved excellent urine output.   In negative fluid balance, weights improving  Okay to stop IV Lasix drip  We will switch him over to Bumex 2 mg twice daily and monitor response    3. Acute diastolic congestive heart failure: Improving  4. Diabetes mellitus with hyperglycemia. 5.  Liver cirrhosis  6. Obesity  7.   Enterococcus bacteremia    D/W patient and JERRELL Quezada MD  [facility]

## 2020-12-06 LAB
ALBUMIN SERPL-MCNC: 3 G/DL (ref 3.5–5.1)
ANION GAP SERPL CALCULATED.3IONS-SCNC: 11 MEQ/L (ref 8–16)
BUN BLDV-MCNC: 46 MG/DL (ref 7–22)
CALCIUM SERPL-MCNC: 10 MG/DL (ref 8.5–10.5)
CHLORIDE BLD-SCNC: 90 MEQ/L (ref 98–111)
CO2: 31 MEQ/L (ref 23–33)
CREAT SERPL-MCNC: 2.9 MG/DL (ref 0.4–1.2)
GFR SERPL CREATININE-BSD FRML MDRD: 23 ML/MIN/1.73M2
GLUCOSE BLD-MCNC: 208 MG/DL (ref 70–108)
GLUCOSE BLD-MCNC: 218 MG/DL (ref 70–108)
GLUCOSE BLD-MCNC: 225 MG/DL (ref 70–108)
GLUCOSE BLD-MCNC: 227 MG/DL (ref 70–108)
GLUCOSE BLD-MCNC: 302 MG/DL (ref 70–108)
GLUCOSE BLD-MCNC: 337 MG/DL (ref 70–108)
GLUCOSE BLD-MCNC: 373 MG/DL (ref 70–108)
POTASSIUM SERPL-SCNC: 4.6 MEQ/L (ref 3.5–5.2)
SODIUM BLD-SCNC: 132 MEQ/L (ref 135–145)

## 2020-12-06 PROCEDURE — 94640 AIRWAY INHALATION TREATMENT: CPT

## 2020-12-06 PROCEDURE — 6370000000 HC RX 637 (ALT 250 FOR IP): Performed by: STUDENT IN AN ORGANIZED HEALTH CARE EDUCATION/TRAINING PROGRAM

## 2020-12-06 PROCEDURE — 2060000000 HC ICU INTERMEDIATE R&B

## 2020-12-06 PROCEDURE — 36415 COLL VENOUS BLD VENIPUNCTURE: CPT

## 2020-12-06 PROCEDURE — 6360000002 HC RX W HCPCS: Performed by: INTERNAL MEDICINE

## 2020-12-06 PROCEDURE — 6370000000 HC RX 637 (ALT 250 FOR IP): Performed by: INTERNAL MEDICINE

## 2020-12-06 PROCEDURE — 99232 SBSQ HOSP IP/OBS MODERATE 35: CPT | Performed by: INTERNAL MEDICINE

## 2020-12-06 PROCEDURE — 80048 BASIC METABOLIC PNL TOTAL CA: CPT

## 2020-12-06 PROCEDURE — 82948 REAGENT STRIP/BLOOD GLUCOSE: CPT

## 2020-12-06 PROCEDURE — 94760 N-INVAS EAR/PLS OXIMETRY 1: CPT

## 2020-12-06 PROCEDURE — 82040 ASSAY OF SERUM ALBUMIN: CPT

## 2020-12-06 PROCEDURE — 6370000000 HC RX 637 (ALT 250 FOR IP): Performed by: FAMILY MEDICINE

## 2020-12-06 PROCEDURE — 2580000003 HC RX 258: Performed by: OTOLARYNGOLOGY

## 2020-12-06 PROCEDURE — 6370000000 HC RX 637 (ALT 250 FOR IP): Performed by: NURSE PRACTITIONER

## 2020-12-06 PROCEDURE — 94669 MECHANICAL CHEST WALL OSCILL: CPT

## 2020-12-06 RX ORDER — INSULIN GLARGINE 100 [IU]/ML
5 INJECTION, SOLUTION SUBCUTANEOUS ONCE
Status: COMPLETED | OUTPATIENT
Start: 2020-12-06 | End: 2020-12-06

## 2020-12-06 RX ORDER — BUMETANIDE 1 MG/1
1 TABLET ORAL 2 TIMES DAILY
Status: DISCONTINUED | OUTPATIENT
Start: 2020-12-06 | End: 2020-12-08 | Stop reason: HOSPADM

## 2020-12-06 RX ORDER — INSULIN GLARGINE 100 [IU]/ML
45 INJECTION, SOLUTION SUBCUTANEOUS NIGHTLY
Status: DISCONTINUED | OUTPATIENT
Start: 2020-12-06 | End: 2020-12-08 | Stop reason: HOSPADM

## 2020-12-06 RX ADMIN — BUMETANIDE 1 MG: 1 TABLET ORAL at 20:05

## 2020-12-06 RX ADMIN — AMLODIPINE BESYLATE 10 MG: 10 TABLET ORAL at 08:56

## 2020-12-06 RX ADMIN — OXYMETAZOLINE HYDROCHLORIDE 2 SPRAY: 0.05 SPRAY NASAL at 08:56

## 2020-12-06 RX ADMIN — POTASSIUM BICARBONATE 40 MEQ: 782 TABLET, EFFERVESCENT ORAL at 20:07

## 2020-12-06 RX ADMIN — ENOXAPARIN SODIUM 40 MG: 40 INJECTION SUBCUTANEOUS at 08:55

## 2020-12-06 RX ADMIN — SODIUM CHLORIDE, PRESERVATIVE FREE 10 ML: 5 INJECTION INTRAVENOUS at 20:01

## 2020-12-06 RX ADMIN — AMOXICILLIN AND CLAVULANATE POTASSIUM 1 TABLET: 875; 125 TABLET, FILM COATED ORAL at 08:56

## 2020-12-06 RX ADMIN — PREDNISOLONE ACETATE 1 DROP: 10 SUSPENSION/ DROPS OPHTHALMIC at 15:11

## 2020-12-06 RX ADMIN — INSULIN GLARGINE 5 UNITS: 100 INJECTION, SOLUTION SUBCUTANEOUS at 11:41

## 2020-12-06 RX ADMIN — SODIUM CHLORIDE, PRESERVATIVE FREE 10 ML: 5 INJECTION INTRAVENOUS at 08:57

## 2020-12-06 RX ADMIN — TAMSULOSIN HYDROCHLORIDE 0.4 MG: 0.4 CAPSULE ORAL at 08:56

## 2020-12-06 RX ADMIN — PREDNISOLONE ACETATE 1 DROP: 10 SUSPENSION/ DROPS OPHTHALMIC at 08:56

## 2020-12-06 RX ADMIN — FOLIC ACID 1 MG: 1 TABLET ORAL at 08:56

## 2020-12-06 RX ADMIN — OXYMETAZOLINE HYDROCHLORIDE 2 SPRAY: 0.05 SPRAY NASAL at 15:11

## 2020-12-06 RX ADMIN — DULOXETINE HYDROCHLORIDE 60 MG: 60 CAPSULE, DELAYED RELEASE ORAL at 08:56

## 2020-12-06 RX ADMIN — IPRATROPIUM BROMIDE AND ALBUTEROL SULFATE 1 AMPULE: .5; 3 SOLUTION RESPIRATORY (INHALATION) at 20:51

## 2020-12-06 RX ADMIN — LATANOPROST 1 DROP: 50 SOLUTION OPHTHALMIC at 20:03

## 2020-12-06 RX ADMIN — IPRATROPIUM BROMIDE AND ALBUTEROL SULFATE 1 AMPULE: .5; 3 SOLUTION RESPIRATORY (INHALATION) at 16:04

## 2020-12-06 RX ADMIN — IPRATROPIUM BROMIDE AND ALBUTEROL SULFATE 1 AMPULE: .5; 3 SOLUTION RESPIRATORY (INHALATION) at 08:14

## 2020-12-06 RX ADMIN — INSULIN GLARGINE 45 UNITS: 100 INJECTION, SOLUTION SUBCUTANEOUS at 21:13

## 2020-12-06 RX ADMIN — IPRATROPIUM BROMIDE AND ALBUTEROL SULFATE 1 AMPULE: .5; 3 SOLUTION RESPIRATORY (INHALATION) at 12:17

## 2020-12-06 RX ADMIN — THERA TABS 1 TABLET: TAB at 08:56

## 2020-12-06 RX ADMIN — ENOXAPARIN SODIUM 40 MG: 40 INJECTION SUBCUTANEOUS at 20:06

## 2020-12-06 RX ADMIN — ROSUVASTATIN CALCIUM 20 MG: 20 TABLET, FILM COATED ORAL at 08:56

## 2020-12-06 RX ADMIN — Medication 100 MG: at 08:56

## 2020-12-06 RX ADMIN — BUMETANIDE 2 MG: 1 TABLET ORAL at 08:56

## 2020-12-06 RX ADMIN — OXYCODONE HYDROCHLORIDE AND ACETAMINOPHEN 1 TABLET: 7.5; 325 TABLET ORAL at 08:56

## 2020-12-06 RX ADMIN — SPIRONOLACTONE 50 MG: 25 TABLET ORAL at 08:56

## 2020-12-06 RX ADMIN — OXYCODONE HYDROCHLORIDE AND ACETAMINOPHEN 1 TABLET: 7.5; 325 TABLET ORAL at 20:05

## 2020-12-06 RX ADMIN — SALINE NASAL SPRAY 2 SPRAY: 1.5 SOLUTION NASAL at 20:03

## 2020-12-06 RX ADMIN — MIRTAZAPINE 15 MG: 15 TABLET, FILM COATED ORAL at 20:04

## 2020-12-06 RX ADMIN — SALINE NASAL SPRAY 2 SPRAY: 1.5 SOLUTION NASAL at 08:56

## 2020-12-06 RX ADMIN — PREDNISOLONE ACETATE 1 DROP: 10 SUSPENSION/ DROPS OPHTHALMIC at 20:12

## 2020-12-06 RX ADMIN — AMOXICILLIN AND CLAVULANATE POTASSIUM 1 TABLET: 875; 125 TABLET, FILM COATED ORAL at 20:05

## 2020-12-06 ASSESSMENT — PAIN SCALES - GENERAL
PAINLEVEL_OUTOF10: 9
PAINLEVEL_OUTOF10: 0
PAINLEVEL_OUTOF10: 8

## 2020-12-06 ASSESSMENT — PAIN DESCRIPTION - PAIN TYPE: TYPE: CHRONIC PAIN

## 2020-12-06 ASSESSMENT — PAIN DESCRIPTION - ONSET: ONSET: ON-GOING

## 2020-12-06 ASSESSMENT — PAIN DESCRIPTION - PROGRESSION: CLINICAL_PROGRESSION: RAPIDLY IMPROVING

## 2020-12-06 ASSESSMENT — PAIN DESCRIPTION - ORIENTATION: ORIENTATION: POSTERIOR

## 2020-12-06 ASSESSMENT — PAIN DESCRIPTION - FREQUENCY: FREQUENCY: INTERMITTENT

## 2020-12-06 ASSESSMENT — PAIN DESCRIPTION - LOCATION: LOCATION: NECK

## 2020-12-06 ASSESSMENT — PAIN DESCRIPTION - DESCRIPTORS: DESCRIPTORS: ACHING

## 2020-12-06 NOTE — PROGRESS NOTES
Hospitalist Progress Note    Patient:  Asya Williamson      Unit/Bed:4K-20/020-A    YOB: 1965    MRN: [id number]       Acct: [de-identified]     PCP: MEGHAN Bear CNP    Date of Admission: 11/25/2020    Assessment/Plan:  1. Acute kidney injury, worsened: Likely multifactorial AIN vs. Sepsis vs. Hypovolemia vs. Hypoalbuminemia. Lasix drip stopped and switched to PO Bumex. Creatinine worsened overnight  - Continue management per nephrology: Lasix drip and spironolactone  - Albumin 25% IV 12.5g q6hrs x4 doses  - 12/3 - Creatinine very mildly improving and had significant urinary output since yesterday. Has received a total of 7 doses of Albumin IV 12.5g 25%. Will consult dietician for protein malnutrition. - 12/4 - Creatinine 2.6 this AM. Dietician added high protein supplements to meals. Output of about 7L since yesterday. Disposition possibly Monday if his MATTIE and edema continues to improve. - 12/5 - Creatinine 2.4. Lasix drip changed to Bumex PO. Unasyn discontinued, switched to PO Augmentin to be completed on 12/14  - 12/6 - Creatinine worsened to 2.9. Still has good urine output. Questionable due to high sugars or from change to PO Bumex. He had significant urinary output overnight. Will decrease Bumex to 1mg and reassess tomorrow. Follow BMP. 2. Severe Pneumonia 2/2 Aspiration, resolved: Lungs CTA. - Continue Augmentin  - Continue Acapella as tolerated  3. Acute hypoxic respiratory failure 2/2 #2, resolved: Postsurgical nasal cautery requiring respiratory support. - Was transferred to ICU on 11/29/20 after procedure and was put on BiPAP, then subsequently HFNC. He was transferred out of ICU on 11/30/20 after he was able to be weaned to HFNC. Has been tolerating 6L O2 via NC.  - 12/2: Started scheduled Duonebs q4hrs WA and acapella as patient as increasing expiratory wheezing and bibasilar rhonchi  - 12/3: Continues to have expiratory wheezing.  Continue DuoNebs q4hrs and acapella as tolerated. Patient is currently on 6L NC, however, he's not keeping his cannula in. Will attempt trial of room air today and monitor. - 12/4: Still has expiratory wheezing, worse on left. Continue DuoNebs q4hrs and acapella as tolerated. - 12/6: Lungs CTA. On room air today. 4. Liver Cirrhosis 2/2 EtOH abuse, stable: Significant other states 1-2 weeks before hospitalization he drinks 6-8, 24 oz beers per day  - Phenobarbital PRN for alcohol withdrawal  5. Fluid overload, improving: Multifactorial due to MATTIE and liver cirrhosis. Questionable diastolic heart dysfunction, however not noted on recent echocardiogram.  - Concerned for upper extremity DVT overnight (12/1-12/2) - Bilateral venous ultrasound negative for DVT  - 12/3 - Total of 9.7L urinary output since yesterday. Pitting edema/anasarca mildly improving. Apply ACE wraps to BLE.  - 12/4 - Total of -7L output since yesterday. Pitting edema to BLE improving. Continue ACE wraps as tolerated. - 12/6 - Anasarca significantly improved. BLE still has +1 pitting edema.  - Plan as above  6. Epistaxis s/p left nasal cautery and packing, improving - POD #5: No further episodes of epistaxis. - 12/3 - Plan for FFP 1 unit at 12PM per ENT with nasal packing removal in afternoon  - 12/4 - Nasal packing removed yesterday. No further episodes of epistaxis. - 12/6 - Had dried, dark red blood to left external nare. Dried blood in left internal nare. He denies hemoptysis. No active epistaxis. Will monitor. 7. Enterococcus Bacteremia: No growth on recent blood cultures. - Unasyn d/c'd - Switched to PO Augmentin to be completed on 12/14  8. Moderate Aortic Stenosis: Valve area 1.2 sq cm, mean gradient 33 mmHg, peak velocity 3.5 cm/s  - Outpatient cardiology follow up  9. IDDM Type 2: HgbA1C 5.9% on 11/26/2020.   - High dose SSI ACHS  - Continue Lantus 45 units QHS  - 12/1 - Given Lantus 10 units once due to -300s in past few days.  Patient has been receiving prednisone. Will reassess need to change insulin dosing once prednisone complete.  - 12/3 - Blood sugars steadily improving since finishing prednisone. - 12/6 - Lantus increased to 45 units QHS as sugars have been high. Patient states he takes Lantus 80 units and Metformin BID at home. Chief Complaint: MATTIE    Hospital Course:  Per HPI, \"49 y.o. male who presented to [address] [place] [address] with with above complain. Patient is a transfer from [place] where he had been admitted for about 4 days after complain of vomiting, incontinence, left shoulder pain and diarrhea. He was found to have sepsis secondary to pneumonia and during fluid resuscitation, it was noted that patient had MATTIE. He did develop fever during stay and blood cultures obtained during triage grew gram + cocci. Patient was started on Zosyn and vanco only for renal function to continue worsening from normal to 2.29. renal US was done showing no hydronephrosis or other kidney abnormalities. It was felt that patient needed nephrology consult to review worsening kidney status despite getting clinically better with abx. At bedside, patient reports feeling well and no longer confused. He dose have diabetes that is poorly controlled per records\"    Subjective (past 24 hours):   Patient states he is doing ok. He reports did he not have much sleep last night and was tossing and turning throughout the night. His swelling has significantly improved and states his legs do not feel as heavy, although they are  to touch. He denies any episodes of epistaxis or hemoptysis, but does have dried blood to left internal and external nare. He has no questions or concerns. He denies fevers, chills, chest pain, shortness of breath, abdominal pain, nausea, vomiting. ROS (12 point review of systems completed. Pertinent positives noted. Otherwise ROS is negative).     Medications:  Reviewed    Infusion Medications    dextrose       Scheduled Medications  insulin glargine  45 Units Subcutaneous Nightly    insulin glargine  5 Units Subcutaneous Once    ipratropium-albuterol  1 ampule Inhalation 4x daily    enoxaparin  40 mg Subcutaneous BID    bumetanide  2 mg Oral BID    amoxicillin-clavulanate  1 tablet Oral 2 times per day    potassium bicarb-citric acid  40 mEq Oral BID    magnesium replacement protocol   Other RX Placeholder    oxymetazoline  2 spray Each Nostril TID    sodium chloride  2 spray Nasal BID    prednisoLONE acetate  1 drop Left Eye TID    insulin lispro  0-18 Units Subcutaneous TID WC    insulin lispro  0-9 Units Subcutaneous Nightly    multivitamin  1 tablet Oral Daily    sodium chloride flush  10 mL Intravenous 2 times per day    [Held by provider] aspirin  81 mg Oral Daily    DULoxetine  60 mg Oral Daily    folic acid  1 mg Oral Daily    latanoprost  1 drop Both Eyes Nightly    mirtazapine  15 mg Oral Nightly    rosuvastatin  20 mg Oral Daily    spironolactone  50 mg Oral Daily    tamsulosin  0.4 mg Oral Daily    thiamine  100 mg Oral Daily    amLODIPine  10 mg Oral Daily     PRN Meds: lidocaine, sodium chloride flush, promethazine **OR** ondansetron, PHENobarbital **OR** PHENobarbital **OR** PHENobarbital IVPB, diphenhydrAMINE, albuterol sulfate HFA, acetaminophen **OR** acetaminophen, polyethylene glycol, potassium chloride **OR** potassium alternative oral replacement **OR** potassium chloride, glucose, dextrose, glucagon (rDNA), dextrose, oxyCODONE-acetaminophen      Intake/Output Summary (Last 24 hours) at 12/6/2020 0906  Last data filed at 12/6/2020 0300  Gross per 24 hour   Intake 803 ml   Output 8100 ml   Net -7297 ml     Diet:  DIET GENERAL; Carb Control: 3 carb choices (45 gms)/meal; No Added Salt (3-4 GM);  Dental Soft; Daily Fluid Restriction: 1500 ml  Dietary Nutrition Supplements: Low Calorie High Protein Supplement    Exam:  /80   Pulse 99   Temp 98.3 °F (36.8 °C) (Oral)   Resp 20   Ht 6' 3\" (1.905 m)   Wt (!) 328 lb 3.2 oz (148.9 kg)   SpO2 91%   BMI 41.02 kg/m²     General appearance: No apparent distress, appears stated age and cooperative. HEENT: Pupils equal, round, and reactive to light. Conjunctivae/corneas clear. Neck: Supple, with full range of motion. No jugular venous distention. Trachea midline. Respiratory:  Normal respiratory effort. Clear to auscultation. No wheezing. No rhonchi. No rales. Cardiovascular: Regular rate and rhythm with normal S1/S2 without murmurs, rubs or gallops. Abdomen: Soft, non-tender, non-distended with normal bowel sounds. Musculoskeletal: passive and active ROM x 4 extremities. +1 pitting edema, tender to palpation. Skin: Skin color, texture, turgor normal.  No rashes or lesions. Neurologic:  Neurovascularly intact without any focal sensory/motor deficits. Grossly non-focal.  Psychiatric: Alert and oriented, thought content appropriate, normal insight  Capillary Refill: Brisk,< 3 seconds   Peripheral Pulses: +2 palpable, equal bilaterally     Labs:   Recent Labs     12/04/20  0911   HGB 8.2*   HCT 25.4*     Recent Labs     12/04/20  0525 12/05/20  0506 12/06/20  0325   * 133* 132*   K 3.9 4.4 4.6   CL 92* 91* 90*   CO2 29 32 31   BUN 41* 44* 46*   CREATININE 2.6* 2.4* 2.9*   CALCIUM 8.8 9.5 10.0     No results for input(s): AST, ALT, BILIDIR, BILITOT, ALKPHOS in the last 72 hours. No results for input(s): INR in the last 72 hours. No results for input(s): [place] Dec in the last 72 hours. Microbiology:      Urinalysis:      Lab Results   Component Value Date    NITRU NEGATIVE 11/28/2020    WBCUA 0-2 11/28/2020    BACTERIA FEW 11/28/2020    RBCUA 5-10 11/28/2020    BLOODU LARGE 11/28/2020    SPECGRAV 1.018 11/28/2020    GLUCOSEU NEGATIVE 10/31/2018     Radiology:  XR CHEST (2 VW)   Final Result   Small bilateral pleural effusions, left greater than right. **This report has been created using voice recognition software.  It may contain minor errors which are inherent in voice recognition technology. **      Final report electronically signed by Dr. Renu Alvarez MD on 12/5/2020 9:34 AM      VL DUP UPPER EXTREMITY VENOUS BILATERAL   Final Result   No sonographic evidence for upper extremity DVT. **This report has been created using voice recognition software. It may contain minor errors which are inherent in voice recognition technology. **      Final report electronically signed by Dr. Inez Aguilar on 12/2/2020 8:05 AM      VL DUP LOWER EXTREMITY VENOUS BILATERAL   Final Result   No sonographic evidence of lower extremity DVT. **This report has been created using voice recognition software. It may contain minor errors which are inherent in voice recognition technology. **      Final report electronically signed by Dr. Inez Aguilar on 11/30/2020 9:25 AM      US GALLBLADDER RUQ   Final Result   Hepatomegaly. Otherwise limited but unremarkable study. This document has been electronically signed by: Roxy Vallejo MD    on 11/30/2020 07:21 AM         CT CHEST WO CONTRAST   Final Result   Bibasilar atelectasis and trace pleural effusions. Superimposed right    lower lobe pneumonia is not excluded. Sequelae of prior granulomatous infection. Cholelithiasis. Cirrhosis. This document has been electronically signed by: Jose Sheikh MD on    11/30/2020 01:31 AM      All CT scans at this facility use dose modulation, iterative    reconstruction, and/or weight-based   dosing when appropriate to reduce radiation dose to as low as reasonably    achievable. XR CHEST PORTABLE   Final Result   Impression:   Patchy bilateral infiltrates, mildly improved. No pneumothorax. This document has been electronically signed by: Jose Sheikh MD on    11/29/2020 10:33 PM         XR CHEST PORTABLE   Final Result   1. Worsening bilateral lower lung atelectasis/infiltrate. 2. Pulmonary edema.    3. Possible small bilateral pleural effusions. 4. Mild stable cardiomegaly. **This report has been created using voice recognition software. It may contain minor errors which are inherent in voice recognition technology. **      Final report electronically signed by Dr. Judit Leung on 11/29/2020 6:43 PM      XR CHEST (2 VW)   Final Result   Mild pulmonary edema versus interstitial infiltrates with very small bilateral pleural effusions. **This report has been created using voice recognition software. It may contain minor errors which are inherent in voice recognition technology. **      Final report electronically signed by Dr. Shankar Rothman on 11/29/2020 2:27 PM      XR CHEST PORTABLE   Final Result   1. Engorged pulmonary vessels. 2. Prominent lung markings in the mid and lower lung zones. This can represent early pulmonary edema. Interstitial infiltrates are not excluded. **This report has been created using voice recognition software. It may contain minor errors which are inherent in voice recognition technology. **      Final report electronically signed by Dr. Shankar Rothman on 11/29/2020 8:39 AM      XR CHEST (2 VW)   Final Result   1. Mild prominence of the basilar interstitium which can represent some fibrotic changes versus early pulmonary edema. 2. New age indeterminate compression fracture of an upper thoracic vertebral body. **This report has been created using voice recognition software. It may contain minor errors which are inherent in voice recognition technology. **      Final report electronically signed by Dr. Shankar Rothman on 11/28/2020 8:47 AM      US RENAL COMPLETE   Final Result   1. Possible left renal cyst but otherwise unremarkable renal ultrasound. 2. Unremarkable urinary bladder.       Final report electronically signed by Dr. Judit Leung on 11/26/2020 5:12 PM        DVT prophylaxis: [] Lovenox

## 2020-12-06 NOTE — PROGRESS NOTES
[facility]   Nephrology progress note  12/6/2020, 1:00 PM      Pt Name:    Jonny Dickerson  MRN:     [id number]     YOB: 1965  Admit Date:    11/25/2020  9:04 PM  Primary Care Physician:  Debbrah Hamman, APRN - CNP   Room number  4K-20/020-A    Chief Complaint: Nephrology following for MATTIE/CKD    Subjective:  Patient seen and examined  No chest pain or shortness of breath  Input and output reviewed  Excellent Urine output  Taken off diuretic drip yesterday    Objective:  24HR INTAKE/OUTPUT:      Intake/Output Summary (Last 24 hours) at 12/6/2020 1300  Last data filed at 12/6/2020 1147  Gross per 24 hour   Intake 563 ml   Output 85719 ml   Net -65233 ml     I/O last 3 completed shifts: In: 5 [P.O.:610; I.V.:193]  Out: 8100 [Urine:8100]  I/O this shift:  In: -   Out: 6676 [Urine:2475]  Admission weight: (!) 348 lb (157.9 kg)  Wt Readings from Last 3 Encounters:   12/06/20 (!) 328 lb 3.2 oz (148.9 kg)   09/23/20 (!) 319 lb (144.7 kg)   09/10/20 (!) 321 lb (145.6 kg)     Body mass index is 41.02 kg/m².     Physical examination  VITALS:     Vitals:    12/06/20 0815 12/06/20 0839 12/06/20 1134 12/06/20 1137   BP:  136/80 120/60    Pulse:  99 92    Resp:  20 20    Temp:  98.3 °F (36.8 °C) 98 °F (36.7 °C)    TempSrc:  Oral Oral    SpO2: 91%  (!) 85% 94%   Weight:       Height:         General Appearance: appears comfortable, no distress  Mouth/Throat: Oral mucosa moist  Neck: No JVD  Lungs: Diminished, no use of accessory muscles  Heart:  S1, S2 heard  GI: soft, non-tender, no guarding      Lab Data  CBC:   Recent Labs     12/04/20  0911   HGB 8.2*   HCT 25.4*     BMP:  Recent Labs     12/04/20  0525 12/05/20  0506 12/06/20  0325   * 133* 132*   K 3.9 4.4 4.6   CL 92* 91* 90*   CO2 29 32 31   BUN 41* 44* 46*   CREATININE 2.6* 2.4* 2.9*   GLUCOSE 265* 299* 218*   CALCIUM 8.8 9.5 10.0   MG 1.7 1.8  --      Hepatic:   Recent Labs     12/06/20  0325   LABALBU 3.0* Meds:  Infusion:    dextrose       Meds:    insulin glargine  45 Units Subcutaneous Nightly    bumetanide  1 mg Oral BID    ipratropium-albuterol  1 ampule Inhalation 4x daily    enoxaparin  40 mg Subcutaneous BID    amoxicillin-clavulanate  1 tablet Oral 2 times per day    potassium bicarb-citric acid  40 mEq Oral BID    magnesium replacement protocol   Other RX Placeholder    oxymetazoline  2 spray Each Nostril TID    sodium chloride  2 spray Nasal BID    prednisoLONE acetate  1 drop Left Eye TID    insulin lispro  0-18 Units Subcutaneous TID WC    insulin lispro  0-9 Units Subcutaneous Nightly    multivitamin  1 tablet Oral Daily    sodium chloride flush  10 mL Intravenous 2 times per day    [Held by provider] aspirin  81 mg Oral Daily    DULoxetine  60 mg Oral Daily    folic acid  1 mg Oral Daily    latanoprost  1 drop Both Eyes Nightly    mirtazapine  15 mg Oral Nightly    rosuvastatin  20 mg Oral Daily    spironolactone  50 mg Oral Daily    tamsulosin  0.4 mg Oral Daily    thiamine  100 mg Oral Daily    amLODIPine  10 mg Oral Daily     Meds prn: lidocaine, sodium chloride flush, promethazine **OR** ondansetron, PHENobarbital **OR** PHENobarbital **OR** PHENobarbital IVPB, diphenhydrAMINE, albuterol sulfate HFA, acetaminophen **OR** acetaminophen, polyethylene glycol, potassium chloride **OR** potassium alternative oral replacement **OR** potassium chloride, glucose, dextrose, glucagon (rDNA), dextrose, oxyCODONE-acetaminophen       Impression and Plan:  1. MATTIE on CKD 3  Renal function overall improved  Baseline creatinine appears to be 1.3 as of September 2020  Peak 3.1, was 2.4 yesterday, 2.9 today due to diuretics  Taken off IV Lasix drip yesterday  On Bumex 2 mg twice a day, dose reduced earlier by hospitalist team to 1 mg twice a day due to MATTIE  Check labs in the morning  Excellent urine output    2. Volume overload/anasarca. Improved excellent urine output.   In negative fluid balance, weights improving  On diuretics    3. Acute diastolic congestive heart failure: Improving  4. Diabetes mellitus with hyperglycemia. 5.  Liver cirrhosis  6. Obesity  7.   Enterococcus bacteremia    D/W patient     Stephanie Worley MD  [facility]

## 2020-12-06 NOTE — PROGRESS NOTES
Progress note: Infectious diseases    Patient - Mina Nissen,  Age - 48 y.o.    - 1965      Room Number - 4K-20/020-A   MRN -  [id number]   Acct # - [de-identified]  Date of Admission -  2020  9:04 PM    SUBJECTIVE:   He has no new complaints  OBJECTIVE   VITALS    height is 6' 3\" (1.905 m) and weight is 328 lb 3.2 oz (148.9 kg) (abnormal). His oral temperature is 98 °F (36.7 °C). His blood pressure is 120/60 and his pulse is 92. His respiration is 20 and oxygen saturation is 94%. Wt Readings from Last 3 Encounters:   20 (!) 328 lb 3.2 oz (148.9 kg)   20 (!) 319 lb (144.7 kg)   09/10/20 (!) 321 lb (145.6 kg)       I/O (24 Hours)    Intake/Output Summary (Last 24 hours) at 2020 1309  Last data filed at 2020 1147  Gross per 24 hour   Intake 563 ml   Output 47683 ml   Net -77025 ml       General Appearance  Awake, alert, oriented. obese  HEENT - normocephalic, atraumatic, pale conjunctiva,  anicteric sclera,  Dry blood on the left nose  Neck - Supple, no mass  Lungs -  Bilateral  air entry, diminished breath sounds. gynecomastia   Cardiovascular - Heart sounds are normal.    Abdomen - soft, not distended, nontender,   Neurologic -oriented to person place and time. Skin - No bruising or bleeding.   Extremities -+ edema, spider angiomas     MEDICATIONS:      insulin glargine  45 Units Subcutaneous Nightly    bumetanide  1 mg Oral BID    ipratropium-albuterol  1 ampule Inhalation 4x daily    enoxaparin  40 mg Subcutaneous BID    amoxicillin-clavulanate  1 tablet Oral 2 times per day    potassium bicarb-citric acid  40 mEq Oral BID    magnesium replacement protocol   Other RX Placeholder    oxymetazoline  2 spray Each Nostril TID    sodium chloride  2 spray Nasal BID    prednisoLONE acetate  1 drop Left Eye TID    insulin lispro  0-18 Units Subcutaneous TID WC    insulin lispro 0-9 Units Subcutaneous Nightly    multivitamin  1 tablet Oral Daily    sodium chloride flush  10 mL Intravenous 2 times per day    [Held by provider] aspirin  81 mg Oral Daily    DULoxetine  60 mg Oral Daily    folic acid  1 mg Oral Daily    latanoprost  1 drop Both Eyes Nightly    mirtazapine  15 mg Oral Nightly    rosuvastatin  20 mg Oral Daily    spironolactone  50 mg Oral Daily    tamsulosin  0.4 mg Oral Daily    thiamine  100 mg Oral Daily    amLODIPine  10 mg Oral Daily      dextrose       lidocaine, sodium chloride flush, promethazine **OR** ondansetron, PHENobarbital **OR** PHENobarbital **OR** PHENobarbital IVPB, diphenhydrAMINE, albuterol sulfate HFA, acetaminophen **OR** acetaminophen, polyethylene glycol, potassium chloride **OR** potassium alternative oral replacement **OR** potassium chloride, glucose, dextrose, glucagon (rDNA), dextrose, oxyCODONE-acetaminophen      LABS:     CBC:   Recent Labs     12/04/20  0911   HGB 8.2*     BMP:    Recent Labs     12/04/20  0525 12/05/20  0506 12/06/20  0325   * 133* 132*   K 3.9 4.4 4.6   CL 92* 91* 90*   CO2 29 32 31   BUN 41* 44* 46*   CREATININE 2.6* 2.4* 2.9*   GLUCOSE 265* 299* 218*     Calcium:  Recent Labs     12/06/20  0325   CALCIUM 10.0     Ionized Calcium:No results for input(s): IONCA in the last 72 hours. Magnesium:  Recent Labs     12/05/20  0506   MG 1.8      Recent Labs     12/06/20  0615 12/06/20  0758 12/06/20  1139   POCGLU 225* 208* 337*     HgbA1C: No results for input(s): LABA1C in the last 72 hours. INR:   No results for input(s): INR in the last 72 hours. Hepatic:   Recent Labs     12/06/20  0325   LABALBU 3.0*      No results for input(s): CKTOTAL, CKMB, TROPONINI in the last 72 hours.         Problem list of patient:     Patient Active Problem List   Diagnosis Code    HCV antibody positive R76.8    Cirrhosis, alcoholic ([facility] [address] W08.67    Alcohol withdrawal seizure with complication ([address] Y23.677, R56.9    Alcohol withdrawal, uncomplicated (HCC) A48.523    Type 2 diabetes mellitus ([facility] [address] E11.9    Hyponatremia E87.1    Leukocytosis D72.829    Thrombocytopenia (HCC) D69.6    COPD (chronic obstructive pulmonary disease) (HCC) J44.9    HLD (hyperlipidemia) E78.5    HTN (hypertension) I10    Seizure ([facility] [address] R56.9    Recurrent falls R29.6    Fracture of multiple ribs of left side S22.42XA    Anemia D64.9    Alcohol abuse F10.10    Closed fracture of distal end of left fibula with routine healing S82.832D    Hyperammonemia (HCC) E72.20    Essential tremor G25.0    Alcohol intoxication delirium ([facility]) F10.121    MATTIE (acute kidney injury) ([facility] [address] N17.9    Renal failure N19    Epistaxis R04.0    Pneumonitis due to aspiration of blood (HCC) J69.8    Hepatic cirrhosis (HCC) K74.60    Hyperglycemia R73.9    Swelling E30.7    Metabolic acidosis X23.5    Hypokalemia X79.9    Diastolic CHF, acute (HCC) I50.31         ASSESSMENT/PLAN   -Enterococcus bacteremia: antibiotic chagned to oral augmentin.  He is tolerating well  -Acute kidney injury  -Epistaxis :resolved  -Liver cirrhosis  -History of alcohol abuse:    Discharge plan in 1-2 days    Luis Escobar MD, FACP 12/6/2020 1:09 PM

## 2020-12-06 NOTE — PROGRESS NOTES
Pt legs wrapped most of night. Requested to take ace wraps off at approximately 0300. Pt stated he would like them back on during the day. Was happy that his swelling had gone down significantly.

## 2020-12-07 LAB
ALBUMIN SERPL-MCNC: 3.1 G/DL (ref 3.5–5.1)
ALP BLD-CCNC: 78 U/L (ref 38–126)
ALT SERPL-CCNC: 27 U/L (ref 11–66)
ANION GAP SERPL CALCULATED.3IONS-SCNC: 12 MEQ/L (ref 8–16)
AST SERPL-CCNC: 48 U/L (ref 5–40)
BACTERIA: ABNORMAL
BASOPHILS # BLD: 0.9 %
BASOPHILS ABSOLUTE: 0.1 THOU/MM3 (ref 0–0.1)
BILIRUB SERPL-MCNC: 0.9 MG/DL (ref 0.3–1.2)
BILIRUBIN DIRECT: 0.4 MG/DL (ref 0–0.3)
BILIRUBIN URINE: NEGATIVE
BLOOD, URINE: ABNORMAL
BUN BLDV-MCNC: 48 MG/DL (ref 7–22)
CALCIUM SERPL-MCNC: 10.1 MG/DL (ref 8.5–10.5)
CASTS: ABNORMAL /LPF
CASTS: ABNORMAL /LPF
CHARACTER, URINE: CLEAR
CHLORIDE BLD-SCNC: 87 MEQ/L (ref 98–111)
CO2: 29 MEQ/L (ref 23–33)
COLOR: YELLOW
CREAT SERPL-MCNC: 2.8 MG/DL (ref 0.4–1.2)
CRYSTALS: ABNORMAL
EOSINOPHIL # BLD: 2.6 %
EOSINOPHILS ABSOLUTE: 0.2 THOU/MM3 (ref 0–0.4)
EPITHELIAL CELLS, UA: ABNORMAL /HPF
ERYTHROCYTE [DISTWIDTH] IN BLOOD BY AUTOMATED COUNT: 13.8 % (ref 11.5–14.5)
ERYTHROCYTE [DISTWIDTH] IN BLOOD BY AUTOMATED COUNT: 50.7 FL (ref 35–45)
GFR SERPL CREATININE-BSD FRML MDRD: 24 ML/MIN/1.73M2
GLUCOSE BLD-MCNC: 192 MG/DL (ref 70–108)
GLUCOSE BLD-MCNC: 196 MG/DL (ref 70–108)
GLUCOSE BLD-MCNC: 297 MG/DL (ref 70–108)
GLUCOSE BLD-MCNC: 306 MG/DL (ref 70–108)
GLUCOSE BLD-MCNC: 307 MG/DL (ref 70–108)
GLUCOSE, URINE: NEGATIVE MG/DL
HCT VFR BLD CALC: 28.7 % (ref 42–52)
HCT VFR BLD CALC: 29.6 % (ref 42–52)
HEMOGLOBIN: 9.4 GM/DL (ref 14–18)
HEMOGLOBIN: 9.5 GM/DL (ref 14–18)
IMMATURE GRANS (ABS): 0.03 THOU/MM3 (ref 0–0.07)
IMMATURE GRANULOCYTES: 0.4 %
INR BLD: 1.26 (ref 0.85–1.13)
KETONES, URINE: NEGATIVE
LEUKOCYTE ESTERASE, URINE: ABNORMAL
LYMPHOCYTES # BLD: 29.3 %
LYMPHOCYTES ABSOLUTE: 2.3 THOU/MM3 (ref 1–4.8)
MCH RBC QN AUTO: 33.1 PG (ref 26–33)
MCHC RBC AUTO-ENTMCNC: 32.8 GM/DL (ref 32.2–35.5)
MCV RBC AUTO: 101.1 FL (ref 80–94)
MISCELLANEOUS LAB TEST RESULT: ABNORMAL
MONOCYTES # BLD: 8.2 %
MONOCYTES ABSOLUTE: 0.6 THOU/MM3 (ref 0.4–1.3)
NITRITE, URINE: NEGATIVE
NUCLEATED RED BLOOD CELLS: 0 /100 WBC
PH UA: >= 9 (ref 5–9)
PLATELET # BLD: 99 THOU/MM3 (ref 130–400)
PMV BLD AUTO: 11.3 FL (ref 9.4–12.4)
POTASSIUM SERPL-SCNC: 4.5 MEQ/L (ref 3.5–5.2)
PROTEIN UA: 30 MG/DL
RBC # BLD: 2.84 MILL/MM3 (ref 4.7–6.1)
RBC URINE: > 100 /HPF
RENAL EPITHELIAL, UA: ABNORMAL
SEG NEUTROPHILS: 58.6 %
SEGMENTED NEUTROPHILS ABSOLUTE COUNT: 4.5 THOU/MM3 (ref 1.8–7.7)
SODIUM BLD-SCNC: 128 MEQ/L (ref 135–145)
SPECIFIC GRAVITY UA: 1.01 (ref 1–1.03)
TOTAL PROTEIN: 8.2 G/DL (ref 6.1–8)
UROBILINOGEN, URINE: 1 EU/DL (ref 0–1)
WBC # BLD: 7.7 THOU/MM3 (ref 4.8–10.8)
WBC UA: ABNORMAL /HPF
YEAST: ABNORMAL

## 2020-12-07 PROCEDURE — 97110 THERAPEUTIC EXERCISES: CPT

## 2020-12-07 PROCEDURE — 6370000000 HC RX 637 (ALT 250 FOR IP): Performed by: INTERNAL MEDICINE

## 2020-12-07 PROCEDURE — 80053 COMPREHEN METABOLIC PANEL: CPT

## 2020-12-07 PROCEDURE — 94760 N-INVAS EAR/PLS OXIMETRY 1: CPT

## 2020-12-07 PROCEDURE — 6370000000 HC RX 637 (ALT 250 FOR IP): Performed by: NURSE PRACTITIONER

## 2020-12-07 PROCEDURE — 99233 SBSQ HOSP IP/OBS HIGH 50: CPT | Performed by: INTERNAL MEDICINE

## 2020-12-07 PROCEDURE — 82948 REAGENT STRIP/BLOOD GLUCOSE: CPT

## 2020-12-07 PROCEDURE — 85610 PROTHROMBIN TIME: CPT

## 2020-12-07 PROCEDURE — 2500000003 HC RX 250 WO HCPCS: Performed by: PHYSICIAN ASSISTANT

## 2020-12-07 PROCEDURE — 99232 SBSQ HOSP IP/OBS MODERATE 35: CPT | Performed by: OTOLARYNGOLOGY

## 2020-12-07 PROCEDURE — 85025 COMPLETE CBC W/AUTO DIFF WBC: CPT

## 2020-12-07 PROCEDURE — 82248 BILIRUBIN DIRECT: CPT

## 2020-12-07 PROCEDURE — 6370000000 HC RX 637 (ALT 250 FOR IP): Performed by: STUDENT IN AN ORGANIZED HEALTH CARE EDUCATION/TRAINING PROGRAM

## 2020-12-07 PROCEDURE — 2580000003 HC RX 258: Performed by: PHYSICIAN ASSISTANT

## 2020-12-07 PROCEDURE — 30906 REPEAT CONTROL OF NOSEBLEED: CPT | Performed by: OTOLARYNGOLOGY

## 2020-12-07 PROCEDURE — APPSS30 APP SPLIT SHARED TIME 16-30 MINUTES: Performed by: NURSE PRACTITIONER

## 2020-12-07 PROCEDURE — 99232 SBSQ HOSP IP/OBS MODERATE 35: CPT | Performed by: INTERNAL MEDICINE

## 2020-12-07 PROCEDURE — 2580000003 HC RX 258: Performed by: OTOLARYNGOLOGY

## 2020-12-07 PROCEDURE — 81001 URINALYSIS AUTO W/SCOPE: CPT

## 2020-12-07 PROCEDURE — 36415 COLL VENOUS BLD VENIPUNCTURE: CPT

## 2020-12-07 PROCEDURE — 85018 HEMOGLOBIN: CPT

## 2020-12-07 PROCEDURE — 2060000000 HC ICU INTERMEDIATE R&B

## 2020-12-07 PROCEDURE — 6360000002 HC RX W HCPCS: Performed by: PHYSICIAN ASSISTANT

## 2020-12-07 PROCEDURE — 94640 AIRWAY INHALATION TREATMENT: CPT

## 2020-12-07 PROCEDURE — 85014 HEMATOCRIT: CPT

## 2020-12-07 PROCEDURE — 6370000000 HC RX 637 (ALT 250 FOR IP): Performed by: FAMILY MEDICINE

## 2020-12-07 RX ORDER — TRANEXAMIC ACID 100 MG/ML
300 INJECTION, SOLUTION INTRAVENOUS ONCE
Status: COMPLETED | OUTPATIENT
Start: 2020-12-07 | End: 2020-12-07

## 2020-12-07 RX ORDER — IPRATROPIUM BROMIDE AND ALBUTEROL SULFATE 2.5; .5 MG/3ML; MG/3ML
1 SOLUTION RESPIRATORY (INHALATION) EVERY 4 HOURS PRN
Status: DISCONTINUED | OUTPATIENT
Start: 2020-12-07 | End: 2020-12-08 | Stop reason: HOSPADM

## 2020-12-07 RX ORDER — LACTOBACILLUS RHAMNOSUS GG 10B CELL
1 CAPSULE ORAL 2 TIMES DAILY WITH MEALS
Status: DISCONTINUED | OUTPATIENT
Start: 2020-12-07 | End: 2020-12-08 | Stop reason: HOSPADM

## 2020-12-07 RX ADMIN — Medication 10 ML: at 11:58

## 2020-12-07 RX ADMIN — SPIRONOLACTONE 50 MG: 25 TABLET ORAL at 08:14

## 2020-12-07 RX ADMIN — PREDNISOLONE ACETATE 1 DROP: 10 SUSPENSION/ DROPS OPHTHALMIC at 21:00

## 2020-12-07 RX ADMIN — IPRATROPIUM BROMIDE AND ALBUTEROL SULFATE 1 AMPULE: .5; 3 SOLUTION RESPIRATORY (INHALATION) at 10:04

## 2020-12-07 RX ADMIN — TRANEXAMIC ACID 300 MG: 1 INJECTION, SOLUTION INTRAVENOUS at 10:30

## 2020-12-07 RX ADMIN — PHYTONADIONE 10 MG: 10 INJECTION, EMULSION INTRAMUSCULAR; INTRAVENOUS; SUBCUTANEOUS at 11:58

## 2020-12-07 RX ADMIN — OXYCODONE HYDROCHLORIDE AND ACETAMINOPHEN 1 TABLET: 7.5; 325 TABLET ORAL at 04:17

## 2020-12-07 RX ADMIN — SODIUM CHLORIDE, PRESERVATIVE FREE 10 ML: 5 INJECTION INTRAVENOUS at 21:00

## 2020-12-07 RX ADMIN — THERA TABS 1 TABLET: TAB at 08:14

## 2020-12-07 RX ADMIN — BUMETANIDE 1 MG: 1 TABLET ORAL at 08:14

## 2020-12-07 RX ADMIN — IPRATROPIUM BROMIDE AND ALBUTEROL SULFATE 1 AMPULE: .5; 3 SOLUTION RESPIRATORY (INHALATION) at 13:45

## 2020-12-07 RX ADMIN — INSULIN GLARGINE 45 UNITS: 100 INJECTION, SOLUTION SUBCUTANEOUS at 21:09

## 2020-12-07 RX ADMIN — AMOXICILLIN AND CLAVULANATE POTASSIUM 1 TABLET: 875; 125 TABLET, FILM COATED ORAL at 21:06

## 2020-12-07 RX ADMIN — AMLODIPINE BESYLATE 10 MG: 10 TABLET ORAL at 08:14

## 2020-12-07 RX ADMIN — OXYCODONE HYDROCHLORIDE AND ACETAMINOPHEN 1 TABLET: 7.5; 325 TABLET ORAL at 23:15

## 2020-12-07 RX ADMIN — DULOXETINE HYDROCHLORIDE 60 MG: 60 CAPSULE, DELAYED RELEASE ORAL at 08:15

## 2020-12-07 RX ADMIN — LATANOPROST 1 DROP: 50 SOLUTION OPHTHALMIC at 21:00

## 2020-12-07 RX ADMIN — MIRTAZAPINE 15 MG: 15 TABLET, FILM COATED ORAL at 21:06

## 2020-12-07 RX ADMIN — IPRATROPIUM BROMIDE AND ALBUTEROL SULFATE 1 AMPULE: .5; 3 SOLUTION RESPIRATORY (INHALATION) at 17:00

## 2020-12-07 RX ADMIN — ROSUVASTATIN CALCIUM 20 MG: 20 TABLET, FILM COATED ORAL at 08:15

## 2020-12-07 RX ADMIN — Medication 100 MG: at 08:14

## 2020-12-07 RX ADMIN — AMOXICILLIN AND CLAVULANATE POTASSIUM 1 TABLET: 875; 125 TABLET, FILM COATED ORAL at 08:14

## 2020-12-07 RX ADMIN — PREDNISOLONE ACETATE 1 DROP: 10 SUSPENSION/ DROPS OPHTHALMIC at 13:36

## 2020-12-07 RX ADMIN — TAMSULOSIN HYDROCHLORIDE 0.4 MG: 0.4 CAPSULE ORAL at 08:14

## 2020-12-07 RX ADMIN — POTASSIUM BICARBONATE 40 MEQ: 782 TABLET, EFFERVESCENT ORAL at 08:14

## 2020-12-07 RX ADMIN — OXYCODONE HYDROCHLORIDE AND ACETAMINOPHEN 1 TABLET: 7.5; 325 TABLET ORAL at 13:35

## 2020-12-07 RX ADMIN — PREDNISOLONE ACETATE 1 DROP: 10 SUSPENSION/ DROPS OPHTHALMIC at 08:13

## 2020-12-07 RX ADMIN — FOLIC ACID 1 MG: 1 TABLET ORAL at 08:15

## 2020-12-07 RX ADMIN — BUMETANIDE 1 MG: 1 TABLET ORAL at 21:06

## 2020-12-07 RX ADMIN — SODIUM CHLORIDE, PRESERVATIVE FREE 10 ML: 5 INJECTION INTRAVENOUS at 08:22

## 2020-12-07 RX ADMIN — Medication 1 CAPSULE: at 16:58

## 2020-12-07 ASSESSMENT — PAIN DESCRIPTION - ORIENTATION
ORIENTATION: POSTERIOR

## 2020-12-07 ASSESSMENT — PAIN DESCRIPTION - DIRECTION: RADIATING_TOWARDS: NON

## 2020-12-07 ASSESSMENT — PAIN SCALES - GENERAL
PAINLEVEL_OUTOF10: 8
PAINLEVEL_OUTOF10: 0
PAINLEVEL_OUTOF10: 8

## 2020-12-07 ASSESSMENT — PAIN DESCRIPTION - LOCATION
LOCATION: NECK

## 2020-12-07 ASSESSMENT — PAIN DESCRIPTION - PROGRESSION
CLINICAL_PROGRESSION: NOT CHANGED

## 2020-12-07 ASSESSMENT — PAIN DESCRIPTION - ONSET
ONSET: ON-GOING

## 2020-12-07 ASSESSMENT — PAIN DESCRIPTION - PAIN TYPE
TYPE: CHRONIC PAIN

## 2020-12-07 ASSESSMENT — PAIN DESCRIPTION - FREQUENCY
FREQUENCY: CONTINUOUS

## 2020-12-07 ASSESSMENT — PAIN DESCRIPTION - DESCRIPTORS
DESCRIPTORS: ACHING

## 2020-12-07 NOTE — PROGRESS NOTES
[facility] ICU STEPDOWN TELEMETRY 4K  Occupational Therapy  Daily Note  Time In: 6258  Time Out: 4680  Timed Code Treatment Minutes: 17 Minutes  Minutes: 17          Date: 2020  Patient Name: Rodger Macedo,   Gender: male      Room: [facility]020-A  MRN: [id number]  : 1965  (48 y.o.)  Referring Practitioner: Dr. Neyda Leong  Diagnosis: MATTIE  Additional Pertinent Hx: Antonio Lee was admitted under the hospitalist service on 2020 with chief complaint of acute kidney injury. Patient was transferred from [facility] after having been at their facility for 4 days. Patient's chief complaint was vomiting incontinence left shoulder pain and diarrhea. Pain patient was found to have sepsis secondary to pneumonia and acute kidney injury. Blood cultures were positive for gram-positive cocci-Enterococcus. With worsening in kidney function patient was transferred to [facility] for further care. 2020 Positive for nose bleed with drop in H/H 8.6-7.9-ENT Dr. Baron Quick was consulted. Patient required OR intervention. Patient was transferred to the ICU post procedure    Restrictions/Precautions:  Restrictions/Precautions: Fall Risk  Position Activity Restriction  Other position/activity restrictions: 6L of O2       SUBJECTIVE: RN okayed session. Pt was in bed upon arrival, pt refused OOB activity, stating he was not allowed to get out of bed. Pt stated he would do arm exercises. PAIN: 8/10: Neck Pain    COGNITION: Decreased Problem Solving and Decreased Safety Awareness    ADL:   No ADL's completed this session. Connie Reynolds BALANCE:  Not Tested    BED MOBILITY:  Not Tested    TRANSFERS:  Not Tested    FUNCTIONAL MOBILITY:  Not Tested     ADDITIONAL ACTIVITIES:  Pt complete BUE strengthening HEP while supine in bed to maintain and increase UB strength and endurance to increase overall indep with ADLs and IADLs. Pt tolerated x15 reps x1 set. Pt with increased SOB during exercises.      ASSESSMENT: Activity Tolerance:  Patient tolerance of  treatment: poor. Pt became very fatigued quickly. Discharge Recommendations: Subacute/Skilled Nursing Facility(if declines[facility]    Equipment Recommendations: Other: continue to assess  Plan: Times per week: 5x  Current Treatment Recommendations: Strengthening, Patient/Caregiver Education & Training, Balance Training, Functional Mobility Training, Endurance Training, Safety Education & Training, Self-Care / ADL    Patient Education  Patient Education: Home Exercise Program and Importance of Increasing Activity    Goals  Short term goals  Time Frame for Short term goals: by discharge  Short term goal 1: Pt to complete LB ADL tasks using LHAE with SBA to be indep wtih dressing tasks  Short term goal 2: Pt to complete toileting tasks and tranfser wit CGA and no vcs for safety  Short term goal 3: Pt to navigate to/from bathroom using RW with CGA and no increase in SOB or decrease in O2 sats during to complete ADL tasks  Short term goal 4: pt to increase standing tolerance > 3 min with CGA and 0-1 UE support to allow for completion of ADL tasks    Following session, patient left in safe position with all fall risk precautions in place.

## 2020-12-07 NOTE — PROGRESS NOTES
bleeding and nasopharynx clear. Returned to see patient at 4:15 PM.  Packing dry. Oropharynx clear. Will recheck in AM and if remains the same, can probably go home with packing in and return to ENT clinic for removal. Will order vitaminK as well.     Electronically signed by Roberto Preciado MD on 12/7/2020 at 4:20 PM

## 2020-12-07 NOTE — PROGRESS NOTES
tolerated  4. Acute hypoxic respiratory failure 2/2 #2, resolved: Postsurgical nasal cautery requiring respiratory support. - Was transferred to ICU on 11/29/20 after procedure and was put on BiPAP, then subsequently HFNC. He was transferred out of ICU on 11/30/20 after he was able to be weaned to HFNC. Has been tolerating 6L O2 via NC.  - 12/2: Started scheduled Duonebs q4hrs WA and acapella as patient as increasing expiratory wheezing and bibasilar rhonchi  - 12/3: Continues to have expiratory wheezing. Continue DuoNebs q4hrs and acapella as tolerated. Patient is currently on 6L NC, however, he's not keeping his cannula in. Will attempt trial of room air today and monitor. - 12/4: Still has expiratory wheezing, worse on left. Continue DuoNebs q4hrs and acapella as tolerated. - 12/6: Lungs CTA. On room air today. 5. Liver Cirrhosis 2/2 EtOH abuse, stable: Significant other states 1-2 weeks before hospitalization he drinks 6-8, 24 oz beers per day  - Phenobarbital PRN for alcohol withdrawal  6. Fluid overload, improving: Multifactorial due to MATTIE and liver cirrhosis. Questionable diastolic heart dysfunction, however not noted on recent echocardiogram.  - Concerned for upper extremity DVT overnight (12/1-12/2) - Bilateral venous ultrasound negative for DVT  - 12/3 - Total of 9.7L urinary output since yesterday. Pitting edema/anasarca mildly improving. Apply ACE wraps to BLE.  - 12/4 - Total of -7L output since yesterday. Pitting edema to BLE improving. Continue ACE wraps as tolerated. - 12/6-12/7 - Anasarca significantly improved. BLE still has +1 pitting edema. Weight has significantly improved from 370s to 313 lbs. - Plan as above  7. Epistaxis s/p left nasal cautery and packing, improving - POD #6  - 12/3 - Plan for FFP 1 unit at 12PM per ENT with nasal packing removal in afternoon  - 12/4 - Nasal packing removed yesterday. No further episodes of epistaxis.   - 12/6 - Had dried, dark red blood to left external nare. Dried blood in left internal nare. He denies hemoptysis. No active epistaxis. Will monitor. - 12/7 - Had episode of epistaxis this morning. ENT saw patient and nasal packing was put in. Plan for outpatient removal. Hgb has remained stable. 8. Enterococcus Bacteremia: No growth on recent blood cultures. - Unasyn d/c'd - Switched to PO Augmentin to be completed on 12/14  9. Moderate Aortic Stenosis: Valve area 1.2 sq cm, mean gradient 33 mmHg, peak velocity 3.5 cm/s  - Outpatient cardiology follow up  10. IDDM Type 2: HgbA1C 5.9% on 11/26/2020.   - High dose SSI ACHS  - Continue Lantus 45 units QHS  - 12/1 - Given Lantus 10 units once due to -300s in past few days. Patient has been receiving prednisone. Will reassess need to change insulin dosing once prednisone complete.  - 12/3 - Blood sugars steadily improving since finishing prednisone. - 12/6 - Lantus increased to 45 units QHS as sugars have been high. Patient states he takes Lantus 80 units and Metformin BID at home. Plan to stop Metformin upon discharge. Chief Complaint: MATTIE    Hospital Course:  Per HPI, \"49 y.o. male who presented to [address] [place] [address] with with above complain. Patient is a transfer from [place] where he had been admitted for about 4 days after complain of vomiting, incontinence, left shoulder pain and diarrhea. He was found to have sepsis secondary to pneumonia and during fluid resuscitation, it was noted that patient had MATTIE. He did develop fever during stay and blood cultures obtained during triage grew gram + cocci. Patient was started on Zosyn and vanco only for renal function to continue worsening from normal to 2.29. renal US was done showing no hydronephrosis or other kidney abnormalities. It was felt that patient needed nephrology consult to review worsening kidney status despite getting clinically better with abx. At bedside, patient reports feeling well and no longer confused.  He dose have chloride **OR** potassium alternative oral replacement **OR** potassium chloride, glucose, dextrose, glucagon (rDNA), dextrose, oxyCODONE-acetaminophen      Intake/Output Summary (Last 24 hours) at 12/7/2020 0856  Last data filed at 12/7/2020 5785  Gross per 24 hour   Intake 1497 ml   Output 6325 ml   Net -4828 ml     Diet:  Dietary Nutrition Supplements: Low Calorie High Protein Supplement  DIET GENERAL; Carb Control: 3 carb choices (45 gms)/meal; Dental Soft; Daily Fluid Restriction: 1500 ml    Exam:  /78   Pulse 89   Temp 98 °F (36.7 °C) (Oral)   Resp 17   Ht 6' 3\" (1.905 m)   Wt (!) 313 lb 3.2 oz (142.1 kg)   SpO2 92%   BMI 39.15 kg/m²     General appearance: No apparent distress, appears stated age and cooperative. HEENT: Pupils equal, round, and reactive to light. Conjunctivae/corneas clear. Bright red blood per left nare. Neck: Supple, with full range of motion. No jugular venous distention. Trachea midline. Respiratory:  Normal respiratory effort. Clear to auscultation. No wheezing. No rhonchi. No rales. Cardiovascular: Regular rate and rhythm with normal S1/S2 without murmurs, rubs or gallops. Abdomen: Soft, non-tender, non-distended with normal bowel sounds. Musculoskeletal: passive and active ROM x 4 extremities. +1 pitting edema, tender to palpation. Skin: Skin color, texture, turgor normal.  No rashes or lesions. Neurologic:  Neurovascularly intact without any focal sensory/motor deficits.  Grossly non-focal.  Psychiatric: Alert and oriented, thought content appropriate, normal insight  Capillary Refill: Brisk,< 3 seconds   Peripheral Pulses: +2 palpable, equal bilaterally     Labs:   Recent Labs     12/04/20  0911 12/07/20  0348   HGB 8.2* 9.5*   HCT 25.4* 29.6*     Recent Labs     12/05/20  0506 12/06/20  0325 12/07/20  0348   * 132* 128*   K 4.4 4.6 4.5   CL 91* 90* 87*   CO2 32 31 29   BUN 44* 46* 48*   CREATININE 2.4* 2.9* 2.8*   CALCIUM 9.5 10.0 10.1     No results for input(s): AST, ALT, BILIDIR, BILITOT, ALKPHOS in the last 72 hours. No results for input(s): INR in the last 72 hours. No results for input(s): Henok Guillermo in the last 72 hours. Microbiology:      Urinalysis:      Lab Results   Component Value Date    NITRU NEGATIVE 11/28/2020    WBCUA 0-2 11/28/2020    BACTERIA FEW 11/28/2020    RBCUA 5-10 11/28/2020    BLOODU LARGE 11/28/2020    SPECGRAV 1.018 11/28/2020    GLUCOSEU NEGATIVE 10/31/2018     Radiology:  XR CHEST (2 VW)   Final Result   Small bilateral pleural effusions, left greater than right. **This report has been created using voice recognition software. It may contain minor errors which are inherent in voice recognition technology. **      Final report electronically signed by Dr. Mohit Cuellar MD on 12/5/2020 9:34 AM      VL DUP UPPER EXTREMITY VENOUS BILATERAL   Final Result   No sonographic evidence for upper extremity DVT. **This report has been created using voice recognition software. It may contain minor errors which are inherent in voice recognition technology. **      Final report electronically signed by Dr. King Rothman on 12/2/2020 8:05 AM      VL DUP LOWER EXTREMITY VENOUS BILATERAL   Final Result   No sonographic evidence of lower extremity DVT. **This report has been created using voice recognition software. It may contain minor errors which are inherent in voice recognition technology. **      Final report electronically signed by Dr. King Rothman on 11/30/2020 9:25 AM      US GALLBLADDER RUQ   Final Result   Hepatomegaly. Otherwise limited but unremarkable study. This document has been electronically signed by: Carmen Mayers MD    on 11/30/2020 07:21 AM         CT CHEST WO CONTRAST   Final Result   Bibasilar atelectasis and trace pleural effusions. Superimposed right    lower lobe pneumonia is not excluded. Sequelae of prior granulomatous infection. Cholelithiasis. early pulmonary edema. 2. New age indeterminate compression fracture of an upper thoracic vertebral body. **This report has been created using voice recognition software. It may contain minor errors which are inherent in voice recognition technology. **      Final report electronically signed by Dr. Tate Doran on 11/28/2020 8:47 AM      US RENAL COMPLETE   Final Result   1. Possible left renal cyst but otherwise unremarkable renal ultrasound. 2. Unremarkable urinary bladder.       Final report electronically signed by Dr. Aileen Eden on 11/26/2020 5:12 PM        DVT prophylaxis: [x] Lovenox                                 [x] SCDs                                 [] SQ Heparin                                 [x] Encourage ambulation           [] Already on Anticoagulation     Code Status: Full Code    PT/OT Eval Status: Started    Tele:   [x] yes             [] no    Electronically signed by Jody Charles DO on 12/7/2020 at 8:56 AM

## 2020-12-07 NOTE — CARE COORDINATION
12/7/20, 5:41 PM EST  DISCHARGE ON GOING EVALUATION    [facility] day: 12  Location: [facility]20/020-A Reason for admit: MATTIE (acute kidney injury) ([facility] [address] [N17.9]  Renal failure [N19]   Barriers to Discharge: Epistaxis packing replaced. [place] out today, UA pending. PCP: MEGHAN Rodriguez CNP  Readmission Risk Score: 33%  Patient Goals/Plan/Treatment Preferences: Possible discharge tomorrow. Plan is for home with S.O. He previously denied discharge needs.

## 2020-12-07 NOTE — PROGRESS NOTES
Progress note: Infectious diseases    Patient - Shaggy Davis,  Age - 48 y.o.    - 1965      Room Number - 4K-20/020-A   MRN -  [id number]   Acct # - [de-identified]  Date of Admission -  2020  9:04 PM    SUBJECTIVE:   No new issues. OBJECTIVE   VITALS    height is 6' 3\" (1.905 m) and weight is 313 lb 3.2 oz (142.1 kg) (abnormal). His oral temperature is 97.6 °F (36.4 °C). His blood pressure is 130/73 and his pulse is 97. His respiration is 18 and oxygen saturation is 95%. Wt Readings from Last 3 Encounters:   20 (!) 313 lb 3.2 oz (142.1 kg)   20 (!) 319 lb (144.7 kg)   09/10/20 (!) 321 lb (145.6 kg)       I/O (24 Hours)    Intake/Output Summary (Last 24 hours) at 2020 1729  Last data filed at 2020 1535  Gross per 24 hour   Intake 2071.1 ml   Output 5025 ml   Net -2953.9 ml       General Appearance  Awake, alert, oriented. obese  HEENT - normocephalic, atraumatic, pale conjunctiva,  anicteric sclera,  Packed left nostril  Neck - Supple, no mass  Lungs -  Bilateral  air entry, diminished breath sounds. gynecomastia   Cardiovascular - Heart sounds are normal.    Abdomen - soft, not distended, nontender,   Neurologic -oriented to person place and time. Skin - No bruising or bleeding.   Extremities -+ edema, spider angiomas     MEDICATIONS:      [START ON 2020] potassium bicarb-citric acid  40 mEq Oral Daily    lactobacillus  1 capsule Oral BID WC    insulin glargine  45 Units Subcutaneous Nightly    bumetanide  1 mg Oral BID    enoxaparin  40 mg Subcutaneous BID    amoxicillin-clavulanate  1 tablet Oral 2 times per day    magnesium replacement protocol   Other RX Placeholder    sodium chloride  2 spray Nasal BID    prednisoLONE acetate  1 drop Left Eye TID    insulin lispro  0-18 Units Subcutaneous TID WC    insulin lispro  0-9 Units Subcutaneous Nightly    multivitamin  1 tablet Oral Daily    sodium chloride flush  10 mL Intravenous 2 times per day    [Held by provider] aspirin  81 mg Oral Daily    DULoxetine  60 mg Oral Daily    folic acid  1 mg Oral Daily    latanoprost  1 drop Both Eyes Nightly    mirtazapine  15 mg Oral Nightly    rosuvastatin  20 mg Oral Daily    spironolactone  50 mg Oral Daily    tamsulosin  0.4 mg Oral Daily    thiamine  100 mg Oral Daily    amLODIPine  10 mg Oral Daily      dextrose       ipratropium-albuterol, lidocaine, sodium chloride flush, promethazine **OR** ondansetron, PHENobarbital **OR** PHENobarbital **OR** PHENobarbital IVPB, diphenhydrAMINE, albuterol sulfate HFA, acetaminophen **OR** acetaminophen, polyethylene glycol, potassium chloride **OR** potassium alternative oral replacement **OR** potassium chloride, glucose, dextrose, glucagon (rDNA), dextrose, oxyCODONE-acetaminophen      LABS:     CBC:   Recent Labs     12/07/20  0348 12/07/20  1005   WBC  --  7.7   HGB 9.5* 9.4*   PLT  --  99*     BMP:    Recent Labs     12/05/20  0506 12/06/20  0325 12/07/20  0348   * 132* 128*   K 4.4 4.6 4.5   CL 91* 90* 87*   CO2 32 31 29   BUN 44* 46* 48*   CREATININE 2.4* 2.9* 2.8*   GLUCOSE 299* 218* 196*     Calcium:  Recent Labs     12/07/20  0348   CALCIUM 10.1     Ionized Calcium:No results for input(s): IONCA in the last 72 hours. Magnesium:  Recent Labs     12/05/20  0506   MG 1.8      Recent Labs     12/07/20  0647 12/07/20  1133 12/07/20  1657   POCGLU 192* 306* 307*     HgbA1C: No results for input(s): LABA1C in the last 72 hours. INR:   Recent Labs     12/07/20  1005   INR 1.26*     Hepatic:   Recent Labs     12/06/20  0325   LABALBU 3.0*      No results for input(s): CKTOTAL, CKMB, TROPONINI in the last 72 hours.         Problem list of patient:     Patient Active Problem List   Diagnosis Code    HCV antibody positive R76.8    Cirrhosis, alcoholic ([address] U26.14    Alcohol withdrawal seizure with complication ([facility] T87.470, R56.9    Alcohol withdrawal, uncomplicated (HCC) E07.494    Type 2 diabetes mellitus ([facility] E11.9    Hyponatremia E87.1    Leukocytosis D72.829    Thrombocytopenia (HCC) D69.6    COPD (chronic obstructive pulmonary disease) (HCC) J44.9    HLD (hyperlipidemia) E78.5    HTN (hypertension) I10    Seizure ([facility] R56.9    Recurrent falls R29.6    Fracture of multiple ribs of left side S22.42XA    Anemia D64.9    Alcohol abuse F10.10    Closed fracture of distal end of left fibula with routine healing S82.832D    Hyperammonemia (HCC) E72.20    Essential tremor G25.0    Alcohol intoxication delirium ([facility]) F10.121    MATTIE (acute kidney injury) ([facility] N17.9    Renal failure N19    Epistaxis R04.0    Pneumonitis due to aspiration of blood (HCC) J69.8    Hepatic cirrhosis (HCC) K74.60    Hyperglycemia R73.9    Swelling Y80.2    Metabolic acidosis X93.6    Hypokalemia T55.3    Diastolic CHF, acute (HCC) I50.31         ASSESSMENT/PLAN   -Enterococcus bacteremia: antibiotic chagned to oral augmentin.  He is tolerating well  -Acute kidney injury  -Epistaxis :new packing placed  -Liver cirrhosis  -History of alcohol abuse:     continue current treatment    Renan Duke MD, FACP 12/7/2020 5:29 PM

## 2020-12-07 NOTE — PROGRESS NOTES
Nephrology Progress Note    Patient Ruthann Calderon   MRN -  [id number]   Acct # - [de-identified]      - 1965    48 y.o. Admit Date: 2020  Hospital Day: 12  Location: --A  Date of evaluation -  2020    Subjective:   CC: vomiting,   Denies SOB on Room air   Epistaxis overnight  UOP 6350/24h  BP Range: Systolic (56MEO), RLX:655 , Min:109 , CRF:297      Diastolic (09RKB), GZC:39, Min:60, Max:82    Objective:   VITALS:  /78   Pulse 89   Temp 98 °F (36.7 °C) (Oral)   Resp 17   Ht 6' 3\" (1.905 m)   Wt (!) 313 lb 3.2 oz (142.1 kg)   SpO2 92%   BMI 39.15 kg/m²    Patient Vitals for the past 24 hrs:   BP Temp Temp src Pulse Resp SpO2 Weight   20 0751 136/78 98 °F (36.7 °C) Oral 89 17 92 % --   20 0330 109/72 98.2 °F (36.8 °C) Oral 99 18 90 % (!) 313 lb 3.2 oz (142.1 kg)   20 2328 127/82 98.2 °F (36.8 °C) Oral 101 18 91 % --   20 -- -- -- -- 18 91 % --   20 1929 118/67 97.7 °F (36.5 °C) Oral 91 14 92 % --   20 1505 135/75 97.6 °F (36.4 °C) Oral 97 18 93 % --   20 1137 -- -- -- -- -- 94 % --   20 1134 120/60 98 °F (36.7 °C) Oral 92 20 (!) 85 % --   20 0839 136/80 98.3 °F (36.8 °C) Oral 99 20 -- --   20 0815 -- -- -- -- -- 91 % --     1 L/min    Intake/Output Summary (Last 24 hours) at 2020 0814  Last data filed at 2020 0330  Gross per 24 hour   Intake 1487 ml   Output 6325 ml   Net -4838 ml       Admission weight: (!) 348 lb (157.9 kg)  Patient Vitals for the past 96 hrs (Last 3 readings):   Weight   20 0330 (!) 313 lb 3.2 oz (142.1 kg)   20 0616 (!) 328 lb 3.2 oz (148.9 kg)   20 0300 (!) 330 lb 8 oz (149.9 kg)     Body mass index is 39.15 kg/m². EXAM:  CONSTITUTIONAL:  No acute distress. Pleasant  HEENT:  Head is normocephalic, Extraocular movement intact. Neck is supple. Voice is clear. CARDIOVASCULAR:  S1, S2  regular rate and rhythm. RESPIRATORY: Clear to ausculation bilaterally.  Equal breath sounds. No wheezes. No shortness of breath noted at rest.  ABDOMEN: soft, non tender  NEUROLOGICAL: Patient is alert and oriented to person, place, and time. Recent and remote memory intact. Thought is coherant. SKIN: no rash, No significant bruises on exposed surfaces  MUSCULOSKELETAL: Movement is coordinated. Moves all extremities   EXTREMITIES: Distal lower extremity temp is warm, Trace body edema. PSYCHIATRIC: mood and affect appropriate.     Medications:   Med reviewed  Scheduled Meds:   insulin glargine  45 Units Subcutaneous Nightly    bumetanide  1 mg Oral BID    ipratropium-albuterol  1 ampule Inhalation 4x daily    enoxaparin  40 mg Subcutaneous BID    amoxicillin-clavulanate  1 tablet Oral 2 times per day    potassium bicarb-citric acid  40 mEq Oral BID    magnesium replacement protocol   Other RX Placeholder    sodium chloride  2 spray Nasal BID    prednisoLONE acetate  1 drop Left Eye TID    insulin lispro  0-18 Units Subcutaneous TID WC    insulin lispro  0-9 Units Subcutaneous Nightly    multivitamin  1 tablet Oral Daily    sodium chloride flush  10 mL Intravenous 2 times per day    [Held by provider] aspirin  81 mg Oral Daily    DULoxetine  60 mg Oral Daily    folic acid  1 mg Oral Daily    latanoprost  1 drop Both Eyes Nightly    mirtazapine  15 mg Oral Nightly    rosuvastatin  20 mg Oral Daily    spironolactone  50 mg Oral Daily    tamsulosin  0.4 mg Oral Daily    thiamine  100 mg Oral Daily    amLODIPine  10 mg Oral Daily     Continuous Infusions:   dextrose       PRN Meds lidocaine, sodium chloride flush, promethazine **OR** ondansetron, PHENobarbital **OR** PHENobarbital **OR** PHENobarbital IVPB, diphenhydrAMINE, albuterol sulfate HFA, acetaminophen **OR** acetaminophen, polyethylene glycol, potassium chloride **OR** potassium alternative oral replacement **OR** potassium chloride, glucose, dextrose, glucagon (rDNA), dextrose, oxyCODONE-acetaminophen   Labs: Labs reviewed  Recent Labs     12/04/20  0911 12/07/20  0348   HGB 8.2* 9.5*   HCT 25.4* 29.6*     Recent Labs     12/05/20  0506 12/06/20  0325 12/07/20  0348   * 132* 128*   K 4.4 4.6 4.5   CL 91* 90* 87*   CO2 32 31 29   BUN 44* 46* 48*   CREATININE 2.4* 2.9* 2.8*   LABGLOM 28* 23* 24*   GLUCOSE 299* 218* 196*   MG 1.8  --   --    CALCIUM 9.5 10.0 10.1   CAION 1.11*  --   --      Summary 11/27/2020   Technically difficult examination. Normal left ventricle size and systolic function. Ejection fraction was   estimated at 55 %. There were no regional left ventricular wall motion   abnormalities and wall thickness was within normal limits. The left atrium is Mildly dilated. There is moderate aortic stenosis with valve area of 1.2 sq cm. The maximum aortic valve gradient is 48 mmHg, the mean gradient is 33   mmHg, and the peak velocity is 3.5 cm/s. ASSESSMENT:  1. Acute Kidney Injury likely multifactorial including sepsis, borderline hypotension and diuretic therapy. BUN and Creatinine stable. Improved from high of 3.1 on 12/1. Ok to continue Bumex 1 mg 2x/d. Decrease K to once daily. Ok to continue Spironolactone. Continue  fluid restriction with ptCatrachito Palma for discharge from renal aspect. FU in 1-2 weeks with BMP  2. Chronic Kidney Disease Stage IIIB. creatinine 1.3 in Sept 2020  3. Acute on chronic HFpEF with resolving anasarca   4. Hyponatremia likely 2nd to diuretic, Dose was decreased 12/6  5. Hypoxic resp failure, s/p HFNC, resolving  6. Pneumonia  7. Sepsis with Enterococcus bacteremia  8. Liver cirrhosis with history of alcohol abuse   9. Diabetes Mellitus Type II with nephrosclerosis with long term use of insulin   10. Epistaxis s/p nasal endoscopy with cautery. Nasal pkg removed 12/3, Now Epistaxis overnight  11.  Morbid obesity      Principal Problem:    MATTIE (acute kidney injury) ([address]  Active Problems:    Type 2 diabetes mellitus (HCC)    COPD (chronic obstructive pulmonary disease) [facility])    Renal failure    Epistaxis    Pneumonitis due to aspiration of blood (HCC)    Hepatic cirrhosis (HCC)    Hyperglycemia    Swelling    Metabolic acidosis    Hypokalemia    Diastolic CHF, acute ([address]  Resolved Problems:    * No resolved hospital problems.  MEGHAN Ramírez - CNP 8:14 AM 12/7/2020

## 2020-12-08 VITALS
OXYGEN SATURATION: 92 % | HEART RATE: 97 BPM | RESPIRATION RATE: 22 BRPM | BODY MASS INDEX: 38.09 KG/M2 | TEMPERATURE: 98 F | HEIGHT: 75 IN | SYSTOLIC BLOOD PRESSURE: 124 MMHG | DIASTOLIC BLOOD PRESSURE: 73 MMHG | WEIGHT: 306.3 LBS

## 2020-12-08 LAB
ANION GAP SERPL CALCULATED.3IONS-SCNC: 11 MEQ/L (ref 8–16)
BUN BLDV-MCNC: 50 MG/DL (ref 7–22)
CALCIUM SERPL-MCNC: 10.1 MG/DL (ref 8.5–10.5)
CHLORIDE BLD-SCNC: 90 MEQ/L (ref 98–111)
CO2: 28 MEQ/L (ref 23–33)
CREAT SERPL-MCNC: 2.9 MG/DL (ref 0.4–1.2)
GFR SERPL CREATININE-BSD FRML MDRD: 23 ML/MIN/1.73M2
GLUCOSE BLD-MCNC: 208 MG/DL (ref 70–108)
GLUCOSE BLD-MCNC: 209 MG/DL (ref 70–108)
GLUCOSE BLD-MCNC: 316 MG/DL (ref 70–108)
INR BLD: 1.18 (ref 0.85–1.13)
POTASSIUM REFLEX MAGNESIUM: 4.6 MEQ/L (ref 3.5–5.2)
SODIUM BLD-SCNC: 129 MEQ/L (ref 135–145)

## 2020-12-08 PROCEDURE — 99231 SBSQ HOSP IP/OBS SF/LOW 25: CPT | Performed by: OTOLARYNGOLOGY

## 2020-12-08 PROCEDURE — 6370000000 HC RX 637 (ALT 250 FOR IP): Performed by: FAMILY MEDICINE

## 2020-12-08 PROCEDURE — 80048 BASIC METABOLIC PNL TOTAL CA: CPT

## 2020-12-08 PROCEDURE — 36415 COLL VENOUS BLD VENIPUNCTURE: CPT

## 2020-12-08 PROCEDURE — 85610 PROTHROMBIN TIME: CPT

## 2020-12-08 PROCEDURE — 97530 THERAPEUTIC ACTIVITIES: CPT

## 2020-12-08 PROCEDURE — 6370000000 HC RX 637 (ALT 250 FOR IP): Performed by: INTERNAL MEDICINE

## 2020-12-08 PROCEDURE — 6370000000 HC RX 637 (ALT 250 FOR IP): Performed by: NURSE PRACTITIONER

## 2020-12-08 PROCEDURE — 94761 N-INVAS EAR/PLS OXIMETRY MLT: CPT

## 2020-12-08 PROCEDURE — 99239 HOSP IP/OBS DSCHRG MGMT >30: CPT | Performed by: INTERNAL MEDICINE

## 2020-12-08 PROCEDURE — 99232 SBSQ HOSP IP/OBS MODERATE 35: CPT | Performed by: INTERNAL MEDICINE

## 2020-12-08 PROCEDURE — 97116 GAIT TRAINING THERAPY: CPT

## 2020-12-08 PROCEDURE — 6370000000 HC RX 637 (ALT 250 FOR IP): Performed by: STUDENT IN AN ORGANIZED HEALTH CARE EDUCATION/TRAINING PROGRAM

## 2020-12-08 PROCEDURE — 82948 REAGENT STRIP/BLOOD GLUCOSE: CPT

## 2020-12-08 PROCEDURE — 2580000003 HC RX 258: Performed by: OTOLARYNGOLOGY

## 2020-12-08 PROCEDURE — 97535 SELF CARE MNGMENT TRAINING: CPT

## 2020-12-08 RX ORDER — INSULIN LISPRO 100 [IU]/ML
5 INJECTION, SOLUTION INTRAVENOUS; SUBCUTANEOUS
Qty: 2 PEN | Refills: 1 | Status: ON HOLD | OUTPATIENT
Start: 2020-12-08 | End: 2021-02-19

## 2020-12-08 RX ORDER — BUMETANIDE 1 MG/1
1 TABLET ORAL 2 TIMES DAILY
Qty: 30 TABLET | Refills: 3 | Status: ON HOLD | OUTPATIENT
Start: 2020-12-08 | End: 2021-03-12 | Stop reason: HOSPADM

## 2020-12-08 RX ORDER — INSULIN GLARGINE 100 [IU]/ML
50 INJECTION, SOLUTION SUBCUTANEOUS NIGHTLY
Qty: 1 VIAL | Refills: 3 | Status: ON HOLD | OUTPATIENT
Start: 2020-12-08 | End: 2021-02-18

## 2020-12-08 RX ORDER — AMLODIPINE BESYLATE 10 MG/1
10 TABLET ORAL DAILY
Qty: 30 TABLET | Refills: 3 | Status: ON HOLD | OUTPATIENT
Start: 2020-12-09 | End: 2021-03-12 | Stop reason: HOSPADM

## 2020-12-08 RX ORDER — OXYCODONE AND ACETAMINOPHEN 7.5; 325 MG/1; MG/1
1 TABLET ORAL EVERY 8 HOURS PRN
Qty: 9 TABLET | Refills: 0 | Status: SHIPPED | OUTPATIENT
Start: 2020-12-08 | End: 2020-12-11

## 2020-12-08 RX ORDER — MIRTAZAPINE 15 MG/1
15 TABLET, FILM COATED ORAL NIGHTLY
Qty: 30 TABLET | Refills: 3 | Status: ON HOLD | OUTPATIENT
Start: 2020-12-08 | End: 2021-02-18

## 2020-12-08 RX ORDER — AMOXICILLIN AND CLAVULANATE POTASSIUM 875; 125 MG/1; MG/1
1 TABLET, FILM COATED ORAL EVERY 12 HOURS SCHEDULED
Qty: 12 TABLET | Refills: 0 | Status: SHIPPED | OUTPATIENT
Start: 2020-12-08 | End: 2020-12-14

## 2020-12-08 RX ORDER — PREDNISOLONE ACETATE 10 MG/ML
SUSPENSION/ DROPS OPHTHALMIC SEE ADMIN INSTRUCTIONS
COMMUNITY
End: 2021-02-15 | Stop reason: ALTCHOICE

## 2020-12-08 RX ORDER — IPRATROPIUM BROMIDE AND ALBUTEROL SULFATE 2.5; .5 MG/3ML; MG/3ML
3 SOLUTION RESPIRATORY (INHALATION) EVERY 4 HOURS PRN
Qty: 360 ML | Refills: 1 | Status: ON HOLD | OUTPATIENT
Start: 2020-12-08 | End: 2022-03-15 | Stop reason: HOSPADM

## 2020-12-08 RX ORDER — ALOGLIPTIN 12.5 MG/1
12.5 TABLET, FILM COATED ORAL DAILY
Qty: 60 TABLET | Refills: 1 | Status: CANCELLED | OUTPATIENT
Start: 2020-12-08

## 2020-12-08 RX ORDER — LACTOBACILLUS RHAMNOSUS GG 10B CELL
1 CAPSULE ORAL 2 TIMES DAILY WITH MEALS
Qty: 12 CAPSULE | Refills: 0 | Status: SHIPPED | OUTPATIENT
Start: 2020-12-08 | End: 2020-12-14

## 2020-12-08 RX ADMIN — THERA TABS 1 TABLET: TAB at 08:30

## 2020-12-08 RX ADMIN — BUMETANIDE 1 MG: 1 TABLET ORAL at 08:31

## 2020-12-08 RX ADMIN — AMLODIPINE BESYLATE 10 MG: 10 TABLET ORAL at 08:31

## 2020-12-08 RX ADMIN — SALINE NASAL SPRAY 2 SPRAY: 1.5 SOLUTION NASAL at 08:41

## 2020-12-08 RX ADMIN — DULOXETINE HYDROCHLORIDE 60 MG: 60 CAPSULE, DELAYED RELEASE ORAL at 08:31

## 2020-12-08 RX ADMIN — POTASSIUM BICARBONATE 40 MEQ: 782 TABLET, EFFERVESCENT ORAL at 08:32

## 2020-12-08 RX ADMIN — SODIUM CHLORIDE, PRESERVATIVE FREE 10 ML: 5 INJECTION INTRAVENOUS at 08:31

## 2020-12-08 RX ADMIN — AMOXICILLIN AND CLAVULANATE POTASSIUM 1 TABLET: 875; 125 TABLET, FILM COATED ORAL at 08:31

## 2020-12-08 RX ADMIN — ROSUVASTATIN CALCIUM 20 MG: 20 TABLET, FILM COATED ORAL at 08:31

## 2020-12-08 RX ADMIN — TAMSULOSIN HYDROCHLORIDE 0.4 MG: 0.4 CAPSULE ORAL at 08:31

## 2020-12-08 RX ADMIN — SPIRONOLACTONE 50 MG: 25 TABLET ORAL at 08:31

## 2020-12-08 RX ADMIN — OXYCODONE HYDROCHLORIDE AND ACETAMINOPHEN 1 TABLET: 7.5; 325 TABLET ORAL at 15:19

## 2020-12-08 RX ADMIN — Medication 100 MG: at 08:30

## 2020-12-08 RX ADMIN — Medication 1 CAPSULE: at 08:30

## 2020-12-08 RX ADMIN — PREDNISOLONE ACETATE 1 DROP: 10 SUSPENSION/ DROPS OPHTHALMIC at 08:41

## 2020-12-08 RX ADMIN — FOLIC ACID 1 MG: 1 TABLET ORAL at 08:31

## 2020-12-08 ASSESSMENT — PAIN DESCRIPTION - ONSET: ONSET: ON-GOING

## 2020-12-08 ASSESSMENT — PAIN DESCRIPTION - DESCRIPTORS: DESCRIPTORS: ACHING

## 2020-12-08 ASSESSMENT — PAIN DESCRIPTION - LOCATION: LOCATION: NECK

## 2020-12-08 ASSESSMENT — PAIN DESCRIPTION - ORIENTATION: ORIENTATION: POSTERIOR

## 2020-12-08 ASSESSMENT — PAIN SCALES - GENERAL
PAINLEVEL_OUTOF10: 8
PAINLEVEL_OUTOF10: 6

## 2020-12-08 ASSESSMENT — PAIN DESCRIPTION - FREQUENCY: FREQUENCY: CONTINUOUS

## 2020-12-08 ASSESSMENT — PAIN DESCRIPTION - PAIN TYPE: TYPE: CHRONIC PAIN

## 2020-12-08 NOTE — DISCHARGE INSTR - DIET
fat foods. Cutting back on your intake of high fat food can help reduce body weight and cholesterol levels. Reduce intake of fried food, rodas, sausage, luncheon meat, gravy, sour cream, cheese, egg yolks and margarine/butter. Limit your intake of alcohol. Drink alcohol only with permission of your doctor. Never drink alcohol on an empty stomach. Be more active. Regular exercise is an important part of your diabetes care as exercise can help lower your blood sugar levels. The type and amount of exercise that is right for you should be discussed with your doctor.

## 2020-12-08 NOTE — PROGRESS NOTES
Renal Progress Note    Assessment and Plan:    1. Acute kidney injury improved and stable. 2.  Hyponatremia likely from  Diuretic  3. Diabetes mellitus type 2  4. Anemia of chronic disease  5. Deconditioning  PLAN:  Labs reviewed  Serum creatinine stable  Medications reviewed  Will not change anything  Labs in the morning  We will continue to follow      Patient Active Problem List:     HCV antibody positive     Cirrhosis, alcoholic ([address]     Alcohol withdrawal seizure with complication ([address]     Alcohol withdrawal, uncomplicated (HCC)     Type 2 diabetes mellitus ([address]     Hyponatremia     Leukocytosis     Thrombocytopenia (HCC)     COPD (chronic obstructive pulmonary disease) (HCC)     HLD (hyperlipidemia)     HTN (hypertension)     Seizure (HCC)     Recurrent falls     Fracture of multiple ribs of left side     Anemia     Alcohol abuse     Closed fracture of distal end of left fibula with routine healing     Hyperammonemia (HCC)     Essential tremor     Alcohol intoxication delirium ([address]     MATTIE (acute kidney injury) ([address]     Renal failure     Epistaxis     Pneumonitis due to aspiration of blood (HCC)     Hepatic cirrhosis (HCC)     Hyperglycemia     Swelling     Metabolic acidosis     Hypokalemia     Diastolic CHF, acute (HCC)      Subjective:   Admit Date: 11/25/2020    Interval History:   Seeing for acute kidney injury  Very sleepy  Updated by the staff  No issues.   Good blood pressure  Urine output is good        Medications:   Scheduled Meds:   potassium bicarb-citric acid  40 mEq Oral Daily    lactobacillus  1 capsule Oral BID     insulin glargine  45 Units Subcutaneous Nightly    bumetanide  1 mg Oral BID    enoxaparin  40 mg Subcutaneous BID    amoxicillin-clavulanate  1 tablet Oral 2 times per day    magnesium replacement protocol   Other RX Placeholder    sodium chloride  2 spray Nasal BID    prednisoLONE acetate  1 drop Left Eye TID    insulin lispro  0-18 Units Subcutaneous TID   insulin lispro  0-9 Units Subcutaneous Nightly    multivitamin  1 tablet Oral Daily    sodium chloride flush  10 mL Intravenous 2 times per day    [Held by provider] aspirin  81 mg Oral Daily    DULoxetine  60 mg Oral Daily    folic acid  1 mg Oral Daily    latanoprost  1 drop Both Eyes Nightly    mirtazapine  15 mg Oral Nightly    rosuvastatin  20 mg Oral Daily    spironolactone  50 mg Oral Daily    tamsulosin  0.4 mg Oral Daily    thiamine  100 mg Oral Daily    amLODIPine  10 mg Oral Daily     Continuous Infusions:   dextrose         CBC:   Recent Labs     12/07/20  0348 12/07/20  1005   WBC  --  7.7   HGB 9.5* 9.4*   PLT  --  99*     CMP:    Recent Labs     12/06/20  0325 12/07/20  0348 12/08/20  0420   * 128* 129*   K 4.6 4.5 4.6   CL 90* 87* 90*   CO2 31 29 28   BUN 46* 48* 50*   CREATININE 2.9* 2.8* 2.9*   GLUCOSE 218* 196* 208*   CALCIUM 10.0 10.1 10.1   LABGLOM 23* 24* 23*     Troponin: No results for input(s): TROPONINI in the last 72 hours. BNP: No results for input(s): BNP in the last 72 hours. INR:   Recent Labs     12/07/20  1005 12/08/20  0420   INR 1.26* 1.18*     Lipids: No results for input(s): CHOL, LDLDIRECT, TRIG, HDL, AMYLASE, LIPASE in the last 72 hours. Liver:   Recent Labs     12/07/20  1005   AST 48*   ALT 27   ALKPHOS 78   PROT 8.2*   LABALBU 3.1*   BILITOT 0.9     Iron:  No results for input(s): IRONS, FERRITIN in the last 72 hours. Invalid input(s): LABIRONS  XR CHEST (2 VW)   Final Result   Small bilateral pleural effusions, left greater than right. **This report has been created using voice recognition software. It may contain minor errors which are inherent in voice recognition technology. **      Final report electronically signed by Dr. Vinicius Joel MD on 12/5/2020 9:34 AM      VL DUP UPPER EXTREMITY VENOUS BILATERAL   Final Result   No sonographic evidence for upper extremity DVT.                **This report has been created using voice recognition software. It may contain minor errors which are inherent in voice recognition technology. **      Final report electronically signed by Dr. Betty Wagner on 12/2/2020 8:05 AM      VL DUP LOWER EXTREMITY VENOUS BILATERAL   Final Result   No sonographic evidence of lower extremity DVT. **This report has been created using voice recognition software. It may contain minor errors which are inherent in voice recognition technology. **      Final report electronically signed by Dr. Betty Wagner on 11/30/2020 9:25 AM      US GALLBLADDER RUQ   Final Result   Hepatomegaly. Otherwise limited but unremarkable study. This document has been electronically signed by: Mannie Vallejo MD    on 11/30/2020 07:21 AM         CT CHEST WO CONTRAST   Final Result   Bibasilar atelectasis and trace pleural effusions. Superimposed right    lower lobe pneumonia is not excluded. Sequelae of prior granulomatous infection. Cholelithiasis. Cirrhosis. This document has been electronically signed by: Hamilton Moss MD on    11/30/2020 01:31 AM      All CT scans at this facility use dose modulation, iterative    reconstruction, and/or weight-based   dosing when appropriate to reduce radiation dose to as low as reasonably    achievable. XR CHEST PORTABLE   Final Result   Impression:   Patchy bilateral infiltrates, mildly improved. No pneumothorax. This document has been electronically signed by: Hamilton Moss MD on    11/29/2020 10:33 PM         XR CHEST PORTABLE   Final Result   1. Worsening bilateral lower lung atelectasis/infiltrate. 2. Pulmonary edema. 3. Possible small bilateral pleural effusions. 4. Mild stable cardiomegaly. **This report has been created using voice recognition software. It may contain minor errors which are inherent in voice recognition technology. **      Final report electronically signed by Dr. Jenelle Jones on 11/29/2020 6:43 PM      XR CHEST (2 VW)   Final Result   Mild pulmonary edema versus interstitial infiltrates with very small bilateral pleural effusions. **This report has been created using voice recognition software. It may contain minor errors which are inherent in voice recognition technology. **      Final report electronically signed by Dr. Riaz Powers on 11/29/2020 2:27 PM      XR CHEST PORTABLE   Final Result   1. Engorged pulmonary vessels. 2. Prominent lung markings in the mid and lower lung zones. This can represent early pulmonary edema. Interstitial infiltrates are not excluded. **This report has been created using voice recognition software. It may contain minor errors which are inherent in voice recognition technology. **      Final report electronically signed by Dr. Riaz Powers on 11/29/2020 8:39 AM      XR CHEST (2 VW)   Final Result   1. Mild prominence of the basilar interstitium which can represent some fibrotic changes versus early pulmonary edema. 2. New age indeterminate compression fracture of an upper thoracic vertebral body. **This report has been created using voice recognition software. It may contain minor errors which are inherent in voice recognition technology. **      Final report electronically signed by Dr. Riaz Powers on 11/28/2020 8:47 AM      US RENAL COMPLETE   Final Result   1. Possible left renal cyst but otherwise unremarkable renal ultrasound. 2. Unremarkable urinary bladder.       Final report electronically signed by Dr. Naina Mariano on 11/26/2020 5:12 PM            Objective:   Vitals: /73   Pulse 97   Temp 98 °F (36.7 °C) (Oral)   Resp 22   Ht 6' 3\" (1.905 m)   Wt (!) 306 lb 4.8 oz (138.9 kg)   SpO2 92%   BMI 38.28 kg/m²    Wt Readings from Last 3 Encounters:   12/08/20 (!) 306 lb 4.8 oz (138.9 kg)   09/23/20 (!) 319 lb (144.7 kg)   09/10/20 (!) 321 lb (145.6 kg)      24HR INTAKE/OUTPUT:      Intake/Output Summary (Last 24 hours) at 12/8/2020 1348  Last data filed at 12/8/2020 0846  Gross per 24 hour   Intake 260 ml   Output 2985 ml   Net -2725 ml       Constitutional: Comfortably asleep  Skin:normal with no rash or lesions. HEENT:Pupils are reactive . Throat is clear . Oral mucosa is moist   Neck:supple with no carotid bruit  Cardiovascular:  S1, S2 without murmur or rubs   Respiratory:  Clear to ausculation without wheezes, rhonchi or rales  Abdomen: Soft. Good bowel sounds.   Ext: Both lower extremities are wrapped  Musculoskeletal:Intact  Neuro: Deferred      Electronically signed by Toy Brownlee MD on 12/8/2020 at 1:48 PM

## 2020-12-08 NOTE — CARE COORDINATION
12/8/20, 2:06 PM EST  Patient is discharged today to home. Referral was made to [facility] to Colletta Hillier. SW updated the nurse  Patient goals/plan/ treatment preferences discussed by  and . Patient goals/plan/ treatment preferences reviewed with patient/ family. Patient/ family verbalize understanding of discharge plan and are in agreement with goal/plan/treatment preferences. Understanding was demonstrated using the teach back method. AVS provided by RN at time of discharge, which includes all necessary medical information pertaining to the patients current course of illness, treatment, post-discharge goals of care, and treatment preferences.     Services After Discharge  Services At/After Discharge: Nursing Services, OT, PT([facility])

## 2020-12-08 NOTE — CARE COORDINATION
Update: plans home with SO, new home oxygen and nebulizer (only DME provider is [facility] @ phone [phone]), per oxygen eval, 1L at rest/ATC, unable to ambulate noted  Electronically signed by Ian Velasco RN on 12/8/2020 at 1:02 PM    12/8/20, 1:16 PM EST    Patient goals/plan/ treatment preferences discussed by  and . Patient goals/plan/ treatment preferences reviewed with patient/ family. Patient/ family verbalize understanding of discharge plan and are in agreement with goal/plan/treatment preferences. Understanding was demonstrated using the teach back method. AVS provided by RN at time of discharge, which includes all necessary medical information pertaining to the patients current course of illness, treatment, post-discharge goals of care, and treatment preferences.             Update: oxygen order in [facility]; faxed to [facility]; collaborated with Steph Koroma, [address] [place] [address], KENZIE Purcell  Electronically signed by Ian Velasco RN on 12/8/2020 at 2:02 PM

## 2020-12-08 NOTE — PROGRESS NOTES
[address] [facility] PHYSICAL THERAPY  DAILY NOTE  [facility] ICU STEPDOWN TELEMETRY 4K - 4K-20/020-A    Time In: 0805  Time Out: 0830  Timed Code Treatment Minutes: 25 Minutes  Minutes: 25          Date: 2020  Patient Name: Mina Nissen,  Gender:  male        MRN: [id number]  : 1965  (48 y.o.)     Referring Practitioner: Christie Daniels MD  Diagnosis: MATTIE  Additional Pertinent Hx: 48 y.o. male who presented to [facility] with with above complain. Patient is a transfer from [place] where he had been admitted for about 4 days after complain of vomiting, incontinence, left shoulder pain and diarrhea. He was found to have sepsis secondary to pneumonia and during fluid resuscitation, it was noted that patient had MATTIE. He did develop fever during stay and blood cultures obtained during triage grew gram + cocci. Patient was started on Zosyn and vanco only for renal function to continue worsening from normal to 2.29. renal US was done showing no hydronephrosis or other kidney abnormalities. It was felt that patient needed nephrology consult to review worsening kidney status despite getting clinically better with abx. At bedside, patient reports feeling well and no longer confused. He dose have diabetes that is poorly controlled per records.      Prior Level of Function:  Lives With: Significant other  Type of Home: House  Home Layout: Two level, Bed/Bath upstairs, Able to Live on Main level with bedroom/bathroom  Home Access: Stairs to enter with rails  Home Equipment: Rolling walker, Reacher   Bathroom Shower/Tub: Tub/Shower unit, Shower chair with back  [employer]: Bedside commode(has been using BSC to be able ot stay on 1st floor.)  Bathroom Accessibility: Accessible    ADL Assistance: Independent  Ambulation Assistance: Independent  Transfer Assistance: Independent  Active : No  Additional Comments: Pt amb with RW, significant other home  to assist. Pt stating his significant other would assist with shower tranfsers but he was abl eto complete ther rest.    Restrictions/Precautions:  Restrictions/Precautions: Fall Risk  Position Activity Restriction  Other position/activity restrictions: 6L of O2 11/30     SUBJECTIVE: RN approved session. Pt resting in bed upon arrival, pleasant and agreeable to therapy. At end of session pt sitting up in chair for just a few minutes before requesting to get back into bed due to not sleeping well last night. PAIN: 0/10: \"Just dont feel well. \"    OBJECTIVE:  Bed Mobility:  Rolling to Left: Modified Independent   Supine to Sit: Modified Independent  Sit to Supine: Modified Independent     Transfers:  Sit to Stand: Stand By Assistance. From EOB and from recliner. Stand to Sit:Stand By Assistance    Ambulation:  Stand By Assistance  Distance: 250 feet with one standing rest break. Surface: Level Tile  Device:Rolling Walker  Gait Deviations: Forward Flexed Posture, Slow Joanna, Decreased Step Length Bilaterally and Decreased Gait Speed    Balance:  Dynamic Sitting Balance: Modified Independent    Exercise:  Patient was guided in 1 set(s) 10 reps of exercise to both lower extremities. Ankle pumps, Quad sets, Heelslides and Hip abduction/adduction. Exercises were completed for increased independence with functional mobility. Functional Outcome Measures: Completed  AM-PAC Inpatient Mobility Raw Score : 19  AM-PAC Inpatient T-Scale Score : 45.44    ASSESSMENT:  Assessment: Patient progressing toward established goals. and Pt tolerated fairly well. Limited by decreased strength and decreased endurance. Would benefit home health therapy at [facility]. Activity Tolerance:  Patient tolerance of  treatment: good.       Equipment Recommendations:Equipment Needed: No  Other: has RW  Discharge Recommendations:  Home with assist PRN    Plan: Times per week: 3-5x GM  Current Treatment Recommendations: Strengthening, Home Exercise Program, Endurance Training, Balance Training, Functional Mobility Training, Transfer Training, Gait Training, Stair training, Patient/Caregiver Education & Training    Patient Education  Patient Education: Plan of Care, [employer] Mobility, Transfers, Gait    Goals:  Patient goals : to go home by Monday  Short term goals  Time Frame for Short term goals: by discharge  Short term goal 1: Pt to transfer supine <--> sit S to enable pt to get in/out of bed. Short term goal 2: Pt to transfer sit <--> stand S for increased functional mobility. Short term goal 3: Pt to ambulate >250 feet with RW SBA for household ambulation. Long term goals  Time Frame for Long term goals : NA due to short length of stay. Following session, patient left in safe position with all fall risk precautions in place.

## 2020-12-08 NOTE — PROGRESS NOTES
A home oxygen evaluation has been completed. [x]Patient is an inpatient. It is expected that the patient will be discharged within the next 48 hours. Qualified provider to write order for home prescription if patient qualifies. Social service/care managers will arrange for home oxygen. If patient is active, arrange for Home Medical supplier to assess for Oxygen Conserving Device per pulse oximetry. []Patient is an outpatient. Results will be faxed to the ordering provider. Qualified provider to write order for home prescription if patient qualifies and arranges for home oxygen. Patientt was placed on room air for 60 minutes. SpO2 was 88 % on room air at rest. Patients SpO2 was below 89% and qualified for home oxygen. Oxygen was applied at 1 lpm via nasal cannula to maintain a SpO2 between 90-92% while at rest. Actual SpO2 was 91 %. Patient cannot ambulate for exercise flow rate. Patient was unable to walk far enough to evaluate o2 with walking, patient was dizzy    Note: For any SpO2 at 62% see policy and procedure for possible qualifications.

## 2020-12-08 NOTE — PROGRESS NOTES
Department of Otolaryngology  Progress Note    Chief Complaint:  Epistaxis, followup      This is a 48 y.o. male. Patient is alert and oriented to person, place and time. Patient appears well developed, well nourished. Mood is happy with normal affect. Not obviously hearing impaired. Head:   Normocephalic, atraumatic. No obvious masses or lesions noted. Ears:  External ears: Normal: no scars, lesions or masses. Mastoid process: No erythema noted. No tenderness to palpation. Nose:    Packing in place, left nostril. Dry  Mouth/Throat:  Lips, tongue and oral cavity: Normal. No masses or lesions noted               Oropharynx clear, no blood posteriorly    ASSESSMENT AND PLAN    Ok to go home. To be seen in office Thursday for removal of packing.      Electronically signed by Rachael Lesches, MD on 12/8/2020 at 8:36 AM

## 2020-12-08 NOTE — PROGRESS NOTES
Informed patient that Oxygen company was not going to be able to deliver O2 until late afternoon, patient does not want to wait, informed patient that will mean he will not have O2 for transport home.  Patient stated \"I will be fine\"

## 2020-12-08 NOTE — PROGRESS NOTES
[facility] ICU STEPDOWN TELEMETRY 4K  Occupational Therapy  Daily Note  Time:   Time In: 1500  Time Out: 1515  Timed Code Treatment Minutes: 15 Minutes  Minutes: 15          Date: 2020  Patient Name: Jana Cordero,   Gender: male      Room: [facility]020-A  MRN: [id number]  : 1965  (48 y.o.)  Referring Practitioner: Dr. Familia Howell  Diagnosis: MATTIE  Additional Pertinent Hx: Yadi Pond was admitted under the hospitalist service on 2020 with chief complaint of acute kidney injury. Patient was transferred from [facility] after having been at their facility for 4 days. Patient's chief complaint was vomiting incontinence left shoulder pain and diarrhea. Pain patient was found to have sepsis secondary to pneumonia and acute kidney injury. Blood cultures were positive for gram-positive cocci-Enterococcus. With worsening in kidney function patient was transferred to [facility] for further care. 2020 Positive for nose bleed with drop in H/H 8.6-7.9-ENT Dr. Olivia Cuello was consulted. Patient required OR intervention. Patient was transferred to the ICU post procedure    Restrictions/Precautions:  Restrictions/Precautions: Fall Risk  Position Activity Restriction  Other position/activity restrictions: 6L of O2      SUBJECTIVE: Reports that he is going home today, [facility] therapy has been arranged, he believes. PAIN: 8/10: neck    COGNITION: Decreased Insight, min unsteady on his feet, denied need for gait belt. Does report that he uses the walker at home. ADL:   Toileting: Stand By Assistance. to pull up his pants  Toilet Transfer: Stand By Assistance. Chandrika Logan BALANCE:  Standing Balance: Stand By Assistance. static standing    BED MOBILITY:  Supine to Sit: Stand By Assistance      TRANSFERS:  Sit to Stand:  Stand By Assistance. Stand to Sit: Stand By Assistance. FUNCTIONAL MOBILITY:  Assistive Device: Rolling Walker  Assist Level:  Contact Guard Assistance. Distance:  To and

## 2020-12-08 NOTE — DISCHARGE SUMMARY
Hospital Medicine Discharge Summary    Patient Identification:   Jana Cordero   : 1965  MRN: [id number]   Account: [de-identified]      Patient's PCP: MEGHAN Jimenez - CNP    Admit Date: 2020     Discharge Date: 2020    Admitting Physician: Guille Sainz MD     Discharge Physician: Simran Lara DO     Discharge Diagnoses:    1. Acute kidney injury, worsened: Likely multifactorial AIN vs. Sepsis vs. Hypovolemia vs. Hypoalbuminemia. Lasix drip stopped and switched to PO Bumex. Creatinine worsened overnight  - Continue management per nephrology: Lasix drip and spironolactone  - Albumin 25% IV 12.5g q6hrs x4 doses  - 12/3 - Creatinine very mildly improving and had significant urinary output since yesterday. Has received a total of 7 doses of Albumin IV 12.5g 25%. Will consult dietician for protein malnutrition. -  - Creatinine 2.6 this AM. Dietician added high protein supplements to meals. Output of about 7L since yesterday. Disposition possibly Monday if his MATTIE and edema continues to improve. -  - Creatinine 2.4. Lasix drip changed to Bumex PO. Unasyn discontinued, switched to PO Augmentin to be completed on   -  - Creatinine worsened to 2.9. Still has good urine output. Questionable due to high sugars or from change to PO Bumex. He had significant urinary output overnight. Will decrease Bumex to 1mg and reassess tomorrow. Follow BMP. -  - Creatinine has slight improvement to 2.8 with new regimen. Still has good urinary output. Will d/c Sevilla catheter today. Ordered U/A due to pink-tinge urine with questionable clots. Did have blood in a previous U/A. Hgb has been stable. Plan for possible discharge tomorrow if creatinine continues to improve. Will follow-up with Nephrology outpatient in 1-2 weeks with BMP. 2. Hyponatremia, mild: Has steadily been declining since . He is asymptomatic. May be due to overdiuresis. Removed salt restrictions from diet.  Will monitor. - 12/8 - Sodium slightly improved today. Recommended patient to continue low salt diet with fluid restrictions of 1.5-1.8L. 3. Severe Pneumonia 2/2 Aspiration, resolved: Lungs CTA. - Continue Augmentin to be completed on 12/14  4. Acute hypoxic respiratory failure 2/2 #3, resolved: Postsurgical nasal cautery requiring respiratory support. - Was transferred to ICU on 11/29/20 after procedure and was put on BiPAP, then subsequently HFNC. He was transferred out of ICU on 11/30/20 after he was able to be weaned to HFNC. Has been tolerating 6L O2 via NC.  - 12/2: Started scheduled Duonebs q4hrs WA and acapella as patient as increasing expiratory wheezing and bibasilar rhonchi  - 12/3: Continues to have expiratory wheezing. Continue DuoNebs q4hrs and acapella as tolerated. Patient is currently on 6L NC, however, he's not keeping his cannula in. Will attempt trial of room air today and monitor. - 12/4: Still has expiratory wheezing, worse on left. Continue DuoNebs q4hrs and acapella as tolerated. - 12/6: Lungs CTA. On room air today. - 12/8: Discharge home with 1L of O2 via NC at rest and activity. 5. Liver Cirrhosis 2/2 EtOH abuse, stable: Significant other states 1-2 weeks before hospitalization he drinks 6-8, 24 oz beers per day  - Counseled on alcohol cessation. 6. Fluid overload, improving: Multifactorial due to MATTIE and liver cirrhosis. Questionable diastolic heart dysfunction, however not noted on recent echocardiogram.  - Concerned for upper extremity DVT overnight (12/1-12/2) - Bilateral venous ultrasound negative for DVT  - 12/3 - Total of 9.7L urinary output since yesterday. Pitting edema/anasarca mildly improving. Apply ACE wraps to BLE.  - 12/4 - Total of -7L output since yesterday. Pitting edema to BLE improving. Continue ACE wraps as tolerated. - 12/6-12/7 - Anasarca significantly improved. BLE still has +1 pitting edema.  Weight has significantly improved from 370s to 313 lbs.  - 12/8 - Trace edema to BLE. Weight 306 lbs. Recommend fluid restriction of 1.5-1.8L with low salt diet. Continue Bumex 1mg and Spironolactone 50mg  7. Epistaxis s/p left nasal cautery and packing, improving - POD #6  - 12/3 - Plan for FFP 1 unit at 12PM per ENT with nasal packing removal in afternoon  - 12/4 - Nasal packing removed yesterday. No further episodes of epistaxis. - 12/6 - Had dried, dark red blood to left external nare. Dried blood in left internal nare. He denies hemoptysis. No active epistaxis. Will monitor. - 12/7 - Had episode of epistaxis this morning. ENT saw patient and nasal packing was put in. Plan for outpatient removal in 2-3 days. Hgb has remained stable. 8. Enterococcus Bacteremia: No growth on recent blood cultures. - Unasyn d/c'd - Switched to PO Augmentin to be completed on 12/14  9. Moderate Aortic Stenosis: Valve area 1.2 sq cm, mean gradient 33 mmHg, peak velocity 3.5 cm/s  - Outpatient cardiology follow up  10. IDDM Type 2: HgbA1C 5.9% on 11/26/2020.   - High dose SSI ACHS  - Continue Lantus 45 units QHS  - 12/1 - Given Lantus 10 units once due to -300s in past few days. Patient has been receiving prednisone. Will reassess need to change insulin dosing once prednisone complete.  - 12/3 - Blood sugars steadily improving since finishing prednisone. - 12/6 - Lantus increased to 45 units QHS as sugars have been high. Patient states he takes Lantus 80 units and Metformin BID at home. Plan to stop Metformin upon discharge. - 12/8 - Discharged with Lantus 50 units QHS, Humalog 5 units with meals, and sliding scale. Recommend PCP follow up for further diabetes management.      Rae Simons evaluated today and a DME order was entered for a nebulizer compressor in order to administer Albuterol due to the diagnosis of COPD.  The need for this equipment and treatment was discussed with the patient and he understands and is in agreement.       Patient was evaluated today for the diagnosis of CHF.  I entered a DME order for home oxygen because the diagnosis and testing requires the patient to have supplemental oxygen.  Condition will improve or be benefited by oxygen use.  The patient is  able to perform good mobility in a home setting and therefore does require the use of a portable oxygen system.  The need for this equipment was discussed with the patient and he understands and is in agreement. The patient was seen and examined on day of discharge and this discharge summary is in conjunction with any daily progress note from day of discharge. Hospital Course:   Per HPI, \"49 y. o. male who presented to [address] [place] [address] with with above complain. Patient is a transfer from [place] where he had been admitted for about 4 days after complain of vomiting, incontinence, left shoulder pain and diarrhea. He was found to have sepsis secondary to pneumonia and during fluid resuscitation, it was noted that patient had MATTIE. He did develop fever during stay and blood cultures obtained during triage grew gram + cocci. Patient was started on Zosyn and vanco only for renal function to continue worsening from normal to 2.29. renal US was done showing no hydronephrosis or other kidney abnormalities. It was felt that patient needed nephrology consult to review worsening kidney status despite getting clinically better with abx. At bedside, patient reports feeling well and no longer confused. He dose have diabetes that is poorly controlled per records\"    During his admission on 11/26/20 he was being treated with IVF for MATTIE likely from post-sepsis ATN. His antibiotics from [facility] was also continued for enterococcus bacteremia. He was found to have left sided pneumonia and was started on Zosyn and Vancomycin. On 11/27/20 his antibiotics were switched to Ampicillin due to sensitivity of his blood cultures and ID was consulted.  His creatinine worsened likely multifactorial due to Vanc/Zosyn, sepsis, and diabetic nephropathy. Nephrology was consulted and following patient. His pitting edema was gradually worsening as well. IVF was continued. 11/28/20 - nephrology saw patient and gave Lasix IV 40mg x2 doses due to his volume overload status. He was also given PO bicarbonate. 11/29/20 - Repeat blood cultures showed no growth. He developed epistaxis overnight. Left nare was packed with ENT consulted. He also had nasal endoscopy with cauterization with repacking. ASA held. Lasix 40mg IV ordered. Started on Unasyn q6hrs for empiric antibiotic therapy for blood aspiration. ENT performed therapeutic bronchoscopy with BAL, which was performed that afternoon. After the procedure he desaturated and was having difficulty maintaining oxygenation even with HFNC. He was started on BiPAP at 14/8 and was transferred to ICU.    11/30/20 - Weaned off BiPAP to HFNC and maintained SpO2 >90%. Continued on Unasyn and Prednisone for severe pneumonia. Started on Lasix drip. 12/1/20 - Transferred to stepdown unit as his respiratory status was improving and was weaned down to 6L O2 via NC. Lasix drip was continued with Unasyn. He also had significant volume overload. Albumin IV q6hrs x4 doses started. His oxygen has been progressively been able to be weaned off to room air. He also had an overnight pulse oximetry study for questionable sleep apnea, which showed some hypoxic episodes. Since starting Albumin IV, his fluid overload status gradually improved. He has lost a significant amount of weight from 370's to 306 lbs on discharge. His creatinine gradually improved, however, it worsened again after switching to oral Bumex 2mg. Worsening was likely due to Bumex and overdiuresis. His Bumex dose was decreased to 1mg. He was also gradually developing hypnoatremia likely from his Bumex and overdiuresis. His creatinine has been leveling off and will be discharged today in stable condition.  Home oxygen evaluation was done and it was recommended of 1L oxygen at rest and activity. For further course changes please refer to above assessment and plan. Exam:     Vitals:  Vitals:    12/08/20 0306 12/08/20 0620 12/08/20 0821 12/08/20 1139   BP: 125/67  (!) 151/82 124/73   Pulse: 93  95 97   Resp: 14  24 22   Temp: 98 °F (36.7 °C)  97.8 °F (36.6 °C) 98 °F (36.7 °C)   TempSrc: Oral  Oral Oral   SpO2: 90%  94% 92%   Weight:  (!) 306 lb 4.8 oz (138.9 kg)     Height:         Weight: Weight: (!) 306 lb 4.8 oz (138.9 kg)     24 hour intake/output:    Intake/Output Summary (Last 24 hours) at 12/8/2020 1704  Last data filed at 12/8/2020 1423  Gross per 24 hour   Intake 430 ml   Output 3085 ml   Net -2655 ml     General appearance:  No apparent distress, appears stated age and cooperative. HEENT:  Normal cephalic, atraumatic without obvious deformity. Pupils equal, round, and reactive to light. Extra ocular muscles intact. Conjunctivae/corneas clear. Neck: Supple, with full range of motion. No jugular venous distention. Trachea midline. Left nasal packing in place. Respiratory:  Normal respiratory effort. Clear to auscultation, bilaterally without Rales/Wheezes/Rhonchi. Cardiovascular:  Regular rate and rhythm with normal S1/S2 without murmurs, rubs or gallops. Abdomen: Soft, non-tender, non-distended with normal bowel sounds. Musculoskeletal:  No clubbing, cyanosis bilaterally. Trace edema to BLE. Full range of motion without deformity. Skin: Skin color, texture, turgor normal.  No rashes or lesions. Neurologic:  Neurovascularly intact without any focal sensory/motor deficits. Grossly non-focal.  Psychiatric:  Alert and oriented, thought content appropriate, normal insight  Capillary Refill: Brisk,< 3 seconds   Peripheral Pulses: +2 palpable, equal bilaterally     Labs:  For convenience and continuity at follow-up the following most recent labs are provided:    CBC:    Lab Results   Component Value Date    WBC 7.7 12/07/2020    HGB 9.4 12/07/2020    HCT 28.7 12/07/2020    PLT 99 12/07/2020     Renal:    Lab Results   Component Value Date     12/08/2020    K 4.6 12/08/2020    CL 90 12/08/2020    CO2 28 12/08/2020    BUN 50 12/08/2020    CREATININE 2.9 12/08/2020    CALCIUM 10.1 12/08/2020    PHOS 2.9 03/20/2019     Significant Diagnostic Studies    Radiology:   XR CHEST (2 VW)   Final Result   Small bilateral pleural effusions, left greater than right. **This report has been created using voice recognition software. It may contain minor errors which are inherent in voice recognition technology. **      Final report electronically signed by Dr. Aissatou Murphy MD on 12/5/2020 9:34 AM      VL DUP UPPER EXTREMITY VENOUS BILATERAL   Final Result   No sonographic evidence for upper extremity DVT. **This report has been created using voice recognition software. It may contain minor errors which are inherent in voice recognition technology. **      Final report electronically signed by Dr. Donny Albright on 12/2/2020 8:05 AM      VL DUP LOWER EXTREMITY VENOUS BILATERAL   Final Result   No sonographic evidence of lower extremity DVT. **This report has been created using voice recognition software. It may contain minor errors which are inherent in voice recognition technology. **      Final report electronically signed by Dr. Donny Albright on 11/30/2020 9:25 AM      US GALLBLADDER RUQ   Final Result   Hepatomegaly. Otherwise limited but unremarkable study. This document has been electronically signed by: Cipriano Leon MD    on 11/30/2020 07:21 AM         CT CHEST WO CONTRAST   Final Result   Bibasilar atelectasis and trace pleural effusions. Superimposed right    lower lobe pneumonia is not excluded. Sequelae of prior granulomatous infection. Cholelithiasis. Cirrhosis.       This document has been electronically signed by: Rikki Pereira MD on    11/30/2020 01:31 AM      All CT scans at this facility use dose modulation, iterative    reconstruction, and/or weight-based   dosing when appropriate to reduce radiation dose to as low as reasonably    achievable. XR CHEST PORTABLE   Final Result   Impression:   Patchy bilateral infiltrates, mildly improved. No pneumothorax. This document has been electronically signed by: Sarah Moctezuma MD on    11/29/2020 10:33 PM         XR CHEST PORTABLE   Final Result   1. Worsening bilateral lower lung atelectasis/infiltrate. 2. Pulmonary edema. 3. Possible small bilateral pleural effusions. 4. Mild stable cardiomegaly. **This report has been created using voice recognition software. It may contain minor errors which are inherent in voice recognition technology. **      Final report electronically signed by Dr. Freddie Perera on 11/29/2020 6:43 PM      XR CHEST (2 VW)   Final Result   Mild pulmonary edema versus interstitial infiltrates with very small bilateral pleural effusions. **This report has been created using voice recognition software. It may contain minor errors which are inherent in voice recognition technology. **      Final report electronically signed by Dr. Tk Villagomez on 11/29/2020 2:27 PM      XR CHEST PORTABLE   Final Result   1. Engorged pulmonary vessels. 2. Prominent lung markings in the mid and lower lung zones. This can represent early pulmonary edema. Interstitial infiltrates are not excluded. **This report has been created using voice recognition software. It may contain minor errors which are inherent in voice recognition technology. **      Final report electronically signed by Dr. Tk Villagomez on 11/29/2020 8:39 AM      XR CHEST (2 VW)   Final Result   1. Mild prominence of the basilar interstitium which can represent some fibrotic changes versus early pulmonary edema. 2. New age indeterminate compression fracture of an upper thoracic vertebral body.                **This report has been created using voice recognition software. It may contain minor errors which are inherent in voice recognition technology. **      Final report electronically signed by Dr. Cherelle Elliott on 11/28/2020 8:47 AM      US RENAL COMPLETE   Final Result   1. Possible left renal cyst but otherwise unremarkable renal ultrasound. 2. Unremarkable urinary bladder. Final report electronically signed by Dr. Sabiha Bridges on 11/26/2020 5:12 PM        Consults:     IP CONSULT TO NEPHROLOGY  IP CONSULT TO PHARMACY  IP CONSULT TO INFECTIOUS DISEASES  IP CONSULT TO OTOLARYNGOLOGY  IP CONSULT TO DIETITIAN  IP CONSULT TO SOCIAL WORK  IP CONSULT TO HOME CARE NEEDS  IP CONSULT TO ADDICTION SERVICES    Disposition: Home  Condition at Discharge: Stable    Code Status:  Full Code     Patient Instructions:    Discharge lab work: BMP in 1 week  Activity: activity as tolerated  Diet: Dietary Nutrition Supplements: Low Calorie High Protein Supplement  DIET GENERAL; Carb Control: 3 carb choices (45 gms)/meal; Dental Soft; Daily Fluid Restriction: 1500 ml      Follow-up visits:   Denise Ramos APRN - CNP  [address] [address]    Go on 12/17/2020  Your appointment time is at 2:40pm, Bring medications, photo ID, & insurance card, Please arrive 15 minutes prior to appointment time    [facility]  [phone]  [address]      As needed    Romero Crawford MD  [address] [address] [phone]    Go on 12/10/2020  Your appointment time is at 10:00am, Bring medications, photo ID, & insurance card, Please arrive 15 minutes prior to appointment time    Lopez Sheriff, APRN - [address] [id number] [phone]    Go on 12/17/2020  Your appointment time is at 12:00pm, Bring medications, photo ID, & insurance card, Please arrive 15 minutes prior to appointment time    Medical Service  Phone [phone]    As needed for nebulizer and oxygen 1L around the clock    [facility]  [address] [facility]  [address]  [phone]               Discharge Medications:      Nataly Butler"   Home Medication Instructions [id number]    Printed on:12/08/20 1704   Medication Information                      amLODIPine (NORVASC) 10 MG tablet  Take 1 tablet by mouth daily             amoxicillin-clavulanate (AUGMENTIN) 875-125 MG per tablet  Take 1 tablet by mouth every 12 hours for 6 days             B-D UF III MINI PEN NEEDLES 31G X 5 MM MISC  USE TO INJECT INSULINS PER INSTRUCTION UP TO 3 TIMES A DAY             Blood Glucose Monitoring Suppl (PRODIGY VOICE BLOOD GLUCOSE) w/Device KIT  by NOT APPLICABLE route             bumetanide (BUMEX) 1 MG tablet  Take 1 tablet by mouth 2 times daily             DULoxetine (CYMBALTA) 60 MG extended release capsule  Take 60 mg by mouth daily             Elastic Bandages & Supports (MEDICAL COMPRESSION STOCKINGS) MISC  Provide jopstock waist-high compression stockings. To be worn while ambulating to prevent syncope. Thigh high insufficient. Dx Code J63.1             folic acid (FOLVITE) 1 MG tablet  Take 1 mg by mouth daily             insulin glargine (LANTUS) 100 UNIT/ML injection vial  Inject 50 Units into the skin nightly             insulin lispro, 1 Unit Dial, (HUMALOG KWIKPEN) 100 UNIT/ML SOPN  Inject 5 Units into the skin 3 times daily (before meals) Add the following sliding scale insulin below depending on your blood sugar.   Glucose: Dose:               No Insulin  140-199           2 Units  200-249 4 Units  250-299 6 Units  300-349 8 Units  350-400 10 Units  Over 400  12 Units             Insulin Pen Needle (ULTICARE MICRO PEN NEEDLES) 31G X 6 MM MISC  by NOT APPLICABLE route             ipratropium-albuterol (DUONEB) 0.5-2.5 (3) MG/3ML SOLN nebulizer solution  Inhale 3 mLs into

## 2020-12-08 NOTE — FLOWSHEET NOTE
12/07/20 2025   Provider Notification   Reason for Communication Review case   Provider Name Dr. Tony Viera   Provider Notification Resident   Method of Communication Secure Message   Response Waiting for response   Notification Time 2026   RN informed Dr. Tony Viera in regards that patient had a bloody nose this morning for almost 2 hours. ENT came to pack left nare this morning. RN asked resident if he would like Lovenox to be held tonight.      2026: Lovenox held per Dr. Tony Viera

## 2020-12-10 ENCOUNTER — OFFICE VISIT (OUTPATIENT)
Dept: ENT CLINIC | Age: 55
End: 2020-12-10

## 2020-12-10 VITALS
SYSTOLIC BLOOD PRESSURE: 122 MMHG | BODY MASS INDEX: 39.53 KG/M2 | TEMPERATURE: 97.6 F | HEIGHT: 74 IN | WEIGHT: 308 LBS | RESPIRATION RATE: 14 BRPM | HEART RATE: 80 BPM | DIASTOLIC BLOOD PRESSURE: 70 MMHG

## 2020-12-10 PROCEDURE — 99024 POSTOP FOLLOW-UP VISIT: CPT | Performed by: OTOLARYNGOLOGY

## 2020-12-10 ASSESSMENT — ENCOUNTER SYMPTOMS
FACIAL SWELLING: 0
APNEA: 0
VOMITING: 0
VOICE CHANGE: 0
DIARRHEA: 0
RHINORRHEA: 0
COLOR CHANGE: 0
NAUSEA: 0
COUGH: 0
SHORTNESS OF BREATH: 0
ABDOMINAL PAIN: 0
CHOKING: 0
STRIDOR: 0
SINUS PRESSURE: 0
WHEEZING: 0
SORE THROAT: 0
CHEST TIGHTNESS: 0
TROUBLE SWALLOWING: 0

## 2020-12-10 NOTE — PROGRESS NOTES
Subjective:      Patient ID: Aye Anderson is a 48 y.o. male. Patient is here for 2 day follow up epistaxis, packing removal     HPI: In for removal of packing that had been inserted while in hospital Monday. Has had no further bleeding. Has not started taking his baby aspiring yet    Review of Systems   Constitutional: Negative for activity change, appetite change, chills, diaphoresis, fatigue, fever and unexpected weight change. HENT: Negative for congestion, dental problem, ear discharge, ear pain, facial swelling, hearing loss, mouth sores, nosebleeds, postnasal drip, rhinorrhea, sinus pressure, sneezing, sore throat, tinnitus, trouble swallowing and voice change. Eyes: Negative for visual disturbance. Respiratory: Negative for apnea, cough, choking, chest tightness, shortness of breath, wheezing and stridor. Cardiovascular: Negative for chest pain, palpitations and leg swelling. Gastrointestinal: Negative for abdominal pain, diarrhea, nausea and vomiting. Endocrine: Negative for cold intolerance, heat intolerance, polydipsia and polyuria. Genitourinary: Negative for difficulty urinating, discharge, dysuria, enuresis, hematuria, penile pain, penile swelling, scrotal swelling, testicular pain and urgency. Musculoskeletal: Negative for arthralgias, gait problem, neck pain and neck stiffness. Skin: Negative for color change, rash and wound. Allergic/Immunologic: Negative for environmental allergies, food allergies and immunocompromised state. Neurological: Negative for dizziness, seizures, syncope, facial asymmetry, speech difficulty, light-headedness and headaches. Hematological: Negative for adenopathy. Does not bruise/bleed easily. Psychiatric/Behavioral: Negative for confusion, sleep disturbance and suicidal ideas. The patient is not nervous/anxious.       Patient Active Problem List   Diagnosis    HCV antibody positive    Cirrhosis, alcoholic ([facility]    Alcohol withdrawal seizure with complication ([address]    Alcohol withdrawal, uncomplicated ([address]    Type 2 diabetes mellitus ([address]    Hyponatremia    Leukocytosis    Thrombocytopenia (HCC)    COPD (chronic obstructive pulmonary disease) (HCC)    HLD (hyperlipidemia)    HTN (hypertension)    Seizure (HCC)    Recurrent falls    Fracture of multiple ribs of left side    Anemia    Alcohol abuse    Closed fracture of distal end of left fibula with routine healing    Hyperammonemia (HCC)    Essential tremor    Alcohol intoxication delirium ([address]    MATTIE (acute kidney injury) ([address]    Renal failure    Epistaxis    Pneumonitis due to aspiration of blood (HCC)    Hepatic cirrhosis (HCC)    Hyperglycemia    Swelling    Metabolic acidosis    Hypokalemia    Diastolic CHF, acute (HCC)       Past Medical History:   Diagnosis Date    Asthma     Brain tumor ([address]     Cirrhosis ([address]     COPD (chronic obstructive pulmonary disease) ([address]     Diabetes mellitus ([address]     Falling     Hepatitis C     Hyperlipidemia     Hypertension     Seizures ([facility]         Past Surgical History:   Procedure Laterality Date    ENDOSCOPY, COLON, DIAGNOSTIC      FIBULA FRACTURE SURGERY Left 3/21/2019    LEFT FIBULAR SHAFT ORIF performed by Travis Daniels DPM at [address] Right     Steel pins in place    NASAL SINUS SURGERY Bilateral 11/29/2020    FLEXIBLE BRONCHOSCOPY WITH BRONCHOALVEOLAR LAVAGE WITH CULTURES.  NASAL ENDOSCOPY WITH POSSIBLE CAUTERY ADN NASAL PACKING performed by Naomi Jones MD at [facility]  01/2020    TOE AMPUTATION Right 9/23/2020    AMPUTATION DISTAL APSECT RIGHT HALLUX, REMOVAL OF HARDWARE RIGHT HALLUX performed by Kamaljit Guidry DPM at [address] [place] [address] ENDOSCOPY N/A 4/25/2019    EGD BIOPSY performed by Karissa Sanders MD at [facility]       Current Outpatient Medications   Medication Sig Dispense Refill    oxyCODONE-acetaminophen (PERCOCET) 7.5-325 MG per tablet Take 1 tablet by mouth every 8 hours as needed for Pain for up to 3 days. 9 tablet 0    ipratropium-albuterol (DUONEB) 0.5-2.5 (3) MG/3ML SOLN nebulizer solution Inhale 3 mLs into the lungs every 4 hours as needed for Shortness of Breath 360 mL 1    mirtazapine (REMERON) 15 MG tablet Take 1 tablet by mouth nightly 30 tablet 3    insulin glargine (LANTUS) 100 UNIT/ML injection vial Inject 50 Units into the skin nightly 1 vial 3    insulin lispro, 1 Unit Dial, (HUMALOG KWIKPEN) 100 UNIT/ML SOPN Inject 5 Units into the skin 3 times daily (before meals) Add the following sliding scale insulin below depending on your blood sugar. Glucose: Dose:               No Insulin  140-199           2 Units  200-249 4 Units  250-299 6 Units  300-349 8 Units  350-400 10 Units  Over 400  12 Units 2 pen 1    prednisoLONE acetate (PRED FORTE) 1 % ophthalmic suspension See Admin Instructions Place 1 drop into the left eye 4 times a day for 1 week, then 3 times a day for 1 week, then 2 times a day for 1 week, then daily for a week then stop.  bumetanide (BUMEX) 1 MG tablet Take 1 tablet by mouth 2 times daily 30 tablet 3    amLODIPine (NORVASC) 10 MG tablet Take 1 tablet by mouth daily 30 tablet 3    amoxicillin-clavulanate (AUGMENTIN) 875-125 MG per tablet Take 1 tablet by mouth every 12 hours for 6 days 12 tablet 0    lactobacillus (CULTURELLE) capsule Take 1 capsule by mouth 2 times daily (with meals) for 6 days 12 capsule 0    ondansetron (ZOFRAN) 4 MG tablet Take 4 mg by mouth every 8 hours as needed for Nausea or Vomiting      tamsulosin (FLOMAX) 0.4 MG capsule Take 0.4 mg by mouth daily      Blood Glucose Monitoring Suppl (PRODIGTSCA [facility] BLOOD GLUCOSE) w/Device KIT by NOT APPLICABLE route      Elastic Bandages & Supports (MEDICAL COMPRESSION STOCKINGS) MISC Provide jopstock waist-high compression stockings. To be worn while ambulating to prevent syncope. Thigh high insufficient.  Dx Code Pack years: 30.00     Types: Cigarettes    Smokeless tobacco: Never Used    Tobacco comment: Currently smoking electronic cigarettes   Substance and Sexual Activity    Alcohol use: Yes     Comment: 8   25oz cans nightly    Drug use: Yes     Types: Marijuana     Comment: medical marijuana 2 times weekly last used 3 weeks ago    Sexual activity: Not on file   Lifestyle    Physical activity     Days per week: Not on file     Minutes per session: Not on file    Stress: Not on file   Relationships    Social connections     Talks on phone: Not on file     Gets together: Not on file     Attends Islam service: Not on file     Active member of club or organization: Not on file     Attends meetings of clubs or organizations: Not on file     Relationship status: Not on file    Intimate partner violence     Fear of current or ex partner: Not on file     Emotionally abused: Not on file     Physically abused: Not on file     Forced sexual activity: Not on file   Other Topics Concern    Not on file   Social History Narrative    Not on file       Objective:   Physical Exam  This is a 48 y.o. male. Patient is in no respiratory distress, alert and oriented to time and place. Not nasal, not hoarse. Not obviously hearing impaired. Vitals:    12/10/20 1007   BP: 122/70   Site: Left Lower Arm   Position: Sitting   Pulse: 80   Resp: 14   Temp: 97.6 °F (36.4 °C)   TempSrc: Infrared   Weight: (!) 308 lb (139.7 kg)   Height: 6' 2\" (1.88 m)       Head is normocephalic. CINTIA, EOM full,  no diplopia, no nystagmus. Conjunctivae pink, no discharge    Pinnae are WNL  R External auditory canal clear and free of any pathology  L  External auditory canal clear and free of any pathology                                                  Nasal bones midline  Nasal pack removed. Nasal mucosa irritated from packing. No active bleeding  No facial redness, tenderness or swelling    No facial weakness.     Salivary glands not enlarged or palpable    Lips, tongue and oral cavity show tongue is midline, mobile. Oropharynx: clear  Uvula midline. Gag reflex present and symmetrical      Data:  All of the past medical history, past surgical history, family history, social history, allergies and current medications were reviewed. Assessment:      1. Epistaxis            Plan:      Reviewed and discussed: Patient advised to pinch nose if starts bleeding. He may apply Affrin at that time as well. If he does not have a nose bleed for 7 days, he can resume his aspirin.      RTC: bari Servin MD

## 2020-12-10 NOTE — PROGRESS NOTES
CLINICAL PHARMACY: DISCHARGE MED RECONCILIATION/REVIEW    [facility]) Select Patient?: No  Total # of Interventions Recommended: 0   -   Total # Interventions Accepted: 0  Intervention Severity:   - Level 1 Intervention Present?: No   - Level 2 #: 0   - Level 3 #: 0   Time Spent (min): 45    Additional Documentation:

## 2020-12-16 NOTE — PROGRESS NOTES
CLINICAL PHARMACY NOTE: MEDS TO [address] Select Patient?: No  Total # of Prescriptions Filled: 10   The following medications were delivered to the patient:  Ipratropium-Albuterol 0.5-2.5  Amoxicillin-pot clav 875-125  Leader Pen Needle 29g 12 mm  Mirtazapine 15 mg  Culturelle   Bumetanide 1 mg  Amlodipine 10 mg  Lantus 100unit/ml  Oxycodone/apap 7.5-325  Humalog Kwikpen   Total # of Interventions Completed: 2  Time Spent (min): 30    Additional Documentation:

## 2020-12-17 ENCOUNTER — OFFICE VISIT (OUTPATIENT)
Dept: NEPHROLOGY | Age: 55
End: 2020-12-17
Payer: MEDICAID

## 2020-12-17 VITALS
OXYGEN SATURATION: 97 % | HEART RATE: 99 BPM | WEIGHT: 311 LBS | DIASTOLIC BLOOD PRESSURE: 74 MMHG | BODY MASS INDEX: 39.93 KG/M2 | SYSTOLIC BLOOD PRESSURE: 126 MMHG | TEMPERATURE: 97.5 F

## 2020-12-17 PROBLEM — D68.9 COAGULOPATHY (HCC): Status: ACTIVE | Noted: 2019-06-05

## 2020-12-17 PROBLEM — E87.8 ELECTROLYTE DISORDER: Status: ACTIVE | Noted: 2019-06-05

## 2020-12-17 PROBLEM — R53.1 ACUTE LEFT-SIDED WEAKNESS: Status: ACTIVE | Noted: 2020-05-23

## 2020-12-17 PROBLEM — E66.9 OBESITY, CLASS II, BMI 35-39.9: Status: ACTIVE | Noted: 2020-05-24

## 2020-12-17 PROBLEM — S82.402A LEFT FIBULAR FRACTURE: Status: ACTIVE | Noted: 2019-06-05

## 2020-12-17 PROBLEM — E83.42 HYPOMAGNESEMIA: Status: ACTIVE | Noted: 2019-06-05
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
## 2020-12-17 PROBLEM — S12.9XXA CERVICAL SPINE FRACTURE (HCC): Status: ACTIVE | Noted: 2019-09-19

## 2020-12-17 PROCEDURE — G8484 FLU IMMUNIZE NO ADMIN: HCPCS | Performed by: NURSE PRACTITIONER

## 2020-12-17 PROCEDURE — 3017F COLORECTAL CA SCREEN DOC REV: CPT | Performed by: NURSE PRACTITIONER

## 2020-12-17 PROCEDURE — 99213 OFFICE O/P EST LOW 20 MIN: CPT | Performed by: NURSE PRACTITIONER

## 2020-12-17 PROCEDURE — G8417 CALC BMI ABV UP PARAM F/U: HCPCS | Performed by: NURSE PRACTITIONER

## 2020-12-17 PROCEDURE — G8427 DOCREV CUR MEDS BY ELIG CLIN: HCPCS | Performed by: NURSE PRACTITIONER

## 2020-12-17 PROCEDURE — 4004F PT TOBACCO SCREEN RCVD TLK: CPT | Performed by: NURSE PRACTITIONER

## 2020-12-17 PROCEDURE — 1111F DSCHRG MED/CURRENT MED MERGE: CPT | Performed by: NURSE PRACTITIONER

## 2020-12-17 NOTE — PROGRESS NOTES
Zoila Renee is a 48 y. o.oldmale came for follow up regarding his hospital admission with fluid overload and Acute Kidney Injury. He has underlying chronic kidney disease, Stage III, of several year duration. Kristinaa Dial most recent creatinine is 2.1 improved from high of 3.1 on 12/1/2020. His creatinine was 2.9 at discharge 12/8/2020. He was aggressively diuresed with significant wt loss and improvement in edema. During his admission, he developed epistaxis and required nasal pkg. States doing well. But has issues with fluid restriction. States he always drank a lot of water all his life. Is recovering alcoholic. State compliance with meds and denies adverse effects. The pt does not check home BP. Does not weigh self at home. Denies dysuria. denies edema. Denies new issues with Diabetes. The pt denies use of NSAIDs. Usual state of health. Denies nausea or diarrhea. Denies shortness of breath. Has a normal appetite. Pleasant    Recent Wts and BP Noted and Reviewed    Wt Readings from Last 3 Encounters:   12/17/20 (!) 311 lb (141.1 kg)   12/10/20 (!) 308 lb (139.7 kg)   12/08/20 (!) 306 lb 4.8 oz (138.9 kg)     BP Readings from Last 3 Encounters:   12/17/20 126/74   12/10/20 122/70   12/08/20 124/73     Body mass index is 39.93 kg/m².   Immunization History   Administered Date(s) Administered    Tdap (Boostrix, Adacel) 09/19/2019     PAST MEDICAL HISTORY:       Diagnosis Date    Asthma     Brain tumor ([address]     Cirrhosis ([address]     COPD (chronic obstructive pulmonary disease) (HCC)     Diabetes mellitus (HCC)     Falling     Hepatitis C     Hyperlipidemia     Hypertension     Seizures ([address]      SURGICAL HISTORY:    Past Surgical History:   Procedure Laterality Date    ENDOSCOPY, COLON, DIAGNOSTIC      FIBULA FRACTURE SURGERY Left 3/21/2019    LEFT FIBULAR SHAFT ORIF performed by Agata King DPM at [address] Right     Steel pins in place    NASAL SINUS SURGERY Bilateral 11/29/2020    FLEXIBLE BRONCHOSCOPY WITH BRONCHOALVEOLAR LAVAGE WITH CULTURES. NASAL ENDOSCOPY WITH POSSIBLE CAUTERY ADN NASAL PACKING performed by Yoel Tan MD at [facility]  01/2020    TOE AMPUTATION Right 9/23/2020    AMPUTATION DISTAL APSECT RIGHT HALLUX, REMOVAL OF HARDWARE RIGHT HALLUX performed by Tez Augustine DPM at [address] [place] [address] ENDOSCOPY N/A 4/25/2019    EGD BIOPSY performed by Migue Johnson MD at [facility]     ALLERGIES:    Allergies   Allergen Reactions    Adhesive Tape Rash     Bandaid     TOBACCO:   reports that he has been smoking cigarettes. He has a 30.00 pack-year smoking history. He has never used smokeless tobacco.     EXAM:  VITALS:  /74 (Site: Right Upper Arm, Position: Sitting, Cuff Size: Large Adult)   Pulse 99   Temp 97.5 °F (36.4 °C)   Wt (!) 311 lb (141.1 kg)   SpO2 97%   BMI 39.93 kg/m²    Body mass index is 39.93 kg/m². CONSTITUTIONAL:  No acute distress. Sitting comfortable in chair  HEENT:  Head is normocephalic, Extraocular movement intact,  Neck is supple  CARDIOVASCULAR:  No lower extremity edema  RESPIRATORY: No shortness of breath. ABDOMEN: soft  NEUROLOGICAL: Patient is alert and oriented to person, place, and time. Recent and remote memory is intact. SKIN: No rash on exposed surfaces  MUSCULOSKELETAL: Movement is coordinated.    EXTREMITIES: Distal lower extremity temp is warm,   PSYCHIATRIC: mood and affect appropriate    Current Outpatient Medications   Medication Sig Dispense Refill    ipratropium-albuterol (DUONEB) 0.5-2.5 (3) MG/3ML SOLN nebulizer solution Inhale 3 mLs into the lungs every 4 hours as needed for Shortness of Breath 360 mL 1    mirtazapine (REMERON) 15 MG tablet Take 1 tablet by mouth nightly 30 tablet 3    insulin glargine (LANTUS) 100 UNIT/ML injection vial Inject 50 Units into the skin nightly (Patient taking differently: Inject 70 Units into the skin nightly ) 1 vial 3    insulin lispro, 1 Unit Dial, (HUMALOG KWIKPEN) 100 UNIT/ML SOPN Inject 5 Units into the skin 3 times daily (before meals) Add the following sliding scale insulin below depending on your blood sugar. Glucose: Dose:               No Insulin  140-199           2 Units  200-249 4 Units  250-299 6 Units  300-349 8 Units  350-400 10 Units  Over 400  12 Units 2 pen 1    prednisoLONE acetate (PRED FORTE) 1 % ophthalmic suspension See Admin Instructions Place 1 drop into the left eye 4 times a day for 1 week, then 3 times a day for 1 week, then 2 times a day for 1 week, then daily for a week then stop.  bumetanide (BUMEX) 1 MG tablet Take 1 tablet by mouth 2 times daily 30 tablet 3    amLODIPine (NORVASC) 10 MG tablet Take 1 tablet by mouth daily 30 tablet 3    ondansetron (ZOFRAN) 4 MG tablet Take 4 mg by mouth every 8 hours as needed for Nausea or Vomiting      tamsulosin (FLOMAX) 0.4 MG capsule Take 0.4 mg by mouth daily      Blood Glucose Monitoring Suppl (PRODIGY VOICE BLOOD GLUCOSE) w/Device KIT by NOT APPLICABLE route      Elastic Bandages & Supports (MEDICAL COMPRESSION STOCKINGS) MISC Provide jopstock waist-high compression stockings. To be worn while ambulating to prevent syncope. Thigh high insufficient.  Dx Code I95.1      TRUETRACK TEST strip TEAST UP TO 4 TIMES A DAY AS INSTRUCTED  3    Insulin Pen Needle (ULTICARE MICRO PEN NEEDLES) 31G X 6 MM MISC by NOT APPLICABLE route      ULTICARE MINI PEN NEEDLES 31G X 6 MM MISC USE TO INJECT INSULINS PER INSTRUCTION UP TO 3 TIMES A DAY  11    B-D UF III MINI PEN NEEDLES 31G X 5 MM MISC USE TO INJECT INSULINS PER INSTRUCTION UP TO 3 TIMES A DAY  11    TRUEPLUS LANCETS 28G MISC TEST AS INSTRUCTED UP TO 4 TIMES A DAY  3    tiotropium (SPIRIVA) 18 MCG inhalation capsule Inhale 1 capsule into the lungs daily 30 capsule 3    latanoprost (XALATAN) 0.005 % ophthalmic solution Place 1 drop into both eyes nightly 1 Bottle 3    spironolactone (ALDACTONE) 50 MG tablet Take 1 tablet by mouth daily 30 tablet 3    thiamine 100 MG tablet Take 1 tablet by mouth daily      tadalafil (CIALIS) 20 MG tablet Take 20 mg by mouth as needed for Erectile Dysfunction      DULoxetine (CYMBALTA) 60 MG extended release capsule Take 60 mg by mouth daily      rosuvastatin (CRESTOR) 20 MG tablet Take 20 mg by mouth daily      folic acid (FOLVITE) 1 MG tablet Take 1 mg by mouth daily       No current facility-administered medications for this visit. Labs and Meds reviewed and discussed with pt    Diagnostic Data:  Lab Results   Component Value Date    CREATININE 2.1 12/15/2020    CREATININE 2.9 12/08/2020    CREATININE 2.8 12/07/2020    CREATININE 2.9 12/06/2020    CREATININE 2.4 12/05/2020    CREATININE 2.6 12/04/2020     Lab Results   Component Value Date     12/15/2020    K 4.7 12/15/2020    K 4.6 12/08/2020    CL 97 12/15/2020    CO2 22 12/15/2020    BUN 40 12/15/2020    CREATININE 2.1 12/15/2020    LABGLOM 33 12/15/2020    GLUCOSE 313 12/15/2020     Lab Results   Component Value Date    ANIONGAP 14.0 12/15/2020     Lab Results   Component Value Date    PHOS 2.9 03/20/2019    CALCIUM 9.6 12/15/2020     Lab Results   Component Value Date    WBC 6.1 12/15/2020    HGB 9.0 (L) 12/15/2020    HCT 27.7 (L) 12/15/2020    .2 (H) 12/15/2020    MCH 33.2 (H) 12/15/2020     (L) 12/15/2020     Lab Results   Component Value Date    FERRITIN 808 04/17/2019    YZEHCTOH82 1205 11/29/2020    FOLATE 15.2 11/29/2020     Lab Results   Component Value Date    LABA1C 5.9 11/26/2020           Assessment:    1. Acute Kidney Injury likely multifactorial including sepsis, borderline hypotension and diuretic therapy. Improved from high of 3.1 on 12/1. Ok to continue Bumex 1 mg 2x/d and Spironolactone. Continue  fluid restriction 5536-5261/24 hr. Daily wt. 2. Chronic Kidney Disease Stage IIIB. Likely at baseline  3. Acute on chronic HFpEF with resolved anasarca   4.

## 2020-12-29 ENCOUNTER — TELEPHONE (OUTPATIENT)
Dept: PHYSICAL MEDICINE AND REHAB | Age: 55
End: 2020-12-29

## 2020-12-29 NOTE — TELEPHONE ENCOUNTER
[address] [place] [address] radiology dept and asked them to send images of thoracic spine to [facility]. Loc Left information in a voice message.
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
## 2021-01-21 ENCOUNTER — HOSPITAL ENCOUNTER (OUTPATIENT)
Age: 56
Discharge: HOME | End: 2021-01-21
Payer: MEDICAID

## 2021-01-21 ENCOUNTER — HOSPITAL ENCOUNTER (OUTPATIENT)
Dept: GENERAL RADIOLOGY | Age: 56
Discharge: HOME | End: 2021-01-21
Payer: MEDICAID

## 2021-01-21 ENCOUNTER — OFFICE VISIT (OUTPATIENT)
Dept: PHYSICAL MEDICINE AND REHAB | Age: 56
End: 2021-01-21
Payer: MEDICAID

## 2021-01-21 VITALS
DIASTOLIC BLOOD PRESSURE: 78 MMHG | HEIGHT: 74 IN | SYSTOLIC BLOOD PRESSURE: 122 MMHG | WEIGHT: 311 LBS | BODY MASS INDEX: 39.91 KG/M2

## 2021-01-21 DIAGNOSIS — G62.9 NEUROPATHY: ICD-10-CM

## 2021-01-21 DIAGNOSIS — M54.50 CHRONIC BILATERAL LOW BACK PAIN WITHOUT SCIATICA: ICD-10-CM

## 2021-01-21 DIAGNOSIS — S22.040A CLOSED WEDGE COMPRESSION FRACTURE OF T4 VERTEBRA, INITIAL ENCOUNTER (HCC): Primary | ICD-10-CM

## 2021-01-21 DIAGNOSIS — M54.9 MID BACK PAIN: ICD-10-CM

## 2021-01-21 DIAGNOSIS — S22.040A CLOSED WEDGE COMPRESSION FRACTURE OF T4 VERTEBRA, INITIAL ENCOUNTER (HCC): ICD-10-CM

## 2021-01-21 DIAGNOSIS — G89.29 CHRONIC BILATERAL LOW BACK PAIN WITHOUT SCIATICA: ICD-10-CM

## 2021-01-21 DIAGNOSIS — G89.4 CHRONIC PAIN SYNDROME: ICD-10-CM

## 2021-01-21 DIAGNOSIS — Z98.1 HISTORY OF FUSION OF CERVICAL SPINE: ICD-10-CM

## 2021-01-21 DIAGNOSIS — N18.4 STAGE 4 CHRONIC KIDNEY DISEASE (HCC): ICD-10-CM

## 2021-01-21 DIAGNOSIS — M54.2 NECK PAIN: ICD-10-CM

## 2021-01-21 PROCEDURE — 72040 X-RAY EXAM NECK SPINE 2-3 VW: CPT

## 2021-01-21 PROCEDURE — 99205 OFFICE O/P NEW HI 60 MIN: CPT | Performed by: NURSE PRACTITIONER

## 2021-01-21 PROCEDURE — 4004F PT TOBACCO SCREEN RCVD TLK: CPT | Performed by: NURSE PRACTITIONER

## 2021-01-21 PROCEDURE — 3017F COLORECTAL CA SCREEN DOC REV: CPT | Performed by: NURSE PRACTITIONER

## 2021-01-21 PROCEDURE — G8484 FLU IMMUNIZE NO ADMIN: HCPCS | Performed by: NURSE PRACTITIONER

## 2021-01-21 PROCEDURE — G8427 DOCREV CUR MEDS BY ELIG CLIN: HCPCS | Performed by: NURSE PRACTITIONER

## 2021-01-21 PROCEDURE — 72100 X-RAY EXAM L-S SPINE 2/3 VWS: CPT

## 2021-01-21 PROCEDURE — G8417 CALC BMI ABV UP PARAM F/U: HCPCS | Performed by: NURSE PRACTITIONER

## 2021-01-21 PROCEDURE — 72072 X-RAY EXAM THORAC SPINE 3VWS: CPT

## 2021-01-21 ASSESSMENT — ENCOUNTER SYMPTOMS
WHEEZING: 1
COUGH: 1
CONSTIPATION: 1
SHORTNESS OF BREATH: 1
BACK PAIN: 1

## 2021-01-21 NOTE — PROGRESS NOTES
[address] [facility] [address]  Dept: [phone]  Dept Fax: [phone]: [phone]    Visit Date: 1/21/2021    Jhonathan Francisco is a 48 y.o. male who is referred for pain management evaluation and treatment per Dr. Ryan Kocher. CAGE and CAGE-[facility] Questions   1. In the last three months, have you felt you should cut down or stop drinking or using drugs? Yes []        No [x]     2. In the last three months, has anyone annoyed you or gotten on your nerves by telling you to cut down or stop drinking or using drugs? Yes []        No [x]     3. In the last three months, have you felt guilty or bad about how much you drink or use drugs? Yes []        No [x]     4. In the last three months, have you been waking up wanting to have an alcoholic drink or use drugs? Yes []        No [x]        Opioid Risk Tool:  Clinician Form       1. Family History of Substance Abuse: Female Male    Alcohol   []1   []3    Illegal drugs   []2   []3    Prescription drugs     []4   []4   2. Personal History of Substance Abuse:          Alcohol   []3   [x]3    Illegal drugs   []4   []4    Prescription drugs     []5   [x]5   3. Age (wil box if between 12 and 39):     []1   []1   4. History of Preadolescent Sexual Abuse:     []3   []0   5. Psychological Disease:      Attention deficit disorder, obsessive-compulsive disorder, bipolar, schizophrenia   []2   []2      Depression     []1   []1    Scoring Totals       Total Score  Low Risk  Moderate Risk  High Risk   Risk Category   0 - 3   4 - 7   8 or Above      Patient states symptoms interfere with:  A.  General Activity:  yes   B. Mood: yes    C. Walking Ability:   yes   D. Normal Work (Includes both work outside the home and housework):   yes    E.  Relations with Other People:  yes   F. Sleep:   yes   G.  Enjoyment of Life:  yes       HPI: The patient  has a past surgical history that includes Foot surgery (Right); Fibula Fracture Surgery (Left, 3/21/2019); Endoscopy, colon, diagnostic; Upper gastrointestinal endoscopy (N/A, 4/25/2019); Neck surgery (01/2020); Toe amputation (Right, 9/23/2020); and Nasal sinus surgery (Bilateral, 11/29/2020). Family History  This patient's family history includes Heart Attack in his father. Social History  Sanjana Valle  reports that he has been smoking cigarettes. He has a 30.00 pack-year smoking history. He has never used smokeless tobacco. He reports current alcohol use. He reports current drug use. Drug: Marijuana. Medications    Current Outpatient Medications:     ipratropium-albuterol (DUONEB) 0.5-2.5 (3) MG/3ML SOLN nebulizer solution, Inhale 3 mLs into the lungs every 4 hours as needed for Shortness of Breath, Disp: 360 mL, Rfl: 1    mirtazapine (REMERON) 15 MG tablet, Take 1 tablet by mouth nightly, Disp: 30 tablet, Rfl: 3    insulin glargine (LANTUS) 100 UNIT/ML injection vial, Inject 50 Units into the skin nightly (Patient taking differently: Inject 70 Units into the skin nightly ), Disp: 1 vial, Rfl: 3    insulin lispro, 1 Unit Dial, (HUMALOG KWIKPEN) 100 UNIT/ML SOPN, Inject 5 Units into the skin 3 times daily (before meals) Add the following sliding scale insulin below depending on your blood sugar.  Glucose: Dose:              No Insulin 140-199           2 Units 200-249 4 Units 250-299 6 Units 300-349 8 Units 350-400 10 Units Over 400  12 Units, Disp: 2 pen, Rfl: 1    prednisoLONE acetate (PRED FORTE) 1 % ophthalmic suspension, See Admin Instructions Place 1 drop into the left eye 4 times a day for 1 week, then 3 times a day for 1 week, then 2 times a day for 1 week, then daily for a week then stop., Disp: , Rfl:     bumetanide (BUMEX) 1 MG tablet, Take 1 tablet by mouth 2 times daily, Disp: 30 tablet, Rfl: 3   amLODIPine (NORVASC) 10 MG tablet, Take 1 tablet by mouth daily, Disp: 30 tablet, Rfl: 3    ondansetron (ZOFRAN) 4 MG tablet, Take 4 mg by mouth every 8 hours as needed for Nausea or Vomiting, Disp: , Rfl:     tamsulosin (FLOMAX) 0.4 MG capsule, Take 0.4 mg by mouth daily, Disp: , Rfl:     Blood Glucose Monitoring Suppl (PRODIGY VOICE BLOOD GLUCOSE) w/Device KIT, by NOT APPLICABLE route, Disp: , Rfl:     Elastic Bandages & Supports (MEDICAL COMPRESSION STOCKINGS) MISC, Provide jopstock waist-high compression stockings. To be worn while ambulating to prevent syncope. Thigh high insufficient.  Dx Code I95.1, Disp: , Rfl:     TRUETRACK TEST strip, TEAST UP TO 4 TIMES A DAY AS INSTRUCTED, Disp: , Rfl: 3    Insulin Pen Needle (ULTICARE MICRO PEN NEEDLES) 31G X 6 MM MISC, by NOT APPLICABLE route, Disp: , Rfl:     ULTICARE MINI PEN NEEDLES 31G X 6 MM MISC, USE TO INJECT INSULINS PER INSTRUCTION UP TO 3 TIMES A DAY, Disp: , Rfl: 11    B-D UF III MINI PEN NEEDLES 31G X 5 MM MISC, USE TO INJECT INSULINS PER INSTRUCTION UP TO 3 TIMES A DAY, Disp: , Rfl: 11    TRUEPLUS LANCETS 28G MISC, TEST AS INSTRUCTED UP TO 4 TIMES A DAY, Disp: , Rfl: 3    tiotropium (SPIRIVA) 18 MCG inhalation capsule, Inhale 1 capsule into the lungs daily, Disp: 30 capsule, Rfl: 3    latanoprost (XALATAN) 0.005 % ophthalmic solution, Place 1 drop into both eyes nightly, Disp: 1 Bottle, Rfl: 3    spironolactone (ALDACTONE) 50 MG tablet, Take 1 tablet by mouth daily, Disp: 30 tablet, Rfl: 3    thiamine 100 MG tablet, Take 1 tablet by mouth daily, Disp: , Rfl:     tadalafil (CIALIS) 20 MG tablet, Take 20 mg by mouth as needed for Erectile Dysfunction, Disp: , Rfl:     DULoxetine (CYMBALTA) 60 MG extended release capsule, Take 60 mg by mouth daily, Disp: , Rfl:     rosuvastatin (CRESTOR) 20 MG tablet, Take 20 mg by mouth daily, Disp: , Rfl:     folic acid (FOLVITE) 1 MG tablet, Take 1 mg by mouth daily, Disp: , Rfl:     Subjective: Review of Systems   Constitutional: Positive for activity change. Negative for appetite change, chills, diaphoresis, fatigue, fever and unexpected weight change. HENT: Negative. Eyes: Positive for visual disturbance. Blind, glaucoma    Respiratory: Positive for cough, shortness of breath and wheezing. Tobacco use, COPD   Cardiovascular: Negative. Gastrointestinal: Positive for constipation. Endocrine: Positive for polydipsia, polyphagia and polyuria. Genitourinary:        Kidney disease    Musculoskeletal: Positive for arthralgias, back pain, gait problem, joint swelling, myalgias, neck pain and neck stiffness. In wheel chair today, ambulates with walker at times    Skin: Negative. Neurological: Positive for weakness, numbness and headaches. Psychiatric/Behavioral: Negative. Objective:     Vitals:    01/21/21 1014   BP: 122/78   Weight: (!) 311 lb (141.1 kg)   Height: 6' 2\" (1.88 m)       Physical Exam  Constitutional:       Appearance: Normal appearance. He is ill-appearing. HENT:      Head: Normocephalic and atraumatic. Mouth/Throat:      Mouth: Mucous membranes are moist.   Neck:      Musculoskeletal: Muscular tenderness present. Cardiovascular:      Rate and Rhythm: Normal rate and regular rhythm. Pulmonary:      Comments: Decraesed   Abdominal:      General: Abdomen is flat. Palpations: Abdomen is soft. Musculoskeletal:         General: Swelling and tenderness present. Right wrist: He exhibits decreased range of motion and tenderness. Left wrist: He exhibits decreased range of motion, tenderness and bony tenderness. Right ankle: He exhibits decreased range of motion. Left ankle: He exhibits decreased range of motion. Cervical back: He exhibits decreased range of motion, tenderness, bony tenderness and pain. Thoracic back: He exhibits decreased range of motion, tenderness, bony tenderness and pain. Lumbar back: He exhibits decreased range of motion, tenderness, bony tenderness and pain. Back:       Right lower leg: He exhibits tenderness. Edema present. Left lower leg: He exhibits tenderness. Edema present. Skin:     Coloration: Skin is pale. Neurological:      Mental Status: He is alert. Sensory: Sensory deficit present. Motor: Weakness present. Coordination: Coordination abnormal.      Deep Tendon Reflexes:      Reflex Scores:       Tricep reflexes are 1+ on the right side and 1+ on the left side. Bicep reflexes are 1+ on the right side and 1+ on the left side. Brachioradialis reflexes are 1+ on the right side and 1+ on the left side. Patellar reflexes are 1+ on the right side and 1+ on the left side. Achilles reflexes are 1+ on the right side and 1+ on the left side. Comments: Decraesed sensation bilateral lower extremity and upper extremity    Psychiatric:         Mood and Affect: Mood normal.       ANJUM test: negative   Yeoman's test: negative   Gaenslen test: negative      Assessment:     1. Closed wedge compression fracture of T4 vertebra, initial encounter ([facility] [address]    2. Mid back pain    3. Chronic bilateral low back pain without sciatica    4. Neuropathy    5. Chronic pain syndrome    6. Neck pain    7. History of fusion of cervical spine            Plan:      · Patient read and signed orientation and opioid agreement. · OARRS reviewed. Current MED: 33.75  · Patient was offered naloxone for home. · Discussed long term side effects of medications, tolerance, dependency and addiction. · UDS preformed today. · Patient told can not receive any pain medications from any other source. · No evidence of abuse, diversion or aberrant behavior. ?  Medications and/or procedures to improve function and quality of life- patient understanding with this and that may not be pain free ? Discussed possible weaning of medication dosing dependent on treatment/procedure results. ? Discussed with patient about safe storage of medications at home  ? Testing:   ? Procedures:   ? Discussed with patient about risks with procedure including infection, reaction to medication, increased pain, or bleeding. ? Reviewed pcp notes in detail  ? Reviewed T-MRI form May 2020 +  T 4 compression fracture but may be chornic now and patient with increased low back, mid back and neck pain from frequent falls  ? Ordered Lumbar, thoracic, and Cervical xray to rule out any new fractures. Discussed possible Kyphoplasty. ? Ordered initial UDS today, continue Percocet 7.5/325 TID prn from pcp, if UDS appropriate will take over but discussed no alcohol or THC. Previous Treatments tried:  · PT: Yes,  any benefit? No  · NSAIDs: Yes,  any benefit? Not a candidate  · Chiropractic: Yes,  any benefit? No  · Muscle relaxants: Yes,  any benefit? Yes  · Narcotics: Yes,  any benefit? Yes  · Spine surgeon consult: yes, had cervical surgery   · Any Implants: No    Meds. Prescribed:   No orders of the defined types were placed in this encounter. Return in about 1 week (around 1/28/2021), or if symptoms worsen or fail to improve, for FU 1-2 weeks to reveiw xrays . Time spent with patient was 65 minutes more than 50% was spent  Counseling/coordinated the patient'scare.     Electronically signed by MEGHAN Caba CNP on 1/21/2021 at 11:01 AM

## 2021-01-28 ENCOUNTER — HOSPITAL ENCOUNTER (OUTPATIENT)
Age: 56
Discharge: HOME | End: 2021-01-28
Payer: MEDICAID

## 2021-01-28 ENCOUNTER — OFFICE VISIT (OUTPATIENT)
Dept: PHYSICAL MEDICINE AND REHAB | Age: 56
End: 2021-01-28
Payer: MEDICAID

## 2021-01-28 VITALS
HEIGHT: 74 IN | WEIGHT: 311 LBS | BODY MASS INDEX: 39.91 KG/M2 | DIASTOLIC BLOOD PRESSURE: 72 MMHG | TEMPERATURE: 97.4 F | SYSTOLIC BLOOD PRESSURE: 124 MMHG

## 2021-01-28 DIAGNOSIS — S22.040A CLOSED WEDGE COMPRESSION FRACTURE OF T4 VERTEBRA, INITIAL ENCOUNTER (HCC): Primary | ICD-10-CM

## 2021-01-28 DIAGNOSIS — M54.2 NECK PAIN: ICD-10-CM

## 2021-01-28 DIAGNOSIS — R52 INTRACTABLE PAIN: ICD-10-CM

## 2021-01-28 DIAGNOSIS — T84.498A LOOSENING OF HARDWARE IN SPINE (HCC): ICD-10-CM

## 2021-01-28 DIAGNOSIS — M54.50 CHRONIC BILATERAL LOW BACK PAIN WITHOUT SCIATICA: ICD-10-CM

## 2021-01-28 DIAGNOSIS — G89.29 CHRONIC BILATERAL LOW BACK PAIN WITHOUT SCIATICA: ICD-10-CM

## 2021-01-28 DIAGNOSIS — M47.816 SPONDYLOSIS OF LUMBAR REGION WITHOUT MYELOPATHY OR RADICULOPATHY: ICD-10-CM

## 2021-01-28 DIAGNOSIS — G89.4 CHRONIC PAIN SYNDROME: ICD-10-CM

## 2021-01-28 DIAGNOSIS — M54.9 MID BACK PAIN: ICD-10-CM

## 2021-01-28 DIAGNOSIS — G62.9 NEUROPATHY: ICD-10-CM

## 2021-01-28 DIAGNOSIS — S22.040A CLOSED WEDGE COMPRESSION FRACTURE OF T4 VERTEBRA, INITIAL ENCOUNTER (HCC): ICD-10-CM

## 2021-01-28 DIAGNOSIS — M47.814 SPONDYLOSIS OF THORACIC REGION WITHOUT MYELOPATHY OR RADICULOPATHY: ICD-10-CM

## 2021-01-28 LAB
EKG ATRIAL RATE: 79 BPM
EKG P AXIS: 72 DEGREES
EKG P-R INTERVAL: 150 MS
EKG Q-T INTERVAL: 388 MS
EKG QRS DURATION: 114 MS
EKG QTC CALCULATION (BAZETT): 444 MS
EKG R AXIS: 48 DEGREES
EKG T AXIS: 65 DEGREES
EKG VENTRICULAR RATE: 79 BPM

## 2021-01-28 PROCEDURE — G8417 CALC BMI ABV UP PARAM F/U: HCPCS | Performed by: NURSE PRACTITIONER

## 2021-01-28 PROCEDURE — 3017F COLORECTAL CA SCREEN DOC REV: CPT | Performed by: NURSE PRACTITIONER

## 2021-01-28 PROCEDURE — 99214 OFFICE O/P EST MOD 30 MIN: CPT | Performed by: NURSE PRACTITIONER

## 2021-01-28 PROCEDURE — 93005 ELECTROCARDIOGRAM TRACING: CPT

## 2021-01-28 PROCEDURE — 4004F PT TOBACCO SCREEN RCVD TLK: CPT | Performed by: NURSE PRACTITIONER

## 2021-01-28 PROCEDURE — G8484 FLU IMMUNIZE NO ADMIN: HCPCS | Performed by: NURSE PRACTITIONER

## 2021-01-28 PROCEDURE — G8427 DOCREV CUR MEDS BY ELIG CLIN: HCPCS | Performed by: NURSE PRACTITIONER

## 2021-01-28 ASSESSMENT — ENCOUNTER SYMPTOMS
BACK PAIN: 1
COUGH: 1
CONSTIPATION: 1
WHEEZING: 1
SHORTNESS OF BREATH: 1

## 2021-01-28 NOTE — PROGRESS NOTES
[address] [facility] [address]  Dept: [phone]  Dept Fax: [phone]: [phone]    Visit Date: 1/28/2021    Functionality Assessment/Goals Worksheet     On a scale of 0 (Does not Interfere) to 10 (Completely Interferes)     1. Which number describes how during the past week pain has interfered with       the following:  A. General Activity:  10  B. Mood: 5  C. Walking Ability:  9  D. Normal Work (Includes both work outside the home and housework):  10  E. Relations with Other People:   2  F. Sleep:   8  G. Enjoyment of Life:   10    2. Patient Prefers to Take their Pain Medications:     [x]  On a regular basis   []  Only when necessary    []  Does not take pain medications    3. What are the Patient's Goals/Expectations for Visiting Pain Management? []  Learn about my pain    [x]  Receive Medication   []  Physical Therapy     []  Treat Depression   []  Receive Injections    []  Treat Sleep   []  Deal with Anxiety and Stress   []  Treat Opoid Dependence/Addiction   []  Other:      HPI:   Edgard Bullard is a 48 y.o. male is here today for    Chief Complaint: Mid back pain, neck pain, low back pain     HPI   1 week FU to review xrays. Main complaint remains pain in mid upper back. Patient reports \"I have not been out of bed for 3 days except to go to the bathroom\" Needs to lay flat and movement or activity increases pain. Pain is described crushing, sharp, stiff pain. Continues to have pain in neck and low back. Neck is next complaint. Xray showed loosening of of hardware. Continues Percocet 7.5 TID prn just helps take the edge off, still increased pain at time   In wheel chair today     Medications reviewed. Patient denies side effects with medications. Patient states he is taking medications as prescribed.  Hedenies receiving pain medications from other sources. He denies any ER visits since last visit. Pain scale with out pain medications or at its worst is 9/10. Pain scale with pain medications or at its best is 4-5/10. Last dose of Percocet  was today  Drug screen reviewed from 2021 and was appropriate    C-xray:  FINDINGS:    LEVEL OF IMAGIN. There is unobscured visualization to the mid C5 vertebral body on the lateral projection and C7-T1 on the swimmer's lateral projection.       POSTOPERATIVE CHANGES:    1. There are intact vertical bars attached to intact pedicle screws anchored within C1 and C2. There is lucency adjacent to both C2 pedicle screws suggesting loosening.       ALIGNMENT: Anatomic.       MINERALIZATION:    1. The bony structures are osteopenic.       FRACTURE: None.       DISC SPACES/FACET JOINTS:    1. There is a small focus of anterior ligamentous mineralization at C2-3.   2. The disc spaces are otherwise maintained. 3. The facet joints are visualized to the C4-5 level and partially obscured by overlying soft tissues at C5-6 and C6-7. Given this limitation no abnormality is seen.       ANTERIOR ATLANTODENTAL DISTANCE/ATLANTOAXIAL RELATIONSHIP/PREVERTEBRAL SOFT TISSUE:   1.  Unremarkable.       OTHER:    1. There are atheromatous calcifications within the neck bilaterally. A carotid ultrasound examination would be helpful to assess for the degree of stenosis.             Impression   1. There is no acute process.       2. There are intact vertical bars attached to intact pedicle screws anchored within C1 and C2. There is lucency adjacent to both C2 pedicle screws suggesting loosening.       3. There is a small focus of anterior ligamentous mineralization at C2-3. The disc spaces are otherwise maintained.       4. The bony structures are osteopenic.       5. There are atheromatous calcifications within the neck bilaterally. A carotid ultrasound examination would be helpful to assess for the degree of stenosis.          L-xray:  FINDINGS:    POSTOPERATIVE FINDINGS: None.       ALIGNMENT:    1. There is mild dextroscoliosis of the lumbar spine.       MINERALIZATION: Normal.       FRACTURE: None.       DISC SPACES/FACET JOINTS:    1. There are mild discogenic degenerative changes at L1-2 with endplate sclerosis and endplate spurs. 2. There is mild facet arthritis at L5-S1 bilaterally.       PEDICLES/SACROILIAC JOINTS: Unremarkable.       OBLIQUE PROJECTIONS: Not performed.       FLEXION/EXTENSION LATERAL: Not performed.       OTHER:    1. Atheromatous calcification abdominal aorta and iliac arteries.               Impression   1. There is no acute process.       2. There is mild dextroscoliosis of the lumbar spine.       3. There are mild discogenic degenerative changes at L1-2.       4. Atheromatous calcification abdominal aorta and iliac arteries. T-xray:  1. Moderate vertebral body spondylosis scattered in the thoracic spine. There is a severe compression fracture of T4 which was present on a prior CT thorax dated 11/30/2020. There appears to have been interval callus deposition since that time. 2. No acute fracture seen. Paravertebral soft tissues unremarkable.           The patientis allergic to adhesive tape. Past Medical History  Brian Sanchez  has a past medical history of Asthma, Brain tumor ([address] Cirrhosis ([address] COPD (chronic obstructive pulmonary disease) ([address] Diabetes mellitus ([address] Falling, Hepatitis C, Hyperlipidemia, Hypertension, and Seizures ([address] Past Surgical History  The patient  has a past surgical history that includes Foot surgery (Right); Fibula Fracture Surgery (Left, 3/21/2019); Endoscopy, colon, diagnostic; Upper gastrointestinal endoscopy (N/A, 4/25/2019); Neck surgery (01/2020); Toe amputation (Right, 9/23/2020); and Nasal sinus surgery (Bilateral, 11/29/2020). Family History  This patient's family history includes Heart Attack in his father.     Social History  Brian Sanchez  reports that he has been smoking cigarettes. He has a 30.00 pack-year smoking history. He has never used smokeless tobacco. He reports current alcohol use. He reports current drug use. Drug: Marijuana. Medications    Current Outpatient Medications:     ipratropium-albuterol (DUONEB) 0.5-2.5 (3) MG/3ML SOLN nebulizer solution, Inhale 3 mLs into the lungs every 4 hours as needed for Shortness of Breath, Disp: 360 mL, Rfl: 1    mirtazapine (REMERON) 15 MG tablet, Take 1 tablet by mouth nightly, Disp: 30 tablet, Rfl: 3    insulin glargine (LANTUS) 100 UNIT/ML injection vial, Inject 50 Units into the skin nightly (Patient taking differently: Inject 70 Units into the skin nightly ), Disp: 1 vial, Rfl: 3    insulin lispro, 1 Unit Dial, (HUMALOG KWIKPEN) 100 UNIT/ML SOPN, Inject 5 Units into the skin 3 times daily (before meals) Add the following sliding scale insulin below depending on your blood sugar.  Glucose: Dose:              No Insulin 140-199           2 Units 200-249 4 Units 250-299 6 Units 300-349 8 Units 350-400 10 Units Over 400  12 Units, Disp: 2 pen, Rfl: 1    prednisoLONE acetate (PRED FORTE) 1 % ophthalmic suspension, See Admin Instructions Place 1 drop into the left eye 4 times a day for 1 week, then 3 times a day for 1 week, then 2 times a day for 1 week, then daily for a week then stop., Disp: , Rfl:     bumetanide (BUMEX) 1 MG tablet, Take 1 tablet by mouth 2 times daily, Disp: 30 tablet, Rfl: 3    amLODIPine (NORVASC) 10 MG tablet, Take 1 tablet by mouth daily, Disp: 30 tablet, Rfl: 3    ondansetron (ZOFRAN) 4 MG tablet, Take 4 mg by mouth every 8 hours as needed for Nausea or Vomiting, Disp: , Rfl:     tamsulosin (FLOMAX) 0.4 MG capsule, Take 0.4 mg by mouth daily, Disp: , Rfl:     Blood Glucose Monitoring Suppl (PRODIGY VOICE BLOOD GLUCOSE) w/Device KIT, by NOT APPLICABLE route, Disp: , Rfl:     Elastic Bandages & Supports (MEDICAL COMPRESSION STOCKINGS) MISC, Provide jopstock waist-high compression stockings. To be worn while ambulating to prevent syncope. Thigh high insufficient. Dx Code I95.1, Disp: , Rfl:     TRUETRACK TEST strip, TEAST UP TO 4 TIMES A DAY AS INSTRUCTED, Disp: , Rfl: 3    Insulin Pen Needle (ULTICARE MICRO PEN NEEDLES) 31G X 6 MM MISC, by NOT APPLICABLE route, Disp: , Rfl:     ULTICARE MINI PEN NEEDLES 31G X 6 MM MISC, USE TO INJECT INSULINS PER INSTRUCTION UP TO 3 TIMES A DAY, Disp: , Rfl: 11    B-D UF III MINI PEN NEEDLES 31G X 5 MM MISC, USE TO INJECT INSULINS PER INSTRUCTION UP TO 3 TIMES A DAY, Disp: , Rfl: 11    TRUEPLUS LANCETS 28G MISC, TEST AS INSTRUCTED UP TO 4 TIMES A DAY, Disp: , Rfl: 3    tiotropium (SPIRIVA) 18 MCG inhalation capsule, Inhale 1 capsule into the lungs daily, Disp: 30 capsule, Rfl: 3    latanoprost (XALATAN) 0.005 % ophthalmic solution, Place 1 drop into both eyes nightly, Disp: 1 Bottle, Rfl: 3    spironolactone (ALDACTONE) 50 MG tablet, Take 1 tablet by mouth daily, Disp: 30 tablet, Rfl: 3    thiamine 100 MG tablet, Take 1 tablet by mouth daily, Disp: , Rfl:     tadalafil (CIALIS) 20 MG tablet, Take 20 mg by mouth as needed for Erectile Dysfunction, Disp: , Rfl:     DULoxetine (CYMBALTA) 60 MG extended release capsule, Take 60 mg by mouth daily, Disp: , Rfl:     rosuvastatin (CRESTOR) 20 MG tablet, Take 20 mg by mouth daily, Disp: , Rfl:     Subjective:      Review of Systems   Constitutional: Positive for activity change. Negative for appetite change, chills, diaphoresis, fatigue, fever and unexpected weight change. HENT: Negative. Eyes: Positive for visual disturbance. Blind, glaucoma    Respiratory: Positive for cough, shortness of breath and wheezing. Tobacco use, COPD   Cardiovascular: Negative. Gastrointestinal: Positive for constipation. Endocrine: Positive for polydipsia, polyphagia and polyuria.    Genitourinary:        Kidney disease    Musculoskeletal: Positive for arthralgias, back pain, gait problem, joint pain, or bleeding.  Reviewed T-MRI, Txray, L-xray, C-xray   T4 compression fracture causing severe pain and patient unable to tolerate any activity   Plan Kyphoplasty @ T4 and possible other levels. Procedure and risks discussed in detail with patient.  Ordered CBC, BMP, and 12 lead EKG pre op   Need medical clearance from pcp prior to procedure    Instructed to FU with ortho spine surgeon for loosening in hardware in cervical spine.  UDS on 1/21/2021 was appropriate continue Percocet 7.5/325 from [facility] but when due for a refill I will take over. Discussed no alcohol or THC      Meds. Prescribed:   No orders of the defined types were placed in this encounter. Return for  Kyphoplasty @ T4 and possible other levels. , follow up after procedure.                Electronically signed by MEGHAN Silva CNP on1/28/2021 at 2:16 PM

## 2021-01-29 ENCOUNTER — TELEPHONE (OUTPATIENT)
Dept: PHYSICAL MEDICINE AND REHAB | Age: 56
End: 2021-01-29

## 2021-01-29 NOTE — TELEPHONE ENCOUNTER
[facility]

## 2021-02-01 ENCOUNTER — TELEPHONE (OUTPATIENT)
Dept: PHYSICAL MEDICINE AND REHAB | Age: 56
End: 2021-02-01

## 2021-02-03 ENCOUNTER — TELEPHONE (OUTPATIENT)
Dept: PHYSICAL MEDICINE AND REHAB | Age: 56
End: 2021-02-03

## 2021-02-03 NOTE — TELEPHONE ENCOUNTER
Spoke with pt and caretaker regarding Kyphoplasty on 2-19-21 in [address]. Procedure instructions reviewed and mailed. Told to get labs and covid swab on 2-12-21. [facility] [place] Faxed over. [address] notified.

## 2021-02-04 ENCOUNTER — TELEPHONE (OUTPATIENT)
Dept: PHYSICAL MEDICINE AND REHAB | Age: 56
End: 2021-02-04

## 2021-02-04 DIAGNOSIS — Z41.9 ELECTIVE SURGICAL PROCEDURE: Primary | ICD-10-CM

## 2021-02-04 NOTE — TELEPHONE ENCOUNTER
Spoke with Keisha, patient will come to [facility] on 2-12-21 to get covid 19 swab and labs for procedure on 2-19-21. Explained to caregiver that [facility]'s point Lab facility they wanted to go to does not do covid swab. Expressed understanding and will [facility].

## 2021-02-04 NOTE — TELEPHONE ENCOUNTER
Spoke with Keisha pt's caretaker, and explained rep was not available for date originally scheduled for procedure (2-19-21). Pt now moved to 2-22-21 @ 3pm at [address]. Follow up with Froy Palumbo CNP on 3-4-2021 @ 1:20pm. And Pt and caretaker told to get covid swab and labs on 2-15-21. Reviewed procedure instructions and told instructions would be mailed out again with new times and would be labeled as New Procedure Times. Mailed. Tejas HERNÁNDEZ Notified.

## 2021-02-15 ENCOUNTER — TELEPHONE (OUTPATIENT)
Dept: PHYSICAL MEDICINE AND REHAB | Age: 56
End: 2021-02-15

## 2021-02-15 ENCOUNTER — HOSPITAL ENCOUNTER (OUTPATIENT)
Age: 56
Discharge: HOME | DRG: 321 | End: 2021-02-15
Payer: MEDICAID

## 2021-02-15 DIAGNOSIS — M54.9 MID BACK PAIN: ICD-10-CM

## 2021-02-15 DIAGNOSIS — Z41.9 ELECTIVE SURGICAL PROCEDURE: ICD-10-CM

## 2021-02-15 DIAGNOSIS — G89.4 CHRONIC PAIN SYNDROME: Primary | ICD-10-CM

## 2021-02-15 DIAGNOSIS — R52 INTRACTABLE PAIN: ICD-10-CM

## 2021-02-15 DIAGNOSIS — S22.040A CLOSED WEDGE COMPRESSION FRACTURE OF T4 VERTEBRA, INITIAL ENCOUNTER (HCC): ICD-10-CM

## 2021-02-15 LAB
ANION GAP SERPL CALCULATED.3IONS-SCNC: 10 MEQ/L (ref 8–16)
BASOPHILS # BLD: 0.8 %
BASOPHILS ABSOLUTE: 0.1 THOU/MM3 (ref 0–0.1)
BUN BLDV-MCNC: 46 MG/DL (ref 7–22)
CALCIUM SERPL-MCNC: 10 MG/DL (ref 8.5–10.5)
CHLORIDE BLD-SCNC: 96 MEQ/L (ref 98–111)
CO2: 25 MEQ/L (ref 23–33)
CREAT SERPL-MCNC: 1.8 MG/DL (ref 0.4–1.2)
EOSINOPHIL # BLD: 2.8 %
EOSINOPHILS ABSOLUTE: 0.2 THOU/MM3 (ref 0–0.4)
ERYTHROCYTE [DISTWIDTH] IN BLOOD BY AUTOMATED COUNT: 14.2 % (ref 11.5–14.5)
ERYTHROCYTE [DISTWIDTH] IN BLOOD BY AUTOMATED COUNT: 49.8 FL (ref 35–45)
GFR SERPL CREATININE-BSD FRML MDRD: 39 ML/MIN/1.73M2
GLUCOSE BLD-MCNC: 265 MG/DL (ref 70–108)
HCT VFR BLD CALC: 33.3 % (ref 42–52)
HEMOGLOBIN: 10.7 GM/DL (ref 14–18)
IMMATURE GRANS (ABS): 0.02 THOU/MM3 (ref 0–0.07)
IMMATURE GRANULOCYTES: 0.3 %
LYMPHOCYTES # BLD: 20.6 %
LYMPHOCYTES ABSOLUTE: 1.6 THOU/MM3 (ref 1–4.8)
MCH RBC QN AUTO: 30.8 PG (ref 26–33)
MCHC RBC AUTO-ENTMCNC: 32.1 GM/DL (ref 32.2–35.5)
MCV RBC AUTO: 96 FL (ref 80–94)
MONOCYTES # BLD: 8.4 %
MONOCYTES ABSOLUTE: 0.7 THOU/MM3 (ref 0.4–1.3)
NUCLEATED RED BLOOD CELLS: 0 /100 WBC
PLATELET # BLD: 135 THOU/MM3 (ref 130–400)
PMV BLD AUTO: 10.7 FL (ref 9.4–12.4)
POTASSIUM SERPL-SCNC: 4.6 MEQ/L (ref 3.5–5.2)
RBC # BLD: 3.47 MILL/MM3 (ref 4.7–6.1)
SEG NEUTROPHILS: 67.1 %
SEGMENTED NEUTROPHILS ABSOLUTE COUNT: 5.4 THOU/MM3 (ref 1.8–7.7)
SODIUM BLD-SCNC: 131 MEQ/L (ref 135–145)
WBC # BLD: 8 THOU/MM3 (ref 4.8–10.8)

## 2021-02-15 PROCEDURE — 80048 BASIC METABOLIC PNL TOTAL CA: CPT

## 2021-02-15 PROCEDURE — U0003 INFECTIOUS AGENT DETECTION BY NUCLEIC ACID (DNA OR RNA); SEVERE ACUTE RESPIRATORY SYNDROME CORONAVIRUS 2 (SARS-COV-2) (CORONAVIRUS DISEASE [COVID-19]), AMPLIFIED PROBE TECHNIQUE, MAKING USE OF HIGH THROUGHPUT TECHNOLOGIES AS DESCRIBED BY CMS-2020-01-R: HCPCS

## 2021-02-15 PROCEDURE — 36415 COLL VENOUS BLD VENIPUNCTURE: CPT

## 2021-02-15 PROCEDURE — 85025 COMPLETE CBC W/AUTO DIFF WBC: CPT

## 2021-02-15 RX ORDER — OXYCODONE AND ACETAMINOPHEN 7.5; 325 MG/1; MG/1
1 TABLET ORAL EVERY 8 HOURS PRN
Qty: 90 TABLET | Refills: 0 | Status: ON HOLD | OUTPATIENT
Start: 2021-02-19 | End: 2021-02-18

## 2021-02-15 NOTE — TELEPHONE ENCOUNTER
OARRS reviewed. UDS: + for  Alcohol from 01/28/2021    Last seen: 1/28/2021.      Follow-up: 03/04/2021

## 2021-02-15 NOTE — TELEPHONE ENCOUNTER
Pt. Needs a refill on the Percocet 7.5/325, He states that you wanted to take over prescribing.  Thanks    Pharmacy-[facility]

## 2021-02-16 LAB — SARS-COV-2: NOT DETECTED

## 2021-02-17 ENCOUNTER — HOSPITAL ENCOUNTER (INPATIENT)
Age: 56
LOS: 7 days | Discharge: NURSING, REHAB OR LONG-TERM CARE | DRG: 321 | End: 2021-02-24
Attending: EMERGENCY MEDICINE | Admitting: SURGERY
Payer: MEDICAID

## 2021-02-17 ENCOUNTER — APPOINTMENT (OUTPATIENT)
Dept: MRI IMAGING | Age: 56
DRG: 321 | End: 2021-02-17
Payer: MEDICAID

## 2021-02-17 ENCOUNTER — APPOINTMENT (OUTPATIENT)
Dept: CT IMAGING | Age: 56
DRG: 321 | End: 2021-02-17
Payer: MEDICAID

## 2021-02-17 DIAGNOSIS — M54.2 NECK PAIN: ICD-10-CM

## 2021-02-17 DIAGNOSIS — S22.041A BURST FRACTURE OF FOURTH THORACIC VERTEBRA (HCC): ICD-10-CM

## 2021-02-17 DIAGNOSIS — S32.000A COMPRESSION FRACTURE OF LUMBAR VERTEBRA, INITIAL ENCOUNTER, UNSPECIFIED LUMBAR VERTEBRAL LEVEL: ICD-10-CM
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
## 2021-02-17 LAB
ALBUMIN SERPL-MCNC: 3.5 G/DL (ref 3.5–5.1)
ALP BLD-CCNC: 181 U/L (ref 38–126)
ALT SERPL-CCNC: 77 U/L (ref 11–66)
AMPHETAMINE+METHAMPHETAMINE URINE SCREEN: NEGATIVE
ANION GAP SERPL CALCULATED.3IONS-SCNC: 11 MEQ/L (ref 8–16)
APTT: 34.5 SECONDS (ref 22–38)
AST SERPL-CCNC: 125 U/L (ref 5–40)
BACTERIA: ABNORMAL
BARBITURATE QUANTITATIVE URINE: POSITIVE
BASOPHILS # BLD: 0.7 %
BASOPHILS ABSOLUTE: 0.1 THOU/MM3 (ref 0–0.1)
BENZODIAZEPINE QUANTITATIVE URINE: NEGATIVE
BILIRUB SERPL-MCNC: 0.9 MG/DL (ref 0.3–1.2)
BILIRUBIN DIRECT: 0.4 MG/DL (ref 0–0.3)
BILIRUBIN URINE: NEGATIVE
BLOOD, URINE: ABNORMAL
BUN BLDV-MCNC: 50 MG/DL (ref 7–22)
CALCIUM SERPL-MCNC: 9.8 MG/DL (ref 8.5–10.5)
CANNABINOID QUANTITATIVE URINE: NEGATIVE
CASTS: ABNORMAL /LPF
CASTS: ABNORMAL /LPF
CHARACTER, URINE: CLEAR
CHLORIDE BLD-SCNC: 96 MEQ/L (ref 98–111)
CO2: 24 MEQ/L (ref 23–33)
COCAINE METABOLITE QUANTITATIVE URINE: NEGATIVE
COLOR: YELLOW
CREAT SERPL-MCNC: 1.8 MG/DL (ref 0.4–1.2)
CRYSTALS: ABNORMAL
EKG ATRIAL RATE: 73 BPM
EKG P AXIS: 66 DEGREES
EKG P-R INTERVAL: 146 MS
EKG Q-T INTERVAL: 414 MS
EKG QRS DURATION: 118 MS
EKG QTC CALCULATION (BAZETT): 456 MS
EKG R AXIS: 37 DEGREES
EKG T AXIS: 69 DEGREES
EKG VENTRICULAR RATE: 73 BPM
EOSINOPHIL # BLD: 3 %
EOSINOPHILS ABSOLUTE: 0.3 THOU/MM3 (ref 0–0.4)
EPITHELIAL CELLS, UA: ABNORMAL /HPF
ERYTHROCYTE [DISTWIDTH] IN BLOOD BY AUTOMATED COUNT: 14.2 % (ref 11.5–14.5)
ERYTHROCYTE [DISTWIDTH] IN BLOOD BY AUTOMATED COUNT: 48.7 FL (ref 35–45)
ETHYL ALCOHOL, SERUM: < 0.01 %
GFR SERPL CREATININE-BSD FRML MDRD: 39 ML/MIN/1.73M2
GLUCOSE BLD-MCNC: 135 MG/DL (ref 70–108)
GLUCOSE, URINE: NEGATIVE MG/DL
HCT VFR BLD CALC: 33.8 % (ref 42–52)
HEMOGLOBIN: 11 GM/DL (ref 14–18)
IMMATURE GRANS (ABS): 0.02 THOU/MM3 (ref 0–0.07)
IMMATURE GRANULOCYTES: 0.2 %
INR BLD: 1.16 (ref 0.85–1.13)
KETONES, URINE: NEGATIVE
LEUKOCYTE EST, POC: ABNORMAL
LYMPHOCYTES # BLD: 25.8 %
LYMPHOCYTES ABSOLUTE: 2.2 THOU/MM3 (ref 1–4.8)
MCH RBC QN AUTO: 30.6 PG (ref 26–33)
MCHC RBC AUTO-ENTMCNC: 32.5 GM/DL (ref 32.2–35.5)
MCV RBC AUTO: 94.2 FL (ref 80–94)
MISCELLANEOUS LAB TEST RESULT: ABNORMAL
MONOCYTES # BLD: 8.6 %
MONOCYTES ABSOLUTE: 0.7 THOU/MM3 (ref 0.4–1.3)
NITRITE, URINE: NEGATIVE
NUCLEATED RED BLOOD CELLS: 0 /100 WBC
OPIATES, URINE: NEGATIVE
OSMOLALITY CALCULATION: 278 MOSMOL/KG (ref 275–300)
OXYCODONE: POSITIVE
PH UA: 5 (ref 5–9)
PHENCYCLIDINE QUANTITATIVE URINE: NEGATIVE
PLATELET # BLD: 130 THOU/MM3 (ref 130–400)
PMV BLD AUTO: 10.3 FL (ref 9.4–12.4)
POTASSIUM REFLEX MAGNESIUM: 4.1 MEQ/L (ref 3.5–5.2)
PROTEIN UA: NEGATIVE MG/DL
RBC # BLD: 3.59 MILL/MM3 (ref 4.7–6.1)
RBC URINE: ABNORMAL /HPF
RENAL EPITHELIAL, UA: ABNORMAL
SEG NEUTROPHILS: 61.7 %
SEGMENTED NEUTROPHILS ABSOLUTE COUNT: 5.4 THOU/MM3 (ref 1.8–7.7)
SODIUM BLD-SCNC: 131 MEQ/L (ref 135–145)
SPECIFIC GRAVITY UA: 1.01 (ref 1–1.03)
TOTAL PROTEIN: 9.2 G/DL (ref 6.1–8)
UROBILINOGEN, URINE: 1 EU/DL (ref 0–1)
WBC # BLD: 8.7 THOU/MM3 (ref 4.8–10.8)
WBC UA: ABNORMAL /HPF
YEAST: ABNORMAL

## 2021-02-17 PROCEDURE — 82077 ASSAY SPEC XCP UR&BREATH IA: CPT

## 2021-02-17 PROCEDURE — 74176 CT ABD & PELVIS W/O CONTRAST: CPT

## 2021-02-17 PROCEDURE — 36415 COLL VENOUS BLD VENIPUNCTURE: CPT

## 2021-02-17 PROCEDURE — 85025 COMPLETE CBC W/AUTO DIFF WBC: CPT

## 2021-02-17 PROCEDURE — 3209999900 CT INTERPRETATION OF OUTSIDE IMAGES

## 2021-02-17 PROCEDURE — 93005 ELECTROCARDIOGRAM TRACING: CPT | Performed by: EMERGENCY MEDICINE

## 2021-02-17 PROCEDURE — 96374 THER/PROPH/DIAG INJ IV PUSH: CPT

## 2021-02-17 PROCEDURE — 85730 THROMBOPLASTIN TIME PARTIAL: CPT

## 2021-02-17 PROCEDURE — 72146 MRI CHEST SPINE W/O DYE: CPT

## 2021-02-17 PROCEDURE — 71250 CT THORAX DX C-: CPT

## 2021-02-17 PROCEDURE — 80076 HEPATIC FUNCTION PANEL: CPT

## 2021-02-17 PROCEDURE — 80307 DRUG TEST PRSMV CHEM ANLYZR: CPT

## 2021-02-17 PROCEDURE — 80048 BASIC METABOLIC PNL TOTAL CA: CPT

## 2021-02-17 PROCEDURE — 85610 PROTHROMBIN TIME: CPT

## 2021-02-17 PROCEDURE — APPSS180 APP SPLIT SHARED TIME > 60 MINUTES: Performed by: PHYSICIAN ASSISTANT

## 2021-02-17 PROCEDURE — 99223 1ST HOSP IP/OBS HIGH 75: CPT | Performed by: SURGERY

## 2021-02-17 PROCEDURE — 6360000002 HC RX W HCPCS: Performed by: PHYSICIAN ASSISTANT

## 2021-02-17 PROCEDURE — 51702 INSERT TEMP BLADDER CATH: CPT

## 2021-02-17 PROCEDURE — 6820000002 HC L2 INJURY CALL ACTIVATION: Performed by: SURGERY

## 2021-02-17 PROCEDURE — 1200000003 HC TELEMETRY R&B

## 2021-02-17 PROCEDURE — 72148 MRI LUMBAR SPINE W/O DYE: CPT

## 2021-02-17 PROCEDURE — 99285 EMERGENCY DEPT VISIT HI MDM: CPT

## 2021-02-17 PROCEDURE — 81001 URINALYSIS AUTO W/SCOPE: CPT

## 2021-02-17 RX ORDER — SODIUM CHLORIDE 0.9 % (FLUSH) 0.9 %
10 SYRINGE (ML) INJECTION PRN
Status: DISCONTINUED | OUTPATIENT
Start: 2021-02-17 | End: 2021-02-24 | Stop reason: HOSPADM

## 2021-02-17 RX ORDER — SODIUM CHLORIDE 0.9 % (FLUSH) 0.9 %
10 SYRINGE (ML) INJECTION EVERY 12 HOURS SCHEDULED
Status: DISCONTINUED | OUTPATIENT
Start: 2021-02-18 | End: 2021-02-24 | Stop reason: HOSPADM

## 2021-02-17 RX ORDER — MORPHINE SULFATE 4 MG/ML
4 INJECTION, SOLUTION INTRAMUSCULAR; INTRAVENOUS
Status: DISCONTINUED | OUTPATIENT
Start: 2021-02-17 | End: 2021-02-18

## 2021-02-17 RX ORDER — ONDANSETRON 2 MG/ML
4 INJECTION INTRAMUSCULAR; INTRAVENOUS EVERY 6 HOURS PRN
Status: DISCONTINUED | OUTPATIENT
Start: 2021-02-17 | End: 2021-02-24

## 2021-02-17 RX ORDER — PROMETHAZINE HYDROCHLORIDE 25 MG/1
12.5 TABLET ORAL EVERY 6 HOURS PRN
Status: DISCONTINUED | OUTPATIENT
Start: 2021-02-17 | End: 2021-02-24

## 2021-02-17 RX ORDER — SODIUM CHLORIDE 9 MG/ML
INJECTION, SOLUTION INTRAVENOUS CONTINUOUS
Status: DISCONTINUED | OUTPATIENT
Start: 2021-02-18 | End: 2021-02-18

## 2021-02-17 RX ORDER — FENTANYL CITRATE 50 UG/ML
INJECTION, SOLUTION INTRAMUSCULAR; INTRAVENOUS
Status: DISPENSED
Start: 2021-02-17 | End: 2021-02-18

## 2021-02-17 RX ORDER — POTASSIUM CHLORIDE 7.45 MG/ML
10 INJECTION INTRAVENOUS PRN
Status: DISCONTINUED | OUTPATIENT
Start: 2021-02-17 | End: 2021-02-24

## 2021-02-17 RX ORDER — OXYCODONE HYDROCHLORIDE AND ACETAMINOPHEN 5; 325 MG/1; MG/1
1 TABLET ORAL EVERY 4 HOURS PRN
Status: DISCONTINUED | OUTPATIENT
Start: 2021-02-17 | End: 2021-02-18

## 2021-02-17 RX ORDER — MORPHINE SULFATE 2 MG/ML
2 INJECTION, SOLUTION INTRAMUSCULAR; INTRAVENOUS
Status: DISCONTINUED | OUTPATIENT
Start: 2021-02-17 | End: 2021-02-18

## 2021-02-17 RX ORDER — DOCUSATE SODIUM 100 MG/1
100 CAPSULE, LIQUID FILLED ORAL DAILY
Status: DISCONTINUED | OUTPATIENT
Start: 2021-02-18 | End: 2021-02-18

## 2021-02-17 RX ORDER — OXYCODONE HYDROCHLORIDE AND ACETAMINOPHEN 5; 325 MG/1; MG/1
2 TABLET ORAL EVERY 4 HOURS PRN
Status: DISCONTINUED | OUTPATIENT
Start: 2021-02-17 | End: 2021-02-18

## 2021-02-17 RX ORDER — POLYETHYLENE GLYCOL 3350 17 G/17G
17 POWDER, FOR SOLUTION ORAL DAILY PRN
Status: DISCONTINUED | OUTPATIENT
Start: 2021-02-17 | End: 2021-02-18

## 2021-02-17 RX ORDER — FENTANYL CITRATE 50 UG/ML
INJECTION, SOLUTION INTRAMUSCULAR; INTRAVENOUS DAILY PRN
Status: COMPLETED | OUTPATIENT
Start: 2021-02-17 | End: 2021-02-17

## 2021-02-17 RX ORDER — ACETAMINOPHEN 325 MG/1
650 TABLET ORAL EVERY 4 HOURS PRN
Status: DISCONTINUED | OUTPATIENT
Start: 2021-02-17 | End: 2021-02-24 | Stop reason: HOSPADM

## 2021-02-17 RX ADMIN — FENTANYL CITRATE 50 MCG: 50 INJECTION, SOLUTION INTRAMUSCULAR; INTRAVENOUS at 18:53

## 2021-02-17 ASSESSMENT — PAIN DESCRIPTION - LOCATION: LOCATION: BACK

## 2021-02-17 ASSESSMENT — ENCOUNTER SYMPTOMS
COUGH: 0
BACK PAIN: 1
EYE REDNESS: 0
COLOR CHANGE: 0
SINUS PRESSURE: 0
SORE THROAT: 0
VOMITING: 0
PHOTOPHOBIA: 0
ABDOMINAL PAIN: 0
NAUSEA: 0
CHEST TIGHTNESS: 0

## 2021-02-17 ASSESSMENT — PAIN DESCRIPTION - PROGRESSION
CLINICAL_PROGRESSION: NOT CHANGED
CLINICAL_PROGRESSION: GRADUALLY IMPROVING

## 2021-02-17 ASSESSMENT — PAIN DESCRIPTION - PAIN TYPE
TYPE: ACUTE PAIN
TYPE: ACUTE PAIN

## 2021-02-17 ASSESSMENT — PAIN SCALES - GENERAL: PAINLEVEL_OUTOF10: 10

## 2021-02-17 ASSESSMENT — PAIN DESCRIPTION - FREQUENCY: FREQUENCY: CONTINUOUS
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
## 2021-02-17 NOTE — ED NOTES
Bed: 001A  Expected date:   Expected time:   Means of arrival:   Comments:     Mayank Benavidez RN  02/17/21 [id number]

## 2021-02-17 NOTE — ED NOTES
VISTA collar applied to pt. Pt remains A&OX4. Pt respirations easy and unlabored,.      Casi Cole RN  02/17/21 [id number]
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
## 2021-02-18 ENCOUNTER — APPOINTMENT (OUTPATIENT)
Dept: MRI IMAGING | Age: 56
DRG: 321 | End: 2021-02-18
Payer: MEDICAID

## 2021-02-18 ENCOUNTER — ANESTHESIA EVENT (OUTPATIENT)
Dept: OPERATING ROOM | Age: 56
DRG: 321 | End: 2021-02-18
Payer: MEDICAID

## 2021-02-18 ENCOUNTER — APPOINTMENT (OUTPATIENT)
Dept: GENERAL RADIOLOGY | Age: 56
DRG: 321 | End: 2021-02-18
Payer: MEDICAID

## 2021-02-18 ENCOUNTER — ANESTHESIA (OUTPATIENT)
Dept: OPERATING ROOM | Age: 56
DRG: 321 | End: 2021-02-18
Payer: MEDICAID

## 2021-02-18 PROBLEM — R20.0 LEFT LEG NUMBNESS: Status: ACTIVE | Noted: 2021-02-18
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
## 2021-02-18 PROBLEM — S32.000A COMPRESSION FRACTURE OF LUMBAR VERTEBRA (HCC): Status: ACTIVE | Noted: 2021-02-18

## 2021-02-18 PROBLEM — E11.40 DIABETIC NEUROPATHY (HCC): Status: ACTIVE | Noted: 2021-02-18

## 2021-02-18 PROBLEM — S22.041A BURST FRACTURE OF FOURTH THORACIC VERTEBRA (HCC): Status: ACTIVE | Noted: 2021-02-18

## 2021-02-18 PROBLEM — R29.898 LEFT LEG WEAKNESS: Status: ACTIVE | Noted: 2021-02-18

## 2021-02-18 LAB
ALBUMIN SERPL-MCNC: 3 G/DL (ref 3.5–5.1)
ALP BLD-CCNC: 153 U/L (ref 38–126)
ALT SERPL-CCNC: 71 U/L (ref 11–66)
ANION GAP SERPL CALCULATED.3IONS-SCNC: 10 MEQ/L (ref 8–16)
AST SERPL-CCNC: 109 U/L (ref 5–40)
BASOPHILS # BLD: 0.7 %
BASOPHILS ABSOLUTE: 0.1 THOU/MM3 (ref 0–0.1)
BILIRUB SERPL-MCNC: 1 MG/DL (ref 0.3–1.2)
BILIRUBIN DIRECT: 0.5 MG/DL (ref 0–0.3)
BUN BLDV-MCNC: 50 MG/DL (ref 7–22)
CALCIUM SERPL-MCNC: 9.4 MG/DL (ref 8.5–10.5)
CHLORIDE BLD-SCNC: 99 MEQ/L (ref 98–111)
CO2: 24 MEQ/L (ref 23–33)
CREAT SERPL-MCNC: 1.6 MG/DL (ref 0.4–1.2)
EOSINOPHIL # BLD: 2.8 %
EOSINOPHILS ABSOLUTE: 0.2 THOU/MM3 (ref 0–0.4)
ERYTHROCYTE [DISTWIDTH] IN BLOOD BY AUTOMATED COUNT: 14.3 % (ref 11.5–14.5)
ERYTHROCYTE [DISTWIDTH] IN BLOOD BY AUTOMATED COUNT: 49.7 FL (ref 35–45)
GFR SERPL CREATININE-BSD FRML MDRD: 45 ML/MIN/1.73M2
GLUCOSE BLD-MCNC: 107 MG/DL (ref 70–108)
GLUCOSE BLD-MCNC: 125 MG/DL (ref 70–108)
GLUCOSE BLD-MCNC: 140 MG/DL (ref 70–108)
GLUCOSE BLD-MCNC: 148 MG/DL (ref 70–108)
GLUCOSE BLD-MCNC: 178 MG/DL (ref 70–108)
HCT VFR BLD CALC: 30.7 % (ref 42–52)
HEMOGLOBIN: 10 GM/DL (ref 14–18)
IMMATURE GRANS (ABS): 0.02 THOU/MM3 (ref 0–0.07)
IMMATURE GRANULOCYTES: 0.3 %
LYMPHOCYTES # BLD: 22.4 %
LYMPHOCYTES ABSOLUTE: 1.7 THOU/MM3 (ref 1–4.8)
MCH RBC QN AUTO: 30.7 PG (ref 26–33)
MCHC RBC AUTO-ENTMCNC: 32.6 GM/DL (ref 32.2–35.5)
MCV RBC AUTO: 94.2 FL (ref 80–94)
MONOCYTES # BLD: 8.8 %
MONOCYTES ABSOLUTE: 0.7 THOU/MM3 (ref 0.4–1.3)
NUCLEATED RED BLOOD CELLS: 0 /100 WBC
PLATELET # BLD: 118 THOU/MM3 (ref 130–400)
PMV BLD AUTO: 10.3 FL (ref 9.4–12.4)
POTASSIUM REFLEX MAGNESIUM: 3.9 MEQ/L (ref 3.5–5.2)
RBC # BLD: 3.26 MILL/MM3 (ref 4.7–6.1)
SEG NEUTROPHILS: 65 %
SEGMENTED NEUTROPHILS ABSOLUTE COUNT: 4.9 THOU/MM3 (ref 1.8–7.7)
SODIUM BLD-SCNC: 133 MEQ/L (ref 135–145)
TOTAL PROTEIN: 8.2 G/DL (ref 6.1–8)
WBC # BLD: 7.5 THOU/MM3 (ref 4.8–10.8)

## 2021-02-18 PROCEDURE — 99222 1ST HOSP IP/OBS MODERATE 55: CPT | Performed by: NEUROLOGICAL SURGERY

## 2021-02-18 PROCEDURE — 82948 REAGENT STRIP/BLOOD GLUCOSE: CPT

## 2021-02-18 PROCEDURE — 6360000002 HC RX W HCPCS

## 2021-02-18 PROCEDURE — 6370000000 HC RX 637 (ALT 250 FOR IP): Performed by: PHYSICIAN ASSISTANT

## 2021-02-18 PROCEDURE — 99255 IP/OBS CONSLTJ NEW/EST HI 80: CPT | Performed by: PHYSICIAN ASSISTANT

## 2021-02-18 PROCEDURE — 93010 ELECTROCARDIOGRAM REPORT: CPT | Performed by: INTERNAL MEDICINE

## 2021-02-18 PROCEDURE — APPSS60 APP SPLIT SHARED TIME 46-60 MINUTES: Performed by: NURSE PRACTITIONER

## 2021-02-18 PROCEDURE — 99223 1ST HOSP IP/OBS HIGH 75: CPT | Performed by: PSYCHIATRY & NEUROLOGY

## 2021-02-18 PROCEDURE — 2580000003 HC RX 258: Performed by: PHYSICIAN ASSISTANT

## 2021-02-18 PROCEDURE — 72141 MRI NECK SPINE W/O DYE: CPT

## 2021-02-18 PROCEDURE — 2500000003 HC RX 250 WO HCPCS: Performed by: PHYSICIAN ASSISTANT

## 2021-02-18 PROCEDURE — 80076 HEPATIC FUNCTION PANEL: CPT

## 2021-02-18 PROCEDURE — 72170 X-RAY EXAM OF PELVIS: CPT

## 2021-02-18 PROCEDURE — 94640 AIRWAY INHALATION TREATMENT: CPT

## 2021-02-18 PROCEDURE — 80048 BASIC METABOLIC PNL TOTAL CA: CPT

## 2021-02-18 PROCEDURE — 85025 COMPLETE CBC W/AUTO DIFF WBC: CPT

## 2021-02-18 PROCEDURE — 94761 N-INVAS EAR/PLS OXIMETRY MLT: CPT

## 2021-02-18 PROCEDURE — 6360000002 HC RX W HCPCS: Performed by: PHYSICIAN ASSISTANT

## 2021-02-18 PROCEDURE — 36415 COLL VENOUS BLD VENIPUNCTURE: CPT

## 2021-02-18 PROCEDURE — 99222 1ST HOSP IP/OBS MODERATE 55: CPT | Performed by: NURSE PRACTITIONER

## 2021-02-18 PROCEDURE — 1200000003 HC TELEMETRY R&B

## 2021-02-18 PROCEDURE — 99233 SBSQ HOSP IP/OBS HIGH 50: CPT | Performed by: SURGERY

## 2021-02-18 PROCEDURE — 6370000000 HC RX 637 (ALT 250 FOR IP): Performed by: NURSE PRACTITIONER

## 2021-02-18 PROCEDURE — 70551 MRI BRAIN STEM W/O DYE: CPT

## 2021-02-18 RX ORDER — LORAZEPAM 2 MG/ML
1 INJECTION INTRAMUSCULAR ONCE
Status: COMPLETED | OUTPATIENT
Start: 2021-02-18 | End: 2021-02-18

## 2021-02-18 RX ORDER — DEXTROSE MONOHYDRATE 25 G/50ML
12.5 INJECTION, SOLUTION INTRAVENOUS PRN
Status: DISCONTINUED | OUTPATIENT
Start: 2021-02-18 | End: 2021-02-24

## 2021-02-18 RX ORDER — LIDOCAINE 4 G/G
3 PATCH TOPICAL DAILY
Status: DISCONTINUED | OUTPATIENT
Start: 2021-02-18 | End: 2021-02-24 | Stop reason: HOSPADM

## 2021-02-18 RX ORDER — OXYCODONE HYDROCHLORIDE 5 MG/1
10 TABLET ORAL EVERY 4 HOURS PRN
Status: DISCONTINUED | OUTPATIENT
Start: 2021-02-18 | End: 2021-02-22

## 2021-02-18 RX ORDER — INSULIN GLARGINE 100 [IU]/ML
80 INJECTION, SOLUTION SUBCUTANEOUS NIGHTLY
Status: ON HOLD | COMMUNITY
End: 2021-03-12 | Stop reason: SDUPTHER

## 2021-02-18 RX ORDER — TAMSULOSIN HYDROCHLORIDE 0.4 MG/1
0.4 CAPSULE ORAL DAILY
Status: DISCONTINUED | OUTPATIENT
Start: 2021-02-18 | End: 2021-02-24 | Stop reason: HOSPADM

## 2021-02-18 RX ORDER — LIDOCAINE 4 G/G
PATCH TOPICAL
Status: DISPENSED
Start: 2021-02-18 | End: 2021-02-19

## 2021-02-18 RX ORDER — DEXTROSE MONOHYDRATE 50 MG/ML
100 INJECTION, SOLUTION INTRAVENOUS PRN
Status: DISCONTINUED | OUTPATIENT
Start: 2021-02-18 | End: 2021-02-24

## 2021-02-18 RX ORDER — IPRATROPIUM BROMIDE AND ALBUTEROL SULFATE 2.5; .5 MG/3ML; MG/3ML
3 SOLUTION RESPIRATORY (INHALATION) EVERY 4 HOURS PRN
Status: DISCONTINUED | OUTPATIENT
Start: 2021-02-18 | End: 2021-02-18

## 2021-02-18 RX ORDER — ALBUTEROL SULFATE 90 UG/1
1 AEROSOL, METERED RESPIRATORY (INHALATION) EVERY 4 HOURS PRN
Status: DISCONTINUED | OUTPATIENT
Start: 2021-02-18 | End: 2021-02-18

## 2021-02-18 RX ORDER — NICOTINE POLACRILEX 4 MG
15 LOZENGE BUCCAL PRN
Status: DISCONTINUED | OUTPATIENT
Start: 2021-02-18 | End: 2021-02-24 | Stop reason: HOSPADM

## 2021-02-18 RX ORDER — POLYETHYLENE GLYCOL 3350 17 G/17G
17 POWDER, FOR SOLUTION ORAL DAILY
Status: DISCONTINUED | OUTPATIENT
Start: 2021-02-19 | End: 2021-02-24 | Stop reason: HOSPADM

## 2021-02-18 RX ORDER — GABAPENTIN 300 MG/1
300 CAPSULE ORAL NIGHTLY
Status: DISCONTINUED | OUTPATIENT
Start: 2021-02-18 | End: 2021-02-24 | Stop reason: HOSPADM

## 2021-02-18 RX ORDER — LORAZEPAM 2 MG/ML
INJECTION INTRAMUSCULAR
Status: COMPLETED
Start: 2021-02-18 | End: 2021-02-18

## 2021-02-18 RX ORDER — LATANOPROST 50 UG/ML
1 SOLUTION/ DROPS OPHTHALMIC NIGHTLY
Status: DISCONTINUED | OUTPATIENT
Start: 2021-02-18 | End: 2021-02-24 | Stop reason: HOSPADM

## 2021-02-18 RX ORDER — SENNA PLUS 8.6 MG/1
2 TABLET ORAL NIGHTLY
Status: DISCONTINUED | OUTPATIENT
Start: 2021-02-18 | End: 2021-02-24 | Stop reason: HOSPADM

## 2021-02-18 RX ORDER — DULOXETIN HYDROCHLORIDE 60 MG/1
60 CAPSULE, DELAYED RELEASE ORAL DAILY
Status: DISCONTINUED | OUTPATIENT
Start: 2021-02-18 | End: 2021-02-24 | Stop reason: HOSPADM

## 2021-02-18 RX ORDER — ROSUVASTATIN CALCIUM 20 MG/1
20 TABLET, COATED ORAL NIGHTLY
Status: DISCONTINUED | OUTPATIENT
Start: 2021-02-18 | End: 2021-02-24 | Stop reason: HOSPADM

## 2021-02-18 RX ORDER — INSULIN GLARGINE 100 [IU]/ML
80 INJECTION, SOLUTION SUBCUTANEOUS NIGHTLY
Status: DISCONTINUED | OUTPATIENT
Start: 2021-02-18 | End: 2021-02-24 | Stop reason: HOSPADM

## 2021-02-18 RX ORDER — MIRTAZAPINE 15 MG/1
15 TABLET, FILM COATED ORAL NIGHTLY
Status: DISCONTINUED | OUTPATIENT
Start: 2021-02-18 | End: 2021-02-24 | Stop reason: HOSPADM

## 2021-02-18 RX ORDER — DOCUSATE SODIUM 100 MG/1
100 CAPSULE, LIQUID FILLED ORAL 2 TIMES DAILY
Status: DISCONTINUED | OUTPATIENT
Start: 2021-02-18 | End: 2021-02-24 | Stop reason: HOSPADM

## 2021-02-18 RX ORDER — FOLIC ACID 1 MG/1
1 TABLET ORAL DAILY
COMMUNITY
End: 2022-05-04

## 2021-02-18 RX ORDER — IPRATROPIUM BROMIDE AND ALBUTEROL SULFATE 2.5; .5 MG/3ML; MG/3ML
1 SOLUTION RESPIRATORY (INHALATION)
Status: ACTIVE | OUTPATIENT
Start: 2021-02-18 | End: 2021-02-23

## 2021-02-18 RX ORDER — ONDANSETRON 4 MG/1
4 TABLET, FILM COATED ORAL EVERY 8 HOURS PRN
Status: DISCONTINUED | OUTPATIENT
Start: 2021-02-18 | End: 2021-02-24 | Stop reason: HOSPADM

## 2021-02-18 RX ORDER — INSULIN LISPRO 100 [IU]/ML
15 INJECTION, SOLUTION INTRAVENOUS; SUBCUTANEOUS
Status: DISCONTINUED | OUTPATIENT
Start: 2021-02-18 | End: 2021-02-18

## 2021-02-18 RX ORDER — OXYCODONE HYDROCHLORIDE 5 MG/1
5 TABLET ORAL EVERY 4 HOURS PRN
Status: DISCONTINUED | OUTPATIENT
Start: 2021-02-18 | End: 2021-02-22

## 2021-02-18 RX ORDER — SPIRONOLACTONE 25 MG/1
50 TABLET ORAL DAILY
Status: DISCONTINUED | OUTPATIENT
Start: 2021-02-18 | End: 2021-02-24 | Stop reason: HOSPADM

## 2021-02-18 RX ORDER — BUMETANIDE 1 MG/1
1 TABLET ORAL 2 TIMES DAILY
Status: DISCONTINUED | OUTPATIENT
Start: 2021-02-18 | End: 2021-02-24 | Stop reason: HOSPADM

## 2021-02-18 RX ORDER — AMLODIPINE BESYLATE 10 MG/1
10 TABLET ORAL DAILY
Status: DISCONTINUED | OUTPATIENT
Start: 2021-02-18 | End: 2021-02-21

## 2021-02-18 RX ORDER — MIRTAZAPINE 15 MG/1
15 TABLET, FILM COATED ORAL NIGHTLY PRN
Status: ON HOLD | COMMUNITY
End: 2021-08-03

## 2021-02-18 RX ORDER — PRIMIDONE 50 MG/1
50 TABLET ORAL 2 TIMES DAILY
Status: DISCONTINUED | OUTPATIENT
Start: 2021-02-18 | End: 2021-02-24 | Stop reason: HOSPADM

## 2021-02-18 RX ORDER — FOLIC ACID 1 MG/1
1 TABLET ORAL DAILY
Status: DISCONTINUED | OUTPATIENT
Start: 2021-02-18 | End: 2021-02-24 | Stop reason: HOSPADM

## 2021-02-18 RX ADMIN — POLYETHYLENE GLYCOL 3350 17 G: 17 POWDER, FOR SOLUTION ORAL at 08:36

## 2021-02-18 RX ADMIN — FOLIC ACID 1 MG: 1 TABLET ORAL at 16:23

## 2021-02-18 RX ADMIN — AMLODIPINE BESYLATE 10 MG: 10 TABLET ORAL at 16:30

## 2021-02-18 RX ADMIN — LORAZEPAM 1 MG: 2 INJECTION INTRAMUSCULAR at 10:08

## 2021-02-18 RX ADMIN — PRIMIDONE 50 MG: 50 TABLET ORAL at 21:21

## 2021-02-18 RX ADMIN — SODIUM CHLORIDE, PRESERVATIVE FREE 10 ML: 5 INJECTION INTRAVENOUS at 08:14

## 2021-02-18 RX ADMIN — MORPHINE SULFATE 4 MG: 4 INJECTION, SOLUTION INTRAMUSCULAR; INTRAVENOUS at 08:10

## 2021-02-18 RX ADMIN — DULOXETINE HYDROCHLORIDE 60 MG: 60 CAPSULE, DELAYED RELEASE ORAL at 16:28

## 2021-02-18 RX ADMIN — SPIRONOLACTONE 50 MG: 25 TABLET ORAL at 16:28

## 2021-02-18 RX ADMIN — SENNOSIDES 17.2 MG: 8.6 TABLET, FILM COATED ORAL at 21:21

## 2021-02-18 RX ADMIN — ROSUVASTATIN CALCIUM 20 MG: 20 TABLET, FILM COATED ORAL at 21:21

## 2021-02-18 RX ADMIN — BUMETANIDE 1 MG: 1 TABLET ORAL at 16:24

## 2021-02-18 RX ADMIN — IPRATROPIUM BROMIDE AND ALBUTEROL SULFATE 1 AMPULE: .5; 3 SOLUTION RESPIRATORY (INHALATION) at 16:33

## 2021-02-18 RX ADMIN — OXYCODONE 10 MG: 5 TABLET ORAL at 23:58

## 2021-02-18 RX ADMIN — OXYCODONE 10 MG: 5 TABLET ORAL at 18:12

## 2021-02-18 RX ADMIN — LORAZEPAM 1 MG: 2 INJECTION INTRAMUSCULAR; INTRAVENOUS at 10:08

## 2021-02-18 RX ADMIN — GABAPENTIN 300 MG: 300 CAPSULE ORAL at 21:21

## 2021-02-18 RX ADMIN — TAMSULOSIN HYDROCHLORIDE 0.4 MG: 0.4 CAPSULE ORAL at 16:23

## 2021-02-18 RX ADMIN — MORPHINE SULFATE 2 MG: 2 INJECTION, SOLUTION INTRAMUSCULAR; INTRAVENOUS at 09:07

## 2021-02-18 RX ADMIN — FAMOTIDINE 20 MG: 10 INJECTION INTRAVENOUS at 08:10

## 2021-02-18 RX ADMIN — IPRATROPIUM BROMIDE AND ALBUTEROL SULFATE 1 AMPULE: .5; 3 SOLUTION RESPIRATORY (INHALATION) at 11:49

## 2021-02-18 RX ADMIN — INSULIN LISPRO 1 UNITS: 100 INJECTION, SOLUTION INTRAVENOUS; SUBCUTANEOUS at 21:21

## 2021-02-18 RX ADMIN — SODIUM CHLORIDE: 9 INJECTION, SOLUTION INTRAVENOUS at 00:27

## 2021-02-18 RX ADMIN — DOCUSATE SODIUM 100 MG: 100 CAPSULE, LIQUID FILLED ORAL at 08:36

## 2021-02-18 RX ADMIN — FAMOTIDINE 20 MG: 10 INJECTION INTRAVENOUS at 00:33

## 2021-02-18 RX ADMIN — DOCUSATE SODIUM 100 MG: 100 CAPSULE, LIQUID FILLED ORAL at 21:21

## 2021-02-18 RX ADMIN — FAMOTIDINE 20 MG: 10 INJECTION INTRAVENOUS at 21:21

## 2021-02-18 RX ADMIN — MIRTAZAPINE 15 MG: 15 TABLET, FILM COATED ORAL at 21:21

## 2021-02-18 RX ADMIN — OXYCODONE HYDROCHLORIDE AND ACETAMINOPHEN 2 TABLET: 5; 325 TABLET ORAL at 00:27

## 2021-02-18 RX ADMIN — OXYCODONE HYDROCHLORIDE AND ACETAMINOPHEN 2 TABLET: 5; 325 TABLET ORAL at 05:28

## 2021-02-18 ASSESSMENT — PAIN SCALES - GENERAL
PAINLEVEL_OUTOF10: 10
PAINLEVEL_OUTOF10: 10
PAINLEVEL_OUTOF10: 9
PAINLEVEL_OUTOF10: 10
PAINLEVEL_OUTOF10: 6
PAINLEVEL_OUTOF10: 9

## 2021-02-18 ASSESSMENT — ENCOUNTER SYMPTOMS
ABDOMINAL PAIN: 0
ABDOMINAL PAIN: 1
TROUBLE SWALLOWING: 0
BACK PAIN: 1
NAUSEA: 0
SHORTNESS OF BREATH: 0
VOMITING: 0
CHEST TIGHTNESS: 0

## 2021-02-18 ASSESSMENT — PAIN DESCRIPTION - PAIN TYPE: TYPE: ACUTE PAIN

## 2021-02-18 ASSESSMENT — PAIN DESCRIPTION - LOCATION: LOCATION: NECK;BACK
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
## 2021-02-18 NOTE — ED NOTES
Pt at MRI at this time. Pt remains A&Ox4. Pt respirations easy and unlabored.  VSS Leanord Fleischer, RN  02/17/21 1952

## 2021-02-18 NOTE — PROGRESS NOTES
Pharmacy Medication History Note      List of current medications patient is taking is complete. Source of information: Patient's wife, Keisha and external fill history    Changes made to medication list:  Medications removed (include reason, ex. therapy complete or physician discontinued):  NONE    Medications added/doses adjusted: Added folic acid 1 mg - 1 tablet daily    Adjusted Lantus to 80 units nightly with a note stating patient is taking 90 units nightly  Adjusted Humalog to 7 units TID with meals plus Sliding scale. Adjusted gabapentin 300 mg nightly to gabapentin 300 mg - 1 tablet nightly PRN  Adjusted mirtazapine 15 mg nightly to mirtazapine 15 mg - 1 tablet nightly PRN    Other notes (ex. Recent course of antibiotics, Coumadin dosing):  Patient is taking Lantus 90 units nightly as noted on home med list.  Patient adjusted Lantus dose himself. Per pharmacy, last prescription was written for 80 units at bedtime. Also patient does use a sliding scale for his Humalog. Patient's wife did not have this information with her at this time but I instructed her to bring it in so we could update med list.  Patient also stated that he ran out of Cognovant and was unsure if he should still be taking this medication. Denies use of other OTC or herbal medications.       Allergies reviewed      Electronically signed by Tom Aguirre, [facility] on 2/18/2021 at 11:26 AM

## 2021-02-18 NOTE — PROGRESS NOTES
[address] THERAPY MISSED TREATMENT NOTE  STR NEUROSCIENCES 4A  4A-10/010-A      Date: 2021  Patient Name: Erwin De Anda        CSN: [id number]   : 1965  (48 y.o.)  Gender: male                REASON FOR MISSED TREATMENT: patient on strict bedrest. will attempt to evaluate when patient appropriate for eval and tx.

## 2021-02-18 NOTE — ED NOTES
Pt resting on cot, pt updated on POC. Pt VSS. Call light in reach. Will continue to monitor.      Martha Veronica RN  02/17/21 0504

## 2021-02-18 NOTE — ED NOTES
ED to inpatient nurses report    Chief Complaint   Patient presents with    Trauma      Present to ED from home  LOC: alert and orientated to name, place, date  Vital signs   Vitals:    02/17/21 1953 02/17/21 2115 02/17/21 2159 02/17/21 2308   BP: 120/87 105/77  99/69   Pulse: 80 90 82 84   Resp: 16 17 20 16   Temp:       TempSrc:       SpO2: 98% 95% 95% 95%   Weight:       Height:          Oxygen Baseline RA    Current needs required RA Bipap/Cpap No  LDAs:   Peripheral IV 02/17/21 Right Antecubital (Active)   Site Assessment Clean;Dry; Intact 02/17/21 1831   Line Status Flushed 02/17/21 1831   Dressing Status Intact;Dry;Clean 02/17/21 1831   Dressing Intervention New 02/17/21 1831       Peripheral IV 02/17/21 Left Forearm (Active)   Site Assessment Clean;Dry; Intact 02/17/21 1832   Line Status Flushed;Specimen collected 02/17/21 1832   Dressing Status Intact;Dry;Clean 02/17/21 1832     Mobility: Fully dependent  Pending ED orders: none  Present condition: Pt resting on cot.  VSS      Electronically signed by Maru Miller RN on 2/17/2021 at 11:18 PM     Kiana Silvestre RN  02/17/21 9445

## 2021-02-18 NOTE — ED NOTES
MRI form completed at this time. Pt unable to sign due to being legally blind.      Jazmín Gould RN  02/17/21 5865

## 2021-02-18 NOTE — ED NOTES
Pt returned to room from [facility], [address] [place] [address]  02/17/21 8606

## 2021-02-18 NOTE — PLAN OF CARE
Problem: Pain:  Goal: Pain level will decrease  Description: Pain level will decrease  2/18/2021 1101 by Randy Martin RN  Outcome: Ongoing  2/18/2021 0703 by Meg Morales RN  Outcome: Ongoing  Note: Patient able to use 0-10 pain scale. Denies pain at this time. Agreeable to take PRN pain medications. Turned for comfort. Lights dimmed. Goal: Control of acute pain  Description: Control of acute pain  2/18/2021 1101 by Randy Martin RN  Outcome: Ongoing  2/18/2021 0703 by Meg Morales RN  Outcome: Ongoing  Note: Patient able to use 0-10 pain scale. Denies pain at this time. Agreeable to take PRN pain medications. Goal: Control of chronic pain  Description: Control of chronic pain  2/18/2021 1101 by Randy Martin RN  Outcome: Ongoing  2/18/2021 0703 by Meg Morales RN  Outcome: Ongoing  Note: Patient able to use 0-10 pain scale. Denies pain at this time. Agreeable to take PRN pain medications. Problem: Falls - Risk of:  Goal: Will remain free from falls  Description: Will remain free from falls  2/18/2021 1101 by Randy Martin RN  Outcome: Ongoing  2/18/2021 0703 by Meg Morales RN  Outcome: Ongoing  Note: Call light in reach, bed in lowest position, bed alarm activated. Educated on use of call light when in need of assistance. Patient expressed understanding. Hourly visual checks performed and charted. Toileting offered to patient. No falls during shift, at this time. Arm band & falling star in place. Continuing to monitor. Goal: Absence of physical injury  Description: Absence of physical injury  2/18/2021 1101 by Randy Martin RN  Outcome: Ongoing  2/18/2021 0703 by Meg Morales RN  Outcome: Ongoing  Note: Patient in [address] at this time.       Problem: Skin Integrity:  Goal: Will show no infection signs and symptoms  Description: Will show no infection signs and symptoms  2/18/2021 1101 by Randy Martin RN  Outcome: Ongoing  2/18/2021 0703 by Meg Morales RN  Outcome: Ongoing  Note: No signs of new skin breakdown with each assessment. Skin remains warm, dry, intact. Mucous membranes pink & moist. Patient is able to turn self. Goal: Absence of new skin breakdown  Description: Absence of new skin breakdown  2/18/2021 1101 by Kemar Bolden RN  Outcome: Ongoing  2/18/2021 0703 by John Mullen RN  Outcome: Ongoing  Note: No signs of skin breakdown. Skin war, dry, intact. Mucous membranes pink, moist. Patient turns self. Assistance with turns/ambulationprovided PRN. Continue to monitor.

## 2021-02-18 NOTE — PROGRESS NOTES
Pt unable to lay still for cervical mri, given a dose of ativan iv while in mri at 10am. Pt go to OR this afternoon per Dr Yeni Galicia

## 2021-02-18 NOTE — ED NOTES
Pt at CT at this time. Pt VSS. Pt remains A&Ox4. Pt going to MRI after CT.           Benedicto Cali, JERRELL  02/17/21 1940
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
## 2021-02-18 NOTE — ED NOTES
Dr Teetee Perea at bedside for [address] [facility], [address] [place] [address]  02/17/21 3072
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
## 2021-02-18 NOTE — ED NOTES
Neena Baumgarten PA at bedside with Dr Isabella Simons completing FAST exam      Jasper Benitez RN  02/17/21 [id number]
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
## 2021-02-18 NOTE — PLAN OF CARE
Problem: Pain:  Goal: Pain level will decrease  Description: Pain level will decrease  Outcome: Ongoing  Note: Patient able to use 0-10 pain scale. Denies pain at this time. Agreeable to take PRN pain medications. Turned for comfort. Lights dimmed. Goal: Control of acute pain  Description: Control of acute pain  Outcome: Ongoing  Note: Patient able to use 0-10 pain scale. Denies pain at this time. Agreeable to take PRN pain medications. Goal: Control of chronic pain  Description: Control of chronic pain  Outcome: Ongoing  Note: Patient able to use 0-10 pain scale. Denies pain at this time. Agreeable to take PRN pain medications. Problem: Falls - Risk of:  Goal: Will remain free from falls  Description: Will remain free from falls  Outcome: Ongoing  Note: Call light in reach, bed in lowest position, bed alarm activated. Educated on use of call light when in need of assistance. Patient expressed understanding. Hourly visual checks performed and charted. Toileting offered to patient. No falls during shift, at this time. Arm band & falling star in place. Continuing to monitor. Goal: Absence of physical injury  Description: Absence of physical injury  Outcome: Ongoing  Note: Patient in C[place] at this time. Problem: Skin Integrity:  Goal: Will show no infection signs and symptoms  Description: Will show no infection signs and symptoms  Outcome: Ongoing  Note: No signs of new skin breakdown with each assessment. Skin remains warm, dry, intact. Mucous membranes pink & moist. Patient is able to turn self. Goal: Absence of new skin breakdown  Description: Absence of new skin breakdown  Outcome: Ongoing  Note: No signs of skin breakdown. Skin war, dry, intact. Mucous membranes pink, moist. Patient turns self. Assistance with turns/ambulationprovided PRN. Continue to monitor. Care plan reviewed with patient. Patient verbalize understanding of the plan of care and contribute to goal setting.

## 2021-02-18 NOTE — FLOWSHEET NOTE
Able to lift bilat legs off of bed, new numbness from L knee down. L leg weaker than right. neurosurg notified.

## 2021-02-19 ENCOUNTER — APPOINTMENT (OUTPATIENT)
Dept: GENERAL RADIOLOGY | Age: 56
DRG: 321 | End: 2021-02-19
Payer: MEDICAID

## 2021-02-19 VITALS
OXYGEN SATURATION: 100 % | DIASTOLIC BLOOD PRESSURE: 72 MMHG | RESPIRATION RATE: 7 BRPM | SYSTOLIC BLOOD PRESSURE: 116 MMHG | TEMPERATURE: 95.9 F

## 2021-02-19 PROBLEM — S32.020A COMPRESSION FRACTURE OF L2 LUMBAR VERTEBRA, CLOSED, INITIAL ENCOUNTER (HCC): Status: ACTIVE | Noted: 2021-02-18

## 2021-02-19 LAB
GLUCOSE BLD-MCNC: 146 MG/DL (ref 70–108)
GLUCOSE BLD-MCNC: 146 MG/DL (ref 70–108)
GLUCOSE BLD-MCNC: 190 MG/DL (ref 70–108)
GLUCOSE BLD-MCNC: 241 MG/DL (ref 70–108)
GLUCOSE BLD-MCNC: 313 MG/DL (ref 70–108)

## 2021-02-19 PROCEDURE — 0PB43ZX EXCISION OF THORACIC VERTEBRA, PERCUTANEOUS APPROACH, DIAGNOSTIC: ICD-10-PCS | Performed by: PAIN MEDICINE

## 2021-02-19 PROCEDURE — 2580000003 HC RX 258: Performed by: ANESTHESIOLOGY

## 2021-02-19 PROCEDURE — 2500000003 HC RX 250 WO HCPCS: Performed by: PAIN MEDICINE

## 2021-02-19 PROCEDURE — 3600000002 HC SURGERY LEVEL 2 BASE: Performed by: PAIN MEDICINE

## 2021-02-19 PROCEDURE — 2720000010 HC SURG SUPPLY STERILE: Performed by: PAIN MEDICINE

## 2021-02-19 PROCEDURE — 6370000000 HC RX 637 (ALT 250 FOR IP): Performed by: PAIN MEDICINE

## 2021-02-19 PROCEDURE — 6370000000 HC RX 637 (ALT 250 FOR IP): Performed by: NURSE PRACTITIONER

## 2021-02-19 PROCEDURE — 3700000001 HC ADD 15 MINUTES (ANESTHESIA): Performed by: PAIN MEDICINE

## 2021-02-19 PROCEDURE — C1713 ANCHOR/SCREW BN/BN,TIS/BN: HCPCS | Performed by: PAIN MEDICINE

## 2021-02-19 PROCEDURE — 94640 AIRWAY INHALATION TREATMENT: CPT

## 2021-02-19 PROCEDURE — 3700000000 HC ANESTHESIA ATTENDED CARE: Performed by: PAIN MEDICINE

## 2021-02-19 PROCEDURE — 82948 REAGENT STRIP/BLOOD GLUCOSE: CPT

## 2021-02-19 PROCEDURE — 99232 SBSQ HOSP IP/OBS MODERATE 35: CPT | Performed by: NEUROLOGICAL SURGERY

## 2021-02-19 PROCEDURE — 0PU43JZ SUPPLEMENT THORACIC VERTEBRA WITH SYNTHETIC SUBSTITUTE, PERCUTANEOUS APPROACH: ICD-10-PCS | Performed by: PAIN MEDICINE

## 2021-02-19 PROCEDURE — 3600000012 HC SURGERY LEVEL 2 ADDTL 15MIN: Performed by: PAIN MEDICINE

## 2021-02-19 PROCEDURE — 2580000003 HC RX 258: Performed by: NURSE ANESTHETIST, CERTIFIED REGISTERED

## 2021-02-19 PROCEDURE — 6360000002 HC RX W HCPCS: Performed by: NURSE ANESTHETIST, CERTIFIED REGISTERED

## 2021-02-19 PROCEDURE — 2580000003 HC RX 258: Performed by: PHYSICIAN ASSISTANT

## 2021-02-19 PROCEDURE — 2500000003 HC RX 250 WO HCPCS: Performed by: NURSE ANESTHETIST, CERTIFIED REGISTERED

## 2021-02-19 PROCEDURE — 2500000003 HC RX 250 WO HCPCS: Performed by: PHYSICIAN ASSISTANT

## 2021-02-19 PROCEDURE — 0QB03ZX EXCISION OF LUMBAR VERTEBRA, PERCUTANEOUS APPROACH, DIAGNOSTIC: ICD-10-PCS | Performed by: PAIN MEDICINE

## 2021-02-19 PROCEDURE — 7100000001 HC PACU RECOVERY - ADDTL 15 MIN: Performed by: PAIN MEDICINE

## 2021-02-19 PROCEDURE — C1894 INTRO/SHEATH, NON-LASER: HCPCS | Performed by: PAIN MEDICINE

## 2021-02-19 PROCEDURE — 0PS43ZZ REPOSITION THORACIC VERTEBRA, PERCUTANEOUS APPROACH: ICD-10-PCS | Performed by: PAIN MEDICINE

## 2021-02-19 PROCEDURE — 1200000000 HC SEMI PRIVATE

## 2021-02-19 PROCEDURE — 2709999900 HC NON-CHARGEABLE SUPPLY: Performed by: PAIN MEDICINE

## 2021-02-19 PROCEDURE — APPSS30 APP SPLIT SHARED TIME 16-30 MINUTES: Performed by: PHYSICIAN ASSISTANT

## 2021-02-19 PROCEDURE — 99232 SBSQ HOSP IP/OBS MODERATE 35: CPT | Performed by: SURGERY

## 2021-02-19 PROCEDURE — 0QS03ZZ REPOSITION LUMBAR VERTEBRA, PERCUTANEOUS APPROACH: ICD-10-PCS | Performed by: PAIN MEDICINE

## 2021-02-19 PROCEDURE — 6370000000 HC RX 637 (ALT 250 FOR IP): Performed by: PHYSICIAN ASSISTANT

## 2021-02-19 PROCEDURE — APPSS60 APP SPLIT SHARED TIME 46-60 MINUTES: Performed by: PHYSICIAN ASSISTANT

## 2021-02-19 PROCEDURE — 7100000000 HC PACU RECOVERY - FIRST 15 MIN: Performed by: PAIN MEDICINE

## 2021-02-19 PROCEDURE — 2580000003 HC RX 258: Performed by: PAIN MEDICINE

## 2021-02-19 PROCEDURE — 22513 PERQ VERTEBRAL AUGMENTATION: CPT | Performed by: PAIN MEDICINE

## 2021-02-19 PROCEDURE — 3209999900 FLUORO FOR SURGICAL PROCEDURES

## 2021-02-19 PROCEDURE — 94760 N-INVAS EAR/PLS OXIMETRY 1: CPT

## 2021-02-19 PROCEDURE — 88305 TISSUE EXAM BY PATHOLOGIST: CPT

## 2021-02-19 PROCEDURE — 22515 PERQ VERTEBRAL AUGMENTATION: CPT | Performed by: PAIN MEDICINE

## 2021-02-19 PROCEDURE — 0QU03JZ SUPPLEMENT LUMBAR VERTEBRA WITH SYNTHETIC SUBSTITUTE, PERCUTANEOUS APPROACH: ICD-10-PCS | Performed by: PAIN MEDICINE

## 2021-02-19 DEVICE — BONE CEMENT CX01A XPEDE US
Type: IMPLANTABLE DEVICE | Status: FUNCTIONAL
Brand: KYPHON® XPEDE™ BONE CEMENT

## 2021-02-19 RX ORDER — CEFAZOLIN SODIUM 1 G/3ML
INJECTION, POWDER, FOR SOLUTION INTRAMUSCULAR; INTRAVENOUS PRN
Status: DISCONTINUED | OUTPATIENT
Start: 2021-02-19 | End: 2021-02-19 | Stop reason: SDUPTHER

## 2021-02-19 RX ORDER — LABETALOL 20 MG/4 ML (5 MG/ML) INTRAVENOUS SYRINGE
10 EVERY 10 MIN PRN
Status: DISCONTINUED | OUTPATIENT
Start: 2021-02-19 | End: 2021-02-19 | Stop reason: HOSPADM

## 2021-02-19 RX ORDER — PROPOFOL 10 MG/ML
INJECTION, EMULSION INTRAVENOUS PRN
Status: DISCONTINUED | OUTPATIENT
Start: 2021-02-19 | End: 2021-02-19 | Stop reason: SDUPTHER

## 2021-02-19 RX ORDER — SODIUM CHLORIDE 9 MG/ML
INJECTION, SOLUTION INTRAVENOUS ONCE
Status: COMPLETED | OUTPATIENT
Start: 2021-02-19 | End: 2021-02-19

## 2021-02-19 RX ORDER — BUPIVACAINE HYDROCHLORIDE 5 MG/ML
INJECTION, SOLUTION PERINEURAL PRN
Status: DISCONTINUED | OUTPATIENT
Start: 2021-02-19 | End: 2021-02-19 | Stop reason: HOSPADM

## 2021-02-19 RX ORDER — MORPHINE SULFATE 2 MG/ML
2 INJECTION, SOLUTION INTRAMUSCULAR; INTRAVENOUS EVERY 5 MIN PRN
Status: DISCONTINUED | OUTPATIENT
Start: 2021-02-19 | End: 2021-02-19 | Stop reason: HOSPADM

## 2021-02-19 RX ORDER — ROCURONIUM BROMIDE 10 MG/ML
INJECTION, SOLUTION INTRAVENOUS PRN
Status: DISCONTINUED | OUTPATIENT
Start: 2021-02-19 | End: 2021-02-19 | Stop reason: SDUPTHER

## 2021-02-19 RX ORDER — SODIUM CHLORIDE 9 MG/ML
INJECTION, SOLUTION INTRAVENOUS CONTINUOUS PRN
Status: DISCONTINUED | OUTPATIENT
Start: 2021-02-19 | End: 2021-02-19 | Stop reason: SDUPTHER

## 2021-02-19 RX ORDER — ONDANSETRON 2 MG/ML
INJECTION INTRAMUSCULAR; INTRAVENOUS PRN
Status: DISCONTINUED | OUTPATIENT
Start: 2021-02-19 | End: 2021-02-19 | Stop reason: SDUPTHER

## 2021-02-19 RX ORDER — FENTANYL CITRATE 50 UG/ML
50 INJECTION, SOLUTION INTRAMUSCULAR; INTRAVENOUS EVERY 5 MIN PRN
Status: DISCONTINUED | OUTPATIENT
Start: 2021-02-19 | End: 2021-02-19 | Stop reason: HOSPADM

## 2021-02-19 RX ORDER — LIDOCAINE HCL/PF 100 MG/5ML
SYRINGE (ML) INJECTION PRN
Status: DISCONTINUED | OUTPATIENT
Start: 2021-02-19 | End: 2021-02-19 | Stop reason: SDUPTHER

## 2021-02-19 RX ORDER — MIDAZOLAM HYDROCHLORIDE 1 MG/ML
INJECTION INTRAMUSCULAR; INTRAVENOUS PRN
Status: DISCONTINUED | OUTPATIENT
Start: 2021-02-19 | End: 2021-02-19 | Stop reason: SDUPTHER

## 2021-02-19 RX ORDER — SUCCINYLCHOLINE/SOD CL,ISO/PF 200MG/10ML
SYRINGE (ML) INTRAVENOUS PRN
Status: DISCONTINUED | OUTPATIENT
Start: 2021-02-19 | End: 2021-02-19 | Stop reason: SDUPTHER

## 2021-02-19 RX ORDER — FENTANYL CITRATE 50 UG/ML
INJECTION, SOLUTION INTRAMUSCULAR; INTRAVENOUS PRN
Status: DISCONTINUED | OUTPATIENT
Start: 2021-02-19 | End: 2021-02-19 | Stop reason: SDUPTHER

## 2021-02-19 RX ORDER — DEXAMETHASONE SODIUM PHOSPHATE 10 MG/ML
INJECTION, EMULSION INTRAMUSCULAR; INTRAVENOUS PRN
Status: DISCONTINUED | OUTPATIENT
Start: 2021-02-19 | End: 2021-02-19 | Stop reason: SDUPTHER

## 2021-02-19 RX ADMIN — POLYETHYLENE GLYCOL 3350 17 G: 17 POWDER, FOR SOLUTION ORAL at 14:18

## 2021-02-19 RX ADMIN — INSULIN LISPRO 2 UNITS: 100 INJECTION, SOLUTION INTRAVENOUS; SUBCUTANEOUS at 21:01

## 2021-02-19 RX ADMIN — INSULIN GLARGINE 80 UNITS: 100 INJECTION, SOLUTION SUBCUTANEOUS at 21:07

## 2021-02-19 RX ADMIN — FAMOTIDINE 20 MG: 10 INJECTION INTRAVENOUS at 08:58

## 2021-02-19 RX ADMIN — ROCURONIUM BROMIDE 25 MG: 10 INJECTION INTRAVENOUS at 11:01

## 2021-02-19 RX ADMIN — SODIUM CHLORIDE: 9 INJECTION, SOLUTION INTRAVENOUS at 10:39

## 2021-02-19 RX ADMIN — ROSUVASTATIN CALCIUM 20 MG: 20 TABLET, FILM COATED ORAL at 21:00

## 2021-02-19 RX ADMIN — Medication 120 MG: at 10:39

## 2021-02-19 RX ADMIN — FAMOTIDINE 20 MG: 10 INJECTION INTRAVENOUS at 21:02

## 2021-02-19 RX ADMIN — MIDAZOLAM HYDROCHLORIDE 2 MG: 1 INJECTION, SOLUTION INTRAMUSCULAR; INTRAVENOUS at 10:39

## 2021-02-19 RX ADMIN — SENNOSIDES 17.2 MG: 8.6 TABLET, FILM COATED ORAL at 21:01

## 2021-02-19 RX ADMIN — DOCUSATE SODIUM 100 MG: 100 CAPSULE, LIQUID FILLED ORAL at 20:59

## 2021-02-19 RX ADMIN — ONDANSETRON HYDROCHLORIDE 4 MG: 4 INJECTION, SOLUTION INTRAMUSCULAR; INTRAVENOUS at 10:39

## 2021-02-19 RX ADMIN — IPRATROPIUM BROMIDE AND ALBUTEROL SULFATE 1 AMPULE: .5; 3 SOLUTION RESPIRATORY (INHALATION) at 07:59

## 2021-02-19 RX ADMIN — IPRATROPIUM BROMIDE AND ALBUTEROL SULFATE 1 AMPULE: .5; 3 SOLUTION RESPIRATORY (INHALATION) at 16:15

## 2021-02-19 RX ADMIN — OXYCODONE HYDROCHLORIDE 5 MG: 5 TABLET ORAL at 14:18

## 2021-02-19 RX ADMIN — LATANOPROST 1 DROP: 50 SOLUTION OPHTHALMIC at 21:01

## 2021-02-19 RX ADMIN — BUMETANIDE 1 MG: 1 TABLET ORAL at 17:18

## 2021-02-19 RX ADMIN — PRIMIDONE 50 MG: 50 TABLET ORAL at 21:00

## 2021-02-19 RX ADMIN — CEFAZOLIN 2000 MG: 1 INJECTION, POWDER, FOR SOLUTION INTRAMUSCULAR; INTRAVENOUS; PARENTERAL at 10:49

## 2021-02-19 RX ADMIN — DOCUSATE SODIUM 100 MG: 100 CAPSULE, LIQUID FILLED ORAL at 14:18

## 2021-02-19 RX ADMIN — SUGAMMADEX 200 MG: 100 INJECTION, SOLUTION INTRAVENOUS at 12:24

## 2021-02-19 RX ADMIN — FENTANYL CITRATE 100 MCG: 50 INJECTION, SOLUTION INTRAMUSCULAR; INTRAVENOUS at 10:39

## 2021-02-19 RX ADMIN — SODIUM CHLORIDE: 9 INJECTION, SOLUTION INTRAVENOUS at 13:25

## 2021-02-19 RX ADMIN — PROPOFOL 200 MG: 10 INJECTION, EMULSION INTRAVENOUS at 10:39

## 2021-02-19 RX ADMIN — OXYCODONE 10 MG: 5 TABLET ORAL at 20:59

## 2021-02-19 RX ADMIN — IPRATROPIUM BROMIDE AND ALBUTEROL SULFATE 1 AMPULE: .5; 3 SOLUTION RESPIRATORY (INHALATION) at 21:20

## 2021-02-19 RX ADMIN — SODIUM CHLORIDE, PRESERVATIVE FREE 10 ML: 5 INJECTION INTRAVENOUS at 08:59

## 2021-02-19 RX ADMIN — DEXAMETHASONE SODIUM PHOSPHATE 4 MG: 10 INJECTION, EMULSION INTRAMUSCULAR; INTRAVENOUS at 10:39

## 2021-02-19 RX ADMIN — LATANOPROST 1 DROP: 50 SOLUTION OPHTHALMIC at 00:01

## 2021-02-19 RX ADMIN — GABAPENTIN 300 MG: 300 CAPSULE ORAL at 21:00

## 2021-02-19 RX ADMIN — SODIUM CHLORIDE, PRESERVATIVE FREE 10 ML: 5 INJECTION INTRAVENOUS at 21:03

## 2021-02-19 RX ADMIN — MIRTAZAPINE 15 MG: 15 TABLET, FILM COATED ORAL at 21:01

## 2021-02-19 RX ADMIN — Medication 100 MG: at 10:39

## 2021-02-19 ASSESSMENT — PULMONARY FUNCTION TESTS
PIF_VALUE: 19
PIF_VALUE: 3
PIF_VALUE: 18
PIF_VALUE: 18
PIF_VALUE: 20
PIF_VALUE: 3
PIF_VALUE: 10
PIF_VALUE: 2
PIF_VALUE: 18
PIF_VALUE: 14
PIF_VALUE: 18
PIF_VALUE: 19
PIF_VALUE: 12
PIF_VALUE: 14
PIF_VALUE: 19
PIF_VALUE: 18
PIF_VALUE: 19
PIF_VALUE: 18
PIF_VALUE: 18
PIF_VALUE: 0
PIF_VALUE: 19
PIF_VALUE: 18
PIF_VALUE: 19
PIF_VALUE: 18
PIF_VALUE: 14
PIF_VALUE: 0
PIF_VALUE: 0
PIF_VALUE: 18
PIF_VALUE: 18
PIF_VALUE: 19
PIF_VALUE: 18
PIF_VALUE: 18
PIF_VALUE: 19
PIF_VALUE: 18
PIF_VALUE: 0
PIF_VALUE: 18
PIF_VALUE: 18
PIF_VALUE: 19
PIF_VALUE: 12
PIF_VALUE: 18
PIF_VALUE: 12
PIF_VALUE: 18
PIF_VALUE: 18
PIF_VALUE: 14
PIF_VALUE: 19
PIF_VALUE: 3
PIF_VALUE: 18
PIF_VALUE: 3
PIF_VALUE: 23
PIF_VALUE: 19
PIF_VALUE: 6
PIF_VALUE: 2
PIF_VALUE: 19
PIF_VALUE: 18
PIF_VALUE: 18
PIF_VALUE: 19
PIF_VALUE: 18
PIF_VALUE: 3
PIF_VALUE: 18
PIF_VALUE: 19

## 2021-02-19 ASSESSMENT — PAIN SCALES - GENERAL
PAINLEVEL_OUTOF10: 0
PAINLEVEL_OUTOF10: 10
PAINLEVEL_OUTOF10: 0
PAINLEVEL_OUTOF10: 0
PAINLEVEL_OUTOF10: 9

## 2021-02-19 ASSESSMENT — PAIN DESCRIPTION - PAIN TYPE
TYPE: ACUTE PAIN
TYPE: SURGICAL PAIN

## 2021-02-19 ASSESSMENT — PAIN DESCRIPTION - PROGRESSION: CLINICAL_PROGRESSION: NOT CHANGED

## 2021-02-19 ASSESSMENT — LIFESTYLE VARIABLES: SMOKING_STATUS: 1

## 2021-02-19 ASSESSMENT — PAIN DESCRIPTION - DESCRIPTORS: DESCRIPTORS: ACHING;CONSTANT

## 2021-02-19 ASSESSMENT — PAIN DESCRIPTION - LOCATION
LOCATION: BACK
LOCATION: BACK

## 2021-02-19 NOTE — ANESTHESIA POSTPROCEDURE EVALUATION
Department of Anesthesiology  Postprocedure Note    Patient: Tomas Monreal  MRN: [id number]  YOB: 1965  Date of evaluation: 2/19/2021  Time:  3:29 PM     Procedure Summary     Date: 02/19/21 Room / Location: [address]    Anesthesia Start: 1034 Anesthesia Stop: 1244    Procedure: KYPHOPLASTY at T4, L2, L4 (N/A Back) Diagnosis: (Compression fracture of thoracic vertebra)    Surgeons: Lianne Pisano MD Responsible Provider: Pilar Zamora MD    Anesthesia Type: general ASA Status: 4          Anesthesia Type: general    Rae Phase I: Rae Score: 10    Rae Phase II:      Last vitals: Reviewed and per EMR flowsheets.        Anesthesia Post Evaluation    Patient location during evaluation: PACU  Patient participation: complete - patient participated  Level of consciousness: awake  Airway patency: patent  Nausea & Vomiting: no vomiting and no nausea  Complications: no  Cardiovascular status: hemodynamically stable  Respiratory status: acceptable  Hydration status: stable

## 2021-02-19 NOTE — PLAN OF CARE
Problem: Pain:  Goal: Pain level will decrease  Description: Pain level will decrease  2/18/2021 2108 by Luke Almonte RN  Outcome: Ongoing     Problem: Pain:  Goal: Control of acute pain  Description: Control of acute pain  2/18/2021 2108 by Luke Almonte RN  Outcome: Ongoing     Problem: Pain:  Goal: Control of chronic pain  Description: Control of chronic pain  2/18/2021 2108 by Luke Almonte RN  Outcome: Ongoing     Problem: Falls - Risk of:  Goal: Will remain free from falls  Description: Will remain free from falls  2/18/2021 2108 by Luke Almonte RN  Outcome: Ongoing     Problem: Falls - Risk of:  Goal: Absence of physical injury  Description: Absence of physical injury  2/18/2021 2108 by Luke Almonte RN  Outcome: Ongoing     Problem: Skin Integrity:  Goal: Will show no infection signs and symptoms  Description: Will show no infection signs and symptoms  2/18/2021 2108 by Luke Almonte RN  Outcome: Ongoing     Problem: Skin Integrity:  Goal: Absence of new skin breakdown  Description: Absence of new skin breakdown  2/18/2021 2108 by Luke Almonte RN  Outcome: Ongoing

## 2021-02-19 NOTE — PROGRESS NOTES
[address] [place] [id number]  PHYSICAL THERAPY MISSED TREATMENT NOTE  [facility]  [facility]              Missed Treatment  Pt getting ready to leave for kyphoplasty. Will re-attempt as time allows and pt is appropriate.
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
## 2021-02-19 NOTE — PROGRESS NOTES
Patient spoke with wife who has his cell phone and wallet.     Report called to [facility]  on [address]

## 2021-02-20 ENCOUNTER — APPOINTMENT (OUTPATIENT)
Dept: ULTRASOUND IMAGING | Age: 56
DRG: 321 | End: 2021-02-20
Payer: MEDICAID

## 2021-02-20 ENCOUNTER — APPOINTMENT (OUTPATIENT)
Dept: MRI IMAGING | Age: 56
DRG: 321 | End: 2021-02-20
Payer: MEDICAID

## 2021-02-20 LAB
ALBUMIN SERPL-MCNC: 3.4 G/DL (ref 3.5–5.1)
ALP BLD-CCNC: 179 U/L (ref 38–126)
ALT SERPL-CCNC: 60 U/L (ref 11–66)
ANION GAP SERPL CALCULATED.3IONS-SCNC: 11 MEQ/L (ref 8–16)
ANION GAP SERPL CALCULATED.3IONS-SCNC: 13 MEQ/L (ref 8–16)
AST SERPL-CCNC: 100 U/L (ref 5–40)
BASOPHILS # BLD: 0.1 %
BASOPHILS ABSOLUTE: 0 THOU/MM3 (ref 0–0.1)
BILIRUB SERPL-MCNC: 1.1 MG/DL (ref 0.3–1.2)
BUN BLDV-MCNC: 53 MG/DL (ref 7–22)
BUN BLDV-MCNC: 56 MG/DL (ref 7–22)
CALCIUM SERPL-MCNC: 9.3 MG/DL (ref 8.5–10.5)
CALCIUM SERPL-MCNC: 9.8 MG/DL (ref 8.5–10.5)
CHLORIDE BLD-SCNC: 93 MEQ/L (ref 98–111)
CHLORIDE BLD-SCNC: 95 MEQ/L (ref 98–111)
CO2: 21 MEQ/L (ref 23–33)
CO2: 22 MEQ/L (ref 23–33)
CREAT SERPL-MCNC: 2.1 MG/DL (ref 0.4–1.2)
CREAT SERPL-MCNC: 2.4 MG/DL (ref 0.4–1.2)
CREATININE URINE: 41.6 MG/DL
EOSINOPHIL # BLD: 0 %
EOSINOPHILS ABSOLUTE: 0 THOU/MM3 (ref 0–0.4)
ERYTHROCYTE [DISTWIDTH] IN BLOOD BY AUTOMATED COUNT: 14.1 % (ref 11.5–14.5)
ERYTHROCYTE [DISTWIDTH] IN BLOOD BY AUTOMATED COUNT: 50.4 FL (ref 35–45)
GFR SERPL CREATININE-BSD FRML MDRD: 28 ML/MIN/1.73M2
GFR SERPL CREATININE-BSD FRML MDRD: 33 ML/MIN/1.73M2
GLUCOSE BLD-MCNC: 169 MG/DL (ref 70–108)
GLUCOSE BLD-MCNC: 209 MG/DL (ref 70–108)
GLUCOSE BLD-MCNC: 213 MG/DL (ref 70–108)
GLUCOSE BLD-MCNC: 214 MG/DL (ref 70–108)
GLUCOSE BLD-MCNC: 224 MG/DL (ref 70–108)
GLUCOSE BLD-MCNC: 252 MG/DL (ref 70–108)
GLUCOSE BLD-MCNC: 304 MG/DL (ref 70–108)
HCT VFR BLD CALC: 32.1 % (ref 42–52)
HEMOGLOBIN: 10 GM/DL (ref 14–18)
IMMATURE GRANS (ABS): 0.07 THOU/MM3 (ref 0–0.07)
IMMATURE GRANULOCYTES: 0.7 %
LYMPHOCYTES # BLD: 7.9 %
LYMPHOCYTES ABSOLUTE: 0.7 THOU/MM3 (ref 1–4.8)
MCH RBC QN AUTO: 30.2 PG (ref 26–33)
MCHC RBC AUTO-ENTMCNC: 31.2 GM/DL (ref 32.2–35.5)
MCV RBC AUTO: 97 FL (ref 80–94)
MONOCYTES # BLD: 6.3 %
MONOCYTES ABSOLUTE: 0.6 THOU/MM3 (ref 0.4–1.3)
NUCLEATED RED BLOOD CELLS: 0 /100 WBC
OSMOLALITY URINE: 203 MOSMOL/KG (ref 250–750)
PLATELET # BLD: 126 THOU/MM3 (ref 130–400)
PMV BLD AUTO: 10.5 FL (ref 9.4–12.4)
POTASSIUM REFLEX MAGNESIUM: 5.1 MEQ/L (ref 3.5–5.2)
POTASSIUM SERPL-SCNC: 4.7 MEQ/L (ref 3.5–5.2)
RBC # BLD: 3.31 MILL/MM3 (ref 4.7–6.1)
SEG NEUTROPHILS: 85 %
SEGMENTED NEUTROPHILS ABSOLUTE COUNT: 8 THOU/MM3 (ref 1.8–7.7)
SODIUM BLD-SCNC: 126 MEQ/L (ref 135–145)
SODIUM BLD-SCNC: 129 MEQ/L (ref 135–145)
SODIUM URINE: < 20 MEQ/L
T4 FREE: 1.24 NG/DL (ref 0.93–1.76)
TOTAL CK: 103 U/L (ref 55–170)
TOTAL PROTEIN: 9 G/DL (ref 6.1–8)
TSH SERPL DL<=0.05 MIU/L-ACNC: 0.33 UIU/ML (ref 0.4–4.2)
WBC # BLD: 9.4 THOU/MM3 (ref 4.8–10.8)

## 2021-02-20 PROCEDURE — 84300 ASSAY OF URINE SODIUM: CPT

## 2021-02-20 PROCEDURE — 83935 ASSAY OF URINE OSMOLALITY: CPT

## 2021-02-20 PROCEDURE — 97110 THERAPEUTIC EXERCISES: CPT

## 2021-02-20 PROCEDURE — 76770 US EXAM ABDO BACK WALL COMP: CPT

## 2021-02-20 PROCEDURE — 84443 ASSAY THYROID STIM HORMONE: CPT

## 2021-02-20 PROCEDURE — 76705 ECHO EXAM OF ABDOMEN: CPT

## 2021-02-20 PROCEDURE — 2700000000 HC OXYGEN THERAPY PER DAY

## 2021-02-20 PROCEDURE — 1200000000 HC SEMI PRIVATE

## 2021-02-20 PROCEDURE — 84439 ASSAY OF FREE THYROXINE: CPT

## 2021-02-20 PROCEDURE — 85025 COMPLETE CBC W/AUTO DIFF WBC: CPT

## 2021-02-20 PROCEDURE — 97162 PT EVAL MOD COMPLEX 30 MIN: CPT

## 2021-02-20 PROCEDURE — 6370000000 HC RX 637 (ALT 250 FOR IP): Performed by: PAIN MEDICINE

## 2021-02-20 PROCEDURE — 36415 COLL VENOUS BLD VENIPUNCTURE: CPT

## 2021-02-20 PROCEDURE — 82948 REAGENT STRIP/BLOOD GLUCOSE: CPT

## 2021-02-20 PROCEDURE — 2580000003 HC RX 258: Performed by: INTERNAL MEDICINE

## 2021-02-20 PROCEDURE — 99233 SBSQ HOSP IP/OBS HIGH 50: CPT | Performed by: INTERNAL MEDICINE

## 2021-02-20 PROCEDURE — 2580000003 HC RX 258: Performed by: PAIN MEDICINE

## 2021-02-20 PROCEDURE — 97530 THERAPEUTIC ACTIVITIES: CPT

## 2021-02-20 PROCEDURE — 94761 N-INVAS EAR/PLS OXIMETRY MLT: CPT

## 2021-02-20 PROCEDURE — 82570 ASSAY OF URINE CREATININE: CPT

## 2021-02-20 PROCEDURE — 94669 MECHANICAL CHEST WALL OSCILL: CPT

## 2021-02-20 PROCEDURE — 2500000003 HC RX 250 WO HCPCS: Performed by: PAIN MEDICINE

## 2021-02-20 PROCEDURE — APPSS60 APP SPLIT SHARED TIME 46-60 MINUTES: Performed by: PHYSICIAN ASSISTANT

## 2021-02-20 PROCEDURE — 70551 MRI BRAIN STEM W/O DYE: CPT

## 2021-02-20 PROCEDURE — 82550 ASSAY OF CK (CPK): CPT

## 2021-02-20 PROCEDURE — 99233 SBSQ HOSP IP/OBS HIGH 50: CPT | Performed by: SURGERY

## 2021-02-20 PROCEDURE — 94640 AIRWAY INHALATION TREATMENT: CPT

## 2021-02-20 PROCEDURE — 80053 COMPREHEN METABOLIC PANEL: CPT

## 2021-02-20 RX ORDER — SODIUM CHLORIDE 9 MG/ML
INJECTION, SOLUTION INTRAVENOUS CONTINUOUS
Status: DISCONTINUED | OUTPATIENT
Start: 2021-02-20 | End: 2021-02-24

## 2021-02-20 RX ADMIN — OXYCODONE 10 MG: 5 TABLET ORAL at 06:46

## 2021-02-20 RX ADMIN — ROSUVASTATIN CALCIUM 20 MG: 20 TABLET, FILM COATED ORAL at 21:11

## 2021-02-20 RX ADMIN — INSULIN LISPRO 1 UNITS: 100 INJECTION, SOLUTION INTRAVENOUS; SUBCUTANEOUS at 21:14

## 2021-02-20 RX ADMIN — AMLODIPINE BESYLATE 10 MG: 10 TABLET ORAL at 09:08

## 2021-02-20 RX ADMIN — DULOXETINE HYDROCHLORIDE 60 MG: 60 CAPSULE, DELAYED RELEASE ORAL at 09:08

## 2021-02-20 RX ADMIN — DOCUSATE SODIUM 100 MG: 100 CAPSULE, LIQUID FILLED ORAL at 09:08

## 2021-02-20 RX ADMIN — OXYCODONE 10 MG: 5 TABLET ORAL at 14:21

## 2021-02-20 RX ADMIN — IPRATROPIUM BROMIDE AND ALBUTEROL SULFATE 1 AMPULE: .5; 3 SOLUTION RESPIRATORY (INHALATION) at 16:45

## 2021-02-20 RX ADMIN — OXYCODONE 10 MG: 5 TABLET ORAL at 22:54

## 2021-02-20 RX ADMIN — OXYCODONE 10 MG: 5 TABLET ORAL at 01:19

## 2021-02-20 RX ADMIN — IPRATROPIUM BROMIDE AND ALBUTEROL SULFATE 1 AMPULE: .5; 3 SOLUTION RESPIRATORY (INHALATION) at 09:24

## 2021-02-20 RX ADMIN — GABAPENTIN 300 MG: 300 CAPSULE ORAL at 21:11

## 2021-02-20 RX ADMIN — LATANOPROST 1 DROP: 50 SOLUTION OPHTHALMIC at 21:11

## 2021-02-20 RX ADMIN — PRIMIDONE 50 MG: 50 TABLET ORAL at 09:08

## 2021-02-20 RX ADMIN — FAMOTIDINE 20 MG: 10 INJECTION INTRAVENOUS at 21:11

## 2021-02-20 RX ADMIN — FOLIC ACID 1 MG: 1 TABLET ORAL at 09:08

## 2021-02-20 RX ADMIN — POLYETHYLENE GLYCOL 3350 17 G: 17 POWDER, FOR SOLUTION ORAL at 09:08

## 2021-02-20 RX ADMIN — MIRTAZAPINE 15 MG: 15 TABLET, FILM COATED ORAL at 21:11

## 2021-02-20 RX ADMIN — OXYCODONE 10 MG: 5 TABLET ORAL at 18:25

## 2021-02-20 RX ADMIN — TAMSULOSIN HYDROCHLORIDE 0.4 MG: 0.4 CAPSULE ORAL at 09:08

## 2021-02-20 RX ADMIN — DOCUSATE SODIUM 100 MG: 100 CAPSULE, LIQUID FILLED ORAL at 21:11

## 2021-02-20 RX ADMIN — PRIMIDONE 50 MG: 50 TABLET ORAL at 21:11

## 2021-02-20 RX ADMIN — SODIUM CHLORIDE: 9 INJECTION, SOLUTION INTRAVENOUS at 19:07

## 2021-02-20 RX ADMIN — SENNOSIDES 17.2 MG: 8.6 TABLET, FILM COATED ORAL at 21:11

## 2021-02-20 RX ADMIN — BUMETANIDE 1 MG: 1 TABLET ORAL at 06:45

## 2021-02-20 RX ADMIN — SODIUM CHLORIDE, PRESERVATIVE FREE 10 ML: 5 INJECTION INTRAVENOUS at 21:11

## 2021-02-20 RX ADMIN — FAMOTIDINE 20 MG: 10 INJECTION INTRAVENOUS at 09:08

## 2021-02-20 RX ADMIN — INSULIN GLARGINE 80 UNITS: 100 INJECTION, SOLUTION SUBCUTANEOUS at 21:14

## 2021-02-20 RX ADMIN — SODIUM CHLORIDE, PRESERVATIVE FREE 10 ML: 5 INJECTION INTRAVENOUS at 09:20

## 2021-02-20 RX ADMIN — IPRATROPIUM BROMIDE AND ALBUTEROL SULFATE 1 AMPULE: .5; 3 SOLUTION RESPIRATORY (INHALATION) at 12:58

## 2021-02-20 RX ADMIN — IPRATROPIUM BROMIDE AND ALBUTEROL SULFATE 1 AMPULE: .5; 3 SOLUTION RESPIRATORY (INHALATION) at 20:07

## 2021-02-20 RX ADMIN — SPIRONOLACTONE 50 MG: 25 TABLET ORAL at 09:08

## 2021-02-20 RX ADMIN — SODIUM CHLORIDE, PRESERVATIVE FREE 10 ML: 5 INJECTION INTRAVENOUS at 19:07

## 2021-02-20 ASSESSMENT — PAIN SCALES - GENERAL
PAINLEVEL_OUTOF10: 9
PAINLEVEL_OUTOF10: 10
PAINLEVEL_OUTOF10: 8
PAINLEVEL_OUTOF10: 10

## 2021-02-20 ASSESSMENT — PAIN DESCRIPTION - DESCRIPTORS
DESCRIPTORS: ACHING;SHARP
DESCRIPTORS: ACHING

## 2021-02-20 ASSESSMENT — PAIN DESCRIPTION - ORIENTATION
ORIENTATION: LOWER
ORIENTATION: MID;LOWER

## 2021-02-20 ASSESSMENT — PAIN DESCRIPTION - FREQUENCY
FREQUENCY: INTERMITTENT
FREQUENCY: CONTINUOUS

## 2021-02-20 ASSESSMENT — PAIN DESCRIPTION - LOCATION: LOCATION: BACK

## 2021-02-20 ASSESSMENT — PAIN DESCRIPTION - PAIN TYPE: TYPE: SURGICAL PAIN

## 2021-02-20 ASSESSMENT — PAIN DESCRIPTION - ONSET: ONSET: ON-GOING
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
## 2021-02-20 NOTE — PLAN OF CARE
Problem: Pain:  Goal: Pain level will decrease  Description: Pain level will decrease  Outcome: Ongoing  Note: Pt report pain at 8 on scale. Pt states oral/iv/im medication helping to achieve pain goal of a 5 on scale. Problem: Falls - Risk of:  Goal: Will remain free from falls  Description: Will remain free from falls  Outcome: Ongoing  Note: Pt using call light appropriately to call for assistance with ambulation to the bathroom and to chair. Pt is also compliant with use of non-skid slippers. Pt reports understanding of fall prevention when discussed. Problem: Skin Integrity:  Goal: Will show no infection signs and symptoms  Description: Will show no infection signs and symptoms  Outcome: Ongoing  Note: Pt's lumbar surgical incision healing. Dressing is dry and intact and not due to be changed today. No other skin impairments noted. Pt understands the importance of frequent repositioning in order to prevent any skin breakdown. Problem: Discharge Planning:  Goal: Discharged to appropriate level of care  Description: Discharged to appropriate level of care  Outcome: Ongoing  Note: Pt plans home with wife v/s inpatient Rehab at discharge. Care manager and social working helping with discharge needs. Problem: Impaired respiratory status  Goal: Clear lung sounds  Description: Clear lung sounds  2/20/2021 0929 by Lynn Bolivar RCP  Outcome: Ongoing  Note: Cont aerosol to improve aeration  Care plan reviewed with patient. Patient verbalizes understanding of the plan of care and contribute to goal setting.

## 2021-02-20 NOTE — PLAN OF CARE
Problem: Pain:  Goal: Pain level will decrease  Description: Pain level will decrease  Outcome: Ongoing  Note: Patient reports 10/10 pain. Patient medicated per MAR orders. Patient states pain decreases but increases dramatically around the time pain medications are due. Patient also repositioned for comfort. Will continue to monitor. Problem: Pain:  Goal: Control of acute pain  Description: Control of acute pain  Outcome: Ongoing  Note: Patient reports 10/10 pain. Patient medicated per MAR orders. Patient states pain decreases but increases dramatically around the time pain medications are due. Patient also repositioned for comfort. Will continue to monitor. Problem: Pain:  Goal: Control of chronic pain  Description: Control of chronic pain  Outcome: Ongoing  Note: Patient denies chronic pain at this time. Will continue to monitor. Problem: Falls - Risk of:  Goal: Will remain free from falls  Description: Will remain free from falls  Outcome: Ongoing  Note: Patient has remained free of falls throughout this shift. Bed alarm on for safety. Patient demonstrates appropriate use of call light. Will continue to monitor. Problem: Falls - Risk of:  Goal: Absence of physical injury  Description: Absence of physical injury  Outcome: Ongoing  Note: Patient has remained free of physical injury throughout this shift. Bed alarm on for safety. Patient demonstrates appropriate use of call light. Will continue to monitor. Problem: Skin Integrity:  Goal: Will show no infection signs and symptoms  Description: Will show no infection signs and symptoms  Outcome: Ongoing  Note: Patient has remained afebrile throughout this shift. Will continue to monitor. Problem: Skin Integrity:  Goal: Absence of new skin breakdown  Description: Absence of new skin breakdown  Outcome: Ongoing  Note: Patient has remained free of skin breakdown throughout this shift.  Patient encouraged to turn every 2 hours to decrease risk of skin breakdown. Will continue to monitor. Care plan reviewed with patient. Patient verbalize understanding of the plan of care and contribute to goal setting.

## 2021-02-20 NOTE — PROGRESS NOTES
[facility]  OCCUPATIONAL THERAPY MISSED TREATMENT NOTE  [facility]K  [id number]/014-A      Date: 2021  Patient Name: Dean Young        CSN: [id number]   : 1965  (55 y.o.)  Gender: male                REASON FOR MISSED TREATMENT: Pt reports having a busy morning and just returned back to bed. Will check back as time allows.
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
## 2021-02-20 NOTE — PROGRESS NOTES
Patient transferred into  [id number]. A&Ox4. On 3 liters nasal cannula. INT iv's x2. Telemetry applied - NSR.

## 2021-02-21 LAB
ALBUMIN SERPL-MCNC: 3 G/DL (ref 3.5–5.1)
ALP BLD-CCNC: 165 U/L (ref 38–126)
ALT SERPL-CCNC: 49 U/L (ref 11–66)
ANION GAP SERPL CALCULATED.3IONS-SCNC: 8 MEQ/L (ref 8–16)
AST SERPL-CCNC: 86 U/L (ref 5–40)
BILIRUB SERPL-MCNC: 0.8 MG/DL (ref 0.3–1.2)
BILIRUBIN DIRECT: 0.4 MG/DL (ref 0–0.3)
BUN BLDV-MCNC: 58 MG/DL (ref 7–22)
CALCIUM SERPL-MCNC: 9.5 MG/DL (ref 8.5–10.5)
CHLORIDE BLD-SCNC: 100 MEQ/L (ref 98–111)
CO2: 23 MEQ/L (ref 23–33)
CREAT SERPL-MCNC: 2.3 MG/DL (ref 0.4–1.2)
ERYTHROCYTE [DISTWIDTH] IN BLOOD BY AUTOMATED COUNT: 14.4 % (ref 11.5–14.5)
ERYTHROCYTE [DISTWIDTH] IN BLOOD BY AUTOMATED COUNT: 50.3 FL (ref 35–45)
GFR SERPL CREATININE-BSD FRML MDRD: 30 ML/MIN/1.73M2
GLUCOSE BLD-MCNC: 119 MG/DL (ref 70–108)
GLUCOSE BLD-MCNC: 127 MG/DL (ref 70–108)
GLUCOSE BLD-MCNC: 187 MG/DL (ref 70–108)
GLUCOSE BLD-MCNC: 216 MG/DL (ref 70–108)
GLUCOSE BLD-MCNC: 236 MG/DL (ref 70–108)
HCT VFR BLD CALC: 29.4 % (ref 42–52)
HEMOGLOBIN: 9.3 GM/DL (ref 14–18)
MCH RBC QN AUTO: 30.3 PG (ref 26–33)
MCHC RBC AUTO-ENTMCNC: 31.6 GM/DL (ref 32.2–35.5)
MCV RBC AUTO: 95.8 FL (ref 80–94)
PLATELET # BLD: 109 THOU/MM3 (ref 130–400)
PMV BLD AUTO: 10.9 FL (ref 9.4–12.4)
POTASSIUM SERPL-SCNC: 4.7 MEQ/L (ref 3.5–5.2)
RBC # BLD: 3.07 MILL/MM3 (ref 4.7–6.1)
SODIUM BLD-SCNC: 125 MEQ/L (ref 135–145)
SODIUM BLD-SCNC: 131 MEQ/L (ref 135–145)
TOTAL PROTEIN: 8.1 G/DL (ref 6.1–8)
WBC # BLD: 9.3 THOU/MM3 (ref 4.8–10.8)

## 2021-02-21 PROCEDURE — 84295 ASSAY OF SERUM SODIUM: CPT

## 2021-02-21 PROCEDURE — 1200000000 HC SEMI PRIVATE

## 2021-02-21 PROCEDURE — APPSS60 APP SPLIT SHARED TIME 46-60 MINUTES: Performed by: PHYSICIAN ASSISTANT

## 2021-02-21 PROCEDURE — 97535 SELF CARE MNGMENT TRAINING: CPT

## 2021-02-21 PROCEDURE — 85027 COMPLETE CBC AUTOMATED: CPT

## 2021-02-21 PROCEDURE — 94640 AIRWAY INHALATION TREATMENT: CPT

## 2021-02-21 PROCEDURE — 99233 SBSQ HOSP IP/OBS HIGH 50: CPT | Performed by: INTERNAL MEDICINE

## 2021-02-21 PROCEDURE — 97116 GAIT TRAINING THERAPY: CPT

## 2021-02-21 PROCEDURE — 6370000000 HC RX 637 (ALT 250 FOR IP): Performed by: INTERNAL MEDICINE

## 2021-02-21 PROCEDURE — 2580000003 HC RX 258: Performed by: INTERNAL MEDICINE

## 2021-02-21 PROCEDURE — 82948 REAGENT STRIP/BLOOD GLUCOSE: CPT

## 2021-02-21 PROCEDURE — 36415 COLL VENOUS BLD VENIPUNCTURE: CPT

## 2021-02-21 PROCEDURE — 6370000000 HC RX 637 (ALT 250 FOR IP): Performed by: PAIN MEDICINE

## 2021-02-21 PROCEDURE — 97530 THERAPEUTIC ACTIVITIES: CPT

## 2021-02-21 PROCEDURE — 2580000003 HC RX 258: Performed by: PAIN MEDICINE

## 2021-02-21 PROCEDURE — 94669 MECHANICAL CHEST WALL OSCILL: CPT

## 2021-02-21 PROCEDURE — 97166 OT EVAL MOD COMPLEX 45 MIN: CPT

## 2021-02-21 PROCEDURE — 80053 COMPREHEN METABOLIC PANEL: CPT

## 2021-02-21 PROCEDURE — 97110 THERAPEUTIC EXERCISES: CPT

## 2021-02-21 PROCEDURE — 82248 BILIRUBIN DIRECT: CPT

## 2021-02-21 PROCEDURE — 94761 N-INVAS EAR/PLS OXIMETRY MLT: CPT

## 2021-02-21 PROCEDURE — 99254 IP/OBS CNSLTJ NEW/EST MOD 60: CPT | Performed by: INTERNAL MEDICINE

## 2021-02-21 PROCEDURE — 99232 SBSQ HOSP IP/OBS MODERATE 35: CPT | Performed by: PSYCHIATRY & NEUROLOGY

## 2021-02-21 PROCEDURE — 2500000003 HC RX 250 WO HCPCS: Performed by: PAIN MEDICINE

## 2021-02-21 RX ORDER — AMLODIPINE BESYLATE 5 MG/1
5 TABLET ORAL DAILY
Status: DISCONTINUED | OUTPATIENT
Start: 2021-02-21 | End: 2021-02-24 | Stop reason: HOSPADM

## 2021-02-21 RX ADMIN — NALOXEGOL OXALATE 12.5 MG: 12.5 TABLET, FILM COATED ORAL at 08:48

## 2021-02-21 RX ADMIN — POLYETHYLENE GLYCOL 3350 17 G: 17 POWDER, FOR SOLUTION ORAL at 08:07

## 2021-02-21 RX ADMIN — OXYCODONE 10 MG: 5 TABLET ORAL at 20:51

## 2021-02-21 RX ADMIN — FOLIC ACID 1 MG: 1 TABLET ORAL at 08:49

## 2021-02-21 RX ADMIN — IPRATROPIUM BROMIDE AND ALBUTEROL SULFATE 1 AMPULE: .5; 3 SOLUTION RESPIRATORY (INHALATION) at 07:57

## 2021-02-21 RX ADMIN — AMLODIPINE BESYLATE 5 MG: 10 TABLET ORAL at 08:49

## 2021-02-21 RX ADMIN — INSULIN GLARGINE 80 UNITS: 100 INJECTION, SOLUTION SUBCUTANEOUS at 20:55

## 2021-02-21 RX ADMIN — ROSUVASTATIN CALCIUM 20 MG: 20 TABLET, FILM COATED ORAL at 19:45

## 2021-02-21 RX ADMIN — GABAPENTIN 300 MG: 300 CAPSULE ORAL at 19:45

## 2021-02-21 RX ADMIN — SENNOSIDES 17.2 MG: 8.6 TABLET, FILM COATED ORAL at 19:45

## 2021-02-21 RX ADMIN — IPRATROPIUM BROMIDE AND ALBUTEROL SULFATE 1 AMPULE: .5; 3 SOLUTION RESPIRATORY (INHALATION) at 16:02

## 2021-02-21 RX ADMIN — DULOXETINE HYDROCHLORIDE 60 MG: 60 CAPSULE, DELAYED RELEASE ORAL at 08:48

## 2021-02-21 RX ADMIN — FAMOTIDINE 20 MG: 10 INJECTION INTRAVENOUS at 19:45

## 2021-02-21 RX ADMIN — INSULIN LISPRO 1 UNITS: 100 INJECTION, SOLUTION INTRAVENOUS; SUBCUTANEOUS at 19:45

## 2021-02-21 RX ADMIN — DOCUSATE SODIUM 100 MG: 100 CAPSULE, LIQUID FILLED ORAL at 08:08

## 2021-02-21 RX ADMIN — LATANOPROST 1 DROP: 50 SOLUTION OPHTHALMIC at 19:45

## 2021-02-21 RX ADMIN — MIRTAZAPINE 15 MG: 15 TABLET, FILM COATED ORAL at 19:45

## 2021-02-21 RX ADMIN — PRIMIDONE 50 MG: 50 TABLET ORAL at 08:49

## 2021-02-21 RX ADMIN — OXYCODONE 10 MG: 5 TABLET ORAL at 08:07

## 2021-02-21 RX ADMIN — IPRATROPIUM BROMIDE AND ALBUTEROL SULFATE 1 AMPULE: .5; 3 SOLUTION RESPIRATORY (INHALATION) at 12:37

## 2021-02-21 RX ADMIN — PRIMIDONE 50 MG: 50 TABLET ORAL at 19:45

## 2021-02-21 RX ADMIN — TAMSULOSIN HYDROCHLORIDE 0.4 MG: 0.4 CAPSULE ORAL at 08:49

## 2021-02-21 RX ADMIN — DOCUSATE SODIUM 100 MG: 100 CAPSULE, LIQUID FILLED ORAL at 19:45

## 2021-02-21 RX ADMIN — OXYCODONE 10 MG: 5 TABLET ORAL at 12:17

## 2021-02-21 RX ADMIN — FAMOTIDINE 20 MG: 10 INJECTION INTRAVENOUS at 08:08

## 2021-02-21 RX ADMIN — SODIUM CHLORIDE: 9 INJECTION, SOLUTION INTRAVENOUS at 19:44

## 2021-02-21 RX ADMIN — IPRATROPIUM BROMIDE AND ALBUTEROL SULFATE 1 AMPULE: .5; 3 SOLUTION RESPIRATORY (INHALATION) at 20:04

## 2021-02-21 RX ADMIN — OXYCODONE 10 MG: 5 TABLET ORAL at 16:34

## 2021-02-21 RX ADMIN — OXYCODONE 10 MG: 5 TABLET ORAL at 04:43

## 2021-02-21 RX ADMIN — SODIUM CHLORIDE, PRESERVATIVE FREE 10 ML: 5 INJECTION INTRAVENOUS at 19:46

## 2021-02-21 RX ADMIN — SODIUM CHLORIDE, PRESERVATIVE FREE 10 ML: 5 INJECTION INTRAVENOUS at 08:51

## 2021-02-21 ASSESSMENT — PAIN SCALES - GENERAL
PAINLEVEL_OUTOF10: 6
PAINLEVEL_OUTOF10: 8

## 2021-02-21 ASSESSMENT — PAIN DESCRIPTION - ORIENTATION: ORIENTATION: MID;LOWER

## 2021-02-21 NOTE — PLAN OF CARE
Problem: Pain:  Goal: Pain level will decrease  Description: Pain level will decrease  2/21/2021 1521 by Dale Christy RN  Outcome: Ongoing  Note: Pt report pain at 8 on scale. Pt states oral/iv/im medication helping to achieve pain goal of a 6 on scale. Problem: Falls - Risk of:  Goal: Will remain free from falls  Description: Will remain free from falls  2/21/2021 1521 by Dale Christy RN  Outcome: Ongoing  Note: Pt using call light appropriately to call for assistance with ambulation to the bathroom and to chair. Pt is also compliant with use of non-skid slippers. Pt reports understanding of fall prevention when discussed. Problem: Skin Integrity:  Goal: Will show no infection signs and symptoms  Description: Will show no infection signs and symptoms  2/21/2021 1521 by Dale Christy RN  Outcome: Ongoing  Note: Pt's lumbar surgical incision healing. Dressing is dry and intact and not due to be changed today. No other skin impairments noted. Pt understands the importance of frequent repositioning in order to prevent any skin breakdown. Problem: Neurological  Goal: Maximum potential motor/sensory/cognitive function  2/21/2021 1521 by Dale Christy RN  Outcome: Ongoing  Note: Continues with numbness in LLE. Workup being peformed. Continue to monitor. Problem: Cardiovascular  Goal: No DVT, peripheral vascular complications  6/77/0137 1521 by Dale Christy RN  Outcome: Ongoing  Note: Pt without s/s of DVT. Pt able to take prescribed anticoagulants and anita hose and SCD,S in place to help prevent development of DVT. Problem: Respiratory  Goal: No pulmonary complications  5/47/7280 1521 by Dale Christy RN  Outcome: Ongoing  Note: Pt sats 95% on room air. Wears 1L O2 at bedtime. No shortness of breath noted. Lungs clear throughout, uses IS, and able to C&DB  as ordered.        Problem: GI  Goal: No bowel complications  2/85/2852 1521 by Dale Christy RN  Outcome: Ongoing  Note: Pt with bowel sounds,  passing flatus, and without nausea. Taking prescribed medications to assist with BM. Problem:   Goal: Adequate urinary output  2/21/2021 1521 by Dno Kent RN  Outcome: Ongoing  Note: Pt voiding adequate amounts without difficulty. Problem: Discharge Planning:  Goal: Discharged to appropriate level of care  Description: Discharged to appropriate level of care  2/21/2021 1521 by Don Kent RN  Outcome: Ongoing  Note: Pt plans home with wife v/s rehab at discharge. Care manager and social working helping with discharge needs. Problem: Impaired respiratory status  Goal: Clear lung sounds  Description: Clear lung sounds  2/21/2021 0801 by Cely Dwyer RCP  Outcome: Ongoing   Care plan reviewed with patient. Patient verbalizes understanding of the plan of care and contribute to goal setting.
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
## 2021-02-21 NOTE — PLAN OF CARE
Problem: Pain:  Goal: Pain level will decrease  Description: Pain level will decrease  2/21/2021 0240 by Sowmya Bustamante RN  Outcome: Ongoing  Note: Pain Assessment: 0-10  Pain Level: 8  Patient's Stated Pain Goal: No pain   Is pain goal met at this time? Patient is currently resting post pain medication. Will monitor. Non-Pharmaceutical Pain Intervention(s): Relaxation techniques, Repositioned, Rest      Problem: Falls - Risk of:  Goal: Will remain free from falls  Description: Will remain free from falls  2/21/2021 0240 by Sowmya Bustamante RN  Outcome: Ongoing  Note: Patient remained free of falls this shift. Fall precautions in place. Items within reach, call light within reach and side rails up x2. Bed alarm is on. Hourly rounding in place. Patient verbalizes understanding of using call light to ask for assistance as needed. Will monitor. Problem: Skin Integrity:  Goal: Will show no infection signs and symptoms  Description: Will show no infection signs and symptoms  2/21/2021 0240 by Sowmya Bustamante RN  Outcome: Ongoing  Note: Dressing dry and intact. Unable to visualize surgical incision due to surgical dressing. No fevers, tachycardia, hypotension noted. Pt denies any complaints other than pain control. Problem: Skin Integrity:  Goal: Absence of new skin breakdown  Description: Absence of new skin breakdown  2/21/2021 0240 by Sowmya Bustamante RN  Outcome: Ongoing  Note: Pt has 3 surgical incision. Dressing is dry and intact. No  skin impairments noted; redness on buttocks and bilateral heels. Pt understands the importance of frequent repositioning in order to prevent any skin breakdown. Assisting as needed. Will monitor . Problem: Neurological  Goal: Maximum potential motor/sensory/cognitive function  Outcome: Ongoing  Note: Patient is alert and oriented x4. Chronic numbness/tingling in BUE and BLE. Up with 1 assist with walker and gait belt. PT/OT involved in care. Will monitor. Problem: Discharge Planning:  Goal: Discharged to appropriate level of care  Description: Discharged to appropriate level of care  2/21/2021 0240 by Ching Wong RN  Outcome: Ongoing  Note: Pt plans home with caregiver vs IP rehab at discharge. Care manager and  helping with discharge needs. Will follow. Problem: Cardiovascular  Goal: No DVT, peripheral vascular complications  Outcome: Ongoing  Note: Pt without s/s of DVT. Pt has SCD,S in place to help prevent development of DVT. Will monitor. Problem: Respiratory  Goal: No pulmonary complications  Outcome: Ongoing  Note: Supplemental oxygen at 1L at bedtime. Patient states this is his normal. On room air during day shift. Lungs sounds are clear. Denies SOB. Will monitor. Problem: GI  Goal: No bowel complications  Outcome: Ongoing  Note: Pt with active bowel sounds, passing flatus, and without nausea. Taking prescribed medications to assist with BM. Needs to have a BM, has not had a BM for a couple days. Will monitor. Problem:   Goal: Adequate urinary output  Outcome: Ongoing  Note: Patient is voiding adequately without difficulty. Will monitor. Care plan reviewed with patient. Patient verbalize understanding of the plan of care and contribute to goal setting.
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
## 2021-02-22 ENCOUNTER — APPOINTMENT (OUTPATIENT)
Dept: NUCLEAR MEDICINE | Age: 56
DRG: 321 | End: 2021-02-22
Payer: MEDICAID

## 2021-02-22 LAB
ALBUMIN SERPL-MCNC: 3.1 G/DL (ref 3.5–5.1)
ALP BLD-CCNC: 159 U/L (ref 38–126)
ALT SERPL-CCNC: 47 U/L (ref 11–66)
ANION GAP SERPL CALCULATED.3IONS-SCNC: 7 MEQ/L (ref 8–16)
AST SERPL-CCNC: 77 U/L (ref 5–40)
BILIRUB SERPL-MCNC: 0.6 MG/DL (ref 0.3–1.2)
BUN BLDV-MCNC: 49 MG/DL (ref 7–22)
CALCIUM SERPL-MCNC: 9.5 MG/DL (ref 8.5–10.5)
CHLORIDE BLD-SCNC: 102 MEQ/L (ref 98–111)
CO2: 23 MEQ/L (ref 23–33)
CREAT SERPL-MCNC: 1.9 MG/DL (ref 0.4–1.2)
ERYTHROCYTE [DISTWIDTH] IN BLOOD BY AUTOMATED COUNT: 14.6 % (ref 11.5–14.5)
ERYTHROCYTE [DISTWIDTH] IN BLOOD BY AUTOMATED COUNT: 52.3 FL (ref 35–45)
GFR SERPL CREATININE-BSD FRML MDRD: 37 ML/MIN/1.73M2
GLUCOSE BLD-MCNC: 142 MG/DL (ref 70–108)
GLUCOSE BLD-MCNC: 169 MG/DL (ref 70–108)
GLUCOSE BLD-MCNC: 183 MG/DL (ref 70–108)
GLUCOSE BLD-MCNC: 191 MG/DL (ref 70–108)
GLUCOSE BLD-MCNC: 204 MG/DL (ref 70–108)
HCT VFR BLD CALC: 29.4 % (ref 42–52)
HEMOGLOBIN: 9.1 GM/DL (ref 14–18)
MCH RBC QN AUTO: 30.1 PG (ref 26–33)
MCHC RBC AUTO-ENTMCNC: 31 GM/DL (ref 32.2–35.5)
MCV RBC AUTO: 97.4 FL (ref 80–94)
PLATELET # BLD: 101 THOU/MM3 (ref 130–400)
PMV BLD AUTO: 10.7 FL (ref 9.4–12.4)
POTASSIUM SERPL-SCNC: 4.5 MEQ/L (ref 3.5–5.2)
RBC # BLD: 3.02 MILL/MM3 (ref 4.7–6.1)
SODIUM BLD-SCNC: 132 MEQ/L (ref 135–145)
TOTAL PROTEIN: 7.8 G/DL (ref 6.1–8)
WBC # BLD: 8.1 THOU/MM3 (ref 4.8–10.8)

## 2021-02-22 PROCEDURE — 92523 SPEECH SOUND LANG COMPREHEN: CPT

## 2021-02-22 PROCEDURE — 6370000000 HC RX 637 (ALT 250 FOR IP): Performed by: INTERNAL MEDICINE

## 2021-02-22 PROCEDURE — 2580000003 HC RX 258: Performed by: PHYSICIAN ASSISTANT

## 2021-02-22 PROCEDURE — 6370000000 HC RX 637 (ALT 250 FOR IP): Performed by: PAIN MEDICINE

## 2021-02-22 PROCEDURE — 99254 IP/OBS CNSLTJ NEW/EST MOD 60: CPT | Performed by: NURSE PRACTITIONER

## 2021-02-22 PROCEDURE — APPSS45 APP SPLIT SHARED TIME 31-45 MINUTES: Performed by: PHYSICIAN ASSISTANT

## 2021-02-22 PROCEDURE — 2580000003 HC RX 258: Performed by: PAIN MEDICINE

## 2021-02-22 PROCEDURE — 1200000000 HC SEMI PRIVATE

## 2021-02-22 PROCEDURE — 99232 SBSQ HOSP IP/OBS MODERATE 35: CPT | Performed by: SURGERY

## 2021-02-22 PROCEDURE — 99232 SBSQ HOSP IP/OBS MODERATE 35: CPT | Performed by: NURSE PRACTITIONER

## 2021-02-22 PROCEDURE — 99232 SBSQ HOSP IP/OBS MODERATE 35: CPT | Performed by: INTERNAL MEDICINE

## 2021-02-22 PROCEDURE — 80053 COMPREHEN METABOLIC PANEL: CPT

## 2021-02-22 PROCEDURE — 82948 REAGENT STRIP/BLOOD GLUCOSE: CPT

## 2021-02-22 PROCEDURE — 97116 GAIT TRAINING THERAPY: CPT

## 2021-02-22 PROCEDURE — 78227 HEPATOBIL SYST IMAGE W/DRUG: CPT

## 2021-02-22 PROCEDURE — 97535 SELF CARE MNGMENT TRAINING: CPT

## 2021-02-22 PROCEDURE — 6370000000 HC RX 637 (ALT 250 FOR IP): Performed by: NURSE PRACTITIONER

## 2021-02-22 PROCEDURE — 6360000002 HC RX W HCPCS: Performed by: PHYSICIAN ASSISTANT

## 2021-02-22 PROCEDURE — 36415 COLL VENOUS BLD VENIPUNCTURE: CPT

## 2021-02-22 PROCEDURE — 99233 SBSQ HOSP IP/OBS HIGH 50: CPT | Performed by: INTERNAL MEDICINE

## 2021-02-22 PROCEDURE — 3430000000 HC RX DIAGNOSTIC RADIOPHARMACEUTICAL: Performed by: PHYSICIAN ASSISTANT

## 2021-02-22 PROCEDURE — 6370000000 HC RX 637 (ALT 250 FOR IP): Performed by: PHYSICIAN ASSISTANT

## 2021-02-22 PROCEDURE — 94640 AIRWAY INHALATION TREATMENT: CPT

## 2021-02-22 PROCEDURE — 85027 COMPLETE CBC AUTOMATED: CPT

## 2021-02-22 PROCEDURE — 97110 THERAPEUTIC EXERCISES: CPT

## 2021-02-22 PROCEDURE — A9537 TC99M MEBROFENIN: HCPCS | Performed by: PHYSICIAN ASSISTANT

## 2021-02-22 RX ORDER — FAMOTIDINE 20 MG/1
20 TABLET, FILM COATED ORAL 2 TIMES DAILY
Status: DISCONTINUED | OUTPATIENT
Start: 2021-02-22 | End: 2021-02-24

## 2021-02-22 RX ORDER — BISACODYL 10 MG
10 SUPPOSITORY, RECTAL RECTAL PRN
Status: DISCONTINUED | OUTPATIENT
Start: 2021-02-22 | End: 2021-02-24 | Stop reason: HOSPADM

## 2021-02-22 RX ORDER — OXYCODONE AND ACETAMINOPHEN 7.5; 325 MG/1; MG/1
1 TABLET ORAL EVERY 4 HOURS PRN
Status: DISCONTINUED | OUTPATIENT
Start: 2021-02-22 | End: 2021-02-24 | Stop reason: HOSPADM

## 2021-02-22 RX ORDER — ALBUTEROL SULFATE 2.5 MG/3ML
5 SOLUTION RESPIRATORY (INHALATION) EVERY 6 HOURS PRN
Status: DISCONTINUED | OUTPATIENT
Start: 2021-02-23 | End: 2021-02-24 | Stop reason: HOSPADM

## 2021-02-22 RX ADMIN — MIRTAZAPINE 15 MG: 15 TABLET, FILM COATED ORAL at 23:04

## 2021-02-22 RX ADMIN — DOCUSATE SODIUM 100 MG: 100 CAPSULE, LIQUID FILLED ORAL at 23:02

## 2021-02-22 RX ADMIN — IPRATROPIUM BROMIDE AND ALBUTEROL SULFATE 1 AMPULE: .5; 3 SOLUTION RESPIRATORY (INHALATION) at 12:54

## 2021-02-22 RX ADMIN — AMLODIPINE BESYLATE 5 MG: 10 TABLET ORAL at 10:16

## 2021-02-22 RX ADMIN — INSULIN LISPRO 1 UNITS: 100 INJECTION, SOLUTION INTRAVENOUS; SUBCUTANEOUS at 23:15

## 2021-02-22 RX ADMIN — FAMOTIDINE 20 MG: 20 TABLET, FILM COATED ORAL at 23:02

## 2021-02-22 RX ADMIN — DULOXETINE HYDROCHLORIDE 60 MG: 60 CAPSULE, DELAYED RELEASE ORAL at 10:16

## 2021-02-22 RX ADMIN — IPRATROPIUM BROMIDE AND ALBUTEROL SULFATE 1 AMPULE: .5; 3 SOLUTION RESPIRATORY (INHALATION) at 16:54

## 2021-02-22 RX ADMIN — SODIUM CHLORIDE 2.82 MCG: 9 INJECTION, SOLUTION INTRAVENOUS at 08:45

## 2021-02-22 RX ADMIN — SODIUM CHLORIDE, PRESERVATIVE FREE 10 ML: 5 INJECTION INTRAVENOUS at 10:18

## 2021-02-22 RX ADMIN — BISACODYL 10 MG: 10 SUPPOSITORY RECTAL at 14:52

## 2021-02-22 RX ADMIN — FAMOTIDINE 20 MG: 20 TABLET, FILM COATED ORAL at 10:16

## 2021-02-22 RX ADMIN — PRIMIDONE 50 MG: 50 TABLET ORAL at 10:16

## 2021-02-22 RX ADMIN — OXYCODONE 10 MG: 5 TABLET ORAL at 01:05

## 2021-02-22 RX ADMIN — FOLIC ACID 1 MG: 1 TABLET ORAL at 10:16

## 2021-02-22 RX ADMIN — POLYETHYLENE GLYCOL 3350 17 G: 17 POWDER, FOR SOLUTION ORAL at 10:17

## 2021-02-22 RX ADMIN — INSULIN GLARGINE 80 UNITS: 100 INJECTION, SOLUTION SUBCUTANEOUS at 23:15

## 2021-02-22 RX ADMIN — OXYCODONE HYDROCHLORIDE AND ACETAMINOPHEN 1 TABLET: 7.5; 325 TABLET ORAL at 21:16

## 2021-02-22 RX ADMIN — IPRATROPIUM BROMIDE AND ALBUTEROL SULFATE 1 AMPULE: .5; 3 SOLUTION RESPIRATORY (INHALATION) at 20:30

## 2021-02-22 RX ADMIN — SENNOSIDES 17.2 MG: 8.6 TABLET, FILM COATED ORAL at 23:04

## 2021-02-22 RX ADMIN — SODIUM CHLORIDE, PRESERVATIVE FREE 10 ML: 5 INJECTION INTRAVENOUS at 23:20

## 2021-02-22 RX ADMIN — NALOXEGOL OXALATE 12.5 MG: 12.5 TABLET, FILM COATED ORAL at 10:17

## 2021-02-22 RX ADMIN — PRIMIDONE 50 MG: 50 TABLET ORAL at 23:05

## 2021-02-22 RX ADMIN — DOCUSATE SODIUM 100 MG: 100 CAPSULE, LIQUID FILLED ORAL at 10:16

## 2021-02-22 RX ADMIN — LATANOPROST 1 DROP: 50 SOLUTION OPHTHALMIC at 23:07

## 2021-02-22 RX ADMIN — Medication 10 MILLICURIE: at 07:35

## 2021-02-22 RX ADMIN — OXYCODONE 10 MG: 5 TABLET ORAL at 10:53

## 2021-02-22 RX ADMIN — TAMSULOSIN HYDROCHLORIDE 0.4 MG: 0.4 CAPSULE ORAL at 10:16

## 2021-02-22 RX ADMIN — ROSUVASTATIN CALCIUM 20 MG: 20 TABLET, FILM COATED ORAL at 23:04

## 2021-02-22 RX ADMIN — OXYCODONE HYDROCHLORIDE AND ACETAMINOPHEN 1 TABLET: 7.5; 325 TABLET ORAL at 16:15

## 2021-02-22 RX ADMIN — GABAPENTIN 300 MG: 300 CAPSULE ORAL at 23:05

## 2021-02-22 ASSESSMENT — PAIN SCALES - GENERAL
PAINLEVEL_OUTOF10: 8
PAINLEVEL_OUTOF10: 10

## 2021-02-22 ASSESSMENT — PAIN - FUNCTIONAL ASSESSMENT: PAIN_FUNCTIONAL_ASSESSMENT: PREVENTS OR INTERFERES SOME ACTIVE ACTIVITIES AND ADLS

## 2021-02-22 ASSESSMENT — PAIN DESCRIPTION - ORIENTATION: ORIENTATION: MID

## 2021-02-22 ASSESSMENT — PAIN DESCRIPTION - PAIN TYPE: TYPE: SURGICAL PAIN

## 2021-02-22 ASSESSMENT — PAIN DESCRIPTION - DESCRIPTORS: DESCRIPTORS: ACHING

## 2021-02-22 NOTE — CONSULTS
(MYSOLINE) tablet 50 mg, 50 mg, Oral, BID  ipratropium-albuterol (DUONEB) nebulizer solution 1 ampule, 1 ampule, Inhalation, Q4H WA  insulin lispro (HUMALOG) injection vial 7 Units, 7 Units, Subcutaneous, TID AC  senna (SENOKOT) tablet 17.2 mg, 2 tablet, Oral, Nightly  docusate sodium (COLACE) capsule 100 mg, 100 mg, Oral, BID  polyethylene glycol (GLYCOLAX) packet 17 g, 17 g, Oral, Daily  oxyCODONE (ROXICODONE) immediate release tablet 5 mg, 5 mg, Oral, Q4H PRN **OR** oxyCODONE (ROXICODONE) immediate release tablet 10 mg, 10 mg, Oral, Q4H PRN  lidocaine 4 % external patch 3 patch, 3 patch, Transdermal, Daily  folic acid (FOLVITE) tablet 1 mg, 1 mg, Oral, Daily  insulin glargine (LANTUS) injection vial 80 Units, 80 Units, Subcutaneous, Nightly  sodium chloride flush 0.9 % injection 10 mL, 10 mL, Intravenous, 2 times per day  sodium chloride flush 0.9 % injection 10 mL, 10 mL, Intravenous, PRN  [Held by provider] acetaminophen (TYLENOL) tablet 650 mg, 650 mg, Oral, Q4H PRN  promethazine (PHENERGAN) tablet 12.5 mg, 12.5 mg, Oral, Q6H PRN **OR** ondansetron (ZOFRAN) injection 4 mg, 4 mg, Intravenous, Q6H PRN  potassium chloride 10 mEq/100 mL IVPB (Peripheral Line), 10 mEq, Intravenous, PRN  famotidine (PEPCID) injection 20 mg, 20 mg, Intravenous, BID  Allergies:  Adhesive tape    Social History:   TOBACCO:  Currently smokes. Type of tobacco used:  Cigarettes. ETOH:  Current alcohol usage  DRUGS:  Current drug usage. Type of drug:  Marijuana.     Family History:       Problem Relation Age of Onset    Heart Attack Father     Colon Cancer Neg Hx     Colon Polyps Neg Hx      REVIEW OF SYSTEMS:    CONSTITUTIONAL:  negative    PHYSICAL EXAM:  VITALS:  /70   Pulse 84   Temp 98 °F (36.7 °C) (Oral)   Resp 18   Ht 6' 2\" (1.88 m)   Wt (!) 310 lb 8 oz (140.8 kg)   SpO2 91%   BMI 39.87 kg/m²   CONSTITUTIONAL:  awake, alert, cooperative, no apparent distress, and appears stated age  MUSCULOSKELETAL:  On exam of bilateral lower extremities, patient is s/p right great toe amputation. Skin is intact. Sensation diminished in bilateral feet below ankles this is baseline for him. Also altered sensation subjectively in left leg distal third anteriorly and laterally. 2 pt discrimination not tested. Hip flexion and knee flexion/extension 5/5 bilaterally. Right plantarflexion 5/5, left PF 4+ to 5/5. Right DF, EHL and eversion 5/5, left DF, EHL, and eversion absent. Pedal pulses palpable. Negative straight leg raise bilaterally. Ligaments stable in bilateral knees and ankles. No pain with PROM of bilateral hips, knee, or ankles. DATA:       IMPRESSION/RECOMMENDATIONS:    Diabetic peripheral neuropathy  Left leg radiculopathy    Discussed patient's case with Dr. Pablo Vallejo. Discussed patient's findings with him. Advised that there is no apparent structural pathology within the left leg. His findings are more consistent with a lumbar spine pathology. Given that Dr. Jude Braun and Dr. Patti Dennison are still on the case, I would defer to their treatment plans. I discussed some of the possible reasons for his persistent symptoms especially with his multiple comorbidities. Questions and concerns addressed. No specific orders in regards to the left leg. Ortho will be signing off.
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
## 2021-02-22 NOTE — PLAN OF CARE
Problem: Pain:  Goal: Pain level will decrease  Description: Pain level will decrease  2/22/2021 0158 by Sidney Burns RN  Outcome: Ongoing  Note: Pain Assessment: 0-10  Pain Level: 8  Patient's Stated Pain Goal: No pain   Is pain goal met at this time? Patient is currently resting post pain medication. Will monitor. Non-Pharmaceutical Pain Intervention(s): Relaxation techniques, Repositioned, Rest      Problem: Falls - Risk of:  Goal: Will remain free from falls  Description: Will remain free from falls  2/22/2021 0158 by Sidney Burns RN  Outcome: Ongoing  Note: Patient remained free of falls this shift. Fall precautions in place. Items within reach, call light within reach and side rails up x2. Bed alarm is on. Hourly rounding in place. Patient verbalizes understanding of using call light to ask for assistance as needed. Will monitor. Problem: Skin Integrity:  Goal: Will show no infection signs and symptoms  Description: Will show no infection signs and symptoms  2/22/2021 0158 by Sidney Burns RN  Outcome: Ongoing  Note: Dressing dry and intact. Unable to visualize surgical incision due to surgical dressing. No fevers, tachycardia, hypotension noted. Pt denies any complaints other than pain control. Problem: Skin Integrity:  Goal: Absence of new skin breakdown  Description: Absence of new skin breakdown  2/22/2021 0158 by Sidney Burns RN  Outcome: Ongoing  Note: Pt has 3 surgical incision. Dressing is dry and intact. No  skin impairments noted; redness on buttocks and bilateral heels. Pt understands the importance of frequent repositioning in order to prevent any skin breakdown. Assisting as needed. Will monitor . Problem: Neurological  Goal: Maximum potential motor/sensory/cognitive function  2/22/2021 0158 by Sidney Burns RN  Outcome: Ongoing  Note: Patient is alert and oriented x4. Chronic numbness/tingling in BUE and BLE. Worsening pedal push/pull in left foot.  Ortho consulted but do not believe it is an ortho issue. Up with 1 assist with walker and gait belt. PT/OT involved in care. Will monitor. Problem: Discharge Planning:  Goal: Discharged to appropriate level of care  Description: Discharged to appropriate level of care  2/22/2021 0158 by Travis Kat RN  Outcome: Ongoing  Note: Pt plans home with caregiver vs IP rehab at discharge. Care manager and  helping with discharge needs. Will follow. Problem: Cardiovascular  Goal: No DVT, peripheral vascular complications  3/88/3180 0158 by Travis Kat RN  Outcome: Ongoing  Note: Pt without s/s of DVT. Pt has SCD,S in place to help prevent development of DVT. Will monitor. Problem: Respiratory  Goal: No pulmonary complications  2/16/0277 0158 by Travis Kat RN  Outcome: Ongoing  Note: Supplemental oxygen at 1L at bedtime. Patient declined to wear it at this time. On room air during day shift. Lungs sounds are clear. Denies SOB. Will monitor. Problem: GI  Goal: No bowel complications  9/09/1598 0158 by Travis Kat RN  Outcome: Ongoing  Note: Patient is voiding adequately without difficulty. Will monitor. Problem:   Goal: Adequate urinary output  2/22/2021 0158 by Travis Kat RN  Outcome: Ongoing  Note: Pt with hypoactive bowel sounds, passing flatus, and without nausea. Taking prescribed medications to assist with BM. Needs to have a BM, has not had a BM for a couple days. Will monitor. Care plan reviewed with patient. Patient verbalize understanding of the plan of care and contribute to goal setting.

## 2021-02-22 NOTE — PROGRESS NOTES
Checked on pt for [facility] administration, he had just gotten food and wanted to eat at this time, will return in able.
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
## 2021-02-23 LAB
ANION GAP SERPL CALCULATED.3IONS-SCNC: 10 MEQ/L (ref 8–16)
BUN BLDV-MCNC: 43 MG/DL (ref 7–22)
CALCIUM SERPL-MCNC: 9.2 MG/DL (ref 8.5–10.5)
CHLORIDE BLD-SCNC: 103 MEQ/L (ref 98–111)
CO2: 22 MEQ/L (ref 23–33)
CREAT SERPL-MCNC: 1.9 MG/DL (ref 0.4–1.2)
GFR SERPL CREATININE-BSD FRML MDRD: 37 ML/MIN/1.73M2
GLUCOSE BLD-MCNC: 108 MG/DL (ref 70–108)
GLUCOSE BLD-MCNC: 132 MG/DL (ref 70–108)
GLUCOSE BLD-MCNC: 158 MG/DL (ref 70–108)
GLUCOSE BLD-MCNC: 199 MG/DL (ref 70–108)
GLUCOSE BLD-MCNC: 203 MG/DL (ref 70–108)
POTASSIUM SERPL-SCNC: 4.6 MEQ/L (ref 3.5–5.2)
SODIUM BLD-SCNC: 135 MEQ/L (ref 135–145)

## 2021-02-23 PROCEDURE — 94669 MECHANICAL CHEST WALL OSCILL: CPT

## 2021-02-23 PROCEDURE — 6370000000 HC RX 637 (ALT 250 FOR IP): Performed by: NURSE PRACTITIONER

## 2021-02-23 PROCEDURE — 6370000000 HC RX 637 (ALT 250 FOR IP): Performed by: INTERNAL MEDICINE

## 2021-02-23 PROCEDURE — 6370000000 HC RX 637 (ALT 250 FOR IP): Performed by: PHYSICIAN ASSISTANT

## 2021-02-23 PROCEDURE — 6370000000 HC RX 637 (ALT 250 FOR IP): Performed by: PAIN MEDICINE

## 2021-02-23 PROCEDURE — 97130 THER IVNTJ EA ADDL 15 MIN: CPT

## 2021-02-23 PROCEDURE — APPSS60 APP SPLIT SHARED TIME 46-60 MINUTES: Performed by: NURSE PRACTITIONER

## 2021-02-23 PROCEDURE — 2580000003 HC RX 258: Performed by: PAIN MEDICINE

## 2021-02-23 PROCEDURE — 97129 THER IVNTJ 1ST 15 MIN: CPT

## 2021-02-23 PROCEDURE — 99232 SBSQ HOSP IP/OBS MODERATE 35: CPT | Performed by: INTERNAL MEDICINE

## 2021-02-23 PROCEDURE — 36415 COLL VENOUS BLD VENIPUNCTURE: CPT

## 2021-02-23 PROCEDURE — 80048 BASIC METABOLIC PNL TOTAL CA: CPT

## 2021-02-23 PROCEDURE — 99233 SBSQ HOSP IP/OBS HIGH 50: CPT | Performed by: NURSE PRACTITIONER

## 2021-02-23 PROCEDURE — 97110 THERAPEUTIC EXERCISES: CPT

## 2021-02-23 PROCEDURE — 94640 AIRWAY INHALATION TREATMENT: CPT

## 2021-02-23 PROCEDURE — 99232 SBSQ HOSP IP/OBS MODERATE 35: CPT | Performed by: NURSE PRACTITIONER

## 2021-02-23 PROCEDURE — 97116 GAIT TRAINING THERAPY: CPT

## 2021-02-23 PROCEDURE — 1200000000 HC SEMI PRIVATE

## 2021-02-23 PROCEDURE — 99233 SBSQ HOSP IP/OBS HIGH 50: CPT | Performed by: INTERNAL MEDICINE

## 2021-02-23 PROCEDURE — 99232 SBSQ HOSP IP/OBS MODERATE 35: CPT | Performed by: SURGERY

## 2021-02-23 PROCEDURE — 82948 REAGENT STRIP/BLOOD GLUCOSE: CPT

## 2021-02-23 RX ADMIN — FAMOTIDINE 20 MG: 20 TABLET, FILM COATED ORAL at 20:50

## 2021-02-23 RX ADMIN — INSULIN GLARGINE 80 UNITS: 100 INJECTION, SOLUTION SUBCUTANEOUS at 20:55

## 2021-02-23 RX ADMIN — DULOXETINE HYDROCHLORIDE 60 MG: 60 CAPSULE, DELAYED RELEASE ORAL at 09:29

## 2021-02-23 RX ADMIN — OXYCODONE HYDROCHLORIDE AND ACETAMINOPHEN 1 TABLET: 7.5; 325 TABLET ORAL at 22:40

## 2021-02-23 RX ADMIN — FOLIC ACID 1 MG: 1 TABLET ORAL at 09:29

## 2021-02-23 RX ADMIN — OXYCODONE HYDROCHLORIDE AND ACETAMINOPHEN 1 TABLET: 7.5; 325 TABLET ORAL at 03:03

## 2021-02-23 RX ADMIN — ROSUVASTATIN CALCIUM 20 MG: 20 TABLET, FILM COATED ORAL at 20:50

## 2021-02-23 RX ADMIN — AMLODIPINE BESYLATE 5 MG: 10 TABLET ORAL at 07:51

## 2021-02-23 RX ADMIN — OXYCODONE HYDROCHLORIDE AND ACETAMINOPHEN 1 TABLET: 7.5; 325 TABLET ORAL at 07:51

## 2021-02-23 RX ADMIN — GABAPENTIN 300 MG: 300 CAPSULE ORAL at 20:50

## 2021-02-23 RX ADMIN — LATANOPROST 1 DROP: 50 SOLUTION OPHTHALMIC at 20:50

## 2021-02-23 RX ADMIN — MIRTAZAPINE 15 MG: 15 TABLET, FILM COATED ORAL at 20:51

## 2021-02-23 RX ADMIN — OXYCODONE HYDROCHLORIDE AND ACETAMINOPHEN 1 TABLET: 7.5; 325 TABLET ORAL at 18:05

## 2021-02-23 RX ADMIN — OXYCODONE HYDROCHLORIDE AND ACETAMINOPHEN 1 TABLET: 7.5; 325 TABLET ORAL at 13:11

## 2021-02-23 RX ADMIN — DOCUSATE SODIUM 100 MG: 100 CAPSULE, LIQUID FILLED ORAL at 07:52

## 2021-02-23 RX ADMIN — SODIUM CHLORIDE, PRESERVATIVE FREE 10 ML: 5 INJECTION INTRAVENOUS at 08:08

## 2021-02-23 RX ADMIN — FAMOTIDINE 20 MG: 20 TABLET, FILM COATED ORAL at 07:51

## 2021-02-23 RX ADMIN — DOCUSATE SODIUM 100 MG: 100 CAPSULE, LIQUID FILLED ORAL at 20:50

## 2021-02-23 RX ADMIN — PRIMIDONE 50 MG: 50 TABLET ORAL at 20:50

## 2021-02-23 RX ADMIN — NALOXEGOL OXALATE 12.5 MG: 12.5 TABLET, FILM COATED ORAL at 09:29

## 2021-02-23 RX ADMIN — SENNOSIDES 17.2 MG: 8.6 TABLET, FILM COATED ORAL at 20:50

## 2021-02-23 RX ADMIN — PRIMIDONE 50 MG: 50 TABLET ORAL at 09:29

## 2021-02-23 RX ADMIN — POLYETHYLENE GLYCOL 3350 17 G: 17 POWDER, FOR SOLUTION ORAL at 07:52

## 2021-02-23 RX ADMIN — TAMSULOSIN HYDROCHLORIDE 0.4 MG: 0.4 CAPSULE ORAL at 09:29

## 2021-02-23 RX ADMIN — TIOTROPIUM BROMIDE INHALATION SPRAY 2 PUFF: 3.12 SPRAY, METERED RESPIRATORY (INHALATION) at 08:30

## 2021-02-23 RX ADMIN — SODIUM CHLORIDE, PRESERVATIVE FREE 10 ML: 5 INJECTION INTRAVENOUS at 20:50

## 2021-02-23 ASSESSMENT — PAIN DESCRIPTION - PAIN TYPE
TYPE: SURGICAL PAIN
TYPE: ACUTE PAIN
TYPE: SURGICAL PAIN

## 2021-02-23 ASSESSMENT — PAIN SCALES - GENERAL
PAINLEVEL_OUTOF10: 10
PAINLEVEL_OUTOF10: 7
PAINLEVEL_OUTOF10: 4
PAINLEVEL_OUTOF10: 8
PAINLEVEL_OUTOF10: 7
PAINLEVEL_OUTOF10: 7

## 2021-02-23 ASSESSMENT — PAIN DESCRIPTION - DESCRIPTORS: DESCRIPTORS: ACHING;RADIATING

## 2021-02-23 ASSESSMENT — PAIN DESCRIPTION - LOCATION
LOCATION: BACK
LOCATION: BACK
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
## 2021-02-23 NOTE — PLAN OF CARE
Problem: Pain:  Goal: Pain level will decrease  Description: Pain level will decrease  Outcome: Ongoing  Note: Pt report pain at 9 on scale. Pt states oral medication helping to achieve pain goal of a 0 on scale. Problem: Falls - Risk of:  Goal: Will remain free from falls  Description: Will remain free from falls  Outcome: Ongoing  Note: Pt using call light appropriately to call for assistance with ambulation to the bathroom and to chair. Pt is also compliant with use of non-skid slippers. Pt reports understanding of fall prevention when discussed. Problem: Skin Integrity:  Goal: Absence of new skin breakdown  Description: Absence of new skin breakdown  Outcome: Ongoing  Note: Patient turns as recommended. Education provided regarding the benefits of repositioning. Problem: Cardiovascular  Goal: No DVT, peripheral vascular complications  Outcome: Ongoing  Note: Pt without s/s of DVT. Pt able to take prescribed anticoagulants and anita hose and SCD,S in place to help prevent development of DVT. Problem: Respiratory  Goal: No pulmonary complications  Outcome: Ongoing  Note: Pt sats >90 on room air. No shortness of breath noted. Lungs clear, uses IS, and able to C&DB  as ordered. Problem: GI  Goal: No bowel complications  Outcome: Ongoing  Note: No BM this shift. Pt given stool softener this shift. Bowel sounds active. No pain or tenderness. Problem:   Goal: Adequate urinary output  Outcome: Ongoing  Note: Pt incontinent of urine. Pt voiding adequate amounts this shift. Care plan reviewed with patient. Patient verbalize understanding of the plan of care and contribute to goal setting.

## 2021-02-23 NOTE — PLAN OF CARE
Problem: Impaired respiratory status  Goal: Clear lung sounds  Description: Clear lung sounds  2/23/2021 1011 by Orly Ayala RCP  Outcome: Ongoing     Improve breath sounds, increase aeration, improve cough effectiveness and decrease WOB.
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
## 2021-02-24 ENCOUNTER — HOSPITAL ENCOUNTER (INPATIENT)
Age: 56
LOS: 16 days | Discharge: HOME | DRG: 862 | End: 2021-03-12
Attending: PHYSICAL MEDICINE & REHABILITATION | Admitting: PHYSICAL MEDICINE & REHABILITATION
Payer: MEDICAID

## 2021-02-24 VITALS
HEIGHT: 74 IN | SYSTOLIC BLOOD PRESSURE: 130 MMHG | TEMPERATURE: 97.7 F | HEART RATE: 73 BPM | WEIGHT: 310.5 LBS | BODY MASS INDEX: 39.85 KG/M2 | DIASTOLIC BLOOD PRESSURE: 71 MMHG | OXYGEN SATURATION: 93 % | RESPIRATION RATE: 16 BRPM

## 2021-02-24 DIAGNOSIS — R56.9 SEIZURE (HCC): Primary | ICD-10-CM

## 2021-02-24 DIAGNOSIS — R56.9 SEIZURE-LIKE ACTIVITY (HCC): ICD-10-CM

## 2021-02-24 LAB
ALBUMIN SERPL-MCNC: 3 G/DL (ref 3.5–5.1)
ALP BLD-CCNC: 145 U/L (ref 38–126)
ALT SERPL-CCNC: 40 U/L (ref 11–66)
ANION GAP SERPL CALCULATED.3IONS-SCNC: 6 MEQ/L (ref 8–16)
AST SERPL-CCNC: 62 U/L (ref 5–40)
AVERAGE GLUCOSE: 138 MG/DL (ref 70–126)
BILIRUB SERPL-MCNC: 0.8 MG/DL (ref 0.3–1.2)
BUN BLDV-MCNC: 42 MG/DL (ref 7–22)
CALCIUM SERPL-MCNC: 9.5 MG/DL (ref 8.5–10.5)
CHLORIDE BLD-SCNC: 105 MEQ/L (ref 98–111)
CO2: 22 MEQ/L (ref 23–33)
CREAT SERPL-MCNC: 1.8 MG/DL (ref 0.4–1.2)
CREATININE, URINE: 68 MG/DL
ERYTHROCYTE [DISTWIDTH] IN BLOOD BY AUTOMATED COUNT: 14.6 % (ref 11.5–14.5)
ERYTHROCYTE [DISTWIDTH] IN BLOOD BY AUTOMATED COUNT: 52.8 FL (ref 35–45)
GFR SERPL CREATININE-BSD FRML MDRD: 39 ML/MIN/1.73M2
GLUCOSE BLD-MCNC: 113 MG/DL (ref 70–108)
GLUCOSE BLD-MCNC: 151 MG/DL (ref 70–108)
GLUCOSE BLD-MCNC: 152 MG/DL (ref 70–108)
GLUCOSE BLD-MCNC: 153 MG/DL (ref 70–108)
GLUCOSE BLD-MCNC: 196 MG/DL (ref 70–108)
HBA1C MFR BLD: 6.6 % (ref 4.4–6.4)
HCT VFR BLD CALC: 29.3 % (ref 42–52)
HEMOGLOBIN: 9 GM/DL (ref 14–18)
MCH RBC QN AUTO: 30.1 PG (ref 26–33)
MCHC RBC AUTO-ENTMCNC: 30.7 GM/DL (ref 32.2–35.5)
MCV RBC AUTO: 98 FL (ref 80–94)
MICROALBUMIN UR-MCNC: 1.52 MG/DL
MICROALBUMIN/CREAT UR-RTO: 22 MG/G (ref 0–30)
PLATELET # BLD: 104 THOU/MM3 (ref 130–400)
PMV BLD AUTO: 10.8 FL (ref 9.4–12.4)
POTASSIUM REFLEX MAGNESIUM: 4.5 MEQ/L (ref 3.5–5.2)
POTASSIUM SERPL-SCNC: 4.5 MEQ/L (ref 3.5–5.2)
RBC # BLD: 2.99 MILL/MM3 (ref 4.7–6.1)
SODIUM BLD-SCNC: 133 MEQ/L (ref 135–145)
TOTAL PROTEIN: 7.5 G/DL (ref 6.1–8)
WBC # BLD: 6.8 THOU/MM3 (ref 4.8–10.8)

## 2021-02-24 PROCEDURE — 97535 SELF CARE MNGMENT TRAINING: CPT

## 2021-02-24 PROCEDURE — 97116 GAIT TRAINING THERAPY: CPT

## 2021-02-24 PROCEDURE — 82948 REAGENT STRIP/BLOOD GLUCOSE: CPT

## 2021-02-24 PROCEDURE — 94640 AIRWAY INHALATION TREATMENT: CPT

## 2021-02-24 PROCEDURE — 99223 1ST HOSP IP/OBS HIGH 75: CPT | Performed by: PHYSICAL MEDICINE & REHABILITATION

## 2021-02-24 PROCEDURE — 6370000000 HC RX 637 (ALT 250 FOR IP): Performed by: NURSE PRACTITIONER

## 2021-02-24 PROCEDURE — 1180000000 HC REHAB R&B

## 2021-02-24 PROCEDURE — 6370000000 HC RX 637 (ALT 250 FOR IP): Performed by: INTERNAL MEDICINE

## 2021-02-24 PROCEDURE — 94760 N-INVAS EAR/PLS OXIMETRY 1: CPT

## 2021-02-24 PROCEDURE — 6370000000 HC RX 637 (ALT 250 FOR IP): Performed by: PHYSICAL MEDICINE & REHABILITATION

## 2021-02-24 PROCEDURE — 97530 THERAPEUTIC ACTIVITIES: CPT

## 2021-02-24 PROCEDURE — 82043 UR ALBUMIN QUANTITATIVE: CPT

## 2021-02-24 PROCEDURE — 99232 SBSQ HOSP IP/OBS MODERATE 35: CPT | Performed by: INTERNAL MEDICINE

## 2021-02-24 PROCEDURE — APPSS45 APP SPLIT SHARED TIME 31-45 MINUTES: Performed by: PHYSICIAN ASSISTANT

## 2021-02-24 PROCEDURE — 83036 HEMOGLOBIN GLYCOSYLATED A1C: CPT

## 2021-02-24 PROCEDURE — 6370000000 HC RX 637 (ALT 250 FOR IP): Performed by: PAIN MEDICINE

## 2021-02-24 PROCEDURE — 99233 SBSQ HOSP IP/OBS HIGH 50: CPT | Performed by: SURGERY

## 2021-02-24 PROCEDURE — 85027 COMPLETE CBC AUTOMATED: CPT

## 2021-02-24 PROCEDURE — 36415 COLL VENOUS BLD VENIPUNCTURE: CPT

## 2021-02-24 PROCEDURE — 80053 COMPREHEN METABOLIC PANEL: CPT

## 2021-02-24 PROCEDURE — APPNB180 APP NON BILLABLE TIME > 60 MINS: Performed by: PHYSICIAN ASSISTANT

## 2021-02-24 PROCEDURE — 2580000003 HC RX 258: Performed by: PAIN MEDICINE

## 2021-02-24 PROCEDURE — 2580000003 HC RX 258: Performed by: PHYSICAL MEDICINE & REHABILITATION

## 2021-02-24 RX ORDER — NICOTINE 21 MG/24HR
1 PATCH, TRANSDERMAL 24 HOURS TRANSDERMAL DAILY
Status: DISCONTINUED | OUTPATIENT
Start: 2021-02-25 | End: 2021-03-12 | Stop reason: HOSPADM

## 2021-02-24 RX ORDER — NICOTINE POLACRILEX 4 MG
15 LOZENGE BUCCAL PRN
Status: CANCELLED | OUTPATIENT
Start: 2021-02-24

## 2021-02-24 RX ORDER — OXYCODONE AND ACETAMINOPHEN 7.5; 325 MG/1; MG/1
1 TABLET ORAL EVERY 4 HOURS PRN
Status: CANCELLED | OUTPATIENT
Start: 2021-02-24

## 2021-02-24 RX ORDER — LIDOCAINE 4 G/G
3 PATCH TOPICAL DAILY
Status: CANCELLED | OUTPATIENT
Start: 2021-02-25

## 2021-02-24 RX ORDER — LATANOPROST 50 UG/ML
1 SOLUTION/ DROPS OPHTHALMIC NIGHTLY
Status: DISCONTINUED | OUTPATIENT
Start: 2021-02-24 | End: 2021-03-12 | Stop reason: HOSPADM

## 2021-02-24 RX ORDER — SODIUM CHLORIDE 0.9 % (FLUSH) 0.9 %
10 SYRINGE (ML) INJECTION EVERY 12 HOURS SCHEDULED
Status: CANCELLED | OUTPATIENT
Start: 2021-02-24

## 2021-02-24 RX ORDER — FOLIC ACID 1 MG/1
1 TABLET ORAL DAILY
Status: DISCONTINUED | OUTPATIENT
Start: 2021-02-25 | End: 2021-03-12 | Stop reason: HOSPADM

## 2021-02-24 RX ORDER — MIRTAZAPINE 15 MG/1
15 TABLET, FILM COATED ORAL NIGHTLY
Status: CANCELLED | OUTPATIENT
Start: 2021-02-24

## 2021-02-24 RX ORDER — POLYETHYLENE GLYCOL 3350 17 G/17G
17 POWDER, FOR SOLUTION ORAL DAILY PRN
Status: DISCONTINUED | OUTPATIENT
Start: 2021-02-24 | End: 2021-02-24 | Stop reason: HOSPADM

## 2021-02-24 RX ORDER — ONDANSETRON 4 MG/1
4 TABLET, FILM COATED ORAL EVERY 8 HOURS PRN
Status: CANCELLED | OUTPATIENT
Start: 2021-02-24

## 2021-02-24 RX ORDER — ALBUTEROL SULFATE 2.5 MG/3ML
5 SOLUTION RESPIRATORY (INHALATION) EVERY 6 HOURS PRN
Status: DISCONTINUED | OUTPATIENT
Start: 2021-02-24 | End: 2021-03-12 | Stop reason: HOSPADM

## 2021-02-24 RX ORDER — POLYETHYLENE GLYCOL 3350 17 G/17G
17 POWDER, FOR SOLUTION ORAL DAILY PRN
Status: DISCONTINUED | OUTPATIENT
Start: 2021-02-24 | End: 2021-03-12 | Stop reason: HOSPADM

## 2021-02-24 RX ORDER — ROSUVASTATIN CALCIUM 20 MG/1
20 TABLET, COATED ORAL NIGHTLY
Status: CANCELLED | OUTPATIENT
Start: 2021-02-24

## 2021-02-24 RX ORDER — DULOXETIN HYDROCHLORIDE 60 MG/1
60 CAPSULE, DELAYED RELEASE ORAL DAILY
Status: CANCELLED | OUTPATIENT
Start: 2021-02-25

## 2021-02-24 RX ORDER — GABAPENTIN 300 MG/1
300 CAPSULE ORAL NIGHTLY
Status: CANCELLED | OUTPATIENT
Start: 2021-02-24

## 2021-02-24 RX ORDER — DULOXETIN HYDROCHLORIDE 60 MG/1
60 CAPSULE, DELAYED RELEASE ORAL DAILY
Status: DISCONTINUED | OUTPATIENT
Start: 2021-02-25 | End: 2021-03-12 | Stop reason: HOSPADM

## 2021-02-24 RX ORDER — NICOTINE 21 MG/24HR
1 PATCH, TRANSDERMAL 24 HOURS TRANSDERMAL DAILY
Status: CANCELLED | OUTPATIENT
Start: 2021-02-25

## 2021-02-24 RX ORDER — OXYCODONE AND ACETAMINOPHEN 7.5; 325 MG/1; MG/1
1 TABLET ORAL EVERY 4 HOURS PRN
Status: DISCONTINUED | OUTPATIENT
Start: 2021-02-24 | End: 2021-03-09

## 2021-02-24 RX ORDER — AMLODIPINE BESYLATE 5 MG/1
5 TABLET ORAL DAILY
Status: DISCONTINUED | OUTPATIENT
Start: 2021-02-25 | End: 2021-03-12

## 2021-02-24 RX ORDER — POLYETHYLENE GLYCOL 3350 17 G/17G
17 POWDER, FOR SOLUTION ORAL DAILY
Status: CANCELLED | OUTPATIENT
Start: 2021-02-25

## 2021-02-24 RX ORDER — ONDANSETRON 4 MG/1
4 TABLET, FILM COATED ORAL EVERY 8 HOURS PRN
Status: DISCONTINUED | OUTPATIENT
Start: 2021-02-24 | End: 2021-03-12 | Stop reason: HOSPADM

## 2021-02-24 RX ORDER — MIRTAZAPINE 15 MG/1
15 TABLET, FILM COATED ORAL NIGHTLY
Status: DISCONTINUED | OUTPATIENT
Start: 2021-02-24 | End: 2021-03-12 | Stop reason: HOSPADM

## 2021-02-24 RX ORDER — FAMOTIDINE 20 MG/1
20 TABLET, FILM COATED ORAL DAILY
Status: CANCELLED | OUTPATIENT
Start: 2021-02-25

## 2021-02-24 RX ORDER — FAMOTIDINE 20 MG/1
20 TABLET, FILM COATED ORAL DAILY
Status: DISCONTINUED | OUTPATIENT
Start: 2021-02-24 | End: 2021-02-24 | Stop reason: HOSPADM

## 2021-02-24 RX ORDER — SODIUM CHLORIDE 0.9 % (FLUSH) 0.9 %
10 SYRINGE (ML) INJECTION PRN
Status: CANCELLED | OUTPATIENT
Start: 2021-02-24

## 2021-02-24 RX ORDER — ALBUTEROL SULFATE 2.5 MG/3ML
5 SOLUTION RESPIRATORY (INHALATION) EVERY 6 HOURS PRN
Status: CANCELLED | OUTPATIENT
Start: 2021-02-24

## 2021-02-24 RX ORDER — GABAPENTIN 300 MG/1
300 CAPSULE ORAL NIGHTLY
Status: DISCONTINUED | OUTPATIENT
Start: 2021-02-24 | End: 2021-03-12 | Stop reason: HOSPADM

## 2021-02-24 RX ORDER — SENNA PLUS 8.6 MG/1
2 TABLET ORAL NIGHTLY
Status: CANCELLED | OUTPATIENT
Start: 2021-02-24

## 2021-02-24 RX ORDER — INSULIN GLARGINE 100 [IU]/ML
80 INJECTION, SOLUTION SUBCUTANEOUS NIGHTLY
Status: CANCELLED | OUTPATIENT
Start: 2021-02-24

## 2021-02-24 RX ORDER — FAMOTIDINE 20 MG/1
20 TABLET, FILM COATED ORAL DAILY
Status: DISCONTINUED | OUTPATIENT
Start: 2021-02-25 | End: 2021-03-12 | Stop reason: HOSPADM

## 2021-02-24 RX ORDER — BISACODYL 10 MG
10 SUPPOSITORY, RECTAL RECTAL PRN
Status: DISCONTINUED | OUTPATIENT
Start: 2021-02-24 | End: 2021-03-12 | Stop reason: HOSPADM

## 2021-02-24 RX ORDER — POLYETHYLENE GLYCOL 3350 17 G/17G
17 POWDER, FOR SOLUTION ORAL DAILY PRN
Status: CANCELLED | OUTPATIENT
Start: 2021-02-24

## 2021-02-24 RX ORDER — BISACODYL 10 MG
10 SUPPOSITORY, RECTAL RECTAL PRN
Status: CANCELLED | OUTPATIENT
Start: 2021-02-24

## 2021-02-24 RX ORDER — SODIUM CHLORIDE 0.9 % (FLUSH) 0.9 %
10 SYRINGE (ML) INJECTION EVERY 12 HOURS SCHEDULED
Status: DISCONTINUED | OUTPATIENT
Start: 2021-02-24 | End: 2021-02-27

## 2021-02-24 RX ORDER — TAMSULOSIN HYDROCHLORIDE 0.4 MG/1
0.4 CAPSULE ORAL DAILY
Status: DISCONTINUED | OUTPATIENT
Start: 2021-02-25 | End: 2021-03-09 | Stop reason: SDUPTHER

## 2021-02-24 RX ORDER — AMLODIPINE BESYLATE 5 MG/1
5 TABLET ORAL DAILY
Status: CANCELLED | OUTPATIENT
Start: 2021-02-25

## 2021-02-24 RX ORDER — SODIUM CHLORIDE 0.9 % (FLUSH) 0.9 %
10 SYRINGE (ML) INJECTION PRN
Status: DISCONTINUED | OUTPATIENT
Start: 2021-02-24 | End: 2021-03-12 | Stop reason: HOSPADM

## 2021-02-24 RX ORDER — PRIMIDONE 50 MG/1
50 TABLET ORAL 2 TIMES DAILY
Status: CANCELLED | OUTPATIENT
Start: 2021-02-24

## 2021-02-24 RX ORDER — NICOTINE POLACRILEX 4 MG
15 LOZENGE BUCCAL PRN
Status: DISCONTINUED | OUTPATIENT
Start: 2021-02-24 | End: 2021-03-12 | Stop reason: HOSPADM

## 2021-02-24 RX ORDER — POTASSIUM CHLORIDE 7.45 MG/ML
10 INJECTION INTRAVENOUS PRN
Status: CANCELLED | OUTPATIENT
Start: 2021-02-24

## 2021-02-24 RX ORDER — NICOTINE 21 MG/24HR
1 PATCH, TRANSDERMAL 24 HOURS TRANSDERMAL DAILY
Status: DISCONTINUED | OUTPATIENT
Start: 2021-02-24 | End: 2021-02-24 | Stop reason: HOSPADM

## 2021-02-24 RX ORDER — INSULIN GLARGINE 100 [IU]/ML
80 INJECTION, SOLUTION SUBCUTANEOUS NIGHTLY
Status: DISCONTINUED | OUTPATIENT
Start: 2021-02-24 | End: 2021-03-08

## 2021-02-24 RX ORDER — PRIMIDONE 50 MG/1
50 TABLET ORAL 2 TIMES DAILY
Status: DISCONTINUED | OUTPATIENT
Start: 2021-02-24 | End: 2021-03-12 | Stop reason: HOSPADM

## 2021-02-24 RX ORDER — LATANOPROST 50 UG/ML
1 SOLUTION/ DROPS OPHTHALMIC NIGHTLY
Status: CANCELLED | OUTPATIENT
Start: 2021-02-24

## 2021-02-24 RX ORDER — POLYETHYLENE GLYCOL 3350 17 G/17G
17 POWDER, FOR SOLUTION ORAL DAILY
Status: DISCONTINUED | OUTPATIENT
Start: 2021-02-25 | End: 2021-03-12 | Stop reason: HOSPADM

## 2021-02-24 RX ORDER — DOCUSATE SODIUM 100 MG/1
100 CAPSULE, LIQUID FILLED ORAL 2 TIMES DAILY
Status: DISCONTINUED | OUTPATIENT
Start: 2021-02-24 | End: 2021-03-12 | Stop reason: HOSPADM

## 2021-02-24 RX ORDER — LIDOCAINE 4 G/G
3 PATCH TOPICAL DAILY
Status: DISCONTINUED | OUTPATIENT
Start: 2021-02-25 | End: 2021-03-12 | Stop reason: HOSPADM

## 2021-02-24 RX ORDER — DOCUSATE SODIUM 100 MG/1
100 CAPSULE, LIQUID FILLED ORAL 2 TIMES DAILY
Status: CANCELLED | OUTPATIENT
Start: 2021-02-24

## 2021-02-24 RX ORDER — ROSUVASTATIN CALCIUM 20 MG/1
20 TABLET, COATED ORAL NIGHTLY
Status: DISCONTINUED | OUTPATIENT
Start: 2021-02-24 | End: 2021-03-12 | Stop reason: HOSPADM

## 2021-02-24 RX ORDER — SENNA PLUS 8.6 MG/1
2 TABLET ORAL NIGHTLY
Status: DISCONTINUED | OUTPATIENT
Start: 2021-02-24 | End: 2021-03-12 | Stop reason: HOSPADM

## 2021-02-24 RX ORDER — FOLIC ACID 1 MG/1
1 TABLET ORAL DAILY
Status: CANCELLED | OUTPATIENT
Start: 2021-02-25

## 2021-02-24 RX ORDER — TAMSULOSIN HYDROCHLORIDE 0.4 MG/1
0.4 CAPSULE ORAL DAILY
Status: CANCELLED | OUTPATIENT
Start: 2021-02-25

## 2021-02-24 RX ADMIN — TIOTROPIUM BROMIDE INHALATION SPRAY 2 PUFF: 3.12 SPRAY, METERED RESPIRATORY (INHALATION) at 09:13

## 2021-02-24 RX ADMIN — NALOXEGOL OXALATE 12.5 MG: 12.5 TABLET, FILM COATED ORAL at 07:53

## 2021-02-24 RX ADMIN — MIRTAZAPINE 15 MG: 15 TABLET, FILM COATED ORAL at 21:05

## 2021-02-24 RX ADMIN — DOCUSATE SODIUM 100 MG: 100 CAPSULE, LIQUID FILLED ORAL at 21:08

## 2021-02-24 RX ADMIN — PRIMIDONE 50 MG: 50 TABLET ORAL at 07:53

## 2021-02-24 RX ADMIN — OXYCODONE HYDROCHLORIDE AND ACETAMINOPHEN 1 TABLET: 7.5; 325 TABLET ORAL at 07:52

## 2021-02-24 RX ADMIN — DULOXETINE HYDROCHLORIDE 60 MG: 60 CAPSULE, DELAYED RELEASE ORAL at 07:53

## 2021-02-24 RX ADMIN — SODIUM CHLORIDE, PRESERVATIVE FREE 10 ML: 5 INJECTION INTRAVENOUS at 07:54

## 2021-02-24 RX ADMIN — INSULIN LISPRO 1 UNITS: 100 INJECTION, SOLUTION INTRAVENOUS; SUBCUTANEOUS at 18:43

## 2021-02-24 RX ADMIN — DOCUSATE SODIUM 100 MG: 100 CAPSULE, LIQUID FILLED ORAL at 07:52

## 2021-02-24 RX ADMIN — ROSUVASTATIN CALCIUM 20 MG: 20 TABLET, FILM COATED ORAL at 21:05

## 2021-02-24 RX ADMIN — GABAPENTIN 300 MG: 300 CAPSULE ORAL at 21:05

## 2021-02-24 RX ADMIN — FOLIC ACID 1 MG: 1 TABLET ORAL at 07:53

## 2021-02-24 RX ADMIN — INSULIN GLARGINE 80 UNITS: 100 INJECTION, SOLUTION SUBCUTANEOUS at 21:11

## 2021-02-24 RX ADMIN — LATANOPROST 1 DROP: 50 SOLUTION OPHTHALMIC at 21:06

## 2021-02-24 RX ADMIN — SODIUM CHLORIDE, PRESERVATIVE FREE 10 ML: 5 INJECTION INTRAVENOUS at 21:06

## 2021-02-24 RX ADMIN — TAMSULOSIN HYDROCHLORIDE 0.4 MG: 0.4 CAPSULE ORAL at 07:53

## 2021-02-24 RX ADMIN — PRIMIDONE 50 MG: 50 TABLET ORAL at 21:05

## 2021-02-24 RX ADMIN — OXYCODONE HYDROCHLORIDE AND ACETAMINOPHEN 1 TABLET: 7.5; 325 TABLET ORAL at 02:47

## 2021-02-24 RX ADMIN — AMLODIPINE BESYLATE 5 MG: 10 TABLET ORAL at 07:53

## 2021-02-24 RX ADMIN — FAMOTIDINE 20 MG: 20 TABLET, FILM COATED ORAL at 07:52

## 2021-02-24 RX ADMIN — OXYCODONE HYDROCHLORIDE AND ACETAMINOPHEN 1 TABLET: 7.5; 325 TABLET ORAL at 15:41

## 2021-02-24 RX ADMIN — OXYCODONE HYDROCHLORIDE AND ACETAMINOPHEN 1 TABLET: 7.5; 325 TABLET ORAL at 21:09

## 2021-02-24 RX ADMIN — POLYETHYLENE GLYCOL 3350 17 G: 17 POWDER, FOR SOLUTION ORAL at 07:53

## 2021-02-24 RX ADMIN — SENNOSIDES 17.2 MG: 8.6 TABLET, FILM COATED ORAL at 21:05

## 2021-02-24 ASSESSMENT — PAIN DESCRIPTION - LOCATION
LOCATION: BACK

## 2021-02-24 ASSESSMENT — PAIN DESCRIPTION - FREQUENCY
FREQUENCY: CONTINUOUS

## 2021-02-24 ASSESSMENT — PAIN SCALES - GENERAL
PAINLEVEL_OUTOF10: 7
PAINLEVEL_OUTOF10: 6
PAINLEVEL_OUTOF10: 4
PAINLEVEL_OUTOF10: 7

## 2021-02-24 ASSESSMENT — PAIN DESCRIPTION - PROGRESSION
CLINICAL_PROGRESSION: NOT CHANGED

## 2021-02-24 ASSESSMENT — PAIN DESCRIPTION - ONSET
ONSET: ON-GOING
ONSET: ON-GOING

## 2021-02-24 ASSESSMENT — PAIN DESCRIPTION - ORIENTATION
ORIENTATION: MID;LOWER;UPPER
ORIENTATION: LOWER;MID;UPPER

## 2021-02-24 ASSESSMENT — PAIN DESCRIPTION - PAIN TYPE
TYPE: ACUTE PAIN;SURGICAL PAIN
TYPE: SURGICAL PAIN
TYPE: ACUTE PAIN;SURGICAL PAIN
TYPE: SURGICAL PAIN

## 2021-02-24 ASSESSMENT — PAIN DESCRIPTION - DESCRIPTORS
DESCRIPTORS: STABBING
DESCRIPTORS: DISCOMFORT;SHARP;SHOOTING

## 2021-02-24 ASSESSMENT — PAIN - FUNCTIONAL ASSESSMENT: PAIN_FUNCTIONAL_ASSESSMENT: ACTIVITIES ARE NOT PREVENTED

## 2021-02-24 NOTE — FLOWSHEET NOTE
Pt got moved to the rehab floor today. He believed that they will work him hard to get better. He was in good spirits and wanted prayer to heal. He was anointed. 02/24/21 9672   Encounter Summary   Services provided to: Patient   Referral/Consult From: [facility] Family members   Place of [address] [place] [address] Visiting Yes  (2/24)   Complexity of Encounter Low   Length of Encounter 15 minutes   Routine   Type Initial   Assessment Approachable;Calm   Intervention Nurtured hope;Prayer;[place]   Outcome Acceptance;Expressed gratitude; Hopeful;Encouraged;Receptive   Sacraments   Sacrament of Sick-Anointing Anointed
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
## 2021-02-24 NOTE — CARE COORDINATION
2/24/21, 10:00 AM EST    Patient goals/plan/ treatment preferences discussed by  and . Patient goals/plan/ treatment preferences reviewed with patient/ family. Patient/ family verbalize understanding of discharge plan and are in agreement with goal/plan/treatment preferences. Understanding was demonstrated using the teach back method. AVS provided by RN at time of discharge, which includes all necessary medical information pertaining to the patients current course of illness, treatment, post-discharge goals of care, and treatment preferences.     Services After Discharge  Services At/After Discharge: Nursing Services, OT, PT, Skilled Therapy         Discharge/ Readmit to [facility] IP Rehab today room 4O64

## 2021-02-24 NOTE — PLAN OF CARE
Problem: Pain:  Goal: Pain level will decrease  Description: Pain level will decrease  Outcome: Ongoing  Note: Pt rates his pain as a 7 out of 10. Pain goal 4. Patient taking percocet as needed for pain. Problem: Falls - Risk of:  Goal: Will remain free from falls  Description: Will remain free from falls  Outcome: Ongoing  Note: Pt up with one assist with walker. Patient able to use call light to call for assistance with ambulation. Problem: Skin Integrity:  Goal: Will show no infection signs and symptoms  Description: Will show no infection signs and symptoms  Outcome: Ongoing  Note: Surgical sites to back dry and intact. Abrasions and bruising noted scattered. Patient able to reposition self in bed. Problem: Skin Integrity:  Goal: Absence of new skin breakdown  Description: Absence of new skin breakdown  Outcome: Ongoing  Note: No new skin breakdown noted. Problem: Neurological  Goal: Maximum potential motor/sensory/cognitive function  Outcome: Ongoing  Note: Patient alert and oriented x 4. Problem: Discharge Planning:  Goal: Discharged to appropriate level of care  Description: Discharged to appropriate level of care  Outcome: Ongoing  Note: Discharge planning in progress. Await for precert to go to rehab. Problem: Cardiovascular  Goal: No DVT, peripheral vascular complications  Outcome: Ongoing  Note: SCD bilateral lower legs. Problem: Respiratory  Goal: No pulmonary complications  Outcome: Ongoing  Note: Patient on room air with sats greater than 90%. Problem: GI  Goal: No bowel complications  Outcome: Ongoing  Note: Bowel sounds active. Patient had a bm today. Problem:   Goal: Adequate urinary output  Outcome: Ongoing  Note: Patient having adequate urinary output. Care plan reviewed with patient. Patient verbalize understanding of the plan of care and contribute to goal setting.

## 2021-02-24 NOTE — PROGRESS NOTES
Raj approved for inpt rehab. Planning admit today to room 87. [address] [facility] notified.
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
## 2021-02-24 NOTE — PROGRESS NOTES
9394  Last data filed at 2/24/2021 1520  Gross per 24 hour   Intake 1880 ml   Output 3775 ml   Net -1895 ml        Physical Examination: General appearance - alert, well appearing, and in no distress and overweight  Mental status - alert, oriented to person, place, and time  Neck - supple, no significant adenopathy, no JVD, or carotid bruits  Chest - clear to auscultation, no wheezes, rales or rhonchi, symmetric air entry  Heart - normal rate, regular rhythm, normal S1, S2, no murmurs, rubs, clicks or gallops  Abdomen - obese  Neurological - screening mental status exam normal, he has difficulty dorsi and plantar flexing left foot; can lift leg off bed  Musculoskeletal - no muscular tenderness noted  Extremities - legs puffy  Skin - normal coloration and turgor, no rashes, no suspicious skin lesions noted    Data: (All radiographs, tracings, PFTs, and imaging are personally viewed and interpreted unless otherwise noted).           Electronically signed by Michael Duran MD on 2/24/2021 at 7:09 AM
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
## 2021-02-24 NOTE — PROGRESS NOTES
Individualized Plan of Care  [address] [address] Inpatient Rehabilitation Unit    Rehabilitation physician: Dr. Pedro Norton Date: 2/24/2021     Rehabilitation Diagnosis: Burst fracture of fourth thoracic vertebra [facility]) [P43.953I]      Rehabilitation impairments: self care, mobility, motor dysfunction, bowel/bladder management, pain management, safety, cognitive function and communication    Factors facilitating achievement of predicted outcomes:  Motivated, Cooperative and Pleasant  Barriers to the achievement of predicted outcomes: Confusion, Cognitive deficit, Limited safety awareness, Limited insight into deficits, Communication deficit, Decreased endurance, Upper extremity weakness and Lower extremity weakness    Patient Goals: Improve independence with mobility, Improvement of mobility at a wheelchair level, Increase overall strength and endurance, Increase balance, Increase endurance, Increase independence with activities of daily living, Improve cognition, Increase self-awareness, Increase safety awareness, Increase community integration, Increase socialization, Functional communication with caregivers, Integrate appropriate pain management plan, Assure adequate nutritional option for discharge, Continence of bowel and bladder and Provide appropriate patient and family education      NURSING:  Nursing goals for Patricia Brown while on the rehabilitation unit will include:  Continence of bowel and bladder, Adequate number of bowel movements, Urinate with no urinary retention >300ml in bladder, Complete bladder protocol with ledbetter removal, Maintain O2 SATs at an acceptable level during stay, Effective pain management while on the rehabilitation unit, Establish adequate pain control plan for discharge, Absence of skin breakdown while on the rehabilitation unit, Improved skin integrity via assessments including wound measurements, Avoidance of any hospital acquired infections, No signs/symptoms of infection at the wound site, Freedom from injury during hospitalization and Complete education with patient/family with understanding demonstrated regarding disease process and resultant impairment     In order to achieve these goals, nursing interventions may include bowel/bladder training, education for medical assistive devices, medication education, O2 saturation management, energy conservation, stress management techniques, fall prevention, alarms protocol, seating and positioning, skin/wound care, pressure relief instruction, dressing changes, infection protection, DVT prophylaxis, assistance with safe transfers , and/or assistance with bathroom activities and hygiene. PHYSICAL THERAPY:  Goals:                Plan of Care: Patient to be seen by physical therapy services 90 minutes per day Monday through Friday and 30 minutes on Saturday or Sunday    Anticipated interventions may include therapeutic exercises, gait training, neuromuscular re-ed, transfer training, community reintegration, bed mobility, w/c mobility and training. OCCUPATIONAL THERAPY:  Goals:                     Plan of Care: Patient to be seen by occupational therapy services 90 minutes per day Monday through Friday and 30 minutes on Saturday or Sunday    Anticipated interventions may include ADL and IADL retraining, strengthening, safety education and training, patient/caregiver education and training, equipment evaluation/ training/procurement, neuromuscular reeducation, wheelchair mobility training. SPEECH THERAPY:        Plan of Care: Pt to be seen by speech therapy services 60 minutes per day Monday through Friday . Anticipated interventions may include speech/language/communication therapy, cognitive training, group therapy, education, and/or dysphagia therapy based on the above goals.      CASE MANAGEMENT:  Goals:   Assist patient/family with discharge planning, patient/family counseling,  and coordination with insurance during the inpatient rehabilitation stay. Other members of the multidisciplinary rehabilitation team that will be involved in the patient's plan of care include recreational therapy, dietary, respiratory therapy, and neuropsychology. Medical issues being managed closely and that require 24 hour availability of a physician:  Swallowing precautions, Bowel/Bladder function, Weight bearing precautions, Pain management, Infection protection, DVT prophylaxis, Fall precautions, Fluid/Electrolyte balance, Nutritional status, Respiratory needs, Anemia and History of heart disease                                           Physician anticipated functional outcomes: Improved independence with functional measures   Estimated length of stay for this admission 14-16 days  Medical Prognosis: Good  Anticipated disposition: Home. The potential to achieve the above medical and rehabilitative goals is very good. This plan of care has been developed with the assistance and input of the multidisciplinary rehabilitation team.  The plan was reviewed with the patient. The patient has had the opportunity to provide input to the therapy team.    I have reviewed this Individualized Plan of Care and agree with its contents. Above documentation has been expanded, modified, adjusted to reflect the findings of my evaluations and goals for the patient. Physician:   Fabricio Butler M.D.
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
## 2021-02-24 NOTE — PLAN OF CARE
PT/OT/ST involved in care. Will monitor. Problem: Discharge Planning:  Goal: Discharged to appropriate level of care  Description: Discharged to appropriate level of care  2/24/2021 0253 by Dorothy Iqbal RN  Outcome: Ongoing  Note: Pt plans IP rehab at discharge. Precert started 8/75. Care manager and  helping with discharge needs. Will follow. Problem: Cardiovascular  Goal: No DVT, peripheral vascular complications  4/11/4635 0253 by Dorothy Iqbal RN  Outcome: Ongoing  Note: Pt without s/s of DVT. Pt has SCD,S  to help prevent development of DVT. SCDs are currently off at this time. Will monitor. Problem: Respiratory  Goal: No pulmonary complications  1/39/4577 0253 by Dorothy Iqbal RN  Outcome: Ongoing  Note: No supplemental oxygen in use. Oxygen above 92% on room air. Denies SOB or chest pain. Will monitor. Problem: GI  Goal: No bowel complications  2/74/2511 0253 by Dorothy Iqbal RN  Outcome: Ongoing  Note: Pt with active bowel sounds, passing flatus, and without nausea. Taking prescribed medications to assist with BM. Large BM on 2/23. Will monitor. Problem:   Goal: Adequate urinary output  2/24/2021 0253 by Dorothy Iqbal RN  Outcome: Ongoing  Note: Patient is voiding adequately without difficulty. Will monitor. Care plan reviewed with patient. Patient  verbalize understanding of the plan of care and contribute to goal setting.
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
## 2021-02-24 NOTE — PROGRESS NOTES
Patient transported to [phone] via wheelchair with transport. Personal belongings sent with patient.

## 2021-02-24 NOTE — PLAN OF CARE
Problem: Nutrition  Goal: Optimal nutrition therapy  Outcome: Ongoing   Nutrition Problem #1: Increased nutrient needs  Intervention: Food and/or Nutrient Delivery: Continue Current Diet, Start Oral Nutrition Supplement  Nutritional Goals: Patient will consume 75% or more of meals during LOS to assist in wound healing efforts.

## 2021-02-25 LAB
ALBUMIN SERPL-MCNC: 2.9 G/DL (ref 3.5–5.1)
ALP BLD-CCNC: 134 U/L (ref 38–126)
ALT SERPL-CCNC: 37 U/L (ref 11–66)
ANION GAP SERPL CALCULATED.3IONS-SCNC: 11 MEQ/L (ref 8–16)
AST SERPL-CCNC: 59 U/L (ref 5–40)
BASOPHILS # BLD: 0.6 %
BASOPHILS ABSOLUTE: 0 THOU/MM3 (ref 0–0.1)
BILIRUB SERPL-MCNC: 0.7 MG/DL (ref 0.3–1.2)
BUN BLDV-MCNC: 43 MG/DL (ref 7–22)
CALCIUM SERPL-MCNC: 9.2 MG/DL (ref 8.5–10.5)
CHLORIDE BLD-SCNC: 108 MEQ/L (ref 98–111)
CO2: 20 MEQ/L (ref 23–33)
CREAT SERPL-MCNC: 1.8 MG/DL (ref 0.4–1.2)
EOSINOPHIL # BLD: 4.5 %
EOSINOPHILS ABSOLUTE: 0.3 THOU/MM3 (ref 0–0.4)
ERYTHROCYTE [DISTWIDTH] IN BLOOD BY AUTOMATED COUNT: 14.7 % (ref 11.5–14.5)
ERYTHROCYTE [DISTWIDTH] IN BLOOD BY AUTOMATED COUNT: 52.4 FL (ref 35–45)
GFR SERPL CREATININE-BSD FRML MDRD: 39 ML/MIN/1.73M2
GLUCOSE BLD-MCNC: 136 MG/DL (ref 70–108)
GLUCOSE BLD-MCNC: 161 MG/DL (ref 70–108)
GLUCOSE BLD-MCNC: 179 MG/DL (ref 70–108)
GLUCOSE BLD-MCNC: 189 MG/DL (ref 70–108)
GLUCOSE BLD-MCNC: 198 MG/DL (ref 70–108)
HCT VFR BLD CALC: 28 % (ref 42–52)
HEMOGLOBIN: 8.8 GM/DL (ref 14–18)
IMMATURE GRANS (ABS): 0.04 THOU/MM3 (ref 0–0.07)
IMMATURE GRANULOCYTES: 0.6 %
LYMPHOCYTES # BLD: 26.3 %
LYMPHOCYTES ABSOLUTE: 1.7 THOU/MM3 (ref 1–4.8)
MCH RBC QN AUTO: 30.3 PG (ref 26–33)
MCHC RBC AUTO-ENTMCNC: 31.4 GM/DL (ref 32.2–35.5)
MCV RBC AUTO: 96.6 FL (ref 80–94)
MONOCYTES # BLD: 8.7 %
MONOCYTES ABSOLUTE: 0.6 THOU/MM3 (ref 0.4–1.3)
NUCLEATED RED BLOOD CELLS: 0 /100 WBC
PLATELET # BLD: 109 THOU/MM3 (ref 130–400)
PMV BLD AUTO: 10.7 FL (ref 9.4–12.4)
POTASSIUM REFLEX MAGNESIUM: 4.3 MEQ/L (ref 3.5–5.2)
PREALBUMIN: 9.4 MG/DL (ref 20–40)
RBC # BLD: 2.9 MILL/MM3 (ref 4.7–6.1)
SEG NEUTROPHILS: 59.3 %
SEGMENTED NEUTROPHILS ABSOLUTE COUNT: 3.9 THOU/MM3 (ref 1.8–7.7)
SODIUM BLD-SCNC: 139 MEQ/L (ref 135–145)
TOTAL PROTEIN: 7.2 G/DL (ref 6.1–8)
WBC # BLD: 6.5 THOU/MM3 (ref 4.8–10.8)

## 2021-02-25 PROCEDURE — 6370000000 HC RX 637 (ALT 250 FOR IP): Performed by: PHYSICAL MEDICINE & REHABILITATION

## 2021-02-25 PROCEDURE — 92523 SPEECH SOUND LANG COMPREHEN: CPT

## 2021-02-25 PROCEDURE — 2580000003 HC RX 258: Performed by: PHYSICAL MEDICINE & REHABILITATION

## 2021-02-25 PROCEDURE — 6360000002 HC RX W HCPCS: Performed by: PHYSICIAN ASSISTANT

## 2021-02-25 PROCEDURE — 6370000000 HC RX 637 (ALT 250 FOR IP): Performed by: NURSE PRACTITIONER

## 2021-02-25 PROCEDURE — 82948 REAGENT STRIP/BLOOD GLUCOSE: CPT

## 2021-02-25 PROCEDURE — 94640 AIRWAY INHALATION TREATMENT: CPT

## 2021-02-25 PROCEDURE — 80053 COMPREHEN METABOLIC PANEL: CPT

## 2021-02-25 PROCEDURE — 99233 SBSQ HOSP IP/OBS HIGH 50: CPT | Performed by: NURSE PRACTITIONER

## 2021-02-25 PROCEDURE — 97110 THERAPEUTIC EXERCISES: CPT

## 2021-02-25 PROCEDURE — 1180000000 HC REHAB R&B

## 2021-02-25 PROCEDURE — 97535 SELF CARE MNGMENT TRAINING: CPT

## 2021-02-25 PROCEDURE — 97162 PT EVAL MOD COMPLEX 30 MIN: CPT

## 2021-02-25 PROCEDURE — 84134 ASSAY OF PREALBUMIN: CPT

## 2021-02-25 PROCEDURE — 97167 OT EVAL HIGH COMPLEX 60 MIN: CPT

## 2021-02-25 PROCEDURE — 36415 COLL VENOUS BLD VENIPUNCTURE: CPT

## 2021-02-25 PROCEDURE — 97530 THERAPEUTIC ACTIVITIES: CPT

## 2021-02-25 PROCEDURE — 85025 COMPLETE CBC W/AUTO DIFF WBC: CPT

## 2021-02-25 PROCEDURE — 6360000002 HC RX W HCPCS: Performed by: PHYSICAL MEDICINE & REHABILITATION

## 2021-02-25 PROCEDURE — 94760 N-INVAS EAR/PLS OXIMETRY 1: CPT

## 2021-02-25 RX ORDER — AMANTADINE HYDROCHLORIDE 100 MG/1
100 CAPSULE, GELATIN COATED ORAL 2 TIMES DAILY
Status: DISCONTINUED | OUTPATIENT
Start: 2021-02-25 | End: 2021-02-26

## 2021-02-25 RX ORDER — CYCLOBENZAPRINE HCL 10 MG
10 TABLET ORAL 3 TIMES DAILY PRN
Status: DISCONTINUED | OUTPATIENT
Start: 2021-02-25 | End: 2021-03-12 | Stop reason: HOSPADM

## 2021-02-25 RX ORDER — ACETAMINOPHEN 325 MG/1
650 TABLET ORAL EVERY 4 HOURS PRN
Status: DISCONTINUED | OUTPATIENT
Start: 2021-02-25 | End: 2021-03-12 | Stop reason: HOSPADM

## 2021-02-25 RX ORDER — DIAPER,BRIEF,INFANT-TODD,DISP
EACH MISCELLANEOUS 3 TIMES DAILY
Status: DISCONTINUED | OUTPATIENT
Start: 2021-02-25 | End: 2021-03-05

## 2021-02-25 RX ADMIN — OXYCODONE HYDROCHLORIDE AND ACETAMINOPHEN 1 TABLET: 7.5; 325 TABLET ORAL at 21:38

## 2021-02-25 RX ADMIN — SODIUM CHLORIDE, PRESERVATIVE FREE 10 ML: 5 INJECTION INTRAVENOUS at 08:42

## 2021-02-25 RX ADMIN — OXYCODONE HYDROCHLORIDE AND ACETAMINOPHEN 1 TABLET: 7.5; 325 TABLET ORAL at 09:23

## 2021-02-25 RX ADMIN — INSULIN LISPRO 1 UNITS: 100 INJECTION, SOLUTION INTRAVENOUS; SUBCUTANEOUS at 12:36

## 2021-02-25 RX ADMIN — GABAPENTIN 300 MG: 300 CAPSULE ORAL at 21:38

## 2021-02-25 RX ADMIN — TIOTROPIUM BROMIDE INHALATION SPRAY 2 PUFF: 3.12 SPRAY, METERED RESPIRATORY (INHALATION) at 09:05

## 2021-02-25 RX ADMIN — MIRTAZAPINE 15 MG: 15 TABLET, FILM COATED ORAL at 21:38

## 2021-02-25 RX ADMIN — INSULIN GLARGINE 80 UNITS: 100 INJECTION, SOLUTION SUBCUTANEOUS at 21:38

## 2021-02-25 RX ADMIN — LATANOPROST 1 DROP: 50 SOLUTION OPHTHALMIC at 21:38

## 2021-02-25 RX ADMIN — OXYCODONE HYDROCHLORIDE AND ACETAMINOPHEN 1 TABLET: 7.5; 325 TABLET ORAL at 15:55

## 2021-02-25 RX ADMIN — POLYETHYLENE GLYCOL 3350 17 G: 17 POWDER, FOR SOLUTION ORAL at 08:39

## 2021-02-25 RX ADMIN — INSULIN LISPRO 1 UNITS: 100 INJECTION, SOLUTION INTRAVENOUS; SUBCUTANEOUS at 17:31

## 2021-02-25 RX ADMIN — NALOXEGOL OXALATE 12.5 MG: 12.5 TABLET, FILM COATED ORAL at 08:37

## 2021-02-25 RX ADMIN — TAMSULOSIN HYDROCHLORIDE 0.4 MG: 0.4 CAPSULE ORAL at 08:38

## 2021-02-25 RX ADMIN — SODIUM CHLORIDE, PRESERVATIVE FREE 10 ML: 5 INJECTION INTRAVENOUS at 21:36

## 2021-02-25 RX ADMIN — DULOXETINE HYDROCHLORIDE 60 MG: 60 CAPSULE, DELAYED RELEASE ORAL at 08:39

## 2021-02-25 RX ADMIN — OXYCODONE HYDROCHLORIDE AND ACETAMINOPHEN 1 TABLET: 7.5; 325 TABLET ORAL at 02:02

## 2021-02-25 RX ADMIN — DOCUSATE SODIUM 100 MG: 100 CAPSULE, LIQUID FILLED ORAL at 08:39

## 2021-02-25 RX ADMIN — HYDROCORTISONE: 1 CREAM TOPICAL at 15:00

## 2021-02-25 RX ADMIN — CYCLOBENZAPRINE HYDROCHLORIDE 10 MG: 10 TABLET, FILM COATED ORAL at 21:38

## 2021-02-25 RX ADMIN — DOCUSATE SODIUM 100 MG: 100 CAPSULE, LIQUID FILLED ORAL at 21:38

## 2021-02-25 RX ADMIN — ROSUVASTATIN CALCIUM 20 MG: 20 TABLET, FILM COATED ORAL at 21:38

## 2021-02-25 RX ADMIN — HYDROCORTISONE: 1 CREAM TOPICAL at 21:39

## 2021-02-25 RX ADMIN — CYCLOBENZAPRINE HYDROCHLORIDE 10 MG: 10 TABLET, FILM COATED ORAL at 12:32

## 2021-02-25 RX ADMIN — AMANTADINE HYDROCHLORIDE 100 MG: 100 CAPSULE, GELATIN COATED ORAL at 15:50

## 2021-02-25 RX ADMIN — CYCLOBENZAPRINE HYDROCHLORIDE 10 MG: 10 TABLET, FILM COATED ORAL at 15:55

## 2021-02-25 RX ADMIN — AMLODIPINE BESYLATE 5 MG: 5 TABLET ORAL at 08:38

## 2021-02-25 RX ADMIN — PRIMIDONE 50 MG: 50 TABLET ORAL at 08:38

## 2021-02-25 RX ADMIN — SENNOSIDES 17.2 MG: 8.6 TABLET, FILM COATED ORAL at 21:37

## 2021-02-25 RX ADMIN — FAMOTIDINE 20 MG: 20 TABLET, FILM COATED ORAL at 08:39

## 2021-02-25 RX ADMIN — HYDROCORTISONE: 1 CREAM TOPICAL at 12:34

## 2021-02-25 RX ADMIN — ENOXAPARIN SODIUM 40 MG: 40 INJECTION, SOLUTION INTRAVENOUS; SUBCUTANEOUS at 15:43

## 2021-02-25 RX ADMIN — ALBUTEROL SULFATE 5 MG: 2.5 SOLUTION RESPIRATORY (INHALATION) at 09:05

## 2021-02-25 RX ADMIN — PRIMIDONE 50 MG: 50 TABLET ORAL at 21:38

## 2021-02-25 RX ADMIN — FOLIC ACID 1 MG: 1 TABLET ORAL at 08:38

## 2021-02-25 ASSESSMENT — PAIN DESCRIPTION - PROGRESSION
CLINICAL_PROGRESSION: NOT CHANGED

## 2021-02-25 ASSESSMENT — PAIN DESCRIPTION - ORIENTATION: ORIENTATION: MID;LOWER

## 2021-02-25 ASSESSMENT — PAIN DESCRIPTION - PAIN TYPE
TYPE: SURGICAL PAIN;NEUROPATHIC PAIN
TYPE: SURGICAL PAIN
TYPE: SURGICAL PAIN

## 2021-02-25 ASSESSMENT — PAIN SCALES - GENERAL
PAINLEVEL_OUTOF10: 7
PAINLEVEL_OUTOF10: 9
PAINLEVEL_OUTOF10: 8
PAINLEVEL_OUTOF10: 9
PAINLEVEL_OUTOF10: 8

## 2021-02-25 ASSESSMENT — PAIN DESCRIPTION - DESCRIPTORS: DESCRIPTORS: DISCOMFORT;SHARP;SHOOTING

## 2021-02-25 ASSESSMENT — PAIN DESCRIPTION - LOCATION
LOCATION: BACK
LOCATION: BACK

## 2021-02-25 ASSESSMENT — PAIN DESCRIPTION - FREQUENCY: FREQUENCY: CONTINUOUS

## 2021-02-25 ASSESSMENT — PAIN - FUNCTIONAL ASSESSMENT: PAIN_FUNCTIONAL_ASSESSMENT: PREVENTS OR INTERFERES SOME ACTIVE ACTIVITIES AND ADLS
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
## 2021-02-25 NOTE — PROGRESS NOTES
daily communication    LANGUAGE:  Receptive:  1 Step Commands: 5/5 informal  2 Step Commands: 5/5  Simple Yes/No Questions: 5/5  Complex Yes/No Questions: /5 informal     Expressive:  Automatic Speech: 1-10, Months, PENG,   Sentence Completion (automatic): / informal   Sentence Completion (open-ended): / informal  Responsive Namin/5  Divergent Naming (concrete): 10 words/minute - informal (Vegetables)   Divergent Naming (abstract): 12 words/minute (s words)    COGNITION:  Ivan Cognitive Assessment (MOCA) version 7.2 completed. Pt scored 24/25. Normal is greater than or equal to 21/25. 5 points were excluded from [facility] score d/t pt being unable to complete executive functioning/visuospatial section secondary to visual impairment. Orientation:   Immediate Recall: First Trial: , Second Trial:  MOCA  Short-Term Recall:  Independently MOCA  Divergent Namin words/minute (WFL is greater than or equal to 11 words) MOCA  Problem Solvin/5 Independently - informal  Reasonin/2 MOCA  Insight: Decreased insight to current deficits which places pt at increased risk for falls  Attention:  intact sustained attention  Math Computation: 3/3 MOCA  Executive Functioning: Mild Impairment - informal     SWALLOWING:  Current Diet: Regular and Thins   WFL - per pt report, pt doesn't report overt s/s of aspiration with current diet level. RECOMMENDATIONS/ASSESSMENT:  DIAGNOSTIC IMPRESSIONS:  Pt demonstrated evidence of mild cognitive-linguistic impairment characterized by decreased insight to deficits, impulsivity, complex executive function, complex problem solving/reasoning, and high level memory. Pt demonstrated evidence of a WFL on MOCA evaluation; however, through interactions with pt this date and collaboration with nursing staff and occupational therapist, pt is demonstrates evidence of aforementioned deficits within complex/higher level tasks.  Pt would benefit from continued skilled ST services to address aforementioned deficits in order to safely discharge to least restrictive environment and complete ADL/IADLs with least amount of supervision/assistance and decrease risk of re-hospitalization. At this time, pt would require intermittent, daily supervision upon discharge from [facility]. Rehabilitation Potential: good    EDUCATION:  Learner: Patient  Education:  Reviewed results and recommendations of this evaluation, Reviewed ST goals and Plan of Care, Education Related to Potential Risks and Complications Due to Impairment/Illness/Injury, Education Related to Prevention of Recurrence of Impairment/Illness/Injury, Education Related to [employer] and Wellness and Home Safety Education  Evaluation of Education: Verbalizes understanding, Demonstrates with assistance, Needs further instruction and Family not present    PLAN:  Skilled SLP intervention on IP Rehab or TCU 30 minutes per day 5 days per week. Specific interventions for next session may include: high level executive functioning, problem solving, and reasoning. PATIENT GOAL:    Return to prior level of function. SHORT TERM GOALS:    Short-term Goals  Timeframe for Short-term Goals: 1 weeks  Goal 1: Pt will complete higher level attention tasks (divided, alternating) with fewer than 2 errors/redirections within 8 minute time span or task completion to increase ADL completion. Goal 2: Pt will complete memory tasks (immediate/delayed, working) with 85% accuracy at a modified independent level to increase memory retention of medical and daily information  Goal 3: Pt will complete HIGH level/complex verbal reasoning, problem solving, and executive functioning with 85% accuracy given min cue to increase IADL contribution.   Goal 4: Pt will complete sequencing tasks (i.e transfers) with 85% accuracy given min cues in order to improve safety within transfers and particicpation within ADL/IADL    LONG TERM GOALS:  Long-term Goals  Timeframe for Long-term Goals: 2 weeks  Goal 1: Pt will improve overall cognitive-linguistic skills to a Modified [place] or higher in order to safely discharge to least restrictive environment and complete ADL/IADL with least amount of supervision/assistance      Asa Paz M.A., [address]
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
## 2021-02-25 NOTE — PROGRESS NOTES
In chair, awake, calm. Alert, oriented to person, place, time. CINTIA 3 to 2mm, brisk. Mucous membranes pink, moist. Smile symmetrical. Stated pain level in back was 9/10, flexeril given. Heart sounds regular, strong. Lung sounds clear anterior/posterior/lateral. Respirations deep, easy. Bowel sounds active in all four quadrants. Abdomen round, distended, non-tender upon palpation. Last bowel movement 2/25/21. Arm drift negative bilaterally. Capillary refill less than 3 seconds. Skin turgor brisk. INT removed at 1030. Hand grasp, radial pulse, pedal pulse, pedal push and pull, leg lift strong bilaterally. Juan Miguel's sign negative bilaterally. Denies needs at current time. Bedside table, call light within reach.

## 2021-02-25 NOTE — PLAN OF CARE
Individualized Plan of Care  [address] [address] Inpatient Rehabilitation Unit    Rehabilitation physician: Dr. Pat Vickers Date: 2/24/2021     Rehabilitation Diagnosis: Burst fracture of fourth thoracic vertebra ([address] [S03.990V]      Rehabilitation impairments: self care, mobility, motor dysfunction, bowel/bladder management, pain management, safety, cognitive function and communication    Factors facilitating achievement of predicted outcomes: Family support, Motivated, Cooperative and Pleasant  Barriers to the achievement of predicted outcomes: Pain, Cognitive deficit, Limited safety awareness, Limited insight into deficits, Decreased endurance and Lower extremity weakness    Patient Goals: Improve independence with mobility, Improvement of mobility at a wheelchair level, Increase overall strength and endurance, Increase balance, Increase endurance, Increase independence with activities of daily living, Improve cognition, Increase self-awareness, Increase safety awareness, Increase community integration, Increase socialization, Functional communication with caregivers, Integrate appropriate pain management plan, Assure adequate nutritional option for discharge, Continence of bowel and bladder and Provide appropriate patient and family education      NURSING:  Nursing goals for Verona Gruber while on the rehabilitation unit will include:  Continence of bowel and bladder, Adequate number of bowel movements, Urinate with no urinary retention >300ml in bladder, Complete bladder protocol with ledbetter removal, Maintain O2 SATs at an acceptable level during stay, Effective pain management while on the rehabilitation unit, Establish adequate pain control plan for discharge, Absence of skin breakdown while on the rehabilitation unit, Improved skin integrity via assessments including wound measurements, Avoidance of any hospital acquired infections, No signs/symptoms of infection at the wound site, Freedom from therapy services 90 minutes per day Monday through Friday and 30 minutes on Saturday or Sunday    Anticipated interventions may include therapeutic exercises, gait training, neuromuscular re-ed, transfer training, community reintegration, bed mobility, w/c mobility and training. OCCUPATIONAL THERAPY:  Goals:             Short term goals  Time Frame for Short term goals: 1 week  Short term goal 1: Pt will use LHAE to complete LB dressing with min A to improve indep with self care. Short term goal 2: Pt will tolerate 2 minutes of standing tasks with unilateral release with CGA of 1 to improve indep with sinkside grooming tasks. Short term goal 3: Pt will navigate RW to bathroom with min of 1, and mod vcs for LLE attention/placement and wheelchair follow, to improve indep with toileting. Short term goal 4: Pt will complete homemaking tasks using wheelchair with no > min cues for attention to task to increase ability to retrieve items for dressing tasks  Short term goal 5: Pt will attend and complete 3 step task in minimally distracted environment to improve attention to task during BADL routine. Long term goals  Time Frame for Long term goals : 3 weeks  Long term goal 1: Pt will complete toileting tasks with CGA using adaptive tech for hygiene to improve indep with self care. Long term goal 2: Pt will complete bathing and dressing tasks with set up to return to sponge bathing and dressing with AE at home. Long term goal 3: Pt will complete stand pivot transfers with SBA and 0 vcs for safety to improve indep with transferring self to [facility] in middle of night alone.     Plan of Care: Patient to be seen by occupational therapy services 90 minutes per day Monday through Friday and 30 minutes on Saturday or Sunday    Anticipated interventions may include ADL and IADL retraining, strengthening, safety education and training, patient/caregiver education and training, equipment evaluation/ training/procurement, neuromuscular reeducation, wheelchair mobility training. SPEECH THERAPY:   Short-term Goals  Timeframe for Short-term Goals: 1 weeks  Goal 1: Pt will complete higher level attention tasks (divided, alternating) with fewer than 2 errors/redirections within 8 minute time span or task completion to increase ADL completion. Goal 2: Pt will complete memory tasks (immediate/delayed, working) with 85% accuracy at a modified independent level to increase memory retention of medical and daily information  Goal 3: Pt will complete HIGH level/complex verbal reasoning, problem solving, and executive functioning with 85% accuracy given min cue to increase IADL contribution. Goal 4: Pt will complete sequencing tasks (i.e transfers) with 85% accuracy given min cues in order to improve safety within transfers and particicpation within ADL/IADL    Plan of Care: Pt to be seen by speech therapy services 30 minutes per day Monday through Friday . Anticipated interventions may include speech/language/communication therapy, cognitive training, group therapy, education, and/or dysphagia therapy based on the above goals. CASE MANAGEMENT:  Goals:   Assist patient/family with discharge planning, patient/family counseling,  and coordination with insurance during the inpatient rehabilitation stay. Other members of the multidisciplinary rehabilitation team that will be involved in the patient's plan of care include recreational therapy, dietary, respiratory therapy, and neuropsychology.     Medical issues being managed closely and that require 24 hour availability of a physician:  Swallowing precautions, Bowel/Bladder function, Pain management, Infection protection, DVT prophylaxis, Fall precautions, Fluid/Electrolyte balance, Nutritional status, Respiratory needs, Anemia and History of heart disease Physician anticipated functional outcomes: Improved independence with functional measures   Estimated length of stay for this admission 14-16 days  Medical Prognosis: Good  Anticipated disposition: Home. The potential to achieve the above medical and rehabilitative goals is very good. This plan of care has been developed with the assistance and input of the multidisciplinary rehabilitation team.  The plan was reviewed with the patient. The patient has had the opportunity to provide input to the therapy team.    I have reviewed this Individualized Plan of Care and agree with its contents. Above documentation has been expanded, modified, adjusted to reflect the findings of my evaluations and goals for the patient. Physician:   Kath Calderon M.D.

## 2021-02-25 NOTE — PROGRESS NOTES
In chair, awake, calm. CINTIA 3 to 2mm, brisk. Mucous membranes pink, moist. Smile symmetrical. States pain is 7/10, sharp in back. Percocet given. Heart sounds strong, regular. Lung sounds clear anterior/posterior/lateral. Respirations easy, deep. Bowel sounds active in all four quadrants. Abdomen round, distended, non-tender upon. Arm drift negative bilaterally. Capillary refill less than 3 seconds, brisk. Skin turgor brisk. INT in left hand, clean, intact. Hand grasp, radial pulse, pedal pulse, leg lift strong bilaterally. Juan Miguel's sign negative bilaterally. Lesion on left foot, second toe. Big toe on right foot missing. Denies needs at current time. Call light, bedside table within reach.
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
## 2021-02-25 NOTE — PROGRESS NOTES
RECREATIONAL THERAPY  MISSED TREATMENT    []Transitional Care Unit  [x]Inpatient Rehabilitation    Date:  2/25/2021            Patient Name: Alfredito Mosquera           MRN: [id number]  Mick: [de-identified]          YOB: 1965 (55 y.o.)       Gender: male      Physician:      REASON FOR MISSED TREATMENT:    []Hold treatment per nursing request  []Patient refusal  []Family declined treatment  []Patient at testing and/or off the floor  []Patient unavailable, with PT/OT/nursing, etc.  [x]Other nursing intervention this am-will attempt to evaluate pt for RT this afternoon if time allows   Annie Ends, CTRS    2/25/2021

## 2021-02-25 NOTE — PROGRESS NOTES
[facility]  Individualized Disclosure Statement      Patient: Jhonathan Francisco      Scope of Service  [facility] provides 24 hour individualized service to patients with functional limitations due to, but not limited to: stroke, brain injury, spinal cord injury, major multiple trauma, fractures, amputation, and neurological disorders. The [address] [place] [address] provides rehabilitative nursing and medical services as well as physical, occupational, speech, and recreation therapies. [facility] is fully accredited by the Commission on Accreditation of Rehabilitation Facilities (CARF) as a comprehensive provider of rehabilitation services. Patients admitted to the [address] [place] [address] receive a minimum of three hours of therapy per day, at least six days per week, with a revised therapy schedule on weekends and holidays. Physical therapy, occupational therapy, and speech therapy are provided seven days per week including holidays. Other therapeutic services are available on weekends and evenings as needed or scheduled. Intensity of Treatment  Your treatment program will consist of Nursing Care and:  1.5 hours of Physical Therapy, per day  1.5 hours of Occupational Therapy, per day   30-60 minutes of Speech Therapy, per day  1 hour of Recreational Therapy, per week    [address] [address] maintains contracts with most insurance plans. Depending on the type of coverage, the insurance may impose limits on the coverage for rehabilitation care. Coverage is based on the premise that you are able to fully participate in the rehabilitation program and show continued progress. Please verify your own insurance information A copy of this was given to the patient/ family on this date.   Insurance Coverage  Your insurance company has made the following determination relative to the length of your stay:   Your estimated length of stay is 14 days   Your insurance Coverage has been verified as follows:    Primary Insurance:[facility]   Deductible: $0  Coverage: Active  Secondary Insurance:none;secondary insurance policies often cover co-pay amounts, but to ensure payment please contact your insurance company.     Alternative Resources: Please ask the  for more information [phone]
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
## 2021-02-25 NOTE — PROGRESS NOTES
Admitted to the Inpatient Rehabilitation Unit via wheelchair. Patient was then oriented to room and unit. Education provided on the rehabilitation routine: three hours of therapy five days per week and dining room for lunch. Explained patients right to have family, representative or physician notified of their admission. Patient has Declined for physician to be notified. Patient has Declined for family/representative to be notified. Admitting medication orders compared with acute stay medications; home medication list reviewed with patient/family. Medication issues identified No   Pt does not know home medications  Medication issue: does not know home medications  If yes, physician notified Dr Paola Julien. and Dr. Bob Marrero     Bladder and Bowel Function Assessment:  1. Prior history of bladder problems: no problems with bladder  2. Number of pads used per day: NA  3. Frequency of night time voiding: unable to answer, uses urinal  4. Fluid intake volume and pattern: several cups of ice and cups of pop  5. Last BM: 2/24/21  6. Bowel problems (prior or current) No      Incontinence      Frequent diarrhea      No BM in 3 days this stay or history of constipation      Hemorrhoids      Diverticulitis      Bowel Surgery     Two nurse skin assessment performed by   and [facility] . Care plan was created with patient's input and goals were agreed upon. Admission folder provided with education regarding patients diagnoses, fall prevention, skin care, and M in the box. \"Data Collection Information Summary for Patients in Inpatient Rehabilitation Facilities\" and \"Privacy Act Statement - Health Care Records\" provided. Please refer to the admission navigator for further information. Scattered abrasions and bruising, dry, calloused toes, back incisions-pictured, scabbed L 2nd toe pictured and measured.

## 2021-02-26 ENCOUNTER — APPOINTMENT (OUTPATIENT)
Dept: MRI IMAGING | Age: 56
DRG: 862 | End: 2021-02-26
Attending: PHYSICAL MEDICINE & REHABILITATION
Payer: MEDICAID

## 2021-02-26 ENCOUNTER — APPOINTMENT (OUTPATIENT)
Dept: CT IMAGING | Age: 56
DRG: 862 | End: 2021-02-26
Attending: PHYSICAL MEDICINE & REHABILITATION
Payer: MEDICAID

## 2021-02-26 LAB
AMMONIA: 45 UMOL/L (ref 11–60)
ANION GAP SERPL CALCULATED.3IONS-SCNC: 8 MEQ/L (ref 8–16)
BUN BLDV-MCNC: 43 MG/DL (ref 7–22)
CALCIUM IONIZED: 1.17 MMOL/L (ref 1.12–1.32)
CALCIUM SERPL-MCNC: 9.1 MG/DL (ref 8.5–10.5)
CHLORIDE BLD-SCNC: 108 MEQ/L (ref 98–111)
CO2: 20 MEQ/L (ref 23–33)
CREAT SERPL-MCNC: 1.5 MG/DL (ref 0.4–1.2)
ERYTHROCYTE [DISTWIDTH] IN BLOOD BY AUTOMATED COUNT: 14.6 % (ref 11.5–14.5)
ERYTHROCYTE [DISTWIDTH] IN BLOOD BY AUTOMATED COUNT: 53.3 FL (ref 35–45)
GFR SERPL CREATININE-BSD FRML MDRD: 49 ML/MIN/1.73M2
GLUCOSE BLD-MCNC: 133 MG/DL (ref 70–108)
GLUCOSE BLD-MCNC: 135 MG/DL (ref 70–108)
GLUCOSE BLD-MCNC: 135 MG/DL (ref 70–108)
GLUCOSE BLD-MCNC: 146 MG/DL (ref 70–108)
GLUCOSE BLD-MCNC: 149 MG/DL (ref 70–108)
GLUCOSE BLD-MCNC: 154 MG/DL (ref 70–108)
GLUCOSE BLD-MCNC: 157 MG/DL (ref 70–108)
GLUCOSE BLD-MCNC: 158 MG/DL (ref 70–108)
GLUCOSE BLD-MCNC: 160 MG/DL (ref 70–108)
GLUCOSE BLD-MCNC: 166 MG/DL (ref 70–108)
HCT VFR BLD CALC: 30.9 % (ref 42–52)
HEMOGLOBIN: 9.5 GM/DL (ref 14–18)
MAGNESIUM: 2.3 MG/DL (ref 1.6–2.4)
MCH RBC QN AUTO: 30.8 PG (ref 26–33)
MCHC RBC AUTO-ENTMCNC: 30.7 GM/DL (ref 32.2–35.5)
MCV RBC AUTO: 100.3 FL (ref 80–94)
PHOSPHORUS: 3.9 MG/DL (ref 2.4–4.7)
PLATELET # BLD: 116 THOU/MM3 (ref 130–400)
PMV BLD AUTO: 10.8 FL (ref 9.4–12.4)
POTASSIUM SERPL-SCNC: 4.5 MEQ/L (ref 3.5–5.2)
PROLACTIN: 19.5 NG/ML
RBC # BLD: 3.08 MILL/MM3 (ref 4.7–6.1)
SODIUM BLD-SCNC: 136 MEQ/L (ref 135–145)
TSH SERPL DL<=0.05 MIU/L-ACNC: 0.77 UIU/ML (ref 0.4–4.2)
WBC # BLD: 6.4 THOU/MM3 (ref 4.8–10.8)

## 2021-02-26 PROCEDURE — 99233 SBSQ HOSP IP/OBS HIGH 50: CPT | Performed by: NURSE PRACTITIONER

## 2021-02-26 PROCEDURE — 80048 BASIC METABOLIC PNL TOTAL CA: CPT

## 2021-02-26 PROCEDURE — 6370000000 HC RX 637 (ALT 250 FOR IP): Performed by: PSYCHIATRY & NEUROLOGY

## 2021-02-26 PROCEDURE — 6370000000 HC RX 637 (ALT 250 FOR IP): Performed by: PHYSICAL MEDICINE & REHABILITATION

## 2021-02-26 PROCEDURE — 99233 SBSQ HOSP IP/OBS HIGH 50: CPT | Performed by: PHYSICAL MEDICINE & REHABILITATION

## 2021-02-26 PROCEDURE — 97530 THERAPEUTIC ACTIVITIES: CPT

## 2021-02-26 PROCEDURE — 2580000003 HC RX 258: Performed by: PHYSICAL MEDICINE & REHABILITATION

## 2021-02-26 PROCEDURE — 1180000000 HC REHAB R&B

## 2021-02-26 PROCEDURE — 84100 ASSAY OF PHOSPHORUS: CPT

## 2021-02-26 PROCEDURE — 70551 MRI BRAIN STEM W/O DYE: CPT

## 2021-02-26 PROCEDURE — 36415 COLL VENOUS BLD VENIPUNCTURE: CPT

## 2021-02-26 PROCEDURE — 83735 ASSAY OF MAGNESIUM: CPT

## 2021-02-26 PROCEDURE — 97129 THER IVNTJ 1ST 15 MIN: CPT

## 2021-02-26 PROCEDURE — 95819 EEG AWAKE AND ASLEEP: CPT | Performed by: PSYCHIATRY & NEUROLOGY

## 2021-02-26 PROCEDURE — 6370000000 HC RX 637 (ALT 250 FOR IP): Performed by: NURSE PRACTITIONER

## 2021-02-26 PROCEDURE — 99232 SBSQ HOSP IP/OBS MODERATE 35: CPT | Performed by: PSYCHIATRY & NEUROLOGY

## 2021-02-26 PROCEDURE — 70450 CT HEAD/BRAIN W/O DYE: CPT

## 2021-02-26 PROCEDURE — 6360000002 HC RX W HCPCS: Performed by: PHYSICIAN ASSISTANT

## 2021-02-26 PROCEDURE — 94640 AIRWAY INHALATION TREATMENT: CPT

## 2021-02-26 PROCEDURE — 94762 N-INVAS EAR/PLS OXIMTRY CONT: CPT

## 2021-02-26 PROCEDURE — 94760 N-INVAS EAR/PLS OXIMETRY 1: CPT

## 2021-02-26 PROCEDURE — 95819 EEG AWAKE AND ASLEEP: CPT

## 2021-02-26 PROCEDURE — 84443 ASSAY THYROID STIM HORMONE: CPT

## 2021-02-26 PROCEDURE — 99222 1ST HOSP IP/OBS MODERATE 55: CPT | Performed by: PSYCHIATRY & NEUROLOGY

## 2021-02-26 PROCEDURE — 82330 ASSAY OF CALCIUM: CPT

## 2021-02-26 PROCEDURE — 82140 ASSAY OF AMMONIA: CPT

## 2021-02-26 PROCEDURE — 85027 COMPLETE CBC AUTOMATED: CPT

## 2021-02-26 PROCEDURE — 82948 REAGENT STRIP/BLOOD GLUCOSE: CPT

## 2021-02-26 PROCEDURE — 84146 ASSAY OF PROLACTIN: CPT

## 2021-02-26 PROCEDURE — 97110 THERAPEUTIC EXERCISES: CPT

## 2021-02-26 RX ORDER — TOPIRAMATE 25 MG/1
25 TABLET ORAL 2 TIMES DAILY
Status: DISCONTINUED | OUTPATIENT
Start: 2021-02-26 | End: 2021-03-12 | Stop reason: HOSPADM

## 2021-02-26 RX ADMIN — INSULIN GLARGINE 80 UNITS: 100 INJECTION, SOLUTION SUBCUTANEOUS at 21:01

## 2021-02-26 RX ADMIN — GABAPENTIN 300 MG: 300 CAPSULE ORAL at 20:56

## 2021-02-26 RX ADMIN — CYCLOBENZAPRINE HYDROCHLORIDE 10 MG: 10 TABLET, FILM COATED ORAL at 09:09

## 2021-02-26 RX ADMIN — DOCUSATE SODIUM 100 MG: 100 CAPSULE, LIQUID FILLED ORAL at 20:57

## 2021-02-26 RX ADMIN — INSULIN LISPRO 1 UNITS: 100 INJECTION, SOLUTION INTRAVENOUS; SUBCUTANEOUS at 12:59

## 2021-02-26 RX ADMIN — AMANTADINE HYDROCHLORIDE 100 MG: 100 CAPSULE, GELATIN COATED ORAL at 09:11

## 2021-02-26 RX ADMIN — FAMOTIDINE 20 MG: 20 TABLET, FILM COATED ORAL at 09:11

## 2021-02-26 RX ADMIN — DULOXETINE HYDROCHLORIDE 60 MG: 60 CAPSULE, DELAYED RELEASE ORAL at 09:11

## 2021-02-26 RX ADMIN — MIRTAZAPINE 15 MG: 15 TABLET, FILM COATED ORAL at 20:56

## 2021-02-26 RX ADMIN — HYDROCORTISONE: 1 CREAM TOPICAL at 20:57

## 2021-02-26 RX ADMIN — ROSUVASTATIN CALCIUM 20 MG: 20 TABLET, FILM COATED ORAL at 20:56

## 2021-02-26 RX ADMIN — TIOTROPIUM BROMIDE INHALATION SPRAY 2 PUFF: 3.12 SPRAY, METERED RESPIRATORY (INHALATION) at 07:33

## 2021-02-26 RX ADMIN — SODIUM CHLORIDE, PRESERVATIVE FREE 10 ML: 5 INJECTION INTRAVENOUS at 09:56

## 2021-02-26 RX ADMIN — NALOXEGOL OXALATE 12.5 MG: 12.5 TABLET, FILM COATED ORAL at 09:12

## 2021-02-26 RX ADMIN — OXYCODONE HYDROCHLORIDE AND ACETAMINOPHEN 1 TABLET: 7.5; 325 TABLET ORAL at 20:56

## 2021-02-26 RX ADMIN — OXYCODONE HYDROCHLORIDE AND ACETAMINOPHEN 1 TABLET: 7.5; 325 TABLET ORAL at 09:09

## 2021-02-26 RX ADMIN — TOPIRAMATE 25 MG: 25 TABLET, FILM COATED ORAL at 20:56

## 2021-02-26 RX ADMIN — POLYETHYLENE GLYCOL 3350 17 G: 17 POWDER, FOR SOLUTION ORAL at 09:10

## 2021-02-26 RX ADMIN — DOCUSATE SODIUM 100 MG: 100 CAPSULE, LIQUID FILLED ORAL at 09:09

## 2021-02-26 RX ADMIN — FOLIC ACID 1 MG: 1 TABLET ORAL at 09:12

## 2021-02-26 RX ADMIN — PRIMIDONE 50 MG: 50 TABLET ORAL at 20:56

## 2021-02-26 RX ADMIN — SENNOSIDES 17.2 MG: 8.6 TABLET, FILM COATED ORAL at 20:56

## 2021-02-26 RX ADMIN — AMLODIPINE BESYLATE 5 MG: 5 TABLET ORAL at 09:11

## 2021-02-26 RX ADMIN — INSULIN LISPRO 1 UNITS: 100 INJECTION, SOLUTION INTRAVENOUS; SUBCUTANEOUS at 21:01

## 2021-02-26 RX ADMIN — TAMSULOSIN HYDROCHLORIDE 0.4 MG: 0.4 CAPSULE ORAL at 09:11

## 2021-02-26 RX ADMIN — CYCLOBENZAPRINE HYDROCHLORIDE 10 MG: 10 TABLET, FILM COATED ORAL at 20:56

## 2021-02-26 RX ADMIN — PRIMIDONE 50 MG: 50 TABLET ORAL at 09:11

## 2021-02-26 RX ADMIN — LATANOPROST 1 DROP: 50 SOLUTION OPHTHALMIC at 20:56

## 2021-02-26 RX ADMIN — HYDROCORTISONE: 1 CREAM TOPICAL at 12:50

## 2021-02-26 RX ADMIN — ENOXAPARIN SODIUM 40 MG: 40 INJECTION, SOLUTION INTRAVENOUS; SUBCUTANEOUS at 12:50

## 2021-02-26 RX ADMIN — OXYCODONE HYDROCHLORIDE AND ACETAMINOPHEN 1 TABLET: 7.5; 325 TABLET ORAL at 14:24

## 2021-02-26 RX ADMIN — TOPIRAMATE 25 MG: 25 TABLET, FILM COATED ORAL at 12:49

## 2021-02-26 ASSESSMENT — PAIN DESCRIPTION - PAIN TYPE: TYPE: ACUTE PAIN

## 2021-02-26 ASSESSMENT — PAIN SCALES - GENERAL
PAINLEVEL_OUTOF10: 6
PAINLEVEL_OUTOF10: 10
PAINLEVEL_OUTOF10: 0

## 2021-02-26 ASSESSMENT — PAIN DESCRIPTION - LOCATION: LOCATION: BACK

## 2021-02-26 NOTE — PROGRESS NOTES
gabapentin  300 mg Oral Nightly    insulin glargine  80 Units Subcutaneous Nightly    insulin lispro  0-3 Units Subcutaneous Nightly    insulin lispro  0-6 Units Subcutaneous TID WC    insulin lispro  7 Units Subcutaneous TID AC    latanoprost  1 drop Both Eyes Nightly    lidocaine  3 patch Transdermal Daily    mirtazapine  15 mg Oral Nightly    naloxegol  12.5 mg Oral QAM    nicotine  1 patch Transdermal Daily    primidone  50 mg Oral BID    rosuvastatin  20 mg Oral Nightly    senna  2 tablet Oral Nightly    tamsulosin  0.4 mg Oral Daily    tiotropium  2 puff Inhalation Daily     Continuous Infusions:  PRN Meds:.acetaminophen, cyclobenzaprine, polyethylene glycol, sodium chloride flush, albuterol, bisacodyl, glucagon (rDNA), glucose, ondansetron, oxyCODONE-acetaminophen    Technician: Nickie Payton 2/26/2021
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
## 2021-02-26 NOTE — PROGRESS NOTES
In bed, awake. Alert, oriented to person, place, time. Mucous membranes pink,moist. CINTIA 3 to 2 mm. Pain 8/10 in back. Heart sounds strong, regular. Lung sounds clear anterior/posterior/lateral. Respirations easy, deep. Bowel sounds active in all four quadrants. Abdomen round, distended, non-tender upon palpation. Arm drift negative bilaterally. Capillary refill less than 3 seconds. Skin turgor brisk. IV in left forearm, no redness, intact. Hand grasp, radial pulse, pedal pulse, leg light, pedal push and pull strond bilaterally. Jua Nmiguel's sign negative bilaterally. Denies needs at this time. Bed in lowest position, call light and bedside table within reach.
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
## 2021-02-26 NOTE — FLOWSHEET NOTE
02/26/21 1113   Provider Notification   Reason for Communication Review case   Provider Name Dr. Guallpa Mediate   Provider Notification Physician   Method of Communication Secure Message   Response Waiting for response   Notification Time 1113   Shift Event Other (comment)     RE: [place] Patient was started on Amantadine this morning. Would this have caused s/s he experienced?       2/26/21 11:16 AM   Can be , please stop it    2/26/21 11:16 AM   will do, thanks

## 2021-02-26 NOTE — PROGRESS NOTES
Anabelle Sales 61  PHYSICAL THERAPY MISSED TREATMENT NOTE  [address]    Date: 2021  Patient Name: Epifanio Tamayo        MRN: [id number]   : 1965  (55 y.o.)  Gender: male   Referring Practitioner: Deana Kendrick PA-C  Diagnosis: Burst fracture of fourth thoracic vertebrae         REASON FOR MISSED TREATMENT:  At time of scheduled PT this AM pt off unit for medical workup after rapid response being called. Missed 60 minutes PT at this time.

## 2021-02-26 NOTE — PROGRESS NOTES
Anabelle Sales 61  OCCUPATIONAL THERAPY MISSED TREATMENT NOTE  [address] [facility]  7E-67/067-A      Date: 2021  Patient Name: Daily Prescott        CSN: [id number]   : 1965  (55 y.o.)  Gender: male   Referring Practitioner: Nikita Rodriguez PA-C (ordering)  Dr Evern Cowden (Attending)  Diagnosis: Burst fracture of fourth thoracic vertebra         REASON FOR MISSED TREATMENT: Patient Unavailable. Pt currently receiving EEG. Missed time of 30 minutes. Keo Connolly [id number] MOT  OTR/L  [id number]
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
## 2021-02-26 NOTE — PROGRESS NOTES
In bed, resting with eyes closed. Alert, oriented to person, time. Reoriented to place. CINTIA 3 to 2mm, brisk. Mucous membranes pink, moist. Pain 10/10 in back. Heart sounds strong, regular. Lung sounds clear, anterior/posterior/lateral. Respirations deep, easy. Bowel sounds active in all four quadrants. Abdomen round, distended, non-tender upon palpation. Arm drift negative bilaterally. Skin turgor brisk. Capillary refill less than 3 seconds. Hand grasp, radial pulse, leg lift, pedal push and pull, pedal pulse strong bilaterally. Juan Miguel's sign negative bilterally. Sitting up, shaky and disoriented, took blood sugar and gave Glucerna shake. Felt better a few minutes later. Then his eyes closed and he passed out onto the bed and a rapid response was called by Mellisa Salas RN.
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
## 2021-02-26 NOTE — PROGRESS NOTES
[address] [address]  Recreational Therapy  Evaluation  Inpatient Rehabilitation Unit      Time Spent with Patient: 25 minutes    Date:  2/26/2021       Patient Name: Tomas Monreal      MRN: [id number]       YOB: 1965 (55 y.o.)       Gender: male  Diagnosis: Burst fracture of fourth thoracic vertebra  Referring Practitioner: Jc Lowe PA-C (ordering)  Dr Malathi Wu (Attending)    RESTRICTIONS/PRECAUTIONS:  Restrictions/Precautions: General Precautions, Fall Risk  Vision: Impaired  Hearing: Within functional limits    PAIN: 10-tailbone -nurse informed     SUBJECTIVE:  Pt lives with wife- she has a son and he has 2 children ages 11 and 8 mos    VISION:  Glasses-states he has to hold things up real close using his glasses to read but after a half hour he has to stop due to headache     HEARING: Within Normal Limit    LEISURE INTERESTS:   Pt's wife assisted pt as needed and does the cooking, cleaning and driving-he likes to fish (some)-go to yard sales, reads his bible, attends Confucianism, enjoys taking care of grandkids,takes his dog Kahlil every where he goes-wife plays piano and he enjoys listening to her music(hymns)     BARRIERS TO LEISURE INTERESTS:    Decreased endurance, Impaired vision and Pain           Patient Education  New Education Provided: Importance of Leisure, RT Plan of Care    Plan:  Continue to follow patient through this admission  See patient individually    Electronically signed by: ROSA ELENA Morris  Date: 2/26/2021

## 2021-02-26 NOTE — PROGRESS NOTES
complete HIGH level/complex verbal reasoning, problem solving, and executive functioning with 85% accuracy given min cue to increase IADL contribution. INTERVENTIONS: Did not address this date d/t focus on other goals. SHORT TERM GOAL #4: GOAL NOT MET; CONTINUE  Goal 4: Pt will complete sequencing tasks (i.e transfers) with 85% accuracy given min cues in order to improve safety within transfers and particicpation within ADL/IADL  INTERVENTIONS: Did not address this date d/t focus on other goals.       Long-Term Goals:  Timeframe for Long-term Goals: 2 weeks    LONG TERM GOAL #1: ONGOING  Goal 1: Pt will improve overall cognitive-linguistic skills to a Modified [place] or higher in order to safely discharge to least restrictive environment and complete ADL/IADL with least amount of supervision/assistance       ST FIM ASSESSMENT:     Admission score Current score Goal Status   COMMUNICATION 6 - Complex ideas 90% or device (hearing aid or glasses- if patient is primarily a visual learner)   6 - Complex ideas 90% or device (hearing aid or glasses- if patient is primarily a visual learner)   STABLE   EXPRESSION 7 - Patient expresses complex ideas/needs     7 - Patient expresses complex ideas/needs   STABLE   SOCIAL INTERACTION 5 - Patient is appropriate with supervision/cues   5 - Patient is appropriate with supervision/cues   STABLE   PROBLEM SOLVING 5 - Patient able to solve simple/routine tasks   5 - Patient able to solve simple/routine tasks   STABLE   MEMORY 5 - Patient requires prompting with stress/unfamiliar situations   5 - Patient requires prompting with stress/unfamiliar situations   STABLE       Comprehension: 6 - Complex ideas 90% or device (hearing aid or glasses- if patient is primarily a visual learner)  Expression: 7 - Patient expresses complex ideas/needs  Social Interaction: 5 - Patient is appropriate with supervision/cues  Problem Solvin - Patient able to solve simple/routine tasks  Memory: 5 - Patient requires prompting with stress/unfamiliar situations    EDUCATION:  Learner: Patient  Education:  Reviewed ST goals and Plan of Care, Education Related to Potential Risks and Complications Due to Impairment/Illness/Injury, Education Related to Prevention of Recurrence of Impairment/Illness/Injury, Education Related to [employer] and Wellness and Home Safety Education  Evaluation of Education: Verbalizes understanding, Demonstrates with assistance, Needs further instruction and Family not present    ASSESSMENT/PLAN:  Summary: Pt has met 0/4 STGs and 0/1 LTGs this reporting period. Per results of skilled speech/language/cognition evaluation, pt demonstrated evidence of mild cognitive-linguistic impairment characterized by decreased insight to deficits, impulsivity, difficulty with complex executive function, complex problem solving/reasoning, and high level memory tasks. Pt demonstrated evidence of WFL on MOCA evaluation; however, through interactions with pt and collaboration with nursing staff and occupational therapist, pt demonstrates evidence of impulsivity, high level sequencing, problem solving, reasoning, and safety awareness. Pt would benefit from continued skilled ST services to address aforementioned deficits in order to safely discharge to least restrictive environment, complete ADL/IADLs with least amount of supervision/assistance, and decrease risk of re-hospitalization. At this time, pt would require intermittent, daily supervision upon discharge from [facility]. Activity Tolerance:  Patient tolerance of  treatment: fair. Increased lethargy     Assessment/Plan: Patient progressing toward established goals. Continues to require skilled care of licensed speech pathologist to progress toward achievement of established goals and plan of care. .     Plan for Next Session: Memory Strategies, Problem Solving/Reasoning, Thought organization     Edy Brady M.A., CCC-SLP [id number]

## 2021-02-26 NOTE — PROGRESS NOTES
[facility] [address]  PHYSICAL THERAPY MISSED TREATMENT NOTE  [address]    Date: 2021  Patient Name: Danial Del Valle        MRN: [id number]   : 1965  (55 y.o.)  Gender: male   Referring Practitioner: Norman Fisher PA-C  Diagnosis: Burst fracture of fourth thoracic vertebrae         REASON FOR MISSED TREATMENT:  Patient Unavailable. Pt currently receiving EEG. Missed time of 30 minutes.       Halima Alt PT,DPT [id number]

## 2021-02-26 NOTE — CODE DOCUMENTATION
Dr Agustina Martinez beamed in via Stroke bot; RR team and Dr Hermes Crowder. Makenna. Patient continues with flaccid left arm, minimal finger movement, patient states he can see about 1 foot in front of his face and shadows on the right side of his visual field 2-3 feet away. Left leg continues to be weak and no sensation (baseline).

## 2021-02-26 NOTE — CODE DOCUMENTATION
Patient sitting at edge of bed talking with nurse manager and student, became unresaponsive and exhibited seizure activity for about 60 seconds.   Upon arousing, patient states he cannot see

## 2021-02-26 NOTE — PROGRESS NOTES
Patient was seen by the telecom stroke physician. Disoriented, left sided weakness, stated pressure behind both eyes and unable to see anything continues. Blood sugar and vitals were take periodically. Progressively regained vision and left sided mobility at the same time over 2 hour time frame. Is eating breakfast in chair currently, wheels locked, call light, bedside table within reach. Monitored patient eating to make sure no reoccurance of symptoms appeared.

## 2021-02-26 NOTE — PLAN OF CARE
Problem: Falls - Risk of:  Goal: Will remain free from falls  Description: Will remain free from falls  Outcome: Ongoing  Note: No fall this shift. Uses call light appropriately. Chair/bed alarms active. Walker, gait belt, and no skid socks used when up. Goal: Absence of physical injury  Description: Absence of physical injury  Outcome: Ongoing  Note: No injuries this shift. Problem: Skin Integrity:  Goal: Will show no infection signs and symptoms  Description: Will show no infection signs and symptoms  Outcome: Ongoing  Note: No signs of infection  Goal: Absence of new skin breakdown  Description: Absence of new skin breakdown  Outcome: Ongoing  Note: No new skin issues     Problem: Infection - Surgical Site:  Goal: Will show no infection signs and symptoms  Description: Will show no infection signs and symptoms  Outcome: Ongoing  Note: No signs of infection     Problem: Bleeding:  Goal: Will show no signs and symptoms of excessive bleeding  Description: Will show no signs and symptoms of excessive bleeding  Outcome: Ongoing  Note: No signs of bleeding. Lovenox for DVT prevention. Problem: Pain:  Goal: Pain level will decrease  Description: Pain level will decrease  Outcome: Ongoing  Goal: Control of acute pain  Description: Control of acute pain  Outcome: Ongoing  Note: C/o back pain. Prn flexeril and oxycodone given.    Goal: Control of chronic pain  Description: Control of chronic pain  Outcome: Ongoing     Problem: Mobility - Impaired:  Goal: Mobility will improve  Description: Mobility will improve  Outcome: Ongoing     Problem: Urinary Elimination:  Goal: Skin integrity will improve  Description: Skin integrity will improve  Outcome: Ongoing  Goal: Ability to recognize the need to void and respond appropriately will improve  Description: Ability to recognize the need to void and respond appropriately will improve  Outcome: Ongoing  Goal: Will remain free from infection  Description: Will remain free from infection  Outcome: Ongoing  Goal: Incidence of incontinence will decrease  Description: Incidence of incontinence will decrease  Outcome: Ongoing     Problem: Fluid Volume - Imbalance:  Goal: Absence of imbalanced fluid volume signs and symptoms  Description: Absence of imbalanced fluid volume signs and symptoms  Outcome: Ongoing     Problem: Serum Glucose Level - Abnormal:  Goal: Ability to maintain appropriate glucose levels will improve  Description: Ability to maintain appropriate glucose levels will improve  Outcome: Ongoing     Problem: Sensory Perception - Impaired:  Goal: Ability to maintain a stable neurologic state will improve  Description: Ability to maintain a stable neurologic state will improve  Outcome: Ongoing     Problem: IP BALANCE  Goal: LTG - Patient will maintain balance to allow for safe/functional mobility  Outcome: Ongoing     Problem: IP INSTRUMENTAL ACTIVITIES OF DAILY LIVING  Goal: LTG - Patient will independently complete basic home making activities to return to independent functioning at home  Outcome: Ongoing     Problem: Discharge Planning:  Goal: Patients continuum of care needs are met  Description: Patients continuum of care needs are met  2/26/2021 0408 by Yolanda Gentile LPN  Outcome: Ongoing  2/25/2021 1441 by CHON Ivey  Outcome: Ongoing     Problem: IP COMMUNICATION/DYSARTHRIA  Goal: LTG - Patient will effectively communicate in all situations with the use of compensatory strategies  Outcome: Ongoing

## 2021-02-27 LAB
GLUCOSE BLD-MCNC: 118 MG/DL (ref 70–108)
GLUCOSE BLD-MCNC: 134 MG/DL (ref 70–108)
GLUCOSE BLD-MCNC: 143 MG/DL (ref 70–108)
GLUCOSE BLD-MCNC: 158 MG/DL (ref 70–108)

## 2021-02-27 PROCEDURE — 94762 N-INVAS EAR/PLS OXIMTRY CONT: CPT

## 2021-02-27 PROCEDURE — 1180000000 HC REHAB R&B

## 2021-02-27 PROCEDURE — 97110 THERAPEUTIC EXERCISES: CPT

## 2021-02-27 PROCEDURE — 6360000002 HC RX W HCPCS: Performed by: PHYSICIAN ASSISTANT

## 2021-02-27 PROCEDURE — 6370000000 HC RX 637 (ALT 250 FOR IP): Performed by: NURSE PRACTITIONER

## 2021-02-27 PROCEDURE — 6370000000 HC RX 637 (ALT 250 FOR IP): Performed by: PSYCHIATRY & NEUROLOGY

## 2021-02-27 PROCEDURE — 82948 REAGENT STRIP/BLOOD GLUCOSE: CPT

## 2021-02-27 PROCEDURE — 2580000003 HC RX 258: Performed by: PHYSICAL MEDICINE & REHABILITATION

## 2021-02-27 PROCEDURE — 97130 THER IVNTJ EA ADDL 15 MIN: CPT

## 2021-02-27 PROCEDURE — 97535 SELF CARE MNGMENT TRAINING: CPT

## 2021-02-27 PROCEDURE — 97129 THER IVNTJ 1ST 15 MIN: CPT

## 2021-02-27 PROCEDURE — 99232 SBSQ HOSP IP/OBS MODERATE 35: CPT | Performed by: PSYCHIATRY & NEUROLOGY

## 2021-02-27 PROCEDURE — 6370000000 HC RX 637 (ALT 250 FOR IP): Performed by: PHYSICAL MEDICINE & REHABILITATION

## 2021-02-27 PROCEDURE — 97116 GAIT TRAINING THERAPY: CPT

## 2021-02-27 PROCEDURE — 97530 THERAPEUTIC ACTIVITIES: CPT

## 2021-02-27 RX ADMIN — OXYCODONE HYDROCHLORIDE AND ACETAMINOPHEN 1 TABLET: 7.5; 325 TABLET ORAL at 03:41

## 2021-02-27 RX ADMIN — OXYCODONE HYDROCHLORIDE AND ACETAMINOPHEN 1 TABLET: 7.5; 325 TABLET ORAL at 22:05

## 2021-02-27 RX ADMIN — AMLODIPINE BESYLATE 5 MG: 5 TABLET ORAL at 10:12

## 2021-02-27 RX ADMIN — DOCUSATE SODIUM 100 MG: 100 CAPSULE, LIQUID FILLED ORAL at 23:40

## 2021-02-27 RX ADMIN — SODIUM CHLORIDE, PRESERVATIVE FREE 10 ML: 5 INJECTION INTRAVENOUS at 10:18

## 2021-02-27 RX ADMIN — OXYCODONE HYDROCHLORIDE AND ACETAMINOPHEN 1 TABLET: 7.5; 325 TABLET ORAL at 18:03

## 2021-02-27 RX ADMIN — DOCUSATE SODIUM 100 MG: 100 CAPSULE, LIQUID FILLED ORAL at 10:14

## 2021-02-27 RX ADMIN — GABAPENTIN 300 MG: 300 CAPSULE ORAL at 20:10

## 2021-02-27 RX ADMIN — ENOXAPARIN SODIUM 40 MG: 40 INJECTION, SOLUTION INTRAVENOUS; SUBCUTANEOUS at 13:14

## 2021-02-27 RX ADMIN — MIRTAZAPINE 15 MG: 15 TABLET, FILM COATED ORAL at 23:41

## 2021-02-27 RX ADMIN — POLYETHYLENE GLYCOL 3350 17 G: 17 POWDER, FOR SOLUTION ORAL at 10:13

## 2021-02-27 RX ADMIN — HYDROCORTISONE: 1 CREAM TOPICAL at 10:17

## 2021-02-27 RX ADMIN — HYDROCORTISONE: 1 CREAM TOPICAL at 23:40

## 2021-02-27 RX ADMIN — PRIMIDONE 50 MG: 50 TABLET ORAL at 10:12

## 2021-02-27 RX ADMIN — TOPIRAMATE 25 MG: 25 TABLET, FILM COATED ORAL at 20:09

## 2021-02-27 RX ADMIN — DULOXETINE HYDROCHLORIDE 60 MG: 60 CAPSULE, DELAYED RELEASE ORAL at 10:12

## 2021-02-27 RX ADMIN — TOPIRAMATE 25 MG: 25 TABLET, FILM COATED ORAL at 10:13

## 2021-02-27 RX ADMIN — PRIMIDONE 50 MG: 50 TABLET ORAL at 20:10

## 2021-02-27 RX ADMIN — INSULIN GLARGINE 80 UNITS: 100 INJECTION, SOLUTION SUBCUTANEOUS at 20:25

## 2021-02-27 RX ADMIN — FAMOTIDINE 20 MG: 20 TABLET, FILM COATED ORAL at 10:12

## 2021-02-27 RX ADMIN — CYCLOBENZAPRINE HYDROCHLORIDE 10 MG: 10 TABLET, FILM COATED ORAL at 22:05

## 2021-02-27 RX ADMIN — INSULIN LISPRO 1 UNITS: 100 INJECTION, SOLUTION INTRAVENOUS; SUBCUTANEOUS at 09:53

## 2021-02-27 RX ADMIN — SENNOSIDES 17.2 MG: 8.6 TABLET, FILM COATED ORAL at 20:09

## 2021-02-27 RX ADMIN — NALOXEGOL OXALATE 12.5 MG: 12.5 TABLET, FILM COATED ORAL at 10:15

## 2021-02-27 RX ADMIN — HYDROCORTISONE: 1 CREAM TOPICAL at 13:15

## 2021-02-27 RX ADMIN — TAMSULOSIN HYDROCHLORIDE 0.4 MG: 0.4 CAPSULE ORAL at 10:12

## 2021-02-27 RX ADMIN — FOLIC ACID 1 MG: 1 TABLET ORAL at 10:12

## 2021-02-27 RX ADMIN — TIOTROPIUM BROMIDE INHALATION SPRAY 2 PUFF: 3.12 SPRAY, METERED RESPIRATORY (INHALATION) at 06:19

## 2021-02-27 RX ADMIN — ROSUVASTATIN CALCIUM 20 MG: 20 TABLET, FILM COATED ORAL at 20:09

## 2021-02-27 RX ADMIN — LATANOPROST 1 DROP: 50 SOLUTION OPHTHALMIC at 23:41

## 2021-02-27 RX ADMIN — OXYCODONE HYDROCHLORIDE AND ACETAMINOPHEN 1 TABLET: 7.5; 325 TABLET ORAL at 10:13

## 2021-02-27 ASSESSMENT — PAIN SCALES - GENERAL
PAINLEVEL_OUTOF10: 8
PAINLEVEL_OUTOF10: 10
PAINLEVEL_OUTOF10: 9
PAINLEVEL_OUTOF10: 8

## 2021-02-27 ASSESSMENT — PAIN DESCRIPTION - PROGRESSION: CLINICAL_PROGRESSION: NOT CHANGED

## 2021-02-27 ASSESSMENT — PAIN DESCRIPTION - DESCRIPTORS: DESCRIPTORS: ACHING;CONSTANT;DISCOMFORT
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
## 2021-02-27 NOTE — PLAN OF CARE
Problem: Falls - Risk of:  Goal: Will remain free from falls  Description: Will remain free from falls  Outcome: Ongoing  Pt will remain free of falls. Pt is 2 assist with walker and gait belt, pt transfers via stand pivot, can walk a few steps from bed to chair but uses wheelchair to bathroom      Problem: Skin Integrity:  Goal: Will show no infection signs and symptoms  Description: Will show no infection signs and symptoms  Outcome: Ongoing  Skin assessment every shift. Pt has surgical incisions to back covered with steri strips, pt has numerous scattered bruises and abrasions, no new areas of skin breakdown. Problem: Infection - Surgical Site:  Goal: Will show no infection signs and symptoms  Description: Will show no infection signs and symptoms  Outcome: Ongoing  Surgical site will be assessed every shift for warmth to site, redness, pain or drainage. Problem: Bleeding:  Goal: Will show no signs and symptoms of excessive bleeding  Description: Will show no signs and symptoms of excessive bleeding  Outcome: Ongoing  Pt will have no active bleeding. Problem: Pain:  Goal: Pain level will decrease  Description: Pain level will decrease  Outcome: Ongoing  Reposition frequently to alleviate pain. Pt has flexeril and percocet for pain, pt is usually at least a 5 at all times, at times uncontrolled at 10/10     Problem: Mobility - Impaired:  Goal: Mobility will improve  Description: Mobility will improve  Outcome: Ongoing  Pt will participate in PT daily as directed to increase mobility.        Problem: Urinary Elimination:  Goal: Skin integrity will improve  Description: Skin integrity will improve  Outcome: Ongoing  Pt encouraged to use urinal and is continent

## 2021-02-27 NOTE — PROGRESS NOTES
Restriction  Spinal Precautions: No Bending, No Lifting, No Twisting  Other position/activity restrictions: Kyphoplasty 2/19/21,  poor sensation BLE knee down L>R, legally blind,     VITALS: Oxygen: Maintained between 96-98%  Heart Rate: Fluctuated between     SUBJECTIVE: Nurse ok'd session. Patient lying in bed upon arrival. Agreeable to OT session    PAIN: Complains of pain in lower back. Did not rate. 1 spasm during ADL routine. COGNITION: Slow Processing, Decreased Recall, Decreased Insight, Decreased Safety Awareness and Impulsive    ADL:   Bathing: Maximum Assistance. For BLE below knees including B feet and bottom. Assist of another for standing balance. Able to wash anastacio area with Brian for standing balance. Wash UB in seated position with SBA  Upper Extremity Dressing: Minimal Assistance. To doff gown and don tshirt seated EOB with assist to adjust once over head  Lower Extremity Dressing: Maximum Assistance. To don B socks. Able to doff using reacher and increased time. ModA to pull pants up, Brina to pull pants down  Toileting: Moderate Assistance to Brian for clothing management. Minimal Assistance. Standing at toilet to urinate. BALANCE:  Sitting Balance:  Stand By Assistance. Standing Balance: Minimal Assistance. +1 and CGA +1 for safety    BED MOBILITY:  Supine to Sit: Contact Guard Assistance - increased time, use of side rail cueing provided for back precautions    TRANSFERS:  Sit to Stand:  Minimal Assistance. +1 and CGA +1 from EOB, w/c  Stand to Sit: Minimal Assistance. +1 and CGA +1 to w/c, bedside chair  Increased time required during transfers with tactile and verbal cues provided for hand placement. Assist provided at times to bring UE to arm rest.     FUNCTIONAL MOBILITY:  Assistive Device: Rolling Walker  Assist Level:  Minimal Assistance. +1 and CGA+1   Distance:  To bathroom  Patient very adamant about completing mobility to bathroom despite moderate encouragement to toileting tasks with CGA using adaptive tech for hygiene to improve indep with self care. Long term goal 2: Pt will complete bathing and dressing tasks with set up to return to sponge bathing and dressing with AE at home. Long term goal 3: Pt will complete stand pivot transfers with SBA and 0 vcs for safety to improve indep with transferring self to [facility] in middle of night alone. Following session, patient left in safe position with all fall risk precautions in place.
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
## 2021-02-27 NOTE — PROGRESS NOTES
[facility]  INPATIENT SPEECH THERAPY  [address] [facility]  DAILY NOTE    TIME   SLP Individual Minutes  Time In: 902  Time Out: 932  Minutes: 30  Timed Code Treatment Minutes: 30 Minutes       Date: 2021  Patient Name: Eleuterio Mena      CSN: [id number]   : 1965  (55 y.o.)  Gender: male   Referring Physician:  Julianna Darnell PA-C  Diagnosis: Burst Fracture of Fourth Thoracic Vertebra  Secondary Diagnosis: Cognitive-Linguistic Deficit  Precautions: Fall Risk  Current Diet: Regular and Thin Liquids  Swallowing Strategies: Standard Universal Swallow Precautions  Date of Last MBS: Not Applicable    Pain:   - Pain location: lower back     Subjective:  Patient seen sitting upright in chair upon ST arrival consuming breakfast. Patient agreeable to participate in skilled ST services this date; alert and pleasant throughout. Patient's caregiver/girlfriend to enter with 10 minutes remaining in session. Short-Term Goals:  SHORT TERM GOAL #1:   Goal 1: Pt will complete higher level attention tasks (divided, alternating) with fewer than 2 errors/redirections within 8 minute time span or task completion to increase ADL completion. INTERVENTIONS: Patient completed divided attention task in which ST presented a verbal addition or subtraction problem during which time consistent clapping was initiated. The patient was expected to solve the verbally presented math problem.   Addition: 10/10 problems solved correctly  Subtraction: 10/10 problems solved correctly  *Patient with increasing frustration related to distraction (e.g., clapping) subsequently causing increased time needed to complete task  *Overall success with task, suspect patient will have difficulty with alternating attention task in which he is expected to recall two types of information    SHORT TERM GOAL #2:   Goal 2: Pt will complete memory tasks (immediate/delayed, working) with 85% accuracy at a modified Education: [place] understanding, Demonstrates with assistance and Needs further instruction    ASSESSMENT/PLAN:  Activity Tolerance:  Patient tolerance of  treatment: good. Assessment/Plan: Patient progressing toward established goals. Continues to require skilled care of licensed speech pathologist to progress toward achievement of established goals and plan of care. .     Plan for Next Session: Problem Solving/Reasoning, Thought Organization     Lawrence Officer, M.S., [facility]
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
## 2021-02-28 LAB
GLUCOSE BLD-MCNC: 108 MG/DL (ref 70–108)
GLUCOSE BLD-MCNC: 141 MG/DL (ref 70–108)
GLUCOSE BLD-MCNC: 153 MG/DL (ref 70–108)
GLUCOSE BLD-MCNC: 167 MG/DL (ref 70–108)

## 2021-02-28 PROCEDURE — 94640 AIRWAY INHALATION TREATMENT: CPT

## 2021-02-28 PROCEDURE — 6370000000 HC RX 637 (ALT 250 FOR IP): Performed by: PHYSICAL MEDICINE & REHABILITATION

## 2021-02-28 PROCEDURE — 1180000000 HC REHAB R&B

## 2021-02-28 PROCEDURE — 82948 REAGENT STRIP/BLOOD GLUCOSE: CPT

## 2021-02-28 PROCEDURE — 6360000002 HC RX W HCPCS: Performed by: PHYSICIAN ASSISTANT

## 2021-02-28 PROCEDURE — 99232 SBSQ HOSP IP/OBS MODERATE 35: CPT | Performed by: PSYCHIATRY & NEUROLOGY

## 2021-02-28 PROCEDURE — 6370000000 HC RX 637 (ALT 250 FOR IP): Performed by: PSYCHIATRY & NEUROLOGY

## 2021-02-28 RX ADMIN — HYDROCORTISONE: 1 CREAM TOPICAL at 21:42

## 2021-02-28 RX ADMIN — FOLIC ACID 1 MG: 1 TABLET ORAL at 09:14

## 2021-02-28 RX ADMIN — OXYCODONE HYDROCHLORIDE AND ACETAMINOPHEN 1 TABLET: 7.5; 325 TABLET ORAL at 21:51

## 2021-02-28 RX ADMIN — FAMOTIDINE 20 MG: 20 TABLET, FILM COATED ORAL at 09:14

## 2021-02-28 RX ADMIN — DOCUSATE SODIUM 100 MG: 100 CAPSULE, LIQUID FILLED ORAL at 21:50

## 2021-02-28 RX ADMIN — HYDROCORTISONE: 1 CREAM TOPICAL at 17:34

## 2021-02-28 RX ADMIN — OXYCODONE HYDROCHLORIDE AND ACETAMINOPHEN 1 TABLET: 7.5; 325 TABLET ORAL at 02:08

## 2021-02-28 RX ADMIN — TAMSULOSIN HYDROCHLORIDE 0.4 MG: 0.4 CAPSULE ORAL at 09:14

## 2021-02-28 RX ADMIN — TOPIRAMATE 25 MG: 25 TABLET, FILM COATED ORAL at 09:14

## 2021-02-28 RX ADMIN — ENOXAPARIN SODIUM 40 MG: 40 INJECTION, SOLUTION INTRAVENOUS; SUBCUTANEOUS at 17:31

## 2021-02-28 RX ADMIN — OXYCODONE HYDROCHLORIDE AND ACETAMINOPHEN 1 TABLET: 7.5; 325 TABLET ORAL at 06:42

## 2021-02-28 RX ADMIN — MIRTAZAPINE 15 MG: 15 TABLET, FILM COATED ORAL at 21:41

## 2021-02-28 RX ADMIN — DOCUSATE SODIUM 100 MG: 100 CAPSULE, LIQUID FILLED ORAL at 09:14

## 2021-02-28 RX ADMIN — PRIMIDONE 50 MG: 50 TABLET ORAL at 21:40

## 2021-02-28 RX ADMIN — AMLODIPINE BESYLATE 5 MG: 5 TABLET ORAL at 09:13

## 2021-02-28 RX ADMIN — OXYCODONE HYDROCHLORIDE AND ACETAMINOPHEN 1 TABLET: 7.5; 325 TABLET ORAL at 12:08

## 2021-02-28 RX ADMIN — NALOXEGOL OXALATE 12.5 MG: 12.5 TABLET, FILM COATED ORAL at 09:14

## 2021-02-28 RX ADMIN — SENNOSIDES 17.2 MG: 8.6 TABLET, FILM COATED ORAL at 21:41

## 2021-02-28 RX ADMIN — POLYETHYLENE GLYCOL 3350 17 G: 17 POWDER, FOR SOLUTION ORAL at 09:14

## 2021-02-28 RX ADMIN — INSULIN LISPRO 1 UNITS: 100 INJECTION, SOLUTION INTRAVENOUS; SUBCUTANEOUS at 09:19

## 2021-02-28 RX ADMIN — OXYCODONE HYDROCHLORIDE AND ACETAMINOPHEN 1 TABLET: 7.5; 325 TABLET ORAL at 17:31

## 2021-02-28 RX ADMIN — DULOXETINE HYDROCHLORIDE 60 MG: 60 CAPSULE, DELAYED RELEASE ORAL at 09:14

## 2021-02-28 RX ADMIN — TIOTROPIUM BROMIDE INHALATION SPRAY 2 PUFF: 3.12 SPRAY, METERED RESPIRATORY (INHALATION) at 09:15

## 2021-02-28 RX ADMIN — GABAPENTIN 300 MG: 300 CAPSULE ORAL at 21:40

## 2021-02-28 RX ADMIN — INSULIN GLARGINE 80 UNITS: 100 INJECTION, SOLUTION SUBCUTANEOUS at 21:47

## 2021-02-28 RX ADMIN — PRIMIDONE 50 MG: 50 TABLET ORAL at 09:14

## 2021-02-28 RX ADMIN — LATANOPROST 1 DROP: 50 SOLUTION OPHTHALMIC at 21:42

## 2021-02-28 RX ADMIN — TOPIRAMATE 25 MG: 25 TABLET, FILM COATED ORAL at 21:40

## 2021-02-28 RX ADMIN — HYDROCORTISONE: 1 CREAM TOPICAL at 09:14

## 2021-02-28 RX ADMIN — INSULIN LISPRO 1 UNITS: 100 INJECTION, SOLUTION INTRAVENOUS; SUBCUTANEOUS at 17:34

## 2021-02-28 RX ADMIN — ROSUVASTATIN CALCIUM 20 MG: 20 TABLET, FILM COATED ORAL at 21:40

## 2021-02-28 ASSESSMENT — PAIN DESCRIPTION - ORIENTATION
ORIENTATION: MID
ORIENTATION: MID

## 2021-02-28 ASSESSMENT — PAIN DESCRIPTION - LOCATION: LOCATION: BACK

## 2021-02-28 ASSESSMENT — PAIN DESCRIPTION - PROGRESSION: CLINICAL_PROGRESSION: NOT CHANGED

## 2021-02-28 ASSESSMENT — PAIN DESCRIPTION - PAIN TYPE: TYPE: ACUTE PAIN;CHRONIC PAIN;SURGICAL PAIN

## 2021-02-28 ASSESSMENT — PAIN SCALES - GENERAL
PAINLEVEL_OUTOF10: 8
PAINLEVEL_OUTOF10: 9
PAINLEVEL_OUTOF10: 10
PAINLEVEL_OUTOF10: 8
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
## 2021-02-28 NOTE — PLAN OF CARE
Problem: Falls - Risk of:  Goal: Will remain free from falls  Description: Will remain free from falls  Outcome: Ongoing   Pt will remain free of falls. Pt is 2 assist with walker and gait belt, pt is compliant with call light usage but would like to ambulate with 1 assist, pt has tremors to BLE and is unsteady at times. Problem: Skin Integrity:  Goal: Will show no infection signs and symptoms  Description: Will show no infection signs and symptoms  Outcome: Ongoing  Skin assessment every shift. Pt has no new areas of skin breakdown, pt has scattered bruises and abrasions and dry calloused feet and toes     Problem: Infection - Surgical Site:  Goal: Will show no infection signs and symptoms  Description: Will show no infection signs and symptoms  Outcome: Ongoing  Surgical site will be assessed every shift for warmth to site, redness, pain or drainage. No signs of infection noted. Problem: Bleeding:  Goal: Will show no signs and symptoms of excessive bleeding  Description: Will show no signs and symptoms of excessive bleeding  Outcome: Ongoing  Pt will have no active bleeding. Problem: Pain:  Goal: Pain level will decrease  Description: Pain level will decrease  Outcome: Ongoing  Reposition frequently to alleviate pain. Pt was able to propel self throughout the unit in wheelchair. Pt states the lidoderm does not relieve pain at all. Problem: Mobility - Impaired:  Goal: Mobility will improve  Description: Mobility will improve  Outcome: Ongoing  Pt will participate in PT daily as directed to increase mobility.

## 2021-02-28 NOTE — PLAN OF CARE
Problem: Falls - Risk of:  Goal: Will remain free from falls  Description: Will remain free from falls  2/27/2021 2336 by Effie Galindo LPN  Outcome: Ongoing  2/27/2021 1825 by Rosana Gusman LPN  Outcome: Ongoing  Goal: Absence of physical injury  Description: Absence of physical injury  Outcome: Ongoing     Problem: Skin Integrity:  Goal: Will show no infection signs and symptoms  Description: Will show no infection signs and symptoms  2/27/2021 2336 by Effie Galindo LPN  Outcome: Ongoing  2/27/2021 1825 by Rosana Gusman LPN  Outcome: Ongoing  Goal: Absence of new skin breakdown  Description: Absence of new skin breakdown  Outcome: Ongoing     Problem: Infection - Surgical Site:  Goal: Will show no infection signs and symptoms  Description: Will show no infection signs and symptoms  2/27/2021 2336 by Effie Galindo LPN  Outcome: Ongoing  2/27/2021 1825 by Rosana Gusman LPN  Outcome: Ongoing     Problem: Bleeding:  Goal: Will show no signs and symptoms of excessive bleeding  Description: Will show no signs and symptoms of excessive bleeding  2/27/2021 2336 by Effie Galindo LPN  Outcome: Ongoing  2/27/2021 1825 by Rosana Gusman LPN  Outcome: Ongoing     Problem: Pain:  Goal: Pain level will decrease  Description: Pain level will decrease  2/27/2021 2336 by Effie Galindo LPN  Outcome: Ongoing  2/27/2021 1825 by Rosana Gusman LPN  Outcome: Ongoing  Goal: Control of acute pain  Description: Control of acute pain  Outcome: Ongoing  Goal: Control of chronic pain  Description: Control of chronic pain  Outcome: Ongoing     Problem: Mobility - Impaired:  Goal: Mobility will improve  Description: Mobility will improve  2/27/2021 2336 by Effie Galindo LPN  Outcome: Ongoing  2/27/2021 1825 by Rosana Gusman LPN  Outcome: Ongoing     Problem: Urinary Elimination:  Goal: Skin integrity will improve  Description: Skin integrity will improve  2/27/2021 2336 by Effie Galindo LPN  Outcome: Ongoing  2/27/2021 1825 by Angelique Sotelo LPN  Outcome: Ongoing  Goal: Ability to recognize the need to void and respond appropriately will improve  Description: Ability to recognize the need to void and respond appropriately will improve  Outcome: Ongoing  Goal: Will remain free from infection  Description: Will remain free from infection  Outcome: Ongoing  Goal: Incidence of incontinence will decrease  Description: Incidence of incontinence will decrease  Outcome: Ongoing     Problem: Fluid Volume - Imbalance:  Goal: Absence of imbalanced fluid volume signs and symptoms  Description: Absence of imbalanced fluid volume signs and symptoms  Outcome: Ongoing     Problem: Serum Glucose Level - Abnormal:  Goal: Ability to maintain appropriate glucose levels will improve  Description: Ability to maintain appropriate glucose levels will improve  Outcome: Ongoing     Problem: Sensory Perception - Impaired:  Goal: Ability to maintain a stable neurologic state will improve  Description: Ability to maintain a stable neurologic state will improve  Outcome: Ongoing     Problem: IP BALANCE  Goal: LTG - Patient will maintain balance to allow for safe/functional mobility  Outcome: Ongoing     Problem: IP INSTRUMENTAL ACTIVITIES OF DAILY LIVING  Goal: LTG - Patient will independently complete basic home making activities to return to independent functioning at home  Outcome: Ongoing     Problem: Discharge Planning:  Goal: Patients continuum of care needs are met  Description: Patients continuum of care needs are met  Outcome: Ongoing     Problem: IP COMMUNICATION/DYSARTHRIA  Goal: LTG - Patient will effectively communicate in all situations with the use of compensatory strategies  Outcome: Ongoing

## 2021-02-28 NOTE — FLOWSHEET NOTE
02/28/21 1853   Provider Notification   Reason for Communication Review case   Provider Name Dr Atilio Grider   Provider Notification Physician   Method of Communication Secure Message   Response Waiting for response   Notification Time [phone]   contacted podiatry for eval of L great toe discoloration, Dr. Atilio Grider did amputation of R great toe in 2020, Dr. Celeste Councilman notified via secure msg.

## 2021-03-01 LAB
ANION GAP SERPL CALCULATED.3IONS-SCNC: 8 MEQ/L (ref 8–16)
BUN BLDV-MCNC: 42 MG/DL (ref 7–22)
CALCIUM SERPL-MCNC: 9.2 MG/DL (ref 8.5–10.5)
CHLORIDE BLD-SCNC: 108 MEQ/L (ref 98–111)
CO2: 20 MEQ/L (ref 23–33)
CREAT SERPL-MCNC: 1.5 MG/DL (ref 0.4–1.2)
ERYTHROCYTE [DISTWIDTH] IN BLOOD BY AUTOMATED COUNT: 15 % (ref 11.5–14.5)
ERYTHROCYTE [DISTWIDTH] IN BLOOD BY AUTOMATED COUNT: 53.9 FL (ref 35–45)
GFR SERPL CREATININE-BSD FRML MDRD: 49 ML/MIN/1.73M2
GLUCOSE BLD-MCNC: 135 MG/DL (ref 70–108)
GLUCOSE BLD-MCNC: 156 MG/DL (ref 70–108)
GLUCOSE BLD-MCNC: 161 MG/DL (ref 70–108)
GLUCOSE BLD-MCNC: 164 MG/DL (ref 70–108)
GLUCOSE BLD-MCNC: 178 MG/DL (ref 70–108)
HCT VFR BLD CALC: 29.7 % (ref 42–52)
HEMOGLOBIN: 9 GM/DL (ref 14–18)
MCH RBC QN AUTO: 29.9 PG (ref 26–33)
MCHC RBC AUTO-ENTMCNC: 30.3 GM/DL (ref 32.2–35.5)
MCV RBC AUTO: 98.7 FL (ref 80–94)
PLATELET # BLD: 109 THOU/MM3 (ref 130–400)
PMV BLD AUTO: 10.6 FL (ref 9.4–12.4)
POTASSIUM SERPL-SCNC: 4.5 MEQ/L (ref 3.5–5.2)
RBC # BLD: 3.01 MILL/MM3 (ref 4.7–6.1)
SODIUM BLD-SCNC: 136 MEQ/L (ref 135–145)
WBC # BLD: 5.9 THOU/MM3 (ref 4.8–10.8)

## 2021-03-01 PROCEDURE — 94640 AIRWAY INHALATION TREATMENT: CPT

## 2021-03-01 PROCEDURE — 6360000002 HC RX W HCPCS: Performed by: PHYSICIAN ASSISTANT

## 2021-03-01 PROCEDURE — 6370000000 HC RX 637 (ALT 250 FOR IP): Performed by: PSYCHIATRY & NEUROLOGY

## 2021-03-01 PROCEDURE — 6370000000 HC RX 637 (ALT 250 FOR IP): Performed by: PHYSICAL MEDICINE & REHABILITATION

## 2021-03-01 PROCEDURE — 97530 THERAPEUTIC ACTIVITIES: CPT

## 2021-03-01 PROCEDURE — 99232 SBSQ HOSP IP/OBS MODERATE 35: CPT | Performed by: NURSE PRACTITIONER

## 2021-03-01 PROCEDURE — 36415 COLL VENOUS BLD VENIPUNCTURE: CPT

## 2021-03-01 PROCEDURE — 85027 COMPLETE CBC AUTOMATED: CPT

## 2021-03-01 PROCEDURE — 1180000000 HC REHAB R&B

## 2021-03-01 PROCEDURE — 97116 GAIT TRAINING THERAPY: CPT

## 2021-03-01 PROCEDURE — 6370000000 HC RX 637 (ALT 250 FOR IP): Performed by: NURSE PRACTITIONER

## 2021-03-01 PROCEDURE — 97130 THER IVNTJ EA ADDL 15 MIN: CPT

## 2021-03-01 PROCEDURE — 82948 REAGENT STRIP/BLOOD GLUCOSE: CPT

## 2021-03-01 PROCEDURE — 94760 N-INVAS EAR/PLS OXIMETRY 1: CPT

## 2021-03-01 PROCEDURE — 97129 THER IVNTJ 1ST 15 MIN: CPT

## 2021-03-01 PROCEDURE — 80048 BASIC METABOLIC PNL TOTAL CA: CPT

## 2021-03-01 RX ADMIN — TOPIRAMATE 25 MG: 25 TABLET, FILM COATED ORAL at 08:38

## 2021-03-01 RX ADMIN — TIOTROPIUM BROMIDE INHALATION SPRAY 2 PUFF: 3.12 SPRAY, METERED RESPIRATORY (INHALATION) at 09:17

## 2021-03-01 RX ADMIN — DULOXETINE HYDROCHLORIDE 60 MG: 60 CAPSULE, DELAYED RELEASE ORAL at 08:38

## 2021-03-01 RX ADMIN — CYCLOBENZAPRINE HYDROCHLORIDE 10 MG: 10 TABLET, FILM COATED ORAL at 18:36

## 2021-03-01 RX ADMIN — OXYCODONE HYDROCHLORIDE AND ACETAMINOPHEN 1 TABLET: 7.5; 325 TABLET ORAL at 12:51

## 2021-03-01 RX ADMIN — FAMOTIDINE 20 MG: 20 TABLET, FILM COATED ORAL at 08:39

## 2021-03-01 RX ADMIN — TAMSULOSIN HYDROCHLORIDE 0.4 MG: 0.4 CAPSULE ORAL at 08:38

## 2021-03-01 RX ADMIN — ENOXAPARIN SODIUM 40 MG: 40 INJECTION, SOLUTION INTRAVENOUS; SUBCUTANEOUS at 14:26

## 2021-03-01 RX ADMIN — ROSUVASTATIN CALCIUM 20 MG: 20 TABLET, FILM COATED ORAL at 21:11

## 2021-03-01 RX ADMIN — DOCUSATE SODIUM 100 MG: 100 CAPSULE, LIQUID FILLED ORAL at 21:11

## 2021-03-01 RX ADMIN — CYCLOBENZAPRINE HYDROCHLORIDE 10 MG: 10 TABLET, FILM COATED ORAL at 08:27

## 2021-03-01 RX ADMIN — AMLODIPINE BESYLATE 5 MG: 5 TABLET ORAL at 08:38

## 2021-03-01 RX ADMIN — TOPIRAMATE 25 MG: 25 TABLET, FILM COATED ORAL at 21:11

## 2021-03-01 RX ADMIN — DOCUSATE SODIUM 100 MG: 100 CAPSULE, LIQUID FILLED ORAL at 08:39

## 2021-03-01 RX ADMIN — POLYETHYLENE GLYCOL 3350 17 G: 17 POWDER, FOR SOLUTION ORAL at 08:39

## 2021-03-01 RX ADMIN — INSULIN LISPRO 1 UNITS: 100 INJECTION, SOLUTION INTRAVENOUS; SUBCUTANEOUS at 17:08

## 2021-03-01 RX ADMIN — SENNOSIDES 17.2 MG: 8.6 TABLET, FILM COATED ORAL at 21:12

## 2021-03-01 RX ADMIN — INSULIN LISPRO 1 UNITS: 100 INJECTION, SOLUTION INTRAVENOUS; SUBCUTANEOUS at 12:49

## 2021-03-01 RX ADMIN — FOLIC ACID 1 MG: 1 TABLET ORAL at 08:38

## 2021-03-01 RX ADMIN — PRIMIDONE 50 MG: 50 TABLET ORAL at 08:38

## 2021-03-01 RX ADMIN — HYDROCORTISONE: 1 CREAM TOPICAL at 08:40

## 2021-03-01 RX ADMIN — OXYCODONE HYDROCHLORIDE AND ACETAMINOPHEN 1 TABLET: 7.5; 325 TABLET ORAL at 23:01

## 2021-03-01 RX ADMIN — OXYCODONE HYDROCHLORIDE AND ACETAMINOPHEN 1 TABLET: 7.5; 325 TABLET ORAL at 18:36

## 2021-03-01 RX ADMIN — HYDROCORTISONE: 1 CREAM TOPICAL at 14:27

## 2021-03-01 RX ADMIN — MIRTAZAPINE 15 MG: 15 TABLET, FILM COATED ORAL at 21:11

## 2021-03-01 RX ADMIN — OXYCODONE HYDROCHLORIDE AND ACETAMINOPHEN 1 TABLET: 7.5; 325 TABLET ORAL at 03:02

## 2021-03-01 RX ADMIN — LATANOPROST 1 DROP: 50 SOLUTION OPHTHALMIC at 21:11

## 2021-03-01 RX ADMIN — NALOXEGOL OXALATE 12.5 MG: 12.5 TABLET, FILM COATED ORAL at 08:38

## 2021-03-01 RX ADMIN — INSULIN GLARGINE 80 UNITS: 100 INJECTION, SOLUTION SUBCUTANEOUS at 21:11

## 2021-03-01 RX ADMIN — GABAPENTIN 300 MG: 300 CAPSULE ORAL at 21:11

## 2021-03-01 RX ADMIN — CYCLOBENZAPRINE HYDROCHLORIDE 10 MG: 10 TABLET, FILM COATED ORAL at 03:07

## 2021-03-01 RX ADMIN — OXYCODONE HYDROCHLORIDE AND ACETAMINOPHEN 1 TABLET: 7.5; 325 TABLET ORAL at 08:27

## 2021-03-01 RX ADMIN — PRIMIDONE 50 MG: 50 TABLET ORAL at 21:12

## 2021-03-01 RX ADMIN — HYDROCORTISONE: 1 CREAM TOPICAL at 21:11

## 2021-03-01 ASSESSMENT — PAIN DESCRIPTION - ONSET: ONSET: ON-GOING

## 2021-03-01 ASSESSMENT — PAIN DESCRIPTION - PROGRESSION
CLINICAL_PROGRESSION: NOT CHANGED

## 2021-03-01 ASSESSMENT — PAIN DESCRIPTION - PAIN TYPE: TYPE: ACUTE PAIN;SURGICAL PAIN

## 2021-03-01 ASSESSMENT — PAIN SCALES - GENERAL
PAINLEVEL_OUTOF10: 3
PAINLEVEL_OUTOF10: 10
PAINLEVEL_OUTOF10: 0
PAINLEVEL_OUTOF10: 6
PAINLEVEL_OUTOF10: 9

## 2021-03-01 ASSESSMENT — PAIN DESCRIPTION - LOCATION: LOCATION: BACK

## 2021-03-01 ASSESSMENT — PAIN DESCRIPTION - DESCRIPTORS: DESCRIPTORS: ACHING;DISCOMFORT
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
## 2021-03-01 NOTE — PROGRESS NOTES
[address] [facility]  [address] [place]  DAILY NOTE    TIME 6386-3417  SLP Individual Minutes  Time In: 1100  Time Out: 1130  Minutes: 30  Timed Code Treatment Minutes: 30 Minutes       Date: 3/1/2021  Patient Name: Derrek Rose      CSN: [id number]   : 1965  (55 y.o.)  Gender: male   Referring Physician:  Anais Hernandez PA-C  Diagnosis: Burst Fracture of Fourth Thoracic Vertebra  Secondary Diagnosis: Cognitive-Linguistic Deficit  Precautions: Fall Risk  Current Diet: Regular and Thin Liquids  Swallowing Strategies: Standard Universal Swallow Precautions  Date of Last MBS: Not Applicable    Pain:   - Pain location: low back - nursing aware of pain    Subjective:  Patient seen sitting upright in chair upon ST arrival. Patient agreeable to participate in skilled ST services this date; alert and pleasant throughout. Short-Term Goals:  SHORT TERM GOAL #1:   Goal 1: Pt will complete higher level attention tasks (divided, alternating) with fewer than 2 errors/redirections within 8 minute time span or task completion to increase ADL completion. INTERVENTIONS: did not address this date d/t focus on other goals. Prior Session   Patient completed divided attention task in which ST presented a verbal addition or subtraction problem during which time consistent clapping was initiated. The patient was expected to solve the verbally presented math problem.   Addition: 10/10 problems solved correctly  Subtraction: 10/10 problems solved correctly  *Patient with increasing frustration related to distraction (e.g., clapping) subsequently causing increased time needed to complete task  *Overall success with task, suspect patient will have difficulty with alternating attention task in which he is expected to recall two types of information    SHORT TERM GOAL #2:   Goal 2: Pt will complete memory tasks (immediate/delayed, working) with 85% accuracy at a modified independent level to increase memory retention of medical and daily information  INTERVENTIONS:  ST completed review of STM strategies using the acronym WRAP--Write it down, Repeat it, Associate it, and Pictures it. Patient able to independently recall 3/4 strategies. Pt recalled 1/4 strategies (picture it) when given min cue    Grocery List - Eggs, Milk, Bell Peppers, Cheddar Cheese, Bread:   Delayed Recall (24 hours): 4/5 Independently, 1/5 min categorical cue     *Good delayed recall of memory strategies and grocery list following an extended period of time. SHORT TERM GOAL #3:    Goal 3: Pt will complete HIGH level/complex verbal reasoning, problem solving, and executive functioning with 85% accuracy given min cue to increase IADL contribution. INTERVENTIONS:   Money Man[employer] - Word Problems  7/11 independently, 2/11 given min cues, 2/11 given mod cues   *pt utilized pen and paper to complete task this date; however, pt demonstrated increased difficulty with working memory when multiple variables were involved in word problem. Pt benefited from repetition of word problem as well as emphasis on important variables required to successful complete task. Safety Awareness with Transfers  5/5 Independently   *Good safety awareness with transfers    SHORT TERM GOAL #4:   Goal 4: Pt will complete sequencing tasks (i.e transfers) with 85% accuracy given min cues in order to improve safety within transfers and particicpation within ADL/IADL  INTERVENTIONS:   Sequencing Transfers  Pt verbally sequenced sit-to-stand and stand-to-sit transfers with 100% accuracy indepenently. *Demonstrated good recall from PT/OT sessions regarding steps to safely transfer from sit-to-stand and stand-to-sit this date.      Long-Term Goals:  Timeframe for Long-term Goals: 2 weeks    LONG TERM GOAL #1:   Goal 1: Pt will improve overall cognitive-linguistic skills to a Modified [place] or higher in order to safely discharge to least restrictive environment and complete ADL/IADL with least amount of supervision/assistance         Comprehension: 6 - Complex ideas 90% or device (hearing aid or glasses- if patient is primarily a visual learner)  Expression: 7 - Patient expresses complex ideas/needs  Social Interaction: 5 - Patient is appropriate with supervision/cues  Problem Solvin - Patient able to solve simple/routine tasks  Memory: 5 - Patient requires prompting with stress/unfamiliar situations    EDUCATION:  Learner: Patient and Significant Other  Education:  Reviewed ST goals and Plan of Care  Evaluation of Education: Verbalizes understanding, Demonstrates with assistance and Needs further instruction    ASSESSMENT/PLAN:  Activity Tolerance:  Patient tolerance of  treatment: good. Assessment/Plan: Patient progressing toward established goals. Continues to require skilled care of licensed speech pathologist to progress toward achievement of established goals and plan of care.   Plan for Next Session: Problem Solving/Reasoning, Thought Organization, Memory      Kayli Doshi M.A., [address]

## 2021-03-01 NOTE — PROGRESS NOTES
Pt is refusing care from me due to the fact his bed alarm was on when he tried to get up and go to the bathroom. He states that no one else leaves it on they just let him get up when he wants. (with out a back brace on)  I just tried to explain to him that its for safety. He refused me when I tried to check his blood sugar. Notified patients primary nurse of the issue RAMESH Velazco.
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
## 2021-03-01 NOTE — PROGRESS NOTES
Patient is a daily weight at 6am. Just seen wasn't done and patient already ate and drank so dint do today due to accuracy.
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
## 2021-03-01 NOTE — PLAN OF CARE
Problem: Falls - Risk of:  Goal: Will remain free from falls  Description: Will remain free from falls  Patient will continue to use call light when needing up  Outcome: Ongoing     Problem: Skin Integrity:  Goal: Will show no infection signs and symptoms  Description: Will show no infection signs and symptoms  Will continue to monitor vitals  Outcome: Ongoing

## 2021-03-01 NOTE — PLAN OF CARE
Problem: Discharge Planning:  Goal: Patients continuum of care needs are met  Description: Patients continuum of care needs are met  Note: Team conference held 03/01/2021. Recommendations of the team were explained to the patient by Luis Card and Dr. Elizabeth Martínez. Team is recommending that patient continue on acute inpatient rehab for 12 more days, with expected discharge date of 3/12/2021. Prior to discharge team is recommending family education. Following discharge, team is recommending home health with RN, PT, OT, ST and HHA  Care plan reviewed with patient and spouse. Patient and spouse verbalize understanding of the plan of care and contribute to goal setting.  will follow for discharge needs.

## 2021-03-01 NOTE — PROGRESS NOTES
Restriction  Spinal Precautions: No Bending, No Lifting, No Twisting  Other position/activity restrictions: Kyphoplasty 2/19/21,  poor sensation BLE knee down L>R, legally blind,     VITALS: Vitals not warranted to take during session. SUBJECTIVE: Patient pleasant and cooperative, agreeable to OT. Excited about shower. PAIN: 10/10: back pain. Nursing aware and administered medication at EOS. COGNITION: Decreased Insight, Decreased Problem Solving and Decreased Safety Awareness (Patient requiring cues for safety awareness and insight)     ADL:   Bathing: Minimal Assistance. Min A for bottom, min A to CGA for standing balance with unsteadiness noted. Pt used reacher to wash B lower legs. Upper Extremity Dressing: with set-up. With t-shirt   Lower Extremity Dressing: Minimal Assistance for standing balance during clothing mgmt with BUE free from AD. Pt used reacher to thread. Toileting: Minimal Assistance for standing balance during urination   Shower Transfer: Minimal Assistance of 1 and CGA of another for optimal safety, stand pivot w/c <>  chair. Mod tactile cues for hand placement due to low vision of grab bars and for safety. Increased time required throughout BADL for task completion, education and safety awareness     BALANCE:  Sitting Balance:  Stand By Assistance. seated on shower bench. Standing Balance: Minimal Assistance. ranges from min A to CGA pending on task. Pt lacking insight with LLE awareness, requring min tactile cues. BED MOBILITY:  Supine to Sit: Stand By Assistance log roll    TRANSFERS:  Sit to Stand:  Minimal Assistance. of 1 ,CGA of another. EOB,  chair, w/c. Mod cues for hand placement. Stand to Sit: Minimal Assistance. of 1, CGA of another. mod cues for hand placement,  Stand Pivot: Minimal Assistance. w/c <>  bench     FUNCTIONAL MOBILITY:  Assistive Device: Rolling Walker  Assist Level:  Minimal Assistance and Moderate Assistance.    Distance: To/from bathroom; ranging from min A to mod A of 1, CGA of another for safety. mod cues for safety insight and LLE awareness. ASSESSMENT:  Assessment: Nathanael Huynh is making steady progress on IP Rehab but continues to be limited greatly by his LLE numbness which poses patient as a high fall risk, his balance, his endurance, his strength, and his impaired independence with ADL / IADL function. Pt requires min A for standing balance during all BADL activities and requires min A for bathing completion. Patient requires from mod A to min A for functional ambulation in short distances only with mod cues for technique and safety insight as he tends to want to ambulate even with obvious physical signs of fatigue and lacks internal feedback. He requires min A for functional stand pivots. He continues to require skilled OT intervention to progress with ADL and IADL remediation with an emphasis on safety insight, judgement, standing endurance, standing balance, and overall strength to decrease risk of fall / future injury. Activity Tolerance:  Patient tolerance of  treatment: fair. Discharge Recommendations: Continue to assess pending progress, Home with Home health OT   Equipment Recommendations: Other: Patient has a BSC and a shower chair in his tub shower combo (upstairs). Pt will likely need a tub tf bench if going upstairs is an option vs. sponge bathing downstairs.   Plan: Times per week: 5x/wk for 90 min and 1x/wk for 30 min  Current Treatment Recommendations: Strengthening, Patient/Caregiver Education & Training, Home Management Training, Balance Training, Functional Mobility Training, Endurance Training, Safety Education & Training, Self-Care / ADL, Equipment Evaluation, Education, & procurement, Neuromuscular Re-education    Patient Education  Patient Education: ADL's, Precautions, Equipment Education, Reviewed Prior Education, Assistive Device Safety and Education Related to Recurrence of Impairment/Illness/Injury    Goals  Short term goals  Time Frame for Short term goals: 1 week  Short term goal 1: Pt will use LHAE to complete LB dressing with min A to improve indep with self care. Short term goal 2: Pt will tolerate 2 minutes of standing tasks with unilateral release with CGA of 1 to improve indep with sinkside grooming tasks. Short term goal 3: Pt will navigate RW to bathroom with min of 1, and mod vcs for LLE attention/placement and wheelchair follow, to improve indep with toileting. Short term goal 4: Pt will complete homemaking tasks using wheelchair with no > min cues for attention to task to increase ability to retrieve items for dressing tasks  Short term goal 5: Pt will attend and complete 3 step task in minimally distracted environment to improve attention to task during BADL routine. Long term goals  Time Frame for Long term goals : 3 weeks  Long term goal 1: Pt will complete toileting tasks with CGA using adaptive tech for hygiene to improve indep with self care. Long term goal 2: Pt will complete bathing and dressing tasks with set up to return to sponge bathing and dressing with AE at home. Long term goal 3: Pt will complete stand pivot transfers with SBA and 0 vcs for safety to improve indep with transferring self to [facility] in middle of night alone. Following session, patient left in safe position with all fall risk precautions in place.

## 2021-03-01 NOTE — PROGRESS NOTES
Pt. Has been yelling about alarm on in bed, pt. Is reminded of safety reasons to prevent falls, pt. States\"I'm not gonna fall\". . Pt. Is alert and oriented but has a hx of seizures, pt did agree to have bed alarm on per this nurse. This nurse doing cares for patient.

## 2021-03-02 LAB
GLUCOSE BLD-MCNC: 139 MG/DL (ref 70–108)
GLUCOSE BLD-MCNC: 153 MG/DL (ref 70–108)
GLUCOSE BLD-MCNC: 155 MG/DL (ref 70–108)
GLUCOSE BLD-MCNC: 178 MG/DL (ref 70–108)
GLUCOSE BLD-MCNC: 201 MG/DL (ref 70–108)

## 2021-03-02 PROCEDURE — 97129 THER IVNTJ 1ST 15 MIN: CPT

## 2021-03-02 PROCEDURE — 97110 THERAPEUTIC EXERCISES: CPT

## 2021-03-02 PROCEDURE — 99222 1ST HOSP IP/OBS MODERATE 55: CPT | Performed by: INTERNAL MEDICINE

## 2021-03-02 PROCEDURE — 6370000000 HC RX 637 (ALT 250 FOR IP): Performed by: PHYSICAL MEDICINE & REHABILITATION

## 2021-03-02 PROCEDURE — 82948 REAGENT STRIP/BLOOD GLUCOSE: CPT

## 2021-03-02 PROCEDURE — 2580000003 HC RX 258: Performed by: INTERNAL MEDICINE

## 2021-03-02 PROCEDURE — 2500000003 HC RX 250 WO HCPCS: Performed by: INTERNAL MEDICINE

## 2021-03-02 PROCEDURE — 6370000000 HC RX 637 (ALT 250 FOR IP): Performed by: PSYCHIATRY & NEUROLOGY

## 2021-03-02 PROCEDURE — 99232 SBSQ HOSP IP/OBS MODERATE 35: CPT | Performed by: NURSE PRACTITIONER

## 2021-03-02 PROCEDURE — 6360000002 HC RX W HCPCS: Performed by: PHYSICIAN ASSISTANT

## 2021-03-02 PROCEDURE — 94640 AIRWAY INHALATION TREATMENT: CPT

## 2021-03-02 PROCEDURE — 2580000003 HC RX 258: Performed by: PHYSICAL MEDICINE & REHABILITATION

## 2021-03-02 PROCEDURE — 1180000000 HC REHAB R&B

## 2021-03-02 PROCEDURE — 97130 THER IVNTJ EA ADDL 15 MIN: CPT

## 2021-03-02 PROCEDURE — 6370000000 HC RX 637 (ALT 250 FOR IP): Performed by: NURSE PRACTITIONER

## 2021-03-02 PROCEDURE — 94760 N-INVAS EAR/PLS OXIMETRY 1: CPT

## 2021-03-02 PROCEDURE — 97535 SELF CARE MNGMENT TRAINING: CPT

## 2021-03-02 PROCEDURE — 97530 THERAPEUTIC ACTIVITIES: CPT

## 2021-03-02 PROCEDURE — 97116 GAIT TRAINING THERAPY: CPT

## 2021-03-02 PROCEDURE — 6360000002 HC RX W HCPCS: Performed by: INTERNAL MEDICINE

## 2021-03-02 RX ORDER — BUMETANIDE 0.25 MG/ML
1 INJECTION, SOLUTION INTRAMUSCULAR; INTRAVENOUS EVERY 12 HOURS
Status: DISCONTINUED | OUTPATIENT
Start: 2021-03-02 | End: 2021-03-12 | Stop reason: HOSPADM

## 2021-03-02 RX ORDER — SODIUM CHLORIDE 0.9 % (FLUSH) 0.9 %
10 SYRINGE (ML) INJECTION PRN
Status: DISCONTINUED | OUTPATIENT
Start: 2021-03-02 | End: 2021-03-12 | Stop reason: HOSPADM

## 2021-03-02 RX ORDER — SODIUM CHLORIDE 0.9 % (FLUSH) 0.9 %
10 SYRINGE (ML) INJECTION 2 TIMES DAILY
Status: DISCONTINUED | OUTPATIENT
Start: 2021-03-02 | End: 2021-03-12 | Stop reason: HOSPADM

## 2021-03-02 RX ADMIN — PRIMIDONE 50 MG: 50 TABLET ORAL at 09:03

## 2021-03-02 RX ADMIN — AMLODIPINE BESYLATE 5 MG: 5 TABLET ORAL at 09:04

## 2021-03-02 RX ADMIN — PRIMIDONE 50 MG: 50 TABLET ORAL at 22:22

## 2021-03-02 RX ADMIN — MIRTAZAPINE 15 MG: 15 TABLET, FILM COATED ORAL at 22:22

## 2021-03-02 RX ADMIN — CYCLOBENZAPRINE HYDROCHLORIDE 10 MG: 10 TABLET, FILM COATED ORAL at 04:06

## 2021-03-02 RX ADMIN — BUMETANIDE 1 MG: 0.25 INJECTION INTRAMUSCULAR; INTRAVENOUS at 10:56

## 2021-03-02 RX ADMIN — TOPIRAMATE 25 MG: 25 TABLET, FILM COATED ORAL at 09:03

## 2021-03-02 RX ADMIN — DOCUSATE SODIUM 100 MG: 100 CAPSULE, LIQUID FILLED ORAL at 09:04

## 2021-03-02 RX ADMIN — OXYCODONE HYDROCHLORIDE AND ACETAMINOPHEN 1 TABLET: 7.5; 325 TABLET ORAL at 04:05

## 2021-03-02 RX ADMIN — TIOTROPIUM BROMIDE INHALATION SPRAY 2 PUFF: 3.12 SPRAY, METERED RESPIRATORY (INHALATION) at 04:36

## 2021-03-02 RX ADMIN — INSULIN LISPRO 1 UNITS: 100 INJECTION, SOLUTION INTRAVENOUS; SUBCUTANEOUS at 18:59

## 2021-03-02 RX ADMIN — INSULIN LISPRO 1 UNITS: 100 INJECTION, SOLUTION INTRAVENOUS; SUBCUTANEOUS at 09:05

## 2021-03-02 RX ADMIN — CHLOROTHIAZIDE SODIUM 500 MG: 500 INJECTION, POWDER, LYOPHILIZED, FOR SOLUTION INTRAVENOUS at 10:56

## 2021-03-02 RX ADMIN — INSULIN GLARGINE 80 UNITS: 100 INJECTION, SOLUTION SUBCUTANEOUS at 22:22

## 2021-03-02 RX ADMIN — CYCLOBENZAPRINE HYDROCHLORIDE 10 MG: 10 TABLET, FILM COATED ORAL at 22:22

## 2021-03-02 RX ADMIN — ENOXAPARIN SODIUM 40 MG: 40 INJECTION, SOLUTION INTRAVENOUS; SUBCUTANEOUS at 16:25

## 2021-03-02 RX ADMIN — DULOXETINE HYDROCHLORIDE 60 MG: 60 CAPSULE, DELAYED RELEASE ORAL at 09:04

## 2021-03-02 RX ADMIN — FAMOTIDINE 20 MG: 20 TABLET, FILM COATED ORAL at 09:04

## 2021-03-02 RX ADMIN — NALOXEGOL OXALATE 12.5 MG: 12.5 TABLET, FILM COATED ORAL at 09:04

## 2021-03-02 RX ADMIN — SODIUM CHLORIDE, PRESERVATIVE FREE 10 ML: 5 INJECTION INTRAVENOUS at 22:33

## 2021-03-02 RX ADMIN — TAMSULOSIN HYDROCHLORIDE 0.4 MG: 0.4 CAPSULE ORAL at 09:03

## 2021-03-02 RX ADMIN — OXYCODONE HYDROCHLORIDE AND ACETAMINOPHEN 1 TABLET: 7.5; 325 TABLET ORAL at 09:04

## 2021-03-02 RX ADMIN — LATANOPROST 1 DROP: 50 SOLUTION OPHTHALMIC at 22:23

## 2021-03-02 RX ADMIN — OXYCODONE HYDROCHLORIDE AND ACETAMINOPHEN 1 TABLET: 7.5; 325 TABLET ORAL at 13:01

## 2021-03-02 RX ADMIN — BUMETANIDE 1 MG: 0.25 INJECTION INTRAMUSCULAR; INTRAVENOUS at 22:33

## 2021-03-02 RX ADMIN — POLYETHYLENE GLYCOL 3350 17 G: 17 POWDER, FOR SOLUTION ORAL at 09:04

## 2021-03-02 RX ADMIN — FOLIC ACID 1 MG: 1 TABLET ORAL at 09:03

## 2021-03-02 RX ADMIN — HYDROCORTISONE: 1 CREAM TOPICAL at 09:04

## 2021-03-02 RX ADMIN — GABAPENTIN 300 MG: 300 CAPSULE ORAL at 22:23

## 2021-03-02 RX ADMIN — TOPIRAMATE 25 MG: 25 TABLET, FILM COATED ORAL at 22:30

## 2021-03-02 RX ADMIN — ROSUVASTATIN CALCIUM 20 MG: 20 TABLET, FILM COATED ORAL at 22:22

## 2021-03-02 RX ADMIN — OXYCODONE HYDROCHLORIDE AND ACETAMINOPHEN 1 TABLET: 7.5; 325 TABLET ORAL at 18:19

## 2021-03-02 RX ADMIN — HYDROCORTISONE: 1 CREAM TOPICAL at 22:33

## 2021-03-02 RX ADMIN — SODIUM CHLORIDE, PRESERVATIVE FREE 10 ML: 5 INJECTION INTRAVENOUS at 10:56

## 2021-03-02 ASSESSMENT — PAIN SCALES - GENERAL
PAINLEVEL_OUTOF10: 10
PAINLEVEL_OUTOF10: 10
PAINLEVEL_OUTOF10: 9
PAINLEVEL_OUTOF10: 9
PAINLEVEL_OUTOF10: 10
PAINLEVEL_OUTOF10: 9
PAINLEVEL_OUTOF10: 10
PAINLEVEL_OUTOF10: 9

## 2021-03-02 ASSESSMENT — PAIN DESCRIPTION - PROGRESSION
CLINICAL_PROGRESSION: NOT CHANGED

## 2021-03-02 ASSESSMENT — PAIN DESCRIPTION - FREQUENCY
FREQUENCY: CONTINUOUS
FREQUENCY: CONTINUOUS

## 2021-03-02 ASSESSMENT — PAIN DESCRIPTION - ONSET
ONSET: ON-GOING
ONSET: ON-GOING

## 2021-03-02 ASSESSMENT — PAIN DESCRIPTION - ORIENTATION
ORIENTATION: MID;LOWER
ORIENTATION: MID;LOWER
ORIENTATION: LOWER;MID

## 2021-03-02 ASSESSMENT — PAIN DESCRIPTION - DESCRIPTORS
DESCRIPTORS: ACHING

## 2021-03-02 ASSESSMENT — PAIN - FUNCTIONAL ASSESSMENT: PAIN_FUNCTIONAL_ASSESSMENT: PREVENTS OR INTERFERES SOME ACTIVE ACTIVITIES AND ADLS

## 2021-03-02 ASSESSMENT — PAIN DESCRIPTION - PAIN TYPE: TYPE: ACUTE PAIN

## 2021-03-02 NOTE — PROGRESS NOTES
Patient: Eleuterio Mena  Unit/Bed: 7Q-66/073-A  YOB: 1965  MRN: [id number] Acct: [de-identified]   Admitting Diagnosis: Burst fracture of fourth thoracic vertebra [facility]) [M48.513C]  Admit Date:  2/24/2021  Hospital Day: 6    Assessment:     Active Problems:    Seizure-like activity ([address]    Burst fracture of fourth thoracic vertebra ([address]    Encephalopathy    Jerking  Resolved Problems:    * No resolved hospital problems. *      Plan:     Continue to follow        Subjective:     Patient has no complaint of CP or SOB. .   Medication side effects: none    Scheduled Meds:   bumetanide  1 mg Intravenous Q12H    sodium chloride flush  10 mL Intravenous BID    topiramate  25 mg Oral BID    hydrocortisone   Topical TID    enoxaparin  40 mg Subcutaneous Q24H    polyethylene glycol  17 g Oral Daily    amLODIPine  5 mg Oral Daily    docusate sodium  100 mg Oral BID    DULoxetine  60 mg Oral Daily    famotidine  20 mg Oral Daily    folic acid  1 mg Oral Daily    gabapentin  300 mg Oral Nightly    insulin glargine  80 Units Subcutaneous Nightly    insulin lispro  0-3 Units Subcutaneous Nightly    insulin lispro  0-6 Units Subcutaneous TID WC    insulin lispro  7 Units Subcutaneous TID AC    latanoprost  1 drop Both Eyes Nightly    lidocaine  3 patch Transdermal Daily    mirtazapine  15 mg Oral Nightly    naloxegol  12.5 mg Oral QAM    nicotine  1 patch Transdermal Daily    primidone  50 mg Oral BID    rosuvastatin  20 mg Oral Nightly    senna  2 tablet Oral Nightly    tamsulosin  0.4 mg Oral Daily    tiotropium  2 puff Inhalation Daily     Continuous Infusions:  PRN Meds:sodium chloride flush, acetaminophen, cyclobenzaprine, polyethylene glycol, sodium chloride flush, albuterol, bisacodyl, glucagon (rDNA), glucose, ondansetron, oxyCODONE-acetaminophen    Review of Systems  Pertinent items are noted in HPI.     Objective:     Patient Vitals for the past 8 hrs:   BP Temp Temp src Pulse Resp SpO2 Weight   03/02/21 0840 (!) 146/83 98.3 °F (36.8 °C) Oral 85 18 97 % --   03/02/21 0515 -- -- -- -- -- -- (!) 333 lb 3 oz (151.1 kg)     I/O last 3 completed shifts:   In: 5 [P.O.:720]  Out: 2800 [Urine:2800]  I/O this shift:  In: 360 [P.O.:360]  Out: -     BP (!) 146/83   Pulse 85   Temp 98.3 °F (36.8 °C) (Oral)   Resp 18   Ht 6' 2\" (1.88 m)   Wt (!) 333 lb 3 oz (151.1 kg)   SpO2 97%   BMI 42.78 kg/m²     General appearance: alert, appears stated age and cooperative  Head: Normocephalic, without obvious abnormality, atraumatic  Lungs: clear to auscultation bilaterally  Chest wall: no tenderness  Heart: regular rate and rhythm, S1, S2 normal, no murmur, click, rub or gallop  Abdomen: soft, non-tender; bowel sounds normal; no masses,  no organomegaly  Extremities: extremities normal, atraumatic, no cyanosis or edema  Skin: Skin color, texture, turgor normal. No rashes or lesions  Neurologic: tremor      Electronically signed by Elizabeth Durant MD on 3/2/2021 at 12:38 PM

## 2021-03-02 NOTE — PROGRESS NOTES
[address] [address]  Recreational Therapy  Daily Note  [address]    Time Spent with Patient: 15 minutes    Date:  3/2/2021       Patient Name: Mike Joyce      MRN: [id number]      YOB: 1965 (55 y.o.)       Gender: male  Diagnosis: Burst fracture of fourth thoracic vertebra  Referring Practitioner: Zhang Tubbs PA-C (ordering)  Dr Desiree Moe (Attending)    RESTRICTIONS/PRECAUTIONS:  Restrictions/Precautions: General Precautions, Fall Risk  Vision: Impaired  Hearing: Within functional limits    PAIN: 0-no c/o pain     SUBJECTIVE:  I need to stand up and use the urinal while you are here    OBJECTIVE:  Sit to stand from recliner with SBA-stood to doff and don underwear and pants-sat edge of chair to use the urinal -emptied for pt and informed nurse of output-pt on the phone and on hold-states he likes to read the paper and watch tv and is not interested in any other leisure materials for in room use-appreciative       Patient Education  New Education Provided: Importance of Leisure,     Electronically signed by: ROSA ELENA Barakat  Date: 3/2/2021

## 2021-03-02 NOTE — CONSULTS
Nephrology Consult Note  Patient's Name: Mike Joyce  26:63 PM  3/2/2021    Nephrologist: Francisco Madden    Reason for Consult: Chronic kidney disease. Volume overload. Requesting Physician: Remberto Guerrier MD  PCP: MEGHAN Foreman CNP    Chief Complaint: Leg swellings  Assessment  1. Acute kidney injury resolved with a serum creatinine at baseline now. 2. Stage IIIb chronic kidney disease  3. Volume overload  4. Deconditioned state  5. Diabetes mellitus type 2  6. Hypertension primary  7. COPD  8. status post thoracic and lumbar spine kyphoplasty  9. Macrocytic anemia    Plan    1. I discussed my thoughts with the patient. 2. He understood. 3. I addressed his questions. 4. Labs reviewed. 5. Serum creatinine stable at baseline. 6. Medications reviewed. 7. Bumetanide 1 mg intravenously every 12 hours. 8. Chlorothiazide 500 mg IV piggyback once  9. Labs in the morning  10 1500 mL fluid restriction in 24 hours  11. Monitor electrolytes very closely. 12.  We will follow      History Obtained From:  Patient ,staff and electronic medical record  History of Present Ilness:    Mike Joyce is a 55 y.o. male with history of chronic kidney disease, hypertension, diabetes mellitus, COPD, enterococcal sepsis among other multiple medical entities listed below initially admitted to [facility] for back pain. Lumbar spine stenosis with subsequent kyphoplasty of T4, L2 and L4 respectively. I had seen him for acute kidney injury. He done well and now telemetry bed. Subsequently he was transferred to rehabilitation unit for continuation of medical management as well as physical and outpatient therapy respectively. He has gained about 18 pounds in the last one week or so. Has lower extremity edema. As result I was asked to see him. No chest pain. No shortness of breath. No nausea vomiting. No fever or chills.     Past Medical History:   Diagnosis Date    Acute kidney injury ([address] 12/2020    due to Enterococous Bacteremia , fluid overload, low sodium    Amputation of right great toe ([facility] 2/18/2021    Asthma     Brain tumor ([facility]     Cirrhosis (HCC)     ETOH abuse    COPD (chronic obstructive pulmonary disease) (HCC)     Diabetes mellitus (HCC)     Falling     Glaucoma     Hepatitis C     History of blood transfusion     s/p nasal surgery    Hyperlipidemia     Hypertension     Legally blind     Right foot infection 11/2021    Seizures ([facility]        Past Surgical History:   Procedure Laterality Date    BACK SURGERY      ENDOSCOPY, COLON, DIAGNOSTIC      EYE SURGERY      FIBULA FRACTURE SURGERY Left 3/21/2019    LEFT FIBULAR SHAFT ORIF performed by Margie Fan DPM at [address] Right     Steel pins in place    FRACTURE SURGERY      NASAL SINUS SURGERY Bilateral 11/29/2020    FLEXIBLE BRONCHOSCOPY WITH BRONCHOALVEOLAR LAVAGE WITH CULTURES. NASAL ENDOSCOPY WITH POSSIBLE CAUTERY ADN NASAL PACKING performed by Erick Parham MD at [facility]  01/2020    SPINE SURGERY N/A 2/19/2021    KYPHOPLASTY at T4, L2, L4 performed by Martina Jose MD at [facility] 9/23/2020    AMPUTATION DISTAL APSECT RIGHT HALLUX, REMOVAL OF HARDWARE RIGHT HALLUX performed by Luis M Neal DPM at [address] [place] [address] ENDOSCOPY N/A 4/25/2019    EGD BIOPSY performed by Neal Garcia MD at [facility]       Family History   Problem Relation Age of Onset    Heart Attack Father     Colon Cancer Neg Hx     Colon Polyps Neg Hx         reports that he has been smoking cigarettes. He has a 30.00 pack-year smoking history. He has never used smokeless tobacco. He reports previous alcohol use. He reports previous drug use. Drug: Marijuana.     Allergies:  Adhesive tape    Current Medications:        bumetanide (BUMEX) injection 1 mg, Q12H      sodium chloride flush 0.9 % injection 10 mL, BID      sodium chloride flush 0.9 % injection 10 mL, PRN      topiramate (TOPAMAX) tablet 25 mg, BID      acetaminophen (TYLENOL) tablet 650 mg, Q4H PRN      hydrocortisone 1 % cream, TID      cyclobenzaprine (FLEXERIL) tablet 10 mg, TID PRN      enoxaparin (LOVENOX) injection 40 mg, Q24H      polyethylene glycol (GLYCOLAX) packet 17 g, Daily PRN      polyethylene glycol (GLYCOLAX) packet 17 g, Daily      sodium chloride flush 0.9 % injection 10 mL, PRN      albuterol (PROVENTIL) nebulizer solution 5 mg, Q6H PRN      amLODIPine (NORVASC) tablet 5 mg, Daily      bisacodyl (DULCOLAX) suppository 10 mg, PRN      docusate sodium (COLACE) capsule 100 mg, BID      DULoxetine (CYMBALTA) extended release capsule 60 mg, Daily      famotidine (PEPCID) tablet 20 mg, Daily      folic acid (FOLVITE) tablet 1 mg, Daily      gabapentin (NEURONTIN) capsule 300 mg, Nightly      glucagon (rDNA) injection 1 mg, PRN      glucose (GLUTOSE) 40 % oral gel 15 g, PRN      insulin glargine (LANTUS) injection vial 80 Units, Nightly      insulin lispro (HUMALOG) injection vial 0-3 Units, Nightly      insulin lispro (HUMALOG) injection vial 0-6 Units, TID WC      insulin lispro (HUMALOG) injection vial 7 Units, TID AC      latanoprost (XALATAN) 0.005 % ophthalmic solution 1 drop, Nightly      lidocaine 4 % external patch 3 patch, Daily      mirtazapine (REMERON) tablet 15 mg, Nightly      naloxegol (MOVANTIK) tablet 12.5 mg, QAM      nicotine (NICODERM CQ) 21 MG/24HR 1 patch, Daily      ondansetron (ZOFRAN) tablet 4 mg, Q8H PRN      oxyCODONE-acetaminophen (PERCOCET) 7.5-325 MG per tablet 1 tablet, Q4H PRN      primidone (MYSOLINE) tablet 50 mg, BID      rosuvastatin (CRESTOR) tablet 20 mg, Nightly      senna (SENOKOT) tablet 17.2 mg, Nightly      tamsulosin (FLOMAX) capsule 0.4 mg, Daily      tiotropium (SPIRIVA RESPIMAT) 2.5 MCG/ACT inhaler 2 puff, Daily        Review of Systems:   Pertinent positives stated above in HPI.  All other systems were reviewed and were negative. ROS: As in HPI. Other systems are negative. Physical exam:   Constitutional: Well-developed middle-aged gentleman in no distress. Vitals:   Vitals:    03/02/21 0840   BP: (!) 146/83   Pulse: 85   Resp: 18   Temp: 98.3 °F (36.8 °C)   SpO2: 97%       Skin: no rash, turgor is normal  Heent: Pupils are reactive to light. Throat is clear. Oral mucosa is moist.  Neck: no bruits or jvd noted   Cardiovascular:  S1, S2 without murmur   Respiratory: Clear with no wheezes,rhonchi or rales   Abdomen: soft,non tender,good bowel sound and no palpable mass  Ext: 3 + bilateral lower extremity edema  Psychiatric: mood and affect appropriate  Musculoskeletal:  Rom, muscular strength intact   CNS: Very awake and very alert. Well-oriented. Normal speech. Good motor strength. No obvious focal deficit. Data:   Labs:  Lab Results   Component Value Date     03/01/2021     02/26/2021     02/25/2021    K 4.5 03/01/2021    K 4.5 02/26/2021    K 4.3 02/25/2021     03/01/2021    CO2 20 (L) 03/01/2021    CO2 20 (L) 02/26/2021    CO2 20 (L) 02/25/2021    CREATININE 1.5 (H) 03/01/2021    CREATININE 1.5 (H) 02/26/2021    CREATININE 1.8 (H) 02/25/2021    BUN 42 (H) 03/01/2021    BUN 43 (H) 02/26/2021    BUN 43 (H) 02/25/2021    GLUCOSE 161 (H) 03/01/2021    GLUCOSE 158 (H) 02/26/2021    GLUCOSE 161 (H) 02/25/2021    PHOS 3.9 02/26/2021    PHOS 2.9 03/20/2019    PHOS 3.1 03/19/2019    WBC 5.9 03/01/2021    WBC 6.4 02/26/2021    WBC 6.5 02/25/2021    HGB 9.0 (L) 03/01/2021    HGB 9.5 (L) 02/26/2021    HGB 8.8 (L) 02/25/2021    HCT 29.7 (L) 03/01/2021    HCT 30.9 (L) 02/26/2021    HCT 28.0 (L) 02/25/2021    MCV 98.7 (H) 03/01/2021     (L) 03/01/2021     {Labs reviewed    Imaging:  CXR results: Thank you Dr. Gaudencio James, APRMUKUND - CNP for allowing us to participate in care of Mike Joyce   **This report has been created using voice recognition software.  It maycontain minor  errors which are inherent in voice recognition technology. **    Electronically signed by Dashawn Londono MD on 3/2/2021 at 12:09 PM

## 2021-03-02 NOTE — PROGRESS NOTES
Pulse Resp SpO2   03/01/21 1942 (!) 173/89 97.6 °F (36.4 °C) Oral 77 16 98 %     I/O last 3 completed shifts:   In: 36 [P.O.:760]  Out: 8860 [Urine:1650]  I/O this shift:  In: 360 [P.O.:360]  Out: 325 [Urine:325]    BP (!) 173/89   Pulse 77   Temp 97.6 °F (36.4 °C) (Oral)   Resp 16   Ht 6' 2\" (1.88 m)   Wt (!) 329 lb 8 oz (149.5 kg)   SpO2 98%   BMI 42.31 kg/m²     General appearance: alert, appears stated age and cooperative  Head: Normocephalic, without obvious abnormality, atraumatic  Lungs: clear to auscultation bilaterally  Chest wall: no tenderness  Heart: regular rate and rhythm, S1, S2 normal, no murmur, click, rub or gallop  Abdomen: soft, non-tender; bowel sounds normal; no masses,  no organomegaly  Extremities: edema 2-3+ bilaterally  Skin: Skin color, texture, turgor normal. No rashes or lesions  Neurologic: Grossly normal      Electronically signed by Hannah Sands MD on 3/1/2021 at 8:50 PM
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
## 2021-03-03 ENCOUNTER — APPOINTMENT (OUTPATIENT)
Dept: CT IMAGING | Age: 56
DRG: 862 | End: 2021-03-03
Attending: PHYSICAL MEDICINE & REHABILITATION
Payer: MEDICAID

## 2021-03-03 LAB
ANION GAP SERPL CALCULATED.3IONS-SCNC: 15 MEQ/L (ref 8–16)
ANION GAP SERPL CALCULATED.3IONS-SCNC: 8 MEQ/L (ref 8–16)
BUN BLDV-MCNC: 42 MG/DL (ref 7–22)
BUN BLDV-MCNC: 42 MG/DL (ref 7–22)
CALCIUM SERPL-MCNC: 8.9 MG/DL (ref 8.5–10.5)
CALCIUM SERPL-MCNC: 9.4 MG/DL (ref 8.5–10.5)
CHLORIDE BLD-SCNC: 106 MEQ/L (ref 98–111)
CHLORIDE BLD-SCNC: 110 MEQ/L (ref 98–111)
CO2: 20 MEQ/L (ref 23–33)
CO2: 22 MEQ/L (ref 23–33)
CREAT SERPL-MCNC: 1.7 MG/DL (ref 0.4–1.2)
CREAT SERPL-MCNC: 1.8 MG/DL (ref 0.4–1.2)
GFR SERPL CREATININE-BSD FRML MDRD: 39 ML/MIN/1.73M2
GFR SERPL CREATININE-BSD FRML MDRD: 42 ML/MIN/1.73M2
GLUCOSE BLD-MCNC: 126 MG/DL (ref 70–108)
GLUCOSE BLD-MCNC: 149 MG/DL (ref 70–108)
GLUCOSE BLD-MCNC: 152 MG/DL (ref 70–108)
GLUCOSE BLD-MCNC: 157 MG/DL (ref 70–108)
GLUCOSE BLD-MCNC: 164 MG/DL (ref 70–108)
GLUCOSE BLD-MCNC: 193 MG/DL (ref 70–108)
LACTIC ACID: 3.4 MMOL/L (ref 0.5–2.2)
MAGNESIUM: 1.9 MG/DL (ref 1.6–2.4)
PHOSPHORUS: 4.1 MG/DL (ref 2.4–4.7)
PHOSPHORUS: 4.9 MG/DL (ref 2.4–4.7)
POTASSIUM REFLEX MAGNESIUM: 3.7 MEQ/L (ref 3.5–5.2)
POTASSIUM REFLEX MAGNESIUM: 3.8 MEQ/L (ref 3.5–5.2)
PROCALCITONIN: 0.18 NG/ML (ref 0.01–0.09)
PROLACTIN: 26.4 NG/ML
REASON FOR REJECTION: NORMAL
REJECTED TEST: NORMAL
SODIUM BLD-SCNC: 140 MEQ/L (ref 135–145)
SODIUM BLD-SCNC: 141 MEQ/L (ref 135–145)

## 2021-03-03 PROCEDURE — 6370000000 HC RX 637 (ALT 250 FOR IP): Performed by: NURSE PRACTITIONER

## 2021-03-03 PROCEDURE — 97110 THERAPEUTIC EXERCISES: CPT

## 2021-03-03 PROCEDURE — 83735 ASSAY OF MAGNESIUM: CPT

## 2021-03-03 PROCEDURE — 97116 GAIT TRAINING THERAPY: CPT

## 2021-03-03 PROCEDURE — 2500000003 HC RX 250 WO HCPCS: Performed by: INTERNAL MEDICINE

## 2021-03-03 PROCEDURE — 80048 BASIC METABOLIC PNL TOTAL CA: CPT

## 2021-03-03 PROCEDURE — 97130 THER IVNTJ EA ADDL 15 MIN: CPT

## 2021-03-03 PROCEDURE — 36415 COLL VENOUS BLD VENIPUNCTURE: CPT

## 2021-03-03 PROCEDURE — 82948 REAGENT STRIP/BLOOD GLUCOSE: CPT

## 2021-03-03 PROCEDURE — 97530 THERAPEUTIC ACTIVITIES: CPT

## 2021-03-03 PROCEDURE — 97129 THER IVNTJ 1ST 15 MIN: CPT

## 2021-03-03 PROCEDURE — 99232 SBSQ HOSP IP/OBS MODERATE 35: CPT | Performed by: NURSE PRACTITIONER

## 2021-03-03 PROCEDURE — 70450 CT HEAD/BRAIN W/O DYE: CPT

## 2021-03-03 PROCEDURE — 94640 AIRWAY INHALATION TREATMENT: CPT

## 2021-03-03 PROCEDURE — 84145 PROCALCITONIN (PCT): CPT

## 2021-03-03 PROCEDURE — 97535 SELF CARE MNGMENT TRAINING: CPT

## 2021-03-03 PROCEDURE — 84146 ASSAY OF PROLACTIN: CPT

## 2021-03-03 PROCEDURE — 6370000000 HC RX 637 (ALT 250 FOR IP): Performed by: PSYCHIATRY & NEUROLOGY

## 2021-03-03 PROCEDURE — 2580000003 HC RX 258: Performed by: PHYSICAL MEDICINE & REHABILITATION

## 2021-03-03 PROCEDURE — 94760 N-INVAS EAR/PLS OXIMETRY 1: CPT

## 2021-03-03 PROCEDURE — 83605 ASSAY OF LACTIC ACID: CPT

## 2021-03-03 PROCEDURE — 6370000000 HC RX 637 (ALT 250 FOR IP): Performed by: PHYSICAL MEDICINE & REHABILITATION

## 2021-03-03 PROCEDURE — 6370000000 HC RX 637 (ALT 250 FOR IP): Performed by: STUDENT IN AN ORGANIZED HEALTH CARE EDUCATION/TRAINING PROGRAM

## 2021-03-03 PROCEDURE — 1180000000 HC REHAB R&B

## 2021-03-03 PROCEDURE — 84100 ASSAY OF PHOSPHORUS: CPT

## 2021-03-03 PROCEDURE — 97542 WHEELCHAIR MNGMENT TRAINING: CPT

## 2021-03-03 PROCEDURE — 6360000002 HC RX W HCPCS: Performed by: PHYSICIAN ASSISTANT

## 2021-03-03 RX ORDER — LORAZEPAM 2 MG/ML
INJECTION INTRAMUSCULAR
Status: DISCONTINUED
Start: 2021-03-03 | End: 2021-03-03 | Stop reason: WASHOUT

## 2021-03-03 RX ORDER — LEVETIRACETAM 500 MG/1
500 TABLET ORAL 2 TIMES DAILY
Status: DISCONTINUED | OUTPATIENT
Start: 2021-03-03 | End: 2021-03-05

## 2021-03-03 RX ADMIN — ROSUVASTATIN CALCIUM 20 MG: 20 TABLET, FILM COATED ORAL at 20:19

## 2021-03-03 RX ADMIN — DULOXETINE HYDROCHLORIDE 60 MG: 60 CAPSULE, DELAYED RELEASE ORAL at 08:57

## 2021-03-03 RX ADMIN — AMLODIPINE BESYLATE 5 MG: 5 TABLET ORAL at 08:57

## 2021-03-03 RX ADMIN — TOPIRAMATE 25 MG: 25 TABLET, FILM COATED ORAL at 08:58

## 2021-03-03 RX ADMIN — SODIUM CHLORIDE, PRESERVATIVE FREE 10 ML: 5 INJECTION INTRAVENOUS at 21:57

## 2021-03-03 RX ADMIN — INSULIN GLARGINE 80 UNITS: 100 INJECTION, SOLUTION SUBCUTANEOUS at 20:20

## 2021-03-03 RX ADMIN — GABAPENTIN 300 MG: 300 CAPSULE ORAL at 20:19

## 2021-03-03 RX ADMIN — BUMETANIDE 1 MG: 0.25 INJECTION INTRAMUSCULAR; INTRAVENOUS at 20:12

## 2021-03-03 RX ADMIN — HYDROCORTISONE: 1 CREAM TOPICAL at 08:58

## 2021-03-03 RX ADMIN — SENNOSIDES 17.2 MG: 8.6 TABLET, FILM COATED ORAL at 20:19

## 2021-03-03 RX ADMIN — PRIMIDONE 50 MG: 50 TABLET ORAL at 20:19

## 2021-03-03 RX ADMIN — DOCUSATE SODIUM 100 MG: 100 CAPSULE, LIQUID FILLED ORAL at 20:20

## 2021-03-03 RX ADMIN — SODIUM CHLORIDE, PRESERVATIVE FREE 10 ML: 5 INJECTION INTRAVENOUS at 08:59

## 2021-03-03 RX ADMIN — BUMETANIDE 1 MG: 0.25 INJECTION INTRAMUSCULAR; INTRAVENOUS at 09:38

## 2021-03-03 RX ADMIN — HYDROCORTISONE: 1 CREAM TOPICAL at 13:30

## 2021-03-03 RX ADMIN — FAMOTIDINE 20 MG: 20 TABLET, FILM COATED ORAL at 08:57

## 2021-03-03 RX ADMIN — PRIMIDONE 50 MG: 50 TABLET ORAL at 08:57

## 2021-03-03 RX ADMIN — DOCUSATE SODIUM 100 MG: 100 CAPSULE, LIQUID FILLED ORAL at 08:57

## 2021-03-03 RX ADMIN — TAMSULOSIN HYDROCHLORIDE 0.4 MG: 0.4 CAPSULE ORAL at 08:57

## 2021-03-03 RX ADMIN — INSULIN LISPRO 1 UNITS: 100 INJECTION, SOLUTION INTRAVENOUS; SUBCUTANEOUS at 12:36

## 2021-03-03 RX ADMIN — ENOXAPARIN SODIUM 40 MG: 40 INJECTION, SOLUTION INTRAVENOUS; SUBCUTANEOUS at 13:30

## 2021-03-03 RX ADMIN — LEVETIRACETAM 500 MG: 500 TABLET, FILM COATED ORAL at 21:31

## 2021-03-03 RX ADMIN — LATANOPROST 1 DROP: 50 SOLUTION OPHTHALMIC at 20:20

## 2021-03-03 RX ADMIN — CYCLOBENZAPRINE HYDROCHLORIDE 10 MG: 10 TABLET, FILM COATED ORAL at 08:09

## 2021-03-03 RX ADMIN — MIRTAZAPINE 15 MG: 15 TABLET, FILM COATED ORAL at 20:19

## 2021-03-03 RX ADMIN — TOPIRAMATE 25 MG: 25 TABLET, FILM COATED ORAL at 20:19

## 2021-03-03 RX ADMIN — FOLIC ACID 1 MG: 1 TABLET ORAL at 08:57

## 2021-03-03 RX ADMIN — POTASSIUM BICARBONATE 40 MEQ: 782 TABLET, EFFERVESCENT ORAL at 09:23

## 2021-03-03 RX ADMIN — NALOXEGOL OXALATE 12.5 MG: 12.5 TABLET, FILM COATED ORAL at 08:57

## 2021-03-03 RX ADMIN — HYDROCORTISONE: 1 CREAM TOPICAL at 20:20

## 2021-03-03 RX ADMIN — TIOTROPIUM BROMIDE INHALATION SPRAY 2 PUFF: 3.12 SPRAY, METERED RESPIRATORY (INHALATION) at 07:50

## 2021-03-03 RX ADMIN — CYCLOBENZAPRINE HYDROCHLORIDE 10 MG: 10 TABLET, FILM COATED ORAL at 20:20

## 2021-03-03 RX ADMIN — OXYCODONE HYDROCHLORIDE AND ACETAMINOPHEN 1 TABLET: 7.5; 325 TABLET ORAL at 20:20

## 2021-03-03 RX ADMIN — INSULIN LISPRO 1 UNITS: 100 INJECTION, SOLUTION INTRAVENOUS; SUBCUTANEOUS at 17:19

## 2021-03-03 RX ADMIN — OXYCODONE HYDROCHLORIDE AND ACETAMINOPHEN 1 TABLET: 7.5; 325 TABLET ORAL at 08:09

## 2021-03-03 ASSESSMENT — PAIN DESCRIPTION - ONSET: ONSET: ON-GOING

## 2021-03-03 ASSESSMENT — PAIN SCALES - GENERAL: PAINLEVEL_OUTOF10: 7

## 2021-03-03 ASSESSMENT — PAIN DESCRIPTION - ORIENTATION: ORIENTATION: LOWER

## 2021-03-03 ASSESSMENT — PAIN DESCRIPTION - FREQUENCY: FREQUENCY: CONTINUOUS

## 2021-03-03 ASSESSMENT — PAIN - FUNCTIONAL ASSESSMENT: PAIN_FUNCTIONAL_ASSESSMENT: PREVENTS OR INTERFERES SOME ACTIVE ACTIVITIES AND ADLS

## 2021-03-03 ASSESSMENT — PAIN DESCRIPTION - PROGRESSION: CLINICAL_PROGRESSION: NOT CHANGED

## 2021-03-03 ASSESSMENT — PAIN DESCRIPTION - DESCRIPTORS: DESCRIPTORS: ACHING

## 2021-03-03 ASSESSMENT — PAIN DESCRIPTION - LOCATION: LOCATION: BACK

## 2021-03-03 NOTE — FLOWSHEET NOTE
Patient volunteered that he was [address] and did not need anything.      03/03/21 1440   Encounter Summary   Services provided to: Patient   Referral/Consult From: [facility] Family members   Continue Visiting Yes  (3/3)   Complexity of Encounter Low   Length of Encounter 15 minutes
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
## 2021-03-03 NOTE — PLAN OF CARE
Problem: Nutrition  Goal: Optimal nutrition therapy  Outcome: Ongoing  Nutrition Problem #1: Increased nutrient needs  Intervention: Food and/or Nutrient Delivery: Continue Current Diet  Nutritional Goals: Pt. will tolerate and consume 75% or more of meals to promote wound healing during LOS.

## 2021-03-03 NOTE — PROGRESS NOTES
[facility]  Recreational Therapy  Daily Note  [address]    Time Spent with Patient: 0 minutes    Date:  3/3/2021       Patient Name: Epifanio Tamayo      MRN: [id number]      YOB: 1965 (55 y.o.)       Gender: male  Diagnosis: Burst fracture of fourth thoracic vertebra  Referring Practitioner: Deana Kendrick PA-C (ordering)  Dr Carlos Eduardo Timmons (Attending)    Patient enjoyed spending time with our pet therapy dogs. Pt enjoyed petting our dog May before P. T. while sitting EOB-affect bright and social     Electronically signed by: ROSA ELENA Dye  Date: 3/3/2021

## 2021-03-03 NOTE — PROGRESS NOTES
#2:   Goal 2: Pt will complete memory tasks (immediate/delayed, working) with 85% accuracy at a modified independent level to increase memory retention of medical and daily information  INTERVENTIONS:  Working Memory:   Pt was prompted to say \"ping\" for red suit and \"pong\" for black suit. Pt independently identified \"ping\" or \"pong\" correctly 47/52 cards, self corrected 4/52 cards, and completed task in 1 minute 40 seconds. Pt was prompted to say \"elephant\" for even, \"octopus\" for odd, and \"reyes\" for face cards. Pt independently identified animal with corresponding card 47/52 cards, 3/52 self-corrected, 2/52 incorrect, and completed task in 2 minutes 45 seconds. Prior Session  ST completed review of STM strategies using the acronym WRAP--Write it down, Repeat it, Associate it, and Pictures it. Patient able to independently recall 3/4 strategies. Pt recalled 1/4 strategies (picture it) when given min cue    Grocery List - Eggs, Milk, Bell Peppers, Cheddar Cheese, Bread:   Delayed Recall (24 hours): 4/5 Independently, 1/5 min categorical cue     *Good delayed recall of memory strategies and grocery list following an extended period of time. SHORT TERM GOAL #3:    Goal 3: Pt will complete HIGH level/complex verbal reasoning, problem solving, and executive functioning with 85% accuracy given min cue to increase IADL contribution. INTERVENTIONS:   Problem Solving- Missing Equipment   14/15 Independently, 1/15 min cues   *Good problem solving skills noted within moderately complex problem solving task    Prior Session  Money Managemet - Word Problems  7/11 independently, 2/11 given min cues, 2/11 given mod cues   *pt utilized pen and paper to complete task this date; however, pt demonstrated increased difficulty with working memory when multiple variables were involved in word problem.  Pt benefited from repetition of word problem as well as emphasis on important variables required to successful complete task.     Safety Awareness with Transfers  5/5 Independently   *Good safety awareness with transfers    SHORT TERM GOAL #4:   Goal 4: Pt will complete sequencing tasks (i.e transfers) with 85% accuracy given min cues in order to improve safety within transfers and particicpation within ADL/IADL  INTERVENTIONS: Did not address this date d/t focus on other goals. Prior Session  Sequencing Transfers  Pt verbally sequenced sit-to-stand and stand-to-sit transfers with 100% accuracy indepenently. *Demonstrated good recall from PT/OT sessions regarding steps to safely transfer from sit-to-stand and stand-to-sit this date. Long-Term Goals:  Timeframe for Long-term Goals: 2 weeks    LONG TERM GOAL #1:   Goal 1: Pt will improve overall cognitive-linguistic skills to a Modified [place] or higher in order to safely discharge to least restrictive environment and complete ADL/IADL with least amount of supervision/assistance       Comprehension: 7 - Patient understands complex ideas (math/planning)  Expression: 7 - Patient expresses complex ideas/needs  Social Interaction: 6 - Patient requires medication for mood and/or effect  Problem Solvin - Independent with device (e.g. notes, schedules)  Memory: 6 - Patient requires device to recall (e.g. memory book)    EDUCATION:  Learner: Patient and Significant Other  Education:  Reviewed ST goals and Plan of Care  Evaluation of Education: Verbalizes understanding, Demonstrates with assistance and Needs further instruction    ASSESSMENT/PLAN:  Activity Tolerance:  Patient tolerance of  treatment: good. Assessment/Plan: Patient progressing toward established goals. Continues to require skilled care of licensed speech pathologist to progress toward achievement of established goals and plan of care.   Plan for Next Session: Problem Solving/Reasoning, Thought Organization, Memory      Estella Flores M.A., [address]

## 2021-03-03 NOTE — PLAN OF CARE
Problem: Falls - Risk of:  Goal: Will remain free from falls  Description: Will remain free from falls  Outcome: Ongoing  Fall sign posted. Call light with in reach. Bed and chair alarm and brakes on and working. Bed in low position. No skid socks on patient. Bilateral side rails elevated. Patient voiced and demonstrated understanding of using call light and waiting for staff before getting up. Problem: Skin Integrity:  Goal: Absence of new skin breakdown  Description: Absence of new skin breakdown  Outcome: Ongoing  Waffle cushion to chair. Special pressure relief mattress on bed and working. Patient refused to elevate feet on pillows. Educated patient on importance of repositioning self frequently when awake. Patient voiced and demonstrated understanding. Care plan reviewed with patient. Patient verbalize understanding of the plan of care and contribute to goal setting.

## 2021-03-03 NOTE — PROGRESS NOTES
Pain Management    Progress Note      3/3/2021 4:04 PM     · SUBJECTIVE:    · Chief complaint: Back pain, neck pain   · Patient is POD # 12 from s/p kyphoplasty @ T4 L2, and L4 from 2/19/2021. · Patient was seen in his room on rehab today with family present for wound check. Yesterday I removed the band aids from his suture removal on Kyphoplasty sites. Sites healing well without drainage or any signs of infection. · Continues to have pain in mid and lower back. Pain increases with activity and relieved with prn Percocet. · I discussed other future interventional procedures that may help such as L-facet MBB following discharge. · He is concerned with his leg swelling but no other needs or concerns   · Medications effective:   Yes   · Pain rating is 7/10 low back mainly   · Constipation: + BM 3/2/2021    Current medications:   Current Facility-Administered Medications   Medication Dose Route Frequency Provider Last Rate Last Admin    bumetanide (BUMEX) injection 1 mg  1 mg Intravenous Q12H Mikie Soto MD   1 mg at 03/03/21 1218    sodium chloride flush 0.9 % injection 10 mL  10 mL Intravenous BID Melania Newby MD   10 mL at 03/03/21 0859    sodium chloride flush 0.9 % injection 10 mL  10 mL Intravenous PRN Melania Newby MD        topiramate (TOPAMAX) tablet 25 mg  25 mg Oral BID Courtney Neil MD   25 mg at 03/03/21 0858    acetaminophen (TYLENOL) tablet 650 mg  650 mg Oral Q4H PRN Lynder Home, APRN - CNP        hydrocortisone 1 % cream   Topical TID [facility], APRN - CNP   Given at 03/03/21 1330    cyclobenzaprine (FLEXERIL) tablet 10 mg  10 mg Oral TID [facility]N [facility], APR[facility] - CNP   10 mg at 03/03/21 0809    enoxaparin (LOVENOX) injection 40 mg  40 mg Subcutaneous Q24H Calib Rhiannon PA-C   40 mg at 03/03/21 1330    polyethylene glycol (GLYCOLAX) packet 17 g  17 g Oral Daily PRN Calib Jurado, PA-C        polyethylene glycol (GLYCOLAX) packet 17 g  17 g Oral Daily Yuli Almendarez MD   17 g at 03/02/21 7767    sodium chloride flush 0.9 % injection 10 mL  10 mL Intravenous PRN Yuli Almendarez MD        albuterol (PROVENTIL) nebulizer solution 5 mg  5 mg Nebulization Q6H PRN Yuli Almendarez MD   5 mg at 02/25/21 0905    amLODIPine (NORVASC) tablet 5 mg  5 mg Oral Daily Yuli Almendarez MD   5 mg at 03/03/21 0857    bisacodyl (DULCOLAX) suppository 10 mg  10 mg Rectal PRN Yuli Almendarez MD        docusate sodium (COLACE) capsule 100 mg  100 mg Oral BID Yuli Almendarez MD   100 mg at 03/03/21 0857    DULoxetine (CYMBALTA) extended release capsule 60 mg  60 mg Oral Daily Yuli Almendarez MD   60 mg at 03/03/21 0857    famotidine (PEPCID) tablet 20 mg  20 mg Oral Daily Yuli Almendarez MD   20 mg at 70/18/11 3862    folic acid (FOLVITE) tablet 1 mg  1 mg Oral Daily Yuli Almendarez MD   1 mg at 03/03/21 0857    gabapentin (NEURONTIN) capsule 300 mg  300 mg Oral Nightly Yuli Almendarez MD   300 mg at 03/02/21 2223    glucagon (rDNA) injection 1 mg  1 mg Intramuscular PRN Yuli Almendarez MD        glucose (GLUTOSE) 40 % oral gel 15 g  15 g Oral PRN Yuli Almendarez MD        insulin glargine (LANTUS) injection vial 80 Units  80 Units Subcutaneous Nightly Yuli Almendarez MD   80 Units at 03/02/21 2222    insulin lispro (HUMALOG) injection vial 0-3 Units  0-3 Units Subcutaneous Nightly Yuli Almendarez MD   1 Units at 02/26/21 2101    insulin lispro (HUMALOG) injection vial 0-6 Units  0-6 Units Subcutaneous TID [facility] Yuli Almendarez MD   1 Units at 03/03/21 1236    insulin lispro (HUMALOG) injection vial 7 Units  7 Units Subcutaneous TID [facility] Yuli Almendarez MD   7 Units at 03/03/21 1235    latanoprost (XALATAN) 0.005 % ophthalmic solution 1 drop  1 drop Both Eyes Nightly Yuli Almendarez MD   1 drop at 03/02/21 2223    lidocaine 4 % external patch 3 patch  3 patch Transdermal Daily Yuli Almendarez MD   3 patch at 03/03/21 0858    mirtazapine (REMERON) tablet 15 mg  15 mg Oral Nightly Randall Barrera MD   15 mg at 03/02/21 2222    naloxegol (MOVANTIK) tablet 12.5 mg  12.5 mg Oral QAM Randall Barrera MD   12.5 mg at 03/03/21 0857    nicotine (NICODERM CQ) 21 MG/24HR 1 patch  1 patch Transdermal Daily Randall Barrera MD   1 patch at 03/03/21 0918    ondansetron (ZOFRAN) tablet 4 mg  4 mg Oral Q8H PRN Randall Barrera MD        oxyCODONE-acetaminophen (PERCOCET) 7.5-325 MG per tablet 1 tablet  1 tablet Oral Q4H PRN Randall Barrera MD   1 tablet at 03/03/21 0809    primidone (MYSOLINE) tablet 50 mg  50 mg Oral BID Randall Barrera MD   50 mg at 03/03/21 0857    rosuvastatin (CRESTOR) tablet 20 mg  20 mg Oral Nightly Randall Barrera MD   20 mg at 03/02/21 2222    senna (SENOKOT) tablet 17.2 mg  2 tablet Oral Nightly Randall Barrera MD   17.2 mg at 03/01/21 2112    tamsulosin (FLOMAX) capsule 0.4 mg  0.4 mg Oral Daily Randall Barrera MD   0.4 mg at 03/03/21 0857    tiotropium (SPIRIVA RESPIMAT) 2.5 MCG/ACT inhaler 2 puff  2 puff Inhalation Daily Randall Barrera MD   2 puff at 03/03/21 0750       REVIEW OF SYSTEMS:  CONSTITUTIONAL: negative   EYES:  positive for  blurred vision, blind spots, visual disturbance and glaucoma   HEENT: negative   RESPIRATORY:  positive for  COPD, tobacco use   CARDIOVASCULAR:  positive for  edema  GASTROINTESTINAL: + BM 3/2/2021  GENITOURINARY:  negative   SKIN:  Kyphoplasty sites open to air and healing well without any signs of infection or drainage   HEMATOLOGIC/LYMPHATIC:  negative  MUSCULOSKELETAL:  positive for  myalgias, arthralgias, joint swelling, muscle weakness, bone pain and back pain   NEUROLOGICAL:  positive for weakness, numbness, pain and tingling  BEHAVIOR/PSYCH:  negative  System review otherwise negative         PHYSICAL EXAM:  /76   Pulse 88   Temp 97.1 °F (36.2 °C) (Oral)   Resp 16   Ht 6' 2\" (1.88 m)   Wt (!) 324 lb (147 kg)   SpO2 96%   BMI 41.60 kg/m²  I Body mass index is 41.6 kg/m².  I   Wt Readings from Last 1

## 2021-03-03 NOTE — PROGRESS NOTES
significant bruises on exposed surfaces  MUSCULOSKELETAL: Movement is coordinated. Moves all extremities   EXTREMITIES: Distal lower extremity temp is warm, 2+ lower extremity edema. PSYCHIATRIC: mood and affect appropriate.     Medications:   Med reviewed  Scheduled Meds:   bumetanide  1 mg Intravenous Q12H    sodium chloride flush  10 mL Intravenous BID    topiramate  25 mg Oral BID    hydrocortisone   Topical TID    enoxaparin  40 mg Subcutaneous Q24H    polyethylene glycol  17 g Oral Daily    amLODIPine  5 mg Oral Daily    docusate sodium  100 mg Oral BID    DULoxetine  60 mg Oral Daily    famotidine  20 mg Oral Daily    folic acid  1 mg Oral Daily    gabapentin  300 mg Oral Nightly    insulin glargine  80 Units Subcutaneous Nightly    insulin lispro  0-3 Units Subcutaneous Nightly    insulin lispro  0-6 Units Subcutaneous TID WC    insulin lispro  7 Units Subcutaneous TID AC    latanoprost  1 drop Both Eyes Nightly    lidocaine  3 patch Transdermal Daily    mirtazapine  15 mg Oral Nightly    naloxegol  12.5 mg Oral QAM    nicotine  1 patch Transdermal Daily    primidone  50 mg Oral BID    rosuvastatin  20 mg Oral Nightly    senna  2 tablet Oral Nightly    tamsulosin  0.4 mg Oral Daily    tiotropium  2 puff Inhalation Daily     Continuous Infusions:  PRN Meds sodium chloride flush, acetaminophen, cyclobenzaprine, polyethylene glycol, sodium chloride flush, albuterol, bisacodyl, glucagon (rDNA), glucose, ondansetron, oxyCODONE-acetaminophen   Labs:   Labs reviewed  Recent Labs     03/01/21  1117   WBC 5.9   RBC 3.01*   HGB 9.0*   HCT 29.7*   MCV 98.7*   MCH 29.9   *     Lab Results   Component Value Date    INR 1.16 (H) 02/17/2021    INR 1.18 (H) 12/08/2020    INR 1.26 (H) 12/07/2020     Lab Results   Component Value Date    LABA1C 6.6 (H) 02/24/2021     Recent Labs     03/01/21  1117 03/03/21  0701    140   K 4.5 3.8    110   CO2 20* 22*   BUN 42* 42*   CREATININE 1. 5* 1.7*   LABGLOM 49* 42*   GLUCOSE 161* 157*   CALCIUM 9.2 8.9      Summary 11/27/2020   Technically difficult examination. Normal left ventricle size and systolic function. Ejection fraction was   estimated at 55 %. There were no regional left ventricular wall motion   abnormalities and wall thickness was within normal limits. The left atrium is Mildly dilated. There is moderate aortic stenosis with valve area of 1.2 sq cm. The maximum aortic valve gradient is 48 mmHg, the mean gradient is 33   mmHg, and the peak velocity is 3.5 cm/s. ASSESSMENT:  1. Acute Kidney Injury improved from high of 3.0. Resolving creatinine running 1.5-1.8 last 2 weeks. 2. Chronic Kidney Disease Stage IIIB,  Will as SS to arrange Nephrology FU appt with Dr Brandy Jorge at [facility] since he lives near there. 3. Acute on chronic HFpEF, Ok to continue Bumex 1 mg IV q12h. K 3.8. Give K 40 meq PO x 1 today. Repeat BMP in AM. Continue fluid restriction  4. T4 burst fracture, L2 and L4 compression fracture. S/P kypoplasty 2/18  5. Lt lower extremity weakness  6. Diabetes Mellitus Type II with nephrosclerosis with long term use of insulin. A1C 6.6 2/24/2021    7. Essential Hypertension with nephrosclerosis, expect improvement with diuresis   8. Cirrhosis with alcohol use  9. Coronary artery disease    Active Problems:    Seizure-like activity (HCC)    Burst fracture of fourth thoracic vertebra (HCC)    Encephalopathy    Jerking  Resolved Problems:    * No resolved hospital problems.  MEGHAN Schulz - CNP 8:14 AM 3/3/2021
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
## 2021-03-03 NOTE — PROGRESS NOTES
Comprehensive Nutrition Assessment    Type and Reason for Visit:  Initial(LOS)    Nutrition Recommendations/Plan:   Continue current diet. Recommend MVI. Consider Thiamine. Nutrition Assessment:    Pt. nutritionally compromised AEB wounds. At risk for further nutrition compromise r/t increased nutrient needs for wound healing and underlying medical condition (hx CKD, COPD, DM, etoh abuse, seizures). Nutrition recommendations/interventions as per above. Malnutrition Assessment:  Malnutrition Status:  No malnutrition    Context:  Acute Illness     Findings of the 6 clinical characteristics of malnutrition:  Energy Intake:  No significant decrease in energy intake  Weight Loss:  No significant weight loss     Body Fat Loss:  No significant body fat loss     Muscle Mass Loss:  No significant muscle mass loss    Fluid Accumulation:  Unable to assess     Strength:  Not Performed    Estimated Daily Nutrient Needs:  Energy (kcal):  0607-1733 kcals (11-14); Weight Used for Energy Requirements:  (147 kgm 3/3)     Protein (g):  60-69 gm (0.7-0.8) - CKD; Weight Used for Protein Requirements:  (86 kgm)          Nutrition Related Findings:  pt. seen - reports good appetite and intake; consuming everything on trays; denies any trouble tolerating diet; does well with protein sources; 1 BM noted past 24 hours; 3/3: Glucose 157, BUN 42, Cr 1.7;  Rx includes Bumex, Folvite, Remeron, Colace, Senokot, Movantik, Glycolax, Zofran      Wounds:  (toe - scabs, incisions - back - kyphoplasty 2/19)       Current Nutrition Therapies:    DIET CARB CONTROL; Carb Control: 4 carb choices (60 gms)/meal; Daily Fluid Restriction: 1500 ml    Anthropometric Measures:  · Height: 6' 2\" (188 cm)  · Current Body Weight: 324 lb (147 kg)(3/3 +1 edema)   · Admission Body Weight: 325 lb 7 oz (147.6 kg)(2/24 no edema)    · Usual Body Weight: (per pt 290#; previously reports was trying to lose weight; per EMR: 9/10/20: 321#; 11/25/20: 306$ 4.8 oz,)     · Ideal Body Weight: 190 lbs;     · BMI: 41.6  · BMI Categories: Obese Class 3 (BMI 40.0 or greater)       Nutrition Diagnosis:   · Increased nutrient needs related to increase demand for energy/nutrients(wound healing) as evidenced by wounds      Nutrition Interventions:   Food and/or Nutrient Delivery:  Continue Current Diet  Nutrition Education/Counseling:  Education initiated(3/3 Encouraged po, good nutrition, protein intake at best efforts for healing. Denies any questions on diabetic diet.)   Coordination of Nutrition Care:  Continue to monitor while inpatient    Goals:  Pt. will tolerate and consume 75% or more of meals to promote wound healing during LOS. Nutrition Monitoring and Evaluation:   Behavioral-Environmental Outcomes:  None Identified   Food/Nutrient Intake Outcomes:  Diet Advancement/Tolerance, Food and Nutrient Intake  Physical Signs/Symptoms Outcomes:  Biochemical Data, Chewing or Swallowing, GI Status, Fluid Status or Edema, Nutrition Focused Physical Findings, Skin, Weight     Discharge Planning:     Too soon to determine     Electronically signed by Alex Zamora RD, LD on 3/3/21 at 10:54 AM EST    Contact: [phone]

## 2021-03-04 LAB
ANION GAP SERPL CALCULATED.3IONS-SCNC: 9 MEQ/L (ref 8–16)
BUN BLDV-MCNC: 41 MG/DL (ref 7–22)
CALCIUM SERPL-MCNC: 9 MG/DL (ref 8.5–10.5)
CHLORIDE BLD-SCNC: 109 MEQ/L (ref 98–111)
CO2: 21 MEQ/L (ref 23–33)
CREAT SERPL-MCNC: 1.7 MG/DL (ref 0.4–1.2)
GFR SERPL CREATININE-BSD FRML MDRD: 42 ML/MIN/1.73M2
GLUCOSE BLD-MCNC: 123 MG/DL (ref 70–108)
GLUCOSE BLD-MCNC: 139 MG/DL (ref 70–108)
GLUCOSE BLD-MCNC: 152 MG/DL (ref 70–108)
GLUCOSE BLD-MCNC: 165 MG/DL (ref 70–108)
GLUCOSE BLD-MCNC: 176 MG/DL (ref 70–108)
LACTIC ACID: 1.1 MMOL/L (ref 0.5–2.2)
POTASSIUM SERPL-SCNC: 3.5 MEQ/L (ref 3.5–5.2)
SODIUM BLD-SCNC: 139 MEQ/L (ref 135–145)

## 2021-03-04 PROCEDURE — 97129 THER IVNTJ 1ST 15 MIN: CPT

## 2021-03-04 PROCEDURE — 99232 SBSQ HOSP IP/OBS MODERATE 35: CPT | Performed by: INTERNAL MEDICINE

## 2021-03-04 PROCEDURE — 6360000002 HC RX W HCPCS: Performed by: PHYSICIAN ASSISTANT

## 2021-03-04 PROCEDURE — 2700000000 HC OXYGEN THERAPY PER DAY

## 2021-03-04 PROCEDURE — 95819 EEG AWAKE AND ASLEEP: CPT

## 2021-03-04 PROCEDURE — 97110 THERAPEUTIC EXERCISES: CPT

## 2021-03-04 PROCEDURE — 6370000000 HC RX 637 (ALT 250 FOR IP): Performed by: PSYCHIATRY & NEUROLOGY

## 2021-03-04 PROCEDURE — 95819 EEG AWAKE AND ASLEEP: CPT | Performed by: PSYCHIATRY & NEUROLOGY

## 2021-03-04 PROCEDURE — 6370000000 HC RX 637 (ALT 250 FOR IP): Performed by: PHYSICAL MEDICINE & REHABILITATION

## 2021-03-04 PROCEDURE — 2580000003 HC RX 258: Performed by: PHYSICAL MEDICINE & REHABILITATION

## 2021-03-04 PROCEDURE — 97542 WHEELCHAIR MNGMENT TRAINING: CPT

## 2021-03-04 PROCEDURE — 94640 AIRWAY INHALATION TREATMENT: CPT

## 2021-03-04 PROCEDURE — 82948 REAGENT STRIP/BLOOD GLUCOSE: CPT

## 2021-03-04 PROCEDURE — 99232 SBSQ HOSP IP/OBS MODERATE 35: CPT | Performed by: NURSE PRACTITIONER

## 2021-03-04 PROCEDURE — 94760 N-INVAS EAR/PLS OXIMETRY 1: CPT

## 2021-03-04 PROCEDURE — 36415 COLL VENOUS BLD VENIPUNCTURE: CPT

## 2021-03-04 PROCEDURE — 83605 ASSAY OF LACTIC ACID: CPT

## 2021-03-04 PROCEDURE — 80048 BASIC METABOLIC PNL TOTAL CA: CPT

## 2021-03-04 PROCEDURE — 97535 SELF CARE MNGMENT TRAINING: CPT

## 2021-03-04 PROCEDURE — 97530 THERAPEUTIC ACTIVITIES: CPT

## 2021-03-04 PROCEDURE — 2500000003 HC RX 250 WO HCPCS: Performed by: INTERNAL MEDICINE

## 2021-03-04 PROCEDURE — 1180000000 HC REHAB R&B

## 2021-03-04 PROCEDURE — 99231 SBSQ HOSP IP/OBS SF/LOW 25: CPT | Performed by: PSYCHIATRY & NEUROLOGY

## 2021-03-04 PROCEDURE — 6370000000 HC RX 637 (ALT 250 FOR IP): Performed by: STUDENT IN AN ORGANIZED HEALTH CARE EDUCATION/TRAINING PROGRAM

## 2021-03-04 PROCEDURE — 97130 THER IVNTJ EA ADDL 15 MIN: CPT

## 2021-03-04 PROCEDURE — 97116 GAIT TRAINING THERAPY: CPT

## 2021-03-04 PROCEDURE — 6370000000 HC RX 637 (ALT 250 FOR IP): Performed by: NURSE PRACTITIONER

## 2021-03-04 RX ADMIN — PRIMIDONE 50 MG: 50 TABLET ORAL at 22:01

## 2021-03-04 RX ADMIN — NALOXEGOL OXALATE 12.5 MG: 12.5 TABLET, FILM COATED ORAL at 07:58

## 2021-03-04 RX ADMIN — AMLODIPINE BESYLATE 5 MG: 5 TABLET ORAL at 07:58

## 2021-03-04 RX ADMIN — GABAPENTIN 300 MG: 300 CAPSULE ORAL at 22:00

## 2021-03-04 RX ADMIN — TOPIRAMATE 25 MG: 25 TABLET, FILM COATED ORAL at 07:58

## 2021-03-04 RX ADMIN — BUMETANIDE 1 MG: 0.25 INJECTION INTRAMUSCULAR; INTRAVENOUS at 07:59

## 2021-03-04 RX ADMIN — INSULIN LISPRO 1 UNITS: 100 INJECTION, SOLUTION INTRAVENOUS; SUBCUTANEOUS at 17:21

## 2021-03-04 RX ADMIN — PRIMIDONE 50 MG: 50 TABLET ORAL at 07:58

## 2021-03-04 RX ADMIN — MIRTAZAPINE 15 MG: 15 TABLET, FILM COATED ORAL at 22:01

## 2021-03-04 RX ADMIN — DOCUSATE SODIUM 100 MG: 100 CAPSULE, LIQUID FILLED ORAL at 22:01

## 2021-03-04 RX ADMIN — SENNOSIDES 17.2 MG: 8.6 TABLET, FILM COATED ORAL at 22:01

## 2021-03-04 RX ADMIN — LEVETIRACETAM 500 MG: 500 TABLET, FILM COATED ORAL at 22:01

## 2021-03-04 RX ADMIN — TIOTROPIUM BROMIDE INHALATION SPRAY 2 PUFF: 3.12 SPRAY, METERED RESPIRATORY (INHALATION) at 07:42

## 2021-03-04 RX ADMIN — ROSUVASTATIN CALCIUM 20 MG: 20 TABLET, FILM COATED ORAL at 22:01

## 2021-03-04 RX ADMIN — OXYCODONE HYDROCHLORIDE AND ACETAMINOPHEN 1 TABLET: 7.5; 325 TABLET ORAL at 13:47

## 2021-03-04 RX ADMIN — BUMETANIDE 1 MG: 0.25 INJECTION INTRAMUSCULAR; INTRAVENOUS at 21:01

## 2021-03-04 RX ADMIN — CYCLOBENZAPRINE HYDROCHLORIDE 10 MG: 10 TABLET, FILM COATED ORAL at 22:06

## 2021-03-04 RX ADMIN — FOLIC ACID 1 MG: 1 TABLET ORAL at 07:58

## 2021-03-04 RX ADMIN — OXYCODONE HYDROCHLORIDE AND ACETAMINOPHEN 1 TABLET: 7.5; 325 TABLET ORAL at 22:06

## 2021-03-04 RX ADMIN — CYCLOBENZAPRINE HYDROCHLORIDE 10 MG: 10 TABLET, FILM COATED ORAL at 13:47

## 2021-03-04 RX ADMIN — SODIUM CHLORIDE, PRESERVATIVE FREE 10 ML: 5 INJECTION INTRAVENOUS at 20:59

## 2021-03-04 RX ADMIN — LATANOPROST 1 DROP: 50 SOLUTION OPHTHALMIC at 22:02

## 2021-03-04 RX ADMIN — LEVETIRACETAM 500 MG: 500 TABLET, FILM COATED ORAL at 07:58

## 2021-03-04 RX ADMIN — DOCUSATE SODIUM 100 MG: 100 CAPSULE, LIQUID FILLED ORAL at 07:58

## 2021-03-04 RX ADMIN — OXYCODONE HYDROCHLORIDE AND ACETAMINOPHEN 1 TABLET: 7.5; 325 TABLET ORAL at 05:31

## 2021-03-04 RX ADMIN — HYDROCORTISONE: 1 CREAM TOPICAL at 13:49

## 2021-03-04 RX ADMIN — SODIUM CHLORIDE, PRESERVATIVE FREE 10 ML: 5 INJECTION INTRAVENOUS at 08:01

## 2021-03-04 RX ADMIN — DULOXETINE HYDROCHLORIDE 60 MG: 60 CAPSULE, DELAYED RELEASE ORAL at 07:58

## 2021-03-04 RX ADMIN — INSULIN LISPRO 1 UNITS: 100 INJECTION, SOLUTION INTRAVENOUS; SUBCUTANEOUS at 12:43

## 2021-03-04 RX ADMIN — ENOXAPARIN SODIUM 40 MG: 40 INJECTION, SOLUTION INTRAVENOUS; SUBCUTANEOUS at 12:45

## 2021-03-04 RX ADMIN — INSULIN GLARGINE 80 UNITS: 100 INJECTION, SOLUTION SUBCUTANEOUS at 21:55

## 2021-03-04 RX ADMIN — HYDROCORTISONE: 1 CREAM TOPICAL at 22:02

## 2021-03-04 RX ADMIN — TAMSULOSIN HYDROCHLORIDE 0.4 MG: 0.4 CAPSULE ORAL at 07:58

## 2021-03-04 RX ADMIN — CYCLOBENZAPRINE HYDROCHLORIDE 10 MG: 10 TABLET, FILM COATED ORAL at 05:31

## 2021-03-04 RX ADMIN — TOPIRAMATE 25 MG: 25 TABLET, FILM COATED ORAL at 22:01

## 2021-03-04 RX ADMIN — FAMOTIDINE 20 MG: 20 TABLET, FILM COATED ORAL at 07:58

## 2021-03-04 RX ADMIN — HYDROCORTISONE: 1 CREAM TOPICAL at 08:09

## 2021-03-04 ASSESSMENT — PAIN DESCRIPTION - ORIENTATION
ORIENTATION: LOWER
ORIENTATION: LOWER

## 2021-03-04 ASSESSMENT — PAIN DESCRIPTION - PAIN TYPE: TYPE: ACUTE PAIN

## 2021-03-04 ASSESSMENT — PAIN DESCRIPTION - LOCATION
LOCATION: BACK
LOCATION: BACK

## 2021-03-04 ASSESSMENT — PAIN SCALES - GENERAL
PAINLEVEL_OUTOF10: 7
PAINLEVEL_OUTOF10: 10
PAINLEVEL_OUTOF10: 10

## 2021-03-04 ASSESSMENT — PAIN DESCRIPTION - FREQUENCY: FREQUENCY: CONTINUOUS

## 2021-03-04 ASSESSMENT — PAIN DESCRIPTION - DESCRIPTORS: DESCRIPTORS: ACHING

## 2021-03-04 NOTE — PROGRESS NOTES
[address]  PHYSICAL THERAPY MISSED TREATMENT NOTE  [address] [facility]    Date: 3/4/2021  Patient Name: Winter Monday        MRN: [id number]   : 1965  (55 y.o.)  Gender: male   Referring Practitioner: Rebeca Evans PA-C  Diagnosis: Burst fracture of fourth thoracic vertebrae         REASON FOR MISSED TREATMENT:  Hold treatment per nursing request.  JERRELL Medina) and MD (Dr. Nisha Sierra) requesting to hold therapy session for remainder of day due to seizure episodes occurring earlier in day and throughout the night. Will check if patient is appropriate to resume activity tomorrow. Missed 30 minutes of PT this date. Vinicio Gipson Cover PTA [phone]
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
## 2021-03-04 NOTE — PROGRESS NOTES
Three lidocaine patches removed per pt request @ [phone], primary nurse [facility] notified.     Josafat Braden, SN

## 2021-03-04 NOTE — FLOWSHEET NOTE
03/04/21 1121   Provider Notification   Reason for Communication Evaluate   Provider Name [facility]   Provider Notification Physician   Method of Communication Secure Message   Response Waiting for response   Notification Time [id number]       Rehab RE: Deo Phelps"[place] [id number]. NEW CONSULT. Patient had a rapid response last night due to having a seizure while walking to the toilet. Started him on Keppra BID, he received a dose this morning. Consult was placed to Dr Chaitanya Pak, she stated to consult you. Patient had another Renuka Kendell during therapy session at [phone], lasting approximate 10 seconds. /85  Pulse 85 SPO2 95%. Patient is awake and alert, answers questions appropriately. Visibly lethargic. Stated he experiences an aura before onset of feeling like cold air is blowing on the back of his head/neck. Put remaining therapy on hold to the day. Please advise.

## 2021-03-04 NOTE — PROGRESS NOTES
OT to nurse, stating that patient just had a seizure, promptly after standing at the sink. Patient was able to safely sit down in the wheelchair prior to seizure, that lasted approximately 10 seconds. VS stable. Patient alert and answering questions appropriately.  BP (!) 141/85   Pulse 85   Temp 97.7 °F (36.5 °C) (Oral)   Resp 22   Ht 6' 2\" (1.88 m)   Wt (!) 327 lb 3.2 oz (148.4 kg)   SpO2 96%   BMI 42.01 kg/m²   Will notify MD and neurologist.

## 2021-03-04 NOTE — PROCEDURES
[address] [address]                          ELECTROENCEPHALOGRAM REPORT    PATIENT NAME: Will Joaquin                  :        1965  MED REC NO:   [id number]                           ROOM:       0067  ACCOUNT NO:   [de-identified]                           ADMIT DATE: 2021  PROVIDER:     Alisha Smart. Norma Nova MD    DATE OF EE2021    REFERRING PROVIDER:  Alisha Smart. Norma Nova MD    CLINICAL HISTORY:  A 52-year-old male presenting with three seizures in  the last 24 hours. He has history of seizure. The patient has history  of alcohol abuse, he is legally blind. EEG shows encephalopathy, done  previously. Medications listed are Keppra, bumetanide, Topamax,  hydrocortisone, Lovenox, amlodipine, duloxetine, famotidine, folic acid,  insulin, lidocaine, mirtazapine, naloxone, rosuvastatin, tamsulosin. CLINICAL INTERPRETATION:  This is a 17-channel EEG performed without  sleep deprivation. Hyperventilation was not performed. Photic  stimulation was not performed. The patient is described as lethargic. Background rhythm activity is noted to be slowed, poorly organized. Light stages of sleep are seen during the recording. Hyperventilation  was not performed. Photic stimulation was not performed. No clinical  seizures were observed. The patient was noted to be asleep throughout  most of the study. There was no evidence of epileptiform activity  appreciated. No clinical seizures were observed. IMPRESSION:  This is an abnormal EEG due to the slowed, poorly organized  background throughout the study. There was no evidence of epileptiform  activity appreciated. The patient was noted to be asleep throughout  most of the recording. No clinical seizures were observed.         Joseph Rice MD    D: 2021 15:36:13       T: 2021 15:44:13     [email or web address]  Job#: [id number]     Doc#: [id number]    CC:

## 2021-03-04 NOTE — PROGRESS NOTES
[facility] Laboratory Technician Worksheet      EEG Date: 3/4/2021    Name: Louise Cullen   : 1965   Age: 55 y.o. SEX: male    ROOM: [address] [place] [address] MRN: [id number]           CSN: [id number]      Ordering Provider: Madelyn Mishra  EEG Number: [id number] Time of Test:  12:09    Hand: Right   Sedation: no    H.V. Done: No  Photic: No    Sleep: Yes  Drowsy: Yes   Sleep Deprived: No    Seizures observed: no    Mentality: lethargic      Clinical History:3 seizures in the last 24 hours. Hx of seizures. Originally fell off of porch and broke his back. ETOH abuse, legally blind. Had EEG on 2021 that read:  IMPRESSION:  This is an abnormal EEG due to the slowed, poorly organized background throughout the study, suggestive of cortical dysfunction such as seen with encephalopathy. In addition, fast beta activity was noted suggestive of mild cortical dysfunction such as seen with metabolic causes, medication effects or nonspecific encephalopathies  CT Head 3/3/21:  Impression   1. 3.5 cm walled off cystic focus/fluid collection within the left    anterior temporal fossa, without significant change. 2. No acute abnormality of the brain.  No intracranial hemorrhage.         MRI Brain 21:  Impression       1. No evidence of acute intracranial abnormality.    2. Stable minimal severity chronic microvascular angiopathy and extra-axial cystic lesion centered at the left middle cranial fossa.           Past Medical History:       Diagnosis Date    Acute kidney injury ([address] 2020    due to Enterococous Bacteremia , fluid overload, low sodium    Amputation of right great toe ([address] 2021    Asthma     Brain tumor ([address]     Cirrhosis (HCC)     ETOH abuse    COPD (chronic obstructive pulmonary disease) (HCC)     Diabetes mellitus (HCC)     Falling     Glaucoma     Hepatitis C     History of blood transfusion     s/p nasal surgery    Hyperlipidemia     Hypertension     Legally blind     Right foot infection 11/2021    Se[facility]ures ([facility]        Scheduled Meds:   levETIRAcetam  500 mg Oral BID    bumetanide  1 mg Intravenous Q12H    sodium chloride flush  10 mL Intravenous BID    topiramate  25 mg Oral BID    hydrocortisone   Topical TID    enoxaparin  40 mg Subcutaneous Q24H    polyethylene glycol  17 g Oral Daily    amLODIPine  5 mg Oral Daily    docusate sodium  100 mg Oral BID    DULoxetine  60 mg Oral Daily    famotidine  20 mg Oral Daily    folic acid  1 mg Oral Daily    gabapentin  300 mg Oral Nightly    insulin glargine  80 Units Subcutaneous Nightly    insulin lispro  0-3 Units Subcutaneous Nightly    insulin lispro  0-6 Units Subcutaneous TID WC    insulin lispro  7 Units Subcutaneous TID AC    latanoprost  1 drop Both Eyes Nightly    lidocaine  3 patch Transdermal Daily    mirtazapine  15 mg Oral Nightly    naloxegol  12.5 mg Oral QAM    nicotine  1 patch Transdermal Daily    primidone  50 mg Oral BID    rosuvastatin  20 mg Oral Nightly    senna  2 tablet Oral Nightly    tamsulosin  0.4 mg Oral Daily    tiotropium  2 puff Inhalation Daily     Continuous Infusions:  PRN Meds:.sodium chloride flush, acetaminophen, cyclobenzaprine, polyethylene glycol, sodium chloride flush, albuterol, bisacodyl, glucagon (rDNA), glucose, ondansetron, oxyCODONE-acetaminophen    Technician: Sunita Hernandez 3/4/2021

## 2021-03-04 NOTE — PROGRESS NOTES
[address] THERAPY MISSED TREATMENT NOTE  [facility]  [id number]      Date: 3/4/2021  Patient Name: Nasrin Leong        CSN: [id number]   : 1965  (55 y.o.)  Gender: male   Referring Practitioner: Bela Bartlett PA-C (ordering)  Dr Rosaline Tang (Attending)  Diagnosis: Burst fracture of fourth thoracic vertebra         REASON FOR MISSED TREATMENT: Hold Treatment per Nursing. Pt continue to be on hold for skilled therapy intervention s/p seizure like episode per nursing Mandy Robert. Will check tomorrow to resume activity as appropriate.
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
## 2021-03-04 NOTE — CODE DOCUMENTATION
2025: while ambulating to the toilet the patient C/O \"not feeling right\" and being dizzy. @ 2027 while on the toilet the patient experienced a decrease in LOC and @ 2027 he experienced a seuizure lasting 30 seconds. 2030: Rapid response called    2032: while still on the toilet and after the rapid response team arrived the pt experienced a second seizure lasting 10 seconds.

## 2021-03-04 NOTE — SIGNIFICANT EVENT
Rapid response was called at 2031. Upon arrival patient's nurse was assisting patient on commode and stated patient had what appeared to be seizure that lasted about 20 seconds and then resolved spontaneously. While patient was still on commode he had another witnessed seizure in which he endorsed a prodrome of light headedness prior to seizure occurring. Patient had a tonic clonic seizure that lasted about 15-25 seconds that resolved spontaneously before Ativan could be administered. Patient had post ictal state that consisted of dizziness, posterior occipital headache and nausea. Patient was assisted back to bed in which vitals were obtained. /87, HR 93, RR 18, SpO2 97% on room air, Temperature 99.7. A neurological assessment was completed at bedside and patient was alert and oriented x3 with no focal neurological deficits noted. Stat labs ordered, head CT without contrast, and neurology contacted immediately. Patient was started on Keprra 500 mg BID.   Head CT w/o contrast reported no acute abnormalities      Electronically signed by Bryanna Pedro DO on 3/4/2021 at 4:14 AM

## 2021-03-04 NOTE — PROGRESS NOTES
Spoke with Keisha to update on patient condition following pt having a seizure/rapid response being called. Information shared included: Patient condition following rapid response. Notified that patient went down to obtain a CT but it has not resulted at this time and that he has returned returned to his room and is resting. Patient medical condition: Stable   '  Keisha stated that he has had many seizure in the past and that she is not concerned at this time but she appreciates the call to update her.
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
## 2021-03-04 NOTE — PROGRESS NOTES
I was contacted last night regarding the patient apparently experiencing another event that was relayed to me last night where he suffered witnessed seizure twice lasting 15 to 29 seconds complained of a headache and tremor in the arms, EEG previously performed on 2/27 showed encephalopathy, no epileptiform activity. MRI brain 2/27 no acute findings. The patient was on Topamax 25 mg twice a day to help with controlling his history of migraines, and seizure. I recommended starting the patient on Keppra 500 mg twice a day. I was called today about the patient having seizure-like activity earlier today, this occurred during therapy session at 1049 lasting approximately 10 seconds vitals were 141/85, pulse rate 85 pulse ox was 95% he was awake and alert answers questions appropriately visibly lethargic. The patient states that he experiences auras before onset of feeling like cold air is blowing in the back of his head and neck. The patient is currently laying in bed on his left side, he is alert oriented appropriate. He denies any episodes of altered awareness or confusion further. Of note the patient underwent an EEG performed this afternoon that showed no evidence of epileptiform activity, no clinical seizures were observed. His repeat CT of the head was reviewed showing no evidence of a bleed, or acute findings. Impression: The patient continues to experience episodes of seizure-like activity, lethargy following, events can last up 10-30 seconds. He continued to experience these episodes despite having him on Topamax, on Keppra. It is unclear to me if the patient's events are epileptic, versus nonepileptic in nature.    This is going to be a difficult test to determine the underlying cause of his symptoms definitively to establish appropriate treatment, I would suggest that if he would continue to experience these episodes to transfer the patient to a higher level of care with epilepsy monitoring available, options are [facility] epilepsy division, or [facility]. I shared my impression with the patient as an option to proceed with he states that he does not want to pursue that at this time. I recommend meanwhile to continue him on the Keppra, and Topamax at current dosages. Please call if any questions. Prema Valderrama MD

## 2021-03-05 LAB
ANION GAP SERPL CALCULATED.3IONS-SCNC: 11 MEQ/L (ref 8–16)
BUN BLDV-MCNC: 44 MG/DL (ref 7–22)
CALCIUM SERPL-MCNC: 9 MG/DL (ref 8.5–10.5)
CHLORIDE BLD-SCNC: 108 MEQ/L (ref 98–111)
CO2: 20 MEQ/L (ref 23–33)
CREAT SERPL-MCNC: 1.8 MG/DL (ref 0.4–1.2)
GFR SERPL CREATININE-BSD FRML MDRD: 39 ML/MIN/1.73M2
GLUCOSE BLD-MCNC: 140 MG/DL (ref 70–108)
GLUCOSE BLD-MCNC: 153 MG/DL (ref 70–108)
GLUCOSE BLD-MCNC: 170 MG/DL (ref 70–108)
GLUCOSE BLD-MCNC: 172 MG/DL (ref 70–108)
GLUCOSE BLD-MCNC: 173 MG/DL (ref 70–108)
POTASSIUM REFLEX MAGNESIUM: 3.8 MEQ/L (ref 3.5–5.2)
SODIUM BLD-SCNC: 139 MEQ/L (ref 135–145)

## 2021-03-05 PROCEDURE — 94640 AIRWAY INHALATION TREATMENT: CPT

## 2021-03-05 PROCEDURE — 6370000000 HC RX 637 (ALT 250 FOR IP): Performed by: FAMILY MEDICINE

## 2021-03-05 PROCEDURE — 2580000003 HC RX 258: Performed by: PSYCHIATRY & NEUROLOGY

## 2021-03-05 PROCEDURE — 6360000002 HC RX W HCPCS: Performed by: PHYSICIAN ASSISTANT

## 2021-03-05 PROCEDURE — 6370000000 HC RX 637 (ALT 250 FOR IP): Performed by: NURSE PRACTITIONER

## 2021-03-05 PROCEDURE — 6370000000 HC RX 637 (ALT 250 FOR IP): Performed by: PHYSICAL MEDICINE & REHABILITATION

## 2021-03-05 PROCEDURE — 99232 SBSQ HOSP IP/OBS MODERATE 35: CPT | Performed by: PHYSICAL MEDICINE & REHABILITATION

## 2021-03-05 PROCEDURE — 97130 THER IVNTJ EA ADDL 15 MIN: CPT

## 2021-03-05 PROCEDURE — 2500000003 HC RX 250 WO HCPCS: Performed by: INTERNAL MEDICINE

## 2021-03-05 PROCEDURE — 97110 THERAPEUTIC EXERCISES: CPT

## 2021-03-05 PROCEDURE — 82948 REAGENT STRIP/BLOOD GLUCOSE: CPT

## 2021-03-05 PROCEDURE — 6370000000 HC RX 637 (ALT 250 FOR IP): Performed by: PSYCHIATRY & NEUROLOGY

## 2021-03-05 PROCEDURE — 97530 THERAPEUTIC ACTIVITIES: CPT

## 2021-03-05 PROCEDURE — 94760 N-INVAS EAR/PLS OXIMETRY 1: CPT

## 2021-03-05 PROCEDURE — 97542 WHEELCHAIR MNGMENT TRAINING: CPT

## 2021-03-05 PROCEDURE — 1180000000 HC REHAB R&B

## 2021-03-05 PROCEDURE — 97116 GAIT TRAINING THERAPY: CPT

## 2021-03-05 PROCEDURE — 99233 SBSQ HOSP IP/OBS HIGH 50: CPT | Performed by: PSYCHIATRY & NEUROLOGY

## 2021-03-05 PROCEDURE — 2580000003 HC RX 258: Performed by: PHYSICAL MEDICINE & REHABILITATION

## 2021-03-05 PROCEDURE — 6360000002 HC RX W HCPCS: Performed by: PSYCHIATRY & NEUROLOGY

## 2021-03-05 PROCEDURE — 6370000000 HC RX 637 (ALT 250 FOR IP): Performed by: STUDENT IN AN ORGANIZED HEALTH CARE EDUCATION/TRAINING PROGRAM

## 2021-03-05 PROCEDURE — 99232 SBSQ HOSP IP/OBS MODERATE 35: CPT | Performed by: INTERNAL MEDICINE

## 2021-03-05 PROCEDURE — 80048 BASIC METABOLIC PNL TOTAL CA: CPT

## 2021-03-05 PROCEDURE — 36415 COLL VENOUS BLD VENIPUNCTURE: CPT

## 2021-03-05 PROCEDURE — 97129 THER IVNTJ 1ST 15 MIN: CPT

## 2021-03-05 RX ADMIN — TAMSULOSIN HYDROCHLORIDE 0.4 MG: 0.4 CAPSULE ORAL at 08:57

## 2021-03-05 RX ADMIN — BUMETANIDE 1 MG: 0.25 INJECTION INTRAMUSCULAR; INTRAVENOUS at 08:59

## 2021-03-05 RX ADMIN — LEVETIRACETAM 750 MG: 500 TABLET, FILM COATED ORAL at 22:12

## 2021-03-05 RX ADMIN — OXYCODONE HYDROCHLORIDE AND ACETAMINOPHEN 1 TABLET: 7.5; 325 TABLET ORAL at 16:49

## 2021-03-05 RX ADMIN — SODIUM CHLORIDE, PRESERVATIVE FREE 10 ML: 5 INJECTION INTRAVENOUS at 08:58

## 2021-03-05 RX ADMIN — LEVETIRACETAM 1000 MG: 100 INJECTION, SOLUTION INTRAVENOUS at 10:26

## 2021-03-05 RX ADMIN — MIRTAZAPINE 15 MG: 15 TABLET, FILM COATED ORAL at 22:11

## 2021-03-05 RX ADMIN — INSULIN GLARGINE 80 UNITS: 100 INJECTION, SOLUTION SUBCUTANEOUS at 22:16

## 2021-03-05 RX ADMIN — INSULIN LISPRO 1 UNITS: 100 INJECTION, SOLUTION INTRAVENOUS; SUBCUTANEOUS at 17:38

## 2021-03-05 RX ADMIN — CYCLOBENZAPRINE HYDROCHLORIDE 10 MG: 10 TABLET, FILM COATED ORAL at 16:49

## 2021-03-05 RX ADMIN — TIOTROPIUM BROMIDE INHALATION SPRAY 2 PUFF: 3.12 SPRAY, METERED RESPIRATORY (INHALATION) at 07:51

## 2021-03-05 RX ADMIN — GABAPENTIN 300 MG: 300 CAPSULE ORAL at 22:11

## 2021-03-05 RX ADMIN — NALOXEGOL OXALATE 12.5 MG: 12.5 TABLET, FILM COATED ORAL at 08:57

## 2021-03-05 RX ADMIN — HYDROCORTISONE: 1 CREAM TOPICAL at 16:50

## 2021-03-05 RX ADMIN — LEVETIRACETAM 500 MG: 500 TABLET, FILM COATED ORAL at 08:57

## 2021-03-05 RX ADMIN — DOCUSATE SODIUM 100 MG: 100 CAPSULE, LIQUID FILLED ORAL at 22:10

## 2021-03-05 RX ADMIN — OXYCODONE HYDROCHLORIDE AND ACETAMINOPHEN 1 TABLET: 7.5; 325 TABLET ORAL at 23:15

## 2021-03-05 RX ADMIN — BUMETANIDE 1 MG: 0.25 INJECTION INTRAMUSCULAR; INTRAVENOUS at 22:10

## 2021-03-05 RX ADMIN — DULOXETINE HYDROCHLORIDE 60 MG: 60 CAPSULE, DELAYED RELEASE ORAL at 08:58

## 2021-03-05 RX ADMIN — AMLODIPINE BESYLATE 5 MG: 5 TABLET ORAL at 08:58

## 2021-03-05 RX ADMIN — OXYCODONE HYDROCHLORIDE AND ACETAMINOPHEN 1 TABLET: 7.5; 325 TABLET ORAL at 09:02

## 2021-03-05 RX ADMIN — PRIMIDONE 50 MG: 50 TABLET ORAL at 22:11

## 2021-03-05 RX ADMIN — CYCLOBENZAPRINE HYDROCHLORIDE 10 MG: 10 TABLET, FILM COATED ORAL at 09:01

## 2021-03-05 RX ADMIN — TOPIRAMATE 25 MG: 25 TABLET, FILM COATED ORAL at 22:17

## 2021-03-05 RX ADMIN — HYDROCORTISONE: 25 CREAM TOPICAL at 22:20

## 2021-03-05 RX ADMIN — INSULIN LISPRO 1 UNITS: 100 INJECTION, SOLUTION INTRAVENOUS; SUBCUTANEOUS at 13:07

## 2021-03-05 RX ADMIN — TOPIRAMATE 25 MG: 25 TABLET, FILM COATED ORAL at 08:56

## 2021-03-05 RX ADMIN — DOCUSATE SODIUM 100 MG: 100 CAPSULE, LIQUID FILLED ORAL at 08:56

## 2021-03-05 RX ADMIN — ROSUVASTATIN CALCIUM 20 MG: 20 TABLET, FILM COATED ORAL at 22:12

## 2021-03-05 RX ADMIN — FOLIC ACID 1 MG: 1 TABLET ORAL at 16:49

## 2021-03-05 RX ADMIN — LATANOPROST 1 DROP: 50 SOLUTION OPHTHALMIC at 22:13

## 2021-03-05 RX ADMIN — POLYETHYLENE GLYCOL 3350 17 G: 17 POWDER, FOR SOLUTION ORAL at 08:57

## 2021-03-05 RX ADMIN — FAMOTIDINE 20 MG: 20 TABLET, FILM COATED ORAL at 08:58

## 2021-03-05 RX ADMIN — SENNOSIDES 17.2 MG: 8.6 TABLET, FILM COATED ORAL at 22:12

## 2021-03-05 RX ADMIN — PRIMIDONE 50 MG: 50 TABLET ORAL at 08:57

## 2021-03-05 RX ADMIN — INSULIN LISPRO 1 UNITS: 100 INJECTION, SOLUTION INTRAVENOUS; SUBCUTANEOUS at 22:13

## 2021-03-05 RX ADMIN — ENOXAPARIN SODIUM 40 MG: 40 INJECTION, SOLUTION INTRAVENOUS; SUBCUTANEOUS at 13:08

## 2021-03-05 RX ADMIN — HYDROCORTISONE: 1 CREAM TOPICAL at 08:59

## 2021-03-05 RX ADMIN — INSULIN LISPRO 1 UNITS: 100 INJECTION, SOLUTION INTRAVENOUS; SUBCUTANEOUS at 09:18

## 2021-03-05 RX ADMIN — SODIUM CHLORIDE, PRESERVATIVE FREE 10 ML: 5 INJECTION INTRAVENOUS at 23:14

## 2021-03-05 ASSESSMENT — PAIN DESCRIPTION - PAIN TYPE
TYPE: ACUTE PAIN
TYPE: ACUTE PAIN

## 2021-03-05 ASSESSMENT — PAIN SCALES - GENERAL
PAINLEVEL_OUTOF10: 8
PAINLEVEL_OUTOF10: 8

## 2021-03-05 ASSESSMENT — PAIN DESCRIPTION - FREQUENCY: FREQUENCY: INTERMITTENT

## 2021-03-05 ASSESSMENT — PAIN DESCRIPTION - PROGRESSION: CLINICAL_PROGRESSION: GRADUALLY IMPROVING

## 2021-03-05 ASSESSMENT — PAIN DESCRIPTION - ORIENTATION: ORIENTATION: LOWER

## 2021-03-05 NOTE — PLAN OF CARE
Problem: Falls - Risk of:  Goal: Will remain free from falls  Description: Will remain free from falls  3/5/2021 1403 by Kylie Pham RN  Outcome: Ongoing  Note: No falls noted. Remains with alarm on in bed and chair. Uses call light without difficulty. Problem: Infection - Surgical Site:  Goal: Will show no infection signs and symptoms  Description: Will show no infection signs and symptoms  Outcome: Ongoing  Note: Remains with 6 scabs on his lower back following recent surgery- sites unremarkable. Rash noted on chest, arms, and bilat lower legs. Hydrocortisone applied as ordered. Bilat lower legs wrapped with ACE wraps per order. 1+ edema noted. Problem: Bleeding:  Goal: Will show no signs and symptoms of excessive bleeding  Description: Will show no signs and symptoms of excessive bleeding  Outcome: Ongoing  Note: Displays no s/s of bleeding at this time. Problem: Pain:  Goal: Pain level will decrease  Description: Pain level will decrease  Outcome: Ongoing  Note: Pain continues- rates 7-8/10. Pain meds administered routine and PRN as ordered. Patient states \"that controls my pain. \"      Problem: Mobility - Impaired:  Goal: Mobility will improve  Description: Mobility will improve  Outcome: Ongoing  Note: Continues with therapy as ordered. Ambulated with walker and 2 assist. Able to self propel w/c. Problem: Urinary Elimination:  Goal: Will remain free from infection  Description: Will remain free from infection  Outcome: Ongoing  Note: No s/s of infection     Problem: Fluid Volume - Imbalance:  Goal: Absence of imbalanced fluid volume signs and symptoms  Description: Absence of imbalanced fluid volume signs and symptoms  Outcome: Ongoing  Note: Remains on IV bumex and fluid restriction. Needs much encouragement to maintain fluid restriction.      Problem: Serum Glucose Level - Abnormal:  Goal: Ability to maintain appropriate glucose levels will improve  Description: Ability to maintain
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
## 2021-03-05 NOTE — PLAN OF CARE
Problem: Falls - Risk of:  Goal: Will remain free from falls  Description: Will remain free from falls  Outcome: Ongoing   Fall sign posted. Call light with in reach. Bed and chair alarm and brakes on and working. Bed in low position. Bilateral padded side rales elevated. Patient voiced and demonstrated understanding of using call light and waiting for staff before getting up. Problem: Skin Integrity:  Goal: Absence of new skin breakdown  Description: Absence of new skin breakdown  Outcome: Ongoing  Waffle cushion to chair. Special pressure relief mattress to bed. Bilateral padded side rales elevated. Patient voiced and demonstrated importance of repositioning self when awake. Care plan reviewed with patient. Patient verbalize understanding of the plan of care and contribute to goal setting.
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
## 2021-03-05 NOTE — PROGRESS NOTES
[address] [address]  INPATIENT SPEECH THERAPY  [address]  PROGRESS NOTE    TIME   SLP Individual Minutes  Time In: 1000  Time Out: 1030  Minutes: 30  Timed Code Treatment Minutes: 30 Minutes       Date: 3/5/2021  Patient Name: Lima Ferrell      CSN: [id number]   : 1965  (55 y.o.)  Gender: male   Referring Physician:  Vincent Cotton PA-C  Diagnosis: Burst Fracture of Fourth Thoracic Vertebra  Secondary Diagnosis: Cognitive-Linguistic Deficit  Precautions: Fall Risk  Current Diet: Regular and Thin Liquids  Swallowing Strategies: Standard Universal Swallow Precautions  Date of Last MBS: Not Applicable    Pain:   - Pain location: back - RN made aware of pain    Subjective:  Patient seen sitting upright in chair upon ST arrival. Patient agreeable to participate in skilled ST services this date; alert and pleasant throughout. Short-Term Goals:  SHORT TERM GOAL #1: GOAL MET; CONTINUE FOR CONSISTENCY  Goal 1: Pt will complete higher level attention tasks (divided, alternating) with fewer than 2 errors/redirections within 8 minute time span or task completion to increase ADL completion. INTERVENTIONS:  Divergent Naming- Abstract - GOAL 11 words/minute  P Words - 12 words/minute independent  Things that sink- 11 words/minute independent  Things that are clear - 10 words/minute independent   Things that are inexpensive -11 words/minute      Divided Attention assessed during complex abstract divergent naming tasks with music playing in background while completing task. Pt with good divided and sustained attention observed within task this date. SHORT TERM GOAL #2: GOAL NOT MET; CONTINUE  Goal 2: Pt will complete memory tasks (immediate/delayed, working) with 85% accuracy at a modified independent level to increase memory retention of medical and daily information  INTERVENTIONS:  Memory Strategies (WRAP)  Patient able to independently recall 3/4 strategies.  Pt recalled 1/4 strategies (picture it) given min cues    Prior Session  [facility] to [facility] provided verbal directions in order to navigate to [address] [place] [address]. ST encouraged pt to utilize memory strategies to improve recall of directions following a shift in attention. Pt independently wrote directions down and repeated directions. ST provided min verbal cue for pt to visualize path as ST described directions to cafeteria for 3rd time. Following shift in attention, pt required min verbal cues to utilize written instructions to recall directions with 100% accuracy. SHORT TERM GOAL #3:  GOAL MET; REVISE  Goal 3: Pt will complete HIGH level/complex verbal reasoning, problem solving, and executive functioning with 85% accuracy given min cue to increase IADL contribution. REVISED Goal 3: Pt will complete HIGH level/complex verbal reasoning, problem solving, and executive functioning with 85% accuracy at a modified independent leve to increase IADL contribution. INTERVENTIONS:   Safety Problem Solving/Reasoning   Consequences : 6/6 Independently  Solution: 5/6 Independently, 1/6 given mod cues     Prior Session  Complex Problem Solving/Reasonign Tasks within ADLs  18/22 Independently, 3/22 given min cues, 1/22 given mod cues   *Evidence of good problem solving and reasoning skills with in moderately complex problems that may occur with ADLs. SHORT TERM GOAL #4: GOAL MET; REVISE  Goal 4: Pt will complete sequencing tasks (i.e transfers) with 85% accuracy given min cues in order to improve safety within transfers and particicpation within ADL/IADL  REVISED Goal 4: Pt will complete sequencing tasks (i.e transfers) with 85% accuracy at a modified independent level in order to improve safety within transfers and particicpation within ADL/IADL  INTERVENTIONS: Did not address this date d/t focus on other goals.      Prior Session  Sequencing Transfers  Pt verbally sequenced sit-to-stand and stand-to-sit transfers with 100% accuracy indepenently. *Demonstrated good recall from PT/OT sessions regarding steps to safely transfer from sit-to-stand and stand-to-sit this date.      Long-Term Goals:  Timeframe for Long-term Goals: 2 weeks    LONG TERM GOAL #1: Ongoing  Goal 1: Pt will improve overall cognitive-linguistic skills to a Modified Brooks or higher in order to safely discharge to least restrictive environment and complete ADL/IADL with least amount of supervision/assistance     ST FIM ASSESSMENT:     Admission score Current score Goal Status   COMMUNICATION 6 - Complex ideas 90% or device (hearing aid or glasses- if patient is primarily a visual learner)   7 - Patient understands complex ideas (math/planning)   Progressing   EXPRESSION 7 - Patient expresses complex ideas/needs     7 - Patient expresses complex ideas/needs   STABLE   SOCIAL INTERACTION 5 - Patient is appropriate with supervision/cues   6 - Patient requires medication for mood and/or effect   Progressing   PROBLEM SOLVING 5 - Patient able to solve simple/routine tasks   6 - Independent with device (e.g. notes, schedules)   Progressing   MEMORY 5 - Patient requires prompting with stress/unfamiliar situations   6 - Patient requires device to recall (e.g. memory book)   Progressing       Comprehension: 7 - Patient understands complex ideas (math/planning)  Expression: 7 - Patient expresses complex ideas/needs  Social Interaction: 6 - Patient requires medication for mood and/or effect  Problem Solvin - Independent with device (e.g. notes, schedules)  Memory: 6 - Patient requires device to recall (e.g. memory book)    EDUCATION:  Learner: Patient  Education:  Reviewed ST goals and Plan of Care, Education Related to Potential Risks and Complications Due to Impairment/Illness/Injury, Education Related to Prevention of Recurrence of Impairment/Illness/Injury and Education Related to [employer] and Wellness  Evaluation of Education: Verbalizes understanding, Demonstrates with assistance and Needs further instruction    ASSESSMENT/PLAN:  Summary: Pt has met 3/4 STGs and 0/1 LTGs this reporting period. Pt has demonstrated improvements in the areas of divided/alternating attention, problem solving, reasoning, executive functioning, and sequencing. Pt has demonstrated improved accuracy with decreased level of cueing. With utilization of memory strategies, pt demonstrated improved short term recall and working memory; however, pt continues to require min verbal cues to utilize memory strategies within tasks. Pt continues to demonstrate evidence of suspected mild cognitive-linguistic impairment and would benefit from continued skilled ST services to address aforementioned deficits. At this time, pt will require daily supervision upon discharge and it is recommended significant other continues to manage medications and finances at time of discharge. Activity Tolerance:  Patient tolerance of  treatment: good. Assessment/Plan: Patient progressing toward established goals. Continues to require skilled care of licensed speech pathologist to progress toward achievement of established goals and plan of care.   Plan for Next Session: Sequencing, executive functioning, memory     Edil Patel., [address]
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
## 2021-03-05 NOTE — FLOWSHEET NOTE
03/05/21 5090   Provider Notification   Reason for Communication Evaluate   Provider Name Mckayla Single   Provider Notification Physician   Method of Communication Secure Message   Response Waiting for response   Notification Time [id number]     Taya Shaffer" [facility] 7E-67 Patient was consulted to Dr Glenna Urbano yesterday for seizure activity. Did an EEG yesterday. Patient had another seizure list episode with therapy just a few minutes ago, he was seated in the wheelchair. lasting approx. 20 seconds, patient was tremoring and had a glazed over look. VS : BP (!) 166/92  Pulse 87  Temp 99.4 °F (37.4 °C) (Oral)  Resp 20 SpO2 99% . Today will be the second day of Keppra that was started. Please advise.
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
## 2021-03-05 NOTE — PROGRESS NOTES
[address] NOTE    Conference Date: 3/8/2021  Admit Date:  2021  4:23 PM  Patient Name: Dinora Cuevas    MRN: [id number]    : 1965  (55 y.o.)  Rehabilitation Admitting Diagnosis:  Burst fracture of fourth thoracic vertebra [facility]) [M28.251Z]  Referring Practitioner: Sayda Barajas PA-C      CASE MANAGEMENT  Current issues/needs regarding patient and family discharge status:     Discharging 3/12/21 with Lorne Thacker RN, HHA,  PT, OT and [address] is making steady progress toward established PT goals, meeting 1/5 STG and no LTG. SBA for bed mobility, CGA for transfers, and CGA-min A for ambulation with RW. He is limited due to increased instability and decreased sensation in B LE. He lacks safety awareness and is impulsive during mobility activities. He will benefit from continued skilled PT services to promote increased indep and safety with functional mobility activities for return to OF. Equipment Needed: Yes  Mobility Devices: Walker, Wheelchair  Walker: Rolling(bariatric)  Wheelchair: Standard    SPEECH THERAPY  Pt has met 3/4 STGs and 0/1 LTGs this reporting period. Pt has demonstrated improvements in the areas of divided/alternating attention, problem solving, reasoning, executive functioning, and sequencing. Pt has demonstrated improved accuracy with decreased level of cueing. With utilization of memory strategies, pt demonstrated improved short term recall and working memory; however, pt continues to require min verbal cues to utilize memory strategies within tasks. Pt continues to demonstrate evidence of suspected mild cognitive-linguistic impairment and would benefit from continued skilled ST services to address aforementioned deficits. At this time, pt will require daily supervision upon discharge and it is recommended significant other continues to manage medications and finances at time of discharge.     OCCUPATIONAL CARB CONTROL; Carb Control: 4 carb choices (60 gms)/meal; Daily Fluid Restriction: 1500 ml  Please see nutrition note for details. NURSING  Continent of Bowel and Bladder: Yes  Pain is Managed:  Flexeril, Lidoderm, Percocet  Signs and Symptoms of Infection:  No  Signs and Symptoms of Skin Breakdown:  No  Injury and Fall Free during Inpatient Rehabilitation Admission: Yes        Recent Labs     03/07/21  2202 03/08/21  0741 03/08/21  1203   POCGLU 179* 137* 212*       Lab Results   Component Value Date    LDLCALC 123 10/24/2013         Vitals:    03/07/21 2145 03/08/21 0600 03/08/21 0852 03/08/21 0935   BP: 137/65  (!) 155/73    Pulse: 84      Resp: 16      Temp: 98.2 °F (36.8 °C)      TempSrc: Oral      SpO2: 100%   98%   Weight:  (!) 320 lb 6.4 oz (145.3 kg)     Height:                Family Education: Family available and participating in education   Fall Risk:  Falling star program initiated  Is the patient appropriate for a stay in the functional apartment? no    Discharge Plan   Estimated Discharge Date: 3/12/21   Destination: discharge home with supervision  Services at Discharge: Home Health  Physical Therapy, Occupational Therapy, Speech Therapy, Nursing and HHA 3x week  Is patient appropriate for an outpatient driving evaluation? no  Equipment at Discharge: Extra-heavy-duty W/C, foam cushion, and bariatric walker  Factors facilitating achievement of predicted outcomes: Motivated, Cooperative and Pleasant  Barriers to the achievement of predicted outcomes: Cognitive deficit    Team Members Present at Conference:  Case [place] OTR/L [address]  Physical Therapist:Keo Lopez, [address]  [address] [place] [address] [address], [address]  Nurse:Asya Gresham, RN  Psychologist: Ivania Vazquez, PhD.    I approve the established interdisciplinary plan of care as documented within the medical record of Gomez Greene.     Rip Cardoza

## 2021-03-05 NOTE — FLOWSHEET NOTE
03/05/21 0732   Provider Notification   Reason for Communication Evaluate   Provider Name [facility]   Provider Notification Physician   Method of Communication Call   Response Other (Comment)       Clarified with Dr [facility], patient is able to resume therapy today. OT informed.

## 2021-03-06 LAB
ANION GAP SERPL CALCULATED.3IONS-SCNC: 10 MEQ/L (ref 8–16)
BUN BLDV-MCNC: 45 MG/DL (ref 7–22)
CALCIUM SERPL-MCNC: 9 MG/DL (ref 8.5–10.5)
CHLORIDE BLD-SCNC: 108 MEQ/L (ref 98–111)
CO2: 22 MEQ/L (ref 23–33)
CREAT SERPL-MCNC: 1.5 MG/DL (ref 0.4–1.2)
GFR SERPL CREATININE-BSD FRML MDRD: 49 ML/MIN/1.73M2
GLUCOSE BLD-MCNC: 173 MG/DL (ref 70–108)
GLUCOSE BLD-MCNC: 189 MG/DL (ref 70–108)
GLUCOSE BLD-MCNC: 211 MG/DL (ref 70–108)
GLUCOSE BLD-MCNC: 233 MG/DL (ref 70–108)
GLUCOSE BLD-MCNC: 235 MG/DL (ref 70–108)
POTASSIUM REFLEX MAGNESIUM: 3.9 MEQ/L (ref 3.5–5.2)
SODIUM BLD-SCNC: 140 MEQ/L (ref 135–145)

## 2021-03-06 PROCEDURE — 6360000002 HC RX W HCPCS: Performed by: PHYSICIAN ASSISTANT

## 2021-03-06 PROCEDURE — 99232 SBSQ HOSP IP/OBS MODERATE 35: CPT | Performed by: NURSE PRACTITIONER

## 2021-03-06 PROCEDURE — 97530 THERAPEUTIC ACTIVITIES: CPT

## 2021-03-06 PROCEDURE — 94760 N-INVAS EAR/PLS OXIMETRY 1: CPT

## 2021-03-06 PROCEDURE — 80048 BASIC METABOLIC PNL TOTAL CA: CPT

## 2021-03-06 PROCEDURE — 6370000000 HC RX 637 (ALT 250 FOR IP): Performed by: PSYCHIATRY & NEUROLOGY

## 2021-03-06 PROCEDURE — 6370000000 HC RX 637 (ALT 250 FOR IP): Performed by: PHYSICAL MEDICINE & REHABILITATION

## 2021-03-06 PROCEDURE — 94640 AIRWAY INHALATION TREATMENT: CPT

## 2021-03-06 PROCEDURE — 1180000000 HC REHAB R&B

## 2021-03-06 PROCEDURE — 82948 REAGENT STRIP/BLOOD GLUCOSE: CPT

## 2021-03-06 PROCEDURE — 2580000003 HC RX 258: Performed by: PHYSICAL MEDICINE & REHABILITATION

## 2021-03-06 PROCEDURE — 97110 THERAPEUTIC EXERCISES: CPT

## 2021-03-06 PROCEDURE — 6370000000 HC RX 637 (ALT 250 FOR IP): Performed by: NURSE PRACTITIONER

## 2021-03-06 PROCEDURE — 99231 SBSQ HOSP IP/OBS SF/LOW 25: CPT | Performed by: PSYCHIATRY & NEUROLOGY

## 2021-03-06 PROCEDURE — 2500000003 HC RX 250 WO HCPCS: Performed by: INTERNAL MEDICINE

## 2021-03-06 PROCEDURE — 36415 COLL VENOUS BLD VENIPUNCTURE: CPT

## 2021-03-06 RX ADMIN — SODIUM CHLORIDE, PRESERVATIVE FREE 10 ML: 5 INJECTION INTRAVENOUS at 20:43

## 2021-03-06 RX ADMIN — PRIMIDONE 50 MG: 50 TABLET ORAL at 21:44

## 2021-03-06 RX ADMIN — FAMOTIDINE 20 MG: 20 TABLET, FILM COATED ORAL at 08:59

## 2021-03-06 RX ADMIN — LEVETIRACETAM 750 MG: 500 TABLET, FILM COATED ORAL at 08:59

## 2021-03-06 RX ADMIN — POTASSIUM BICARBONATE 40 MEQ: 782 TABLET, EFFERVESCENT ORAL at 09:18

## 2021-03-06 RX ADMIN — DOCUSATE SODIUM 100 MG: 100 CAPSULE, LIQUID FILLED ORAL at 08:59

## 2021-03-06 RX ADMIN — INSULIN LISPRO 1 UNITS: 100 INJECTION, SOLUTION INTRAVENOUS; SUBCUTANEOUS at 21:50

## 2021-03-06 RX ADMIN — CYCLOBENZAPRINE HYDROCHLORIDE 10 MG: 10 TABLET, FILM COATED ORAL at 04:43

## 2021-03-06 RX ADMIN — POLYETHYLENE GLYCOL 3350 17 G: 17 POWDER, FOR SOLUTION ORAL at 09:01

## 2021-03-06 RX ADMIN — TOPIRAMATE 25 MG: 25 TABLET, FILM COATED ORAL at 21:45

## 2021-03-06 RX ADMIN — TIOTROPIUM BROMIDE INHALATION SPRAY 2 PUFF: 3.12 SPRAY, METERED RESPIRATORY (INHALATION) at 08:29

## 2021-03-06 RX ADMIN — HYDROCORTISONE: 25 CREAM TOPICAL at 12:39

## 2021-03-06 RX ADMIN — LATANOPROST 1 DROP: 50 SOLUTION OPHTHALMIC at 20:42

## 2021-03-06 RX ADMIN — FOLIC ACID 1 MG: 1 TABLET ORAL at 08:58

## 2021-03-06 RX ADMIN — BUMETANIDE 1 MG: 0.25 INJECTION INTRAMUSCULAR; INTRAVENOUS at 09:03

## 2021-03-06 RX ADMIN — INSULIN LISPRO 2 UNITS: 100 INJECTION, SOLUTION INTRAVENOUS; SUBCUTANEOUS at 18:03

## 2021-03-06 RX ADMIN — DULOXETINE HYDROCHLORIDE 60 MG: 60 CAPSULE, DELAYED RELEASE ORAL at 08:58

## 2021-03-06 RX ADMIN — INSULIN LISPRO 1 UNITS: 100 INJECTION, SOLUTION INTRAVENOUS; SUBCUTANEOUS at 08:58

## 2021-03-06 RX ADMIN — SODIUM CHLORIDE, PRESERVATIVE FREE 10 ML: 5 INJECTION INTRAVENOUS at 09:10

## 2021-03-06 RX ADMIN — TAMSULOSIN HYDROCHLORIDE 0.4 MG: 0.4 CAPSULE ORAL at 08:59

## 2021-03-06 RX ADMIN — INSULIN LISPRO 1 UNITS: 100 INJECTION, SOLUTION INTRAVENOUS; SUBCUTANEOUS at 12:37

## 2021-03-06 RX ADMIN — NALOXEGOL OXALATE 12.5 MG: 12.5 TABLET, FILM COATED ORAL at 08:59

## 2021-03-06 RX ADMIN — ROSUVASTATIN CALCIUM 20 MG: 20 TABLET, FILM COATED ORAL at 21:44

## 2021-03-06 RX ADMIN — PRIMIDONE 50 MG: 50 TABLET ORAL at 08:59

## 2021-03-06 RX ADMIN — SENNOSIDES 17.2 MG: 8.6 TABLET, FILM COATED ORAL at 21:44

## 2021-03-06 RX ADMIN — OXYCODONE HYDROCHLORIDE AND ACETAMINOPHEN 1 TABLET: 7.5; 325 TABLET ORAL at 21:48

## 2021-03-06 RX ADMIN — HYDROCORTISONE: 25 CREAM TOPICAL at 21:49

## 2021-03-06 RX ADMIN — CYCLOBENZAPRINE HYDROCHLORIDE 10 MG: 10 TABLET, FILM COATED ORAL at 21:48

## 2021-03-06 RX ADMIN — GABAPENTIN 300 MG: 300 CAPSULE ORAL at 21:43

## 2021-03-06 RX ADMIN — ENOXAPARIN SODIUM 40 MG: 40 INJECTION, SOLUTION INTRAVENOUS; SUBCUTANEOUS at 14:05

## 2021-03-06 RX ADMIN — DOCUSATE SODIUM 100 MG: 100 CAPSULE, LIQUID FILLED ORAL at 21:48

## 2021-03-06 RX ADMIN — HYDROCORTISONE: 25 CREAM TOPICAL at 18:04

## 2021-03-06 RX ADMIN — AMLODIPINE BESYLATE 5 MG: 5 TABLET ORAL at 09:14

## 2021-03-06 RX ADMIN — LEVETIRACETAM 750 MG: 500 TABLET, FILM COATED ORAL at 21:44

## 2021-03-06 RX ADMIN — MIRTAZAPINE 15 MG: 15 TABLET, FILM COATED ORAL at 21:44

## 2021-03-06 RX ADMIN — HYDROCORTISONE: 25 CREAM TOPICAL at 09:01

## 2021-03-06 RX ADMIN — INSULIN GLARGINE 80 UNITS: 100 INJECTION, SOLUTION SUBCUTANEOUS at 21:50

## 2021-03-06 RX ADMIN — OXYCODONE HYDROCHLORIDE AND ACETAMINOPHEN 1 TABLET: 7.5; 325 TABLET ORAL at 04:43

## 2021-03-06 RX ADMIN — TOPIRAMATE 25 MG: 25 TABLET, FILM COATED ORAL at 08:59

## 2021-03-06 ASSESSMENT — PAIN SCALES - GENERAL
PAINLEVEL_OUTOF10: 9
PAINLEVEL_OUTOF10: 6
PAINLEVEL_OUTOF10: 0

## 2021-03-06 ASSESSMENT — PAIN DESCRIPTION - LOCATION: LOCATION: BACK

## 2021-03-06 ASSESSMENT — PAIN DESCRIPTION - PAIN TYPE: TYPE: ACUTE PAIN

## 2021-03-06 ASSESSMENT — PAIN DESCRIPTION - ORIENTATION
ORIENTATION: LOWER
ORIENTATION: LOWER
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
## 2021-03-06 NOTE — PLAN OF CARE
Problem: Falls - Risk of:  Goal: Will remain free from falls  Description: Will remain free from falls  Outcome: Ongoing  Note: Patient remains free from falls this shift. Fall precautions in place. Non skid footwear used with transferring. Educated patient to use call light when needed assistance with ambulation. Patient used call light appropriate this shift. Problem: Skin Integrity:  Goal: Will show no infection signs and symptoms  Description: Will show no infection signs and symptoms  Outcome: Ongoing  Note: Patient getting hydrocortisone cream to rash on bilateral forearms and bilateral lower legs. Problem: Infection - Surgical Site:  Goal: Will show no infection signs and symptoms  Description: Will show no infection signs and symptoms  Outcome: Ongoing  Note: Patient getting hydrocortisone cream to rash on bilateral forearms and bilateral lower legs. Problem: Pain:  Goal: Pain level will decrease  Description: Pain level will decrease  Outcome: Ongoing  Note: Patient denies pain this shift. Problem: Fluid Volume - Imbalance:  Goal: Absence of imbalanced fluid volume signs and symptoms  Description: Absence of imbalanced fluid volume signs and symptoms  Outcome: Ongoing  Note: Patient is on a 1500 ml fluid restriction. Problem: Discharge Planning:  Goal: Patients continuum of care needs are met  Description: Patients continuum of care needs are met  Outcome: Ongoing  Note: Patients needs met this shift. Patients discharge to be determined. Assess at discharge.
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
## 2021-03-06 NOTE — PROGRESS NOTES
Patient: Luis Alberto Lopes  Unit/Bed: 0W-51/753-W  YOB: 1965  MRN: [id number] Acct: [de-identified]   Admitting Diagnosis: Burst fracture of fourth thoracic vertebra [facility]) [U80.583J]  Admit Date:  2/24/2021  Hospital Day: 9    Assessment:     Active Problems:    Seizure-like activity ([address]    Burst fracture of fourth thoracic vertebra ([address]    Encephalopathy    Jerking  Resolved Problems:    * No resolved hospital problems. *      Plan:     CS acceptable  Follow the temp and BP for now  Change the hydrocortisone cream to 2.5%        Subjective:     Patient has complaints of itching to the anterior trunk and lower legs especially. .   Medication side effects: none    Scheduled Meds:   levETIRAcetam  750 mg Oral BID    hydrocortisone   Topical 4x Daily    bumetanide  1 mg Intravenous Q12H    sodium chloride flush  10 mL Intravenous BID    topiramate  25 mg Oral BID    enoxaparin  40 mg Subcutaneous Q24H    polyethylene glycol  17 g Oral Daily    amLODIPine  5 mg Oral Daily    docusate sodium  100 mg Oral BID    DULoxetine  60 mg Oral Daily    famotidine  20 mg Oral Daily    folic acid  1 mg Oral Daily    gabapentin  300 mg Oral Nightly    insulin glargine  80 Units Subcutaneous Nightly    insulin lispro  0-3 Units Subcutaneous Nightly    insulin lispro  0-6 Units Subcutaneous TID WC    insulin lispro  7 Units Subcutaneous TID AC    latanoprost  1 drop Both Eyes Nightly    lidocaine  3 patch Transdermal Daily    mirtazapine  15 mg Oral Nightly    naloxegol  12.5 mg Oral QAM    nicotine  1 patch Transdermal Daily    primidone  50 mg Oral BID    rosuvastatin  20 mg Oral Nightly    senna  2 tablet Oral Nightly    tamsulosin  0.4 mg Oral Daily    tiotropium  2 puff Inhalation Daily     Continuous Infusions:  PRN Meds:sodium chloride flush, acetaminophen, cyclobenzaprine, polyethylene glycol, sodium chloride flush, albuterol, bisacodyl, glucagon (rDNA), glucose, ondansetron, oxyCODONE-acetaminophen    Review of Systems  Pertinent items are noted in HPI. Objective:     Patient Vitals for the past 8 hrs:   BP Temp Pulse Resp SpO2   03/05/21 1938 133/71 96.8 °F (36 °C) 80 18 97 %     I/O last 3 completed shifts: In: 7623 [P.O.:1450; I.V.:140]  Out: 4250 [Urine:4250]  No intake/output data recorded. /71   Pulse 80   Temp 96.8 °F (36 °C)   Resp 18   Ht 6' 2\" (1.88 m)   Wt (!) 322 lb 4.8 oz (146.2 kg)   SpO2 97%   BMI 41.38 kg/m²     General appearance: alert, appears stated age and cooperative  Head: Normocephalic, without obvious abnormality, atraumatic  Lungs: clear to auscultation bilaterally  Chest wall: no tenderness  Heart: regular rate and rhythm, S1, S2 normal, no murmur, click, rub or gallop  Abdomen: soft, non-tender; bowel sounds normal; no masses,  no organomegaly  Extremities: extremities normal, atraumatic, no cyanosis or edema  Skin: there are some excoriations seen to the anterior distal lower legs but it's hard to see any actual rash. Neurologic: weak    Data Review:   Results for Raphael Holden" (MRN [id number]) as of 3/5/2021 21:36   Ref.  Range 3/5/2021 05:58 3/5/2021 07:29 3/5/2021 11:58 3/5/2021 16:37 3/5/2021 20:00   POC Glucose Latest Ref Range: 70 - 108 mg/dl  170 (H) 153 (H) 140 (H) 172 (H)       Electronically signed by Jyothi Reynolds MD on 3/5/2021 at 9:33 PM

## 2021-03-06 NOTE — PROGRESS NOTES
Staff had patient up to use the bathroom. While patient was standing he started to shake. Patient stated he felt hot, dizzy, lightheaded, and feels like he is not in his body. His vitals were: 97.5, 91, 20, /81 and SPO2 was 96 on room air. Dr. Constanza Newton with Neurology was contacted. New orders for orthostatic blood pressures. Will continue to monitor.

## 2021-03-06 NOTE — PROGRESS NOTES
Orthostatic blood pressure performed as ordered. Patient did well but when he stood up again for standing BP he started to shake again and feeling the same again as when he was in the bathroom. Patient is currently in his recliner with his feet up eating his ice chips. Contacted Neurology. Neurology does not think it was a seizure. They think it may be dehydration. Will contact Neurology at this time.

## 2021-03-06 NOTE — PROGRESS NOTES
Neurology Progress Note    Patient:  Mike Joyce      Unit/Bed:7E-67/067-A    YOB: 1965    MRN: [id number]     Acct: [de-identified]     Admit date: 2/24/2021    Neurology follow-up regarding possible seizures    Patient Seen, Chart, Physician notes, Labs, Radiology studies reviewed. Subjective: The patient stated he felt fine on the higher dosage of Keppra, though his occupational therapist thought he might have been a little less alert. Past, Family, Social History unchanged from admission.     Diet:  DIET CARB CONTROL; Carb Control: 4 carb choices (60 gms)/meal; Daily Fluid Restriction: 1500 ml    Medications:  Scheduled Meds:   levETIRAcetam  750 mg Oral BID    hydrocortisone   Topical 4x Daily    bumetanide  1 mg Intravenous Q12H    sodium chloride flush  10 mL Intravenous BID    topiramate  25 mg Oral BID    enoxaparin  40 mg Subcutaneous Q24H    polyethylene glycol  17 g Oral Daily    amLODIPine  5 mg Oral Daily    docusate sodium  100 mg Oral BID    DULoxetine  60 mg Oral Daily    famotidine  20 mg Oral Daily    folic acid  1 mg Oral Daily    gabapentin  300 mg Oral Nightly    insulin glargine  80 Units Subcutaneous Nightly    insulin lispro  0-3 Units Subcutaneous Nightly    insulin lispro  0-6 Units Subcutaneous TID WC    insulin lispro  7 Units Subcutaneous TID AC    latanoprost  1 drop Both Eyes Nightly    lidocaine  3 patch Transdermal Daily    mirtazapine  15 mg Oral Nightly    naloxegol  12.5 mg Oral QAM    nicotine  1 patch Transdermal Daily    primidone  50 mg Oral BID    rosuvastatin  20 mg Oral Nightly    senna  2 tablet Oral Nightly    tamsulosin  0.4 mg Oral Daily    tiotropium  2 puff Inhalation Daily     Continuous Infusions:  PRN Meds:sodium chloride flush, acetaminophen, cyclobenzaprine, polyethylene glycol, sodium chloride flush, albuterol, bisacodyl, glucagon (rDNA), glucose, ondansetron, oxyCODONE-acetaminophen    Objective:    Vitals: BP (!) 144/81   Pulse 80   Temp 96.8 °F (36 °C)   Resp 18   Ht 6' 2\" (1.88 m)   Wt (!) 325 lb (147.4 kg)   SpO2 98% Comment: o2 not needed at this time per o2 protocol  BMI 41.73 kg/m²   Physical Exam:  Alert and attentive at this time  He was walking well with a walker with no sign of focal weakness. 24 hour intake/output:    Intake/Output Summary (Last 24 hours) at 3/6/2021 1204  Last data filed at 3/6/2021 0829  Gross per 24 hour   Intake 1670 ml   Output 3300 ml   Net -1630 ml     Last 3 weights: Wt Readings from Last 3 Encounters:   03/06/21 (!) 325 lb (147.4 kg)   02/19/21 (!) 310 lb 8 oz (140.8 kg)   01/28/21 (!) 311 lb (141.1 kg)         CBC: No results for input(s): WBC, HGB, PLT in the last 72 hours. BMP:    Recent Labs     03/04/21  0631 03/05/21  0558 03/06/21  0548    139 140   K 3.5 3.8 3.9    108 108   CO2 21* 20* 22*   BUN 41* 44* 45*   CREATININE 1.7* 1.8* 1.5*   GLUCOSE 139* 173* 211*     Calcium:  Recent Labs     03/06/21  0548   CALCIUM 9.0     Magnesium:  Recent Labs     03/03/21  2047   MG 1.9     Glucose:  Recent Labs     03/05/21  1637 03/05/21 2000 03/06/21  0719   POCGLU 140* 172* 173*                 Assessment:    Active Problems:    Seizure-like activity (HCC)    Burst fracture of fourth thoracic vertebra (HCC)    Encephalopathy    Jerking  Resolved Problems:    * No resolved hospital problems. *      Plan:   Will continue with the higher dosage of Keppra, but continue to watch for signs of sedation from this    Electronically signed by Shanika Hernandez DO on 3/6/2021 at 12:04 PM    Neurology
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
## 2021-03-07 LAB
ANION GAP SERPL CALCULATED.3IONS-SCNC: 8 MEQ/L (ref 8–16)
BUN BLDV-MCNC: 41 MG/DL (ref 7–22)
CALCIUM SERPL-MCNC: 9 MG/DL (ref 8.5–10.5)
CHLORIDE BLD-SCNC: 108 MEQ/L (ref 98–111)
CO2: 24 MEQ/L (ref 23–33)
CREAT SERPL-MCNC: 1.6 MG/DL (ref 0.4–1.2)
GFR SERPL CREATININE-BSD FRML MDRD: 45 ML/MIN/1.73M2
GLUCOSE BLD-MCNC: 172 MG/DL (ref 70–108)
GLUCOSE BLD-MCNC: 179 MG/DL (ref 70–108)
GLUCOSE BLD-MCNC: 195 MG/DL (ref 70–108)
GLUCOSE BLD-MCNC: 212 MG/DL (ref 70–108)
GLUCOSE BLD-MCNC: 219 MG/DL (ref 70–108)
POTASSIUM SERPL-SCNC: 3.8 MEQ/L (ref 3.5–5.2)
SODIUM BLD-SCNC: 140 MEQ/L (ref 135–145)

## 2021-03-07 PROCEDURE — 6370000000 HC RX 637 (ALT 250 FOR IP): Performed by: NURSE PRACTITIONER

## 2021-03-07 PROCEDURE — 94760 N-INVAS EAR/PLS OXIMETRY 1: CPT

## 2021-03-07 PROCEDURE — 36415 COLL VENOUS BLD VENIPUNCTURE: CPT

## 2021-03-07 PROCEDURE — 6360000002 HC RX W HCPCS: Performed by: PHYSICIAN ASSISTANT

## 2021-03-07 PROCEDURE — 2500000003 HC RX 250 WO HCPCS: Performed by: INTERNAL MEDICINE

## 2021-03-07 PROCEDURE — 2580000003 HC RX 258: Performed by: PHYSICAL MEDICINE & REHABILITATION

## 2021-03-07 PROCEDURE — 94640 AIRWAY INHALATION TREATMENT: CPT

## 2021-03-07 PROCEDURE — 6370000000 HC RX 637 (ALT 250 FOR IP): Performed by: PHYSICAL MEDICINE & REHABILITATION

## 2021-03-07 PROCEDURE — 6370000000 HC RX 637 (ALT 250 FOR IP): Performed by: PSYCHIATRY & NEUROLOGY

## 2021-03-07 PROCEDURE — 99232 SBSQ HOSP IP/OBS MODERATE 35: CPT | Performed by: INTERNAL MEDICINE

## 2021-03-07 PROCEDURE — 80048 BASIC METABOLIC PNL TOTAL CA: CPT

## 2021-03-07 PROCEDURE — 82948 REAGENT STRIP/BLOOD GLUCOSE: CPT

## 2021-03-07 PROCEDURE — 1180000000 HC REHAB R&B

## 2021-03-07 RX ORDER — BUMETANIDE 0.25 MG/ML
1 INJECTION, SOLUTION INTRAMUSCULAR; INTRAVENOUS ONCE
Status: COMPLETED | OUTPATIENT
Start: 2021-03-07 | End: 2021-03-07

## 2021-03-07 RX ADMIN — POLYETHYLENE GLYCOL 3350 17 G: 17 POWDER, FOR SOLUTION ORAL at 08:44

## 2021-03-07 RX ADMIN — TIOTROPIUM BROMIDE INHALATION SPRAY 2 PUFF: 3.12 SPRAY, METERED RESPIRATORY (INHALATION) at 07:54

## 2021-03-07 RX ADMIN — INSULIN LISPRO 2 UNITS: 100 INJECTION, SOLUTION INTRAVENOUS; SUBCUTANEOUS at 12:05

## 2021-03-07 RX ADMIN — LEVETIRACETAM 750 MG: 500 TABLET, FILM COATED ORAL at 22:04

## 2021-03-07 RX ADMIN — ROSUVASTATIN CALCIUM 20 MG: 20 TABLET, FILM COATED ORAL at 22:04

## 2021-03-07 RX ADMIN — TOPIRAMATE 25 MG: 25 TABLET, FILM COATED ORAL at 08:42

## 2021-03-07 RX ADMIN — HYDROCORTISONE: 25 CREAM TOPICAL at 22:09

## 2021-03-07 RX ADMIN — SODIUM CHLORIDE, PRESERVATIVE FREE 10 ML: 5 INJECTION INTRAVENOUS at 22:12

## 2021-03-07 RX ADMIN — CYCLOBENZAPRINE HYDROCHLORIDE 10 MG: 10 TABLET, FILM COATED ORAL at 22:04

## 2021-03-07 RX ADMIN — HYDROCORTISONE: 25 CREAM TOPICAL at 08:43

## 2021-03-07 RX ADMIN — PRIMIDONE 50 MG: 50 TABLET ORAL at 08:42

## 2021-03-07 RX ADMIN — OXYCODONE HYDROCHLORIDE AND ACETAMINOPHEN 1 TABLET: 7.5; 325 TABLET ORAL at 12:01

## 2021-03-07 RX ADMIN — HYDROCORTISONE: 25 CREAM TOPICAL at 12:01

## 2021-03-07 RX ADMIN — SODIUM CHLORIDE, PRESERVATIVE FREE 10 ML: 5 INJECTION INTRAVENOUS at 08:43

## 2021-03-07 RX ADMIN — HYDROCORTISONE: 25 CREAM TOPICAL at 16:44

## 2021-03-07 RX ADMIN — SENNOSIDES 17.2 MG: 8.6 TABLET, FILM COATED ORAL at 22:04

## 2021-03-07 RX ADMIN — CYCLOBENZAPRINE HYDROCHLORIDE 10 MG: 10 TABLET, FILM COATED ORAL at 12:01

## 2021-03-07 RX ADMIN — GABAPENTIN 300 MG: 300 CAPSULE ORAL at 22:04

## 2021-03-07 RX ADMIN — ENOXAPARIN SODIUM 40 MG: 40 INJECTION, SOLUTION INTRAVENOUS; SUBCUTANEOUS at 14:47

## 2021-03-07 RX ADMIN — DOCUSATE SODIUM 100 MG: 100 CAPSULE, LIQUID FILLED ORAL at 22:03

## 2021-03-07 RX ADMIN — INSULIN LISPRO 2 UNITS: 100 INJECTION, SOLUTION INTRAVENOUS; SUBCUTANEOUS at 08:37

## 2021-03-07 RX ADMIN — LEVETIRACETAM 750 MG: 500 TABLET, FILM COATED ORAL at 08:42

## 2021-03-07 RX ADMIN — MIRTAZAPINE 15 MG: 15 TABLET, FILM COATED ORAL at 22:04

## 2021-03-07 RX ADMIN — INSULIN LISPRO 1 UNITS: 100 INJECTION, SOLUTION INTRAVENOUS; SUBCUTANEOUS at 17:17

## 2021-03-07 RX ADMIN — TOPIRAMATE 25 MG: 25 TABLET, FILM COATED ORAL at 22:04

## 2021-03-07 RX ADMIN — PRIMIDONE 50 MG: 50 TABLET ORAL at 22:04

## 2021-03-07 RX ADMIN — FAMOTIDINE 20 MG: 20 TABLET, FILM COATED ORAL at 08:41

## 2021-03-07 RX ADMIN — TAMSULOSIN HYDROCHLORIDE 0.4 MG: 0.4 CAPSULE ORAL at 08:42

## 2021-03-07 RX ADMIN — INSULIN GLARGINE 80 UNITS: 100 INJECTION, SOLUTION SUBCUTANEOUS at 22:03

## 2021-03-07 RX ADMIN — FOLIC ACID 1 MG: 1 TABLET ORAL at 08:42

## 2021-03-07 RX ADMIN — DULOXETINE HYDROCHLORIDE 60 MG: 60 CAPSULE, DELAYED RELEASE ORAL at 08:42

## 2021-03-07 RX ADMIN — OXYCODONE HYDROCHLORIDE AND ACETAMINOPHEN 1 TABLET: 7.5; 325 TABLET ORAL at 22:04

## 2021-03-07 RX ADMIN — DOCUSATE SODIUM 100 MG: 100 CAPSULE, LIQUID FILLED ORAL at 08:41

## 2021-03-07 RX ADMIN — BUMETANIDE 1 MG: 0.25 INJECTION, SOLUTION INTRAMUSCULAR; INTRAVENOUS at 12:58

## 2021-03-07 RX ADMIN — NALOXEGOL OXALATE 12.5 MG: 12.5 TABLET, FILM COATED ORAL at 08:42

## 2021-03-07 RX ADMIN — AMLODIPINE BESYLATE 5 MG: 5 TABLET ORAL at 08:46

## 2021-03-07 RX ADMIN — LATANOPROST 1 DROP: 50 SOLUTION OPHTHALMIC at 22:03

## 2021-03-07 ASSESSMENT — PAIN SCALES - GENERAL
PAINLEVEL_OUTOF10: 0
PAINLEVEL_OUTOF10: 0
PAINLEVEL_OUTOF10: 10

## 2021-03-07 ASSESSMENT — PAIN DESCRIPTION - ORIENTATION: ORIENTATION: LOWER

## 2021-03-07 NOTE — PLAN OF CARE
Problem: Falls - Risk of:  Goal: Will remain free from falls  Description: Will remain free from falls  3/7/2021 0147 by Ofelia Jordan LPN  Outcome: Met This Shift  Note: Pt. Without falls or incidents. Patient bed alarm on and call light in reach, No attempts to get up per self. 3/6/2021 1438 by Mark Oliveros LPN  Outcome: Ongoing  Note: Patient remains free from falls this shift. Fall precautions in place. Non skid footwear used with transferring. Educated patient to use call light when needed assistance with ambulation. Patient used call light appropriate this shift. Goal: Absence of physical injury  Description: Absence of physical injury  3/7/2021 0147 by Ofelia Jordan LPN  Outcome: Met This Shift  3/6/2021 1438 by Mark Oliveros LPN  Outcome: Ongoing     Problem: Skin Integrity:  Goal: Will show no infection signs and symptoms  Description: Will show no infection signs and symptoms  3/7/2021 0147 by Ofelia Jordan LPN  Outcome: Met This Shift  Note: No s/s of infections noted to surgical sites, LETTY  3/6/2021 1438 by Mark Oliveros LPN  Outcome: Ongoing  Note: Patient getting hydrocortisone cream to rash on bilateral forearms and bilateral lower legs. Goal: Absence of new skin breakdown  Description: Absence of new skin breakdown  3/7/2021 0147 by Ofelia Jordan LPN  Outcome: Met This Shift  3/6/2021 1438 by Mark Oliveros LPN  Outcome: Ongoing     Problem: Infection - Surgical Site:  Goal: Will show no infection signs and symptoms  Description: Will show no infection signs and symptoms  3/7/2021 0147 by Ofelia Jordan LPN  Outcome: Met This Shift  Note: No s/s of infections noted to surgical sites, LETTY  3/6/2021 1438 by Mark Oliveros LPN  Outcome: Ongoing  Note: Patient getting hydrocortisone cream to rash on bilateral forearms and bilateral lower legs.      Problem: Bleeding:  Goal: Will show no signs and symptoms of excessive bleeding  Description: Will show no signs and symptoms of excessive bleeding  3/7/2021 0147 by Guanakito Jordan LPN  Outcome: Ongoing  Note: Patient blew his nose and a large blood clot noted. No active bleeding noted. 3/6/2021 1438 by Stanley Lopez LPN  Outcome: Ongoing     Problem: Pain:  Goal: Pain level will decrease  Description: Pain level will decrease  3/7/2021 0147 by Guanakito Jordan LPN  Outcome: Ongoing  3/6/2021 1438 by Stanley Lopez LPN  Outcome: Ongoing  Note: Patient denies pain this shift.   Goal: Control of acute pain  Description: Control of acute pain  3/7/2021 0147 by Guanakito Jordan LPN  Outcome: Ongoing  3/6/2021 1438 by Stanley Lopez LPN  Outcome: Ongoing  Goal: Control of chronic pain  Description: Control of chronic pain  3/7/2021 0147 by Guanakito Jordan LPN  Outcome: Ongoing  3/6/2021 1438 by Stanley Lopez LPN  Outcome: Ongoing     Problem: Mobility - Impaired:  Goal: Mobility will improve  Description: Mobility will improve  3/7/2021 0147 by Guanakito Jordan LPN  Outcome: Ongoing  Note: PT/OT contnues  3/6/2021 1438 by Stanley Lopez LPN  Outcome: Ongoing     Problem: Urinary Elimination:  Goal: Skin integrity will improve  Description: Skin integrity will improve  3/7/2021 0147 by Guanakito Jordan LPN  Outcome: Ongoing  3/6/2021 1438 by Stanley Lopez LPN  Outcome: Ongoing  Goal: Ability to recognize the need to void and respond appropriately will improve  Description: Ability to recognize the need to void and respond appropriately will improve  3/7/2021 0147 by Guanakito Jordan LPN  Outcome: Met This Shift  3/6/2021 1438 by Stanley Lopez LPN  Outcome: Ongoing  Goal: Will remain free from infection  Description: Will remain free from infection  3/7/2021 0147 by Guanakito Jordan LPN  Outcome: Met This Shift  3/6/2021 1438 by Stanley Lopez LPN  Outcome: Ongoing  Goal: Incidence of incontinence will decrease  Description: Incidence of incontinence will decrease  3/7/2021 0147 by Guanakito Roche Hire, LPN  Outcome: Met This Shift  3/6/2021 1438 by Flaquita Leon LPN  Outcome: Ongoing     Problem: Fluid Volume - Imbalance:  Goal: Absence of imbalanced fluid volume signs and symptoms  Description: Absence of imbalanced fluid volume signs and symptoms  3/7/2021 0147 by Jn Jordan LPN  Outcome: Ongoing  3/6/2021 1438 by Flaquita Leon LPN  Outcome: Ongoing  Note: Patient is on a 1500 ml fluid restriction. Problem: Serum Glucose Level - Abnormal:  Goal: Ability to maintain appropriate glucose levels will improve  Description: Ability to maintain appropriate glucose levels will improve  3/7/2021 0147 by Jn Jordan LPN  Outcome: Ongoing  Note: Blood sugars elevated tonight sliding scale coverage per order. 3/6/2021 1438 by Flaquita Leon LPN  Outcome: Ongoing     Problem: Sensory Perception - Impaired:  Goal: Ability to maintain a stable neurologic state will improve  Description: Ability to maintain a stable neurologic state will improve  3/7/2021 0147 by Jn Jordan LPN  Outcome: Ongoing  3/6/2021 1438 by Flaquita Leon LPN  Outcome: Ongoing     Problem: IP BALANCE  Goal: LTG - Patient will maintain balance to allow for safe/functional mobility  3/6/2021 1438 by Flaquita Leon LPN  Outcome: Ongoing     Problem: IP INSTRUMENTAL ACTIVITIES OF DAILY LIVING  Goal: LTG - Patient will independently complete basic home making activities to return to independent functioning at home  3/6/2021 1438 by Flaquita Leon LPN  Outcome: Ongoing     Problem: Discharge Planning:  Goal: Patients continuum of care needs are met  Description: Patients continuum of care needs are met  3/6/2021 1438 by Flaquita Leon LPN  Outcome: Ongoing  Note: Patients needs met this shift. Patients discharge to be determined. Assess at discharge.        Problem: IP COMMUNICATION/DYSARTHRIA  Goal: LTG - Patient will effectively communicate in all situations with the use of compensatory strategies  3/7/2021 0147 by Kemar Jordan LPN  Outcome: Met This Shift  3/6/2021 1438 by Fede Echevarria LPN  Outcome: Ongoing     Problem: Nutrition  Goal: Optimal nutrition therapy  3/7/2021 0147 by Kemar Jordan LPN  Outcome: Met This Shift  3/6/2021 1438 by Fede Echevarria LPN  Outcome: Ongoing
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
## 2021-03-07 NOTE — PLAN OF CARE
Problem: Falls - Risk of:  Goal: Will remain free from falls  Description: Will remain free from falls  3/7/2021 1423 by Tip Padilla LPN  Outcome: Ongoing  Note: Patient remains free from falls this shift. Fall precautions in place. Non skid footwear used with transferring. Educated patient to use call light when needed assistance with ambulation. Patient used call light appropriate this shift. Problem: Skin Integrity:  Goal: Will show no infection signs and symptoms  Description: Will show no infection signs and symptoms  3/7/2021 1423 by Tip Padilla LPN  Outcome: Ongoing  Note: No skin breakdown during hospitalization. Scattered rash noted. Hydrocortisone cream applied with effect. Bath given by nursing staff. Problem: Infection - Surgical Site:  Goal: Will show no infection signs and symptoms  Description: Will show no infection signs and symptoms  3/7/2021 1423 by Tip Padilla LPN  Outcome: Ongoing  Note: Incision sites washed with chorohexidine soap. Incision sites healing well. Problem: Discharge Planning:  Goal: Patients continuum of care needs are met  Description: Patients continuum of care needs are met  Outcome: Ongoing  Note: Patients needs met this shift. Patients discharge to be determined. Assess at discharge.

## 2021-03-07 NOTE — PROGRESS NOTES
Patient: Cheyanne Rivera  Unit/Bed: 4R-00/668-K  YOB: 1965  MRN: [id number] Acct: [de-identified]   Admitting Diagnosis: Burst fracture of fourth thoracic vertebra [facility]) [T82.210A]  Admit Date:  2/24/2021  Hospital Day: 11    Assessment:     Active Problems:    Seizure-like activity ([address]    Burst fracture of fourth thoracic vertebra ([address]    Encephalopathy    Jerking  Resolved Problems:    * No resolved hospital problems. *      Plan: Will need to raise the rapid acting insulin        Subjective:     Patient has complaints of pain to his back but no CP or SOB. .   Medication side effects: none    Scheduled Meds:   bumetanide  1 mg Intravenous Once    levETIRAcetam  750 mg Oral BID    hydrocortisone   Topical 4x Daily    [Held by provider] bumetanide  1 mg Intravenous Q12H    sodium chloride flush  10 mL Intravenous BID    topiramate  25 mg Oral BID    enoxaparin  40 mg Subcutaneous Q24H    polyethylene glycol  17 g Oral Daily    amLODIPine  5 mg Oral Daily    docusate sodium  100 mg Oral BID    DULoxetine  60 mg Oral Daily    famotidine  20 mg Oral Daily    folic acid  1 mg Oral Daily    gabapentin  300 mg Oral Nightly    insulin glargine  80 Units Subcutaneous Nightly    insulin lispro  0-3 Units Subcutaneous Nightly    insulin lispro  0-6 Units Subcutaneous TID WC    insulin lispro  7 Units Subcutaneous TID AC    latanoprost  1 drop Both Eyes Nightly    lidocaine  3 patch Transdermal Daily    mirtazapine  15 mg Oral Nightly    naloxegol  12.5 mg Oral QAM    nicotine  1 patch Transdermal Daily    primidone  50 mg Oral BID    rosuvastatin  20 mg Oral Nightly    senna  2 tablet Oral Nightly    tamsulosin  0.4 mg Oral Daily    tiotropium  2 puff Inhalation Daily     Continuous Infusions:  PRN Meds:sodium chloride flush, acetaminophen, cyclobenzaprine, polyethylene glycol, sodium chloride flush, albuterol, bisacodyl, glucagon (rDNA), glucose, ondansetron,

## 2021-03-08 LAB
ANION GAP SERPL CALCULATED.3IONS-SCNC: 7 MEQ/L (ref 8–16)
BUN BLDV-MCNC: 36 MG/DL (ref 7–22)
CALCIUM SERPL-MCNC: 9.1 MG/DL (ref 8.5–10.5)
CHLORIDE BLD-SCNC: 109 MEQ/L (ref 98–111)
CO2: 24 MEQ/L (ref 23–33)
CREAT SERPL-MCNC: 1.6 MG/DL (ref 0.4–1.2)
GFR SERPL CREATININE-BSD FRML MDRD: 45 ML/MIN/1.73M2
GLUCOSE BLD-MCNC: 137 MG/DL (ref 70–108)
GLUCOSE BLD-MCNC: 199 MG/DL (ref 70–108)
GLUCOSE BLD-MCNC: 212 MG/DL (ref 70–108)
GLUCOSE BLD-MCNC: 228 MG/DL (ref 70–108)
GLUCOSE BLD-MCNC: 245 MG/DL (ref 70–108)
GLUCOSE BLD-MCNC: 270 MG/DL (ref 70–108)
POTASSIUM SERPL-SCNC: 4.1 MEQ/L (ref 3.5–5.2)
SODIUM BLD-SCNC: 140 MEQ/L (ref 135–145)

## 2021-03-08 PROCEDURE — 97530 THERAPEUTIC ACTIVITIES: CPT

## 2021-03-08 PROCEDURE — 82948 REAGENT STRIP/BLOOD GLUCOSE: CPT

## 2021-03-08 PROCEDURE — 6370000000 HC RX 637 (ALT 250 FOR IP): Performed by: PSYCHIATRY & NEUROLOGY

## 2021-03-08 PROCEDURE — 94761 N-INVAS EAR/PLS OXIMETRY MLT: CPT

## 2021-03-08 PROCEDURE — 1180000000 HC REHAB R&B

## 2021-03-08 PROCEDURE — 97129 THER IVNTJ 1ST 15 MIN: CPT

## 2021-03-08 PROCEDURE — 6370000000 HC RX 637 (ALT 250 FOR IP): Performed by: PHYSICAL MEDICINE & REHABILITATION

## 2021-03-08 PROCEDURE — 94669 MECHANICAL CHEST WALL OSCILL: CPT

## 2021-03-08 PROCEDURE — 94640 AIRWAY INHALATION TREATMENT: CPT

## 2021-03-08 PROCEDURE — 36415 COLL VENOUS BLD VENIPUNCTURE: CPT

## 2021-03-08 PROCEDURE — 6370000000 HC RX 637 (ALT 250 FOR IP): Performed by: NURSE PRACTITIONER

## 2021-03-08 PROCEDURE — 97535 SELF CARE MNGMENT TRAINING: CPT

## 2021-03-08 PROCEDURE — 97116 GAIT TRAINING THERAPY: CPT

## 2021-03-08 PROCEDURE — 99233 SBSQ HOSP IP/OBS HIGH 50: CPT | Performed by: PHYSICAL MEDICINE & REHABILITATION

## 2021-03-08 PROCEDURE — 97542 WHEELCHAIR MNGMENT TRAINING: CPT

## 2021-03-08 PROCEDURE — 6360000002 HC RX W HCPCS: Performed by: PHYSICIAN ASSISTANT

## 2021-03-08 PROCEDURE — 99231 SBSQ HOSP IP/OBS SF/LOW 25: CPT | Performed by: PSYCHIATRY & NEUROLOGY

## 2021-03-08 PROCEDURE — 99232 SBSQ HOSP IP/OBS MODERATE 35: CPT | Performed by: INTERNAL MEDICINE

## 2021-03-08 PROCEDURE — 80048 BASIC METABOLIC PNL TOTAL CA: CPT

## 2021-03-08 PROCEDURE — 97130 THER IVNTJ EA ADDL 15 MIN: CPT

## 2021-03-08 PROCEDURE — 2580000003 HC RX 258: Performed by: PHYSICAL MEDICINE & REHABILITATION

## 2021-03-08 RX ORDER — INSULIN GLARGINE 100 [IU]/ML
84 INJECTION, SOLUTION SUBCUTANEOUS NIGHTLY
Status: DISCONTINUED | OUTPATIENT
Start: 2021-03-09 | End: 2021-03-12 | Stop reason: HOSPADM

## 2021-03-08 RX ADMIN — TOPIRAMATE 25 MG: 25 TABLET, FILM COATED ORAL at 08:53

## 2021-03-08 RX ADMIN — SODIUM CHLORIDE, PRESERVATIVE FREE 10 ML: 5 INJECTION INTRAVENOUS at 22:32

## 2021-03-08 RX ADMIN — TIOTROPIUM BROMIDE INHALATION SPRAY 2 PUFF: 3.12 SPRAY, METERED RESPIRATORY (INHALATION) at 09:34

## 2021-03-08 RX ADMIN — LATANOPROST 1 DROP: 50 SOLUTION OPHTHALMIC at 22:32

## 2021-03-08 RX ADMIN — AMLODIPINE BESYLATE 5 MG: 5 TABLET ORAL at 08:52

## 2021-03-08 RX ADMIN — DOCUSATE SODIUM 100 MG: 100 CAPSULE, LIQUID FILLED ORAL at 08:59

## 2021-03-08 RX ADMIN — FAMOTIDINE 20 MG: 20 TABLET, FILM COATED ORAL at 08:59

## 2021-03-08 RX ADMIN — LEVETIRACETAM 750 MG: 500 TABLET, FILM COATED ORAL at 22:22

## 2021-03-08 RX ADMIN — LEVETIRACETAM 750 MG: 500 TABLET, FILM COATED ORAL at 08:53

## 2021-03-08 RX ADMIN — ROSUVASTATIN CALCIUM 20 MG: 20 TABLET, FILM COATED ORAL at 22:22

## 2021-03-08 RX ADMIN — OXYCODONE HYDROCHLORIDE AND ACETAMINOPHEN 1 TABLET: 7.5; 325 TABLET ORAL at 08:59

## 2021-03-08 RX ADMIN — GABAPENTIN 300 MG: 300 CAPSULE ORAL at 22:22

## 2021-03-08 RX ADMIN — OXYCODONE HYDROCHLORIDE AND ACETAMINOPHEN 1 TABLET: 7.5; 325 TABLET ORAL at 13:35

## 2021-03-08 RX ADMIN — HYDROCORTISONE: 25 CREAM TOPICAL at 12:54

## 2021-03-08 RX ADMIN — FOLIC ACID 1 MG: 1 TABLET ORAL at 08:53

## 2021-03-08 RX ADMIN — INSULIN GLARGINE 80 UNITS: 100 INJECTION, SOLUTION SUBCUTANEOUS at 22:38

## 2021-03-08 RX ADMIN — SENNOSIDES 17.2 MG: 8.6 TABLET, FILM COATED ORAL at 22:22

## 2021-03-08 RX ADMIN — SODIUM CHLORIDE, PRESERVATIVE FREE 10 ML: 5 INJECTION INTRAVENOUS at 08:55

## 2021-03-08 RX ADMIN — OXYCODONE HYDROCHLORIDE AND ACETAMINOPHEN 1 TABLET: 7.5; 325 TABLET ORAL at 22:22

## 2021-03-08 RX ADMIN — DOCUSATE SODIUM 100 MG: 100 CAPSULE, LIQUID FILLED ORAL at 22:22

## 2021-03-08 RX ADMIN — PRIMIDONE 50 MG: 50 TABLET ORAL at 22:22

## 2021-03-08 RX ADMIN — PRIMIDONE 50 MG: 50 TABLET ORAL at 08:53

## 2021-03-08 RX ADMIN — TAMSULOSIN HYDROCHLORIDE 0.4 MG: 0.4 CAPSULE ORAL at 08:53

## 2021-03-08 RX ADMIN — CYCLOBENZAPRINE HYDROCHLORIDE 10 MG: 10 TABLET, FILM COATED ORAL at 22:22

## 2021-03-08 RX ADMIN — ENOXAPARIN SODIUM 40 MG: 40 INJECTION, SOLUTION INTRAVENOUS; SUBCUTANEOUS at 14:28

## 2021-03-08 RX ADMIN — INSULIN LISPRO 2 UNITS: 100 INJECTION, SOLUTION INTRAVENOUS; SUBCUTANEOUS at 17:52

## 2021-03-08 RX ADMIN — INSULIN LISPRO 2 UNITS: 100 INJECTION, SOLUTION INTRAVENOUS; SUBCUTANEOUS at 12:49

## 2021-03-08 RX ADMIN — MIRTAZAPINE 15 MG: 15 TABLET, FILM COATED ORAL at 22:22

## 2021-03-08 RX ADMIN — DULOXETINE HYDROCHLORIDE 60 MG: 60 CAPSULE, DELAYED RELEASE ORAL at 08:53

## 2021-03-08 RX ADMIN — HYDROCORTISONE: 25 CREAM TOPICAL at 22:39

## 2021-03-08 RX ADMIN — HYDROCORTISONE: 25 CREAM TOPICAL at 08:56

## 2021-03-08 RX ADMIN — HYDROCORTISONE: 25 CREAM TOPICAL at 17:52

## 2021-03-08 RX ADMIN — TOPIRAMATE 25 MG: 25 TABLET, FILM COATED ORAL at 22:22

## 2021-03-08 RX ADMIN — CYCLOBENZAPRINE HYDROCHLORIDE 10 MG: 10 TABLET, FILM COATED ORAL at 08:59

## 2021-03-08 RX ADMIN — NALOXEGOL OXALATE 12.5 MG: 12.5 TABLET, FILM COATED ORAL at 08:53

## 2021-03-08 RX ADMIN — INSULIN LISPRO 1 UNITS: 100 INJECTION, SOLUTION INTRAVENOUS; SUBCUTANEOUS at 22:37

## 2021-03-08 ASSESSMENT — PAIN DESCRIPTION - PAIN TYPE: TYPE: SURGICAL PAIN

## 2021-03-08 ASSESSMENT — PAIN - FUNCTIONAL ASSESSMENT: PAIN_FUNCTIONAL_ASSESSMENT: PREVENTS OR INTERFERES SOME ACTIVE ACTIVITIES AND ADLS

## 2021-03-08 ASSESSMENT — PAIN SCALES - GENERAL
PAINLEVEL_OUTOF10: 0
PAINLEVEL_OUTOF10: 7
PAINLEVEL_OUTOF10: 0

## 2021-03-08 ASSESSMENT — PAIN DESCRIPTION - DESCRIPTORS: DESCRIPTORS: ACHING;DISCOMFORT;SQUEEZING

## 2021-03-08 ASSESSMENT — PAIN DESCRIPTION - FREQUENCY
FREQUENCY: INTERMITTENT
FREQUENCY: CONTINUOUS

## 2021-03-08 ASSESSMENT — PAIN DESCRIPTION - ORIENTATION: ORIENTATION: LOWER

## 2021-03-08 ASSESSMENT — PAIN DESCRIPTION - LOCATION: LOCATION: BACK
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
## 2021-03-08 NOTE — PROGRESS NOTES
Neurology Progress Note    Patient:  Erwin Hair      Unit/Bed:7E-67/067-A    YOB: 1965    MRN: [id number]     Acct: [de-identified]     Admit date: 2/24/2021    Neurology follow-up regarding seizures and episodes of shakiness    Patient Seen, Chart, Physician notes, Labs, Radiology studies reviewed. Subjective: The patient was shaky when standing afternoon before last.  I asked for orthostatic blood pressures which were unremarkable. He was up and around today without having any shakiness. No further episodes suspicious for seizures. Past, Family, Social History unchanged from admission.     Diet:  DIET CARB CONTROL; Carb Control: 4 carb choices (60 gms)/meal; Daily Fluid Restriction: 1500 ml    Medications:  Scheduled Meds:   insulin lispro  10 Units Subcutaneous TID AC    levETIRAcetam  750 mg Oral BID    hydrocortisone   Topical 4x Daily    [Held by provider] bumetanide  1 mg Intravenous Q12H    sodium chloride flush  10 mL Intravenous BID    topiramate  25 mg Oral BID    enoxaparin  40 mg Subcutaneous Q24H    polyethylene glycol  17 g Oral Daily    amLODIPine  5 mg Oral Daily    docusate sodium  100 mg Oral BID    DULoxetine  60 mg Oral Daily    famotidine  20 mg Oral Daily    folic acid  1 mg Oral Daily    gabapentin  300 mg Oral Nightly    insulin glargine  80 Units Subcutaneous Nightly    insulin lispro  0-3 Units Subcutaneous Nightly    insulin lispro  0-6 Units Subcutaneous TID WC    latanoprost  1 drop Both Eyes Nightly    lidocaine  3 patch Transdermal Daily    mirtazapine  15 mg Oral Nightly    naloxegol  12.5 mg Oral QAM    nicotine  1 patch Transdermal Daily    primidone  50 mg Oral BID    rosuvastatin  20 mg Oral Nightly    senna  2 tablet Oral Nightly    tamsulosin  0.4 mg Oral Daily    tiotropium  2 puff Inhalation Daily     Continuous Infusions:  PRN Meds:sodium chloride flush, acetaminophen, cyclobenzaprine, polyethylene glycol, sodium chloride flush, albuterol, bisacodyl, glucagon (rDNA), glucose, ondansetron, oxyCODONE-acetaminophen    Objective:    Vitals: BP (!) 155/73   Pulse 84   Temp 98.5 °F (36.9 °C) (Oral)   Resp 16   Ht 6' 2\" (1.88 m)   Wt (!) 320 lb 6.4 oz (145.3 kg)   SpO2 98%   BMI 41.14 kg/m²   Physical Exam:  I rounded yesterday but the patient was sleeping so I just ordered another set of orthostatic blood pressures. That said showed a significant drop from sitting to standing but his standing pressure was the same as what he had been supine. Right now he seated in a chair and is perfectly fine. 24 hour intake/output:    Intake/Output Summary (Last 24 hours) at 3/8/2021 1736  Last data filed at 3/8/2021 1239  Gross per 24 hour   Intake 1540 ml   Output 1550 ml   Net -10 ml     Last 3 weights: Wt Readings from Last 3 Encounters:   03/08/21 (!) 320 lb 6.4 oz (145.3 kg)   02/19/21 (!) 310 lb 8 oz (140.8 kg)   01/28/21 (!) 311 lb (141.1 kg)         CBC: No results for input(s): WBC, HGB, PLT in the last 72 hours. BMP:    Recent Labs     03/06/21  0548 03/07/21  0633 03/08/21  0413    140 140   K 3.9 3.8 4.1    108 109   CO2 22* 24 24   BUN 45* 41* 36*   CREATININE 1.5* 1.6* 1.6*   GLUCOSE 211* 195* 199*     Calcium:  Recent Labs     03/08/21  0413   CALCIUM 9.1     Magnesium:No results for input(s): MG in the last 72 hours. Glucose:  Recent Labs     03/08/21  0741 03/08/21  1203 03/08/21  1715   POCGLU 137* 212* 245*                   Assessment:    Active Problems:    Seizure-like activity (HCC)    Burst fracture of fourth thoracic vertebra (HCC)    Encephalopathy    Jerking  Resolved Problems:    * No resolved hospital problems. *    Suspect the recent shakiness when standing was due to mild dehydration. This seems to have resolved. Plan:  Continue Keppra for possible seizures. If he yes that shakiness when he is up again, if at all possible try to get him standing blood pressure at that time.   We will sign off.  The patient would like outpatient neurology follow-up and we could plan for him to be seen in Dr. Mckenna Devoid office in a few weeks.       Electronically signed by Andrew Bonilla DO on 3/8/2021 at 5:36 PM    Neurology

## 2021-03-08 NOTE — PROGRESS NOTES
Jhonathan Francisco was evaluated today and a DME order was entered for a heavy duty wheeled walker because he requires this to successfully complete daily living tasks of personal cares and ambulating. A wheeled walker is necessary due to the patient's unsteady gait, upper body weakness, and inability to  an ambulation device; and he can ambulate only by pushing a walker instead of a lesser assistive device such as a cane, crutch, or standard walker. He also requires RW to safely perform transfers to and from w/c for safe mobility in the home. Heavy duty RW required due to pt wt of 322 lbs. The need for this equipment was discussed with the patient and he understands and is in agreement.

## 2021-03-08 NOTE — PLAN OF CARE
Problem: Impaired respiratory status  Goal: Clear lung sounds  Outcome: Ongoing  Note: Spiriva and Acapella to improve breath sounds.

## 2021-03-08 NOTE — PROGRESS NOTES
[facility]  INPATIENT SPEECH THERAPY  [address] [facility]  DAILY NOTE    TIME   SLP Individual Minutes  Time In: 0900  Time Out: 0930  Minutes: 30  Timed Code Treatment Minutes: 30 Minutes       Date: 3/8/2021  Patient Name: Guille Mitchell      CSN: [id number]   : 1965  (55 y.o.)  Gender: male   Referring Physician:  Char Thao PA-C  Diagnosis: Burst Fracture of Fourth Thoracic Vertebra  Secondary Diagnosis: Cognitive-Linguistic Deficit  Precautions: Fall Risk  Current Diet: Regular and Thin Liquids  Swallowing Strategies: Standard Universal Swallow Precautions  Date of Last MBS: Not Applicable    Pain:   - Pain location: back - RN made aware of pain    Subjective:  Patient seen sitting upright in chair upon ST arrival finishing breakfast meal. Patient agreeable to participate in skilled ST services this date; alert and pleasant throughout. Pt required increased verbal cues for appropriate social behavior this date. Pt with increased cursing throughout session this date. Short-Term Goals:  Goal 1: Pt will complete higher level attention tasks (divided, alternating) with fewer than 2 errors/redirections within 8 minute time span or task completion to increase ADL completion. INTERVENTIONS: Did not address this date d/t focus on other goals. Prior Session  Divergent Naming- Abstract - GOAL 11 words/minute  P Words - 12 words/minute independent  Things that sink- 11 words/minute independent  Things that are clear - 10 words/minute independent   Things that are inexpensive -11 words/minute      Divided Attention assessed during complex abstract divergent naming tasks with music playing in background while completing task. Pt with good divided and sustained attention observed within task this date.        SHORT TERM GOAL #2:   Goal 2: Pt will complete memory tasks (immediate/delayed, working) with 85% accuracy at a modified independent level to increase memory retention of medical and daily information  INTERVENTIONS: Did not address this date d/t focus on other goals     Prior Session  Memory Strategies (WRAP)  Patient able to independently recall 3/4 strategies. Pt recalled 1/4 strategies (picture it) given min cues    Directions to [facility] provided verbal directions in order to navigate to [address] [facility]. ST encouraged pt to utilize memory strategies to improve recall of directions following a shift in attention. Pt independently wrote directions down and repeated directions. ST provided min verbal cue for pt to visualize path as ST described directions to cafeteria for 3rd time. Following shift in attention, pt required min verbal cues to utilize written instructions to recall directions with 100% accuracy. SHORT TERM GOAL #3:    Goal 3: Pt will complete HIGH level/complex verbal reasoning, problem solving, and executive functioning with 85% accuracy given min cue to increase IADL contribution. REVISED Goal 3: Pt will complete HIGH level/complex verbal reasoning, problem solving, and executive functioning with 85% accuracy at a modified independent leve to increase IADL contribution. INTERVENTIONS:   Complex Scheduling - Pt was provided with list of 5 tasks that needed to be completed with a list of 5 guidelines/parameters. Pt scheduled and organized events correctly based on guidelines/parameters: 5/10 given min cues, 5/10 given mod cues     Walker Safety   5/5 independently   *evidence of good recall of walker safety and reasoning behind safety guidelines this date.  At the conclusion of session, ST observed pt independently utilizing walker safety guidelines when transferring; however, pt demonstrated impulsivity within transfer (ie not waiting for STNA to assist with transfer)    Prior Session  Complex Problem Solving/Reasonign Tasks within ADLs  18/22 Independently, 3/22 given min cues, 1/22 given mod cues   *Evidence of good problem solving of established goals and plan of care.   Plan for Next Session: Memory, Problem Solving/Reasoning, Executive Functioning     Juancarlos Moore M.A., [address]
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
## 2021-03-08 NOTE — PLAN OF CARE
Problem: Falls - Risk of:  Goal: Will remain free from falls  Description: Will remain free from falls  3/8/2021 1658 by Claudean Brunt, LPN  Note: Patient remains free from falls this shift. Fall precautions in place. Non skid footwear used with transferring. Educated patient to use call light when needed assistance with ambulation. Patient used call light appropriate this shift. Problem: Skin Integrity:  Goal: Will show no infection signs and symptoms  Description: Will show no infection signs and symptoms  3/8/2021 1658 by Claudean Brunt, LPN  Note: No skin breakdown during hospitalization. Problem: Infection - Surgical Site:  Goal: Will show no infection signs and symptoms  Description: Will show no infection signs and symptoms  3/8/2021 1658 by Claudean Brunt, LPN  Note: No skin breakdown during hospitalization. Problem: Pain:  Goal: Pain level will decrease  Description: Pain level will decrease  3/8/2021 1658 by Claudean Brunt, LPN  Note: PRN pain medication given per patients request.     Problem: Fluid Volume - Imbalance:  Goal: Absence of imbalanced fluid volume signs and symptoms  Description: Absence of imbalanced fluid volume signs and symptoms  3/8/2021 1658 by Claudean Brunt, LPN  Note: Sure press wraps applied daily for edema of bilateral lower legs. Problem: Discharge Planning:  Goal: Patients continuum of care needs are met  Description: Patients continuum of care needs are met  3/8/2021 1658 by Claudean Brunt, LPN  Note: Patients needs met this shift. Patients discharge to be determined. Assess at discharge.
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
## 2021-03-08 NOTE — PLAN OF CARE
Problem: Falls - Risk of:  Goal: Will remain free from falls  Description: Will remain free from falls  3/8/2021 0254 by Prisca Huerta LPN  Outcome: Ongoing   Fall sign posted. Call light with in reach. Bed and chair alarm and brakes on and working. Bed in low position. No skid socks on patient. Patient voiced and demonstrated importance of using call light and waiting for staff before getting up. Problem: Skin Integrity:  Goal: Absence of new skin breakdown  Description: Absence of new skin breakdown  3/8/2021 0254 by Prisca Huerta LPN  Outcome: Ongoing  Special mattress to bed. Waffle cushion to chair. Bed exstender in place. SCD's remain in place. Patient voiced and demonstrated importance of repositioning self frequently when awake. 2 person skin check preformed at shift change. No new issues noted. Care plan reviewed with patient. Patient verbalize understanding of the plan of care and contribute to goal setting.
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
## 2021-03-09 LAB
BACTERIA: ABNORMAL /HPF
BILIRUBIN URINE: NEGATIVE
BLOOD, URINE: ABNORMAL
CASTS 2: ABNORMAL /LPF
CASTS UA: ABNORMAL /LPF
CHARACTER, URINE: CLEAR
COLOR: YELLOW
CRYSTALS, UA: ABNORMAL
EPITHELIAL CELLS, UA: ABNORMAL /HPF
GLUCOSE BLD-MCNC: 147 MG/DL (ref 70–108)
GLUCOSE BLD-MCNC: 222 MG/DL (ref 70–108)
GLUCOSE BLD-MCNC: 240 MG/DL (ref 70–108)
GLUCOSE BLD-MCNC: 253 MG/DL (ref 70–108)
GLUCOSE URINE: NEGATIVE MG/DL
KETONES, URINE: NEGATIVE
LEUKOCYTE ESTERASE, URINE: NEGATIVE
MISCELLANEOUS 2: ABNORMAL
NITRITE, URINE: NEGATIVE
PH UA: 5.5 (ref 5–9)
PROTEIN UA: NEGATIVE
RBC URINE: ABNORMAL /HPF
RENAL EPITHELIAL, UA: ABNORMAL
SPECIFIC GRAVITY, URINE: 1.01 (ref 1–1.03)
UROBILINOGEN, URINE: 1 EU/DL (ref 0–1)
WBC UA: ABNORMAL /HPF
YEAST: ABNORMAL

## 2021-03-09 PROCEDURE — 97130 THER IVNTJ EA ADDL 15 MIN: CPT

## 2021-03-09 PROCEDURE — 94760 N-INVAS EAR/PLS OXIMETRY 1: CPT

## 2021-03-09 PROCEDURE — 6370000000 HC RX 637 (ALT 250 FOR IP): Performed by: PHYSICAL MEDICINE & REHABILITATION

## 2021-03-09 PROCEDURE — 6370000000 HC RX 637 (ALT 250 FOR IP): Performed by: INTERNAL MEDICINE

## 2021-03-09 PROCEDURE — 6370000000 HC RX 637 (ALT 250 FOR IP): Performed by: NURSE PRACTITIONER

## 2021-03-09 PROCEDURE — 6370000000 HC RX 637 (ALT 250 FOR IP): Performed by: PSYCHIATRY & NEUROLOGY

## 2021-03-09 PROCEDURE — 6370000000 HC RX 637 (ALT 250 FOR IP): Performed by: FAMILY MEDICINE

## 2021-03-09 PROCEDURE — 97110 THERAPEUTIC EXERCISES: CPT

## 2021-03-09 PROCEDURE — 97535 SELF CARE MNGMENT TRAINING: CPT

## 2021-03-09 PROCEDURE — 97116 GAIT TRAINING THERAPY: CPT

## 2021-03-09 PROCEDURE — 2580000003 HC RX 258: Performed by: PHYSICAL MEDICINE & REHABILITATION

## 2021-03-09 PROCEDURE — 97542 WHEELCHAIR MNGMENT TRAINING: CPT

## 2021-03-09 PROCEDURE — 82948 REAGENT STRIP/BLOOD GLUCOSE: CPT

## 2021-03-09 PROCEDURE — 99232 SBSQ HOSP IP/OBS MODERATE 35: CPT | Performed by: NURSE PRACTITIONER

## 2021-03-09 PROCEDURE — 97530 THERAPEUTIC ACTIVITIES: CPT

## 2021-03-09 PROCEDURE — 97129 THER IVNTJ 1ST 15 MIN: CPT

## 2021-03-09 PROCEDURE — 6360000002 HC RX W HCPCS: Performed by: PHYSICIAN ASSISTANT

## 2021-03-09 PROCEDURE — 1180000000 HC REHAB R&B

## 2021-03-09 PROCEDURE — 99232 SBSQ HOSP IP/OBS MODERATE 35: CPT | Performed by: INTERNAL MEDICINE

## 2021-03-09 PROCEDURE — 94640 AIRWAY INHALATION TREATMENT: CPT

## 2021-03-09 PROCEDURE — 81001 URINALYSIS AUTO W/SCOPE: CPT

## 2021-03-09 RX ORDER — DIPHENHYDRAMINE HCL 25 MG
25 TABLET ORAL 3 TIMES DAILY
Status: DISCONTINUED | OUTPATIENT
Start: 2021-03-09 | End: 2021-03-12 | Stop reason: HOSPADM

## 2021-03-09 RX ORDER — LEVETIRACETAM 500 MG/1
500 TABLET ORAL 2 TIMES DAILY
Status: DISCONTINUED | OUTPATIENT
Start: 2021-03-09 | End: 2021-03-12

## 2021-03-09 RX ORDER — TAMSULOSIN HYDROCHLORIDE 0.4 MG/1
0.4 CAPSULE ORAL DAILY
Status: DISCONTINUED | OUTPATIENT
Start: 2021-03-09 | End: 2021-03-12 | Stop reason: HOSPADM

## 2021-03-09 RX ORDER — OXYCODONE AND ACETAMINOPHEN 7.5; 325 MG/1; MG/1
1 TABLET ORAL EVERY 8 HOURS PRN
Status: DISCONTINUED | OUTPATIENT
Start: 2021-03-09 | End: 2021-03-12 | Stop reason: HOSPADM

## 2021-03-09 RX ORDER — DIPHENHYDRAMINE HCL 25 MG
25 TABLET ORAL EVERY 6 HOURS PRN
Status: DISCONTINUED | OUTPATIENT
Start: 2021-03-09 | End: 2021-03-09

## 2021-03-09 RX ADMIN — LEVETIRACETAM 500 MG: 500 TABLET, FILM COATED ORAL at 21:27

## 2021-03-09 RX ADMIN — TOPIRAMATE 25 MG: 25 TABLET, FILM COATED ORAL at 09:02

## 2021-03-09 RX ADMIN — FOLIC ACID 1 MG: 1 TABLET ORAL at 09:01

## 2021-03-09 RX ADMIN — GABAPENTIN 300 MG: 300 CAPSULE ORAL at 21:27

## 2021-03-09 RX ADMIN — INSULIN LISPRO 3 UNITS: 100 INJECTION, SOLUTION INTRAVENOUS; SUBCUTANEOUS at 09:13

## 2021-03-09 RX ADMIN — TIOTROPIUM BROMIDE INHALATION SPRAY 2 PUFF: 3.12 SPRAY, METERED RESPIRATORY (INHALATION) at 09:49

## 2021-03-09 RX ADMIN — CYCLOBENZAPRINE HYDROCHLORIDE 10 MG: 10 TABLET, FILM COATED ORAL at 22:14

## 2021-03-09 RX ADMIN — FAMOTIDINE 20 MG: 20 TABLET, FILM COATED ORAL at 09:05

## 2021-03-09 RX ADMIN — PRIMIDONE 50 MG: 50 TABLET ORAL at 21:27

## 2021-03-09 RX ADMIN — NALOXEGOL OXALATE 12.5 MG: 12.5 TABLET, FILM COATED ORAL at 09:02

## 2021-03-09 RX ADMIN — ENOXAPARIN SODIUM 40 MG: 40 INJECTION, SOLUTION INTRAVENOUS; SUBCUTANEOUS at 13:21

## 2021-03-09 RX ADMIN — INSULIN GLARGINE 84 UNITS: 100 INJECTION, SOLUTION SUBCUTANEOUS at 21:27

## 2021-03-09 RX ADMIN — DULOXETINE HYDROCHLORIDE 60 MG: 60 CAPSULE, DELAYED RELEASE ORAL at 09:01

## 2021-03-09 RX ADMIN — ROSUVASTATIN CALCIUM 20 MG: 20 TABLET, FILM COATED ORAL at 21:27

## 2021-03-09 RX ADMIN — SODIUM CHLORIDE, PRESERVATIVE FREE 10 ML: 5 INJECTION INTRAVENOUS at 09:18

## 2021-03-09 RX ADMIN — CYCLOBENZAPRINE HYDROCHLORIDE 10 MG: 10 TABLET, FILM COATED ORAL at 09:02

## 2021-03-09 RX ADMIN — DOCUSATE SODIUM 100 MG: 100 CAPSULE, LIQUID FILLED ORAL at 21:27

## 2021-03-09 RX ADMIN — INSULIN LISPRO 1 UNITS: 100 INJECTION, SOLUTION INTRAVENOUS; SUBCUTANEOUS at 13:16

## 2021-03-09 RX ADMIN — LEVETIRACETAM 750 MG: 500 TABLET, FILM COATED ORAL at 09:01

## 2021-03-09 RX ADMIN — DIPHENHYDRAMINE HCL 25 MG: 25 TABLET ORAL at 21:27

## 2021-03-09 RX ADMIN — PRIMIDONE 50 MG: 50 TABLET ORAL at 09:02

## 2021-03-09 RX ADMIN — AMLODIPINE BESYLATE 5 MG: 5 TABLET ORAL at 09:01

## 2021-03-09 RX ADMIN — OXYCODONE HYDROCHLORIDE AND ACETAMINOPHEN 1 TABLET: 7.5; 325 TABLET ORAL at 14:08

## 2021-03-09 RX ADMIN — OXYCODONE HYDROCHLORIDE AND ACETAMINOPHEN 1 TABLET: 7.5; 325 TABLET ORAL at 09:02

## 2021-03-09 RX ADMIN — HYDROCORTISONE: 25 CREAM TOPICAL at 09:02

## 2021-03-09 RX ADMIN — TOPIRAMATE 25 MG: 25 TABLET, FILM COATED ORAL at 21:27

## 2021-03-09 RX ADMIN — OXYCODONE HYDROCHLORIDE AND ACETAMINOPHEN 1 TABLET: 7.5; 325 TABLET ORAL at 22:14

## 2021-03-09 RX ADMIN — POLYETHYLENE GLYCOL 3350 17 G: 17 POWDER, FOR SOLUTION ORAL at 09:03

## 2021-03-09 RX ADMIN — DOCUSATE SODIUM 100 MG: 100 CAPSULE, LIQUID FILLED ORAL at 09:05

## 2021-03-09 RX ADMIN — TAMSULOSIN HYDROCHLORIDE 0.4 MG: 0.4 CAPSULE ORAL at 09:02

## 2021-03-09 RX ADMIN — INSULIN LISPRO 2 UNITS: 100 INJECTION, SOLUTION INTRAVENOUS; SUBCUTANEOUS at 17:40

## 2021-03-09 RX ADMIN — HYDROCORTISONE: 25 CREAM TOPICAL at 13:20

## 2021-03-09 RX ADMIN — LATANOPROST 1 DROP: 50 SOLUTION OPHTHALMIC at 21:27

## 2021-03-09 RX ADMIN — MIRTAZAPINE 15 MG: 15 TABLET, FILM COATED ORAL at 21:27

## 2021-03-09 RX ADMIN — SENNOSIDES 17.2 MG: 8.6 TABLET, FILM COATED ORAL at 21:27

## 2021-03-09 ASSESSMENT — PAIN DESCRIPTION - PROGRESSION
CLINICAL_PROGRESSION: NOT CHANGED
CLINICAL_PROGRESSION: NOT CHANGED

## 2021-03-09 ASSESSMENT — PAIN DESCRIPTION - ORIENTATION
ORIENTATION: MID
ORIENTATION: LOWER;MID

## 2021-03-09 ASSESSMENT — PAIN DESCRIPTION - FREQUENCY: FREQUENCY: CONTINUOUS

## 2021-03-09 ASSESSMENT — PAIN DESCRIPTION - PAIN TYPE: TYPE: ACUTE PAIN;CHRONIC PAIN;SURGICAL PAIN

## 2021-03-09 ASSESSMENT — PAIN DESCRIPTION - DESCRIPTORS: DESCRIPTORS: ACHING;CONSTANT

## 2021-03-09 ASSESSMENT — PAIN DESCRIPTION - LOCATION: LOCATION: BACK

## 2021-03-09 NOTE — PROGRESS NOTES
daily information  INTERVENTIONS: Did not address this date d/t focus on family education     Prior Session  Memory Strategies (WRAP)  Patient able to independently recall 3/4 strategies. Pt recalled 1/4 strategies (picture it) given min cues    [facility] provided verbal directions in order to navigate to [facility]. ST encouraged pt to utilize memory strategies to improve recall of directions following a shift in attention. Pt independently wrote directions down and repeated directions. ST provided min verbal cue for pt to visualize path as ST described directions to cafeteria for 3rd time. Following shift in attention, pt required min verbal cues to utilize written instructions to recall directions with 100% accuracy. SHORT TERM GOAL #3:    REVISED Goal 3: Pt will complete HIGH level/complex verbal reasoning, problem solving, and executive functioning with 85% accuracy at a modified independent leve to increase IADL contribution. INTERVENTIONS: Did not address this date d/t focus on family education. Prior Session  Complex Scheduling - Pt was provided with list of 5 tasks that needed to be completed with a list of 5 guidelines/parameters. Pt scheduled and organized events correctly based on guidelines/parameters: 5/10 given min cues, 5/10 given mod cues     Walker Safety   5/5 independently   *evidence of good recall of walker safety and reasoning behind safety guidelines this date. At the conclusion of session, ST observed pt independently utilizing walker safety guidelines when transferring; however, pt demonstrated impulsivity within transfer (ie not waiting for STNA to assist with transfer)    Prior Session  Complex Problem Solving/Reasonign Tasks within ADLs  18/22 Independently, 3/22 given min cues, 1/22 given mod cues   *Evidence of good problem solving and reasoning skills with in moderately complex problems that may occur with ADLs.       SHORT TERM GOAL #4: REVISED Goal 4: Pt will complete sequencing tasks (i.e transfers) with 85% accuracy at a modified independent level in order to improve safety within transfers and particicpation within ADL/IADL  INTERVENTIONS: Did not address this date d/t focus on family education. Prior Session  Sequencing 4 Step ADLs: 4/4 Independently     Sequencing Sit/Stand Transfers: 5/5 Steps Independently  Sequencing Stand/Sit Transfers: 6/6 Steps Independently      Long-Term Goals:  Timeframe for Long-term Goals: 2 weeks    LONG TERM GOAL #1:  Goal 1: Pt will improve overall cognitive-linguistic skills to a Modified Pimento or higher in order to safely discharge to least restrictive environment and complete ADL/IADL with least amount of supervision/assistance         Comprehension: 7 - Patient understands complex ideas (math/planning)  Expression: 7 - Patient expresses complex ideas/needs  Social Interaction: 7 - Patient has appropriate behavior/relations 100% of the time  Problem Solvin - Patient able to solve simple/routine tasks  Memory: 6 - Patient requires device to recall (e.g. memory book)    EDUCATION:  FAMILY EDUCATION: ST completed review of discharge recommendations with pt and wife this date. It is recommended pt continue with ST via [facility] upon discharge to continue to address impulsivity, safety awareness, problem solving, and reasoning. Pt demonstrates excellent problem solving, reasoning, and safety awareness within controlled/structured environment; however, within physical therapy and occupational therapy session, pt has demonstrated increased impulsivity and poor problem solving/reasoning skills which significantly impacts safety within ADL tasks. ST encouraged pt to be insightful to how pt's body is feeling and carryover problem solving and reasoning skills to improve safety.  ST also completed review on ways to keep brain active upon discharge in order to maintain current cognitive-linguistic skills and decrease risk of cognitive decline. Pt and partner stated understanding and identified different activities they enjoy doing from list provided. Partner identified that she will continue completing pill box organization as well as managing finances upon discharge from facility. Learner: Patient and Significant Other  Education:  Reviewed ST goals and Plan of Care, Reviewed recommendations for follow-up, Education Related to Potential Risks and Complications Due to Impairment/Illness/Injury, Education Related to Prevention of Recurrence of Impairment/Illness/Injury, Education Related to [employer] and Wellness and Home Safety Education  Evaluation of Education: Verbalizes understanding, Demonstrates with assistance and Demonstrates without assistance    ASSESSMENT/PLAN:  Activity Tolerance:  Patient tolerance of  treatment: good. Assessment/Plan: Patient progressing toward established goals. Continues to require skilled care of licensed speech pathologist to progress toward achievement of established goals and plan of care.   Plan for Next Session: Memory, Problem Solving/Reasoning, Executive Functioning     Zain Michel M.A., [address]

## 2021-03-09 NOTE — FLOWSHEET NOTE
03/09/21 1304   Provider Notification   Reason for Communication Evaluate;New orders   Provider Name Dr. Edgar Barber   Provider Notification Physician   Method of Communication Secure Message   Response See orders   Notification Time [phone]   contacted Dr Edagr Barber regarding increased itching since increasing Keppra, he changed keppra dose back to 500mg bid

## 2021-03-09 NOTE — PLAN OF CARE
Problem: Falls - Risk of:  Goal: Will remain free from falls  Description: Will remain free from falls  Outcome: Ongoing  Pt will remain free of falls. Pt is 1 assist with walker and gait belt. Problem: Skin Integrity:  Goal: Will show no infection signs and symptoms  Description: Will show no infection signs and symptoms  Outcome: Ongoing  Skin assessment every shift. Pt has no new areas of skin breakdown, pt does have itching to legs, torso  And arms. Problem: Infection - Surgical Site:  Goal: Will show no infection signs and symptoms  Description: Will show no infection signs and symptoms  Outcome: Ongoing  Surgical site will be assessed every shift for warmth to site, redness, pain or drainage. Problem: Pain:  Goal: Pain level will decrease  Description: Pain level will decrease  Outcome: Ongoing  Reposition frequently to alleviate pain. Pt has oxycodone  And flexeril to control pain      Problem: Mobility - Impaired:  Goal: Mobility will improve  Description: Mobility will improve  Outcome: Ongoing  Pt will participate in PT daily as directed to increase mobility.

## 2021-03-09 NOTE — PLAN OF CARE
Problem: Discharge Planning:  Goal: Patients continuum of care needs are met  Description: Patients continuum of care needs are met  Note: Team conference held 3/8/21. Recommendations of the team were explained to the patient by Francesca Prakash and Dr. Lenny Cannon. Team is recommending that patient continue on acute inpatient rehab for four more days, with expected discharge date of 3/12/2021. Prior to discharge team is recommending a full day of family education. Family education is set up for 3/9/21. Following discharge, team is recommending home health. Home Health was set up with [address] [place] [address] with a RN. HHA, PT, OT and ST. Care plan reviewed with patient. Patient verbalize understanding of the plan of care and contribute to goal setting.  will follow for discharge needs.
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
## 2021-03-09 NOTE — PLAN OF CARE
Problem: Nutrition  Goal: Optimal nutrition therapy  Outcome: Ongoing   Nutrition Problem #1: Increased nutrient needs  Intervention: Food and/or Nutrient Delivery: Continue Current Diet, Vitamin Supplement  Nutritional Goals: Pt. will tolerate and consume 75% or more of meals to promote wound healing during LOS.
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
## 2021-03-09 NOTE — PROGRESS NOTES
Comprehensive Nutrition Assessment    Type and Reason for Visit:  Reassess(PO Monitor)    Nutrition Recommendations/Plan:   *Recommend a Multivitamin w/minerals daily. *Consider Thiamine. *Continue current diet. Nutrition Assessment: Pt improving from a nutritional standpoint AEB pt report of good appetit and intake consuming all of his meals. Remains at risk for further nutritional compromise r/t increased nutrient needs for wound healing and underlying medical condition (hx CKD, COPD, DM, etoh abuse, seizures). Nutrition recommendations/interventions as per above. Malnutrition Assessment:  Malnutrition Status:  No malnutrition    Context:  Acute Illness     Findings of the 6 clinical characteristics of malnutrition:  Energy Intake:  No significant decrease in energy intake  Weight Loss:  No significant weight loss     Body Fat Loss:  No significant body fat loss     Muscle Mass Loss:  No significant muscle mass loss    Fluid Accumulation:  Unable to assess     Strength:  Not Performed    Estimated Daily Nutrient Needs:  Energy (kcal):  4456-2518 kcals (11-14); Weight Used for Energy Requirements:  (147 kgm 3/3)     Protein (g):  60-69 gm (0.7-0.8) - CKD; Weight Used for Protein Requirements:  (86 kgm)          Nutrition Related Findings:  pt seen; reports good appetite and intake consuming all of his meals; does well with protein sources with meals; last BM x2 on 3/7. POC Glucose 147 today. HgA1C 6.6% on 2/24/21.  Rx includes: Colace, Folic Acid, Lantus, Humalog, Remeron, Senna, Movantik and Glycolax      Wounds:  Multiple, Surgical Incision(right coccyx wound; left toe wound; back incisions - s/p kyphoplasty on 2/19/21)       Current Nutrition Therapies:    DIET CARB CONTROL; Carb Control: 4 carb choices (60 gms)/meal; Daily Fluid Restriction: 1500 ml    Anthropometric Measures:  · Height: 6' 2\" (188 cm)  · Current Body Weight: 322 lb 14.4 oz (146.5 kg)(3/9; +non-pitting BLE)   · Admission Body Weight: 325 lb 7 oz (147.6 kg)(2/24 no edema)    · Usual Body Weight: (per pt 290#; previously reports was trying to lose weight; per EMR: 9/10/20: 321#; 11/25/20: 306$ 4.8 oz,)     · Ideal Body Weight: 190 lbs  · BMI: 41.4  · BMI Categories: Obese Class 3 (BMI 40.0 or greater)       Nutrition Diagnosis:   · Increased nutrient needs related to increase demand for energy/nutrients(wound healing) as evidenced by wounds    Nutrition Interventions:   Food and/or Nutrient Delivery:  Continue Current Diet, Vitamin Supplement  Nutrition Education/Counseling:  Education initiated(Encouraged po, good nutrition, protein intake at best efforts for healing. Denies any questions on diabetic diet.)   Coordination of Nutrition Care:  Continue to monitor while inpatient    Goals:  Pt. will tolerate and consume 75% or more of meals to promote wound healing during LOS. Nutrition Monitoring and Evaluation:   Behavioral-Environmental Outcomes:  None Identified   Food/Nutrient Intake Outcomes:  Food and Nutrient Intake, Vitamin/Mineral Intake  Physical Signs/Symptoms Outcomes:  Biochemical Data, GI Status, Weight, Skin, Nutrition Focused Physical Findings, Fluid Status or Edema     Discharge Planning:     Too soon to determine     Electronically signed by Mir Raymundo RD, LD on 3/9/21 at 2:05 PM EST    Contact: [phone]

## 2021-03-09 NOTE — PROGRESS NOTES
Date: 3/9/2021  Patient Name: Danial Del Valle        MRN: [id number]    Account Number: [de-identified]  : 1965  (55 y.o.)  Gender: male   Referring Practitioner: Norman Fisher PA-C (ordering)  Dr Maged Leigh (Attending)  Diagnosis: Burst fracture of fourth thoracic vertebra    Danial Del Valle requires an extra heavy duty wheelchair to complete bathing, toileting, dressing and grooming tasks. These tasks cannot be completed by utilizing a cane or a walker secondary to impaired ambulation and mobility. Pt also requires extra heavy duty w/c due to pt weight of 322lbs. Danial Del Valle is able to safely maneuver a wheelchair in the home. Danial Del Valle requires a gel seat cushion for their wheelchair to provide adequate pressure relief for maintenance of proper skin integrity.

## 2021-03-09 NOTE — PROGRESS NOTES
ADL's on one level  Home Access: Stairs to enter without rails  Entrance Stairs - Number of Steps: 1  Home Equipment: Rolling walker, Cane, Lift chair, Wheelchair-manual(Pt just got lift chair in 12/2020. Uses RW for distances longer than about 15 feet PTA. Cane used for shorter distances under about 15 feet.)   Bathroom Shower/Tub: Tub/Shower unit(( Tub/shower upstairs) sponge bathes)  Bathroom Toilet: Bedside commode(next to bed)  Bathroom Equipment: 3-in-1 commode    Receives Help From: Family  ADL Assistance: Needs assistance(Wife assists with bathing, dressing. Pt expressed difficulty with toilet hygiene at home. Wife empties BSC)  Homemaking Assistance: Needs assistance  Homemaking Responsibilities: No  Ambulation Assistance: Independent  Transfer Assistance: Independent  Active : No  Additional Comments: Patient lives with his significant other, she has been providing assistance with all IADLS    Restrictions/Precautions:  Restrictions/Precautions: General Precautions, Fall Risk  Position Activity Restriction  Spinal Precautions: No Bending, No Lifting, No Twisting  Other position/activity restrictions: Kyphoplasty 2/19/21,  poor sensation BLE knee down L>R, legally blind,     SUBJECTIVE: Patient asleep in bed , drowsy and requesting water on waking. Patient c/o itching and rash on BLE, feels it is spreading up legs. C/o dry mouth while in gym, gave patient 12 oz of water. Patient made multiple inappropriate comments to therapist throughout session. Wife arriving towards end of session, initiated family education. PAIN: 10/10: LBP    Vitals: Vitals not assessed per clinical judgement, see nursing flowsheet    OBJECTIVE:  Bed Mobility:  Supine to Sit: Contact Guard Assistance  Sit to Supine: Contact Guard Assistance    VC for patient to follow spinal precautions. Transfers:  Sit to Stand: Contact Guard Assistance  Stand to [place] [address], Minimal Assistance, on returning to room pt. experienced LLE tremors requiring min A to lower into chair. Stand Pivot:Contact Guard Assistance, Moderate Assistance, patient preformed with RW, abandoned RW while preforming in gym, instructed to maintian hand placement on RW. Pt required mod A to preform transfer at end of session due to LLE tremmors. Wheelchair Mobility:  Stand By Assistance  Extremities Used: Bilateral Upper Extremities  Type of Chair:Manual  Surface: Level Tile  Distance: 130 feet x 2  Quality: Slow Velocity, Decreased Fluidity and VC for manueverion WC due to patient's low vision. Stairs:  Platform:  4\" platform X 2 using Parallel Bars and Contact Guard Assistance. Patient demonstrating LLE tremors after completing step 2x. Exercise:  Patient was guided in 1 set(s) 10 reps of exercise to left lower extremities. Heelslides, Pelvic tilts, Seated hamstring curls, Seated heel/toe raises, Long arc quads and hooklying core isolation. Instructed pt. in HEP for return home later this week. Had patient read exercise instructions and provided cues as needed for correct completion. Exercises were completed for increased independence with functional mobility. Functional Outcome Measures: Not completed       ASSESSMENT:  Assessment: Patient progressing toward established goals. and patient demonstrating inproved endurance with exercise, activity limited by LLE weakness and tremors. Activity Tolerance:  Patient tolerance of  treatment: good.      Equipment Recommendations:Equipment Needed: Yes  Mobility Devices: Arley Holden  Walker: Rolling(bariatric)  Wheelchair: Standard  Discharge Recommendations:  Continue to assess pending progress, Home with Home health PT    Plan: Times per week: 5x/wk 90 min, 1x/wk 30 min  Current Treatment Recommendations: Strengthening, Transfer Training, Balance Training, Gait Training, Functional Mobility Training, Equipment Evaluation, Education, & procurement, Safety Education & Training, Patient/Caregiver Education & Training, Endurance Training, Home Exercise Program    Patient Education  Patient Education: Home Exercise Program, Precautions/Restrictions,  - Patient Verbalized Understanding, - Patient Requires Continued Education    Goals:  Patient goals : To go home. Short term goals  Time Frame for Short term goals: 1 week  Short term goal 1: Pt will perform supine to/from sit transfer with S for improved home bed mobility. Short term goal 2: Pt will perform sit to/from stand with wheeled walker and CGA consistently for improved independence with home transfers. Short term goal 3: Pt will ambulate 25 ft with wheeled walker at [id number] in preparation for home distances. Short term goal 4: Pt will perform car transfer with min A x1 in preparation for transportation needs. Short term goal 5: Pt will navigate 1 4\" step in parallel bars with minAx1 for safe home entry. Long term goals  Time Frame for Long term goals : 3 weeks  Long term goal 1: Pt will perform supine to/from sit transfer with mod I for improved home bed mobility. Long term goal 2: Pt will perform sit to/from stand with wheeled walker with mod I for improved independence with home transfers. Long term goal 3: Pt will ambulate 50 ft with wheeled walker at supervision in preparation for home distances. Long term goal 4: Pt will perform car transfer with supervision in preparation for transportation needs. Long term goal 5: Pt will navigate 1 step with LRAD with supervision for safe home entry. Following session, patient left in safe position with all fall risk precautions in place.     Treatment session and note completed by HAYDER Man under supervision of signing therapist.

## 2021-03-09 NOTE — PROGRESS NOTES
Patient: Erwin Hair  Unit/Bed: 6Z-78/010-V  YOB: 1965  MRN: [id number] Acct: [de-identified]   Admitting Diagnosis: Burst fracture of fourth thoracic vertebra [facility]) [R22.051B]  Admit Date:  2/24/2021  Hospital Day: 12    Assessment:     Active Problems:    Seizure-like activity ([address]    Burst fracture of fourth thoracic vertebra ([address]    Encephalopathy    Jerking  Resolved Problems:    * No resolved hospital problems. *      Plan:     Check a UA with reflex for the dysuria and the hesitancy he notices the past 3 days. Subjective:     Patient has no complaint of CP but some SOB if he lays too flat. He notices some dysuria and hesitancy the past 3 days. .   Medication side effects: none    Scheduled Meds:   [START ON 3/9/2021] insulin glargine  84 Units Subcutaneous Nightly    insulin lispro  10 Units Subcutaneous TID AC    levETIRAcetam  750 mg Oral BID    hydrocortisone   Topical 4x Daily    [Held by provider] bumetanide  1 mg Intravenous Q12H    sodium chloride flush  10 mL Intravenous BID    topiramate  25 mg Oral BID    enoxaparin  40 mg Subcutaneous Q24H    polyethylene glycol  17 g Oral Daily    amLODIPine  5 mg Oral Daily    docusate sodium  100 mg Oral BID    DULoxetine  60 mg Oral Daily    famotidine  20 mg Oral Daily    folic acid  1 mg Oral Daily    gabapentin  300 mg Oral Nightly    insulin lispro  0-3 Units Subcutaneous Nightly    insulin lispro  0-6 Units Subcutaneous TID WC    latanoprost  1 drop Both Eyes Nightly    lidocaine  3 patch Transdermal Daily    mirtazapine  15 mg Oral Nightly    naloxegol  12.5 mg Oral QAM    nicotine  1 patch Transdermal Daily    primidone  50 mg Oral BID    rosuvastatin  20 mg Oral Nightly    senna  2 tablet Oral Nightly    tamsulosin  0.4 mg Oral Daily    tiotropium  2 puff Inhalation Daily     Continuous Infusions:  PRN Meds:sodium chloride flush, acetaminophen, cyclobenzaprine, polyethylene glycol, sodium chloride flush, albuterol, bisacodyl, glucagon (rDNA), glucose, ondansetron, oxyCODONE-acetaminophen    Review of Systems  Pertinent items are noted in HPI. Objective:     No data found. I/O last 3 completed shifts:   In: 2090 [P.O.:2090]  Out: 2100 [Urine:2100]  I/O this shift:  In: 240 [P.O.:240]  Out: -     BP (!) 155/73   Pulse 84   Temp 98.5 °F (36.9 °C) (Oral)   Resp 16   Ht 6' 2\" (1.88 m)   Wt (!) 320 lb 6.4 oz (145.3 kg)   SpO2 98%   BMI 41.14 kg/m²     Head: Normocephalic, without obvious abnormality, atraumatic  Lungs: clear to auscultation bilaterally  Heart: regular rate and rhythm, S1, S2 normal, no murmur, click, rub or gallop  Abdomen: soft, non-tender; bowel sounds normal; no masses,  no organomegaly  Extremities: the lower legs are wrapped and the feet have 2+ edema bilaterally  Skin: Skin color, texture, turgor normal. No rashes or lesions  Neurologic: Grossly normal      Electronically signed by Beth Mccormack MD on 3/8/2021 at 9:41 PM

## 2021-03-09 NOTE — PROGRESS NOTES
Patient: Patricia Brown  Unit/Bed: 4U-47/200-B  YOB: 1965  MRN: [id number] Acct: [de-identified]   Admitting Diagnosis: Burst fracture of fourth thoracic vertebra [facility]) [A82.689I]  Admit Date:  2/24/2021  Hospital Day: 13    Assessment:     Active Problems:    Seizure-like activity ([address]    Burst fracture of fourth thoracic vertebra ([address]    Encephalopathy    Jerking  Resolved Problems:    * No resolved hospital problems. *      Plan:     Repeat the urine tomorrow for the presence of the RBC. Change the benadryl to tid scheduled to see if it helps the itching more.         Subjective:     Patient has no complaint of CP or SOB, he states that the itching continues and not helped much with the hydrocortisone cream. Medication side effects: none    Scheduled Meds:   tamsulosin  0.4 mg Oral Daily    levETIRAcetam  500 mg Oral BID    insulin glargine  84 Units Subcutaneous Nightly    insulin lispro  10 Units Subcutaneous TID AC    hydrocortisone   Topical 4x Daily    [Held by provider] bumetanide  1 mg Intravenous Q12H    sodium chloride flush  10 mL Intravenous BID    topiramate  25 mg Oral BID    enoxaparin  40 mg Subcutaneous Q24H    polyethylene glycol  17 g Oral Daily    amLODIPine  5 mg Oral Daily    docusate sodium  100 mg Oral BID    DULoxetine  60 mg Oral Daily    famotidine  20 mg Oral Daily    folic acid  1 mg Oral Daily    gabapentin  300 mg Oral Nightly    insulin lispro  0-3 Units Subcutaneous Nightly    insulin lispro  0-6 Units Subcutaneous TID WC    latanoprost  1 drop Both Eyes Nightly    lidocaine  3 patch Transdermal Daily    mirtazapine  15 mg Oral Nightly    naloxegol  12.5 mg Oral QAM    nicotine  1 patch Transdermal Daily    primidone  50 mg Oral BID    rosuvastatin  20 mg Oral Nightly    senna  2 tablet Oral Nightly    tiotropium  2 puff Inhalation Daily     Continuous Infusions:  PRN Meds:diphenhydrAMINE, sodium chloride flush, acetaminophen, cyclobenzaprine, polyethylene glycol, sodium chloride flush, albuterol, bisacodyl, glucagon (rDNA), glucose, ondansetron, oxyCODONE-acetaminophen    Review of Systems  Pertinent items are noted in HPI. Objective:     Patient Vitals for the past 8 hrs:   BP Pulse Resp SpO2   03/09/21 0949 -- -- -- 98 %   03/09/21 0901 (!) 141/79 88 18 98 %     I/O last 3 completed shifts: In: 2380 [P.O.:2380]  Out: 1625 [Urine:1625]  I/O this shift:  In: -   Out: 450 [Urine:450]    BP (!) 141/79   Pulse 88   Temp 98.4 °F (36.9 °C) (Oral)   Resp 18   Ht 6' 2\" (1.88 m)   Wt (!) 322 lb 14.4 oz (146.5 kg)   SpO2 98%   BMI 41.46 kg/m²     General appearance: alert, appears stated age and cooperative  Head: Normocephalic, without obvious abnormality, atraumatic  Lungs: clear to auscultation bilaterally  Chest wall: no tenderness  Heart: regular rate and rhythm, S1, S2 normal, no murmur, click, rub or gallop  Abdomen: soft, non-tender; bowel sounds normal; no masses,  no organomegaly  Extremities: there is 1-2 + edema to the lower legs  Skin: it's difficult for me to see the rash when he points the area's out  Neurologic: weak    Data Review:   Results for Gabbi Stubbs" (MRN [id number]) as of 3/9/2021 14:12   Ref. Range 3/8/2021 17:15 3/8/2021 20:48 3/8/2021 22:35 3/9/2021 08:37 3/9/2021 11:27   POC Glucose Latest Ref Range: 70 - 108 mg/dl 245 (H) 270 (H) 228 (H) 253 (H) 147 (H)     Results for Gabbi Stubbs" (MRN [id number]) as of 3/9/2021 14:12   Ref.  Range 3/9/2021 04:05   Color, UA Latest Ref Range: STRAW-YELLOW  YELLOW   Glucose, UA Latest Ref Range: NEGATIVE mg/dl NEGATIVE   Bilirubin, Urine Latest Ref Range: NEGATIVE  NEGATIVE   Ketones, Urine Latest Ref Range: NEGATIVE  NEGATIVE   Blood, Urine Latest Ref Range: NEGATIVE  MODERATE (A)   pH, UA Latest Ref Range: 5.0 - 9.0  5.5   Protein, UA Latest Ref Range: NEGATIVE  NEGATIVE   Urobilinogen, Urine Latest Ref Range: 0.0 - 1.0 eu/dl 1.0   Nitrite, Urine Latest Ref Range: NEGATIVE  NEGATIVE   Leukocyte Esterase, Urine Latest Ref Range: NEGATIVE  NEGATIVE   Casts UA Latest Ref Range: NONE SEEN /lpf NONE SEEN   CASTS 2 Latest Ref Range: NONE SEEN /lpf NONE SEEN   WBC, UA Latest Ref Range: 0-4/hpf /hpf 0-2   RBC, UA Latest Ref Range: 0-2/hpf /hpf 25-50   Epithelial Cells, UA Latest Ref Range: 3-5/hpf /hpf 0-2   Renal Epithelial, UA Latest Ref Range: NONE SEEN  NONE SEEN   Bacteria, UA Latest Ref Range: FEW/NONE SEEN /hpf NONE SEEN   Yeast, Urine Latest Ref Range: NONE SEEN  NONE SEEN   Crystals, UA Latest Ref Range: NONE SEEN  NONE SEEN   Character, Urine Latest Ref Range: CLEAR-SL CLOUD  CLEAR       Electronically signed by Beth Mccormack MD on 3/9/2021 at 2:05 PM
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
## 2021-03-10 LAB
ANION GAP SERPL CALCULATED.3IONS-SCNC: 9 MEQ/L (ref 8–16)
BACTERIA: ABNORMAL
BILIRUBIN URINE: NEGATIVE
BLOOD, URINE: ABNORMAL
BUN BLDV-MCNC: 30 MG/DL (ref 7–22)
CALCIUM SERPL-MCNC: 8.9 MG/DL (ref 8.5–10.5)
CASTS: ABNORMAL /LPF
CHARACTER, URINE: CLEAR
CHLORIDE BLD-SCNC: 110 MEQ/L (ref 98–111)
CO2: 22 MEQ/L (ref 23–33)
COLOR: YELLOW
CREAT SERPL-MCNC: 1.4 MG/DL (ref 0.4–1.2)
CRYSTALS: ABNORMAL
EPITHELIAL CELLS, UA: ABNORMAL /HPF
GFR SERPL CREATININE-BSD FRML MDRD: 53 ML/MIN/1.73M2
GLUCOSE BLD-MCNC: 140 MG/DL (ref 70–108)
GLUCOSE BLD-MCNC: 149 MG/DL (ref 70–108)
GLUCOSE BLD-MCNC: 152 MG/DL (ref 70–108)
GLUCOSE BLD-MCNC: 154 MG/DL (ref 70–108)
GLUCOSE BLD-MCNC: 167 MG/DL (ref 70–108)
GLUCOSE BLD-MCNC: 184 MG/DL (ref 70–108)
GLUCOSE, URINE: NEGATIVE MG/DL
KETONES, URINE: NEGATIVE
LEUKOCYTE EST, POC: ABNORMAL
NITRITE, URINE: NEGATIVE
PH UA: 5.5 (ref 5–9)
POTASSIUM REFLEX MAGNESIUM: 3.8 MEQ/L (ref 3.5–5.2)
PROTEIN UA: NEGATIVE MG/DL
RBC URINE: ABNORMAL /HPF
SODIUM BLD-SCNC: 141 MEQ/L (ref 135–145)
SPECIFIC GRAVITY UA: 1.02 (ref 1–1.03)
UROBILINOGEN, URINE: 1 EU/DL (ref 0–1)
WBC UA: ABNORMAL /HPF

## 2021-03-10 PROCEDURE — 94760 N-INVAS EAR/PLS OXIMETRY 1: CPT

## 2021-03-10 PROCEDURE — 99232 SBSQ HOSP IP/OBS MODERATE 35: CPT | Performed by: NURSE PRACTITIONER

## 2021-03-10 PROCEDURE — 94640 AIRWAY INHALATION TREATMENT: CPT

## 2021-03-10 PROCEDURE — 6360000002 HC RX W HCPCS: Performed by: PHYSICIAN ASSISTANT

## 2021-03-10 PROCEDURE — 97530 THERAPEUTIC ACTIVITIES: CPT

## 2021-03-10 PROCEDURE — 6370000000 HC RX 637 (ALT 250 FOR IP): Performed by: PSYCHIATRY & NEUROLOGY

## 2021-03-10 PROCEDURE — 97130 THER IVNTJ EA ADDL 15 MIN: CPT

## 2021-03-10 PROCEDURE — 6370000000 HC RX 637 (ALT 250 FOR IP): Performed by: NURSE PRACTITIONER

## 2021-03-10 PROCEDURE — 97110 THERAPEUTIC EXERCISES: CPT

## 2021-03-10 PROCEDURE — 81001 URINALYSIS AUTO W/SCOPE: CPT

## 2021-03-10 PROCEDURE — 82948 REAGENT STRIP/BLOOD GLUCOSE: CPT

## 2021-03-10 PROCEDURE — 1180000000 HC REHAB R&B

## 2021-03-10 PROCEDURE — 97116 GAIT TRAINING THERAPY: CPT

## 2021-03-10 PROCEDURE — 97535 SELF CARE MNGMENT TRAINING: CPT

## 2021-03-10 PROCEDURE — 6370000000 HC RX 637 (ALT 250 FOR IP): Performed by: PHYSICAL MEDICINE & REHABILITATION

## 2021-03-10 PROCEDURE — 80048 BASIC METABOLIC PNL TOTAL CA: CPT

## 2021-03-10 PROCEDURE — 36415 COLL VENOUS BLD VENIPUNCTURE: CPT

## 2021-03-10 PROCEDURE — 6370000000 HC RX 637 (ALT 250 FOR IP): Performed by: FAMILY MEDICINE

## 2021-03-10 PROCEDURE — 97129 THER IVNTJ 1ST 15 MIN: CPT

## 2021-03-10 PROCEDURE — 6370000000 HC RX 637 (ALT 250 FOR IP): Performed by: INTERNAL MEDICINE

## 2021-03-10 PROCEDURE — 99232 SBSQ HOSP IP/OBS MODERATE 35: CPT | Performed by: INTERNAL MEDICINE

## 2021-03-10 RX ORDER — DIPHENHYDRAMINE HYDROCHLORIDE, ZINC ACETATE 2; .1 G/100G; G/100G
CREAM TOPICAL 3 TIMES DAILY PRN
Status: DISCONTINUED | OUTPATIENT
Start: 2021-03-10 | End: 2021-03-12 | Stop reason: HOSPADM

## 2021-03-10 RX ADMIN — OXYCODONE HYDROCHLORIDE AND ACETAMINOPHEN 1 TABLET: 7.5; 325 TABLET ORAL at 09:08

## 2021-03-10 RX ADMIN — DIPHENHYDRAMINE HYDROCHLORIDE, ZINC ACETATE: 2; .1 CREAM TOPICAL at 22:38

## 2021-03-10 RX ADMIN — INSULIN GLARGINE 84 UNITS: 100 INJECTION, SOLUTION SUBCUTANEOUS at 22:34

## 2021-03-10 RX ADMIN — ROSUVASTATIN CALCIUM 20 MG: 20 TABLET, FILM COATED ORAL at 22:29

## 2021-03-10 RX ADMIN — INSULIN LISPRO 1 UNITS: 100 INJECTION, SOLUTION INTRAVENOUS; SUBCUTANEOUS at 22:34

## 2021-03-10 RX ADMIN — DIPHENHYDRAMINE HCL 25 MG: 25 TABLET ORAL at 14:22

## 2021-03-10 RX ADMIN — FAMOTIDINE 20 MG: 20 TABLET, FILM COATED ORAL at 08:54

## 2021-03-10 RX ADMIN — LATANOPROST 1 DROP: 50 SOLUTION OPHTHALMIC at 22:31

## 2021-03-10 RX ADMIN — AMLODIPINE BESYLATE 5 MG: 5 TABLET ORAL at 08:54

## 2021-03-10 RX ADMIN — DIPHENHYDRAMINE HCL 25 MG: 25 TABLET ORAL at 08:54

## 2021-03-10 RX ADMIN — CYCLOBENZAPRINE HYDROCHLORIDE 10 MG: 10 TABLET, FILM COATED ORAL at 08:55

## 2021-03-10 RX ADMIN — PRIMIDONE 50 MG: 50 TABLET ORAL at 08:53

## 2021-03-10 RX ADMIN — TOPIRAMATE 25 MG: 25 TABLET, FILM COATED ORAL at 22:30

## 2021-03-10 RX ADMIN — DIPHENHYDRAMINE HCL 25 MG: 25 TABLET ORAL at 22:34

## 2021-03-10 RX ADMIN — LEVETIRACETAM 500 MG: 500 TABLET, FILM COATED ORAL at 22:30

## 2021-03-10 RX ADMIN — NALOXEGOL OXALATE 12.5 MG: 12.5 TABLET, FILM COATED ORAL at 08:53

## 2021-03-10 RX ADMIN — TOPIRAMATE 25 MG: 25 TABLET, FILM COATED ORAL at 08:53

## 2021-03-10 RX ADMIN — TIOTROPIUM BROMIDE INHALATION SPRAY 2 PUFF: 3.12 SPRAY, METERED RESPIRATORY (INHALATION) at 07:47

## 2021-03-10 RX ADMIN — PRIMIDONE 50 MG: 50 TABLET ORAL at 22:30

## 2021-03-10 RX ADMIN — INSULIN LISPRO 1 UNITS: 100 INJECTION, SOLUTION INTRAVENOUS; SUBCUTANEOUS at 12:19

## 2021-03-10 RX ADMIN — MIRTAZAPINE 15 MG: 15 TABLET, FILM COATED ORAL at 22:30

## 2021-03-10 RX ADMIN — INSULIN LISPRO 1 UNITS: 100 INJECTION, SOLUTION INTRAVENOUS; SUBCUTANEOUS at 17:26

## 2021-03-10 RX ADMIN — GABAPENTIN 300 MG: 300 CAPSULE ORAL at 22:30

## 2021-03-10 RX ADMIN — INSULIN LISPRO 1 UNITS: 100 INJECTION, SOLUTION INTRAVENOUS; SUBCUTANEOUS at 09:04

## 2021-03-10 RX ADMIN — FOLIC ACID 1 MG: 1 TABLET ORAL at 08:53

## 2021-03-10 RX ADMIN — TAMSULOSIN HYDROCHLORIDE 0.4 MG: 0.4 CAPSULE ORAL at 08:53

## 2021-03-10 RX ADMIN — POLYETHYLENE GLYCOL 3350 17 G: 17 POWDER, FOR SOLUTION ORAL at 08:53

## 2021-03-10 RX ADMIN — DULOXETINE HYDROCHLORIDE 60 MG: 60 CAPSULE, DELAYED RELEASE ORAL at 08:54

## 2021-03-10 RX ADMIN — ENOXAPARIN SODIUM 40 MG: 40 INJECTION, SOLUTION INTRAVENOUS; SUBCUTANEOUS at 14:22

## 2021-03-10 RX ADMIN — DOCUSATE SODIUM 100 MG: 100 CAPSULE, LIQUID FILLED ORAL at 08:54

## 2021-03-10 RX ADMIN — LEVETIRACETAM 500 MG: 500 TABLET, FILM COATED ORAL at 08:53

## 2021-03-10 ASSESSMENT — PAIN DESCRIPTION - PROGRESSION
CLINICAL_PROGRESSION: NOT CHANGED

## 2021-03-10 ASSESSMENT — PAIN SCALES - GENERAL
PAINLEVEL_OUTOF10: 9
PAINLEVEL_OUTOF10: 7
PAINLEVEL_OUTOF10: 8
PAINLEVEL_OUTOF10: 7
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
## 2021-03-10 NOTE — PROGRESS NOTES
Guard Assistance  Distance: 70 feet x 2  Surface: Level Tile  Device:Rolling Walker  Gait Deviations:  Slow Joanna, Decreased Step Length on Right, Mild Path Deviations to L, verbal and tactile cues to guide RW. Patient able to complete distances without L knee buckling or tremors. Stairs:  Platform:  4\" platform X 3 using Parallel Bars and Minimal Assistance, with cues for safety. Patient unable to tell if toes are over edge of step when decending. Required V/C to decend with appropriate LE.:      Wheelchair Mobility:  Stand By Assistance  Extremities Used: Bilateral Upper Extremities  Type of Chair:Manual  Surface: Level Tile  Distance: 140 feet x 2  Quality: Short Strokes, Decreased Fluidity and V/C required due to low vision. Exercise:  Patient was guided in 1 set(s) 10 reps of exercise to both lower extremities. Glut sets, Seated hamstring curls, Seated heel/toe raises, Long arc quads and Seated core isolation. Core isolation and L ankle dorsiflexion challenging. Exercises were completed for increased independence with functional mobility. Functional Outcome Measures: Not completed       ASSESSMENT:  Assessment: Patient progressing toward established goals. and completed therapy today without instances of LLE tremors. Patient required increased verbal cues for completion of tasks initially, requiring less as session progressed. Activity Tolerance:  Patient tolerance of  treatment: good.   Equipment Recommendations:Equipment Needed: Yes  Mobility Devices: Cristine Drivers, Wheelchair  Walker: Rolling(bariatric)  Wheelchair: Standard  Discharge Recommendations: Continue to assess pending progress, Home with Home health PT    Plan: Times per week: 5x/wk 90 min, 1x/wk 30 min  Current Treatment Recommendations: Strengthening, Transfer Training, Balance Training, Gait Training, Functional Mobility Training, Equipment Evaluation, Education, & procurement, Safety Education & Training, Patient/Caregiver Education & Training, Endurance Training, Home Exercise Program    Patient Education  Patient Education: Transfers, Gait, Stairs, - Patient Requires Continued Education    Goals:  Patient goals : To go home. Short term goals  Time Frame for Short term goals: 1 week  Short term goal 1: Pt will perform supine to/from sit transfer with S for improved home bed mobility. Short term goal 2: Pt will perform sit to/from stand with wheeled walker and CGA consistently for improved independence with home transfers. Short term goal 3: Pt will ambulate 25 ft [facility] for home distances. Short term goal 4: Pt will perform car transfer with min A x1 in preparation for transportation needs. Short term goal 5: Pt will navigate 1 4\" step in parallel bars with minAx1 for safe home entry. Long term goals  Time Frame for Long term goals : 3 weeks  Long term goal 1: Pt will perform supine to/from sit transfer with mod I for improved home bed mobility. Long term goal 2: Pt will perform sit to/from stand with wheeled walker with mod I for improved independence with home transfers. Long term goal 3: Pt will ambulate 50 ft with wheeled walker at supervision in preparation for home distances. Long term goal 4: Pt will perform car transfer with supervision in preparation for transportation needs. Long term goal 5: Pt will navigate 1 step with LRAD with supervision for safe home entry. Following session, patient left in safe position with all fall risk precautions in place.     Treatment session and note completed by HAYDER Funes under supervision of signing therapist.
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
## 2021-03-10 NOTE — PROGRESS NOTES
90 min and 1x/wk for 30 min  Current Treatment Recommendations: Strengthening, Patient/Caregiver Education & Training, Home Management Training, Balance Training, Functional Mobility Training, Endurance Training, Safety Education & Training, Self-Care / ADL, Equipment Evaluation, Education, & procurement, Neuromuscular Re-education    Patient Education  Patient Education: ADL's and Home Exercise Program , RW safety     Goals  Short term goals  Time Frame for Short term goals: 1 week  Short term goal 1: Pt will use LHAE to complete LB dressing with consistent CGA to improve indep with self care. Short term goal 2: Pt will tolerate 2 minutes of standing tasks with unilateral release with CGA of 1 to improve indep with sinkside grooming tasks. Short term goal 3: Pt will navigate RW to bathroom with CGA of 1, and min vcs for LLE attention/placement and wheelchair follow, to improve indep with toileting. Short term goal 4: Pt will complete homemaking tasks using wheelchair with no > min cues for attention to task to increase ability to retrieve items for dressing tasks  Short term goal 5: Pt will attend and complete 3 step task in minimally distracted environment to improve attention to task during BADL routine. Long term goals  Time Frame for Long term goals : 3 weeks  Long term goal 1: Pt will complete toileting tasks with CGA using adaptive tech for hygiene to improve indep with self care. Long term goal 2: Pt will complete bathing and dressing tasks with set up to return to sponge bathing and dressing with AE at home. Long term goal 3: Pt will complete stand pivot transfers with SBA and 0 vcs for safety to improve indep with transferring self to [facility] in middle of night alone. Following session, patient left in safe position with all fall risk precautions in place.

## 2021-03-10 NOTE — PLAN OF CARE
Problem: Falls - Risk of:  Goal: Will remain free from falls  Description: Will remain free from falls  Yudy Buchanan will be used. Call light in reach.  Remind him to use when he needs something  Outcome: Ongoing     Problem: Skin Integrity:  Goal: Will show no infection signs and symptoms  Description: Will show no infection signs and symptoms  Will remind him to change position frequently and try to use pillows as support  Outcome: Ongoing

## 2021-03-11 LAB
GLUCOSE BLD-MCNC: 141 MG/DL (ref 70–108)
GLUCOSE BLD-MCNC: 150 MG/DL (ref 70–108)
GLUCOSE BLD-MCNC: 208 MG/DL (ref 70–108)
GLUCOSE BLD-MCNC: 382 MG/DL (ref 70–108)

## 2021-03-11 PROCEDURE — 6360000002 HC RX W HCPCS: Performed by: INTERNAL MEDICINE

## 2021-03-11 PROCEDURE — 82948 REAGENT STRIP/BLOOD GLUCOSE: CPT

## 2021-03-11 PROCEDURE — 97116 GAIT TRAINING THERAPY: CPT

## 2021-03-11 PROCEDURE — 6370000000 HC RX 637 (ALT 250 FOR IP): Performed by: INTERNAL MEDICINE

## 2021-03-11 PROCEDURE — 97130 THER IVNTJ EA ADDL 15 MIN: CPT

## 2021-03-11 PROCEDURE — 6370000000 HC RX 637 (ALT 250 FOR IP): Performed by: FAMILY MEDICINE

## 2021-03-11 PROCEDURE — 6360000002 HC RX W HCPCS: Performed by: PHYSICIAN ASSISTANT

## 2021-03-11 PROCEDURE — 6370000000 HC RX 637 (ALT 250 FOR IP): Performed by: PHYSICAL MEDICINE & REHABILITATION

## 2021-03-11 PROCEDURE — 6370000000 HC RX 637 (ALT 250 FOR IP): Performed by: PSYCHIATRY & NEUROLOGY

## 2021-03-11 PROCEDURE — 1180000000 HC REHAB R&B

## 2021-03-11 PROCEDURE — 97530 THERAPEUTIC ACTIVITIES: CPT

## 2021-03-11 PROCEDURE — 6370000000 HC RX 637 (ALT 250 FOR IP): Performed by: NURSE PRACTITIONER

## 2021-03-11 PROCEDURE — 97110 THERAPEUTIC EXERCISES: CPT

## 2021-03-11 PROCEDURE — 2580000003 HC RX 258: Performed by: PHYSICAL MEDICINE & REHABILITATION

## 2021-03-11 PROCEDURE — 97129 THER IVNTJ 1ST 15 MIN: CPT

## 2021-03-11 PROCEDURE — 99232 SBSQ HOSP IP/OBS MODERATE 35: CPT | Performed by: INTERNAL MEDICINE

## 2021-03-11 PROCEDURE — 99232 SBSQ HOSP IP/OBS MODERATE 35: CPT | Performed by: NURSE PRACTITIONER

## 2021-03-11 PROCEDURE — 94760 N-INVAS EAR/PLS OXIMETRY 1: CPT

## 2021-03-11 PROCEDURE — 94640 AIRWAY INHALATION TREATMENT: CPT

## 2021-03-11 PROCEDURE — 2500000003 HC RX 250 WO HCPCS: Performed by: INTERNAL MEDICINE

## 2021-03-11 PROCEDURE — 97542 WHEELCHAIR MNGMENT TRAINING: CPT

## 2021-03-11 PROCEDURE — 97535 SELF CARE MNGMENT TRAINING: CPT

## 2021-03-11 RX ORDER — METHYLPREDNISOLONE SODIUM SUCCINATE 125 MG/2ML
60 INJECTION, POWDER, LYOPHILIZED, FOR SOLUTION INTRAMUSCULAR; INTRAVENOUS EVERY 8 HOURS
Status: COMPLETED | OUTPATIENT
Start: 2021-03-11 | End: 2021-03-12

## 2021-03-11 RX ORDER — BUMETANIDE 0.25 MG/ML
1 INJECTION, SOLUTION INTRAMUSCULAR; INTRAVENOUS ONCE
Status: COMPLETED | OUTPATIENT
Start: 2021-03-11 | End: 2021-03-11

## 2021-03-11 RX ADMIN — INSULIN LISPRO 1 UNITS: 100 INJECTION, SOLUTION INTRAVENOUS; SUBCUTANEOUS at 08:42

## 2021-03-11 RX ADMIN — ENOXAPARIN SODIUM 40 MG: 40 INJECTION, SOLUTION INTRAVENOUS; SUBCUTANEOUS at 16:12

## 2021-03-11 RX ADMIN — METHYLPREDNISOLONE SODIUM SUCCINATE 60 MG: 125 INJECTION, POWDER, FOR SOLUTION INTRAMUSCULAR; INTRAVENOUS at 22:19

## 2021-03-11 RX ADMIN — FAMOTIDINE 20 MG: 20 TABLET, FILM COATED ORAL at 10:38

## 2021-03-11 RX ADMIN — LEVETIRACETAM 500 MG: 500 TABLET, FILM COATED ORAL at 22:12

## 2021-03-11 RX ADMIN — INSULIN LISPRO 1 UNITS: 100 INJECTION, SOLUTION INTRAVENOUS; SUBCUTANEOUS at 13:49

## 2021-03-11 RX ADMIN — ACETAMINOPHEN 650 MG: 325 TABLET ORAL at 22:14

## 2021-03-11 RX ADMIN — DIPHENHYDRAMINE HCL 25 MG: 25 TABLET ORAL at 10:44

## 2021-03-11 RX ADMIN — TAMSULOSIN HYDROCHLORIDE 0.4 MG: 0.4 CAPSULE ORAL at 10:38

## 2021-03-11 RX ADMIN — LATANOPROST 1 DROP: 50 SOLUTION OPHTHALMIC at 22:14

## 2021-03-11 RX ADMIN — DIPHENHYDRAMINE HYDROCHLORIDE, ZINC ACETATE: 2; .1 CREAM TOPICAL at 23:46

## 2021-03-11 RX ADMIN — BUMETANIDE 1 MG: 0.25 INJECTION, SOLUTION INTRAMUSCULAR; INTRAVENOUS at 13:47

## 2021-03-11 RX ADMIN — SODIUM CHLORIDE, PRESERVATIVE FREE 10 ML: 5 INJECTION INTRAVENOUS at 13:47

## 2021-03-11 RX ADMIN — ROSUVASTATIN CALCIUM 20 MG: 20 TABLET, FILM COATED ORAL at 22:12

## 2021-03-11 RX ADMIN — TOPIRAMATE 25 MG: 25 TABLET, FILM COATED ORAL at 22:13

## 2021-03-11 RX ADMIN — GABAPENTIN 300 MG: 300 CAPSULE ORAL at 22:12

## 2021-03-11 RX ADMIN — DULOXETINE HYDROCHLORIDE 60 MG: 60 CAPSULE, DELAYED RELEASE ORAL at 10:38

## 2021-03-11 RX ADMIN — NALOXEGOL OXALATE 12.5 MG: 12.5 TABLET, FILM COATED ORAL at 10:38

## 2021-03-11 RX ADMIN — DOCUSATE SODIUM 100 MG: 100 CAPSULE, LIQUID FILLED ORAL at 22:14

## 2021-03-11 RX ADMIN — SODIUM CHLORIDE, PRESERVATIVE FREE 10 ML: 5 INJECTION INTRAVENOUS at 22:14

## 2021-03-11 RX ADMIN — DOCUSATE SODIUM 100 MG: 100 CAPSULE, LIQUID FILLED ORAL at 10:45

## 2021-03-11 RX ADMIN — OXYCODONE HYDROCHLORIDE AND ACETAMINOPHEN 1 TABLET: 7.5; 325 TABLET ORAL at 05:07

## 2021-03-11 RX ADMIN — INSULIN GLARGINE 84 UNITS: 100 INJECTION, SOLUTION SUBCUTANEOUS at 22:15

## 2021-03-11 RX ADMIN — LEVETIRACETAM 500 MG: 500 TABLET, FILM COATED ORAL at 10:46

## 2021-03-11 RX ADMIN — MIRTAZAPINE 15 MG: 15 TABLET, FILM COATED ORAL at 22:12

## 2021-03-11 RX ADMIN — SENNOSIDES 17.2 MG: 8.6 TABLET, FILM COATED ORAL at 22:14

## 2021-03-11 RX ADMIN — INSULIN LISPRO 2 UNITS: 100 INJECTION, SOLUTION INTRAVENOUS; SUBCUTANEOUS at 17:30

## 2021-03-11 RX ADMIN — TIOTROPIUM BROMIDE INHALATION SPRAY 2 PUFF: 3.12 SPRAY, METERED RESPIRATORY (INHALATION) at 07:35

## 2021-03-11 RX ADMIN — DIPHENHYDRAMINE HYDROCHLORIDE, ZINC ACETATE: 2; .1 CREAM TOPICAL at 18:23

## 2021-03-11 RX ADMIN — AMLODIPINE BESYLATE 5 MG: 5 TABLET ORAL at 10:37

## 2021-03-11 RX ADMIN — TOPIRAMATE 25 MG: 25 TABLET, FILM COATED ORAL at 10:47

## 2021-03-11 RX ADMIN — DIPHENHYDRAMINE HCL 25 MG: 25 TABLET ORAL at 22:14

## 2021-03-11 RX ADMIN — PRIMIDONE 50 MG: 50 TABLET ORAL at 10:46

## 2021-03-11 RX ADMIN — METHYLPREDNISOLONE SODIUM SUCCINATE 60 MG: 125 INJECTION, POWDER, FOR SOLUTION INTRAMUSCULAR; INTRAVENOUS at 13:48

## 2021-03-11 RX ADMIN — FOLIC ACID 1 MG: 1 TABLET ORAL at 10:38

## 2021-03-11 RX ADMIN — INSULIN LISPRO 3 UNITS: 100 INJECTION, SOLUTION INTRAVENOUS; SUBCUTANEOUS at 22:15

## 2021-03-11 RX ADMIN — CYCLOBENZAPRINE HYDROCHLORIDE 10 MG: 10 TABLET, FILM COATED ORAL at 05:07

## 2021-03-11 RX ADMIN — PRIMIDONE 50 MG: 50 TABLET ORAL at 22:12

## 2021-03-11 ASSESSMENT — PAIN DESCRIPTION - PAIN TYPE: TYPE: OTHER (COMMENT)

## 2021-03-11 ASSESSMENT — PAIN DESCRIPTION - ONSET
ONSET: ON-GOING
ONSET: ON-GOING

## 2021-03-11 ASSESSMENT — PAIN DESCRIPTION - ORIENTATION: ORIENTATION: LOWER

## 2021-03-11 ASSESSMENT — PAIN DESCRIPTION - PROGRESSION: CLINICAL_PROGRESSION: NOT CHANGED

## 2021-03-11 ASSESSMENT — PAIN - FUNCTIONAL ASSESSMENT: PAIN_FUNCTIONAL_ASSESSMENT: ACTIVITIES ARE NOT PREVENTED

## 2021-03-11 ASSESSMENT — PAIN DESCRIPTION - LOCATION: LOCATION: BACK

## 2021-03-11 ASSESSMENT — PAIN SCALES - GENERAL: PAINLEVEL_OUTOF10: 6
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
## 2021-03-11 NOTE — PROGRESS NOTES
Patient: Yovany Pringle  Unit/Bed: 5H-38/153-V  YOB: 1965  MRN: [id number] Acct: [de-identified]   Admitting Diagnosis: Burst fracture of fourth thoracic vertebra [facility]) [A32.852Z]  Admit Date:  2/24/2021  Hospital Day: 15    Assessment:     Active Problems:    Seizure-like activity ([address]    Burst fracture of fourth thoracic vertebra ([address]    Encephalopathy    Jerking  Resolved Problems:    * No resolved hospital problems. *      Plan:     I spoke to [place] and discussed using some IV steroid for the rash. Subjective:     Patient has no complaint of CP or SOB but that the rash is worsening. .   Medication side effects: none    Scheduled Meds:   bumetanide  1 mg Intravenous Once    methylPREDNISolone  60 mg Intravenous Q8H    tamsulosin  0.4 mg Oral Daily    levETIRAcetam  500 mg Oral BID    diphenhydrAMINE  25 mg Oral TID    insulin glargine  84 Units Subcutaneous Nightly    insulin lispro  10 Units Subcutaneous TID AC    [Held by provider] bumetanide  1 mg Intravenous Q12H    sodium chloride flush  10 mL Intravenous BID    topiramate  25 mg Oral BID    enoxaparin  40 mg Subcutaneous Q24H    polyethylene glycol  17 g Oral Daily    amLODIPine  5 mg Oral Daily    docusate sodium  100 mg Oral BID    DULoxetine  60 mg Oral Daily    famotidine  20 mg Oral Daily    folic acid  1 mg Oral Daily    gabapentin  300 mg Oral Nightly    insulin lispro  0-3 Units Subcutaneous Nightly    insulin lispro  0-6 Units Subcutaneous TID     latanoprost  1 drop Both Eyes Nightly    lidocaine  3 patch Transdermal Daily    mirtazapine  15 mg Oral Nightly    naloxegol  12.5 mg Oral QAM    nicotine  1 patch Transdermal Daily    primidone  50 mg Oral BID    rosuvastatin  20 mg Oral Nightly    senna  2 tablet Oral Nightly    tiotropium  2 puff Inhalation Daily     Continuous Infusions:  PRN Meds:diphenhydrAMINE-zinc acetate, oxyCODONE-acetaminophen, sodium chloride flush, acetaminophen,
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
## 2021-03-11 NOTE — PROGRESS NOTES
Pharmacy consult from Dr Real Arenas to review medications to identify cause of rash    Per 7E RN patient developed rash and itching around 3/5/21    Reviewed new medications started this admission (excluded home medications) which include:    Topiramate - started 2/26/21 -can cause severe skin reactions such as Jose-Tommy. Rash and itching occur in 1 to 4%  Nicotine patch - started 2/24/21 - skin rash locally where patch is applied  Naloxegol - started 2/21/21 - <1%, postmarketing, and/or case reports: skin rash, urticaria  Levetiracetam - started 3/3/21 - postmarketing reports of erythema multiforme, maculopapular rash, Webb-Tommy syndrome   Famotidine -started 2/18/21 - rash - < 1% (patient has had previous admission)  Enoxaparin -  started 2/24/21 - has been associated with pruritis and rash (patient has had previous admission)  Cyclobenzaprine - started 2/25/21 - rash occurs in < 1%    Based on timing, it appears levetiracetam is the most likely medication to cause of this patient's itching and rash. Levetiracetam 500mg BID started on 3/3/21. Due to seizure activity, levetiracetam increased to 750mg BID with 1000mg IV x1 given on 3/5/21. On 3/9/21, dose decreased to 500mg due to itching and rash. One 7E nurse feels rash is better today than it was which could correspond with dose reduction. Topiramate is next most likely to cause rash and itching. It was started on 2/26/21 for migraine prophylaxis but also is beneficial for seizures.     Used [employer] as reference    Eugenio Brown, Casey, BCPS 3/11/2021 2:10 PM
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
## 2021-03-11 NOTE — PROGRESS NOTES
increase memory retention of medical and daily information  INTERVENTIONS:   WRAP recall strategies  -/ndep    STM fake appointment-4 unit recall medication directions and side effects  Immediate:  indep visual cues  -delay (10 minute)  indep  -delay (15 minutes):  indep  *excellent success this date       SHORT TERM GOAL #3:    Goal 3: Pt will complete HIGH level/complex verbal reasoning, problem solving, and executive functioning with 85% accuracy at a modified independent leve to increase IADL contribution. INTERVENTIONS:     Problem Solving (Safety Scenarios):  - ID problem  indep  -Rationale:  indep  *good insight    SHORT TERM GOAL #4:   Goal 4: Pt will complete sequencing tasks (i.e transfers) with 85% accuracy at a modified independent level in order to improve safety within transfers and particicpation within ADL/IADL  INTERVENTIONS:     Sequencing Transfer:  TASK 1: (Bed to chair)  -pt indep recall of safety transfer in chronological order with specific details. TASK 2: (Walking up the stairs)  -indep recall of utilizing strategies to complete task in chronological order, required min cues to include specific details. *good success this date  *good insight into deficits and rationale for strategies.     Long-Term Goals:  Timeframe for Long-term Goals: 2 weeks    LONG TERM GOAL #1:  Goal 1: Pt will improve overall cognitive-linguistic skills to a Modified [place] or higher in order to safely discharge to least restrictive environment and complete ADL/IADL with least amount of supervision/assistance         Comprehension: 7 - Patient understands complex ideas (math/planning)  Expression: 7 - Patient expresses complex ideas/needs  Social Interaction: 7 - Patient has appropriate behavior/relations 100% of the time  Problem Solvin - Patient able to solve simple/routine tasks  Memory: 6 - Patient requires device to recall (e.g. memory book)     Insurance Update: Pt has met  STG and 0/1 LTG this period. Pt has demonstrated progress within the domains of immediate/delay recall (with use of memory strategies) completing sequencing tasks. Barriers to therapy include visual impairment (legally blind), as well as recall of therapy strategies during stressful situations (I.e impulsivity, concerns for fall risk). Pt continues to present with mild cognitive impairment specifically within the domains of higher level attention (divided, alternating) during stressful situations and COMPLEX reasoning, problem solving and executive functioning skills. Recommend continuation of skilled ST services to address above aforementioned impairments to improve ADL/IADL contribution and assist with re-integration into home environment. EDUCATION:  Learner: Patient  Education:  Reviewed ST goals and Plan of Care, Reviewed recommendations for follow-up, Education Related to Potential Risks and Complications Due to Impairment/Illness/Injury, Education Related to Prevention of Recurrence of Impairment/Illness/Injury, Education Related to [employer] and Wellness and Home Safety Education  Evaluation of Education: Verbalizes understanding, Demonstrates with assistance and Demonstrates without assistance    ASSESSMENT/PLAN:  Activity Tolerance:  Patient tolerance of  treatment: good. Assessment/Plan: Patient progressing toward established goals. Continues to require skilled care of licensed speech pathologist to progress toward achievement of established goals and plan of care.   Plan for Next Session: Memory, Problem Solving/Reasoning, Executive Functioning    FLORENTIN Gan, Student Intern  Joseph Curtis M.S. Dariana Lou 3/11/2021

## 2021-03-12 VITALS
DIASTOLIC BLOOD PRESSURE: 99 MMHG | HEART RATE: 90 BPM | HEIGHT: 74 IN | OXYGEN SATURATION: 96 % | SYSTOLIC BLOOD PRESSURE: 183 MMHG | WEIGHT: 315 LBS | BODY MASS INDEX: 40.43 KG/M2 | RESPIRATION RATE: 16 BRPM | TEMPERATURE: 97.6 F

## 2021-03-12 LAB
ANION GAP SERPL CALCULATED.3IONS-SCNC: 11 MEQ/L (ref 8–16)
BUN BLDV-MCNC: 38 MG/DL (ref 7–22)
CALCIUM SERPL-MCNC: 9.3 MG/DL (ref 8.5–10.5)
CHLORIDE BLD-SCNC: 106 MEQ/L (ref 98–111)
CO2: 18 MEQ/L (ref 23–33)
CREAT SERPL-MCNC: 1.6 MG/DL (ref 0.4–1.2)
GFR SERPL CREATININE-BSD FRML MDRD: 45 ML/MIN/1.73M2
GLUCOSE BLD-MCNC: 387 MG/DL (ref 70–108)
GLUCOSE BLD-MCNC: 409 MG/DL (ref 70–108)
POTASSIUM REFLEX MAGNESIUM: 4.1 MEQ/L (ref 3.5–5.2)
SODIUM BLD-SCNC: 135 MEQ/L (ref 135–145)

## 2021-03-12 PROCEDURE — 36415 COLL VENOUS BLD VENIPUNCTURE: CPT

## 2021-03-12 PROCEDURE — 97530 THERAPEUTIC ACTIVITIES: CPT

## 2021-03-12 PROCEDURE — 94760 N-INVAS EAR/PLS OXIMETRY 1: CPT

## 2021-03-12 PROCEDURE — 80048 BASIC METABOLIC PNL TOTAL CA: CPT

## 2021-03-12 PROCEDURE — 97116 GAIT TRAINING THERAPY: CPT

## 2021-03-12 PROCEDURE — 6370000000 HC RX 637 (ALT 250 FOR IP): Performed by: PHYSICAL MEDICINE & REHABILITATION

## 2021-03-12 PROCEDURE — 6370000000 HC RX 637 (ALT 250 FOR IP): Performed by: INTERNAL MEDICINE

## 2021-03-12 PROCEDURE — 99232 SBSQ HOSP IP/OBS MODERATE 35: CPT | Performed by: INTERNAL MEDICINE

## 2021-03-12 PROCEDURE — 6370000000 HC RX 637 (ALT 250 FOR IP): Performed by: FAMILY MEDICINE

## 2021-03-12 PROCEDURE — 97535 SELF CARE MNGMENT TRAINING: CPT

## 2021-03-12 PROCEDURE — 99239 HOSP IP/OBS DSCHRG MGMT >30: CPT | Performed by: NURSE PRACTITIONER

## 2021-03-12 PROCEDURE — 6360000002 HC RX W HCPCS: Performed by: INTERNAL MEDICINE

## 2021-03-12 PROCEDURE — 82948 REAGENT STRIP/BLOOD GLUCOSE: CPT

## 2021-03-12 PROCEDURE — 6370000000 HC RX 637 (ALT 250 FOR IP): Performed by: PSYCHIATRY & NEUROLOGY

## 2021-03-12 PROCEDURE — 94640 AIRWAY INHALATION TREATMENT: CPT

## 2021-03-12 RX ORDER — TOPIRAMATE 25 MG/1
25 TABLET ORAL 2 TIMES DAILY
Qty: 60 TABLET | Refills: 0 | Status: ON HOLD | OUTPATIENT
Start: 2021-03-12 | End: 2021-08-03

## 2021-03-12 RX ORDER — INSULIN GLARGINE 100 [IU]/ML
84 INJECTION, SOLUTION SUBCUTANEOUS NIGHTLY
Qty: 1 VIAL | Refills: 3
Start: 2021-03-12

## 2021-03-12 RX ORDER — POLYETHYLENE GLYCOL 3350 17 G/17G
17 POWDER, FOR SOLUTION ORAL DAILY
COMMUNITY
Start: 2021-03-12 | End: 2021-04-11

## 2021-03-12 RX ORDER — METOPROLOL SUCCINATE 50 MG/1
50 TABLET, EXTENDED RELEASE ORAL DAILY
Qty: 30 TABLET | Refills: 0 | Status: ON HOLD | OUTPATIENT
Start: 2021-03-13 | End: 2022-03-15 | Stop reason: HOSPADM

## 2021-03-12 RX ORDER — SENNA PLUS 8.6 MG/1
2 TABLET ORAL NIGHTLY
Qty: 60 TABLET | Refills: 0 | COMMUNITY
Start: 2021-03-12 | End: 2021-04-11

## 2021-03-12 RX ORDER — CYCLOBENZAPRINE HCL 10 MG
10 TABLET ORAL 3 TIMES DAILY PRN
Qty: 90 TABLET | Refills: 0 | Status: SHIPPED | OUTPATIENT
Start: 2021-03-12 | End: 2021-04-11

## 2021-03-12 RX ORDER — SODIUM BICARBONATE 650 MG/1
650 TABLET ORAL 2 TIMES DAILY
Qty: 60 TABLET | Refills: 0 | Status: SHIPPED | OUTPATIENT
Start: 2021-03-12 | End: 2021-04-11

## 2021-03-12 RX ORDER — DIPHENHYDRAMINE HYDROCHLORIDE, ZINC ACETATE 2; .1 G/100G; G/100G
CREAM TOPICAL
Refills: 0 | Status: ON HOLD | COMMUNITY
Start: 2021-03-12 | End: 2021-08-03

## 2021-03-12 RX ORDER — LACOSAMIDE 50 MG/1
50 TABLET ORAL 2 TIMES DAILY
Status: DISCONTINUED | OUTPATIENT
Start: 2021-03-12 | End: 2021-03-12 | Stop reason: HOSPADM

## 2021-03-12 RX ORDER — FAMOTIDINE 20 MG/1
20 TABLET, FILM COATED ORAL DAILY
Qty: 30 TABLET | Refills: 0 | Status: ON HOLD | OUTPATIENT
Start: 2021-03-13 | End: 2021-08-03

## 2021-03-12 RX ORDER — DIPHENHYDRAMINE HCL 25 MG
25 TABLET ORAL EVERY 6 HOURS PRN
Qty: 120 TABLET | Refills: 0 | Status: SHIPPED | OUTPATIENT
Start: 2021-03-12 | End: 2021-04-11

## 2021-03-12 RX ORDER — LACOSAMIDE 50 MG/1
50 TABLET ORAL 2 TIMES DAILY
Qty: 60 TABLET | Refills: 0 | Status: ON HOLD | OUTPATIENT
Start: 2021-03-12 | End: 2021-08-03

## 2021-03-12 RX ORDER — PSEUDOEPHEDRINE HCL 30 MG
100 TABLET ORAL 2 TIMES DAILY
Qty: 60 CAPSULE | Refills: 0 | Status: SHIPPED | OUTPATIENT
Start: 2021-03-12 | End: 2022-05-04

## 2021-03-12 RX ORDER — METOPROLOL SUCCINATE 50 MG/1
50 TABLET, EXTENDED RELEASE ORAL DAILY
Status: DISCONTINUED | OUTPATIENT
Start: 2021-03-12 | End: 2021-03-12 | Stop reason: HOSPADM

## 2021-03-12 RX ADMIN — INSULIN LISPRO 6 UNITS: 100 INJECTION, SOLUTION INTRAVENOUS; SUBCUTANEOUS at 08:16

## 2021-03-12 RX ADMIN — METOPROLOL SUCCINATE 50 MG: 50 TABLET, FILM COATED, EXTENDED RELEASE ORAL at 08:10

## 2021-03-12 RX ADMIN — TOPIRAMATE 25 MG: 25 TABLET, FILM COATED ORAL at 08:09

## 2021-03-12 RX ADMIN — NALOXEGOL OXALATE 12.5 MG: 12.5 TABLET, FILM COATED ORAL at 08:09

## 2021-03-12 RX ADMIN — DULOXETINE HYDROCHLORIDE 60 MG: 60 CAPSULE, DELAYED RELEASE ORAL at 08:09

## 2021-03-12 RX ADMIN — LEVETIRACETAM 500 MG: 500 TABLET, FILM COATED ORAL at 08:09

## 2021-03-12 RX ADMIN — TIOTROPIUM BROMIDE INHALATION SPRAY 2 PUFF: 3.12 SPRAY, METERED RESPIRATORY (INHALATION) at 09:46

## 2021-03-12 RX ADMIN — TAMSULOSIN HYDROCHLORIDE 0.4 MG: 0.4 CAPSULE ORAL at 08:09

## 2021-03-12 RX ADMIN — FAMOTIDINE 20 MG: 20 TABLET, FILM COATED ORAL at 08:09

## 2021-03-12 RX ADMIN — FOLIC ACID 1 MG: 1 TABLET ORAL at 08:10

## 2021-03-12 RX ADMIN — DIPHENHYDRAMINE HCL 25 MG: 25 TABLET ORAL at 08:09

## 2021-03-12 RX ADMIN — METHYLPREDNISOLONE SODIUM SUCCINATE 60 MG: 125 INJECTION, POWDER, FOR SOLUTION INTRAMUSCULAR; INTRAVENOUS at 06:12

## 2021-03-12 RX ADMIN — PRIMIDONE 50 MG: 50 TABLET ORAL at 08:10
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
## 2021-03-12 NOTE — PROGRESS NOTES
Patient was discharged against medical advice. Patient signed paper. patient was educated about the reason He should  Not go home since starting new med as ordered by Dr. Alicia Ochoa.
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
## 2021-03-12 NOTE — PROGRESS NOTES
[address] [address]  [facility]  Speech Therapy  Discharge Note  Date: 3/12/2021  Patient Name: Edgard Bullard        MRN: [id number]   : 1965  (55 y.o.)  Gender: male       ASSESSMENT:  Discharge Recommendations: Recommend continuation of skilled ST services in [facility] setting to address higher level executive functioning tasks, verbal reasoning, problem solving and attention (divided, alternating) to improve functionality with ADL/IADLs completion. Plan: Discharged AMA to home setting. See last note for details related to goals. All goals unmet secondary to unexpected discharge. FLORENTIN Borges, Student Intern  [address].  Isaac Clark MA., CCC-SLP

## 2021-03-12 NOTE — PROGRESS NOTES
[address] [address]  Recreational Therapy  Discharge Note  Inpatient Rehabilitation Unit           Date:  3/12/2021       Patient Name: Dinorah Arambula      MRN: [id number]       YOB: 1965 (55 y.o.)       Gender: male  Diagnosis: Burst fracture of fourth thoracic vertebra  Referring Practitioner: Lexx Falcon PA-C (ordering)  Dr Yessi Neal (Attending)    Patient discharged from Recreational Therapy at this time. See recreational therapy notes for details.     Electronically signed by: Sheralyn Osgood, CTRS  Date: 3/12/2021

## 2021-03-12 NOTE — PROGRESS NOTES
Case discussed with Dr Daily Brandon. Patient and wife verbalize understanding of concerns and risks with leaving today. Explained to patient that our team will try to make this as safe as possible. Patient verbalizes understanding that there is a risk of seizures and safest option would be to stay another day for montioring. Patient adamant about leaving at [place] [address] today. Patient and wife verbalizes understanding and willing to accept risk for discharge. Discussed due to the liability, this discharge is against medical advice and he will need to sign the proper forms for this. Patient upset with this due to insurance coverage, but again states he will take the risk with this.        Electronically signed by MEGHAN Chan CNP on 3/12/2021 at 9:49 AM

## 2021-03-12 NOTE — PROGRESS NOTES
We were contacted today about the patient having persistent skin rash, and itching despite reduction of the dose of the Keppra. He was started on the [address] for seizure like activity. In review of the patient's alternative medication options, he has liver cirrhosis alcohol induced. Hence we will avoid hepatic metabolized medication. Recommend: discontinue Keppra  Start Vimpat 50 mg twice daily  Patient needs to be monitored for 24 hours after the introduction of the Vimpat.     Keaton Fonseca MD

## 2021-03-16 ENCOUNTER — TELEPHONE (OUTPATIENT)
Dept: NEPHROLOGY | Age: 56
End: 2021-03-16

## 2021-03-16 NOTE — TELEPHONE ENCOUNTER
Pt lives near [place]. Needs FU with [address], Nephrology, Dr Micky Fuller at [facility].  Pls schedule appt with him to follow there rather than [email or web address]. Pls send records to his office.  Cancel FU appt with me

## 2021-03-19 ENCOUNTER — OFFICE VISIT (OUTPATIENT)
Dept: NEUROLOGY | Age: 56
End: 2021-03-19
Payer: MEDICARE

## 2021-03-19 VITALS
SYSTOLIC BLOOD PRESSURE: 130 MMHG | BODY MASS INDEX: 40.43 KG/M2 | RESPIRATION RATE: 17 BRPM | WEIGHT: 315 LBS | DIASTOLIC BLOOD PRESSURE: 80 MMHG | HEIGHT: 74 IN

## 2021-03-19 DIAGNOSIS — R56.9 SEIZURE-LIKE ACTIVITY (HCC): Primary | ICD-10-CM

## 2021-03-19 PROCEDURE — G8484 FLU IMMUNIZE NO ADMIN: HCPCS | Performed by: NURSE PRACTITIONER

## 2021-03-19 PROCEDURE — 1111F DSCHRG MED/CURRENT MED MERGE: CPT | Performed by: NURSE PRACTITIONER

## 2021-03-19 PROCEDURE — 3017F COLORECTAL CA SCREEN DOC REV: CPT | Performed by: NURSE PRACTITIONER

## 2021-03-19 PROCEDURE — G8427 DOCREV CUR MEDS BY ELIG CLIN: HCPCS | Performed by: NURSE PRACTITIONER

## 2021-03-19 PROCEDURE — 4004F PT TOBACCO SCREEN RCVD TLK: CPT | Performed by: NURSE PRACTITIONER

## 2021-03-19 PROCEDURE — G8417 CALC BMI ABV UP PARAM F/U: HCPCS | Performed by: NURSE PRACTITIONER

## 2021-03-19 PROCEDURE — 99213 OFFICE O/P EST LOW 20 MIN: CPT | Performed by: NURSE PRACTITIONER
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
## 2021-03-19 NOTE — PATIENT INSTRUCTIONS
1. Continue with Vimpat at current dose  2. Referral to epilepsy specialist at [facility]. [facility]  3. No driving, swimming, operating heavy machinery or compromising heights until event free for 6 months. Report any new events. Call if any questions  4. Follow up after evaluation with epilepsy specialist.    5. Call if any questions or concerns.

## 2021-03-29 DIAGNOSIS — R52 INTRACTABLE PAIN: ICD-10-CM

## 2021-03-29 DIAGNOSIS — G89.4 CHRONIC PAIN SYNDROME: Primary | ICD-10-CM

## 2021-03-29 DIAGNOSIS — M54.2 NECK PAIN: ICD-10-CM

## 2021-03-29 DIAGNOSIS — M54.9 MID BACK PAIN: ICD-10-CM

## 2021-03-29 RX ORDER — OXYCODONE AND ACETAMINOPHEN 7.5; 325 MG/1; MG/1
1 TABLET ORAL 3 TIMES DAILY
Qty: 90 TABLET | Refills: 0 | Status: SHIPPED | OUTPATIENT
Start: 2021-03-29 | End: 2021-04-28 | Stop reason: SDUPTHER

## 2021-03-30 ENCOUNTER — HOSPITAL ENCOUNTER (OUTPATIENT)
Age: 56
Discharge: HOME | End: 2021-03-30
Payer: MEDICARE

## 2021-03-30 ENCOUNTER — TELEPHONE (OUTPATIENT)
Dept: PHYSICAL MEDICINE AND REHAB | Age: 56
End: 2021-03-30

## 2021-03-30 LAB
ANION GAP SERPL CALCULATED.3IONS-SCNC: 10 MEQ/L (ref 8–16)
BUN BLDV-MCNC: 27 MG/DL (ref 7–22)
CALCIUM SERPL-MCNC: 8.8 MG/DL (ref 8.5–10.5)
CHLORIDE BLD-SCNC: 104 MEQ/L (ref 98–111)
CO2: 23 MEQ/L (ref 23–33)
CREAT SERPL-MCNC: 1.3 MG/DL (ref 0.4–1.2)
GFR SERPL CREATININE-BSD FRML MDRD: 57 ML/MIN/1.73M2
GLUCOSE BLD-MCNC: 167 MG/DL (ref 70–108)
POTASSIUM SERPL-SCNC: 4.1 MEQ/L (ref 3.5–5.2)
SODIUM BLD-SCNC: 137 MEQ/L (ref 135–145)
VITAMIN D 25-HYDROXY: 17 NG/ML (ref 30–100)

## 2021-03-30 PROCEDURE — 80048 BASIC METABOLIC PNL TOTAL CA: CPT

## 2021-03-30 PROCEDURE — 82306 VITAMIN D 25 HYDROXY: CPT

## 2021-03-30 PROCEDURE — 36415 COLL VENOUS BLD VENIPUNCTURE: CPT

## 2021-03-30 NOTE — TELEPHONE ENCOUNTER
Approvedtoday  PA Case: [id number], Status: Approved, Coverage Starts on: 3/30/2021 12:00:00 AM, Coverage Ends on: 4/13/2021 12:00:00 AM.

## 2021-03-30 NOTE — TELEPHONE ENCOUNTER
I called patient and let him know we have an approval for his Percocet. I called [facility] and let Christian Umana know we have an approval for Percocet.

## 2021-03-31 ENCOUNTER — TELEPHONE (OUTPATIENT)
Dept: NEPHROLOGY | Age: 56
End: 2021-03-31

## 2021-03-31 DIAGNOSIS — J44.9 CHRONIC OBSTRUCTIVE PULMONARY DISEASE, UNSPECIFIED COPD TYPE (HCC): ICD-10-CM

## 2021-03-31 DIAGNOSIS — E11.01 TYPE 2 DIABETES MELLITUS WITH HYPEROSMOLAR COMA, WITH LONG-TERM CURRENT USE OF INSULIN (HCC): Primary | ICD-10-CM

## 2021-03-31 DIAGNOSIS — D64.9 ANEMIA, UNSPECIFIED TYPE: ICD-10-CM

## 2021-03-31 DIAGNOSIS — N18.32 STAGE 3B CHRONIC KIDNEY DISEASE (HCC): ICD-10-CM

## 2021-03-31 DIAGNOSIS — Z79.4 TYPE 2 DIABETES MELLITUS WITH HYPEROSMOLAR COMA, WITH LONG-TERM CURRENT USE OF INSULIN (HCC): Primary | ICD-10-CM

## 2021-03-31 DIAGNOSIS — I10 ESSENTIAL HYPERTENSION: ICD-10-CM

## 2021-03-31 NOTE — TELEPHONE ENCOUNTER
Pls see Notes from 3/16/2021    Pt lives near [place]. Needs FU with [address], Nephrology, Dr Amita Barrientos at [facility].  Pls schedule appt with him to follow there rather than [facility]. Records were sent to his office. FU appt with me has been cancelled.   Pls forward recent labs to his office

## 2021-03-31 NOTE — TELEPHONE ENCOUNTER
I spoke to Ej Sullivan he said no referral was ever received. There was not one in the chart. I created the referral and faxed all information to the office.

## 2021-03-31 NOTE — TELEPHONE ENCOUNTER
I spoke to [place] he said he could not see where the referral was faxed. I asked if I can speak to someone that can let me know if they received it or not.

## 2021-03-31 NOTE — TELEPHONE ENCOUNTER
I called Dr. Jeanne Rice office this patient has not been seen there and he does not have an appointment.

## 2021-03-31 NOTE — TELEPHONE ENCOUNTER
I called back and spoke to [facility] at Dr. Daniel Bajwa office she said that it takes 7-10 days to scan the referral and get the patient scheduled.

## 2021-04-06 ENCOUNTER — OFFICE VISIT (OUTPATIENT)
Dept: NEPHROLOGY | Age: 56
End: 2021-04-06
Payer: MEDICARE

## 2021-04-06 VITALS
BODY MASS INDEX: 40.78 KG/M2 | OXYGEN SATURATION: 98 % | SYSTOLIC BLOOD PRESSURE: 113 MMHG | DIASTOLIC BLOOD PRESSURE: 75 MMHG | TEMPERATURE: 97.9 F | HEART RATE: 68 BPM | WEIGHT: 315 LBS

## 2021-04-06 DIAGNOSIS — N18.32 STAGE 3B CHRONIC KIDNEY DISEASE (HCC): Primary | ICD-10-CM

## 2021-04-06 PROCEDURE — G8417 CALC BMI ABV UP PARAM F/U: HCPCS | Performed by: NURSE PRACTITIONER

## 2021-04-06 PROCEDURE — G8427 DOCREV CUR MEDS BY ELIG CLIN: HCPCS | Performed by: NURSE PRACTITIONER

## 2021-04-06 PROCEDURE — 1111F DSCHRG MED/CURRENT MED MERGE: CPT | Performed by: NURSE PRACTITIONER

## 2021-04-06 PROCEDURE — 3017F COLORECTAL CA SCREEN DOC REV: CPT | Performed by: NURSE PRACTITIONER

## 2021-04-06 PROCEDURE — 99213 OFFICE O/P EST LOW 20 MIN: CPT | Performed by: NURSE PRACTITIONER

## 2021-04-06 PROCEDURE — 4004F PT TOBACCO SCREEN RCVD TLK: CPT | Performed by: NURSE PRACTITIONER

## 2021-04-06 RX ORDER — SYRINGE AND NEEDLE,INSULIN,1ML 31 GX5/16"
SYRINGE, EMPTY DISPOSABLE MISCELLANEOUS
COMMUNITY
Start: 2021-03-20 | End: 2021-08-18
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
## 2021-04-08 ENCOUNTER — OFFICE VISIT (OUTPATIENT)
Dept: SURGERY | Age: 56
End: 2021-04-08
Payer: MEDICARE

## 2021-04-08 VITALS
HEIGHT: 74 IN | BODY MASS INDEX: 40.43 KG/M2 | HEART RATE: 75 BPM | DIASTOLIC BLOOD PRESSURE: 68 MMHG | WEIGHT: 315 LBS | OXYGEN SATURATION: 98 % | SYSTOLIC BLOOD PRESSURE: 132 MMHG | RESPIRATION RATE: 20 BRPM | TEMPERATURE: 97.5 F

## 2021-04-08 DIAGNOSIS — K70.30 ALCOHOLIC CIRRHOSIS OF LIVER WITHOUT ASCITES (HCC): ICD-10-CM

## 2021-04-08 DIAGNOSIS — S22.041A BURST FRACTURE OF FOURTH THORACIC VERTEBRA (HCC): ICD-10-CM
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
## 2021-04-08 DIAGNOSIS — K80.20 CALCULUS OF GALLBLADDER WITHOUT CHOLECYSTITIS WITHOUT OBSTRUCTION: Primary | ICD-10-CM

## 2021-04-08 DIAGNOSIS — E08.42 DIABETIC POLYNEUROPATHY ASSOCIATED WITH DIABETES MELLITUS DUE TO UNDERLYING CONDITION (HCC): ICD-10-CM

## 2021-04-08 PROCEDURE — G8427 DOCREV CUR MEDS BY ELIG CLIN: HCPCS | Performed by: SURGERY

## 2021-04-08 PROCEDURE — 1111F DSCHRG MED/CURRENT MED MERGE: CPT | Performed by: SURGERY

## 2021-04-08 PROCEDURE — G8417 CALC BMI ABV UP PARAM F/U: HCPCS | Performed by: SURGERY

## 2021-04-08 PROCEDURE — 99213 OFFICE O/P EST LOW 20 MIN: CPT | Performed by: SURGERY

## 2021-04-08 PROCEDURE — 2022F DILAT RTA XM EVC RTNOPTHY: CPT | Performed by: SURGERY

## 2021-04-08 PROCEDURE — 3017F COLORECTAL CA SCREEN DOC REV: CPT | Performed by: SURGERY

## 2021-04-08 PROCEDURE — 4004F PT TOBACCO SCREEN RCVD TLK: CPT | Performed by: SURGERY
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
## 2021-04-09 ASSESSMENT — ENCOUNTER SYMPTOMS
SHORTNESS OF BREATH: 0
ABDOMINAL PAIN: 0
SORE THROAT: 0
BLOOD IN STOOL: 0
VOICE CHANGE: 0
WHEEZING: 0
TROUBLE SWALLOWING: 0
VOMITING: 0
COUGH: 0
COLOR CHANGE: 0
BACK PAIN: 1
NAUSEA: 0

## 2021-04-12 ENCOUNTER — OFFICE VISIT (OUTPATIENT)
Dept: CARDIOLOGY CLINIC | Age: 56
End: 2021-04-12
Payer: MEDICARE

## 2021-04-12 VITALS
HEART RATE: 68 BPM | DIASTOLIC BLOOD PRESSURE: 72 MMHG | BODY MASS INDEX: 40.43 KG/M2 | SYSTOLIC BLOOD PRESSURE: 138 MMHG | WEIGHT: 315 LBS | HEIGHT: 74 IN

## 2021-04-12 DIAGNOSIS — Z01.810 PREOP CARDIOVASCULAR EXAM: Primary | ICD-10-CM

## 2021-04-12 PROCEDURE — 3017F COLORECTAL CA SCREEN DOC REV: CPT | Performed by: INTERNAL MEDICINE

## 2021-04-12 PROCEDURE — G8427 DOCREV CUR MEDS BY ELIG CLIN: HCPCS | Performed by: INTERNAL MEDICINE

## 2021-04-12 PROCEDURE — G8417 CALC BMI ABV UP PARAM F/U: HCPCS | Performed by: INTERNAL MEDICINE

## 2021-04-12 PROCEDURE — 4004F PT TOBACCO SCREEN RCVD TLK: CPT | Performed by: INTERNAL MEDICINE

## 2021-04-12 PROCEDURE — 99204 OFFICE O/P NEW MOD 45 MIN: CPT | Performed by: INTERNAL MEDICINE

## 2021-04-12 NOTE — PROGRESS NOTES
NP, cardiac clearance for surgery with Dr. Modesta Quinteros. EKG and Echo in Epic. He states he has increase in fatigue. He states no changes in his medications.

## 2021-04-12 NOTE — PROGRESS NOTES
[id number] [address] [facility] [address]  Dept: [phone]  Dept Fax: [phone]  Loc: [phone]    Visit Date: 4/12/2021    Mr. Wava Boas is a 55 y.o. male  who presented for:  Chief Complaint   Patient presents with    New Patient    Results     ekg and echo    Cardiac Clearance       HPI:   56 yo M c hx of Brain tumor, Cirrhosis, Hep C, COPD, CKD, HTN, HLD and Seizure is here preop risk stratification for gall bladder. Patient states he underwent cardiac cath along time ago which was normal.  Echo on 11/2020 showed moderate AS, EF 55%. Patient just had back surgery in 2 months ago, he is unable to walk. Continues to smoke. 1PPD. EKG shows NSR. Cr 1.3        Current Outpatient Medications:     TRUEPLUS INSULIN SYRINGE 31G X 5/16\" 1 ML MISC, USE FOR INSULIN INJECTIONS ONCE EACH DAY AS DIRECTED., Disp: , Rfl:     oxyCODONE-acetaminophen (PERCOCET) 7.5-325 MG per tablet, Take 1 tablet by mouth 3 times daily for 30 days. , Disp: 90 tablet, Rfl: 0    insulin glargine (LANTUS) 100 UNIT/ML injection vial, Inject 84 Units into the skin nightly, Disp: 1 vial, Rfl: 3    metoprolol succinate (TOPROL XL) 50 MG extended release tablet, Take 1 tablet by mouth daily, Disp: 30 tablet, Rfl: 0    topiramate (TOPAMAX) 25 MG tablet, Take 1 tablet by mouth 2 times daily, Disp: 60 tablet, Rfl: 0    insulin lispro (HUMALOG) 100 UNIT/ML injection vial, Inject 10 Units into the skin 3 times daily (before meals) Plus Sliding Scale, Disp: 1 vial, Rfl: 0    insulin lispro (HUMALOG) 100 UNIT/ML injection vial, Glucose: Dose:  No Insulin, 140-199 2 Units, 200-249 4 Units, 250-299 6 Units, 300-349 8 Units, 350-400 10 Units, Over 400 12 Units, Disp: 1 vial, Rfl: 0    diphenhydrAMINE-zinc acetate 2-0.1 % cream, Apply topically 3 times daily as needed. , Disp:  , Rfl: 0    docusate sodium (COLACE, DULCOLAX) 100 MG CAPS, Take 100 mg by mouth 2 times daily, Disp: 60 capsule, Rfl: 0    famotidine (PEPCID) 20 MG tablet, Take 1 tablet by mouth daily, Disp: 30 tablet, Rfl: 0    naloxegol (MOVANTIK) 12.5 MG TABS tablet, Take 1 tablet by mouth every morning, Disp: 30 tablet, Rfl: 0    mirtazapine (REMERON) 15 MG tablet, Take 15 mg by mouth nightly as needed, Disp: , Rfl:     folic acid (FOLVITE) 1 MG tablet, Take 1 mg by mouth daily, Disp: , Rfl:     gabapentin (NEURONTIN) 300 MG capsule, Take 300 mg by mouth nightly as needed. , Disp: , Rfl:     primidone (MYSOLINE) 50 MG tablet, Take 1 tablet by mouth 2 times daily, Disp: , Rfl:     albuterol sulfate  (90 Base) MCG/ACT inhaler, Inhale 1 puff into the lungs every 4-6 hours as needed, Disp: , Rfl:     OXYGEN, Inhale into the lungs daily as needed, Disp: , Rfl:     ipratropium-albuterol (DUONEB) 0.5-2.5 (3) MG/3ML SOLN nebulizer solution, Inhale 3 mLs into the lungs every 4 hours as needed for Shortness of Breath, Disp: 360 mL, Rfl: 1    ondansetron (ZOFRAN) 4 MG tablet, Take 4 mg by mouth every 8 hours as needed for Nausea or Vomiting, Disp: , Rfl:     tamsulosin (FLOMAX) 0.4 MG capsule, Take 0.4 mg by mouth daily, Disp: , Rfl:     tiotropium (SPIRIVA) 18 MCG inhalation capsule, Inhale 1 capsule into the lungs daily, Disp: 30 capsule, Rfl: 3    latanoprost (XALATAN) 0.005 % ophthalmic solution, Place 1 drop into both eyes nightly, Disp: 1 Bottle, Rfl: 3    DULoxetine (CYMBALTA) 60 MG extended release capsule, Take 60 mg by mouth daily, Disp: , Rfl:     rosuvastatin (CRESTOR) 20 MG tablet, Take 20 mg by mouth nightly , Disp: , Rfl:     lacosamide (VIMPAT) 50 MG TABS tablet, Take 1 tablet by mouth 2 times daily for 30 days. , Disp: 60 tablet, Rfl: 0    Past Medical History  Belinda Reilly  has a past medical history of Acute kidney injury ([facility] [address] Amputation of right great toe ([facility] [address] Asthma, Brain tumor ([facility] [address] Cirrhosis ([facility] [address] CKD (chronic kidney disease), COPD (chronic obstructive pulmonary disease) ([facility] [address] Diabetes mellitus ([facility] [address] Falling, Glaucoma, Hepatitis C, History of blood transfusion, Hyperlipidemia, Hypertension, Legally blind, Obesity, Pain management, Right foot infection, and Seizures ([facility] [address] Social History  Tammy Wood  reports that he has been smoking cigarettes. He has a 30.00 pack-year smoking history. He has never used smokeless tobacco. He reports previous alcohol use. He reports previous drug use. Drug: Marijuana. Family History  Tammy Wood family history includes Cancer in his sister; Heart Attack in his brother, father, and paternal grandfather; Heart Disease in his brother; Clayborne Jackson in his paternal grandmother; No Known Problems in his maternal grandfather, maternal grandmother, and mother. Past Surgical History   Past Surgical History:   Procedure Laterality Date    BACK SURGERY      EYE SURGERY      FIBULA FRACTURE SURGERY Left 03/21/2019    LEFT FIBULAR SHAFT ORIF performed by Francisco Richardson DPM at [address] Right     Steel pins in place    FRACTURE SURGERY      NASAL SINUS SURGERY Bilateral 11/29/2020    FLEXIBLE BRONCHOSCOPY WITH BRONCHOALVEOLAR LAVAGE WITH CULTURES. NASAL ENDOSCOPY WITH POSSIBLE CAUTERY ADN NASAL PACKING performed by Yolis Hair MD at [facility]  01/2020    SPINE SURGERY N/A 02/19/2021    KYPHOPLASTY at T4, L2, L4 performed by Cortes Valadez MD at [address] [place] [address] Right 09/23/2020    AMPUTATION DISTAL APSECT RIGHT HALLUX, REMOVAL OF HARDWARE RIGHT HALLUX performed by Demarco Mota DPM at [facility] N/A 04/25/2019    EGD BIOPSY performed by Staci Ivey MD at [address] [facility]       Subjective:     REVIEW OF SYSTEMS  Constitutional: denies sweats, chills and fever  HENT: denies  congestion, sinus pressure, sneezing and sore throat. Eyes: denies  pain, discharge, redness and itching.    Respiratory: denies apnea, cough  Gastrointestinal: denies Lab Results   Component Value Date     03/30/2021    K 4.1 03/30/2021    K 4.1 03/12/2021     03/30/2021    CO2 23 03/30/2021    BUN 27 03/30/2021    LABALBU 2.9 02/25/2021    CREATININE 1.3 03/30/2021    CALCIUM 8.8 03/30/2021    LABGLOM 57 03/30/2021    GLUCOSE 167 03/30/2021       Lab Results   Component Value Date    ALKPHOS 134 02/25/2021    ALT 37 02/25/2021    AST 59 02/25/2021    PROT 7.2 02/25/2021    BILITOT 0.7 02/25/2021    BILIDIR 0.4 02/21/2021    LABALBU 2.9 02/25/2021       Lab Results   Component Value Date    MG 1.9 03/03/2021       Lab Results   Component Value Date    INR 1.16 (H) 02/17/2021    INR 1.18 (H) 12/08/2020    INR 1.26 (H) 12/07/2020         Lab Results   Component Value Date    LABA1C 6.6 02/24/2021       Lab Results   Component Value Date    TRIG 164 10/24/2013    HDL 46 10/24/2013    LDLCALC 123 10/24/2013       Lab Results   Component Value Date    TSH 0.771 02/26/2021         Testing Reviewed:      I haveindividually reviewed the below cardiac tests    EKG:    ECHO:   Results for orders placed during the hospital encounter of 11/25/20   ECHO Complete 2D W Doppler W Color    Narrative Transthoracic Echocardiography Report (TTE)     Demographics      Patient Name   [facility]       Gender               Male                  [address]      MR #           [id number]        Race                                                       Ethnicity      Account #      [de-identified]        Room Number          0018      Accession      [id number]       Date of Study        11/27/2020   Number      Date of Birth  1965       Referring Physician  Char Vásquez MD      Age            48 year(s)       Sonographer          Andrew Bernal RDCS                                      Interpreting         Echo reader of the                                   Physician            mouna Lowery MD     Procedure    Type of Study TTE procedure:ECHOCARDIOGRAM COMPLETE 2D W DOPPLER W COLOR. Procedure Date  Date: 11/27/2020 Start: 02:42 PM    Study Location: Bedside  Technical Quality: Limited visualization due to body habitus. Indications:Lower extremity edema. Additional Medical History:Hypertension, Hyperlipidemia, Smoker, Alcohol  abuse, Diabetes, COPD, Anemia, Hep C, Brain tumor, Sepsis, Family history of  heart disease    Patient Status: Routine    Height: 73.62 inches Weight: 347.01 pounds BSA: 2.74 m^2 BMI: 45.01 kg/m^2    BP: 145/85 mmHg     Conclusions      Summary   Technically difficult examination. Normal left ventricle size and systolic function. Ejection fraction was   estimated at 55 %. There were no regional left ventricular wall motion   abnormalities and wall thickness was within normal limits. The left atrium is Mildly dilated. There is moderate aortic stenosis with valve area of 1.2 sq cm. The maximum aortic valve gradient is 48 mmHg, the mean gradient is 33   mmHg, and the peak velocity is 3.5 cm/s. Signature      ----------------------------------------------------------------   Electronically signed by Luis Means MD (Interpreting   physician) on 11/27/2020 at 08:42 PM   ----------------------------------------------------------------      Findings      Mitral Valve   The mitral valve structure was normal with normal leaflet separation. DOPPLER: The transmitral velocity was within the normal range with no   evidence for mitral stenosis. Mild mitral regurgitation is present. Moderate annular calcification. Aortic Valve   The aortic valve leaflets were not well visualized. Aortic valve leaflets   are Moderately calcified. Leaflets exhibited moderately increased   thickness and moderately reduced cuspal separation of the aortic valve. No   evidence of aortic valve regurgitation . There is moderate aortic stenosis with valve area of 1.2 sq cm.    The maximum aortic valve gradient is 48 mmHg, the mean gradient is 33   mmHg, and the peak velocity is 3.5 cm/s. Tricuspid Valve   The tricuspid valve structure was normal with normal leaflet separation. DOPPLER: There was no evidence of tricuspid stenosis. Mild tricuspid regurgitation visualized. Pulmonic Valve   The pulmonic valve leaflets exhibited normal thickness, no calcification,   and normal cuspal separation. DOPPLER: The transpulmonic velocity was   within the normal range with no evidence for regurgitation. Left Atrium   The left atrium is Mildly dilated. Left Ventricle   Normal left ventricle size and systolic function. Ejection fraction was   estimated at 55 %. There were no regional left ventricular wall motion   abnormalities and wall thickness was within normal limits. Right Atrium   Right atrial size was normal.      Right Ventricle   The right ventricular size was normal with normal systolic function and   wall thickness. Pericardial Effusion   The pericardium was normal in appearance with no evidence of a pericardial   effusion. Pleural Effusion   No evidence of pleural effusion. Aorta / Great Vessels   -Aortic root dimension within normal limits.   -The Pulmonary artery is within normal limits. -IVC size is within normal limits with normal respiratory phasic changes.      M-Mode/2D Measurements & Calculations      LV Diastolic   LV Systolic Dimension:    AV Cusp Separation: 1.1 cmLA   Dimension: 6.2 5.2 cm                    Dimension: 4.4 cmAO Root   cm             LV Volume Diastolic: 204  Dimension: 3.5 cmLA Area: 31.7   LV FS:16.1 %   ml                        cm^2   LV PW          LV Volume Systolic: 708.1   Diastolic: 0.9 ml   cm             LV EDV/LV EDV Index: 238   Septum         ml/87 m^2LV ESV/LV ESV    RV Diastolic Dimension: 3.6 cm   Diastolic: 0.9 Index: 423.9 ml/38 m^2   cm             EF Calculated: 55.9 %     LA/Aorta: 1.26                     LV Length: 10.2 cm        LA volume/Index: 118.1 ml /43m^2      LV Area        LVOT: 2.3 cm   Diastolic:   15.0 cm^2   LV Area   Systolic: 05.1   cm^2     Doppler Measurements & Calculations      MV Peak E-Wave:    AV Peak Velocity: 346    LVOT Peak Velocity: 114 cm/s   134 cm/s           cm/s                     LVOT Mean Velocity: 81.4 cm/s   MV Peak A-Wave:    AV Peak Gradient: 47.89  LVOT Peak Gradient: 5 mmHgLVOT   68.1 cm/s          mmHg                     Mean Gradient: 3 mmHg   MV E/A Ratio: 1.97 AV Mean Velocity: 275   MV Peak Gradient:  cm/s                     TV Peak E-Wave: 75 cm/s   7.18 mmHg          AV Mean Gradient: 23     TV Peak A-Wave: 61.7 cm/s                      mmHg   MV Deceleration    AV VTI: 60.2 cm          TV Peak Gradient: 2.25 mmHg   Time: 222 msec     AV Area                  TR Velocity:281 cm/s   MV P1/2t: 65 msec  (Continuity):1.69 cm^2   TR Gradient:31.58 mmHg   MVA by PHT:3.38   cm^2               LVOT VTI: 24.5 cm                      IVRT: 63 msec                         AV DVI (VTI): 0.41AV DVI   MR Velocity: 394   (Vmax):0.33   cm/s     [id number]       STRESS:    CATH:    Assessment/Plan       Diagnosis Orders   1. Preop cardiovascular exam       Preop risk stratification for gall bladder surgery  Moderate AS peak velocity of 3.5 m/s  COPD  Cirrhosis  Hep C  HTN  HLD  Brain tumor/Seizure    Advised to stop smoking because smoking increases risk of heart disease, morbidity, mortality and end organ damage. Cannot exert due to recent back issues  Reviewed Echo, has moderate AS with 3.5 m/s  COPD  Cirrhosis  HTN  HLD  Will get Lexiscan stress test to evaluate for ischemia  Continue rest of the management  The patient is asked to make an attempt to improve diet and exercise patterns to aid in medical management of this problem.   Advised more plant based nutrition/meditarrean

## 2021-04-12 NOTE — TELEPHONE ENCOUNTER
I spoke to Georges at [facility]. She said they are pretty far behind on referrals and it is still not scanned in. She told me to check back in a few days.

## 2021-04-19 NOTE — TELEPHONE ENCOUNTER
I spoke to [facility] with scheduling for Dr. João Simmons. She said the referral did not said for patient to be seen by Dr. João Simmons and she said they assume with the diagnosis he has and where the patient lives he needs to see Dr. João Simmons. He is not scheduled yet, but patient can call to get scheduled.

## 2021-04-22 ENCOUNTER — TELEPHONE (OUTPATIENT)
Dept: CARDIOLOGY CLINIC | Age: 56
End: 2021-04-22

## 2021-04-22 NOTE — TELEPHONE ENCOUNTER
Pt.'s wife called and said that when the prior auth was sent for his tests, it was sent to their old insurance. Since that time that have switched insurance plans and would like the prior auth to be sent to their new insurance. Wasn't sure if this was something you would be able to take care of or if I should send it to [facility].

## 2021-04-26 DIAGNOSIS — G89.4 CHRONIC PAIN SYNDROME: ICD-10-CM

## 2021-04-26 NOTE — TELEPHONE ENCOUNTER
Meredith Ulloa called requesting a refill on the following medications:  Requested Prescriptions     Pending Prescriptions Disp Refills    oxyCODONE-acetaminophen (PERCOCET) 7.5-325 MG per tablet 90 tablet 0     Sig: Take 1 tablet by mouth 3 times daily for 30 days. Pharmacy verified: [facility]  . pv      Date of last visit: hosp  Date of next visit (if applicable): 4/8/9340

## 2021-04-27 ENCOUNTER — HOSPITAL ENCOUNTER (OUTPATIENT)
Dept: NON INVASIVE DIAGNOSTICS | Age: 56
Discharge: HOME | End: 2021-04-27
Payer: MEDICARE

## 2021-04-27 VITALS — HEIGHT: 74 IN | BODY MASS INDEX: 40.43 KG/M2 | WEIGHT: 315 LBS

## 2021-04-27 DIAGNOSIS — Z01.810 PREOP CARDIOVASCULAR EXAM: ICD-10-CM

## 2021-04-27 PROCEDURE — 3430000000 HC RX DIAGNOSTIC RADIOPHARMACEUTICAL: Performed by: INTERNAL MEDICINE

## 2021-04-27 PROCEDURE — 6360000002 HC RX W HCPCS

## 2021-04-27 PROCEDURE — 78452 HT MUSCLE IMAGE SPECT MULT: CPT

## 2021-04-27 PROCEDURE — A9500 TC99M SESTAMIBI: HCPCS | Performed by: INTERNAL MEDICINE

## 2021-04-27 PROCEDURE — 93017 CV STRESS TEST TRACING ONLY: CPT | Performed by: INTERNAL MEDICINE

## 2021-04-27 RX ADMIN — Medication 9.6 MILLICURIE: at 13:50

## 2021-04-27 RX ADMIN — Medication 32.7 MILLICURIE: at 14:50

## 2021-04-28 RX ORDER — OXYCODONE AND ACETAMINOPHEN 7.5; 325 MG/1; MG/1
1 TABLET ORAL 3 TIMES DAILY
Qty: 90 TABLET | Refills: 0 | Status: SHIPPED | OUTPATIENT
Start: 2021-04-29 | End: 2021-05-27 | Stop reason: SDUPTHER

## 2021-04-28 NOTE — TELEPHONE ENCOUNTER
OARRS reviewed. UDS: + for Cyclobenzaprine, Gabapentin, Noroxycodone, Oxycodone. Last seen: 1/28/2021.  Follow-up: 5/4/21

## 2021-05-03 NOTE — TELEPHONE ENCOUNTER
I spoke to JACKELIN AT [facility]  for Dr. Lopez Bautista. Marnell Lundborg is scheduled for July 28 th at 10:05.

## 2021-05-04 ENCOUNTER — OFFICE VISIT (OUTPATIENT)
Dept: PHYSICAL MEDICINE AND REHAB | Age: 56
End: 2021-05-04
Payer: MEDICARE

## 2021-05-04 VITALS
HEART RATE: 88 BPM | SYSTOLIC BLOOD PRESSURE: 130 MMHG | DIASTOLIC BLOOD PRESSURE: 80 MMHG | BODY MASS INDEX: 40.43 KG/M2 | WEIGHT: 315 LBS | HEIGHT: 74 IN

## 2021-05-04 DIAGNOSIS — M54.50 CHRONIC BILATERAL LOW BACK PAIN WITHOUT SCIATICA: ICD-10-CM

## 2021-05-04 DIAGNOSIS — M47.816 SPONDYLOSIS OF LUMBAR REGION WITHOUT MYELOPATHY OR RADICULOPATHY: Primary | ICD-10-CM

## 2021-05-04 DIAGNOSIS — Z98.890 S/P KYPHOPLASTY: ICD-10-CM

## 2021-05-04 DIAGNOSIS — M47.816 LUMBAR FACET ARTHROPATHY: ICD-10-CM

## 2021-05-04 DIAGNOSIS — F11.90 CHRONIC, CONTINUOUS USE OF OPIOIDS: ICD-10-CM

## 2021-05-04 DIAGNOSIS — M54.9 MID BACK PAIN: ICD-10-CM

## 2021-05-04 DIAGNOSIS — R52 INTRACTABLE PAIN: ICD-10-CM

## 2021-05-04 DIAGNOSIS — M54.2 NECK PAIN: ICD-10-CM

## 2021-05-04 DIAGNOSIS — S22.040A CLOSED WEDGE COMPRESSION FRACTURE OF T4 VERTEBRA, INITIAL ENCOUNTER (HCC): ICD-10-CM

## 2021-05-04 DIAGNOSIS — G89.29 CHRONIC BILATERAL LOW BACK PAIN WITHOUT SCIATICA: ICD-10-CM

## 2021-05-04 PROCEDURE — G8427 DOCREV CUR MEDS BY ELIG CLIN: HCPCS | Performed by: NURSE PRACTITIONER

## 2021-05-04 PROCEDURE — 4004F PT TOBACCO SCREEN RCVD TLK: CPT | Performed by: NURSE PRACTITIONER

## 2021-05-04 PROCEDURE — 99214 OFFICE O/P EST MOD 30 MIN: CPT | Performed by: NURSE PRACTITIONER

## 2021-05-04 PROCEDURE — 3017F COLORECTAL CA SCREEN DOC REV: CPT | Performed by: NURSE PRACTITIONER

## 2021-05-04 PROCEDURE — G8417 CALC BMI ABV UP PARAM F/U: HCPCS | Performed by: NURSE PRACTITIONER

## 2021-05-04 ASSESSMENT — ENCOUNTER SYMPTOMS
COUGH: 1
BACK PAIN: 1
CONSTIPATION: 1
WHEEZING: 1
SHORTNESS OF BREATH: 1

## 2021-05-04 NOTE — PROGRESS NOTES
[address] [facility] [address]  Dept: [phone]  Dept Fax: [phone]: [phone]    Visit Date: 5/4/2021    Functionality Assessment/Goals Worksheet     On a scale of 0 (Does not Interfere) to 10 (Completely Interferes)     1. Which number describes how during the past week pain has interfered with       the following:  A. General Activity:  7  B. Mood: 7  C. Walking Ability:  8  D. Normal Work (Includes both work outside the home and housework):  9  E. Relations with Other People:   5  F. Sleep:   8  G. Enjoyment of Life:   7    2. Patient Prefers to Take their Pain Medications:     [x]  On a regular basis   []  Only when necessary    []  Does not take pain medications    3. What are the Patient's Goals/Expectations for Visiting Pain Management? [x]  Learn about my pain    []  Receive Medication   []  Physical Therapy     []  Treat Depression   [x]  Receive Injections    []  Treat Sleep   []  Deal with Anxiety and Stress   []  Treat Opoid Dependence/Addiction   []  Other:      HPI:   Paige Vizcaino is a 55 y.o. male is here today for    Chief Complaint: Back pain, neck pain    Family present      HPI   2 month Hospital FU. Patient had Kyphoplasty at T4, L2 and L4 from 2/19/2021. Receiving good relief since Kyphoplasty of about 80 %. Main complaint today remains continued pain in low back mainly left side into left Si area. Pain has been up and down- pulling and aching pain. States pain feels a little lower than Kyphoplasty sites. Percocet TID prn remains effective in making pain tolerable  Ambulating with cane. Medications reviewed. Patient constipation  side effects with medications. Patient states he is taking medications as prescribed. Hedenies receiving pain medications from other sources. He denies any ER visits since last visit.     Pain scale with out pain medications or at its worst is 8/10. Pain scale with pain medications or at its best is 3/10. Last dose of Percocet was today  Drug screen reviewed from 3/30/2021 and was appropriate  Pill count was not  completed today and he was instructed to bring at next visit   Patient does not have naloxone available at home. Patient has not required use of naloxone at home since last office visit. The patientis allergic to adhesive tape and keppra [levetiracetam]. Subjective:      Review of Systems   Constitutional: Positive for activity change. Negative for appetite change, chills, diaphoresis, fatigue, fever and unexpected weight change. HENT: Negative. Eyes: Positive for visual disturbance. Blind, glaucoma    Respiratory: Positive for cough, shortness of breath and wheezing. Tobacco use, COPD   Cardiovascular: Negative. Gastrointestinal: Positive for constipation. Endocrine: Positive for polydipsia, polyphagia and polyuria. Genitourinary:        Kidney disease    Musculoskeletal: Positive for arthralgias, back pain, gait problem, joint swelling, myalgias, neck pain and neck stiffness. In wheel chair today, ambulates with walker at times    Skin: Negative. Neurological: Positive for weakness, numbness and headaches. Psychiatric/Behavioral: Negative. Objective:     Vitals:    05/04/21 1057   BP: 130/80   Site: Left Upper Arm   Position: Sitting   Cuff Size: Large Adult   Pulse: 88   Weight: (!) 317 lb (143.8 kg)   Height: 6' 2\" (1.88 m)       Physical Exam  Constitutional:       Appearance: Normal appearance. He is ill-appearing. HENT:      Head: Normocephalic and atraumatic. Mouth/Throat:      Mouth: Mucous membranes are moist.   Neck:      Musculoskeletal: Muscular tenderness present. Cardiovascular:      Rate and Rhythm: Normal rate and regular rhythm. Pulses: Normal pulses. Heart sounds: Normal heart sounds.    Pulmonary:      Effort: sciatica    3. Intractable pain    4. Mid back pain    5. Neck pain    6. Closed wedge compression fracture of T4 vertebra, initial encounter ([facility] [address]    7. S/P kyphoplasty    8. Lumbar facet arthropathy    9. Chronic, continuous use of opioids            Plan:      · OARRS reviewed. Current MED: 33.75  · Patient was offered naloxone for home. · Discussed long term side effects of medications, tolerance, dependency and addiction. · Previous UDS reviewed  · UDS preformed today for compliance. · Patient told can not receive any pain medications from any other source. · No evidence of abuse, diversion or aberrant behavior.  Medications and/or procedures to improve function and quality of life- patient understanding with this and that may not be pain free   Discussed with patient about safe storage of medications at home   Discussed possible weaning of medication dosing dependent on treatment/procedure results.  Discussed with patient about risks with procedure including infection, reaction to medication, increased pain, or bleeding.  Procedure notes reveiwed in detail    Receiving 80% relief from Kyphoplasty.  Still a lot of low back ordered updated lumbar xray to rule out any new fractures and discussed possible L-facet MBB in future    Continue Percocet 7.5/325 TID prn- filled recently 4/29/2021    Updated UDS ordered today    Continue home exercises. Meds. Prescribed:   No orders of the defined types were placed in this encounter. Return in about 1 month (around 6/4/2021), or if symptoms worsen or fail to improve, for Review xray and discuss procedures.  .               Electronically signed by MEGHAN Whitman CNP on5/4/2021 at 1:12 PM

## 2021-05-27 DIAGNOSIS — G89.4 CHRONIC PAIN SYNDROME: ICD-10-CM

## 2021-05-27 RX ORDER — OXYCODONE AND ACETAMINOPHEN 7.5; 325 MG/1; MG/1
1 TABLET ORAL EVERY 8 HOURS PRN
Qty: 90 TABLET | Refills: 0 | Status: SHIPPED | OUTPATIENT
Start: 2021-05-29 | End: 2021-06-25 | Stop reason: SDUPTHER

## 2021-05-27 NOTE — TELEPHONE ENCOUNTER
OARRS reviewed. UDS: + for  Flexeril oxycodone gabapentin consistent. Last seen: 5/4/2021.  Follow-up:   Future Appointments   Date Time Provider Kenny Arrington   6/15/2021 12:30 PM MEGHAN Gómez - CNP N [facility] Pain [facility] [address]   7/22/2021 10:45 AM Lilia Andrews MD [facility] [address]   10/14/2021 11:45 AM Roverto Rodriguez MD N [address] [facility]

## 2021-05-27 NOTE — TELEPHONE ENCOUNTER
Ramonita Crouch called requesting a refill on the following medications:  Requested Prescriptions     Pending Prescriptions Disp Refills    oxyCODONE-acetaminophen (PERCOCET) 7.5-325 MG per tablet 90 tablet 0     Sig: Take 1 tablet by mouth 3 times daily for 30 days. Pharmacy verified:[facility]      Date of last visit:   Date of next visit (if applicable): 6/61/1717

## 2021-06-07 ENCOUNTER — HOSPITAL ENCOUNTER (OUTPATIENT)
Age: 56
Discharge: HOME | End: 2021-06-07
Payer: MEDICARE

## 2021-06-07 ENCOUNTER — HOSPITAL ENCOUNTER (OUTPATIENT)
Dept: GENERAL RADIOLOGY | Age: 56
Discharge: HOME | End: 2021-06-07
Payer: MEDICARE

## 2021-06-07 DIAGNOSIS — R52 INTRACTABLE PAIN: ICD-10-CM

## 2021-06-07 DIAGNOSIS — M47.816 SPONDYLOSIS OF LUMBAR REGION WITHOUT MYELOPATHY OR RADICULOPATHY: ICD-10-CM

## 2021-06-07 PROCEDURE — 72100 X-RAY EXAM L-S SPINE 2/3 VWS: CPT

## 2021-06-15 ENCOUNTER — OFFICE VISIT (OUTPATIENT)
Dept: PHYSICAL MEDICINE AND REHAB | Age: 56
End: 2021-06-15
Payer: MEDICARE

## 2021-06-15 VITALS
BODY MASS INDEX: 40.43 KG/M2 | HEIGHT: 74 IN | DIASTOLIC BLOOD PRESSURE: 62 MMHG | WEIGHT: 315 LBS | SYSTOLIC BLOOD PRESSURE: 108 MMHG

## 2021-06-15 DIAGNOSIS — R52 INTRACTABLE PAIN: ICD-10-CM

## 2021-06-15 DIAGNOSIS — G89.29 CHRONIC BILATERAL LOW BACK PAIN WITHOUT SCIATICA: ICD-10-CM

## 2021-06-15 DIAGNOSIS — G89.4 CHRONIC PAIN SYNDROME: ICD-10-CM

## 2021-06-15 DIAGNOSIS — S32.030A CLOSED COMPRESSION FRACTURE OF L3 VERTEBRA, INITIAL ENCOUNTER (HCC): Primary | ICD-10-CM

## 2021-06-15 DIAGNOSIS — M80.00XA AGE-RELATED OSTEOPOROSIS WITH CURRENT PATHOLOGICAL FRACTURE, INITIAL ENCOUNTER: ICD-10-CM

## 2021-06-15 DIAGNOSIS — M54.59 INTRACTABLE LOW BACK PAIN: ICD-10-CM

## 2021-06-15 DIAGNOSIS — Z98.890 S/P KYPHOPLASTY: ICD-10-CM

## 2021-06-15 DIAGNOSIS — M47.816 LUMBAR FACET ARTHROPATHY: ICD-10-CM

## 2021-06-15 DIAGNOSIS — M47.816 SPONDYLOSIS OF LUMBAR REGION WITHOUT MYELOPATHY OR RADICULOPATHY: ICD-10-CM

## 2021-06-15 DIAGNOSIS — M54.50 CHRONIC BILATERAL LOW BACK PAIN WITHOUT SCIATICA: ICD-10-CM

## 2021-06-15 PROCEDURE — 99214 OFFICE O/P EST MOD 30 MIN: CPT | Performed by: NURSE PRACTITIONER

## 2021-06-15 ASSESSMENT — ENCOUNTER SYMPTOMS: BACK PAIN: 1

## 2021-06-15 NOTE — PROGRESS NOTES
[address]  Dept: [phone]  Dept Fax: [phone]: [phone]    Visit Date: 6/15/2021    Functionality Assessment/Goals Worksheet     On a scale of 0 (Does not Interfere) to 10 (Completely Interferes)     1. Which number describes how during the past week pain has interfered with       the following:  A. General Activity:  0  B. Mood: 0  C. Walking Ability:  0  D. Normal Work (Includes both work outside the home and housework):  0  E. Relations with Other People:   0  F. Sleep:   0  G. Enjoyment of Life:   0    2. Patient Prefers to Take their Pain Medications:     [x]  On a regular basis   []  Only when necessary    []  Does not take pain medications    3. What are the Patient's Goals/Expectations for Visiting Pain Management? []  Learn about my pain    [x]  Receive Medication   []  Physical Therapy     []  Treat Depression   [x]  Receive Injections    []  Treat Sleep   []  Deal with Anxiety and Stress   []  Treat Opoid Dependence/Addiction   []  Other:      HPI:   Katya Cotton is a 55 y.o. male is here today for    Chief Complaint: Low back pain, neck pain     HPI   1 month FU to review lumbar xray. Main complaint today is pain in low back- sharp, achinhg pain worse with activity and as the day goes on. Lumbar xray showed a possible new compression fracture at L3. Mid and upper back pain is tolerable. Percocet prn is effective in decreasing pain to a tolerable level. Pain increases with bending, twisting , turning torso, standing and getting up and down. Medications reviewed. Patient denies side effects with medications. Patient states he is taking medications as prescribed. Hedenies receiving pain medications from other sources. He denies any ER visits since last visit.     Pain scale with out pain medications or at its worst is 8/10.  Pain scale with pain medications or at its best is 3/10. Last dose of Percocet was today  Drug screen reviewed from 5/4/2021 and was appropriate  Pill count was completed today and was appropriate  Patient does not have naloxone available at home. Patient has not required use of naloxone at home since last office visit. Lumbar xray:   1. Mild old compression fractures L2 and L4 with evidence for prior kyphoplasty at both levels. 2. There is a new mild fracture involving the inferior end plate and anterior/inferior corner of the L3 vertebral body, not seen on prior study. 3. Moderate degenerative facet arthropathy L4-5 and L5/S1. Sacroiliac joints unremarkable. The patientis allergic to adhesive tape and keppra [levetiracetam]. Subjective:      Review of Systems   Constitutional: Negative. Cardiovascular: Positive for leg swelling. Musculoskeletal: Positive for arthralgias, back pain, gait problem, joint swelling, myalgias, neck pain and neck stiffness. Ambulating with cane    Neurological: Positive for weakness and numbness. Psychiatric/Behavioral: Negative. Objective:     Vitals:    06/15/21 1231   BP: 108/62   Weight: (!) 317 lb (143.8 kg)   Height: 6' 2\" (1.88 m)       Physical Exam  Constitutional:       Appearance: Normal appearance. He is ill-appearing. HENT:      Head: Normocephalic and atraumatic. Mouth/Throat:      Mouth: Mucous membranes are moist.   Cardiovascular:      Rate and Rhythm: Normal rate and regular rhythm. Pulses: Normal pulses. Heart sounds: Normal heart sounds. Pulmonary:      Effort: Pulmonary effort is normal.      Comments: Decreased  Abdominal:      General: Abdomen is flat. Palpations: Abdomen is soft. Musculoskeletal:         General: Swelling and tenderness present. Right wrist: Tenderness present. Decreased range of motion. Left wrist: Tenderness and bony tenderness present. Decreased range of motion. Cervical back: Tenderness and bony tenderness present. Muscular tenderness present. Thoracic back: Tenderness and bony tenderness present. Decreased range of motion. Lumbar back: Tenderness and bony tenderness present. Decreased range of motion. Back:       Right lower leg: Tenderness present. Edema present. Left lower leg: Tenderness present. Edema present. Right ankle: Decreased range of motion. Left ankle: Decreased range of motion. Skin:     Coloration: Skin is pale. Neurological:      General: No focal deficit present. Mental Status: He is alert and oriented to person, place, and time. Sensory: Sensory deficit present. Motor: Weakness present. Coordination: Coordination abnormal.      Gait: Gait abnormal.      Deep Tendon Reflexes:      Reflex Scores:       Tricep reflexes are 1+ on the right side and 1+ on the left side. Bicep reflexes are 1+ on the right side and 1+ on the left side. Brachioradialis reflexes are 1+ on the right side and 1+ on the left side. Patellar reflexes are 1+ on the right side and 1+ on the left side. Achilles reflexes are 1+ on the right side and 1+ on the left side. Comments: Decraesed sensation bilateral lower extremity and upper extremity    Psychiatric:         Mood and Affect: Mood normal.       ANJUM test: +   Yeomans test: +   Gaenslen test: +      Assessment:     1. Closed compression fracture of L3 vertebra, initial encounter ([facility] [address]    2. Age-related osteoporosis with current pathological fracture, initial encounter    3. Intractable low back pain    4. Spondylosis of lumbar region without myelopathy or radiculopathy    5. Chronic bilateral low back pain without sciatica    6. Intractable pain    7. S/P kyphoplasty    8. Lumbar facet arthropathy    9. Chronic pain syndrome            Plan:      · OARRS reviewed. Current MED: 33.75  · Patient was offered naloxone for home. · Discussed long term side effects of medications, tolerance, dependency and addiction. · Previous UDS reviewed  · UDS preformed today for compliance. · Patient told can not receive any pain medications from any other source. · No evidence of abuse, diversion or aberrant behavior.  Medications and/or procedures to improve function and quality of life- patient understanding with this and that may not be pain free   Discussed with patient about safe storage of medications at home   Discussed possible weaning of medication dosing dependent on treatment/procedure results.  Discussed with patient about risks with procedure including infection, reaction to medication, increased pain, or bleeding. · Procedure notes reveiwed in detail   · Receiving 80% relief from previosu Kyphoplasty  · Lumbar pain remains main complaint- reveiwed lumbar xray showed new acute L3 compression fracture Kyphoplasty   · Ordered Lumbar MRI to check acuity of fracture. Discussed Kyphoplasty at L3 possibly   · Continue Percocet 7.5/325 TID prn- filled recently 5/29/2021   · Patient is compliant will get next UDS at FU. Meds. Prescribed:   No orders of the defined types were placed in this encounter. Return for FU after Lumbar MRI, try to get pt fit on schedule .                Electronically signed by MEGHAN Duque CNP on6/15/2021 at 12:53 PM

## 2021-06-25 DIAGNOSIS — G89.4 CHRONIC PAIN SYNDROME: ICD-10-CM

## 2021-06-25 RX ORDER — OXYCODONE AND ACETAMINOPHEN 7.5; 325 MG/1; MG/1
1 TABLET ORAL EVERY 8 HOURS PRN
Qty: 90 TABLET | Refills: 0 | Status: SHIPPED | OUTPATIENT
Start: 2021-06-28 | End: 2021-07-23 | Stop reason: SDUPTHER

## 2021-06-25 NOTE — TELEPHONE ENCOUNTER
OARRS reviewed. UDS: + for Cyclobenzaprine, Gabapentin, Noroxycodone, Oxycodone. Last seen: 6/15/2021.  Follow-up:   Future Appointments   Date Time Provider Kenny Arrington   7/22/2021 10:45 AM MD [facility]les [facility]LL RUDOLPH.TORO   10/14/2021 11:45 AM Grazyna Adam MD N SRPX Heart [facility] SHERODROTHY PATRICK II.TORO

## 2021-06-25 NOTE — TELEPHONE ENCOUNTER
Carmen Nolen called requesting a refill on the following medications:  Requested Prescriptions     Pending Prescriptions Disp Refills    oxyCODONE-acetaminophen (PERCOCET) 7.5-325 MG per tablet 90 tablet 0     Sig: Take 1 tablet by mouth every 8 hours as needed for Pain for up to 30 days. Pharmacy verified: [id number]  . pv      Date of last visit: 6/15/2021  Date of next visit (if applicable): Visit date not found

## 2021-07-07 DIAGNOSIS — M54.59 INTRACTABLE LOW BACK PAIN: ICD-10-CM

## 2021-07-07 DIAGNOSIS — M80.00XA AGE-RELATED OSTEOPOROSIS WITH CURRENT PATHOLOGICAL FRACTURE, INITIAL ENCOUNTER: ICD-10-CM

## 2021-07-07 DIAGNOSIS — S32.030A CLOSED COMPRESSION FRACTURE OF L3 VERTEBRA, INITIAL ENCOUNTER (HCC): ICD-10-CM

## 2021-07-13 ENCOUNTER — TELEPHONE (OUTPATIENT)
Dept: PHYSICAL MEDICINE AND REHAB | Age: 56
End: 2021-07-13

## 2021-07-13 DIAGNOSIS — M80.80XA OTHER OSTEOPOROSIS WITH CURRENT PATHOLOGICAL FRACTURE, INITIAL ENCOUNTER: ICD-10-CM

## 2021-07-13 DIAGNOSIS — S32.030A CLOSED COMPRESSION FRACTURE OF L3 LUMBAR VERTEBRA, INITIAL ENCOUNTER (HCC): ICD-10-CM

## 2021-07-13 DIAGNOSIS — M54.59 INTRACTABLE LOW BACK PAIN: ICD-10-CM

## 2021-07-13 DIAGNOSIS — S32.050A CLOSED COMPRESSION FRACTURE OF L5 LUMBAR VERTEBRA, INITIAL ENCOUNTER (HCC): Primary | ICD-10-CM

## 2021-07-15 ENCOUNTER — TELEPHONE (OUTPATIENT)
Dept: CARDIOLOGY CLINIC | Age: 56
End: 2021-07-15

## 2021-07-15 DIAGNOSIS — I50.31 DIASTOLIC CHF, ACUTE (HCC): ICD-10-CM

## 2021-07-15 DIAGNOSIS — Z01.818 PRE-OP TESTING: Primary | ICD-10-CM

## 2021-07-15 NOTE — TELEPHONE ENCOUNTER
Pt. Contacted. Informed of need for Kyphoplasty and need for EKG and labs prior. Verbalized understanding. Medical clearance faxed to Dr. Tommie Wilson.

## 2021-07-15 NOTE — TELEPHONE ENCOUNTER
Pre op Risk Assessment    Procedure Kyphoplasty lumbar 3+5  Physician [facility] neuro  Date of surgery/procedure TBD    Last OV 4-  Last Stress 4-27-21  Last Echo 11-  Last Cath   Last Stent   Is patient on blood thinners   Hold Meds/how many days

## 2021-07-22 ENCOUNTER — HOSPITAL ENCOUNTER (OUTPATIENT)
Age: 56
Discharge: HOME | End: 2021-07-22
Payer: MEDICARE

## 2021-07-22 ENCOUNTER — HOSPITAL ENCOUNTER (OUTPATIENT)
Dept: NON INVASIVE DIAGNOSTICS | Age: 56
Discharge: HOME | End: 2021-07-22
Payer: MEDICARE

## 2021-07-22 DIAGNOSIS — S32.030A CLOSED COMPRESSION FRACTURE OF L3 LUMBAR VERTEBRA, INITIAL ENCOUNTER (HCC): ICD-10-CM

## 2021-07-22 DIAGNOSIS — M54.2 NECK PAIN: ICD-10-CM

## 2021-07-22 DIAGNOSIS — Z98.890 S/P KYPHOPLASTY: ICD-10-CM

## 2021-07-22 DIAGNOSIS — I50.31 DIASTOLIC CHF, ACUTE (HCC): ICD-10-CM

## 2021-07-22 DIAGNOSIS — M54.50 CHRONIC BILATERAL LOW BACK PAIN WITHOUT SCIATICA: ICD-10-CM

## 2021-07-22 DIAGNOSIS — G89.29 CHRONIC BILATERAL LOW BACK PAIN WITHOUT SCIATICA: ICD-10-CM

## 2021-07-22 DIAGNOSIS — M47.816 SPONDYLOSIS OF LUMBAR REGION WITHOUT MYELOPATHY OR RADICULOPATHY: ICD-10-CM

## 2021-07-22 DIAGNOSIS — M47.816 LUMBAR FACET ARTHROPATHY: ICD-10-CM

## 2021-07-22 DIAGNOSIS — M80.80XA OTHER OSTEOPOROSIS WITH CURRENT PATHOLOGICAL FRACTURE, INITIAL ENCOUNTER: ICD-10-CM

## 2021-07-22 DIAGNOSIS — M54.9 MID BACK PAIN: ICD-10-CM

## 2021-07-22 DIAGNOSIS — Z01.818 PRE-OP TESTING: ICD-10-CM

## 2021-07-22 DIAGNOSIS — S32.050A CLOSED COMPRESSION FRACTURE OF L5 LUMBAR VERTEBRA, INITIAL ENCOUNTER (HCC): ICD-10-CM

## 2021-07-22 DIAGNOSIS — S22.040A CLOSED WEDGE COMPRESSION FRACTURE OF T4 VERTEBRA, INITIAL ENCOUNTER (HCC): ICD-10-CM

## 2021-07-22 DIAGNOSIS — R52 INTRACTABLE PAIN: ICD-10-CM

## 2021-07-22 LAB
AMPHETAMINE+METHAMPHETAMINE URINE SCREEN: NEGATIVE
ANION GAP SERPL CALCULATED.3IONS-SCNC: 11 MEQ/L (ref 8–16)
BARBITURATE QUANTITATIVE URINE: NEGATIVE
BASOPHILS # BLD: 0.5 %
BASOPHILS ABSOLUTE: 0 THOU/MM3 (ref 0–0.1)
BENZODIAZEPINE QUANTITATIVE URINE: NEGATIVE
BUN BLDV-MCNC: 14 MG/DL (ref 7–22)
CALCIUM SERPL-MCNC: 9.5 MG/DL (ref 8.5–10.5)
CANNABINOID QUANTITATIVE URINE: NEGATIVE
CHLORIDE BLD-SCNC: 99 MEQ/L (ref 98–111)
CO2: 27 MEQ/L (ref 23–33)
COCAINE METABOLITE QUANTITATIVE URINE: NEGATIVE
CREAT SERPL-MCNC: 1 MG/DL (ref 0.4–1.2)
EKG ATRIAL RATE: 84 BPM
EKG P AXIS: 61 DEGREES
EKG P-R INTERVAL: 146 MS
EKG Q-T INTERVAL: 402 MS
EKG QRS DURATION: 112 MS
EKG QTC CALCULATION (BAZETT): 475 MS
EKG R AXIS: -3 DEGREES
EKG T AXIS: 62 DEGREES
EKG VENTRICULAR RATE: 84 BPM
EOSINOPHIL # BLD: 2.4 %
EOSINOPHILS ABSOLUTE: 0.2 THOU/MM3 (ref 0–0.4)
ERYTHROCYTE [DISTWIDTH] IN BLOOD BY AUTOMATED COUNT: 14.5 % (ref 11.5–14.5)
ERYTHROCYTE [DISTWIDTH] IN BLOOD BY AUTOMATED COUNT: 51.8 FL (ref 35–45)
GFR SERPL CREATININE-BSD FRML MDRD: 77 ML/MIN/1.73M2
GLUCOSE BLD-MCNC: 265 MG/DL (ref 70–108)
HCT VFR BLD CALC: 37.3 % (ref 42–52)
HEMOGLOBIN: 11.7 GM/DL (ref 14–18)
IMMATURE GRANS (ABS): 0.03 THOU/MM3 (ref 0–0.07)
IMMATURE GRANULOCYTES: 0.3 %
LV EF: 38 %
LVEF MODALITY: NORMAL
LYMPHOCYTES # BLD: 19 %
LYMPHOCYTES ABSOLUTE: 1.8 THOU/MM3 (ref 1–4.8)
MCH RBC QN AUTO: 30.6 PG (ref 26–33)
MCHC RBC AUTO-ENTMCNC: 31.4 GM/DL (ref 32.2–35.5)
MCV RBC AUTO: 97.6 FL (ref 80–94)
MONOCYTES # BLD: 6.1 %
MONOCYTES ABSOLUTE: 0.6 THOU/MM3 (ref 0.4–1.3)
NUCLEATED RED BLOOD CELLS: 0 /100 WBC
OPIATES, URINE: NEGATIVE
OXYCODONE: POSITIVE
PHENCYCLIDINE QUANTITATIVE URINE: NEGATIVE
PLATELET # BLD: 154 THOU/MM3 (ref 130–400)
PMV BLD AUTO: 10.3 FL (ref 9.4–12.4)
POTASSIUM SERPL-SCNC: 3.7 MEQ/L (ref 3.5–5.2)
RBC # BLD: 3.82 MILL/MM3 (ref 4.7–6.1)
SEG NEUTROPHILS: 71.7 %
SEGMENTED NEUTROPHILS ABSOLUTE COUNT: 6.7 THOU/MM3 (ref 1.8–7.7)
SODIUM BLD-SCNC: 137 MEQ/L (ref 135–145)
WBC # BLD: 9.4 THOU/MM3 (ref 4.8–10.8)

## 2021-07-22 PROCEDURE — 36415 COLL VENOUS BLD VENIPUNCTURE: CPT

## 2021-07-22 PROCEDURE — 85025 COMPLETE CBC W/AUTO DIFF WBC: CPT

## 2021-07-22 PROCEDURE — 93306 TTE W/DOPPLER COMPLETE: CPT

## 2021-07-22 PROCEDURE — 80048 BASIC METABOLIC PNL TOTAL CA: CPT

## 2021-07-22 PROCEDURE — 80307 DRUG TEST PRSMV CHEM ANLYZR: CPT

## 2021-07-22 PROCEDURE — 93005 ELECTROCARDIOGRAM TRACING: CPT | Performed by: NURSE PRACTITIONER

## 2021-07-23 DIAGNOSIS — G89.4 CHRONIC PAIN SYNDROME: ICD-10-CM

## 2021-07-23 NOTE — TELEPHONE ENCOUNTER
Nathalia Monsivais called requesting a refill on the following medications:  Requested Prescriptions     Pending Prescriptions Disp Refills    oxyCODONE-acetaminophen (PERCOCET) 7.5-325 MG per tablet 90 tablet 0     Sig: Take 1 tablet by mouth every 8 hours as needed for Pain for up to 30 days.      Pharmacy verified:  [facility]      Date of last visit: 06-15-21  Date of next visit (if applicable): 5/00/4595

## 2021-07-23 NOTE — TELEPHONE ENCOUNTER
Please see ECHO. Is pt clear for Kyphoplasty? Conclusions      Summary   Technically difficult study due to poor acoustic windows. Left ventricular size is normal and systolic function is severely reduced. Ejection fraction was estimated at 35-40%. LV wall thickness is within   normal limits. Moderately dilated left atrium. Thickened and calcified aortic valve leaflets with reduced excursion. DOPPLER: Transaortic peak velocity 3 m/s, mean gradient of 20 mm Hg and   calculated LOBITO was 1.2 cm2. There is moderate aortic stenosis present. Mild aortic regurgitation is noted. Calcification of the mitral valve noted. Mild mitral regurgitation is present. Mild tricuspid regurgitation visualized.       Signature      ----------------------------------------------------------------   Electronically signed by Loretat Landa MD (Interpreting   physician) on 07/22/2021 at 10:00 PM   ----------------------------------------------------------------

## 2021-07-23 NOTE — TELEPHONE ENCOUNTER
OARRS reviewed. UDS: + for Cyclobenzaprine, Gabapentin, Noroxycodone, Oxycodone  Last seen: 6/15/2021. Follow-up:   Future Appointments   Date Time Provider Kenny Arrington   8/2/2021  2:00 PM Lizzy Martin MD [id number] [address]   8/30/2021  8:30 AM Howie Beavers APRN - CNP N SRPX Pain [facility]   10/14/2021 11:45 AM Jv Lombardo MD N [address] [facility]     On 7/7 patient filled #6 3 days of Oxycodone 5 MG by Tapan Baca. I pushed prescription out 3 more days, was due on 7/28/21. Moved to 7/31/21.

## 2021-07-26 RX ORDER — OXYCODONE AND ACETAMINOPHEN 7.5; 325 MG/1; MG/1
1 TABLET ORAL EVERY 8 HOURS PRN
Qty: 90 TABLET | Refills: 0 | Status: SHIPPED | OUTPATIENT
Start: 2021-07-31 | End: 2021-08-25 | Stop reason: SDUPTHER

## 2021-07-30 ENCOUNTER — TELEPHONE (OUTPATIENT)
Dept: CARDIOLOGY CLINIC | Age: 56
End: 2021-07-30

## 2021-07-30 NOTE — TELEPHONE ENCOUNTER
PROCEDURE: Cardiac cath. DATE OF SERVICE: 08/03/2021. CPT CODE: 17660. PHYSICIAN: . DATE PRIOR AUTH SUBMITTED: 07/27/2021. STATUS: APPROVED. CASE [facility]:[id number]. AUTH NUMBER:  [id number]. VALID: 07/29/2021-09/11/2021.

## 2021-08-02 ENCOUNTER — HOSPITAL ENCOUNTER (OUTPATIENT)
Age: 56
Discharge: HOME | End: 2021-08-03
Attending: INTERNAL MEDICINE | Admitting: INTERNAL MEDICINE
Payer: MEDICARE

## 2021-08-02 ENCOUNTER — PREP FOR PROCEDURE (OUTPATIENT)
Dept: CARDIOLOGY | Age: 56
End: 2021-08-02

## 2021-08-02 PROBLEM — N28.9 RENAL INSUFFICIENCY: Status: ACTIVE | Noted: 2021-08-02

## 2021-08-02 LAB — GLUCOSE BLD-MCNC: 186 MG/DL (ref 70–108)

## 2021-08-02 PROCEDURE — 6370000000 HC RX 637 (ALT 250 FOR IP): Performed by: INTERNAL MEDICINE

## 2021-08-02 PROCEDURE — 82948 REAGENT STRIP/BLOOD GLUCOSE: CPT

## 2021-08-02 RX ORDER — DIPHENHYDRAMINE HYDROCHLORIDE 50 MG/ML
50 INJECTION INTRAMUSCULAR; INTRAVENOUS ONCE
Status: CANCELLED | OUTPATIENT
Start: 2021-08-02 | End: 2021-08-02

## 2021-08-02 RX ORDER — GABAPENTIN 300 MG/1
300 CAPSULE ORAL NIGHTLY
Status: DISCONTINUED | OUTPATIENT
Start: 2021-08-02 | End: 2021-08-03 | Stop reason: HOSPADM

## 2021-08-02 RX ORDER — NITROGLYCERIN 0.4 MG/1
0.4 TABLET SUBLINGUAL EVERY 5 MIN PRN
Status: CANCELLED | OUTPATIENT
Start: 2021-08-02

## 2021-08-02 RX ORDER — SODIUM CHLORIDE 9 MG/ML
INJECTION, SOLUTION INTRAVENOUS CONTINUOUS
Status: CANCELLED | OUTPATIENT
Start: 2021-08-02

## 2021-08-02 RX ORDER — LATANOPROST 50 UG/ML
1 SOLUTION/ DROPS OPHTHALMIC NIGHTLY
Status: DISCONTINUED | OUTPATIENT
Start: 2021-08-02 | End: 2021-08-03 | Stop reason: HOSPADM

## 2021-08-02 RX ORDER — SODIUM CHLORIDE 0.9 % (FLUSH) 0.9 %
5-40 SYRINGE (ML) INJECTION PRN
Status: CANCELLED | OUTPATIENT
Start: 2021-08-02

## 2021-08-02 RX ORDER — ASPIRIN 325 MG
325 TABLET ORAL ONCE
Status: CANCELLED | OUTPATIENT
Start: 2021-08-02 | End: 2021-08-02

## 2021-08-02 RX ORDER — SODIUM BICARBONATE 650 MG/1
650 TABLET ORAL 2 TIMES DAILY
Status: ON HOLD | COMMUNITY
End: 2021-08-03

## 2021-08-02 RX ORDER — SODIUM BICARBONATE 650 MG/1
650 TABLET ORAL 2 TIMES DAILY
Status: DISCONTINUED | OUTPATIENT
Start: 2021-08-02 | End: 2021-08-03 | Stop reason: HOSPADM

## 2021-08-02 RX ORDER — DOCUSATE SODIUM 100 MG/1
100 CAPSULE, LIQUID FILLED ORAL 2 TIMES DAILY
Status: DISCONTINUED | OUTPATIENT
Start: 2021-08-02 | End: 2021-08-03 | Stop reason: HOSPADM

## 2021-08-02 RX ORDER — FOLIC ACID 1 MG/1
1 TABLET ORAL DAILY
Status: DISCONTINUED | OUTPATIENT
Start: 2021-08-03 | End: 2021-08-03 | Stop reason: HOSPADM

## 2021-08-02 RX ORDER — OXYCODONE AND ACETAMINOPHEN 7.5; 325 MG/1; MG/1
1 TABLET ORAL EVERY 8 HOURS PRN
Status: DISCONTINUED | OUTPATIENT
Start: 2021-08-02 | End: 2021-08-03 | Stop reason: HOSPADM

## 2021-08-02 RX ORDER — SODIUM CHLORIDE 0.9 % (FLUSH) 0.9 %
5-40 SYRINGE (ML) INJECTION EVERY 12 HOURS SCHEDULED
Status: CANCELLED | OUTPATIENT
Start: 2021-08-02

## 2021-08-02 RX ORDER — SODIUM CHLORIDE 9 MG/ML
25 INJECTION, SOLUTION INTRAVENOUS PRN
Status: CANCELLED | OUTPATIENT
Start: 2021-08-02

## 2021-08-02 RX ORDER — ROSUVASTATIN CALCIUM 20 MG/1
20 TABLET, COATED ORAL NIGHTLY
Status: DISCONTINUED | OUTPATIENT
Start: 2021-08-02 | End: 2021-08-03 | Stop reason: HOSPADM

## 2021-08-02 RX ORDER — ALBUTEROL SULFATE 90 UG/1
1 AEROSOL, METERED RESPIRATORY (INHALATION) EVERY 4 HOURS PRN
Status: DISCONTINUED | OUTPATIENT
Start: 2021-08-02 | End: 2021-08-03 | Stop reason: HOSPADM

## 2021-08-02 RX ORDER — METOPROLOL SUCCINATE 50 MG/1
50 TABLET, EXTENDED RELEASE ORAL DAILY
Status: DISCONTINUED | OUTPATIENT
Start: 2021-08-03 | End: 2021-08-03 | Stop reason: HOSPADM

## 2021-08-02 RX ORDER — DULOXETIN HYDROCHLORIDE 60 MG/1
60 CAPSULE, DELAYED RELEASE ORAL DAILY
Status: DISCONTINUED | OUTPATIENT
Start: 2021-08-03 | End: 2021-08-03 | Stop reason: HOSPADM

## 2021-08-02 RX ORDER — IPRATROPIUM BROMIDE AND ALBUTEROL SULFATE 2.5; .5 MG/3ML; MG/3ML
3 SOLUTION RESPIRATORY (INHALATION) EVERY 4 HOURS PRN
Status: DISCONTINUED | OUTPATIENT
Start: 2021-08-02 | End: 2021-08-03 | Stop reason: HOSPADM

## 2021-08-02 RX ORDER — TAMSULOSIN HYDROCHLORIDE 0.4 MG/1
0.4 CAPSULE ORAL NIGHTLY
Status: DISCONTINUED | OUTPATIENT
Start: 2021-08-02 | End: 2021-08-03 | Stop reason: HOSPADM

## 2021-08-02 RX ADMIN — OXYCODONE HYDROCHLORIDE AND ACETAMINOPHEN 1 TABLET: 7.5; 325 TABLET ORAL at 21:27

## 2021-08-02 ASSESSMENT — PAIN SCALES - GENERAL
PAINLEVEL_OUTOF10: 0
PAINLEVEL_OUTOF10: 7

## 2021-08-03 VITALS
OXYGEN SATURATION: 97 % | HEART RATE: 72 BPM | RESPIRATION RATE: 18 BRPM | DIASTOLIC BLOOD PRESSURE: 88 MMHG | SYSTOLIC BLOOD PRESSURE: 143 MMHG | HEIGHT: 72 IN | WEIGHT: 310.3 LBS | BODY MASS INDEX: 42.03 KG/M2 | TEMPERATURE: 97.7 F

## 2021-08-03 LAB
ABO: NORMAL
ANION GAP SERPL CALCULATED.3IONS-SCNC: 10 MEQ/L (ref 8–16)
ANTIBODY SCREEN: NORMAL
APTT: 32 SECONDS (ref 22–38)
BUN BLDV-MCNC: 16 MG/DL (ref 7–22)
CALCIUM SERPL-MCNC: 8.9 MG/DL (ref 8.5–10.5)
CHLORIDE BLD-SCNC: 103 MEQ/L (ref 98–111)
CO2: 25 MEQ/L (ref 23–33)
CREAT SERPL-MCNC: 1 MG/DL (ref 0.4–1.2)
EKG ATRIAL RATE: 72 BPM
EKG P AXIS: 45 DEGREES
EKG P-R INTERVAL: 160 MS
EKG Q-T INTERVAL: 424 MS
EKG QRS DURATION: 112 MS
EKG QTC CALCULATION (BAZETT): 464 MS
EKG R AXIS: 55 DEGREES
EKG T AXIS: 72 DEGREES
EKG VENTRICULAR RATE: 72 BPM
ERYTHROCYTE [DISTWIDTH] IN BLOOD BY AUTOMATED COUNT: 14.4 % (ref 11.5–14.5)
ERYTHROCYTE [DISTWIDTH] IN BLOOD BY AUTOMATED COUNT: 52.2 FL (ref 35–45)
GFR SERPL CREATININE-BSD FRML MDRD: 77 ML/MIN/1.73M2
GLUCOSE BLD-MCNC: 160 MG/DL (ref 70–108)
GLUCOSE BLD-MCNC: 169 MG/DL (ref 70–108)
HCT VFR BLD CALC: 34.4 % (ref 42–52)
HEMOGLOBIN: 10.9 GM/DL (ref 14–18)
INR BLD: 1.06 (ref 0.85–1.13)
LV EF: 35 %
LVEF MODALITY: NORMAL
MCH RBC QN AUTO: 31.1 PG (ref 26–33)
MCHC RBC AUTO-ENTMCNC: 31.7 GM/DL (ref 32.2–35.5)
MCV RBC AUTO: 98.3 FL (ref 80–94)
PLATELET # BLD: 125 THOU/MM3 (ref 130–400)
PMV BLD AUTO: 10.7 FL (ref 9.4–12.4)
POTASSIUM REFLEX MAGNESIUM: 3.8 MEQ/L (ref 3.5–5.2)
RBC # BLD: 3.5 MILL/MM3 (ref 4.7–6.1)
RH FACTOR: NORMAL
SODIUM BLD-SCNC: 138 MEQ/L (ref 135–145)
WBC # BLD: 8.5 THOU/MM3 (ref 4.8–10.8)

## 2021-08-03 PROCEDURE — 93452 LEFT HRT CATH W/VENTRCLGRPHY: CPT | Performed by: INTERNAL MEDICINE

## 2021-08-03 PROCEDURE — 86901 BLOOD TYPING SEROLOGIC RH(D): CPT

## 2021-08-03 PROCEDURE — 93005 ELECTROCARDIOGRAM TRACING: CPT | Performed by: INTERNAL MEDICINE

## 2021-08-03 PROCEDURE — 36415 COLL VENOUS BLD VENIPUNCTURE: CPT

## 2021-08-03 PROCEDURE — 6360000002 HC RX W HCPCS: Performed by: PHYSICIAN ASSISTANT

## 2021-08-03 PROCEDURE — 6370000000 HC RX 637 (ALT 250 FOR IP): Performed by: INTERNAL MEDICINE

## 2021-08-03 PROCEDURE — 6370000000 HC RX 637 (ALT 250 FOR IP): Performed by: STUDENT IN AN ORGANIZED HEALTH CARE EDUCATION/TRAINING PROGRAM

## 2021-08-03 PROCEDURE — 6360000004 HC RX CONTRAST MEDICATION: Performed by: INTERNAL MEDICINE

## 2021-08-03 PROCEDURE — 6360000002 HC RX W HCPCS

## 2021-08-03 PROCEDURE — 94640 AIRWAY INHALATION TREATMENT: CPT

## 2021-08-03 PROCEDURE — 80048 BASIC METABOLIC PNL TOTAL CA: CPT

## 2021-08-03 PROCEDURE — 2580000003 HC RX 258: Performed by: STUDENT IN AN ORGANIZED HEALTH CARE EDUCATION/TRAINING PROGRAM

## 2021-08-03 PROCEDURE — 85730 THROMBOPLASTIN TIME PARTIAL: CPT

## 2021-08-03 PROCEDURE — 94760 N-INVAS EAR/PLS OXIMETRY 1: CPT

## 2021-08-03 PROCEDURE — 85610 PROTHROMBIN TIME: CPT

## 2021-08-03 PROCEDURE — 2500000003 HC RX 250 WO HCPCS

## 2021-08-03 PROCEDURE — C1769 GUIDE WIRE: HCPCS

## 2021-08-03 PROCEDURE — 86900 BLOOD TYPING SEROLOGIC ABO: CPT

## 2021-08-03 PROCEDURE — 82948 REAGENT STRIP/BLOOD GLUCOSE: CPT

## 2021-08-03 PROCEDURE — C1894 INTRO/SHEATH, NON-LASER: HCPCS

## 2021-08-03 PROCEDURE — 85027 COMPLETE CBC AUTOMATED: CPT

## 2021-08-03 PROCEDURE — 86850 RBC ANTIBODY SCREEN: CPT

## 2021-08-03 PROCEDURE — 93458 L HRT ARTERY/VENTRICLE ANGIO: CPT | Performed by: INTERNAL MEDICINE

## 2021-08-03 RX ORDER — ACETAMINOPHEN 325 MG/1
650 TABLET ORAL EVERY 4 HOURS PRN
Status: DISCONTINUED | OUTPATIENT
Start: 2021-08-03 | End: 2021-08-03 | Stop reason: HOSPADM

## 2021-08-03 RX ORDER — SODIUM CHLORIDE 0.9 % (FLUSH) 0.9 %
5-40 SYRINGE (ML) INJECTION PRN
Status: DISCONTINUED | OUTPATIENT
Start: 2021-08-03 | End: 2021-08-03 | Stop reason: HOSPADM

## 2021-08-03 RX ORDER — HYDRALAZINE HYDROCHLORIDE 20 MG/ML
10 INJECTION INTRAMUSCULAR; INTRAVENOUS ONCE
Status: COMPLETED | OUTPATIENT
Start: 2021-08-03 | End: 2021-08-03

## 2021-08-03 RX ORDER — DULOXETIN HYDROCHLORIDE 60 MG/1
60 CAPSULE, DELAYED RELEASE ORAL DAILY
Qty: 30 CAPSULE | Refills: 3 | Status: SHIPPED
Start: 2021-08-04

## 2021-08-03 RX ORDER — TAMSULOSIN HYDROCHLORIDE 0.4 MG/1
0.4 CAPSULE ORAL NIGHTLY
Qty: 30 CAPSULE | Refills: 0 | Status: SHIPPED
Start: 2021-08-03

## 2021-08-03 RX ORDER — BUSPIRONE HYDROCHLORIDE 15 MG/1
15 TABLET ORAL 2 TIMES DAILY
Status: ON HOLD | COMMUNITY
End: 2021-08-24

## 2021-08-03 RX ORDER — SODIUM CHLORIDE 0.9 % (FLUSH) 0.9 %
5-40 SYRINGE (ML) INJECTION EVERY 12 HOURS SCHEDULED
Status: DISCONTINUED | OUTPATIENT
Start: 2021-08-03 | End: 2021-08-03 | Stop reason: HOSPADM

## 2021-08-03 RX ORDER — ONDANSETRON 2 MG/ML
4 INJECTION INTRAMUSCULAR; INTRAVENOUS EVERY 6 HOURS PRN
Status: DISCONTINUED | OUTPATIENT
Start: 2021-08-03 | End: 2021-08-03 | Stop reason: HOSPADM

## 2021-08-03 RX ORDER — SODIUM CHLORIDE 9 MG/ML
25 INJECTION, SOLUTION INTRAVENOUS PRN
Status: DISCONTINUED | OUTPATIENT
Start: 2021-08-03 | End: 2021-08-03 | Stop reason: HOSPADM

## 2021-08-03 RX ORDER — ASPIRIN 325 MG
325 TABLET ORAL ONCE
Status: COMPLETED | OUTPATIENT
Start: 2021-08-03 | End: 2021-08-03

## 2021-08-03 RX ORDER — DIPHENHYDRAMINE HYDROCHLORIDE 50 MG/ML
50 INJECTION INTRAMUSCULAR; INTRAVENOUS ONCE
Status: DISCONTINUED | OUTPATIENT
Start: 2021-08-03 | End: 2021-08-03 | Stop reason: HOSPADM

## 2021-08-03 RX ORDER — GABAPENTIN 300 MG/1
300 CAPSULE ORAL NIGHTLY
Qty: 90 CAPSULE | Refills: 0 | Status: SHIPPED
Start: 2021-08-03 | End: 2022-05-04

## 2021-08-03 RX ORDER — NITROGLYCERIN 0.4 MG/1
0.4 TABLET SUBLINGUAL EVERY 5 MIN PRN
Status: DISCONTINUED | OUTPATIENT
Start: 2021-08-03 | End: 2021-08-03 | Stop reason: HOSPADM

## 2021-08-03 RX ORDER — NITROGLYCERIN 0.4 MG/1
TABLET SUBLINGUAL
Qty: 25 TABLET | Refills: 3 | Status: ON HOLD | OUTPATIENT
Start: 2021-08-03 | End: 2022-03-15 | Stop reason: HOSPADM

## 2021-08-03 RX ORDER — SODIUM CHLORIDE 9 MG/ML
INJECTION, SOLUTION INTRAVENOUS CONTINUOUS
Status: DISCONTINUED | OUTPATIENT
Start: 2021-08-03 | End: 2021-08-03 | Stop reason: HOSPADM

## 2021-08-03 RX ADMIN — TIOTROPIUM BROMIDE INHALATION SPRAY 2 PUFF: 3.12 SPRAY, METERED RESPIRATORY (INHALATION) at 10:47

## 2021-08-03 RX ADMIN — IOPAMIDOL 50 ML: 755 INJECTION, SOLUTION INTRAVENOUS at 11:52

## 2021-08-03 RX ADMIN — FOLIC ACID 1 MG: 1 TABLET ORAL at 08:01

## 2021-08-03 RX ADMIN — DOCUSATE SODIUM 100 MG: 100 CAPSULE, LIQUID FILLED ORAL at 08:01

## 2021-08-03 RX ADMIN — ASPIRIN 325 MG: 325 TABLET ORAL at 08:01

## 2021-08-03 RX ADMIN — OXYCODONE HYDROCHLORIDE AND ACETAMINOPHEN 1 TABLET: 7.5; 325 TABLET ORAL at 06:51

## 2021-08-03 RX ADMIN — METOPROLOL SUCCINATE 50 MG: 50 TABLET, FILM COATED, EXTENDED RELEASE ORAL at 08:01

## 2021-08-03 RX ADMIN — SODIUM CHLORIDE: 9 INJECTION, SOLUTION INTRAVENOUS at 00:25

## 2021-08-03 RX ADMIN — DULOXETINE HYDROCHLORIDE 60 MG: 60 CAPSULE, DELAYED RELEASE ORAL at 08:01

## 2021-08-03 RX ADMIN — HYDRALAZINE HYDROCHLORIDE 10 MG: 20 INJECTION INTRAMUSCULAR; INTRAVENOUS at 03:21

## 2021-08-03 RX ADMIN — SODIUM BICARBONATE 650 MG: 650 TABLET ORAL at 08:01

## 2021-08-03 ASSESSMENT — PAIN DESCRIPTION - LOCATION
LOCATION: BACK
LOCATION: BACK

## 2021-08-03 ASSESSMENT — PAIN SCALES - GENERAL
PAINLEVEL_OUTOF10: 0
PAINLEVEL_OUTOF10: 7
PAINLEVEL_OUTOF10: 0
PAINLEVEL_OUTOF10: 3
PAINLEVEL_OUTOF10: 0
PAINLEVEL_OUTOF10: 7

## 2021-08-03 ASSESSMENT — PAIN DESCRIPTION - PAIN TYPE
TYPE: CHRONIC PAIN
TYPE: CHRONIC PAIN

## 2021-08-03 ASSESSMENT — PAIN DESCRIPTION - PROGRESSION
CLINICAL_PROGRESSION: GRADUALLY IMPROVING
CLINICAL_PROGRESSION: NOT CHANGED
CLINICAL_PROGRESSION: GRADUALLY WORSENING
CLINICAL_PROGRESSION: NOT CHANGED

## 2021-08-03 ASSESSMENT — PAIN DESCRIPTION - FREQUENCY
FREQUENCY: INTERMITTENT
FREQUENCY: INTERMITTENT

## 2021-08-03 ASSESSMENT — PAIN DESCRIPTION - DESCRIPTORS
DESCRIPTORS: ACHING
DESCRIPTORS: ACHING

## 2021-08-03 ASSESSMENT — PAIN DESCRIPTION - ORIENTATION
ORIENTATION: LOWER
ORIENTATION: LOWER

## 2021-08-03 ASSESSMENT — PAIN DESCRIPTION - ONSET
ONSET: ON-GOING
ONSET: ON-GOING

## 2021-08-03 ASSESSMENT — PAIN - FUNCTIONAL ASSESSMENT
PAIN_FUNCTIONAL_ASSESSMENT: ACTIVITIES ARE NOT PREVENTED
PAIN_FUNCTIONAL_ASSESSMENT: ACTIVITIES ARE NOT PREVENTED

## 2021-08-03 NOTE — PROGRESS NOTES
Pt admitted to  8AB12 via via direct admit from ED. Complaints: Pre- hydration for heart cath. IV none infusing into the none at a rate of 0 mls/ hour. IV site free of s/s of infection or infiltration. Vital signs obtained. Assessment and data collection initiated. Two nurse skin assessment performed by Montserrat Pandya RN and Marline Romano RN. Oriented to room. Policies and procedures for 8AB explained. All questions answered with no further questions at this time. Fall prevention and safety brochure discussed with patient. Bed alarm on. Call light in reach. Oriented to room. Alex Rios, RN, RN 8/2/2021 10:24 PM     Explained patients right to have family, representative or physician notified of their admission. Patient has Declined for physician to be notified. Patient has Declined for family/representative to be notified. Patient would like family notified once per shift?  No

## 2021-08-03 NOTE — PLAN OF CARE
Problem: Falls - Risk of:  Goal: Will remain free from falls  Description: Will remain free from falls  8/3/2021 0959 by Solomon Scott RN  Note: Remains free from falls at this time. Skid socks on, bed alarm on, bed in lowest position, and call light within reach. Problem: Falls - Risk of:  Goal: Absence of physical injury  Description: Absence of physical injury  8/3/2021 0959 by Solomon Scott RN  Note: Free from physical injury at this time. Non-skid socks on,bed alarm on, and call light within reach. Problem: Pain:  Goal: Pain level will decrease  Description: Pain level will decrease  Note: PRN meds on. Continue to monitor     Problem: Pain:  Goal: Control of acute pain  Description: Control of acute pain  Note: PRN meds on. Continue to monitor     Problem: Pain:  Goal: Control of chronic pain  Description: Control of chronic pain  Note: PRN meds. Continue to monitor     Problem: Discharge Planning:  Goal: Discharged to appropriate level of care  Description: Discharged to appropriate level of care  Note: Discharging home to appropriate level of care with support from spouse.

## 2021-08-03 NOTE — CARE COORDINATION
8/3/21, 9:10 AM EDT    Patient goals/plan/ treatment preferences discussed by  and . Patient goals/plan/ treatment preferences reviewed with patient/ family. Patient/ family verbalize understanding of discharge plan and are in agreement with goal/plan/treatment preferences. Understanding was demonstrated using the teach back method. AVS provided by RN at time of discharge, which includes all necessary medical information pertaining to the patients current course of illness, treatment, post-discharge goals of care, and treatment preferences. Admitted for Cardiac Cath planned for today at 11 am. BP elevated, received hydralazine. Pt states he is from home with long time girlfriend who helps with his needs as he is legally blind. He has no home services. He uses a cane and a bedside commode. He has a PCP, his SO provides transportation and he has no issues getting his medications. His girlfriend will provide transportation home at discharge.

## 2021-08-03 NOTE — PLAN OF CARE
Problem: Falls - Risk of:  Goal: Will remain free from falls  Description: Will remain free from falls  Outcome: Ongoing  Note: No falls at this time. Call light in reach and bed alarm on. Goal: Absence of physical injury  Description: Absence of physical injury  Outcome: Ongoing  Note: No physical injury at this time. Call light in reach, bed alarm on, and hourly rounding to monitor pt. Needs. Care plan reviewed with patient. Patient verbalize understanding of the plan of care and contribute to goal setting.

## 2021-08-03 NOTE — PROGRESS NOTES
Patient educated on discharge and cardiac cath instructions and any new medications. No medication questions at this time.

## 2021-08-03 NOTE — H&P
Sedation/Analgesia History & Physical      Pt Name: Amada Medrano  MRN: [id number]  YOB: 1965  Provider Performing Procedure: June Garcia MD MD, [address] [facility]  Primary Care Physician: Austyn Moran, APRN - CNP      PRE-PROCEDURE   DNR-CCA/DNR-CC []Yes [x]No      PLANNED PROCEDURE     [x]Cath  [x]PCI                []Pacemaker/AICD  []RAMON             []Cardioversion []Peripheral angiography/PTA  []Other:        Consent:   The indication, risks and benefits of the procedure and possible therapeutic consequences and alternatives were discussed with the patient. The patient was given the opportunity to ask questions and to have them answered to his/her satisfaction. Risks of the procedure include but are not limited to the following: Bleeding, hematoma including retroperitoneal hematoma, infection, pain and discomfort, injury to the aorta and other blood vessels, rhythm disturbance, low blood pressure, myocardial infarction, stroke, kidney damage/failure, myocardial perforation, allergic reactions to sedatives/contrast material, loss of pulse/vascular compromise requiring surgery, aneurysm/pseudoaneurysm formation, possible loss of a limb/hand/leg, death. Alternatives to and omission of the suggested procedure were discussed. The patient had no further questions and wished to proceed; the consent form was signed.       Indications for the Procedure:     New LV dysfunction  Abnormal stress test  Preop risk stratifcation    Plan: [facility] + possible PCI        MEDICAL HISTORY        Past Medical History:   Diagnosis Date    Acute kidney injury ([address] 12/2020    due to Enterococous Bacteremia , fluid overload, low sodium    Amputation of right great toe ([address] 02/18/2021    Asthma     Brain tumor ([facility] [address]     Cirrhosis (HCC)     ETOH abuse    CKD (chronic kidney disease)     l.brown-osu spetie    COPD (chronic obstructive pulmonary disease) (HCC)     Diabetes mellitus (HCC)     type 2-insulin lantus and humalog    Falling     Glaucoma     Hepatitis C     History of blood transfusion     s/p nasal surgery    Hyperlipidemia     Hypertension     Legally blind     Obesity     Pain management     karol marzec-percocet Rx    Right foot infection 11/2021    Seizures ([address]          Past Surgical History:   Procedure Laterality Date    BACK SURGERY      EYE SURGERY      FIBULA FRACTURE SURGERY Left 03/21/2019    LEFT FIBULAR SHAFT ORIF performed by Arlin Jo DPM at [facility] Right     Steel pins in place    FRACTURE SURGERY      NASAL SINUS SURGERY Bilateral 11/29/2020    FLEXIBLE BRONCHOSCOPY WITH BRONCHOALVEOLAR LAVAGE WITH CULTURES. NASAL ENDOSCOPY WITH POSSIBLE CAUTERY ADN NASAL PACKING [facility]  01/2020    SPINE SURGERY N/A 02/19/2021    KYPHOPLASTY at T4, L2, L4 performed by Huong Proctor MD at [address] [place] [address] Right 09/23/2020    AMPUTATION DISTAL APSECT RIGHT HALLUX, REMOVAL OF HARDWARE RIGHT HALLUX performed by Christo Mcclure DPM at [address] [place] [address] ENDOSCOPY N/A 04/25/2019    EGD BIOPSY performed by Shelley Nance MD at [facility]           Allergies   Allergen Reactions    Adhesive Tape Rash     Bandaid    Keppra [Levetiracetam] Rash         MEDICATIONS   Coumadin Use Last 5 Days [x]No []Yes  Antiplatelet drug therapy use last 5 days  []No [x]Yes  Other anticoagulant use last 5 days  [x]No []Yes      No current facility-administered medications on file prior to encounter. Current Outpatient Medications on File Prior to Encounter   Medication Sig Dispense Refill    busPIRone (BUSPAR) 15 MG tablet Take 15 mg by mouth 2 times daily      oxyCODONE-acetaminophen (PERCOCET) 7.5-325 MG per tablet Take 1 tablet by mouth every 8 hours as needed for Pain for up to 30 days.  90 tablet 0    insulin glargine (LANTUS) 100 UNIT/ML injection vial Inject 84 Units into the Sublingual Q5 Min PRN Janine Zee PA-C        sodium chloride flush 0.9 % injection 5-40 mL  5-40 mL Intravenous 2 times per day Janine Zee PA-C        sodium chloride flush 0.9 % injection 5-40 mL  5-40 mL Intravenous PRN Janine Zee PA-C        albuterol sulfate  (90 Base) MCG/ACT inhaler 1 puff  1 puff Inhalation Q4H PRN Neil Barnett MD        docusate sodium (COLACE) capsule 100 mg  100 mg Oral BID Neil Barnett MD   100 mg at 08/03/21 0801    DULoxetine (CYMBALTA) extended release capsule 60 mg  60 mg Oral Daily Neil Barnett MD   60 mg at 04/71/00 1520    folic acid (FOLVITE) tablet 1 mg  1 mg Oral Daily Neil Barnett MD   1 mg at 08/03/21 0801    gabapentin (NEURONTIN) capsule 300 mg  300 mg Oral Nightly Neil Barnett MD        ipratropium-albuterol (DUONEB) nebulizer solution 3 mL  3 mL Inhalation Q4H PRN Neil Barnett MD        latanoprost (XALATAN) 0.005 % ophthalmic solution 1 drop  1 drop Both Eyes Nightly Neil Barnett MD        metoprolol succinate (TOPROL XL) extended release tablet 50 mg  50 mg Oral Daily Neil Barnett MD   50 mg at 08/03/21 0801    oxyCODONE-acetaminophen (PERCOCET) 7.5-325 MG per tablet 1 tablet  1 tablet Oral Q8H PRN Neil Barnett MD   1 tablet at 08/03/21 6031    rosuvastatin (CRESTOR) tablet 20 mg  20 mg Oral Nightly Neil Barnett MD        sodium bicarbonate tablet 650 mg  650 mg Oral BID Neil Barnett MD   650 mg at 08/03/21 0801    tamsulosin (FLOMAX) capsule 0.4 mg  0.4 mg Oral Nightly Neil Barnett MD        tiotropium (SPIRIVA RESPIMAT) 2.5 MCG/ACT inhaler 2 puff  2 puff Inhalation Daily Neil Barnett MD   2 puff at 08/03/21 1047             PHYSICAL:     BP (!) 180/92   Pulse 77   Temp 98 °F (36.7 °C) (Oral)   Resp 18   Ht 6' (1.829 m)   Wt (!) 310 lb 4.8 oz (140.8 kg)   SpO2 95%   BMI 42.08 kg/m²     Heart:  [x]Regular rate and rhythm  []Other:    Lungs:  [x]Clear []Other:    Abdomen: [x]Soft    []Other:    Mental Status: [x]Alert & Oriented  []Other:   Ext:                [x]No edema       []Other:         No results for input(s): CKTOTAL, CKMB, CKMBINDEX, TROPONINI in the last 72 hours. Lab Results   Component Value Date    WBC 8.5 08/03/2021    RBC 3.50 08/03/2021    HGB 10.9 08/03/2021    HCT 34.4 08/03/2021    MCV 98.3 08/03/2021    MCH 31.1 08/03/2021    MCHC 31.7 08/03/2021    RDW 16.6 06/03/2020     08/03/2021    MPV 10.7 08/03/2021       Lab Results   Component Value Date     08/03/2021    K 3.8 08/03/2021     08/03/2021    CO2 25 08/03/2021    BUN 16 08/03/2021    LABALBU 2.9 02/25/2021    CREATININE 1.0 08/03/2021    CALCIUM 8.9 08/03/2021    LABGLOM 77 08/03/2021    GLUCOSE 169 08/03/2021       Lab Results   Component Value Date    ALKPHOS 134 02/25/2021    ALT 37 02/25/2021    AST 59 02/25/2021    PROT 7.2 02/25/2021    BILITOT 0.7 02/25/2021    BILIDIR 0.4 02/21/2021    LABALBU 2.9 02/25/2021       Lab Results   Component Value Date    MG 1.9 03/03/2021       No components found for: Mera Dariela    Lab Results   Component Value Date    LABA1C 6.6 02/24/2021       Lab Results   Component Value Date    TRIG 164 10/24/2013    HDL 46 10/24/2013    LDLCALC 123 10/24/2013       Lab Results   Component Value Date    TSH 0.771 02/26/2021            SEDATION  Planned agent:[x]Midazolam []Meperidine [x]Sublimaze []Morphine []Diazepam  []Other:         ASA Classification:  []1 [x]2 []3 []4 []5  Class 1: A normal healthy patient  Class 2: Pt with mild to moderate systemic disease  Class 3: Severe systemic disease or disturbance  Class 4: Severe systemic disorders that are already life threatening. Class 5: Moribund pt with little chances of survival, for more than 24 hours. Mallampati I Airway Classification:   []1 [x]2 []3 []4      [x]Pre-procedure diagnostic studies complete and results available.    Comment:    [x]Previous sedation/anesthesia experiences assessed. Comment:    [x]The patient is an appropriate candidate to undergo the planned procedure sedation and anesthesia. (Refer to nursing sedation/analgesia documentation record)  [x]Formulation and discussion of sedation/procedure plan, risks, and expectations with patient and/or responsible adult completed. [x]Patient examined immediately prior to the procedure.  (Refer to nursing sedation/analgesia documentation record)        Sunitha Michael MD MD, [facility]  Electronically signed 8/3/2021 at 11:16 AM

## 2021-08-10 NOTE — TELEPHONE ENCOUNTER
Med. Clearance received. Scanned into media. Pt. Contacted. Informed of provider out of office at this time. Referral for possible Kyphoplasty ordered-Dr. Kaylee Cárdenas. Verbalized understanding.

## 2021-08-17 ENCOUNTER — TELEPHONE (OUTPATIENT)
Dept: NEUROSURGERY | Age: 56
End: 2021-08-17

## 2021-08-17 NOTE — TELEPHONE ENCOUNTER
SURGERY [address]  [facility] [address] [facility] [address] Gabby Blancas, [address]      Phone *[phone] *[phone]   Surgical Scheduling Direct Line Phone *[phone] Fax *[phone]      Claudine Garibay   1965   male    [address]  [address] [address]               Home Phone: [phone]    Cell Phone:    Telephone Information:   Mobile [phone]          Surgeon: Luz Conway   Surgery Date: 8/24/2021   Time: 8:00 am    Procedure: L3 + L5 Kyphoplasty    Diagnosis: Closed compression fracture of L3 vertebra    Important Medical History:       Special Inst/Equip: Position: Prone, orestes frame, 2 Fluoro, Metronic Rep Viviana Doe)    Anesthesia:  Gen            Admission Type: Observation    Case Location:      Main OR         Need Preop Cardiac Clearance:       Does Patient have Cardiologist/physician?          Surgery Confirmation #: __________________________________________________    [facility] Law: ________________________   Date: __________________________     [employer] Name: [facility] Dual Complete  CPT: 18772

## 2021-08-18 RX ORDER — SODIUM BICARBONATE 650 MG/1
650 TABLET ORAL 2 TIMES DAILY
COMMUNITY
End: 2022-05-04

## 2021-08-18 RX ORDER — SENNA PLUS 8.6 MG/1
2 TABLET ORAL DAILY
COMMUNITY

## 2021-08-18 NOTE — PROGRESS NOTES
In preparation for their surgical procedure above patient was screened for Obstructive Sleep Apnea (TAPAN) using the STOP-Bang Questionnaire by the Pre-Admission Testing department. This is a pre-surgical screening tool for patient safety and serves as a recommendation, this WILL NOT cause cancellation of surgery. STOP-Bang Questionnaire  * Do you currently see a pulmonologist?  No     If yes STOP, do not complete. Patient follows with Dr.     1.  Do you snore loudly (able to be heard in the next room)? No    2. Do you often feel tired or sleepy during the daytime? No       3. Has anyone ever told you that you stop breathing during your sleep? No    4. Do you have or are you being treated for high blood pressure? Yes      5. BMI more than 35? BMI (Calculated): 42.1        Yes    6. Age over 48 years? 55 y.o. Yes    7. Neck Circumference greater than 17 inches for male or 16 inches for female? Measured           (visits only)            Not Applicable    8. Gender Male? Yes      TOTAL SCORE: 4    TAPAN - Low Risk : Yes to 0 - 2 questions  TAPAN - Intermediate Risk : Yes to 3 - 4 questions  TAPAN - High Risk : Yes to 5 - 8 questions    Adapted from:   STOP Questionnaire: A Tool to Screen Patients for Obstructive Sleep Apnea   Raydell Paget, F.R.C.P.C., Modesto Leyva M.B.B.S., Georgie Clinton M.D., Leesa Monday. Joanie Chapman, Ph.D., ALEXIS Roberts.B.B.S., Elda Villa, M.Sc., Ramon Stringer M.D., Dorenda Appl. BELEN Shelley.P.C.    Anesthesiology 2008; 937:624-36 Copyright 2008, the [address] of Anesthesiologists, [facility].   ----------------------------------------------------------------------------------------------------------------

## 2021-08-18 NOTE — PROGRESS NOTES
Following instructions given to patient, who states understanding:    NPO after midnight  [employer] and 's license  Wear comfortable clean clothing  Do not bring jewelry   Shower night before and morning of surgery with a liquid antibacterial soap  Bring medications in original bottles  Follow all instructions given by your physician   needed at discharge  Call [phone] for any questions  Report to SDS on 2nd floor  If you would become ill prior to surgery, please call the surgeon  May have a visitor with you, we request that you limit to 2 visitors in pre-op area  Please bring and wear mask

## 2021-08-23 ENCOUNTER — OFFICE VISIT (OUTPATIENT)
Dept: SURGERY | Age: 56
End: 2021-08-23
Payer: MEDICARE

## 2021-08-23 ENCOUNTER — ANESTHESIA EVENT (OUTPATIENT)
Dept: OPERATING ROOM | Age: 56
End: 2021-08-23
Payer: MEDICARE

## 2021-08-23 VITALS
TEMPERATURE: 97.2 F | WEIGHT: 309.2 LBS | SYSTOLIC BLOOD PRESSURE: 110 MMHG | HEART RATE: 92 BPM | BODY MASS INDEX: 41.88 KG/M2 | HEIGHT: 72 IN | OXYGEN SATURATION: 97 % | RESPIRATION RATE: 18 BRPM | DIASTOLIC BLOOD PRESSURE: 78 MMHG

## 2021-08-23 DIAGNOSIS — E08.42 DIABETIC POLYNEUROPATHY ASSOCIATED WITH DIABETES MELLITUS DUE TO UNDERLYING CONDITION (HCC): ICD-10-CM

## 2021-08-23 DIAGNOSIS — K80.20 CALCULUS OF GALLBLADDER WITHOUT CHOLECYSTITIS WITHOUT OBSTRUCTION: ICD-10-CM

## 2021-08-23 DIAGNOSIS — S22.041A BURST FRACTURE OF FOURTH THORACIC VERTEBRA (HCC): ICD-10-CM
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
## 2021-08-23 DIAGNOSIS — K80.20 GALLSTONES: Primary | ICD-10-CM

## 2021-08-23 DIAGNOSIS — K70.30 ALCOHOLIC CIRRHOSIS OF LIVER WITHOUT ASCITES (HCC): ICD-10-CM

## 2021-08-23 PROCEDURE — 2022F DILAT RTA XM EVC RTNOPTHY: CPT | Performed by: SURGERY

## 2021-08-23 PROCEDURE — 99214 OFFICE O/P EST MOD 30 MIN: CPT | Performed by: SURGERY

## 2021-08-23 PROCEDURE — G8427 DOCREV CUR MEDS BY ELIG CLIN: HCPCS | Performed by: SURGERY

## 2021-08-23 PROCEDURE — 4004F PT TOBACCO SCREEN RCVD TLK: CPT | Performed by: SURGERY

## 2021-08-23 PROCEDURE — 3017F COLORECTAL CA SCREEN DOC REV: CPT | Performed by: SURGERY

## 2021-08-23 PROCEDURE — G8417 CALC BMI ABV UP PARAM F/U: HCPCS | Performed by: SURGERY

## 2021-08-24 ENCOUNTER — ANESTHESIA (OUTPATIENT)
Dept: OPERATING ROOM | Age: 56
End: 2021-08-24
Payer: MEDICARE

## 2021-08-24 ENCOUNTER — HOSPITAL ENCOUNTER (OUTPATIENT)
Age: 56
Setting detail: OUTPATIENT SURGERY
Discharge: HOME | End: 2021-08-24
Attending: NEUROLOGICAL SURGERY | Admitting: NEUROLOGICAL SURGERY
Payer: MEDICARE

## 2021-08-24 ENCOUNTER — APPOINTMENT (OUTPATIENT)
Dept: GENERAL RADIOLOGY | Age: 56
End: 2021-08-24
Attending: NEUROLOGICAL SURGERY
Payer: MEDICARE

## 2021-08-24 VITALS
DIASTOLIC BLOOD PRESSURE: 96 MMHG | RESPIRATION RATE: 13 BRPM | OXYGEN SATURATION: 100 % | TEMPERATURE: 96.1 F | SYSTOLIC BLOOD PRESSURE: 136 MMHG

## 2021-08-24 VITALS
SYSTOLIC BLOOD PRESSURE: 171 MMHG | DIASTOLIC BLOOD PRESSURE: 93 MMHG | HEIGHT: 74 IN | WEIGHT: 305 LBS | OXYGEN SATURATION: 94 % | BODY MASS INDEX: 39.14 KG/M2 | RESPIRATION RATE: 20 BRPM | TEMPERATURE: 97.1 F | HEART RATE: 71 BPM

## 2021-08-24 DIAGNOSIS — M54.9 INTRACTABLE BACK PAIN: Primary | ICD-10-CM

## 2021-08-24 LAB
APTT: 31.2 SECONDS (ref 22–38)
GLUCOSE BLD-MCNC: 128 MG/DL (ref 70–108)

## 2021-08-24 PROCEDURE — 6360000002 HC RX W HCPCS: Performed by: ANESTHESIOLOGY

## 2021-08-24 PROCEDURE — 3600000004 HC SURGERY LEVEL 4 BASE: Performed by: NEUROLOGICAL SURGERY

## 2021-08-24 PROCEDURE — 2580000003 HC RX 258: Performed by: NEUROLOGICAL SURGERY

## 2021-08-24 PROCEDURE — 2500000003 HC RX 250 WO HCPCS: Performed by: NURSE ANESTHETIST, CERTIFIED REGISTERED

## 2021-08-24 PROCEDURE — 3600000014 HC SURGERY LEVEL 4 ADDTL 15MIN: Performed by: NEUROLOGICAL SURGERY

## 2021-08-24 PROCEDURE — 3209999900 FLUORO FOR SURGICAL PROCEDURES

## 2021-08-24 PROCEDURE — 7100000010 HC PHASE II RECOVERY - FIRST 15 MIN: Performed by: NEUROLOGICAL SURGERY

## 2021-08-24 PROCEDURE — 7100000011 HC PHASE II RECOVERY - ADDTL 15 MIN: Performed by: NEUROLOGICAL SURGERY

## 2021-08-24 PROCEDURE — C1894 INTRO/SHEATH, NON-LASER: HCPCS | Performed by: NEUROLOGICAL SURGERY

## 2021-08-24 PROCEDURE — 22514 PERQ VERTEBRAL AUGMENTATION: CPT | Performed by: NEUROLOGICAL SURGERY

## 2021-08-24 PROCEDURE — 3700000001 HC ADD 15 MINUTES (ANESTHESIA): Performed by: NEUROLOGICAL SURGERY

## 2021-08-24 PROCEDURE — 7100000001 HC PACU RECOVERY - ADDTL 15 MIN: Performed by: NEUROLOGICAL SURGERY

## 2021-08-24 PROCEDURE — 2709999900 HC NON-CHARGEABLE SUPPLY: Performed by: NEUROLOGICAL SURGERY

## 2021-08-24 PROCEDURE — 7100000000 HC PACU RECOVERY - FIRST 15 MIN: Performed by: NEUROLOGICAL SURGERY

## 2021-08-24 PROCEDURE — 2720000010 HC SURG SUPPLY STERILE: Performed by: NEUROLOGICAL SURGERY

## 2021-08-24 PROCEDURE — 22515 PERQ VERTEBRAL AUGMENTATION: CPT | Performed by: NEUROLOGICAL SURGERY

## 2021-08-24 PROCEDURE — 85730 THROMBOPLASTIN TIME PARTIAL: CPT

## 2021-08-24 PROCEDURE — 82948 REAGENT STRIP/BLOOD GLUCOSE: CPT

## 2021-08-24 PROCEDURE — 6360000002 HC RX W HCPCS: Performed by: NURSE ANESTHETIST, CERTIFIED REGISTERED

## 2021-08-24 PROCEDURE — 6360000004 HC RX CONTRAST MEDICATION: Performed by: NEUROLOGICAL SURGERY

## 2021-08-24 PROCEDURE — 6370000000 HC RX 637 (ALT 250 FOR IP): Performed by: NURSE ANESTHETIST, CERTIFIED REGISTERED

## 2021-08-24 PROCEDURE — 3700000000 HC ANESTHESIA ATTENDED CARE: Performed by: NEUROLOGICAL SURGERY

## 2021-08-24 PROCEDURE — 6360000002 HC RX W HCPCS: Performed by: PHYSICIAN ASSISTANT

## 2021-08-24 PROCEDURE — C1713 ANCHOR/SCREW BN/BN,TIS/BN: HCPCS | Performed by: NEUROLOGICAL SURGERY

## 2021-08-24 PROCEDURE — 36415 COLL VENOUS BLD VENIPUNCTURE: CPT

## 2021-08-24 PROCEDURE — 72100 X-RAY EXAM L-S SPINE 2/3 VWS: CPT

## 2021-08-24 DEVICE — BONE CEMENT CX01A XPEDE US
Type: IMPLANTABLE DEVICE | Site: SPINE LUMBAR | Status: FUNCTIONAL
Brand: KYPHON® XPEDE™ BONE CEMENT

## 2021-08-24 RX ORDER — LABETALOL 20 MG/4 ML (5 MG/ML) INTRAVENOUS SYRINGE
5 EVERY 10 MIN PRN
Status: DISCONTINUED | OUTPATIENT
Start: 2021-08-24 | End: 2021-08-24 | Stop reason: HOSPADM

## 2021-08-24 RX ORDER — EPHEDRINE SULFATE/0.9% NACL/PF 50 MG/5 ML
SYRINGE (ML) INTRAVENOUS PRN
Status: DISCONTINUED | OUTPATIENT
Start: 2021-08-24 | End: 2021-08-24 | Stop reason: SDUPTHER

## 2021-08-24 RX ORDER — METOCLOPRAMIDE HYDROCHLORIDE 5 MG/ML
10 INJECTION INTRAMUSCULAR; INTRAVENOUS
Status: DISCONTINUED | OUTPATIENT
Start: 2021-08-24 | End: 2021-08-24 | Stop reason: HOSPADM

## 2021-08-24 RX ORDER — SODIUM CHLORIDE 0.9 % (FLUSH) 0.9 %
5-40 SYRINGE (ML) INJECTION EVERY 12 HOURS SCHEDULED
Status: DISCONTINUED | OUTPATIENT
Start: 2021-08-24 | End: 2021-08-24 | Stop reason: HOSPADM

## 2021-08-24 RX ORDER — ROCURONIUM BROMIDE 10 MG/ML
INJECTION, SOLUTION INTRAVENOUS PRN
Status: DISCONTINUED | OUTPATIENT
Start: 2021-08-24 | End: 2021-08-24 | Stop reason: SDUPTHER

## 2021-08-24 RX ORDER — GLYCOPYRROLATE 1 MG/5 ML
SYRINGE (ML) INTRAVENOUS PRN
Status: DISCONTINUED | OUTPATIENT
Start: 2021-08-24 | End: 2021-08-24 | Stop reason: SDUPTHER

## 2021-08-24 RX ORDER — PHENYLEPHRINE HYDROCHLORIDE 10 MG/ML
INJECTION INTRAVENOUS PRN
Status: DISCONTINUED | OUTPATIENT
Start: 2021-08-24 | End: 2021-08-24 | Stop reason: SDUPTHER

## 2021-08-24 RX ORDER — FENTANYL CITRATE 50 UG/ML
50 INJECTION, SOLUTION INTRAMUSCULAR; INTRAVENOUS EVERY 5 MIN PRN
Status: DISCONTINUED | OUTPATIENT
Start: 2021-08-24 | End: 2021-08-24 | Stop reason: HOSPADM

## 2021-08-24 RX ORDER — NEOSTIGMINE METHYLSULFATE 5 MG/5 ML
SYRINGE (ML) INTRAVENOUS PRN
Status: DISCONTINUED | OUTPATIENT
Start: 2021-08-24 | End: 2021-08-24 | Stop reason: SDUPTHER

## 2021-08-24 RX ORDER — DEXAMETHASONE SODIUM PHOSPHATE 4 MG/ML
INJECTION, SOLUTION INTRA-ARTICULAR; INTRALESIONAL; INTRAMUSCULAR; INTRAVENOUS; SOFT TISSUE PRN
Status: DISCONTINUED | OUTPATIENT
Start: 2021-08-24 | End: 2021-08-24 | Stop reason: SDUPTHER

## 2021-08-24 RX ORDER — SODIUM CHLORIDE 9 MG/ML
INJECTION, SOLUTION INTRAVENOUS CONTINUOUS
Status: DISCONTINUED | OUTPATIENT
Start: 2021-08-24 | End: 2021-08-24 | Stop reason: HOSPADM

## 2021-08-24 RX ORDER — DIPHENHYDRAMINE HYDROCHLORIDE 50 MG/ML
12.5 INJECTION INTRAMUSCULAR; INTRAVENOUS
Status: DISCONTINUED | OUTPATIENT
Start: 2021-08-24 | End: 2021-08-24 | Stop reason: HOSPADM

## 2021-08-24 RX ORDER — LIDOCAINE HYDROCHLORIDE 20 MG/ML
INJECTION, SOLUTION EPIDURAL; INFILTRATION; INTRACAUDAL; PERINEURAL PRN
Status: DISCONTINUED | OUTPATIENT
Start: 2021-08-24 | End: 2021-08-24 | Stop reason: SDUPTHER

## 2021-08-24 RX ORDER — ONDANSETRON 2 MG/ML
INJECTION INTRAMUSCULAR; INTRAVENOUS PRN
Status: DISCONTINUED | OUTPATIENT
Start: 2021-08-24 | End: 2021-08-24 | Stop reason: SDUPTHER

## 2021-08-24 RX ORDER — FENTANYL CITRATE 50 UG/ML
25 INJECTION, SOLUTION INTRAMUSCULAR; INTRAVENOUS EVERY 5 MIN PRN
Status: DISCONTINUED | OUTPATIENT
Start: 2021-08-24 | End: 2021-08-24 | Stop reason: HOSPADM

## 2021-08-24 RX ORDER — MIDAZOLAM HYDROCHLORIDE 1 MG/ML
INJECTION INTRAMUSCULAR; INTRAVENOUS PRN
Status: DISCONTINUED | OUTPATIENT
Start: 2021-08-24 | End: 2021-08-24 | Stop reason: SDUPTHER

## 2021-08-24 RX ORDER — SODIUM CHLORIDE 0.9 % (FLUSH) 0.9 %
5-40 SYRINGE (ML) INJECTION PRN
Status: DISCONTINUED | OUTPATIENT
Start: 2021-08-24 | End: 2021-08-24 | Stop reason: HOSPADM

## 2021-08-24 RX ORDER — FENTANYL CITRATE 50 UG/ML
INJECTION, SOLUTION INTRAMUSCULAR; INTRAVENOUS PRN
Status: DISCONTINUED | OUTPATIENT
Start: 2021-08-24 | End: 2021-08-24 | Stop reason: SDUPTHER

## 2021-08-24 RX ORDER — PROMETHAZINE HYDROCHLORIDE 25 MG/ML
12.5 INJECTION, SOLUTION INTRAMUSCULAR; INTRAVENOUS
Status: DISCONTINUED | OUTPATIENT
Start: 2021-08-24 | End: 2021-08-24 | Stop reason: HOSPADM

## 2021-08-24 RX ORDER — MEPERIDINE HYDROCHLORIDE 25 MG/ML
12.5 INJECTION INTRAMUSCULAR; INTRAVENOUS; SUBCUTANEOUS EVERY 5 MIN PRN
Status: DISCONTINUED | OUTPATIENT
Start: 2021-08-24 | End: 2021-08-24 | Stop reason: HOSPADM

## 2021-08-24 RX ORDER — SUCCINYLCHOLINE CHLORIDE 20 MG/ML
INJECTION INTRAMUSCULAR; INTRAVENOUS PRN
Status: DISCONTINUED | OUTPATIENT
Start: 2021-08-24 | End: 2021-08-24 | Stop reason: SDUPTHER

## 2021-08-24 RX ORDER — PROPOFOL 10 MG/ML
INJECTION, EMULSION INTRAVENOUS PRN
Status: DISCONTINUED | OUTPATIENT
Start: 2021-08-24 | End: 2021-08-24 | Stop reason: SDUPTHER

## 2021-08-24 RX ORDER — SODIUM CHLORIDE 9 MG/ML
25 INJECTION, SOLUTION INTRAVENOUS PRN
Status: DISCONTINUED | OUTPATIENT
Start: 2021-08-24 | End: 2021-08-24 | Stop reason: HOSPADM

## 2021-08-24 RX ORDER — MINERAL OIL AND WHITE PETROLATUM 150; 830 MG/G; MG/G
OINTMENT OPHTHALMIC PRN
Status: DISCONTINUED | OUTPATIENT
Start: 2021-08-24 | End: 2021-08-24 | Stop reason: SDUPTHER

## 2021-08-24 RX ADMIN — Medication 4 MG: at 10:09

## 2021-08-24 RX ADMIN — PHENYLEPHRINE HYDROCHLORIDE 100 MCG: 10 INJECTION INTRAVENOUS at 09:28

## 2021-08-24 RX ADMIN — ROCURONIUM BROMIDE 20 MG: 10 INJECTION INTRAVENOUS at 09:05

## 2021-08-24 RX ADMIN — Medication 5 MG: at 08:48

## 2021-08-24 RX ADMIN — PHENYLEPHRINE HYDROCHLORIDE 100 MCG: 10 INJECTION INTRAVENOUS at 08:56

## 2021-08-24 RX ADMIN — DEXAMETHASONE SODIUM PHOSPHATE 10 MG: 4 INJECTION, SOLUTION INTRAMUSCULAR; INTRAVENOUS at 08:31

## 2021-08-24 RX ADMIN — ONDANSETRON HYDROCHLORIDE 4 MG: 4 INJECTION, SOLUTION INTRAMUSCULAR; INTRAVENOUS at 09:46

## 2021-08-24 RX ADMIN — MIDAZOLAM 2 MG: 1 INJECTION INTRAMUSCULAR; INTRAVENOUS at 08:21

## 2021-08-24 RX ADMIN — SODIUM CHLORIDE: 9 INJECTION, SOLUTION INTRAVENOUS at 08:18

## 2021-08-24 RX ADMIN — FENTANYL CITRATE 50 MCG: 50 INJECTION, SOLUTION INTRAMUSCULAR; INTRAVENOUS at 10:43

## 2021-08-24 RX ADMIN — Medication 5 MG: at 08:40

## 2021-08-24 RX ADMIN — PHENYLEPHRINE HYDROCHLORIDE 100 MCG: 10 INJECTION INTRAVENOUS at 09:02

## 2021-08-24 RX ADMIN — Medication 10 MG: at 08:43

## 2021-08-24 RX ADMIN — HYDROMORPHONE HYDROCHLORIDE 0.5 MG: 1 INJECTION, SOLUTION INTRAMUSCULAR; INTRAVENOUS; SUBCUTANEOUS at 10:35

## 2021-08-24 RX ADMIN — Medication 0.2 MG: at 08:31

## 2021-08-24 RX ADMIN — Medication 3000 MG: at 08:30

## 2021-08-24 RX ADMIN — Medication 0.6 MG: at 10:09

## 2021-08-24 RX ADMIN — HYDROMORPHONE HYDROCHLORIDE 0.5 MG: 1 INJECTION, SOLUTION INTRAMUSCULAR; INTRAVENOUS; SUBCUTANEOUS at 10:30

## 2021-08-24 RX ADMIN — SUCCINYLCHOLINE CHLORIDE 180 MG: 20 INJECTION, SOLUTION INTRAMUSCULAR; INTRAVENOUS at 08:23

## 2021-08-24 RX ADMIN — PHENYLEPHRINE HYDROCHLORIDE 200 MCG: 10 INJECTION INTRAVENOUS at 08:50

## 2021-08-24 RX ADMIN — FENTANYL CITRATE 50 MCG: 50 INJECTION, SOLUTION INTRAMUSCULAR; INTRAVENOUS at 10:48

## 2021-08-24 RX ADMIN — FENTANYL CITRATE 100 MCG: 50 INJECTION, SOLUTION INTRAMUSCULAR; INTRAVENOUS at 08:22

## 2021-08-24 RX ADMIN — MINERAL OIL AND WHITE PETROLATUM 1 APPLICATOR: 150; 830 OINTMENT OPHTHALMIC at 08:25

## 2021-08-24 RX ADMIN — SODIUM CHLORIDE: 9 INJECTION, SOLUTION INTRAVENOUS at 09:08

## 2021-08-24 RX ADMIN — PHENYLEPHRINE HYDROCHLORIDE 100 MCG: 10 INJECTION INTRAVENOUS at 08:52

## 2021-08-24 RX ADMIN — Medication 5 MG: at 08:46

## 2021-08-24 RX ADMIN — LIDOCAINE HYDROCHLORIDE 100 MG: 20 INJECTION, SOLUTION EPIDURAL; INFILTRATION; INTRACAUDAL; PERINEURAL at 08:22

## 2021-08-24 RX ADMIN — PHENYLEPHRINE HYDROCHLORIDE 100 MCG: 10 INJECTION INTRAVENOUS at 09:41

## 2021-08-24 RX ADMIN — PHENYLEPHRINE HYDROCHLORIDE 200 MCG: 10 INJECTION INTRAVENOUS at 09:09

## 2021-08-24 RX ADMIN — PROPOFOL 200 MG: 10 INJECTION, EMULSION INTRAVENOUS at 08:22

## 2021-08-24 ASSESSMENT — PULMONARY FUNCTION TESTS
PIF_VALUE: 11
PIF_VALUE: 15
PIF_VALUE: 19
PIF_VALUE: 21
PIF_VALUE: 20
PIF_VALUE: 21
PIF_VALUE: 20
PIF_VALUE: 20
PIF_VALUE: 31
PIF_VALUE: 21
PIF_VALUE: 1
PIF_VALUE: 2
PIF_VALUE: 20
PIF_VALUE: 0
PIF_VALUE: 20
PIF_VALUE: 18
PIF_VALUE: 20
PIF_VALUE: 19
PIF_VALUE: 19
PIF_VALUE: 20
PIF_VALUE: 20
PIF_VALUE: 19
PIF_VALUE: 19
PIF_VALUE: 17
PIF_VALUE: 20
PIF_VALUE: 20
PIF_VALUE: 21
PIF_VALUE: 19
PIF_VALUE: 19
PIF_VALUE: 17
PIF_VALUE: 22
PIF_VALUE: 20
PIF_VALUE: 19
PIF_VALUE: 20
PIF_VALUE: 21
PIF_VALUE: 17
PIF_VALUE: 20
PIF_VALUE: 19
PIF_VALUE: 16
PIF_VALUE: 22
PIF_VALUE: 1
PIF_VALUE: 21
PIF_VALUE: 8
PIF_VALUE: 20
PIF_VALUE: 17
PIF_VALUE: 20
PIF_VALUE: 21
PIF_VALUE: 19
PIF_VALUE: 2
PIF_VALUE: 13
PIF_VALUE: 20
PIF_VALUE: 21
PIF_VALUE: 19
PIF_VALUE: 19
PIF_VALUE: 8
PIF_VALUE: 21
PIF_VALUE: 18
PIF_VALUE: 2
PIF_VALUE: 19
PIF_VALUE: 19
PIF_VALUE: 20
PIF_VALUE: 1
PIF_VALUE: 20
PIF_VALUE: 2
PIF_VALUE: 18
PIF_VALUE: 20
PIF_VALUE: 20
PIF_VALUE: 19
PIF_VALUE: 20
PIF_VALUE: 20
PIF_VALUE: 16
PIF_VALUE: 4
PIF_VALUE: 20
PIF_VALUE: 19
PIF_VALUE: 21
PIF_VALUE: 20
PIF_VALUE: 21
PIF_VALUE: 21
PIF_VALUE: 20
PIF_VALUE: 16
PIF_VALUE: 19
PIF_VALUE: 21
PIF_VALUE: 9
PIF_VALUE: 3
PIF_VALUE: 19
PIF_VALUE: 20
PIF_VALUE: 0
PIF_VALUE: 15
PIF_VALUE: 21
PIF_VALUE: 20
PIF_VALUE: 21
PIF_VALUE: 20
PIF_VALUE: 19
PIF_VALUE: 21
PIF_VALUE: 8
PIF_VALUE: 21
PIF_VALUE: 20
PIF_VALUE: 19
PIF_VALUE: 19
PIF_VALUE: 20
PIF_VALUE: 19
PIF_VALUE: 20
PIF_VALUE: 17
PIF_VALUE: 1
PIF_VALUE: 20
PIF_VALUE: 4
PIF_VALUE: 21
PIF_VALUE: 20
PIF_VALUE: 19

## 2021-08-24 ASSESSMENT — PAIN SCALES - GENERAL
PAINLEVEL_OUTOF10: 4
PAINLEVEL_OUTOF10: 3
PAINLEVEL_OUTOF10: 10
PAINLEVEL_OUTOF10: 10
PAINLEVEL_OUTOF10: 0
PAINLEVEL_OUTOF10: 4
PAINLEVEL_OUTOF10: 8
PAINLEVEL_OUTOF10: 7
PAINLEVEL_OUTOF10: 8

## 2021-08-24 ASSESSMENT — PAIN - FUNCTIONAL ASSESSMENT: PAIN_FUNCTIONAL_ASSESSMENT: 0-10

## 2021-08-24 ASSESSMENT — COPD QUESTIONNAIRES: CAT_SEVERITY: SEVERE

## 2021-08-24 ASSESSMENT — PAIN DESCRIPTION - DESCRIPTORS: DESCRIPTORS: ACHING

## 2021-08-24 NOTE — PROGRESS NOTES
1020 pt arrived to PACU, awakens to voice. Denies pain at this time  1030 c/o pain 10/10, medicated with 0.5 mg dilaudid  1035 no change in pain status, medicated with 0.5 mg dilaudid  1043 pain 8/10, medicated with 50 mcg fentanyl  1048 pain 7/10, medicated with 50 mcg fentanyl  1058 pt resting off and on, states pain 6/10 and tolerable. VSS  1108 pt states pain 4/10 and tolerable.  Meets criteria for discharge from PACU, transported to [facility]

## 2021-08-24 NOTE — PROGRESS NOTES
ADMITTED TO [facility] AND ORIENTED TO UNIT. SCDS ON. FALL AND ALLERGY BANDS ON. PT VERBALIZED APPROVAL FOR FIRST NAME, LAST INITIAL AND PHYSICIAN NAME ON UNIT WHITEBOARD. Keisha is with the patient.

## 2021-08-24 NOTE — H&P
Terra Mata is a 55 y.o.  male, a current every day smoker tobacco with a 30-year pack day history who denies current alcohol use but has a medical history significant for hypertension and hyperlipidemia, diabetes mellitus, COPD, seizure disorder, hepatitis C, glaucoma, acute kidney injury, chronic kidney disease and obesity who presents with intractable low back pain secondary to compression deformity fractures evidenced on recent imaging at lumbar 3 and lumbar 5. MRI of the lumbar spine imaged on 6/30/2021 reveals the presence of acute compression fracture deformities at lumbar 2, lumbar 3, lumbar 4, lumbar 5. Patient demonstrates pain with motion and activity and tenderness to palpation of the lumbar spine consistent with the presence of these fractures. Otherwise stable and intact his baseline. He denies any significant changes in bowel or bladder habits or radiating pain. Past Medical History:   Diagnosis Date    Acute kidney injury ([address] 12/2020    due to Enterococous Bacteremia , fluid overload, low sodium    Amputation of right great toe ([address] 02/18/2021    Asthma     Brain tumor ([address]     Cirrhosis (HCC)     ETOH abuse    CKD (chronic kidney disease)     l.brown-osu spetie    COPD (chronic obstructive pulmonary disease) (HCC)     Diabetes mellitus (HCC)     type 2-insulin lantus and humalog    Falling     Glaucoma     Hepatitis C     History of blood transfusion     s/p nasal surgery    Hyperlipidemia     Hypertension     Nallu    Legally blind     Obesity     Pain management     karol marzec-percocet Rx    Right foot infection 11/2021    Seizures ([facility]        Allergies: Allergies   Allergen Reactions    Adhesive Tape Rash     Bandaid    Keppra [Levetiracetam] Rash       Active Problems:    * No active hospital problems. *  Resolved Problems:    * No resolved hospital problems. *    Blood pressure (!) 182/111, pulse 83, temperature 97.2 °F (36.2 °C), resp.  rate 18, height 6' 2\" (1.88 m), weight (!) 305 lb (138.3 kg), SpO2 96 %. Review of Systems  Constitutional: Positive for activity change. Negative for appetite change, chills, diaphoresis, fatigue, fever and unexpected weight change. HENT: Negative. Eyes: Positive for visual disturbance. Blind, glaucoma    Respiratory: Positive for cough, shortness of breath and wheezing. Tobacco use, COPD   Cardiovascular: Negative. Gastrointestinal: Positive for constipation. Endocrine: Positive for polydipsia, polyphagia and polyuria. Genitourinary:        Kidney disease    Musculoskeletal: Positive for arthralgias, back pain, gait problem, joint swelling, myalgias, neck pain and neck stiffness. In wheel chair today, ambulates with walker at times    Skin: Negative. Neurological: Positive for weakness, numbness and headaches. Psychiatric/Behavioral: Negative. Physical Exam  Constitutional:       Appearance: Normal appearance. He is ill-appearing. HENT:      Head: Normocephalic and atraumatic. Mouth/Throat:      Mouth: Mucous membranes are moist.   Neck:      Musculoskeletal: Muscular tenderness present. Cardiovascular:      Rate and Rhythm: Normal rate and regular rhythm. Pulses: Normal pulses. Heart sounds: Normal heart sounds. Pulmonary:      Effort: Pulmonary effort is normal.      Comments: Decreased  Abdominal:      General: Abdomen is flat. Palpations: Abdomen is soft. Musculoskeletal:         General: Swelling and tenderness present. Right wrist: He exhibits decreased range of motion and tenderness. Left wrist: He exhibits decreased range of motion, tenderness and bony tenderness. Right ankle: He exhibits decreased range of motion. Left ankle: He exhibits decreased range of motion. Cervical back: He exhibits decreased range of motion, tenderness, bony tenderness and pain.       Thoracic back: He exhibits decreased range of motion,

## 2021-08-24 NOTE — PROGRESS NOTES
Patient was seen and examined in the pre op area in conjunction with neurosurgery GABRIELA Mead PA-C). There are no changes in patient symptoms and neurological exam since the last time patient was evaluated in pain medicine outpatient clinic. Patient  continues to experience severe low back pain spite all the conservative measures. Today I discussed with patient and his family again today planned/recommended surgical intervention (L3 and L5 kyphoplasty) as well as the associated risk and benefit and alternative. All questions and concerns were addressed and answered. Patient elected again to go with today planned surgical intervention.
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
## 2021-08-24 NOTE — BRIEF OP NOTE
Brief Postoperative Note      Patient: Rodger Macedo  YOB: 1965  MRN: [id number]    Date of Procedure: 8/24/2021    Pre-Op Diagnosis: CLOSED WEDGE COMPRESSION FRACTURE OF T4 VERTEBRA    Post-Op Diagnosis: Same       Procedure(s):  LUMBAR 3 AND LUMBAR 5 KYPHOPLASTY    Surgeon(s):  Rhiannon Macedo MD    Assistant:  Physician Assistant: Claudine Hoskins PA-C    Anesthesia: General    Estimated Blood Loss (mL): Minimal    Complications: None    Specimens:   * No specimens in log *    Implants:  Implant Name Type Inv.  Item Serial No.  Lot No. LRB No. Used Action   [employer]  [employer] [address] [place] [address] [id number] N/A 2 Implanted         Drains: * No LDAs found *    Findings: Pression deformity fractures of L3 and L5    Electronically signed by Claudine Hoskins PA-C on 8/24/2021 at 10:20 AM

## 2021-08-24 NOTE — PROGRESS NOTES
Pt has met discharge criteria and states he is ready for discharge to home. IV removed, gauze and tape applied. Dressed in own clothes and personal belongings gathered. Discharge instructions (with opioid medication education information) given to pt and family; pt and family verbalized understanding of discharge instructions, prescriptions and follow up appointments. Pt transported to discharge lobby by [facility] staff.

## 2021-08-24 NOTE — ANESTHESIA POSTPROCEDURE EVALUATION
Department of Anesthesiology  Postprocedure Note    Patient: Rafael Clement  MRN: [id number]  YOB: 1965  Date of evaluation: 8/24/2021  Time:  3:34 PM     Procedure Summary     Date: 08/24/21 Room / Location: [place] [address] / [facility] OR    Anesthesia Start: 0818 Anesthesia Stop: 6147    Procedure: LUMBAR 3 AND LUMBAR 5 KYPHOPLASTY (N/A ) Diagnosis: (CLOSED WEDGE COMPRESSION FRACTURE OF T4 VERTEBRA)    Surgeons: Thiago Jenkins MD Responsible Provider: Jw Goodman MD    Anesthesia Type: General ASA Status: 4          Anesthesia Type: General    Rae Phase I: Rae Score: 9    Rae Phase II: Rae Score: 10    Last vitals: Reviewed and per EMR flowsheets. Anesthesia Post Evaluation    Patient location during evaluation: PACU  Patient participation: complete - patient participated  Level of consciousness: awake and alert  Airway patency: patent  Nausea & Vomiting: no nausea and no vomiting  Complications: no  Cardiovascular status: hemodynamically stable  Respiratory status: acceptable  Hydration status: euvolemic      [facility]  POST-ANESTHESIA NOTE       Name:  Rafael Clement                                         Age:  55 y.o.   MRN:  [id number]      Last Vitals:  BP (!) 171/93   Pulse 71   Temp 97.1 °F (36.2 °C) (Temporal)   Resp 20   Ht 6' 2\" (1.88 m)   Wt (!) 305 lb (138.3 kg)   SpO2 94%   BMI 39.16 kg/m²   Patient Vitals for the past 4 hrs:   BP Pulse Resp SpO2   08/24/21 1206 (!) 171/93 71 20 94 %       Level of Consciousness:  Awake    Respiratory:  Stable    Oxygen Saturation:  Stable    Cardiovascular:  Stable    Hydration:  Adequate    PONV:  Stable    Post-op Pain:  Adequate analgesia    Post-op Assessment:  No apparent anesthetic complications    Additional Follow-Up / Treatment / Comment:  Eliana Mathews MD  August 24, 2021   3:34 PM

## 2021-08-25 DIAGNOSIS — G89.4 CHRONIC PAIN SYNDROME: ICD-10-CM

## 2021-08-25 RX ORDER — OXYCODONE AND ACETAMINOPHEN 7.5; 325 MG/1; MG/1
1 TABLET ORAL EVERY 8 HOURS PRN
Qty: 90 TABLET | Refills: 0 | Status: SHIPPED | OUTPATIENT
Start: 2021-08-29 | End: 2021-09-28 | Stop reason: SDUPTHER

## 2021-08-25 ASSESSMENT — ENCOUNTER SYMPTOMS
NAUSEA: 0
COUGH: 0
ABDOMINAL DISTENTION: 0
SHORTNESS OF BREATH: 0
ABDOMINAL PAIN: 0
VOICE CHANGE: 0
SORE THROAT: 0
DIARRHEA: 0
WHEEZING: 0
TROUBLE SWALLOWING: 0
BACK PAIN: 1
BLOOD IN STOOL: 0
VOMITING: 0
COLOR CHANGE: 0
CONSTIPATION: 0

## 2021-08-25 NOTE — PROGRESS NOTES
hospitalization several months ago. He denies any abdominal symptoms. He is scheduled for vertebroplasty tomorrow. No indications for any acute gallbladder surgery at this time. Abdomen remains benign. He is agreeable to a 3-month follow-up. Past Medical History  Past Medical History:   Diagnosis Date    Acute kidney injury ([address] 12/2020    due to Enterococous Bacteremia , fluid overload, low sodium    Amputation of right great toe (HCC) 02/18/2021    Asthma     Brain tumor ([address]     Cirrhosis (HCC)     ETOH abuse    CKD (chronic kidney disease)     l.brown-osu spetie    COPD (chronic obstructive pulmonary disease) (HCC)     Diabetes mellitus (HCC)     type 2-insulin lantus and humalog    Falling     Glaucoma     Hepatitis C     History of blood transfusion     s/p nasal surgery    Hyperlipidemia     Hypertension     Nallu    Legally blind     Obesity     Pain management     karol marzec-percocet Rx    Right foot infection 11/2021    Seizures ([facility]        Past Surgical History  Past Surgical History:   Procedure Laterality Date    BACK SURGERY      CARDIAC CATHETERIZATION  08/03/2021    EYE SURGERY      FIBULA FRACTURE SURGERY Left 03/21/2019    LEFT FIBULAR SHAFT ORIF performed by Ayaan Fall DPM at [address] Right     Steel pins in place    FRACTURE SURGERY      NASAL SINUS SURGERY Bilateral 11/29/2020    FLEXIBLE BRONCHOSCOPY WITH BRONCHOALVEOLAR LAVAGE WITH CULTURES.  NASAL ENDOSCOPY WITH POSSIBLE CAUTERY ADN NASAL PACKING performed by Rudy Rodrigues MD at [facility]  01/2020    SPINE SURGERY N/A 02/19/2021    KYPHOPLASTY at T4, L2, L4 performed by Phil Gaviria MD at [address] [place] [address] Drive Right 09/23/2020    AMPUTATION DISTAL APSECT RIGHT HALLUX, REMOVAL OF HARDWARE RIGHT HALLUX performed by Ilir Paz DPM at [address] [place] [address] N/A 04/25/2019    EGD BIOPSY performed by Kj Bermudez MD at [facility]       Medications  Current Outpatient Medications   Medication Sig Dispense Refill    sodium bicarbonate 650 MG tablet Take 650 mg by mouth 2 times daily      senna (SENOKOT) 8.6 MG tablet Take 2 tablets by mouth daily      tamsulosin (FLOMAX) 0.4 MG capsule Take 1 capsule by mouth nightly 30 capsule 0    DULoxetine (CYMBALTA) 60 MG extended release capsule Take 1 capsule by mouth daily 30 capsule 3    gabapentin (NEURONTIN) 300 MG capsule Take 1 capsule by mouth nightly for 30 days. 90 capsule 0    nitroGLYCERIN (NITROSTAT) 0.4 MG SL tablet up to max of 3 total doses.  If no relief after 1 dose, call 911. 25 tablet 3    insulin glargine (LANTUS) 100 UNIT/ML injection vial Inject 84 Units into the skin nightly 1 vial 3    metoprolol succinate (TOPROL XL) 50 MG extended release tablet Take 1 tablet by mouth daily 30 tablet 0    insulin lispro (HUMALOG) 100 UNIT/ML injection vial Inject 10 Units into the skin 3 times daily (before meals) Plus Sliding Scale (Patient taking differently: Inject 12 Units into the skin 3 times daily (before meals) Plus Sliding Scale ) 1 vial 0    insulin lispro (HUMALOG) 100 UNIT/ML injection vial Glucose: Dose:  No Insulin, 140-199 2 Units, 200-249 4 Units, 250-299 6 Units, 300-349 8 Units, 350-400 10 Units, Over 400 12 Units 1 vial 0    docusate sodium (COLACE, DULCOLAX) 100 MG CAPS Take 100 mg by mouth 2 times daily 60 capsule 0    folic acid (FOLVITE) 1 MG tablet Take 1 mg by mouth daily      albuterol sulfate  (90 Base) MCG/ACT inhaler Inhale 1 puff into the lungs every 4-6 hours as needed      OXYGEN Inhale into the lungs daily as needed (nightly)       ipratropium-albuterol (DUONEB) 0.5-2.5 (3) MG/3ML SOLN nebulizer solution Inhale 3 mLs into the lungs every 4 hours as needed for Shortness of Breath 360 mL 1    tiotropium (SPIRIVA) 18 MCG inhalation capsule Inhale 1 capsule into the lungs daily 30 capsule 3    latanoprost (XALATAN) 0.005 % ophthalmic solution Place 1 drop into both eyes nightly 1 Bottle 3    rosuvastatin (CRESTOR) 20 MG tablet Take 20 mg by mouth nightly       [START ON 8/29/2021] oxyCODONE-acetaminophen (PERCOCET) 7.5-325 MG per tablet Take 1 tablet by mouth every 8 hours as needed for Pain for up to 30 days. 90 tablet 0     No current facility-administered medications for this visit.      Allergies     Allergies   Allergen Reactions    Adhesive Tape Rash     Bandaid    Keppra [Levetiracetam] Rash       Family History  Family History   Problem Relation Age of Onset    Heart Attack Father     Heart Attack Brother         pneumonia    No Known Problems Mother     Cancer Sister         breast    No Known Problems Maternal Grandmother     No Known Problems Maternal Grandfather     Liver Cancer Paternal Grandmother     Heart Attack Paternal Grandfather     Heart Disease Brother         stents    Colon Cancer Neg Hx     Colon Polyps Neg Hx        SocialHistory  Social History     Socioeconomic History    Marital status: Single     Spouse name: Not on file    Number of children: 0    Years of education: Not on file    Highest education level: Not on file   Occupational History    Not on file   Tobacco Use    Smoking status: Current Every Day Smoker     Packs/day: 1.00     Years: 30.00     Pack years: 30.00     Types: Cigarettes    Smokeless tobacco: Never Used    Tobacco comment: Currently smoking electronic cigarettes   Vaping Use    Vaping Use: Never used   Substance and Sexual Activity    Alcohol use: Not Currently     Comment: 8   25oz cans nightly    Drug use: Not Currently     Types: Marijuana     Comment: medical marijuana not currently using    Sexual activity: Not Currently     Partners: Female   Other Topics Concern    Not on file   Social History Narrative    Not on file     Social Determinants of Health     Financial Resource Strain:     Difficulty of Paying Living Expenses:    Food Insecurity:     Worried About Running Out of Food in the Last Year:     [address] in the Last Year:    Transportation Needs:     Lack of Transportation (Medical):  Lack of Transportation (Non-Medical):    Physical Activity:     Days of Exercise per Week:     Minutes of Exercise per Session:    Stress:     Feeling of Stress :    Social Connections:     Frequency of Communication with Friends and Family:     Frequency of Social Gatherings with Friends and Family:     Attends [employer] Services:     Active Member of Clubs or Organizations:     Attends Club or Organization Meetings:     Marital Status:    Intimate Partner Violence:     Fear of Current or Ex-Partner:     Emotionally Abused:     Physically Abused:     Sexually Abused:            Review of Systems  Review of Systems   Constitutional: Negative for activity change, chills, fatigue, fever and unexpected weight change. HENT: Positive for dental problem. Negative for congestion, sore throat, trouble swallowing and voice change. Eyes: Negative for visual disturbance. Respiratory: Negative for cough, shortness of breath and wheezing. Cardiovascular: Negative for chest pain and palpitations. Gastrointestinal: Negative for abdominal distention, abdominal pain, blood in stool, constipation, diarrhea, nausea and vomiting. Endocrine: Negative for cold intolerance, heat intolerance and polydipsia. Genitourinary: Negative for dysuria, flank pain and hematuria. Musculoskeletal: Positive for back pain. Negative for gait problem, joint swelling and myalgias. Skin: Negative for color change and rash. Allergic/Immunologic: Negative for immunocompromised state. Neurological: Negative for dizziness, tremors, seizures and speech difficulty. Hematological: Does not bruise/bleed easily. Psychiatric/Behavioral: Negative for behavioral problems and confusion.        OBJECTIVE     /78 (Site: Right Upper Arm, Position: Sitting, Cuff Size: Medium Adult)   Pulse 92   Temp 97.2 °F (36.2 °C) (Temporal)   Resp 18   Ht 6' (1.829 m)   Wt (!) 309 lb 3.2 oz (140.3 kg)   SpO2 97%   BMI 41.94 kg/m²      Physical Exam  Vitals reviewed. Constitutional:       Appearance: Normal appearance. He is well-developed. He is obese. He is not ill-appearing or diaphoretic. HENT:      Head: Normocephalic and atraumatic. Nose: No congestion. Eyes:      General: No scleral icterus. Pupils: Pupils are equal, round, and reactive to light. Neck:      Thyroid: No thyromegaly. Vascular: No JVD. Trachea: No tracheal deviation. Cardiovascular:      Rate and Rhythm: Normal rate and regular rhythm. Heart sounds: Normal heart sounds. Pulmonary:      Effort: Pulmonary effort is normal. No respiratory distress. Breath sounds: Normal breath sounds. No wheezing. Abdominal:      General: There is no distension. Palpations: Abdomen is soft. There is no mass. Tenderness: There is no abdominal tenderness. There is no guarding or rebound. Musculoskeletal:         General: No deformity. Cervical back: Normal range of motion and neck supple. Lymphadenopathy:      Cervical: No cervical adenopathy. Skin:     General: Skin is warm and dry. Coloration: Skin is not jaundiced or pale. Findings: No rash. Neurological:      General: No focal deficit present. Mental Status: He is alert and oriented to person, place, and time. Cranial Nerves: No cranial nerve deficit.    Psychiatric:         Mood and Affect: Mood normal.         Lab Results   Component Value Date    WBC 8.5 08/03/2021    HGB 10.9 (L) 08/03/2021    HCT 34.4 (L) 08/03/2021     (L) 08/03/2021    CHOL 188 10/24/2013    TRIG 164 10/24/2013    HDL 46 10/24/2013    ALT 37 02/25/2021    AST 59 (H) 02/25/2021     08/03/2021    K 3.8 08/03/2021     08/03/2021    CREATININE 1.0 08/03/2021    BUN 16 08/03/2021    CO2 25 08/03/2021    TSH 0.771 02/26/2021    INR 1.06 08/03/2021    LABA1C 6.6 (H) 02/24/2021    LABMICR 1.52 02/24/2021          PROCEDURE: NM HEPATOBILIARY SCAN W PHARMACOLOGICAL INTERVENTION       CLINICAL INFORMATION: Cholelithiasis, abdominal pain, nausea, vomiting.       Radiopharmaceutical: 10 mCi technetium 99m mebrofenin, intravenously.       TECHNIQUE: Anterior images of the abdomen were obtained for 60 minutes following radiopharmaceutical administration. 2.8 mcg of Kinevac were infused over 60 minutes while additional left anterior oblique images were obtained. Subsequently, a region of    interest was drawn around the gallbladder and ejection fraction was calculated.       COMPARISON: None       FINDINGS: There is normal hepatic uptake of radiopharmaceutical. Activity is present in the gallbladder by 13 minutes. There is faint activity in the common bile duct and small bowel by 42 minutes.       Following Kinevac administration, the calculated gallbladder ejection fraction is 58% (normal is 35% or greater).         Impression   1. Patent cystic and common bile ducts without evidence of acute cholecystitis. 2. Normal gallbladder ejection fraction.       Final report electronically signed by Dr. Claudia Maharaj on 2/22/2021 10:27 AM       Order History               US abdomen limited       Comparison:  US,SR  - US GALLBLADDER RUQ  - 11/30/2020 04:43 AM EST       Findings:   The visualized pancreas is poorly visualized secondary to overlying bowel    gas. The aorta and inferior vena cava are normal caliber.       The liver is 21 cm in length.  There is irregularity of the liver cortex.     There is limited evaluation of liver parenchyma without obvious    intrahepatic mass or intrahepatic ductal dilatation. There is no intrahepatic bile duct dilatation. The common duct is 4.2 mm in diameter. There is a gallstone within the gallbladder.    The main portal vein is antegrade.       No ascites         Impression   1.  Cholelithiasis.    2.  Cirrhosis of the liver.       This document has been electronically signed by: Evelin Santana MD on    02/21/2021 12:42 AM

## 2021-08-25 NOTE — TELEPHONE ENCOUNTER
Patient is calling in for his refill of the percocet 7.5-325 mg every 8 hours. He just had surgery yesterday and did have to take an extra pill then and he will be out before his appt on 8/30/2021 with Mihai. Please advise.

## 2021-08-26 NOTE — OP NOTE
[facility]    Patient name: Petrona Juarez  Medical Record Number: [id number]  Account Number: [de-identified]      Date of Procedure: 8/24/2021    Pre-operative Diagnosis:     · Acute L3 and L5  age related osteoporotic compression fractures   ·  Intractable back pain. · Several co-morbidities (COPD, morbid obesity, chronic kidney disease and hepatitis C). Post-operative Diagnosis: The same      PROCEDURE:     · Kyphoplasty and cement augmentation for acute  osteoporotic compression fracture at the level of L3.  · Kyphoplasty and cement augmentation for acute osteoporotic compression fracture at the level of L5. Anesthesia: General endotracheal     Surgeon:  Nano Fernandez MD   Assistant: Priscila Long PA-C     Estimated Blood Loss: Minimal     Drains: None     Blood Transfusions: None      Complications: none immediately appreciated     Specimens: N one      Indications for Procedure: This is a 67-year-old male who developed worsening underwent lumbar spine MRI that showed  acute compression  age related osteoporotic fractures at the levels of L3 and L5 ( please see the pain medicine office visit note from 6/15/2021 and Telephone note from 7/13/2021 and 7/14/2021). -Patient is complaining from severe back pain but there is no neurological deficit. Patient's pain did not respond to conservative treatment modalities (bedrest and pain medications). For this reason, patient was offered a surgical intervention in the form of L3 and L5 kyphoplasty. Patient was initially scheduled to have the procedure with Dr. Christen Mathews ( pain medicine). However, I was requested to perform the surgery for the patient because of Dr. Guilherme Leal absence. - The recommended surgical intervention was discussed with patent and patient's wife in detail including the associated risks and benefits, as well as, the alternative treatment options.    - All questions and concerns were answered and addressed. - Patient  and his wife agreeed with  the recommended surgical intervention and patient signed the surgical consent. PROCEDURE:     The patient was brought to the OR, and he was placed supine initially in his OR table. Then, general anesthesia was inducted;  then IV line, Sevilla catheter were inserted and maintained. Next, we flipped the patient on Oscar table in supine position  given 2 grams of Ancef/ IV prior to the procedure for antibiotic prophylaxis. To facilitate the procedure biplanar fluoroscopy was used and by adjusting the angles of fluoroscopy both AP and lateral views of corresponding vertebral bodies of L3 and L5 were  optimized. Skin over the back was prepped with antiseptic solution and the procedure was done under strict aspetic precautions. Then, draped and prepped patient in the standard fashion  Next:      At he level of L3:      by using #11 blade a small skin incission was made. The the working canula with trochar in place was inserted such that it lies over the lateral aspect of the pedicle. Then it was tapped slowly through the pedicle under constant fluoroscopic surveillance making sure that the medial border of the pedicle was not violated at any time. Then a bone biopsy canula was inserted through the canula and a bone biopsy was obtained. Then the Kyphon bone tamp was inserted therough the working canula in sides(15mm x 2mm ballon tamps). .This was done bipedicularly in similar fashion. Good placements were confirmed with fluoroscopy. In the meantime polymethylmethacrylate was mixed as per the protocol and Kyphon bone fillers were filled with it. Then they were immersed in warm water to obtain adequate consistency. Two  Balloons were inflated to 3  mL from each side to achieve adequate fracture reduction. Then the balloons where removed. Next,  the bone cement was placed in both balloon cavities, using fluoroscopic guidance.  6 cc of pmma was filling the void completely with acceptable interdigitation of the bone cement. At he level of L5:      by using #11 blade a small skin incission was made. The the working canula with trochar in place was inserted such that it lies over the lateral aspect of the pedicle. Then it was tapped slowly through the pedicle under constant fluoroscopic surveillance making sure that the medial border of the pedicle was not violated at any time. Then a bone biopsy canula was inserted through the canula and a bone biopsy was obtained. Then the Kyphon bone tamp was inserted therough the working canula in sides(15mm x 2mm ballon tamps). .This was done bipedicularly in similar fashion. Good placements were confirmed with fluoroscopy. In the meantime polymethylmethacrylate was mixed as per the protocol and Kyphon bone fillers were filled with it. Then they were immersed in warm water to obtain adequate consistency. Two Balloons were inflated to 4  mL from the left and 1 mm from the right  to achieve adequate fracture reduction. Then the balloons where removed. Next,  the bone cement was placed in both balloon cavities, using fluoroscopic guidance. 8 cc of pmma was filling the void completely with acceptable interdigitation of the bone cement          Waited 3 minutes for the pmma to harden then removed all instrumentation and closed. Once the cement had set and was seen to be in good position by fluoroscopy, the working canula and bone fillers were removed. Final hemostasis was secured by applying pressure. The incisions were closed with staples, followed by sterile dressings. Sponge, needle, and instrument counts were correct at the end of the case. Patient tolerated the surgery very well without  intraoperative complications. At the end of surgery, patient was turned  back in his bed supine.  Then,  he  was extubated and was transferred to the  PACU for further observation and treat Juany Harrell MD, MD  Electronically signed by me on 8/26/2021 at 6:33 PM

## 2021-08-30 ENCOUNTER — OFFICE VISIT (OUTPATIENT)
Dept: PHYSICAL MEDICINE AND REHAB | Age: 56
End: 2021-08-30
Payer: MEDICARE

## 2021-08-30 VITALS
DIASTOLIC BLOOD PRESSURE: 84 MMHG | BODY MASS INDEX: 39.14 KG/M2 | SYSTOLIC BLOOD PRESSURE: 146 MMHG | HEIGHT: 74 IN | WEIGHT: 305 LBS

## 2021-08-30 DIAGNOSIS — M54.50 CHRONIC BILATERAL LOW BACK PAIN WITHOUT SCIATICA: ICD-10-CM

## 2021-08-30 DIAGNOSIS — Z98.890 S/P KYPHOPLASTY: ICD-10-CM

## 2021-08-30 DIAGNOSIS — M80.80XA OTHER OSTEOPOROSIS WITH CURRENT PATHOLOGICAL FRACTURE, INITIAL ENCOUNTER: ICD-10-CM

## 2021-08-30 DIAGNOSIS — M54.2 NECK PAIN: ICD-10-CM

## 2021-08-30 DIAGNOSIS — S32.030A CLOSED COMPRESSION FRACTURE OF L3 LUMBAR VERTEBRA, INITIAL ENCOUNTER (HCC): ICD-10-CM

## 2021-08-30 DIAGNOSIS — S32.050A CLOSED COMPRESSION FRACTURE OF L5 LUMBAR VERTEBRA, INITIAL ENCOUNTER (HCC): ICD-10-CM

## 2021-08-30 DIAGNOSIS — M47.814 SPONDYLOSIS OF THORACIC REGION WITHOUT MYELOPATHY OR RADICULOPATHY: ICD-10-CM

## 2021-08-30 DIAGNOSIS — M54.59 INTRACTABLE LOW BACK PAIN: Primary | ICD-10-CM

## 2021-08-30 DIAGNOSIS — M54.9 MID BACK PAIN: ICD-10-CM

## 2021-08-30 DIAGNOSIS — Z98.1 HISTORY OF FUSION OF CERVICAL SPINE: ICD-10-CM

## 2021-08-30 DIAGNOSIS — G62.9 NEUROPATHY: ICD-10-CM

## 2021-08-30 DIAGNOSIS — M47.816 SPONDYLOSIS OF LUMBAR REGION WITHOUT MYELOPATHY OR RADICULOPATHY: ICD-10-CM

## 2021-08-30 DIAGNOSIS — G89.29 CHRONIC BILATERAL LOW BACK PAIN WITHOUT SCIATICA: ICD-10-CM

## 2021-08-30 DIAGNOSIS — G89.4 CHRONIC PAIN SYNDROME: ICD-10-CM

## 2021-08-30 PROCEDURE — 99214 OFFICE O/P EST MOD 30 MIN: CPT | Performed by: NURSE PRACTITIONER

## 2021-08-30 PROCEDURE — G8427 DOCREV CUR MEDS BY ELIG CLIN: HCPCS | Performed by: NURSE PRACTITIONER

## 2021-08-30 PROCEDURE — 3017F COLORECTAL CA SCREEN DOC REV: CPT | Performed by: NURSE PRACTITIONER

## 2021-08-30 PROCEDURE — G8417 CALC BMI ABV UP PARAM F/U: HCPCS | Performed by: NURSE PRACTITIONER

## 2021-08-30 PROCEDURE — 4004F PT TOBACCO SCREEN RCVD TLK: CPT | Performed by: NURSE PRACTITIONER

## 2021-08-30 ASSESSMENT — ENCOUNTER SYMPTOMS: BACK PAIN: 1

## 2021-08-30 NOTE — PROGRESS NOTES
[address]  Dept: [phone]  Dept Fax: [phone]: [phone]    Visit Date: 8/30/2021    Functionality Assessment/Goals Worksheet     On a scale of 0 (Does not Interfere) to 10 (Completely Interferes)     1. Which number describes how during the past week pain has interfered with       the following:  A. General Activity:  8  B. Mood: 8  C. Walking Ability:  8  D. Normal Work (Includes both work outside the home and housework):  8  E. Relations with Other People:   6  F. Sleep:   6  G. Enjoyment of Life:   4    2. Patient Prefers to Take their Pain Medications:     [x]  On a regular basis   []  Only when necessary    []  Does not take pain medications    3. What are the Patient's Goals/Expectations for Visiting Pain Management? []  Learn about my pain    [x]  Receive Medication   []  Physical Therapy     []  Treat Depression   [x]  Receive Injections    []  Treat Sleep   []  Deal with Anxiety and Stress   []  Treat Opoid Dependence/Addiction   []  Other:      HPI:   Cori Anderson is a 55 y.o. male is here today for    Chief Complaint: Low back pain, neck pain     HPI   FU from L3 and L5 Kyphoplasty completed by Dr. Nannette Galicia on 8/24/2021. Receiving about 70% relief of low back pain from Kyphoplasty. Can walk a little better and tolerate more activity   Continues to have pain in neck, mid and low back which is up and down- aching     Still a lot of tenderness in low back at Kyphoplasty site  Pain increases with bending, lifting, twisting , walking, standing and getting up and down. Percocet prn remains effective   Medications reviewed. Patient denies side effects with medications. Patient states he is taking medications as prescribed. Hedenies receiving pain medications from other sources.  He had 1 ER visit since last visit    Pain scale with out pain medications or at its worst is 7-8/10. Pain scale with pain medications or at its best is 3/10. Last dose of Percocet was today   Drug screen reviewed from 5/4/2021 and was appropriate  Pill count was completed today and was appropriate Pt is out of meds:   Patient does not have naloxone available at home. Patient has not required use of naloxone at home since last office visit. The patientis allergic to adhesive tape and keppra [levetiracetam]. Subjective:      Review of Systems   Constitutional: Negative. Cardiovascular: Positive for leg swelling. Musculoskeletal: Positive for arthralgias, back pain, gait problem, joint swelling, myalgias, neck pain and neck stiffness. Ambulating with cane    Neurological: Positive for weakness and numbness. Psychiatric/Behavioral: Negative. Objective:     Vitals:    08/30/21 0827   BP: (!) 146/84   Weight: (!) 305 lb (138.3 kg)   Height: 6' 2\" (1.88 m)       Physical Exam  Constitutional:       Appearance: Normal appearance. He is ill-appearing. HENT:      Head: Normocephalic and atraumatic. Mouth/Throat:      Mouth: Mucous membranes are moist.   Cardiovascular:      Rate and Rhythm: Normal rate and regular rhythm. Pulses: Normal pulses. Heart sounds: Normal heart sounds. Pulmonary:      Effort: Pulmonary effort is normal.      Comments: Decreased  Abdominal:      General: Abdomen is flat. Palpations: Abdomen is soft. Musculoskeletal:         General: Swelling and tenderness present. Right wrist: Tenderness present. Decreased range of motion. Left wrist: Tenderness and bony tenderness present. Decreased range of motion. Cervical back: Tenderness and bony tenderness present. Muscular tenderness present. Thoracic back: Tenderness and bony tenderness present. Decreased range of motion. Lumbar back: Tenderness and bony tenderness present. Decreased range of motion.         Back:       Right lower leg: Tenderness present. Edema present. Left lower leg: Tenderness present. Edema present. Right ankle: Decreased range of motion. Left ankle: Decreased range of motion. Skin:     Coloration: Skin is pale. Neurological:      General: No focal deficit present. Mental Status: He is alert and oriented to person, place, and time. Sensory: Sensory deficit present. Motor: Weakness present. Coordination: Coordination abnormal.      Gait: Gait abnormal.      Deep Tendon Reflexes:      Reflex Scores:       Tricep reflexes are 1+ on the right side and 1+ on the left side. Bicep reflexes are 1+ on the right side and 1+ on the left side. Brachioradialis reflexes are 1+ on the right side and 1+ on the left side. Patellar reflexes are 1+ on the right side and 1+ on the left side. Achilles reflexes are 1+ on the right side and 1+ on the left side. Comments: Decraesed sensation bilateral lower extremity and upper extremity    Psychiatric:         Mood and Affect: Mood normal.       ANJUM test: +   Yeomans test: +   Gaenslen test: +      Assessment:     1. Intractable low back pain    2. Closed compression fracture of L3 lumbar vertebra, initial encounter ([facility])    3. [facility]    4. S/P kyphoplasty    5. Other osteoporosis with current pathological fracture, initial encounter    6. Spondylosis of lumbar region without myelopathy or radiculopathy    7. Chronic bilateral low back pain without sciatica    8. Neck pain    9. Mid back pain    10. Spondylosis of thoracic region without myelopathy or radiculopathy    11. Neuropathy    12. History of fusion of cervical spine    13. Chronic pain syndrome            Plan:      · OARRS reviewed. Current MED: 33.75  · Patient was offered naloxone for home. · Discussed long term side effects of medications, tolerance, dependency and addiction.   · Previous UDS reviewed  · UDS preformed today for compliance. · Patient told can not receive any pain medications from any other source. · No evidence of abuse, diversion or aberrant behavior.  Medications and/or procedures to improve function and quality of life- patient understanding with this and that may not be pain free   Discussed with patient about safe storage of medications at home   Discussed possible weaning of medication dosing dependent on treatment/procedure results.  Discussed with patient about risks with procedure including infection, reaction to medication, increased pain, or bleeding.  Reviewed neurosurgery OR note   Healing from L3 and L5 kyphoplasty. Receiving 70% relief and walking better and tolerating more activity    Continue Percocet 7.5/ 325 TID prn- refill ordered for 8/30/2021 and needs to    UDS ordered today. Patient is compliant. Meds. Prescribed:   No orders of the defined types were placed in this encounter. Return in about 2 months (around 10/30/2021), or if symptoms worsen or fail to improve, for follow up  for medications.                Electronically signed by MEGHAN Yo CNP on8/30/2021 at 8:46 AM

## 2021-09-28 DIAGNOSIS — G89.4 CHRONIC PAIN SYNDROME: ICD-10-CM

## 2021-09-28 RX ORDER — OXYCODONE AND ACETAMINOPHEN 7.5; 325 MG/1; MG/1
1 TABLET ORAL EVERY 8 HOURS PRN
Qty: 90 TABLET | Refills: 0 | Status: SHIPPED | OUTPATIENT
Start: 2021-09-28 | End: 2021-10-28 | Stop reason: SDUPTHER

## 2021-09-28 NOTE — TELEPHONE ENCOUNTER
[facility] called requesting a refill on the following medications:  Requested Prescriptions     Pending Prescriptions Disp Refills    oxyCODONE-acetaminophen (PERCOCET) 7.5-325 MG per tablet 90 tablet 0     Sig: Take 1 tablet by mouth every 8 hours as needed for Pain for up to 30 days.      Pharmacy verified:  [id number]    Date of last visit: 08-30-21  Date of next visit (if applicable): 79/7/4323

## 2021-09-28 NOTE — TELEPHONE ENCOUNTER
OARRS reviewed. UDS: + for Cotinine, Duloxetine, Oxycodone   Last seen: 8/30/2021.  Follow-up: 11/1/2021

## 2021-10-28 DIAGNOSIS — G89.4 CHRONIC PAIN SYNDROME: ICD-10-CM

## 2021-10-28 RX ORDER — OXYCODONE AND ACETAMINOPHEN 7.5; 325 MG/1; MG/1
1 TABLET ORAL EVERY 8 HOURS PRN
Qty: 90 TABLET | Refills: 0 | Status: SHIPPED | OUTPATIENT
Start: 2021-10-28 | End: 2021-11-29 | Stop reason: SDUPTHER

## 2021-10-28 NOTE — TELEPHONE ENCOUNTER
David Oacmpo called requesting a refill on the following medications:  Requested Prescriptions     Pending Prescriptions Disp Refills    oxyCODONE-acetaminophen (PERCOCET) 7.5-325 MG per tablet 90 tablet 0     Sig: Take 1 tablet by mouth every 8 hours as needed for Pain for up to 30 days.      Pharmacy verified: [facility]      Date of last visit: 8/30/21  Date of next visit (if applicable): 54/6/6990

## 2021-11-09 ENCOUNTER — OFFICE VISIT (OUTPATIENT)
Dept: PHYSICAL MEDICINE AND REHAB | Age: 56
End: 2021-11-09
Payer: MEDICARE

## 2021-11-09 VITALS
WEIGHT: 305 LBS | HEIGHT: 74 IN | BODY MASS INDEX: 39.14 KG/M2 | SYSTOLIC BLOOD PRESSURE: 134 MMHG | DIASTOLIC BLOOD PRESSURE: 84 MMHG

## 2021-11-09 DIAGNOSIS — M54.50 CHRONIC BILATERAL LOW BACK PAIN WITHOUT SCIATICA: ICD-10-CM

## 2021-11-09 DIAGNOSIS — M47.816 SPONDYLOSIS OF LUMBAR REGION WITHOUT MYELOPATHY OR RADICULOPATHY: Primary | ICD-10-CM

## 2021-11-09 DIAGNOSIS — M54.2 NECK PAIN: ICD-10-CM

## 2021-11-09 DIAGNOSIS — S32.030D CLOSED COMPRESSION FRACTURE OF L3 LUMBAR VERTEBRA WITH ROUTINE HEALING, SUBSEQUENT ENCOUNTER: ICD-10-CM

## 2021-11-09 DIAGNOSIS — Z98.1 HISTORY OF FUSION OF CERVICAL SPINE: ICD-10-CM

## 2021-11-09 DIAGNOSIS — Z98.890 S/P KYPHOPLASTY: ICD-10-CM

## 2021-11-09 DIAGNOSIS — S32.050K: ICD-10-CM

## 2021-11-09 DIAGNOSIS — G62.9 NEUROPATHY: ICD-10-CM

## 2021-11-09 DIAGNOSIS — G89.4 CHRONIC PAIN SYNDROME: ICD-10-CM

## 2021-11-09 DIAGNOSIS — G89.29 CHRONIC BILATERAL LOW BACK PAIN WITHOUT SCIATICA: ICD-10-CM

## 2021-11-09 PROCEDURE — 4004F PT TOBACCO SCREEN RCVD TLK: CPT | Performed by: NURSE PRACTITIONER

## 2021-11-09 PROCEDURE — G8417 CALC BMI ABV UP PARAM F/U: HCPCS | Performed by: NURSE PRACTITIONER

## 2021-11-09 PROCEDURE — 3017F COLORECTAL CA SCREEN DOC REV: CPT | Performed by: NURSE PRACTITIONER

## 2021-11-09 PROCEDURE — G8484 FLU IMMUNIZE NO ADMIN: HCPCS | Performed by: NURSE PRACTITIONER

## 2021-11-09 PROCEDURE — G8427 DOCREV CUR MEDS BY ELIG CLIN: HCPCS | Performed by: NURSE PRACTITIONER

## 2021-11-09 PROCEDURE — 99214 OFFICE O/P EST MOD 30 MIN: CPT | Performed by: NURSE PRACTITIONER

## 2021-11-09 ASSESSMENT — ENCOUNTER SYMPTOMS: BACK PAIN: 1
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
## 2021-11-29 DIAGNOSIS — G89.4 CHRONIC PAIN SYNDROME: ICD-10-CM

## 2021-11-29 RX ORDER — OXYCODONE AND ACETAMINOPHEN 7.5; 325 MG/1; MG/1
1 TABLET ORAL EVERY 8 HOURS PRN
Qty: 90 TABLET | Refills: 0 | Status: SHIPPED | OUTPATIENT
Start: 2021-11-29 | End: 2021-12-27 | Stop reason: SDUPTHER

## 2021-11-29 NOTE — TELEPHONE ENCOUNTER
Carlos Koch called requesting a refill on the following medications:  Requested Prescriptions     Pending Prescriptions Disp Refills    oxyCODONE-acetaminophen (PERCOCET) 7.5-325 MG per tablet 90 tablet 0     Sig: Take 1 tablet by mouth every 8 hours as needed for Pain for up to 30 days. Pharmacy verified: [facility]. pv      Date of last visit: 11/9/2021  Date of next visit (if applicable): 1/84/9616

## 2021-11-29 NOTE — TELEPHONE ENCOUNTER
OARRS reviewed. UDS: + for  Cotinine -consistent. Last seen: 11/9/2021.  Follow-up:   Future Appointments   Date Time Provider Kenny Arrington   1/11/2022 11:00 AM MEGHAN Kelly - CNP [facility] - [address] [facility]

## 2021-12-27 DIAGNOSIS — G89.4 CHRONIC PAIN SYNDROME: ICD-10-CM

## 2021-12-27 RX ORDER — OXYCODONE AND ACETAMINOPHEN 7.5; 325 MG/1; MG/1
1 TABLET ORAL EVERY 8 HOURS PRN
Qty: 90 TABLET | Refills: 0 | Status: SHIPPED | OUTPATIENT
Start: 2021-12-29 | End: 2022-01-27 | Stop reason: SDUPTHER

## 2021-12-27 NOTE — TELEPHONE ENCOUNTER
OARRS reviewed. UDS: + for  Cotinine -consistent. + for Duloxetine -inconsistent  Last seen: 11/9/2021.  Follow-up:   Future Appointments   Date Time Provider Kenny Nury   1/11/2022 11:00 AM MEGHAN Mari - CNP [facility] - [address]

## 2022-01-06 ENCOUNTER — TELEPHONE (OUTPATIENT)
Dept: PHYSICAL MEDICINE AND REHAB | Age: 57
End: 2022-01-06

## 2022-01-06 DIAGNOSIS — M54.50 LUMBAR PAIN: Primary | ICD-10-CM

## 2022-01-06 DIAGNOSIS — M54.50 ACUTE EXACERBATION OF CHRONIC LOW BACK PAIN: ICD-10-CM

## 2022-01-06 DIAGNOSIS — G89.29 ACUTE EXACERBATION OF CHRONIC LOW BACK PAIN: ICD-10-CM

## 2022-01-06 NOTE — TELEPHONE ENCOUNTER
Patient is calling and wanting to know if he can get orders for some xrays to be done prior to his appt with Mihai on 01/11/2022. He states that about a week ago he had a fall and is having increased pain in the area where he had his surgery and he is concerned and would like to be able to have the xray done prior to his appt so it could be reviewed with him at his visit.   DOLV 11/09/2021  Please advise  [phone]

## 2022-01-06 NOTE — TELEPHONE ENCOUNTER
Patient states it would be for his lower back. States sometimes it will suddenly feel like there's a hot needle stuck in the area where he had his last procedure done and when this happens his left leg goes numb for about 30 seconds and then the feeling comes back. Patient states although the feeling comes back in his left leg, the back pain always remains.      States he has taken an extra pill twice when this has happened because it was so painful

## 2022-01-11 ENCOUNTER — HOSPITAL ENCOUNTER (OUTPATIENT)
Dept: GENERAL RADIOLOGY | Age: 57
Discharge: HOME | End: 2022-01-11
Payer: MEDICARE

## 2022-01-11 ENCOUNTER — HOSPITAL ENCOUNTER (OUTPATIENT)
Age: 57
Discharge: HOME | End: 2022-01-11
Payer: MEDICARE

## 2022-01-11 ENCOUNTER — OFFICE VISIT (OUTPATIENT)
Dept: PHYSICAL MEDICINE AND REHAB | Age: 57
End: 2022-01-11
Payer: MEDICARE

## 2022-01-11 VITALS
BODY MASS INDEX: 39.14 KG/M2 | HEIGHT: 74 IN | SYSTOLIC BLOOD PRESSURE: 118 MMHG | DIASTOLIC BLOOD PRESSURE: 82 MMHG | WEIGHT: 305 LBS

## 2022-01-11 DIAGNOSIS — M54.50 LUMBAR PAIN: ICD-10-CM

## 2022-01-11 DIAGNOSIS — M47.816 SPONDYLOSIS OF LUMBAR REGION WITHOUT MYELOPATHY OR RADICULOPATHY: Primary | ICD-10-CM

## 2022-01-11 DIAGNOSIS — S32.050K: ICD-10-CM

## 2022-01-11 DIAGNOSIS — Z98.890 S/P KYPHOPLASTY: ICD-10-CM

## 2022-01-11 DIAGNOSIS — G89.4 CHRONIC PAIN SYNDROME: ICD-10-CM

## 2022-01-11 DIAGNOSIS — G62.9 NEUROPATHY: ICD-10-CM

## 2022-01-11 DIAGNOSIS — S32.030D CLOSED COMPRESSION FRACTURE OF L3 LUMBAR VERTEBRA WITH ROUTINE HEALING, SUBSEQUENT ENCOUNTER: ICD-10-CM

## 2022-01-11 DIAGNOSIS — M54.2 NECK PAIN: ICD-10-CM

## 2022-01-11 DIAGNOSIS — Z98.1 HISTORY OF FUSION OF CERVICAL SPINE: ICD-10-CM

## 2022-01-11 DIAGNOSIS — G89.29 ACUTE EXACERBATION OF CHRONIC LOW BACK PAIN: ICD-10-CM

## 2022-01-11 DIAGNOSIS — M54.50 ACUTE EXACERBATION OF CHRONIC LOW BACK PAIN: ICD-10-CM

## 2022-01-11 PROCEDURE — 72100 X-RAY EXAM L-S SPINE 2/3 VWS: CPT

## 2022-01-11 PROCEDURE — 99214 OFFICE O/P EST MOD 30 MIN: CPT | Performed by: NURSE PRACTITIONER

## 2022-01-11 PROCEDURE — G8427 DOCREV CUR MEDS BY ELIG CLIN: HCPCS | Performed by: NURSE PRACTITIONER

## 2022-01-11 PROCEDURE — 4004F PT TOBACCO SCREEN RCVD TLK: CPT | Performed by: NURSE PRACTITIONER

## 2022-01-11 PROCEDURE — G8417 CALC BMI ABV UP PARAM F/U: HCPCS | Performed by: NURSE PRACTITIONER

## 2022-01-11 PROCEDURE — G8484 FLU IMMUNIZE NO ADMIN: HCPCS | Performed by: NURSE PRACTITIONER

## 2022-01-11 PROCEDURE — 3017F COLORECTAL CA SCREEN DOC REV: CPT | Performed by: NURSE PRACTITIONER

## 2022-01-11 RX ORDER — AZITHROMYCIN 250 MG/1
TABLET, FILM COATED ORAL
Status: ON HOLD | COMMUNITY
Start: 2022-01-06 | End: 2022-03-15 | Stop reason: HOSPADM

## 2022-01-11 ASSESSMENT — ENCOUNTER SYMPTOMS: BACK PAIN: 1

## 2022-01-11 NOTE — PROGRESS NOTES
[address] [place] [address]  Dept: [phone]  Dept Fax: [phone]: [phone]    Visit Date: 1/11/2022    Functionality Assessment/Goals Worksheet     On a scale of 0 (Does not Interfere) to 10 (Completely Interferes)     1. Which number describes how during the past week pain has interfered with       the following:  A. General Activity:  6  B. Mood: 0  C. Walking Ability:  6  D. Normal Work (Includes both work outside the home and housework):  0  E. Relations with Other People:   0  F. Sleep:   6  G. Enjoyment of Life:   0    2. Patient Prefers to Take their Pain Medications:     [x]  On a regular basis   [x]  Only when necessary    []  Does not take pain medications    3. What are the Patient's Goals/Expectations for Visiting Pain Management? []  Learn about my pain    [x]  Receive Medication   []  Physical Therapy     []  Treat Depression   [x]  Receive Injections    []  Treat Sleep   []  Deal with Anxiety and Stress   []  Treat Opoid Dependence/Addiction   []  Other:      HPI:   Mahesh Evans is a 55 y.o. male is here today for    Chief Complaint: Low back pain     HPI   2 month FU. Patient her e with continued pain in low back. Had a fall about 2 weeks ago now and had increased pain and asked last week for me to order a lumbar xray showed no new fracture. Patient reports that pain is improved from a week ago. Patient describes his pain as a constant tooth aching pain. Denies any neck pain today   Pain increases with bending, lifting, twisting , walking, standing and stairs. Patient ambulating with walker. Has lost 6 lbs since last visit,   Continues Percocet prn which is effective in decreasing pain in half to a very tolerable level. 4/10 or below. Able to get around.    Patient recently joined [facility] and as been doing more exercises walking in the pool which helps. He has been doing this 3 times per week and started last week    Has not been taking neuronin prescribed by his pcp . Medications reviewed. Patient denies side effects with medications. Patient states he is taking medications as prescribed. Hedenies receiving pain medications from other sources. He denies any ER visits since last visit. Pain scale with out pain medications or at its worst is 7-8/10. Pain scale with pain medications or at its best is 2-4/10. Last dose of Percocet was today   Drug screen reviewed from 11/9/2021 and was appropriate  Pill count completed  today and WNL: Yes    Lumbar xray:  FINDINGS:        There is evidence of prior vertebral augmentation at L2-L5.       No new loss of vertebral body height is seen. The overall configuration of the vertebral bodies appears grossly similar to the previous examination dated 6/7/2021. There is straightening of the normal lumbar lordosis. There is lower lumbar facet    arthrosis seen.           Impression   1. Evidence of previous vertebral augmentation at L2-L5. No new loss of vertebral body height is seen on the current examination. 2. Moderate degenerative facet arthrosis of the lower lumbar spine is seen, most pronounced at L4-5 and L5-S1.                   The patientis allergic to adhesive tape and keppra [levetiracetam]. Subjective:      Review of Systems   Constitutional: Negative. Cardiovascular: Positive for leg swelling. Musculoskeletal: Positive for arthralgias, back pain, gait problem, joint swelling, myalgias, neck pain and neck stiffness. Skin: Negative for wound. Neurological: Positive for weakness and numbness. Psychiatric/Behavioral: Negative. Objective:     Vitals:    01/11/22 1044   BP: 118/82   Weight: (!) 305 lb (138.3 kg)   Height: 6' 2\" (1.88 m)       Physical Exam  Constitutional:       Appearance: Normal appearance. He is ill-appearing. HENT:      Head: Normocephalic and atraumatic. Mouth/Throat:      Mouth: Mucous membranes are moist.   Cardiovascular:      Rate and Rhythm: Normal rate and regular rhythm. Pulses: Normal pulses. Heart sounds: Normal heart sounds. Pulmonary:      Effort: Pulmonary effort is normal.      Comments: Decreased  Abdominal:      General: Abdomen is flat. Palpations: Abdomen is soft. Musculoskeletal:         General: Swelling and tenderness present. Right wrist: Tenderness present. Decreased range of motion. Left wrist: Tenderness and bony tenderness present. Decreased range of motion. Cervical back: Tenderness and bony tenderness present. Muscular tenderness present. Thoracic back: Tenderness and bony tenderness present. Decreased range of motion. Lumbar back: Tenderness and bony tenderness present. Decreased range of motion. Negative right straight leg raise test and negative left straight leg raise test.        Back:       Right lower leg: Tenderness present. Edema present. Left lower leg: Tenderness present. Edema present. Right ankle: Decreased range of motion. Left ankle: Decreased range of motion. Skin:     Coloration: Skin is pale. Neurological:      General: No focal deficit present. Mental Status: He is alert and oriented to person, place, and time. Sensory: Sensory deficit present. Motor: Weakness present. Coordination: Coordination abnormal.      Gait: Gait abnormal.      Deep Tendon Reflexes:      Reflex Scores:       Tricep reflexes are 1+ on the right side and 1+ on the left side. Bicep reflexes are 1+ on the right side and 1+ on the left side. Brachioradialis reflexes are 1+ on the right side and 1+ on the left side. Patellar reflexes are 1+ on the right side and 1+ on the left side. Achilles reflexes are 1+ on the right side and 1+ on the left side.      Comments: Decreased sensation bilateral lower extremity and upper extremity Psychiatric:         Mood and Affect: Mood normal.       ANJUM  Patricks test  positive  Yeoman's  positive  Gaenslen's  positive         Assessment:     1. Spondylosis of lumbar region without myelopathy or radiculopathy    2. Lumbar pain    3. Closed compression fracture of L3 lumbar vertebra with routine healing, subsequent encounter    4. Closed compression fracture of L5 lumbar vertebra with nonunion, subsequent encounter    5. S/P kyphoplasty    6. Neuropathy    7. Neck pain    8. History of fusion of cervical spine    9. Chronic pain syndrome            Plan:      · OARRS reviewed. Current MED: 33.8  · Patient was offered naloxone for home. · Discussed long term side effects of medications, tolerance, dependency and addiction. · Previous UDS reviewed  · UDS preformed today for compliance. · Patient told can not receive any pain medications from any other source. · No evidence of abuse, diversion or aberrant behavior.  Medications and/or procedures to improve function and quality of life- patient understanding with this and that may not be pain free   Discussed with patient about safe storage of medications at home   Discussed possible weaning of medication dosing dependent on treatment/procedure results.  Discussed with patient about risks with procedure including infection, reaction to medication, increased pain, or bleeding. · Procedure notes reveiwed in detail  · Receiving over 85% relief from kyphoplasty    Reviewed lumbar xray- no acute fracture, pain is improved now since fall 2 weeks ago. Discussed possible bilateral L-facet MBB @ L4-5 and L5-S1 if needed  bilateral    Continue exercises and pool exercises at [facility].  Medications remain very effective- continue Percocet 7.5/325 TID prn- filled 12/29/2021 and has plenty. Has bought a lock box   UDS ordered today         Meds. Prescribed:   No orders of the defined types were placed in this encounter.       Return in about 2 months (around 3/11/2022), or if symptoms worsen or fail to improve, for follow up  for medications.                Electronically signed by MEGHAN Calero CNP on1/11/2022 at 11:24 AM

## 2022-01-27 DIAGNOSIS — G89.4 CHRONIC PAIN SYNDROME: ICD-10-CM

## 2022-01-27 RX ORDER — OXYCODONE AND ACETAMINOPHEN 7.5; 325 MG/1; MG/1
1 TABLET ORAL EVERY 8 HOURS PRN
Qty: 90 TABLET | Refills: 0 | Status: SHIPPED | OUTPATIENT
Start: 2022-01-28 | End: 2022-02-24 | Stop reason: SDUPTHER

## 2022-01-27 NOTE — TELEPHONE ENCOUNTER
Robin Huff called requesting a refill on the following medications:  Requested Prescriptions     Pending Prescriptions Disp Refills    oxyCODONE-acetaminophen (PERCOCET) 7.5-325 MG per tablet 90 tablet 0     Sig: Take 1 tablet by mouth every 8 hours as needed for Pain for up to 30 days.      Pharmacy verified:  [email or web address] [address] [facility]      Date of last visit: 01/11/2022  Date of next visit (if applicable): 7/54/4782

## 2022-01-27 NOTE — TELEPHONE ENCOUNTER
OARRS reviewed. UDS: + for  Oxycodone, Duloxetine -consistent. Last seen: 1/11/2022.  Follow-up:   Future Appointments   Date Time Provider Kenny Nury   3/15/2022 11:00 AM MEGHAN Xiong - CNP [facility] - [address]

## 2022-02-24 DIAGNOSIS — G89.4 CHRONIC PAIN SYNDROME: ICD-10-CM

## 2022-02-24 RX ORDER — OXYCODONE AND ACETAMINOPHEN 7.5; 325 MG/1; MG/1
1 TABLET ORAL EVERY 8 HOURS PRN
Qty: 90 TABLET | Refills: 0 | Status: SHIPPED | OUTPATIENT
Start: 2022-02-27 | End: 2022-03-30 | Stop reason: SDUPTHER

## 2022-02-24 NOTE — TELEPHONE ENCOUNTER
OARRS reviewed. UDS: + for  Cotinine, Duloxetine, Oxycodone -consistent. Last seen: 1/11/2022.  Follow-up:   Future Appointments   Date Time Provider Kenny Nury   3/15/2022 11:00 AM MEGHAN Hercules - CNP N [facility]

## 2022-02-24 NOTE — TELEPHONE ENCOUNTER
Sondio Pineda called requesting a refill on the following medications:  Requested Prescriptions     Pending Prescriptions Disp Refills    oxyCODONE-acetaminophen (PERCOCET) 7.5-325 MG per tablet 90 tablet 0     Sig: Take 1 tablet by mouth every 8 hours as needed for Pain for up to 30 days.      Pharmacy verified:  [facility]       Date of last visit: 01-11-22  Date of next visit (if applicable): 1/21/4605

## 2022-03-06 ENCOUNTER — HOSPITAL ENCOUNTER (INPATIENT)
Age: 57
LOS: 9 days | Discharge: HOME HEALTH | DRG: 291 | End: 2022-03-15
Attending: EMERGENCY MEDICINE | Admitting: INTERNAL MEDICINE
Payer: MEDICARE

## 2022-03-06 ENCOUNTER — APPOINTMENT (OUTPATIENT)
Dept: GENERAL RADIOLOGY | Age: 57
DRG: 291 | End: 2022-03-06
Payer: MEDICARE

## 2022-03-06 DIAGNOSIS — Z87.891 PERSONAL HISTORY OF TOBACCO USE: ICD-10-CM

## 2022-03-06 DIAGNOSIS — R60.9 PERIPHERAL EDEMA: ICD-10-CM

## 2022-03-06 DIAGNOSIS — J44.9 STAGE 3 SEVERE COPD BY GOLD CLASSIFICATION (HCC): ICD-10-CM

## 2022-03-06 DIAGNOSIS — I50.9 ACUTE ON CHRONIC CONGESTIVE HEART FAILURE, UNSPECIFIED HEART FAILURE TYPE (HCC): ICD-10-CM

## 2022-03-06 DIAGNOSIS — I35.0 NONRHEUMATIC AORTIC VALVE STENOSIS: Primary | ICD-10-CM

## 2022-03-06 DIAGNOSIS — I50.23 ACUTE ON CHRONIC SYSTOLIC CONGESTIVE HEART FAILURE (HCC): ICD-10-CM

## 2022-03-06 DIAGNOSIS — R06.02 SHORTNESS OF BREATH: ICD-10-CM

## 2022-03-06 LAB
ANION GAP SERPL CALCULATED.3IONS-SCNC: 11 MEQ/L (ref 8–16)
BUN BLDV-MCNC: 12 MG/DL (ref 7–22)
CALCIUM SERPL-MCNC: 9.1 MG/DL (ref 8.5–10.5)
CHLORIDE BLD-SCNC: 98 MEQ/L (ref 98–111)
CO2: 28 MEQ/L (ref 23–33)
CREAT SERPL-MCNC: 1.1 MG/DL (ref 0.4–1.2)
GFR SERPL CREATININE-BSD FRML MDRD: 69 ML/MIN/1.73M2
GLUCOSE BLD-MCNC: 142 MG/DL (ref 70–108)
GLUCOSE BLD-MCNC: 144 MG/DL (ref 70–108)
OSMOLALITY CALCULATION: 276.1 MOSMOL/KG (ref 275–300)
POTASSIUM SERPL-SCNC: 3.7 MEQ/L (ref 3.5–5.2)
PRO-BNP: 4977 PG/ML (ref 0–900)
PROCALCITONIN: 0.08 NG/ML (ref 0.01–0.09)
SARS-COV-2, NAAT: NOT  DETECTED
SCAN OF BLOOD SMEAR: NORMAL
SODIUM BLD-SCNC: 137 MEQ/L (ref 135–145)
TROPONIN T: < 0.01 NG/ML
TROPONIN T: < 0.01 NG/ML

## 2022-03-06 PROCEDURE — 82948 REAGENT STRIP/BLOOD GLUCOSE: CPT

## 2022-03-06 PROCEDURE — 84145 PROCALCITONIN (PCT): CPT

## 2022-03-06 PROCEDURE — 84484 ASSAY OF TROPONIN QUANT: CPT

## 2022-03-06 PROCEDURE — 1200000000 HC SEMI PRIVATE

## 2022-03-06 PROCEDURE — 96374 THER/PROPH/DIAG INJ IV PUSH: CPT

## 2022-03-06 PROCEDURE — 83880 ASSAY OF NATRIURETIC PEPTIDE: CPT

## 2022-03-06 PROCEDURE — 2580000003 HC RX 258: Performed by: INTERNAL MEDICINE

## 2022-03-06 PROCEDURE — 80048 BASIC METABOLIC PNL TOTAL CA: CPT

## 2022-03-06 PROCEDURE — 93005 ELECTROCARDIOGRAM TRACING: CPT | Performed by: EMERGENCY MEDICINE

## 2022-03-06 PROCEDURE — 6360000002 HC RX W HCPCS: Performed by: INTERNAL MEDICINE

## 2022-03-06 PROCEDURE — 71046 X-RAY EXAM CHEST 2 VIEWS: CPT

## 2022-03-06 PROCEDURE — 36415 COLL VENOUS BLD VENIPUNCTURE: CPT

## 2022-03-06 PROCEDURE — 73590 X-RAY EXAM OF LOWER LEG: CPT

## 2022-03-06 PROCEDURE — 6360000002 HC RX W HCPCS: Performed by: STUDENT IN AN ORGANIZED HEALTH CARE EDUCATION/TRAINING PROGRAM

## 2022-03-06 PROCEDURE — 85025 COMPLETE CBC W/AUTO DIFF WBC: CPT

## 2022-03-06 PROCEDURE — 1200000003 HC TELEMETRY R&B

## 2022-03-06 PROCEDURE — 87635 SARS-COV-2 COVID-19 AMP PRB: CPT

## 2022-03-06 PROCEDURE — 99285 EMERGENCY DEPT VISIT HI MDM: CPT

## 2022-03-06 RX ORDER — DEXTROSE MONOHYDRATE 25 G/50ML
12.5 INJECTION, SOLUTION INTRAVENOUS PRN
Status: DISCONTINUED | OUTPATIENT
Start: 2022-03-06 | End: 2022-03-06

## 2022-03-06 RX ORDER — INSULIN GLARGINE 100 [IU]/ML
42 INJECTION, SOLUTION SUBCUTANEOUS NIGHTLY
Status: DISCONTINUED | OUTPATIENT
Start: 2022-03-07 | End: 2022-03-15

## 2022-03-06 RX ORDER — ASPIRIN 81 MG/1
81 TABLET, CHEWABLE ORAL DAILY
Status: DISCONTINUED | OUTPATIENT
Start: 2022-03-06 | End: 2022-03-15 | Stop reason: HOSPADM

## 2022-03-06 RX ORDER — DULOXETIN HYDROCHLORIDE 60 MG/1
60 CAPSULE, DELAYED RELEASE ORAL DAILY
Status: DISCONTINUED | OUTPATIENT
Start: 2022-03-07 | End: 2022-03-15 | Stop reason: HOSPADM

## 2022-03-06 RX ORDER — DEXTROSE MONOHYDRATE 50 MG/ML
100 INJECTION, SOLUTION INTRAVENOUS PRN
Status: DISCONTINUED | OUTPATIENT
Start: 2022-03-06 | End: 2022-03-15 | Stop reason: HOSPADM

## 2022-03-06 RX ORDER — LATANOPROST 50 UG/ML
1 SOLUTION/ DROPS OPHTHALMIC NIGHTLY
Status: DISCONTINUED | OUTPATIENT
Start: 2022-03-07 | End: 2022-03-15 | Stop reason: HOSPADM

## 2022-03-06 RX ORDER — ROSUVASTATIN CALCIUM 20 MG/1
20 TABLET, COATED ORAL NIGHTLY
Status: DISCONTINUED | OUTPATIENT
Start: 2022-03-06 | End: 2022-03-15 | Stop reason: HOSPADM

## 2022-03-06 RX ORDER — METOPROLOL SUCCINATE 50 MG/1
50 TABLET, EXTENDED RELEASE ORAL DAILY
Status: DISCONTINUED | OUTPATIENT
Start: 2022-03-07 | End: 2022-03-09

## 2022-03-06 RX ORDER — SODIUM BICARBONATE 650 MG/1
650 TABLET ORAL 2 TIMES DAILY
Status: DISCONTINUED | OUTPATIENT
Start: 2022-03-07 | End: 2022-03-15 | Stop reason: HOSPADM

## 2022-03-06 RX ORDER — LISINOPRIL 5 MG/1
5 TABLET ORAL DAILY
Status: DISCONTINUED | OUTPATIENT
Start: 2022-03-07 | End: 2022-03-07

## 2022-03-06 RX ORDER — ACETAMINOPHEN 325 MG/1
650 TABLET ORAL EVERY 4 HOURS PRN
Status: DISCONTINUED | OUTPATIENT
Start: 2022-03-06 | End: 2022-03-15 | Stop reason: HOSPADM

## 2022-03-06 RX ORDER — FUROSEMIDE 10 MG/ML
40 INJECTION INTRAMUSCULAR; INTRAVENOUS ONCE
Status: COMPLETED | OUTPATIENT
Start: 2022-03-06 | End: 2022-03-06

## 2022-03-06 RX ORDER — IPRATROPIUM BROMIDE AND ALBUTEROL SULFATE 2.5; .5 MG/3ML; MG/3ML
3 SOLUTION RESPIRATORY (INHALATION) EVERY 4 HOURS PRN
Status: DISCONTINUED | OUTPATIENT
Start: 2022-03-06 | End: 2022-03-08

## 2022-03-06 RX ORDER — ONDANSETRON 2 MG/ML
4 INJECTION INTRAMUSCULAR; INTRAVENOUS EVERY 6 HOURS PRN
Status: DISCONTINUED | OUTPATIENT
Start: 2022-03-06 | End: 2022-03-15 | Stop reason: HOSPADM

## 2022-03-06 RX ORDER — DOCUSATE SODIUM 100 MG/1
100 CAPSULE, LIQUID FILLED ORAL 2 TIMES DAILY
Status: DISCONTINUED | OUTPATIENT
Start: 2022-03-07 | End: 2022-03-15 | Stop reason: HOSPADM

## 2022-03-06 RX ORDER — MORPHINE SULFATE 2 MG/ML
2 INJECTION, SOLUTION INTRAMUSCULAR; INTRAVENOUS EVERY 4 HOURS PRN
Status: DISCONTINUED | OUTPATIENT
Start: 2022-03-06 | End: 2022-03-15 | Stop reason: HOSPADM

## 2022-03-06 RX ORDER — FOLIC ACID 1 MG/1
1 TABLET ORAL DAILY
Status: DISCONTINUED | OUTPATIENT
Start: 2022-03-07 | End: 2022-03-15 | Stop reason: HOSPADM

## 2022-03-06 RX ORDER — FUROSEMIDE 10 MG/ML
40 INJECTION INTRAMUSCULAR; INTRAVENOUS 2 TIMES DAILY
Status: DISCONTINUED | OUTPATIENT
Start: 2022-03-06 | End: 2022-03-06

## 2022-03-06 RX ORDER — ALBUTEROL SULFATE 2.5 MG/3ML
2.5 SOLUTION RESPIRATORY (INHALATION) EVERY 4 HOURS PRN
Status: DISCONTINUED | OUTPATIENT
Start: 2022-03-06 | End: 2022-03-11

## 2022-03-06 RX ORDER — NICOTINE POLACRILEX 4 MG
15 LOZENGE BUCCAL PRN
Status: DISCONTINUED | OUTPATIENT
Start: 2022-03-06 | End: 2022-03-06

## 2022-03-06 RX ORDER — PANTOPRAZOLE SODIUM 40 MG/1
40 TABLET, DELAYED RELEASE ORAL
Status: DISCONTINUED | OUTPATIENT
Start: 2022-03-07 | End: 2022-03-15 | Stop reason: HOSPADM

## 2022-03-06 RX ADMIN — ENOXAPARIN SODIUM 40 MG: 100 INJECTION SUBCUTANEOUS at 23:28

## 2022-03-06 RX ADMIN — FUROSEMIDE 5 MG/HR: 10 INJECTION, SOLUTION INTRAMUSCULAR; INTRAVENOUS at 23:34

## 2022-03-06 RX ADMIN — MORPHINE SULFATE 2 MG: 2 INJECTION, SOLUTION INTRAMUSCULAR; INTRAVENOUS at 23:28

## 2022-03-06 RX ADMIN — FUROSEMIDE 40 MG: 10 INJECTION, SOLUTION INTRAVENOUS at 17:50

## 2022-03-06 ASSESSMENT — ENCOUNTER SYMPTOMS
SINUS PAIN: 0
COUGH: 1
SINUS PRESSURE: 0
BACK PAIN: 0
COLOR CHANGE: 0
VOMITING: 0
SHORTNESS OF BREATH: 1
CONSTIPATION: 0
SORE THROAT: 0
ABDOMINAL PAIN: 0
NAUSEA: 0
DIARRHEA: 0
CHEST TIGHTNESS: 1

## 2022-03-06 ASSESSMENT — PAIN SCALES - GENERAL
PAINLEVEL_OUTOF10: 8
PAINLEVEL_OUTOF10: 8
PAINLEVEL_OUTOF10: 4
PAINLEVEL_OUTOF10: 8

## 2022-03-06 ASSESSMENT — PAIN DESCRIPTION - PAIN TYPE
TYPE: ACUTE PAIN

## 2022-03-06 ASSESSMENT — PAIN - FUNCTIONAL ASSESSMENT
PAIN_FUNCTIONAL_ASSESSMENT: 0-10

## 2022-03-06 ASSESSMENT — PAIN DESCRIPTION - LOCATION: LOCATION: LEG;BACK

## 2022-03-06 NOTE — ED TRIAGE NOTES
Pt presents to the ER from home with c/o SOB and lower extremity swelling. Pt states he was started on Lasix last week and \"has peed 5, 1 gallon jugs\" since then. Pt states the SOB and leg swelling started last week, went to PCP, and was started on the Lasix. Pt has a hx of COPD. Pt denies chest pain.

## 2022-03-06 NOTE — ED PROVIDER NOTES

## 2022-03-06 NOTE — ED PROVIDER NOTES
Date    Acute kidney injury ([facility] 12/2020    due to Enterococous Bacteremia , fluid overload, low sodium    Amputation of right great toe ([facility] 02/18/2021    Asthma     Brain tumor ([facility]     Cirrhosis (HCC)     ETOH abuse    CKD (chronic kidney disease)     l.brown-osu spetie    COPD (chronic obstructive pulmonary disease) (HCC)     Diabetes mellitus (HCC)     type 2-insulin lantus and humalog    Falling     Glaucoma     Hepatitis C     History of blood transfusion     s/p nasal surgery    Hyperlipidemia     Hypertension     Nallu    Legally blind     Obesity     Pain management     karol marzec-percocet Rx    Right foot infection 11/2021    Seizures ([facility]      Past Surgical History:   Procedure Laterality Date    BACK SURGERY      CARDIAC CATHETERIZATION  08/03/2021    EYE SURGERY      FIBULA FRACTURE SURGERY Left 03/21/2019    LEFT FIBULAR SHAFT ORIF performed by Francisco Richardson DPM at [address] Right     Steel pins in place    FRACTURE SURGERY      NASAL SINUS SURGERY Bilateral 11/29/2020    FLEXIBLE BRONCHOSCOPY WITH BRONCHOALVEOLAR LAVAGE WITH CULTURES.  NASAL ENDOSCOPY WITH POSSIBLE CAUTERY ADN NASAL PACKING performed by Yolis Hair MD at [facility]  01/2020    SPINE SURGERY N/A 02/19/2021    KYPHOPLASTY at T4, L2, L4 performed by Cortes Valadez MD at [address] [place] [address] N/A 8/24/2021    LUMBAR 3 AND LUMBAR 5 KYPHOPLASTY performed by Corie Dos Santos MD at [address] Right 09/23/2020    AMPUTATION DISTAL APSECT RIGHT HALLUX, REMOVAL OF HARDWARE RIGHT HALLUX performed by Demarco Mota DPM at [facility] N/A 04/25/2019    EGD BIOPSY performed by Staci Ivey MD at [facility]         MEDICATIONS     Current Facility-Administered Medications:     furosemide (LASIX) injection 40 mg, 40 mg, IntraVENous, Once, Maureen Sifuentes,     Current Outpatient Medications:    oxyCODONE-acetaminophen (PERCOCET) 7.5-325 MG per tablet, Take 1 tablet by mouth every 8 hours as needed for Pain for up to 30 days. , Disp: 90 tablet, Rfl: 0    azithromycin (ZITHROMAX) 250 MG tablet, TAKE TWO (2) TABLETS THE 1ST DOSE, THEN TAKE ONE (1) TABLET EVERY DAY UNTIL GONE FOR INFECTION. , Disp: , Rfl:     sodium bicarbonate 650 MG tablet, Take 650 mg by mouth 2 times daily, Disp: , Rfl:     senna (SENOKOT) 8.6 MG tablet, Take 2 tablets by mouth daily, Disp: , Rfl:     tamsulosin (FLOMAX) 0.4 MG capsule, Take 1 capsule by mouth nightly, Disp: 30 capsule, Rfl: 0    DULoxetine (CYMBALTA) 60 MG extended release capsule, Take 1 capsule by mouth daily, Disp: 30 capsule, Rfl: 3    gabapentin (NEURONTIN) 300 MG capsule, Take 1 capsule by mouth nightly for 30 days. , Disp: 90 capsule, Rfl: 0    nitroGLYCERIN (NITROSTAT) 0.4 MG SL tablet, up to max of 3 total doses.  If no relief after 1 dose, call 911., Disp: 25 tablet, Rfl: 3    insulin glargine (LANTUS) 100 UNIT/ML injection vial, Inject 84 Units into the skin nightly, Disp: 1 vial, Rfl: 3    metoprolol succinate (TOPROL XL) 50 MG extended release tablet, Take 1 tablet by mouth daily, Disp: 30 tablet, Rfl: 0    insulin lispro (HUMALOG) 100 UNIT/ML injection vial, Inject 10 Units into the skin 3 times daily (before meals) Plus Sliding Scale (Patient taking differently: Inject 12 Units into the skin 3 times daily (before meals) Plus Sliding Scale ), Disp: 1 vial, Rfl: 0    insulin lispro (HUMALOG) 100 UNIT/ML injection vial, Glucose: Dose:  No Insulin, 140-199 2 Units, 200-249 4 Units, 250-299 6 Units, 300-349 8 Units, 350-400 10 Units, Over 400 12 Units, Disp: 1 vial, Rfl: 0    docusate sodium (COLACE, DULCOLAX) 100 MG CAPS, Take 100 mg by mouth 2 times daily, Disp: 60 capsule, Rfl: 0    folic acid (FOLVITE) 1 MG tablet, Take 1 mg by mouth daily, Disp: , Rfl:     albuterol sulfate  (90 Base) MCG/ACT inhaler, Inhale 1 puff into the lungs every 4-6 hours as needed, Disp: , Rfl:     OXYGEN, Inhale into the lungs daily as needed (nightly) , Disp: , Rfl:     ipratropium-albuterol (DUONEB) 0.5-2.5 (3) MG/3ML SOLN nebulizer solution, Inhale 3 mLs into the lungs every 4 hours as needed for Shortness of Breath, Disp: 360 mL, Rfl: 1    tiotropium (SPIRIVA) 18 MCG inhalation capsule, Inhale 1 capsule into the lungs daily, Disp: 30 capsule, Rfl: 3    latanoprost (XALATAN) 0.005 % ophthalmic solution, Place 1 drop into both eyes nightly, Disp: 1 Bottle, Rfl: 3    rosuvastatin (CRESTOR) 20 MG tablet, Take 20 mg by mouth nightly , Disp: , Rfl:       SOCIAL HISTORY     Social History     Social History Narrative    Not on file     Social History     Tobacco Use    Smoking status: Current Every Day Smoker     Packs/day: 1.00     Years: 30.00     Pack years: 30.00     Types: Cigarettes    Smokeless tobacco: Never Used    Tobacco comment: Currently smoking electronic cigarettes   Vaping Use    Vaping Use: Never used   Substance Use Topics    Alcohol use: Yes     Alcohol/week: 3.0 standard drinks     Types: 3 Cans of beer per week    Drug use: Not Currently     Types: Marijuana Lanis Jenn)     Comment: medical marijuana not currently using         ALLERGIES     Allergies   Allergen Reactions    Adhesive Tape Rash     Bandaid    Keppra [Levetiracetam] Rash         FAMILY HISTORY     Family History   Problem Relation Age of Onset    Heart Attack Father     Heart Attack Brother         pneumonia    No Known Problems Mother     Cancer Sister         breast    No Known Problems Maternal Grandmother     No Known Problems Maternal Grandfather     Liver Cancer Paternal Grandmother     Heart Attack Paternal Grandfather     Heart Disease Brother         stents    Colon Cancer Neg Hx     Colon Polyps Neg Hx          PREVIOUS RECORDS   Previous records reviewed: Patient was last seen for right foot pain in 2020.         PHYSICAL EXAM     ED Triage Vitals [03/06/22 1435]   BP Temp Temp Source Pulse Resp SpO2 Height Weight   (!) 188/113 97.5 °F (36.4 °C) Oral 86 22 94 % 6' 2\" (1.88 m) (!) 368 lb 2.7 oz (167 kg)     Initial vital signs and nursing assessment reviewed and normal. Body mass index is 47.27 kg/m². Pulsoximetry is normal per my interpretation. Additional Vital Signs:  Vitals:    03/06/22 1647   BP: (!) 166/101   Pulse: 84   Resp: 22   Temp:    SpO2: 95%       Physical Exam  Constitutional:       General: He is not in acute distress. Appearance: Normal appearance. He is obese. He is ill-appearing. He is not toxic-appearing or diaphoretic. HENT:      Head: Normocephalic and atraumatic. Mouth/Throat:      Mouth: Mucous membranes are moist.      Pharynx: Oropharynx is clear. No oropharyngeal exudate or posterior oropharyngeal erythema. Eyes:      Extraocular Movements: Extraocular movements intact. Conjunctiva/sclera: Conjunctivae normal.      Pupils: Pupils are equal, round, and reactive to light. Cardiovascular:      Rate and Rhythm: Normal rate and regular rhythm. Pulses: Normal pulses. Heart sounds: Normal heart sounds. No murmur heard. No friction rub. No gallop. Pulmonary:      Effort: Pulmonary effort is normal.      Breath sounds: Rales (Bibasilar) present. No wheezing or rhonchi. Abdominal:      Palpations: Abdomen is soft. Tenderness: There is no abdominal tenderness. There is no guarding or rebound. Musculoskeletal:         General: No swelling. Cervical back: Normal range of motion. No rigidity. No muscular tenderness. Right lower leg: Tenderness present. Edema present. Left lower leg: Tenderness present. Edema present. Skin:     General: Skin is warm and dry. Capillary Refill: Capillary refill takes less than 2 seconds. Coloration: Skin is not cyanotic. Findings: Erythema present. No bruising or lesion.       Comments: Bilateral stasis dermatitis   Neurological: General: No focal deficit present. Mental Status: He is alert and oriented to person, place, and time. Sensory: No sensory deficit. MEDICAL DECISION MAKING   Initial Assessment:   1. Pleasant 80-year-old gentleman resting in the cot no acute distress presenting for worsening shortness of breath and bilateral lower extremity swelling. Significant weight gain. Stasis dermatitis bilaterally. Vitals are stable without hypoxia, tachycardia, fever or hypotension. Differential diagnosis includes was not limited to CHF exacerbation, COPD exacerbation, ACS, pneumonia, pneumothorax, COVID-19, URI, unlikely dissection, tamponade. Plan:    Labs   X-ray tibia-fibula   X-ray chest   EKG   Expect diuresis   Admission to the hospital for CHF exacerbation        ED RESULTS   Laboratory results:  Labs Reviewed   BASIC METABOLIC PANEL - Abnormal; Notable for the following components:       Result Value    Glucose 144 (*)     All other components within normal limits   CBC WITH AUTO DIFFERENTIAL - Abnormal; Notable for the following components:    WBC 4.6 (*)     RBC 3.28 (*)     Hemoglobin 9.9 (*)     Hematocrit 33.4 (*)     .8 (*)     MCHC 29.6 (*)     RDW-CV 14.7 (*)     RDW-SD 55.4 (*)     Platelets 95 (*)     All other components within normal limits   BRAIN NATRIURETIC PEPTIDE - Abnormal; Notable for the following components:    Pro-BNP 4977.0 (*)     All other components within normal limits   GLOMERULAR FILTRATION RATE, ESTIMATED - Abnormal; Notable for the following components:    Est, Glom Filt Rate 69 (*)     All other components within normal limits   COVID-19, RAPID   TROPONIN   PROCALCITONIN   ANION GAP   OSMOLALITY   SCAN OF BLOOD SMEAR       Radiologic studies results:  XR CHEST (2 VW)   Final Result   1. Left lower lobe consolidation that can relate to infiltrate and/or atelectasis. 2. Small bilateral pleural effusions. 3. Moderate cardiomegaly.             **This report has been created using voice recognition software. It may contain minor errors which are inherent in voice recognition technology. **      Final report electronically signed by Dr Angela Duke on 3/6/2022 4:15 PM      XR TIBIA FIBULA RIGHT (2 VIEWS)   Final Result   1. No acute bony abnormality. 2. Tricompartmental degenerative changes of the right knee. Chondrocalcinosis. 3. Mild degenerative changes of the ankle. 4. Moderate to marked soft tissues swelling about the ankle. **This report has been created using voice recognition software. It may contain minor errors which are inherent in voice recognition technology. **      Final report electronically signed by Dr Angela Duke on 3/6/2022 4:12 PM          ED Medications administered this visit:   Medications   furosemide (LASIX) injection 40 mg (has no administration in time range)         ED COURSE     ED Course as of 03/06/22 1745   Sun Mar 06, 2022   1621 Pro-BNP(!): 4977.0 [EM]      ED Course User Index  [EM] Erika Mcpherson DO             MEDICATION CHANGES     New Prescriptions    No medications on file         FINAL DISPOSITION     Final diagnoses:   Acute on chronic congestive heart failure, unspecified heart failure type (HCC)   Shortness of breath   Peripheral edema     Condition: condition: stable  Dispo: Admit to telemetry      This transcription was electronically signed. Parts of this transcriptions may have been dictated by use of voice recognition software and electronically transcribed, and parts may have been transcribed with the assistance of an ED scribe. The transcription may contain errors not detected in proofreading. Please refer to my supervising physician's documentation if my documentation differs.     Electronically Signed: Erika Mcpherson DO, 03/06/22, 5:45 PM       Erika Mcpherson DO  Resident  03/06/22 5635

## 2022-03-06 NOTE — ED NOTES
ED to inpatient nurses report    Chief Complaint   Patient presents with    Shortness of Breath    Leg Swelling      Present to ED from home  LOC: alert and orientated to name, place, date  Vital signs   Vitals:    03/06/22 1435 03/06/22 1536 03/06/22 1647   BP: (!) 188/113 (!) 188/98 (!) 166/101   Pulse: 86 83 84   Resp: 22 20 22   Temp: 97.5 °F (36.4 °C)     TempSrc: Oral     SpO2: 94% 95% 95%   Weight: (!) 368 lb 2.7 oz (167 kg)     Height: 6' 2\" (1.88 m)        Oxygen Baseline RA    Current needs required RA Bipap/Cpap No  LDAs:   Peripheral IV 03/06/22 Right Forearm (Active)   Site Assessment Clean;Dry; Intact 03/06/22 1647   Line Status Normal saline locked 03/06/22 1647   Dressing Status Clean;Dry; Intact 03/06/22 1647     Mobility: Requires assistance * 1  Pending ED orders: Complete  Present condition: Stable    Electronically signed by Cat Flores RN on 3/6/2022 at 5:40 PM     Stephanie Bach RN  03/06/22 3742

## 2022-03-07 PROBLEM — I50.23 ACUTE ON CHRONIC SYSTOLIC CONGESTIVE HEART FAILURE (HCC): Status: ACTIVE | Noted: 2022-03-07

## 2022-03-07 LAB
ALBUMIN SERPL-MCNC: 3.2 G/DL (ref 3.5–5.1)
ALP BLD-CCNC: 156 U/L (ref 38–126)
ALT SERPL-CCNC: 31 U/L (ref 11–66)
ANION GAP SERPL CALCULATED.3IONS-SCNC: 8 MEQ/L (ref 8–16)
AST SERPL-CCNC: 75 U/L (ref 5–40)
BILIRUB SERPL-MCNC: 1.2 MG/DL (ref 0.3–1.2)
BILIRUBIN DIRECT: 0.6 MG/DL (ref 0–0.3)
BUN BLDV-MCNC: 13 MG/DL (ref 7–22)
CALCIUM SERPL-MCNC: 9 MG/DL (ref 8.5–10.5)
CHLORIDE BLD-SCNC: 101 MEQ/L (ref 98–111)
CHOLESTEROL, TOTAL: 87 MG/DL (ref 100–199)
CO2: 32 MEQ/L (ref 23–33)
CREAT SERPL-MCNC: 1.1 MG/DL (ref 0.4–1.2)
EKG ATRIAL RATE: 86 BPM
EKG P AXIS: 66 DEGREES
EKG P-R INTERVAL: 154 MS
EKG Q-T INTERVAL: 384 MS
EKG QRS DURATION: 114 MS
EKG QTC CALCULATION (BAZETT): 459 MS
EKG R AXIS: 28 DEGREES
EKG T AXIS: 64 DEGREES
EKG VENTRICULAR RATE: 86 BPM
ERYTHROCYTE [DISTWIDTH] IN BLOOD BY AUTOMATED COUNT: 14.8 % (ref 11.5–14.5)
ERYTHROCYTE [DISTWIDTH] IN BLOOD BY AUTOMATED COUNT: 54.1 FL (ref 35–45)
FERRITIN: 175 NG/ML (ref 22–322)
FOLATE: > 20 NG/ML (ref 4.8–24.2)
GFR SERPL CREATININE-BSD FRML MDRD: 69 ML/MIN/1.73M2
GLUCOSE BLD-MCNC: 122 MG/DL (ref 70–108)
GLUCOSE BLD-MCNC: 169 MG/DL (ref 70–108)
GLUCOSE BLD-MCNC: 195 MG/DL (ref 70–108)
GLUCOSE BLD-MCNC: 86 MG/DL (ref 70–108)
GLUCOSE BLD-MCNC: 86 MG/DL (ref 70–108)
HCT VFR BLD CALC: 31.3 % (ref 42–52)
HDLC SERPL-MCNC: 40 MG/DL
HEMOGLOBIN: 9.7 GM/DL (ref 14–18)
IRON: 64 UG/DL (ref 65–195)
LDL CHOLESTEROL CALCULATED: 32 MG/DL
LV EF: 35 %
LVEF MODALITY: NORMAL
MCH RBC QN AUTO: 30.9 PG (ref 26–33)
MCHC RBC AUTO-ENTMCNC: 31 GM/DL (ref 32.2–35.5)
MCV RBC AUTO: 99.7 FL (ref 80–94)
OSMOLALITY CALCULATION: 280.7 MOSMOL/KG (ref 275–300)
PLATELET # BLD: 103 THOU/MM3 (ref 130–400)
PMV BLD AUTO: 10.1 FL (ref 9.4–12.4)
POTASSIUM SERPL-SCNC: 3.7 MEQ/L (ref 3.5–5.2)
RBC # BLD: 3.14 MILL/MM3 (ref 4.7–6.1)
SODIUM BLD-SCNC: 141 MEQ/L (ref 135–145)
TOTAL PROTEIN: 7.7 G/DL (ref 6.1–8)
TRIGL SERPL-MCNC: 73 MG/DL (ref 0–199)
TSH SERPL DL<=0.05 MIU/L-ACNC: 0.95 UIU/ML (ref 0.4–4.2)
VITAMIN B-12: 959 PG/ML (ref 211–911)
WBC # BLD: 6 THOU/MM3 (ref 4.8–10.8)

## 2022-03-07 PROCEDURE — 93306 TTE W/DOPPLER COMPLETE: CPT

## 2022-03-07 PROCEDURE — 2580000003 HC RX 258: Performed by: INTERNAL MEDICINE

## 2022-03-07 PROCEDURE — 82607 VITAMIN B-12: CPT

## 2022-03-07 PROCEDURE — 82746 ASSAY OF FOLIC ACID SERUM: CPT

## 2022-03-07 PROCEDURE — 36415 COLL VENOUS BLD VENIPUNCTURE: CPT

## 2022-03-07 PROCEDURE — 85027 COMPLETE CBC AUTOMATED: CPT

## 2022-03-07 PROCEDURE — 82248 BILIRUBIN DIRECT: CPT

## 2022-03-07 PROCEDURE — 84443 ASSAY THYROID STIM HORMONE: CPT

## 2022-03-07 PROCEDURE — 82728 ASSAY OF FERRITIN: CPT

## 2022-03-07 PROCEDURE — 82948 REAGENT STRIP/BLOOD GLUCOSE: CPT

## 2022-03-07 PROCEDURE — 1200000003 HC TELEMETRY R&B

## 2022-03-07 PROCEDURE — 6370000000 HC RX 637 (ALT 250 FOR IP): Performed by: INTERNAL MEDICINE

## 2022-03-07 PROCEDURE — 6360000002 HC RX W HCPCS: Performed by: INTERNAL MEDICINE

## 2022-03-07 PROCEDURE — 1200000000 HC SEMI PRIVATE

## 2022-03-07 PROCEDURE — 99223 1ST HOSP IP/OBS HIGH 75: CPT | Performed by: INTERNAL MEDICINE

## 2022-03-07 PROCEDURE — 80053 COMPREHEN METABOLIC PANEL: CPT

## 2022-03-07 PROCEDURE — 83540 ASSAY OF IRON: CPT

## 2022-03-07 PROCEDURE — 80061 LIPID PANEL: CPT

## 2022-03-07 RX ORDER — LISINOPRIL 5 MG/1
5 TABLET ORAL 2 TIMES DAILY
Status: DISCONTINUED | OUTPATIENT
Start: 2022-03-07 | End: 2022-03-10

## 2022-03-07 RX ADMIN — SODIUM BICARBONATE 650 MG: 650 TABLET ORAL at 09:09

## 2022-03-07 RX ADMIN — MORPHINE SULFATE 2 MG: 2 INJECTION, SOLUTION INTRAMUSCULAR; INTRAVENOUS at 05:40

## 2022-03-07 RX ADMIN — ROSUVASTATIN CALCIUM 20 MG: 20 TABLET, FILM COATED ORAL at 01:58

## 2022-03-07 RX ADMIN — INSULIN GLARGINE 42 UNITS: 100 INJECTION, SOLUTION SUBCUTANEOUS at 21:13

## 2022-03-07 RX ADMIN — PANTOPRAZOLE SODIUM 40 MG: 40 TABLET, DELAYED RELEASE ORAL at 05:40

## 2022-03-07 RX ADMIN — ROSUVASTATIN CALCIUM 20 MG: 20 TABLET, FILM COATED ORAL at 21:13

## 2022-03-07 RX ADMIN — LATANOPROST 1 DROP: 50 SOLUTION OPHTHALMIC at 21:13

## 2022-03-07 RX ADMIN — MORPHINE SULFATE 2 MG: 2 INJECTION, SOLUTION INTRAMUSCULAR; INTRAVENOUS at 12:39

## 2022-03-07 RX ADMIN — MORPHINE SULFATE 2 MG: 2 INJECTION, SOLUTION INTRAMUSCULAR; INTRAVENOUS at 18:38

## 2022-03-07 RX ADMIN — SODIUM BICARBONATE 650 MG: 650 TABLET ORAL at 01:58

## 2022-03-07 RX ADMIN — FOLIC ACID 1 MG: 1 TABLET ORAL at 09:09

## 2022-03-07 RX ADMIN — ENOXAPARIN SODIUM 40 MG: 100 INJECTION SUBCUTANEOUS at 21:13

## 2022-03-07 RX ADMIN — LISINOPRIL 5 MG: 5 TABLET ORAL at 09:08

## 2022-03-07 RX ADMIN — INSULIN GLARGINE 42 UNITS: 100 INJECTION, SOLUTION SUBCUTANEOUS at 01:58

## 2022-03-07 RX ADMIN — ASPIRIN 81 MG CHEWABLE TABLET 81 MG: 81 TABLET CHEWABLE at 01:58

## 2022-03-07 RX ADMIN — METOPROLOL SUCCINATE 50 MG: 50 TABLET, EXTENDED RELEASE ORAL at 09:09

## 2022-03-07 RX ADMIN — ASPIRIN 81 MG CHEWABLE TABLET 81 MG: 81 TABLET CHEWABLE at 09:11

## 2022-03-07 RX ADMIN — LISINOPRIL 5 MG: 5 TABLET ORAL at 21:13

## 2022-03-07 RX ADMIN — SODIUM BICARBONATE 650 MG: 650 TABLET ORAL at 21:13

## 2022-03-07 RX ADMIN — DOCUSATE SODIUM 100 MG: 100 CAPSULE, LIQUID FILLED ORAL at 09:11

## 2022-03-07 RX ADMIN — ENOXAPARIN SODIUM 40 MG: 100 INJECTION SUBCUTANEOUS at 09:11

## 2022-03-07 RX ADMIN — DULOXETINE HYDROCHLORIDE 60 MG: 60 CAPSULE, DELAYED RELEASE ORAL at 09:09

## 2022-03-07 RX ADMIN — FUROSEMIDE 5 MG/HR: 10 INJECTION, SOLUTION INTRAMUSCULAR; INTRAVENOUS at 18:38

## 2022-03-07 RX ADMIN — LATANOPROST 1 DROP: 50 SOLUTION OPHTHALMIC at 01:58

## 2022-03-07 ASSESSMENT — PAIN DESCRIPTION - PAIN TYPE
TYPE: ACUTE PAIN
TYPE: ACUTE PAIN

## 2022-03-07 ASSESSMENT — PAIN SCALES - GENERAL
PAINLEVEL_OUTOF10: 7
PAINLEVEL_OUTOF10: 5
PAINLEVEL_OUTOF10: 7
PAINLEVEL_OUTOF10: 5
PAINLEVEL_OUTOF10: 5
PAINLEVEL_OUTOF10: 7
PAINLEVEL_OUTOF10: 8
PAINLEVEL_OUTOF10: 8

## 2022-03-07 ASSESSMENT — PAIN DESCRIPTION - PROGRESSION
CLINICAL_PROGRESSION: NOT CHANGED

## 2022-03-07 ASSESSMENT — PAIN DESCRIPTION - LOCATION
LOCATION: BACK

## 2022-03-07 ASSESSMENT — PAIN - FUNCTIONAL ASSESSMENT: PAIN_FUNCTIONAL_ASSESSMENT: PREVENTS OR INTERFERES SOME ACTIVE ACTIVITIES AND ADLS

## 2022-03-07 ASSESSMENT — PAIN DESCRIPTION - ORIENTATION: ORIENTATION: LOWER

## 2022-03-07 ASSESSMENT — PAIN DESCRIPTION - DESCRIPTORS: DESCRIPTORS: CONSTANT

## 2022-03-07 ASSESSMENT — PAIN DESCRIPTION - ONSET: ONSET: ON-GOING

## 2022-03-07 ASSESSMENT — PAIN DESCRIPTION - FREQUENCY: FREQUENCY: CONTINUOUS

## 2022-03-07 NOTE — PROGRESS NOTES
INTERNAL MEDICINE Progress Note  3/7/2022 6:48 PM  Subjective:   Admit Date: 3/6/2022  PCP: MEGHAN Godinez - CNP  Interval History:     SOB, diuresing++  No CP    Objective:   Vitals: BP (!) 161/112   Pulse 84   Temp 98.4 °F (36.9 °C) (Oral)   Resp 18   Ht 6' 2\" (1.88 m)   Wt (!) 368 lb 2.7 oz (167 kg)   SpO2 99%   BMI 47.27 kg/m²   General appearance: alert and cooperative with exam  HEENT: Head: atraumatic  Neck: no adenopathy, no carotid bruit and no JVD  Lungs: diminished breath sounds bilaterally and rales bibasilar  Heart: S1, S2 normal and 2/6 SM  Abdomen: normal findings: bowel sounds normal and no organomegaly and abnormal findings:  distended and obese  Extremities: edema edema++/ erythema  Neurologic: Mental status: Alert, oriented, thought content appropriate      Medications:   Scheduled Meds:   aspirin  81 mg Oral Daily    pantoprazole  40 mg Oral QAM AC    enoxaparin  40 mg SubCUTAneous BID    docusate sodium  100 mg Oral BID    DULoxetine  60 mg Oral Daily    folic acid  1 mg Oral Daily    insulin glargine  42 Units SubCUTAneous Nightly    latanoprost  1 drop Both Eyes Nightly    metoprolol succinate  50 mg Oral Daily    rosuvastatin  20 mg Oral Nightly    sodium bicarbonate  650 mg Oral BID    lisinopril  5 mg Oral Daily     Continuous Infusions:   dextrose      furosemide (LASIX) 1mg/ml infusion 5 mg/hr (03/07/22 1838)       Lab Results:   CBC:   Recent Labs     03/06/22  1500 03/07/22  0535   WBC 4.6* 6.0   HGB 9.9* 9.7*   PLT 95* 103*     BMP:    Recent Labs     03/06/22  1500 03/07/22  0535    141   K 3.7 3.7   CL 98 101   CO2 28 32   BUN 12 13   CREATININE 1.1 1.1   GLUCOSE 144* 86     Hepatic:   Recent Labs     03/07/22  0535   AST 75*   ALT 31   BILITOT 1.2   ALKPHOS 156*     Lipids:   Recent Labs     03/07/22  0535   CHOL 87*   HDL 40     Magnesium:    Lab Results   Component Value Date    MG 1.9 03/03/2021     HgBA1c:    Lab Results   Component Value Date LABA1C 6.6 02/24/2021     TSH:    Lab Results   Component Value Date    TSH 0.951 03/07/2022     FOLATE:    Lab Results   Component Value Date    FOLATE > 20.0 03/07/2022     IRON:    Lab Results   Component Value Date    IRON 64 03/07/2022     FERRITIN:    Lab Results   Component Value Date    FERRITIN 175 03/07/2022     TTE Summary    Technically difficult examination.    Left Ventricular size is Mildly increased .    Normal left ventricular wall thickness.    There was severe global hypokinesis of the left ventricle.    Systolic function was severely reduced.    Ejection fraction is visually estimated in the range of 35%.    The left atrium is Moderately dilated.    There is moderate-to-severe aortic stenosis with valve area of 0.9 to 1 sq    cm.    The maximum aortic valve gradient is 40 mmHg, the mean gradient is 25    mmHg, and the peak velocity is 3.1 m/s.        Signature    ----------------------------------------------------------------    Electronically signed by Zina Bonilla MD (Interpreting    physician) on 03/07/2022 at 05:58 PM       Assessment and Plan:   1. Acute on chronic systolic CHF-exac  2. Mod-severe AS, finn 0.9 sq cm  3. HTN  4. COPD  5. DM 2  6. Pancytopenia  7. H/o HCV  8. H/o liver cirrhosis  9.  Morbid obesity     Cont DARRON lasix  BB, ACEI, cardiology ecal  Bronchodilators  O2 supplement  Cont Insulin Rx  ADA diet  Bmp am.    Hermes Butt MD, MD

## 2022-03-07 NOTE — CARE COORDINATION
3/7/22, 3:23 PM EST  DISCHARGE PLANNING EVALUATION:    Shana Rodgers       Admitted: 3/6/2022/ [facility] day: 1   [facility]: [facility] Reason for admit: Shortness of breath [R06.02]  Peripheral edema [R60.9]  CHF with unknown LVEF (HCC) [I50.9]  Acute on chronic congestive heart failure, unspecified heart failure type ([address] [I50.9]   PMH:  has a past medical history of Acute kidney injury ([address] Amputation of right great toe ([address] Asthma, Brain tumor ([address] Cirrhosis ([address] CKD (chronic kidney disease), COPD (chronic obstructive pulmonary disease) ([address] Diabetes mellitus ([address] Falling, Glaucoma, Hepatitis C, History of blood transfusion, Hyperlipidemia, Hypertension, Legally blind, Obesity, Pain management, Right foot infection, and Seizures ([facility] Procedure: none  Barriers to Discharge:  BNP 4977. Hypertensive 195/96.  98% on 3L O2. Lasix gtt, duonebs prn. PCP: MEGHAN Barrett CNP  Readmission Risk Score: 18 ( )%    Patient Goals/Plan/Treatment Preferences: 1 [facility] is asleep at this time and did not wake to name. Per report, he is from home with his s.o. Will check back tomorrow for needs. Transportation/Food Security/Housekeeping Addressed:  No issues identified.
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
## 2022-03-08 LAB
ALBUMIN SERPL-MCNC: 3.2 G/DL (ref 3.5–5.1)
ALP BLD-CCNC: 131 U/L (ref 38–126)
ALT SERPL-CCNC: 23 U/L (ref 11–66)
ANION GAP SERPL CALCULATED.3IONS-SCNC: 9 MEQ/L (ref 8–16)
AST SERPL-CCNC: 47 U/L (ref 5–40)
BASOPHILS # BLD: 0.4 %
BASOPHILS ABSOLUTE: 0 THOU/MM3 (ref 0–0.1)
BILIRUB SERPL-MCNC: 0.8 MG/DL (ref 0.3–1.2)
BILIRUBIN DIRECT: 0.4 MG/DL (ref 0–0.3)
BUN BLDV-MCNC: 16 MG/DL (ref 7–22)
CALCIUM SERPL-MCNC: 9 MG/DL (ref 8.5–10.5)
CHLORIDE BLD-SCNC: 97 MEQ/L (ref 98–111)
CO2: 33 MEQ/L (ref 23–33)
CREAT SERPL-MCNC: 1.1 MG/DL (ref 0.4–1.2)
DIFFERENTIAL TYPE: ABNORMAL
EOSINOPHIL # BLD: 3.4 %
EOSINOPHILS ABSOLUTE: 0.2 THOU/MM3 (ref 0–0.4)
ERYTHROCYTE [DISTWIDTH] IN BLOOD BY AUTOMATED COUNT: 14.6 % (ref 11.5–14.5)
ERYTHROCYTE [DISTWIDTH] IN BLOOD BY AUTOMATED COUNT: 14.7 % (ref 11.5–14.5)
ERYTHROCYTE [DISTWIDTH] IN BLOOD BY AUTOMATED COUNT: 54.4 FL (ref 35–45)
ERYTHROCYTE [DISTWIDTH] IN BLOOD BY AUTOMATED COUNT: 55.4 FL (ref 35–45)
GFR SERPL CREATININE-BSD FRML MDRD: 69 ML/MIN/1.73M2
GLUCOSE BLD-MCNC: 111 MG/DL (ref 70–108)
GLUCOSE BLD-MCNC: 140 MG/DL (ref 70–108)
GLUCOSE BLD-MCNC: 141 MG/DL (ref 70–108)
GLUCOSE BLD-MCNC: 151 MG/DL (ref 70–108)
GLUCOSE BLD-MCNC: 155 MG/DL (ref 70–108)
HCT VFR BLD CALC: 32.2 % (ref 42–52)
HCT VFR BLD CALC: 33.4 % (ref 42–52)
HEMOGLOBIN: 9.8 GM/DL (ref 14–18)
HEMOGLOBIN: 9.9 GM/DL (ref 14–18)
IMMATURE GRANS (ABS): 0.01 THOU/MM3 (ref 0–0.07)
IMMATURE GRANULOCYTES: 0.2 %
LYMPHOCYTES # BLD: 20.7 %
LYMPHOCYTES ABSOLUTE: 1 THOU/MM3 (ref 1–4.8)
MCH RBC QN AUTO: 30.2 PG (ref 26–33)
MCH RBC QN AUTO: 30.5 PG (ref 26–33)
MCHC RBC AUTO-ENTMCNC: 29.6 GM/DL (ref 32.2–35.5)
MCHC RBC AUTO-ENTMCNC: 30.4 GM/DL (ref 32.2–35.5)
MCV RBC AUTO: 100.3 FL (ref 80–94)
MCV RBC AUTO: 101.8 FL (ref 80–94)
MONOCYTES # BLD: 9.7 %
MONOCYTES ABSOLUTE: 0.4 THOU/MM3 (ref 0.4–1.3)
NUCLEATED RED BLOOD CELLS: 0 /100 WBC
PATHOLOGIST REVIEW: ABNORMAL
PLATELET # BLD: 106 THOU/MM3 (ref 130–400)
PLATELET # BLD: 95 THOU/MM3 (ref 130–400)
PLATELET ESTIMATE: ABNORMAL
PMV BLD AUTO: 10.5 FL (ref 9.4–12.4)
PMV BLD AUTO: 10.7 FL (ref 9.4–12.4)
POTASSIUM SERPL-SCNC: 3.5 MEQ/L (ref 3.5–5.2)
RBC # BLD: 3.21 MILL/MM3 (ref 4.7–6.1)
RBC # BLD: 3.28 MILL/MM3 (ref 4.7–6.1)
SEG NEUTROPHILS: 65.6 %
SEGMENTED NEUTROPHILS ABSOLUTE COUNT: 3 THOU/MM3 (ref 1.8–7.7)
SODIUM BLD-SCNC: 139 MEQ/L (ref 135–145)
TOTAL PROTEIN: 7.6 G/DL (ref 6.1–8)
WBC # BLD: 4.6 THOU/MM3 (ref 4.8–10.8)
WBC # BLD: 4.9 THOU/MM3 (ref 4.8–10.8)

## 2022-03-08 PROCEDURE — 6370000000 HC RX 637 (ALT 250 FOR IP): Performed by: INTERNAL MEDICINE

## 2022-03-08 PROCEDURE — 85027 COMPLETE CBC AUTOMATED: CPT

## 2022-03-08 PROCEDURE — 82248 BILIRUBIN DIRECT: CPT

## 2022-03-08 PROCEDURE — 1200000000 HC SEMI PRIVATE

## 2022-03-08 PROCEDURE — 1200000003 HC TELEMETRY R&B

## 2022-03-08 PROCEDURE — 94640 AIRWAY INHALATION TREATMENT: CPT

## 2022-03-08 PROCEDURE — 94760 N-INVAS EAR/PLS OXIMETRY 1: CPT

## 2022-03-08 PROCEDURE — 36415 COLL VENOUS BLD VENIPUNCTURE: CPT

## 2022-03-08 PROCEDURE — 99232 SBSQ HOSP IP/OBS MODERATE 35: CPT | Performed by: STUDENT IN AN ORGANIZED HEALTH CARE EDUCATION/TRAINING PROGRAM

## 2022-03-08 PROCEDURE — 6360000002 HC RX W HCPCS: Performed by: INTERNAL MEDICINE

## 2022-03-08 PROCEDURE — 2580000003 HC RX 258: Performed by: INTERNAL MEDICINE

## 2022-03-08 PROCEDURE — 2700000000 HC OXYGEN THERAPY PER DAY

## 2022-03-08 PROCEDURE — 82948 REAGENT STRIP/BLOOD GLUCOSE: CPT

## 2022-03-08 PROCEDURE — 80053 COMPREHEN METABOLIC PANEL: CPT

## 2022-03-08 RX ORDER — IPRATROPIUM BROMIDE AND ALBUTEROL SULFATE 2.5; .5 MG/3ML; MG/3ML
3 SOLUTION RESPIRATORY (INHALATION) 4 TIMES DAILY
Status: DISCONTINUED | OUTPATIENT
Start: 2022-03-08 | End: 2022-03-15 | Stop reason: HOSPADM

## 2022-03-08 RX ADMIN — MORPHINE SULFATE 2 MG: 2 INJECTION, SOLUTION INTRAMUSCULAR; INTRAVENOUS at 13:30

## 2022-03-08 RX ADMIN — ENOXAPARIN SODIUM 40 MG: 100 INJECTION SUBCUTANEOUS at 08:16

## 2022-03-08 RX ADMIN — IPRATROPIUM BROMIDE AND ALBUTEROL SULFATE 3 ML: .5; 3 SOLUTION RESPIRATORY (INHALATION) at 11:21

## 2022-03-08 RX ADMIN — LISINOPRIL 5 MG: 5 TABLET ORAL at 21:02

## 2022-03-08 RX ADMIN — INSULIN GLARGINE 42 UNITS: 100 INJECTION, SOLUTION SUBCUTANEOUS at 21:02

## 2022-03-08 RX ADMIN — IPRATROPIUM BROMIDE AND ALBUTEROL SULFATE 3 ML: .5; 3 SOLUTION RESPIRATORY (INHALATION) at 21:29

## 2022-03-08 RX ADMIN — MORPHINE SULFATE 2 MG: 2 INJECTION, SOLUTION INTRAMUSCULAR; INTRAVENOUS at 00:52

## 2022-03-08 RX ADMIN — SODIUM BICARBONATE 650 MG: 650 TABLET ORAL at 21:02

## 2022-03-08 RX ADMIN — METOPROLOL SUCCINATE 50 MG: 50 TABLET, EXTENDED RELEASE ORAL at 08:17

## 2022-03-08 RX ADMIN — ASPIRIN 81 MG CHEWABLE TABLET 81 MG: 81 TABLET CHEWABLE at 08:16

## 2022-03-08 RX ADMIN — PANTOPRAZOLE SODIUM 40 MG: 40 TABLET, DELAYED RELEASE ORAL at 05:24

## 2022-03-08 RX ADMIN — MORPHINE SULFATE 2 MG: 2 INJECTION, SOLUTION INTRAMUSCULAR; INTRAVENOUS at 08:33

## 2022-03-08 RX ADMIN — DULOXETINE HYDROCHLORIDE 60 MG: 60 CAPSULE, DELAYED RELEASE ORAL at 08:18

## 2022-03-08 RX ADMIN — ENOXAPARIN SODIUM 40 MG: 100 INJECTION SUBCUTANEOUS at 21:02

## 2022-03-08 RX ADMIN — ROSUVASTATIN CALCIUM 20 MG: 20 TABLET, FILM COATED ORAL at 21:02

## 2022-03-08 RX ADMIN — DOCUSATE SODIUM 100 MG: 100 CAPSULE, LIQUID FILLED ORAL at 08:16

## 2022-03-08 RX ADMIN — LISINOPRIL 5 MG: 5 TABLET ORAL at 08:18

## 2022-03-08 RX ADMIN — FUROSEMIDE 5 MG/HR: 10 INJECTION, SOLUTION INTRAMUSCULAR; INTRAVENOUS at 13:26

## 2022-03-08 RX ADMIN — SODIUM BICARBONATE 650 MG: 650 TABLET ORAL at 08:17

## 2022-03-08 RX ADMIN — FOLIC ACID 1 MG: 1 TABLET ORAL at 08:18

## 2022-03-08 RX ADMIN — IPRATROPIUM BROMIDE AND ALBUTEROL SULFATE 3 ML: .5; 3 SOLUTION RESPIRATORY (INHALATION) at 08:14

## 2022-03-08 RX ADMIN — LATANOPROST 1 DROP: 50 SOLUTION OPHTHALMIC at 21:02

## 2022-03-08 RX ADMIN — IPRATROPIUM BROMIDE AND ALBUTEROL SULFATE 3 ML: .5; 3 SOLUTION RESPIRATORY (INHALATION) at 15:30

## 2022-03-08 RX ADMIN — INSULIN LISPRO 1 UNITS: 100 INJECTION, SOLUTION INTRAVENOUS; SUBCUTANEOUS at 16:29

## 2022-03-08 RX ADMIN — INSULIN LISPRO 1 UNITS: 100 INJECTION, SOLUTION INTRAVENOUS; SUBCUTANEOUS at 12:03

## 2022-03-08 RX ADMIN — MORPHINE SULFATE 2 MG: 2 INJECTION, SOLUTION INTRAMUSCULAR; INTRAVENOUS at 21:01

## 2022-03-08 ASSESSMENT — PAIN SCALES - GENERAL
PAINLEVEL_OUTOF10: 8
PAINLEVEL_OUTOF10: 5
PAINLEVEL_OUTOF10: 5
PAINLEVEL_OUTOF10: 8
PAINLEVEL_OUTOF10: 8
PAINLEVEL_OUTOF10: 5
PAINLEVEL_OUTOF10: 8
PAINLEVEL_OUTOF10: 8
PAINLEVEL_OUTOF10: 7

## 2022-03-08 ASSESSMENT — PAIN DESCRIPTION - PROGRESSION

## 2022-03-08 ASSESSMENT — PAIN DESCRIPTION - PAIN TYPE: TYPE: ACUTE PAIN

## 2022-03-08 ASSESSMENT — PAIN DESCRIPTION - LOCATION: LOCATION: LEG

## 2022-03-08 ASSESSMENT — PAIN DESCRIPTION - ONSET: ONSET: ON-GOING

## 2022-03-08 ASSESSMENT — PAIN DESCRIPTION - DESCRIPTORS: DESCRIPTORS: SHARP;STABBING

## 2022-03-08 ASSESSMENT — PAIN DESCRIPTION - ORIENTATION: ORIENTATION: RIGHT;LEFT

## 2022-03-08 ASSESSMENT — PAIN - FUNCTIONAL ASSESSMENT: PAIN_FUNCTIONAL_ASSESSMENT: PREVENTS OR INTERFERES SOME ACTIVE ACTIVITIES AND ADLS

## 2022-03-08 ASSESSMENT — PAIN DESCRIPTION - FREQUENCY: FREQUENCY: CONTINUOUS

## 2022-03-08 NOTE — PROGRESS NOTES
Educated patient regarding heart failure management by explaining the importance of obtaining daily weights at the same time every day with approximately the same amount of clothing, how to recognize symptoms, low sodium diet and taking prescribed medications as ordered. Patient was given our heart failure booklet, A new patient appointment sched in OP CHF clinic. Patient was receptive to the education given and verbalized understanding.

## 2022-03-08 NOTE — RT PROTOCOL NOTE
RT Inhaler-Nebulizer Bronchodilator Protocol Note    There is a bronchodilator order in the chart from a provider indicating to follow the RT Bronchodilator Protocol and there is an Initiate RT Inhaler-Nebulizer Bronchodilator Protocol order as well (see protocol at bottom of note). CXR Findings:  No results found. The findings from the last RT Protocol Assessment were as follows:   History Pulmonary Disease: Chronic pulmonary disease  Respiratory Pattern: Mild dyspnea at rest, irregular pattern, or RR 21-25 bpm  Breath Sounds: Inspiratory and expiratory or bilateral wheezing and/or rhonchi  Cough: Strong, productive  Indication for Bronchodilator Therapy: Wheezing associated with pulm disorder,On home bronchodilators  Bronchodilator Assessment Score: 13    Aerosolized bronchodilator medication orders have been revised according to the RT Inhaler-Nebulizer Bronchodilator Protocol below. Respiratory Therapist to perform RT Therapy Protocol Assessment initially then follow the protocol. Repeat RT Therapy Protocol Assessment PRN for score 0-3 or on second treatment, BID, and PRN for scores above 3. No Indications - adjust the frequency to every 6 hours PRN wheezing or bronchospasm, if no treatments needed after 48 hours then discontinue using Per Protocol order mode. If indication present, adjust the RT bronchodilator orders based on the Bronchodilator Assessment Score as indicated below. Use Inhaler orders unless patient has one or more of the following: on home nebulizer, not able to hold breath for 10 seconds, is not alert and oriented, cannot activate and use MDI correctly, or respiratory rate 25 breaths per minute or more, then use the equivalent nebulizer order(s) with same Frequency and PRN reasons based on the score. If a patient is on this medication at home then do not decrease Frequency below that used at home.     0-3 - enter or revise RT bronchodilator order(s) to equivalent RT Bronchodilator order with Frequency of every 4 hours PRN for wheezing or increased work of breathing using Per Protocol order mode. 4-6 - enter or revise RT Bronchodilator order(s) to two equivalent RT bronchodilator orders with one order with BID Frequency and one order with Frequency of every 4 hours PRN wheezing or increased work of breathing using Per Protocol order mode. 7-10 - enter or revise RT Bronchodilator order(s) to two equivalent RT bronchodilator orders with one order with TID Frequency and one order with Frequency of every 4 hours PRN wheezing or increased work of breathing using Per Protocol order mode. 11-13 - enter or revise RT Bronchodilator order(s) to one equivalent RT bronchodilator order with QID Frequency and an Albuterol order with Frequency of every 4 hours PRN wheezing or increased work of breathing using Per Protocol order mode. Greater than 13 - enter or revise RT Bronchodilator order(s) to one equivalent RT bronchodilator order with every 4 hours Frequency and an Albuterol order with Frequency of every 2 hours PRN wheezing or increased work of breathing using Per Protocol order mode. RT to enter RT Home Evaluation for COPD & MDI Assessment order using Per Protocol order mode.     Electronically signed by Flaco Chaney RCP on 3/8/2022 at 8:19 AM

## 2022-03-08 NOTE — PROGRESS NOTES
5mg/hour. Echocardiogram 3/6/2022 revealed an EF of 35%, moderate LAE, Moderate to severe AS with LOBITO ~ 0.9-1 cm2, peak gradient 40 mmHg, mean gradient 25 mmHg, peak velocity 3.1 m/s.      All labs, EKG's, diagnostic testing and images as well as cardiac cath, stress testing   were reviewed during this encounter\"    Subjective (Events in last 24 hours):   Pt awake, alert. Appears SOB. C/o SOB still, not much different than before. Denies CP, still significant swelling of the legs. D/w patient about continuing lasix gtt for at least another day, checking BMP tomorrow and adjusting as needed. Will need dobutamine stress test once volume status is more stable to take a better look at heart valve. Consider valve repair/replacement. Down over 5L total now.      Objective:   BP (!) 163/93   Pulse 77   Temp 97.6 °F (36.4 °C) (Oral)   Resp 18   Ht 6' 2\" (1.88 m)   Wt (!) 345 lb 10.9 oz (156.8 kg)   SpO2 100%   BMI 44.38 kg/m²      Vss, BP elevated  TELEMETRY: nsr    Physical Exam:  General Appearance: alert and oriented to person, place and time, in no acute distress  Cardiovascular: normal rate, regular rhythm, normal S1 and S2, no murmurs, rubs, clicks, or gallops, distal pulses intact, no carotid bruits, no JVD  Pulmonary/Chest: wheezing heard througout, rales in the bases  Abdomen: soft, non-tender, non-distended, normal bowel sounds, no masses   Extremities: no cyanosis, clubbing, 1+ bilat LE edema, , erythema bilat pulse   Skin: warm and dry, no rash or erythema  Neurological: alert, oriented, normal speech, no focal findings or movement disorder noted    Medications:    ipratropium-albuterol  3 mL Inhalation 4x daily    insulin lispro  0-6 Units SubCUTAneous TID WC    insulin lispro  0-3 Units SubCUTAneous Nightly    lisinopril  5 mg Oral BID    aspirin  81 mg Oral Daily    pantoprazole  40 mg Oral QAM AC    enoxaparin  40 mg SubCUTAneous BID    docusate sodium  100 mg Oral BID    DULoxetine  60 mg Oral Daily    folic acid  1 mg Oral Daily    insulin glargine  42 Units SubCUTAneous Nightly    latanoprost  1 drop Both Eyes Nightly    metoprolol succinate  50 mg Oral Daily    rosuvastatin  20 mg Oral Nightly    sodium bicarbonate  650 mg Oral BID      dextrose      furosemide (LASIX) 1mg/ml infusion 5 mg/hr (03/07/22 1838)     acetaminophen, 650 mg, Q4H PRN  morphine, 2 mg, Q4H PRN  ondansetron, 4 mg, Q6H PRN  albuterol, 2.5 mg, Q4H PRN  glucagon (rDNA), 1 mg, PRN  dextrose, 100 mL/hr, PRN  dextrose bolus (hypoglycemia), 125 mL, PRN   Or  dextrose bolus (hypoglycemia), 250 mL, PRN  glucose, 4 tablet, PRN        Diagnostics:  EKG:   Normal sinus rhythm  Normal ECG  When compared with ECG of 03-AUG-2021 06:20,  No significant change was found  Confirmed by Eyad Raman ([id number]) on 3/7/2022   Echo:   Conclusions      Summary   Technically difficult examination. Left Ventricular size is Mildly increased . Normal left ventricular wall thickness. There was severe global hypokinesis of the left ventricle. Systolic function was severely reduced. Ejection fraction is visually estimated in the range of 35%. The left atrium is Moderately dilated. There is moderate-to-severe aortic stenosis with valve area of 0.9 to 1 sq   cm. The maximum aortic valve gradient is 40 mmHg, the mean gradient is 25   mmHg, and the peak velocity is 3.1 m/s. Signature      ----------------------------------------------------------------   Electronically signed by González Hodges MD (Interpreting   physician) on 03/07/2022   Stress:     Left Heart Cath:     Lab Data:    Cardiac Enzymes:  No results for input(s): CKTOTAL, CKMB, CKMBINDEX, TROPONINI in the last 72 hours.     CBC:   Lab Results   Component Value Date    WBC 4.9 03/08/2022    RBC 3.21 03/08/2022    HGB 9.8 03/08/2022    HCT 32.2 03/08/2022     03/08/2022       CMP:    Lab Results   Component Value Date     03/08/2022    K 3.5 03/08/2022    K 3.8 08/03/2021    CL 97 03/08/2022    CO2 33 03/08/2022    BUN 16 03/08/2022    CREATININE 1.1 03/08/2022    LABGLOM 69 03/08/2022    GLUCOSE 151 03/08/2022    CALCIUM 9.0 03/08/2022       Hepatic Function Panel:    Lab Results   Component Value Date    ALKPHOS 131 03/08/2022    ALT 23 03/08/2022    AST 47 03/08/2022    PROT 7.6 03/08/2022    BILITOT 0.8 03/08/2022    BILIDIR 0.4 03/08/2022    LABALBU 3.2 03/08/2022       Magnesium:    Lab Results   Component Value Date    MG 1.9 03/03/2021       PT/INR:    Lab Results   Component Value Date    INR 1.06 08/03/2021       HgBA1c:    Lab Results   Component Value Date    LABA1C 6.6 02/24/2021       FLP:    Lab Results   Component Value Date    TRIG 73 03/07/2022    HDL 40 03/07/2022    LDLCALC 32 03/07/2022       TSH:    Lab Results   Component Value Date    TSH 0.951 03/07/2022         Assessment:  Acute on chronic combined CHF  Severe NICDMP  Mod to severe AS, possible low gradient--dobutamine stress once stable  EF 35%  HTN-elevated, may need to increase lisinopril   Hx of liver cirrhosis  COPD with acute exacerb  Tobacco abuse  Pleural effusions-  DM    Plan:  Daily weights  2 g sodium restriction daily  2 L fluid restriction daily  Keep K>4, Mg>2    CHF Guidelines  BB-yes. toprol 50 mg daily. ACE/ARB/Entresto-yes. Lisinopril 5 mg BID. Diuretics-yes. Lasix gtt. Continue for at least another day. Aldactone-no. Consider later  Farxiga-no. Depends on insurance. crestor 20 mg daily. Dobutamine stress test after respiratory status stable. Likely needs referral to CHF clinic. Creat stable today. Will follow.    Electronically signed by Kathryn Mohan PA-C on 3/8/2022 at 11:13 AM

## 2022-03-08 NOTE — CONSULTS
[facility]  Cardiology Consult      Patient:  Padilla Nieves  YOB: 1965    MRN: [id number]   Acct: [de-identified]     Primary Care Physician: MEGHAN Thomson CNP    REASON FOR CONSULT:    Chf exac, mod-sev AS     CHIEF COMPLAINT:    SOB  Leg swelling     HISTORY OF PRESENT ILLNESS:    Padilla Nieves is a pleasant 65year old male patient with past medical history that includes:   Past Medical History:   Diagnosis Date    Acute kidney injury ([address] 12/2020    due to Enterococous Bacteremia , fluid overload, low sodium    Amputation of right great toe ([address] 02/18/2021    Asthma     Brain tumor ([address]     Cirrhosis ([address]     ETOH abuse    CKD (chronic kidney disease)     l.brown-osu spetie    COPD (chronic obstructive pulmonary disease) ([address]     Diabetes mellitus ([address]     type 2-insulin lantus and humalog    Falling     Glaucoma     Hepatitis C     History of blood transfusion     s/p nasal surgery    Hyperlipidemia     Hypertension     Nallu    Legally blind     Obesity     Pain management     karol quiana-percocet Rx    Right foot infection 11/2021    Seizures ([address]    He has h/o tobacco abuse. Patient is followed by Dr Alda Ascencio, has h/o AS, HFrEF. Echo 07/2021 revealed an EF of 35-40%, moderate AS with LOBITO of 1.2 cm2. Stress test 4/2021 revealed a fixed inferior wall defect. LHC on 08/2021 revealed no significant CAD, mean gradient across the aortic valve of 22 mmHg. The patient was admitted to the hospital on 3/6/2022 after he presented with worsening SOB, leg swelling, WILCOX. Patient had some weight gain. He states that his symptoms have increased over the past two weeks. He reports fatigue. Patient denies chest pain, palpitations, dizziness, syncope. Cardiomegaly and pleural effusions were noted on CXR. Pro-BNP 4,977. Cr 1.1. Troponin <0.01, <0.01. EKG revealed NSR. He was started on IV Lasix gtt 5mg/hour.  Echocardiogram 3/6/2022 revealed an EF of 35%, moderate LAE, Moderate to severe AS with LOBITO ~ 0.9-1 cm2, peak gradient 40 mmHg, mean gradient 25 mmHg, peak velocity 3.1 m/s. All labs, EKG's, diagnostic testing and images as well as cardiac cath, stress testing   were reviewed during this encounter      CARDIAC TESTING  Echo:   ECHO: Results for orders placed during the hospital encounter of 03/06/22    Echocardiogram 2D/ M-Mode/ Colorflow/ Do    Narrative  Transthoracic Echocardiography Report (TTE)    Demographics    Patient Name  [facility]     Gender             Male  [address]    MR #          [id number]      Race                   Ethnicity    Account #     [de-identified]      Room Number        0018    Accession     [id number]     Date of Study      03/07/2022  Number    Date of Birth 1965     Referring          Wendy Velázquez MD  Physician    Age           65 year(s)     Sonographer        SANCHEZ Smith, RDCS,  RDMS, RVT    Interpreting       Echo reader of the week  Physician          Rema Bunch MD    Procedure    Type of Study    TTE procedure:ECHOCARDIOGRAM COMPLETE 2D W DOPPLER W COLOR. Procedure Date  Date: 03/07/2022 Start: 05:53 AM    Study Location: Bedside  Technical Quality: Limited visualization due to body habitus. Indications:Congestive heart failure. Additional Medical History:congestive heart failure, cirrhosis, alcohol  withdrawal, chronic obstructive pulmonary disease, hypertension,  hyperlipidemia, anemia, alcohol abuse, acute kidney injury, asthma,  Hepatitis C    Patient Status: Routine    Height: 74 inches Weight: 368.01 pounds BSA: 2.82 m^2 BMI: 47.25 kg/m^2    BP: 160/92 mmHg    Conclusions    Summary  Technically difficult examination. Left Ventricular size is Mildly increased . Normal left ventricular wall thickness. There was severe global hypokinesis of the left ventricle. Systolic function was severely reduced. Ejection fraction is visually estimated in the range of 35%.   The left atrium is Moderately dilated. There is moderate-to-severe aortic stenosis with valve area of 0.9 to 1 sq  cm. The maximum aortic valve gradient is 40 mmHg, the mean gradient is 25  mmHg, and the peak velocity is 3.1 m/s. Signature    ----------------------------------------------------------------  Electronically signed by Tasneem Sainz MD (Interpreting  physician) on 03/07/2022 at 05:58 PM  ----------------------------------------------------------------    Findings    Mitral Valve  The mitral valve structure was normal with normal leaflet separation. DOPPLER: The transmitral velocity was within the normal range with no  evidence for mitral stenosis. Mild mitral regurgitation is present. Aortic Valve  The aortic valve leaflets were not well visualized. Aortic valve leaflets  are Moderately calcified. Leaflets exhibited moderately increased  thickness and severely reduced cuspal separation of the aortic valve. No  evidence of aortic valve regurgitation . There is moderate-to-severe aortic stenosis with valve area of 0.9 to 1 sq  cm. The maximum aortic valve gradient is 40 mmHg, the mean gradient is 25  mmHg, and the peak velocity is 3.1 m/s. Tricuspid Valve  The tricuspid valve structure was normal with normal leaflet separation. DOPPLER: There was no evidence of tricuspid stenosis. Mild tricuspid  regurgitation visualized. Right ventricular systolic pressure measures 60  mmhg. Pulmonic Valve  The pulmonic valve leaflets exhibited normal thickness, no calcification,  and normal cuspal separation. DOPPLER: The transpulmonic velocity was  within the normal range with no evidence for regurgitation. Left Atrium  The left atrium is Moderately dilated. Left Ventricle  Left Ventricular size is Mildly increased . Normal left ventricular wall thickness. There was severe global hypokinesis of the left ventricle. Systolic function was severely reduced.   Ejection fraction is visually estimated in the range of 35%.    Right Atrium  Right atrial size was normal.    Right Ventricle  The right ventricular size was normal with normal systolic function and  wall thickness. Pericardial Effusion  The pericardium was normal in appearance with no evidence of a pericardial  effusion. Pleural Effusion  No evidence of pleural effusion. Aorta / Great Vessels  -Aortic root dimension within normal limits.  -The Pulmonary artery is within normal limits. -IVC size is within normal limits with normal respiratory phasic changes.     M-Mode/2D Measurements & Calculations    LV Diastolic    LV Systolic Dimension:    AV Cusp Separation: 1.1 cmLA  Dimension: 6.6  5.3 cm                    Dimension: 5 cmAO Root  cm              LV Volume Diastolic: 888  Dimension: 3.3 cmLA Area: 32.4  LV FS:19.7 %    ml                        cm^2  LV PW           LV Volume Systolic: 564  Diastolic: 1 cm ml  Septum          LV EDV/LV EDV Index: 792  Diastolic: 1.1  LC/56 E^6CM ESV/LV ESV    RV Diastolic Dimension: 2.2 cm  cm              Index: 135 ml/48 m^2  EF Calculated: 39.7 %     LA/Aorta: 1.52    LV Length: 10.6 cm        LA volume/Index: 122.9 ml /44m^2  LV Area  Diastolic: 59.8 LVOT: 2 cm  cm^2  LV Area  Systolic: 96.2  cm^2    Doppler Measurements & Calculations    MV Peak E-Wave: 145 AV Peak Velocity: 304    LVOT Peak Velocity: 91.7 cm/s  cm/s                cm/s                     LVOT Mean Velocity: 72.2 cm/s  AV Peak Gradient: 36.97  LVOT Peak Gradient: 3  MV Peak Gradient:   mmHg                     mmHgLVOT Mean Gradient: 2  8.41 mmHg           AV Mean Velocity: 224    mmHg  cm/s  MV Deceleration     AV Mean Gradient: 24     TV Peak E-Wave: 44.1 cm/s  Time: 158 msec      mmHg                     TV Peak A-Wave: 49.7 cm/s  AV VTI: 58.5 cm  AV Area                  TV Peak Gradient: 0.78 mmHg  (Continuity):0.99 cm^2   TR Velocity:190 cm/s  TR Gradient:14.44 mmHg  LVOT VTI: 18.5 cm        PV Peak Velocity: 53.6 cm/s  MR Velocity: 507 PV Peak Gradient: 1.15 mmHg  cm/s    AV DVI (VTI): 0.32AV DVI  (Vmax):0.3    [id number]%3d       Stress Test:4/2021   Summary   Lexiscan EKG stress test is non-diagnostic for ischemia. Calculated gated LVEF 47 %. The T.I.D. ratio was 1.14 . There was a small to moderate sized, moderate in intensity, fixed   myocardial perfusion defect of the inferior wall. This study was negative for ischemia. Recommendation   Clinical correlation is recommended. Aggressive risk factor management. Medical management. Cath:8/2021  CORONARY ANATOMY:  Right Coronary: Dominant and patent  Left Main: Patent  Left Circumflex: Patent  Left Anterior Descending: Patent     LVEDP was measured at 14 mmHg. LVEF was estimated at 35%.   Mean gradient of 22mm Hg across aortic valve    Past Medical History:    Past Medical History:   Diagnosis Date    Acute kidney injury ([address] 12/2020    due to Enterococous Bacteremia , fluid overload, low sodium    Amputation of right great toe ([address] 02/18/2021    Asthma     Brain tumor ([address]     Cirrhosis (HCC)     ETOH abuse    CKD (chronic kidney disease)     l.brown-osu spetie    COPD (chronic obstructive pulmonary disease) (HCC)     Diabetes mellitus (HCC)     type 2-insulin lantus and humalog    Falling     Glaucoma     Hepatitis C     History of blood transfusion     s/p nasal surgery    Hyperlipidemia     Hypertension     Nallu    Legally blind     Obesity     Pain management     karol marzec-percocet Rx    Right foot infection 11/2021    Seizures ([address]        Past Surgical History:    Past Surgical History:   Procedure Laterality Date    BACK SURGERY      CARDIAC CATHETERIZATION  08/03/2021    EYE SURGERY      FIBULA FRACTURE SURGERY Left 03/21/2019    LEFT FIBULAR SHAFT ORIF performed by Sena Craft DPM at [facility]    FOOT SURGERY Right     Steel pins in place    FRACTURE SURGERY      NASAL SINUS SURGERY Bilateral 11/29/2020    FLEXIBLE BRONCHOSCOPY WITH BRONCHOALVEOLAR LAVAGE WITH CULTURES. NASAL ENDOSCOPY WITH POSSIBLE CAUTERY ADN NASAL PACKING performed by Edwar Laguna MD at [facility]  01/2020    SPINE SURGERY N/A 02/19/2021    KYPHOPLASTY at T4, L2, L4 performed by Sirena Marshall MD at [address] N/A 8/24/2021    LUMBAR 3 AND LUMBAR 5 KYPHOPLASTY performed by Jennifer Santos MD at [address] Right 09/23/2020    [facility], REMOVAL OF HARDWARE RIGHT HALLUX performed by [place], DPM at [facility] N/A 04/25/2019    EGD BIOPSY performed by Mary Crouch MD at [facility]       Medications Prior to Admission:    Medications Prior to Admission: oxyCODONE-acetaminophen (PERCOCET) 7.5-325 MG per tablet, Take 1 tablet by mouth every 8 hours as needed for Pain for up to 30 days. sodium bicarbonate 650 MG tablet, Take 650 mg by mouth 2 times daily  tamsulosin (FLOMAX) 0.4 MG capsule, Take 1 capsule by mouth nightly  DULoxetine (CYMBALTA) 60 MG extended release capsule, Take 1 capsule by mouth daily  insulin glargine (LANTUS) 100 UNIT/ML injection vial, Inject 84 Units into the skin nightly  docusate sodium (COLACE, DULCOLAX) 100 MG CAPS, Take 100 mg by mouth 2 times daily  OXYGEN, Inhale into the lungs daily as needed (nightly)   latanoprost (XALATAN) 0.005 % ophthalmic solution, Place 1 drop into both eyes nightly  rosuvastatin (CRESTOR) 20 MG tablet, Take 20 mg by mouth nightly   azithromycin (ZITHROMAX) 250 MG tablet, TAKE TWO (2) TABLETS THE 1ST DOSE, THEN TAKE ONE (1) TABLET EVERY DAY UNTIL GONE FOR INFECTION. senna (SENOKOT) 8.6 MG tablet, Take 2 tablets by mouth daily  gabapentin (NEURONTIN) 300 MG capsule, Take 1 capsule by mouth nightly for 30 days.   nitroGLYCERIN (NITROSTAT) 0.4 MG SL tablet, up to max of 3 total doses. If no relief after 1 dose, call 911. metoprolol succinate (TOPROL XL) 50 MG extended release tablet, Take 1 tablet by mouth daily  insulin lispro (HUMALOG) 100 UNIT/ML injection vial, Inject 10 Units into the skin 3 times daily (before meals) Plus Sliding Scale (Patient taking differently: Inject 12 Units into the skin 3 times daily (before meals) Plus Sliding Scale )  insulin lispro (HUMALOG) 100 UNIT/ML injection vial, Glucose: Dose:  No Insulin, 140-199 2 Units, 200-249 4 Units, 250-299 6 Units, 300-349 8 Units, 350-400 10 Units, Over 008 12 Units  folic acid (FOLVITE) 1 MG tablet, Take 1 mg by mouth daily  albuterol sulfate  (90 Base) MCG/ACT inhaler, Inhale 1 puff into the lungs every 4-6 hours as needed  ipratropium-albuterol (DUONEB) 0.5-2.5 (3) MG/3ML SOLN nebulizer solution, Inhale 3 mLs into the lungs every 4 hours as needed for Shortness of Breath  tiotropium (SPIRIVA) 18 MCG inhalation capsule, Inhale 1 capsule into the lungs daily    Allergies:    Adhesive tape and Keppra [levetiracetam]    Social History:    reports that he has been smoking cigarettes. He has a 30.00 pack-year smoking history. He has never used smokeless tobacco. He reports current alcohol use of about 3.0 standard drinks of alcohol per week. He reports previous drug use. Drug: Marijuana [place]). Family History:   family history includes Cancer in his sister; Heart Attack in his brother, father, and paternal grandfather; Heart Disease in his brother; Cara Catracho in his paternal grandmother; No Known Problems in his maternal grandfather, maternal grandmother, and mother.       REVIEW OF SYSTEMS:  Constitutional: negative for anorexia, chills and fevers,weight change  Skin: negative for new skin rash per patient  HEENT: negative for head trauma or new visual changes  Respiratory: positive for cough, wheezing  Cardiovascular: negative for  orthopnea, palpitations organomegaly  Extremities: no cyanosis, clubbing . +1 Edema  Musculoskeletal: normal range of motion, no joint swelling, deformity or tenderness      RADIOLOGY   Echocardiogram 2D/ M-Mode/ Colorflow/ Do    Result Date: 3/7/2022  Transthoracic Echocardiography Report (TTE)  Demographics   Patient Name  [facility]     Gender             Male                [address]   MR #          [id number]      Race                                               Ethnicity   Account #     [de-identified]      Room Number        0018   Accession     [id number]     Date of Study      03/07/2022  Number   Date of Birth 1965     Referring          German Tovar MD                               Physician   Age           65 year(s)     Sonographer        SANCHEZ Chappell, RDCS,                                                  RDMS, RVT                                Interpreting       Echo reader of the week                               Physician          Ryan Arceo MD  Procedure Type of Study   TTE procedure:ECHOCARDIOGRAM COMPLETE 2D W DOPPLER W COLOR. Procedure Date Date: 03/07/2022 Start: 05:53 AM Study Location: Bedside Technical Quality: Limited visualization due to body habitus. Indications:Congestive heart failure. Additional Medical History:congestive heart failure, cirrhosis, alcohol withdrawal, chronic obstructive pulmonary disease, hypertension, hyperlipidemia, anemia, alcohol abuse, acute kidney injury, asthma, Hepatitis C Patient Status: Routine Height: 74 inches Weight: 368.01 pounds BSA: 2.82 m^2 BMI: 47.25 kg/m^2 BP: 160/92 mmHg  Conclusions   Summary  Technically difficult examination. Left Ventricular size is Mildly increased . Normal left ventricular wall thickness. There was severe global hypokinesis of the left ventricle. Systolic function was severely reduced. Ejection fraction is visually estimated in the range of 35%. The left atrium is Moderately dilated.   There is moderate-to-severe aortic stenosis with valve area of 0.9 to 1 sq  cm. The maximum aortic valve gradient is 40 mmHg, the mean gradient is 25  mmHg, and the peak velocity is 3.1 m/s. Signature   ----------------------------------------------------------------  Electronically signed by Ricci Roque MD (Interpreting  physician) on 03/07/2022 at 05:58 PM  ----------------------------------------------------------------   Findings   Mitral Valve  The mitral valve structure was normal with normal leaflet separation. DOPPLER: The transmitral velocity was within the normal range with no  evidence for mitral stenosis. Mild mitral regurgitation is present. Aortic Valve  The aortic valve leaflets were not well visualized. Aortic valve leaflets  are Moderately calcified. Leaflets exhibited moderately increased  thickness and severely reduced cuspal separation of the aortic valve. No  evidence of aortic valve regurgitation . There is moderate-to-severe aortic stenosis with valve area of 0.9 to 1 sq  cm. The maximum aortic valve gradient is 40 mmHg, the mean gradient is 25  mmHg, and the peak velocity is 3.1 m/s. Tricuspid Valve  The tricuspid valve structure was normal with normal leaflet separation. DOPPLER: There was no evidence of tricuspid stenosis. Mild tricuspid  regurgitation visualized. Right ventricular systolic pressure measures 60  mmhg. Pulmonic Valve  The pulmonic valve leaflets exhibited normal thickness, no calcification,  and normal cuspal separation. DOPPLER: The transpulmonic velocity was  within the normal range with no evidence for regurgitation. Left Atrium  The left atrium is Moderately dilated. Left Ventricle  Left Ventricular size is Mildly increased . Normal left ventricular wall thickness. There was severe global hypokinesis of the left ventricle. Systolic function was severely reduced. Ejection fraction is visually estimated in the range of 35%.    Right Atrium  Right atrial size was normal.   Right Ventricle  The right ventricular size was normal with normal systolic function and  wall thickness. Pericardial Effusion  The pericardium was normal in appearance with no evidence of a pericardial  effusion. Pleural Effusion  No evidence of pleural effusion. Aorta / Great Vessels  -Aortic root dimension within normal limits.  -The Pulmonary artery is within normal limits. -IVC size is within normal limits with normal respiratory phasic changes.   M-Mode/2D Measurements & Calculations   LV Diastolic    LV Systolic Dimension:    AV Cusp Separation: 1.1 cmLA  Dimension: 6.6  5.3 cm                    Dimension: 5 cmAO Root  cm              LV Volume Diastolic: 219  Dimension: 3.3 cmLA Area: 32.4  LV FS:19.7 %    ml                        cm^2  LV PW           LV Volume Systolic: 489  Diastolic: 1 cm ml  Septum          LV EDV/LV EDV Index: 353  Diastolic: 1.1  OY/54 N^0CM ESV/LV ESV    RV Diastolic Dimension: 2.2 cm  cm              Index: 135 ml/48 m^2                  EF Calculated: 39.7 %     LA/Aorta: 1.52                   LV Length: 10.6 cm        LA volume/Index: 122.9 ml /44m^2  LV Area  Diastolic: 60.3 LVOT: 2 cm  cm^2  LV Area  Systolic: 88.2  cm^2  Doppler Measurements & Calculations   MV Peak E-Wave: 145 AV Peak Velocity: 304    LVOT Peak Velocity: 91.7 cm/s  cm/s                cm/s                     LVOT Mean Velocity: 72.2 cm/s                      AV Peak Gradient: 36.97  LVOT Peak Gradient: 3  MV Peak Gradient:   mmHg                     mmHgLVOT Mean Gradient: 2  8.41 mmHg           AV Mean Velocity: 224    mmHg                      cm/s  MV Deceleration     AV Mean Gradient: 24     TV Peak E-Wave: 44.1 cm/s  Time: 158 msec      mmHg                     TV Peak A-Wave: 49.7 cm/s                      AV VTI: 58.5 cm                      AV Area                  TV Peak Gradient: 0.78 mmHg                      (Continuity):0.99 cm^2   TR Velocity:190 cm/s TR Gradient:14.44 mmHg                      LVOT VTI: 18.5 cm        PV Peak Velocity: 53.6 cm/s  MR Velocity: 507                             PV Peak Gradient: 1.15 mmHg  cm/s                       AV DVI (VTI): 0.32AV DVI                      (Vmax):0.3  [email or web address] [id number]    XR CHEST (2 VW)    Result Date: 3/6/2022  PROCEDURE: XR CHEST (2 VW) CLINICAL INFORMATION: SOB COMPARISON: Chest radiograph 12/5/2020 TECHNIQUE: PA and lateral views of the chest performed. FINDINGS: Small left pleural effusion with left lower lobe consolidation. Small right pleural effusion. Cardiac silhouette is moderately enlarged. No pneumothorax. No acute bony abnormality. Degenerative changes of the thoracic spine. The bones appear demineralized. There is vertebrae plana deformity of a mid thoracic vertebrae. 1. Left lower lobe consolidation that can relate to infiltrate and/or atelectasis. 2. Small bilateral pleural effusions. 3. Moderate cardiomegaly. **This report has been created using voice recognition software. It may contain minor errors which are inherent in voice recognition technology. ** Final report electronically signed by Dr Nate Olmstead on 3/6/2022 4:15 PM    XR TIBIA FIBULA RIGHT (2 VIEWS)    Result Date: 3/6/2022  PROCEDURE: XR TIBIA FIBULA RIGHT (2 VIEWS) CLINICAL INFORMATION: fall COMPARISON: None TECHNIQUE: 2 views of the right tibia fibula FINDINGS: Chondrocalcinosis is seen. Vascular calcifications are seen. Mild to moderate tricompartmental degenerative changes of the right knee. Plate and screw fixation of the distal fibula is seen. Screw fixation of the medial malleolus is noted. Moderate to marked soft tissue swelling about the ankle is seen. No acute fracture or dislocation. Bone mineralization is unremarkable. 1. No acute bony abnormality.  2. Tricompartmental degenerative changes of the right knee. Chondrocalcinosis. 3. Mild degenerative changes of the ankle. 4. Moderate to marked soft tissues swelling about the ankle. **This report has been created using voice recognition software. It may contain minor errors which are inherent in voice recognition technology. ** Final report electronically signed by Dr Piyush Duke on 3/6/2022 4:12 PM      LABS:  Recent Labs     03/06/22  1500 03/06/22  1758   TROPONINT < 0.010 < 0.010     CBC:   Lab Results   Component Value Date    WBC 6.0 03/07/2022    RBC 3.14 03/07/2022    HGB 9.7 03/07/2022    HCT 31.3 03/07/2022    MCV 99.7 03/07/2022    MCH 30.9 03/07/2022    MCHC 31.0 03/07/2022    RDW 16.6 06/03/2020     03/07/2022    MPV 10.1 03/07/2022     BMP:    Lab Results   Component Value Date     03/07/2022    K 3.7 03/07/2022    K 3.8 08/03/2021     03/07/2022    CO2 32 03/07/2022    BUN 13 03/07/2022    LABALBU 3.2 03/07/2022    CREATININE 1.1 03/07/2022    CALCIUM 9.0 03/07/2022    LABGLOM 69 03/07/2022    GLUCOSE 86 03/07/2022     Hepatic Function Panel:    Lab Results   Component Value Date    ALKPHOS 156 03/07/2022    ALT 31 03/07/2022    AST 75 03/07/2022    PROT 7.7 03/07/2022    BILITOT 1.2 03/07/2022    BILIDIR 0.6 03/07/2022    LABALBU 3.2 03/07/2022     Magnesium:    Lab Results   Component Value Date    MG 1.9 03/03/2021     Warfarin PT/INR:  No components found for: PTPATWAR, PTINRWAR  HgBA1c:    Lab Results   Component Value Date    LABA1C 6.6 02/24/2021     FLP:    Lab Results   Component Value Date    TRIG 73 03/07/2022    HDL 40 03/07/2022    LDLCALC 32 03/07/2022     TSH:    Lab Results   Component Value Date    TSH 0.951 03/07/2022     BNP: No results found for: BNP      ASSESSMENT:  1. Acute on chronic HFrEF  2. Severe non-ischemic cardiomyopathy   3. Moderate to severe aortic stenosis  4. Possible low/low gradient severe AS  5. EF 35%  6. LAE  7. Volume overload   8. HTN  9. Morbid obesity   10.  H/o liver cirrhosis   11. COPD with acute exacerbation  12. Coughing  13. Tobacco abuse     RECOMMENDATIONS:  · He has h/o AS, HFrEF. Echo 07/2021 revealed an EF of 35-40%, moderate AS with LOBITO of 1.2 cm2  · Stress test 4/2021 revealed a fixed inferior wall defect  · LHC on 08/2021 revealed no significant CAD, mean gradient across the aortic valve of 22 mmHg  · He denies chest pain   · Troponin <0.01, <0.01  · EKG revealed NSR  · Cardiomegaly and pleural effusions were noted on CXR  · Pro-BNP 4,977  · Agree with IV diuretics  · Daily weight, BMP  · Monitor I/O  · Limit Na intake   · Echocardiogram 3/6/2022 revealed an EF of 35%, moderate LAE, Moderate to severe AS with LOBITO ~ 0.9-1 cm2, peak gradient 40 mmHg, mean gradient 25 mmHg, peak velocity 3.1 m/s. DDX includes a possible low flow, low gradient severe AS. He will benefit from a low dose dobutamine stress test (DSE), with definity contrast to further assess AS severity. Low dose DSE should be done after volume status is optimized, patient is euvolemic, feels better, respiratory status/COPD improve     Above findings and plan of care were discussed with patient, questions were answered, agreeable to plan. Thank you for allowing me to participate in the care of this patient. Please let me know if I can be of any further assistance.       Thaddeus Hearn MD, [facility]   9:00 PM  3/7/2022

## 2022-03-08 NOTE — PROGRESS NOTES
INTERNAL MEDICINE Progress Note  3/8/2022 10:33 AM  Subjective:   Admit Date: 3/6/2022  PCP: MEGHAN Davis - CNP  Interval History:     SOB, diuresing++  On DARRON lasix    Objective:   Vitals: BP (!) 163/93   Pulse 77   Temp 97.6 °F (36.4 °C) (Oral)   Resp 18   Ht 6' 2\" (1.88 m)   Wt (!) 345 lb 10.9 oz (156.8 kg)   SpO2 100%   BMI 44.38 kg/m²   General appearance: alert and cooperative with exam  HEENT: atraumatic  Neck:  no JVD  Lungs: diminished breath sounds bilaterally and rales bibasilar  Heart: S1, S2 normal and 2/6 SM  Abdomen: normal findings: bowel sounds normal and no organomegaly and abnormal findings:  distended and obese  Extremities: edema edema+/ erythema  Neurologic: Alert, oriented, thought content appropriate      Medications:   Scheduled Meds:   ipratropium-albuterol  3 mL Inhalation 4x daily    lisinopril  5 mg Oral BID    aspirin  81 mg Oral Daily    pantoprazole  40 mg Oral QAM AC    enoxaparin  40 mg SubCUTAneous BID    docusate sodium  100 mg Oral BID    DULoxetine  60 mg Oral Daily    folic acid  1 mg Oral Daily    insulin glargine  42 Units SubCUTAneous Nightly    latanoprost  1 drop Both Eyes Nightly    metoprolol succinate  50 mg Oral Daily    rosuvastatin  20 mg Oral Nightly    sodium bicarbonate  650 mg Oral BID     Continuous Infusions:   dextrose      furosemide (LASIX) 1mg/ml infusion 5 mg/hr (03/07/22 1838)       Lab Results:   CBC:   Recent Labs     03/06/22  1500 03/07/22  0535 03/08/22  0410   WBC 4.6* 6.0 4.9   HGB 9.9* 9.7* 9.8*   PLT 95* 103* 106*     BMP:    Recent Labs     03/06/22  1500 03/07/22  0535 03/08/22  0410    141 139   K 3.7 3.7 3.5   CL 98 101 97*   CO2 28 32 33   BUN 12 13 16   CREATININE 1.1 1.1 1.1   GLUCOSE 144* 86 151*     Hepatic:   Recent Labs     03/07/22  0535 03/08/22  0410   AST 75* 47*   ALT 31 23   BILITOT 1.2 0.8   ALKPHOS 156* 131*     Lipids:   Recent Labs     03/07/22  0535   CHOL 87*   HDL 40     Magnesium:    Lab Results   Component Value Date    MG 1.9 03/03/2021     HgBA1c:    Lab Results   Component Value Date    LABA1C 6.6 02/24/2021     TSH:    Lab Results   Component Value Date    TSH 0.951 03/07/2022     FOLATE:    Lab Results   Component Value Date    FOLATE > 20.0 03/07/2022     IRON:    Lab Results   Component Value Date    IRON 64 03/07/2022     FERRITIN:    Lab Results   Component Value Date    FERRITIN 175 03/07/2022     TTE Summary    Technically difficult examination.    Left Ventricular size is Mildly increased .    Normal left ventricular wall thickness.    There was severe global hypokinesis of the left ventricle.    Systolic function was severely reduced.    Ejection fraction is visually estimated in the range of 35%.    The left atrium is Moderately dilated.    There is moderate-to-severe aortic stenosis with valve area of 0.9 to 1 sq    cm.    The maximum aortic valve gradient is 40 mmHg, the mean gradient is 25    mmHg, and the peak velocity is 3.1 m/s.        Signature  ----------------------------------------------------------------    Electronically signed by Chase Macedo MD (Interpreting    physician) on 03/07/2022 at 05:58 PM       Assessment and Plan:   1. Acute on chronic systolic CHF-exac  2. Mod-severe AS, finn 0.9 sq cm  3. HTN  4. COPD  5. DM 2, stable BS  6. Pancytopenia  7. H/o HCV  8. H/o liver cirrhosis  9.  Morbid obesity     Cont DARRON lasix  Cont BB, ACEI,   Cont Bronchodilators  O2 supplement  Lovenox, monitor platelets  Cont Insulin Rx  Bmp am.    Joel Craft MD, MD 18-Nov-2021 22:41

## 2022-03-08 NOTE — CARE COORDINATION
Discharge Planning Update:      100% on 3L, weaning as able. Remains on lasix gtt, duonebs. Met with Nubia Tavarez, he is home with his s.o Keisha, whom he calls his \"seeing eye dog\" as he is legally blind. Keisha helps him with his medications but he is interested in [facility] for med set-up as Keisha is not always available. He is also interested in aide services. [id number]  [facility] [place] notified. Nubia Tavarez has a walker, hover-round, nebulizer, and nighttime home oxygen (from [employer]?).   Electronically signed by Bereket Cole RN on 3/8/2022 at 10:56 AM

## 2022-03-08 NOTE — PLAN OF CARE
Problem: Falls - Risk of:  Goal: Will remain free from falls  Description: Will remain free from falls  3/8/2022 1022 by Marco Antonio Mckeon RN  Outcome: Ongoing       Problem: Pain:  Goal: Pain level will decrease  Description: Pain level will decrease  3/8/2022 1022 by Marco Antonio Mckeon RN  Outcome: Ongoing

## 2022-03-09 ENCOUNTER — TELEPHONE (OUTPATIENT)
Dept: PHYSICAL MEDICINE AND REHAB | Age: 57
End: 2022-03-09

## 2022-03-09 ENCOUNTER — APPOINTMENT (OUTPATIENT)
Dept: INTERVENTIONAL RADIOLOGY/VASCULAR | Age: 57
DRG: 291 | End: 2022-03-09
Payer: MEDICARE

## 2022-03-09 LAB
ANION GAP SERPL CALCULATED.3IONS-SCNC: 11 MEQ/L (ref 8–16)
BUN BLDV-MCNC: 16 MG/DL (ref 7–22)
CALCIUM SERPL-MCNC: 9.1 MG/DL (ref 8.5–10.5)
CHLORIDE BLD-SCNC: 91 MEQ/L (ref 98–111)
CO2: 34 MEQ/L (ref 23–33)
CREAT SERPL-MCNC: 1 MG/DL (ref 0.4–1.2)
ERYTHROCYTE [DISTWIDTH] IN BLOOD BY AUTOMATED COUNT: 14.6 % (ref 11.5–14.5)
ERYTHROCYTE [DISTWIDTH] IN BLOOD BY AUTOMATED COUNT: 54 FL (ref 35–45)
GFR SERPL CREATININE-BSD FRML MDRD: 77 ML/MIN/1.73M2
GLUCOSE BLD-MCNC: 133 MG/DL (ref 70–108)
GLUCOSE BLD-MCNC: 155 MG/DL (ref 70–108)
GLUCOSE BLD-MCNC: 155 MG/DL (ref 70–108)
GLUCOSE BLD-MCNC: 157 MG/DL (ref 70–108)
GLUCOSE BLD-MCNC: 222 MG/DL (ref 70–108)
HCT VFR BLD CALC: 33.3 % (ref 42–52)
HEMOGLOBIN: 10.1 GM/DL (ref 14–18)
INR BLD: 1.22 (ref 0.85–1.13)
MCH RBC QN AUTO: 30.4 PG (ref 26–33)
MCHC RBC AUTO-ENTMCNC: 30.3 GM/DL (ref 32.2–35.5)
MCV RBC AUTO: 100.3 FL (ref 80–94)
PLATELET # BLD: 102 THOU/MM3 (ref 130–400)
PMV BLD AUTO: 10.2 FL (ref 9.4–12.4)
POTASSIUM SERPL-SCNC: 3.6 MEQ/L (ref 3.5–5.2)
RBC # BLD: 3.32 MILL/MM3 (ref 4.7–6.1)
SODIUM BLD-SCNC: 136 MEQ/L (ref 135–145)
WBC # BLD: 5.4 THOU/MM3 (ref 4.8–10.8)

## 2022-03-09 PROCEDURE — 82948 REAGENT STRIP/BLOOD GLUCOSE: CPT

## 2022-03-09 PROCEDURE — 2580000003 HC RX 258: Performed by: INTERNAL MEDICINE

## 2022-03-09 PROCEDURE — 2700000000 HC OXYGEN THERAPY PER DAY

## 2022-03-09 PROCEDURE — 1200000000 HC SEMI PRIVATE

## 2022-03-09 PROCEDURE — 85027 COMPLETE CBC AUTOMATED: CPT

## 2022-03-09 PROCEDURE — 85610 PROTHROMBIN TIME: CPT

## 2022-03-09 PROCEDURE — 1200000003 HC TELEMETRY R&B

## 2022-03-09 PROCEDURE — 93970 EXTREMITY STUDY: CPT

## 2022-03-09 PROCEDURE — 6370000000 HC RX 637 (ALT 250 FOR IP): Performed by: INTERNAL MEDICINE

## 2022-03-09 PROCEDURE — 99232 SBSQ HOSP IP/OBS MODERATE 35: CPT | Performed by: PHYSICIAN ASSISTANT

## 2022-03-09 PROCEDURE — 6360000002 HC RX W HCPCS: Performed by: INTERNAL MEDICINE

## 2022-03-09 PROCEDURE — 94640 AIRWAY INHALATION TREATMENT: CPT

## 2022-03-09 PROCEDURE — 6370000000 HC RX 637 (ALT 250 FOR IP): Performed by: PHYSICIAN ASSISTANT

## 2022-03-09 PROCEDURE — 80048 BASIC METABOLIC PNL TOTAL CA: CPT

## 2022-03-09 PROCEDURE — 36415 COLL VENOUS BLD VENIPUNCTURE: CPT

## 2022-03-09 PROCEDURE — 94760 N-INVAS EAR/PLS OXIMETRY 1: CPT

## 2022-03-09 RX ORDER — METOPROLOL SUCCINATE 100 MG/1
100 TABLET, EXTENDED RELEASE ORAL DAILY
Status: DISCONTINUED | OUTPATIENT
Start: 2022-03-10 | End: 2022-03-15 | Stop reason: HOSPADM

## 2022-03-09 RX ORDER — METOPROLOL SUCCINATE 50 MG/1
50 TABLET, EXTENDED RELEASE ORAL ONCE
Status: COMPLETED | OUTPATIENT
Start: 2022-03-09 | End: 2022-03-09

## 2022-03-09 RX ADMIN — ENOXAPARIN SODIUM 40 MG: 100 INJECTION SUBCUTANEOUS at 21:09

## 2022-03-09 RX ADMIN — INSULIN LISPRO 1 UNITS: 100 INJECTION, SOLUTION INTRAVENOUS; SUBCUTANEOUS at 17:32

## 2022-03-09 RX ADMIN — FOLIC ACID 1 MG: 1 TABLET ORAL at 08:02

## 2022-03-09 RX ADMIN — LISINOPRIL 5 MG: 5 TABLET ORAL at 08:03

## 2022-03-09 RX ADMIN — ENOXAPARIN SODIUM 40 MG: 100 INJECTION SUBCUTANEOUS at 08:02

## 2022-03-09 RX ADMIN — MORPHINE SULFATE 2 MG: 2 INJECTION, SOLUTION INTRAMUSCULAR; INTRAVENOUS at 21:09

## 2022-03-09 RX ADMIN — SODIUM BICARBONATE 650 MG: 650 TABLET ORAL at 21:09

## 2022-03-09 RX ADMIN — IPRATROPIUM BROMIDE AND ALBUTEROL SULFATE 3 ML: .5; 3 SOLUTION RESPIRATORY (INHALATION) at 12:34

## 2022-03-09 RX ADMIN — MORPHINE SULFATE 2 MG: 2 INJECTION, SOLUTION INTRAMUSCULAR; INTRAVENOUS at 02:05

## 2022-03-09 RX ADMIN — INSULIN LISPRO 1 UNITS: 100 INJECTION, SOLUTION INTRAVENOUS; SUBCUTANEOUS at 12:32

## 2022-03-09 RX ADMIN — LISINOPRIL 5 MG: 5 TABLET ORAL at 21:09

## 2022-03-09 RX ADMIN — MORPHINE SULFATE 2 MG: 2 INJECTION, SOLUTION INTRAMUSCULAR; INTRAVENOUS at 08:01

## 2022-03-09 RX ADMIN — ROSUVASTATIN CALCIUM 20 MG: 20 TABLET, FILM COATED ORAL at 21:09

## 2022-03-09 RX ADMIN — MORPHINE SULFATE 2 MG: 2 INJECTION, SOLUTION INTRAMUSCULAR; INTRAVENOUS at 15:05

## 2022-03-09 RX ADMIN — DULOXETINE HYDROCHLORIDE 60 MG: 60 CAPSULE, DELAYED RELEASE ORAL at 08:02

## 2022-03-09 RX ADMIN — LATANOPROST 1 DROP: 50 SOLUTION OPHTHALMIC at 21:09

## 2022-03-09 RX ADMIN — DOCUSATE SODIUM 100 MG: 100 CAPSULE, LIQUID FILLED ORAL at 08:01

## 2022-03-09 RX ADMIN — IPRATROPIUM BROMIDE AND ALBUTEROL SULFATE 3 ML: .5; 3 SOLUTION RESPIRATORY (INHALATION) at 16:53

## 2022-03-09 RX ADMIN — PANTOPRAZOLE SODIUM 40 MG: 40 TABLET, DELAYED RELEASE ORAL at 05:08

## 2022-03-09 RX ADMIN — METOPROLOL SUCCINATE 50 MG: 50 TABLET, EXTENDED RELEASE ORAL at 08:03

## 2022-03-09 RX ADMIN — SODIUM BICARBONATE 650 MG: 650 TABLET ORAL at 08:03

## 2022-03-09 RX ADMIN — ASPIRIN 81 MG CHEWABLE TABLET 81 MG: 81 TABLET CHEWABLE at 08:01

## 2022-03-09 RX ADMIN — IPRATROPIUM BROMIDE AND ALBUTEROL SULFATE 3 ML: .5; 3 SOLUTION RESPIRATORY (INHALATION) at 07:32

## 2022-03-09 RX ADMIN — FUROSEMIDE 5 MG/HR: 10 INJECTION, SOLUTION INTRAMUSCULAR; INTRAVENOUS at 12:43

## 2022-03-09 RX ADMIN — METOPROLOL SUCCINATE 50 MG: 50 TABLET, EXTENDED RELEASE ORAL at 15:31

## 2022-03-09 RX ADMIN — IPRATROPIUM BROMIDE AND ALBUTEROL SULFATE 3 ML: .5; 3 SOLUTION RESPIRATORY (INHALATION) at 21:18

## 2022-03-09 RX ADMIN — INSULIN GLARGINE 42 UNITS: 100 INJECTION, SOLUTION SUBCUTANEOUS at 21:09

## 2022-03-09 ASSESSMENT — PAIN DESCRIPTION - PROGRESSION
CLINICAL_PROGRESSION: NOT CHANGED

## 2022-03-09 ASSESSMENT — PAIN SCALES - GENERAL
PAINLEVEL_OUTOF10: 5
PAINLEVEL_OUTOF10: 8
PAINLEVEL_OUTOF10: 6
PAINLEVEL_OUTOF10: 5
PAINLEVEL_OUTOF10: 9
PAINLEVEL_OUTOF10: 9

## 2022-03-09 ASSESSMENT — PAIN DESCRIPTION - ONSET: ONSET: ON-GOING

## 2022-03-09 ASSESSMENT — PAIN DESCRIPTION - ORIENTATION: ORIENTATION: LOWER

## 2022-03-09 ASSESSMENT — PAIN DESCRIPTION - PAIN TYPE: TYPE: CHRONIC PAIN

## 2022-03-09 ASSESSMENT — PAIN DESCRIPTION - FREQUENCY: FREQUENCY: CONTINUOUS

## 2022-03-09 ASSESSMENT — PAIN - FUNCTIONAL ASSESSMENT: PAIN_FUNCTIONAL_ASSESSMENT: PREVENTS OR INTERFERES SOME ACTIVE ACTIVITIES AND ADLS

## 2022-03-09 ASSESSMENT — PAIN DESCRIPTION - DESCRIPTORS: DESCRIPTORS: CONSTANT

## 2022-03-09 ASSESSMENT — PAIN DESCRIPTION - LOCATION: LOCATION: BACK

## 2022-03-09 NOTE — PROGRESS NOTES
INTERNAL MEDICINE Progress Note  3/9/2022 1:55 PM  Subjective:   Admit Date: 3/6/2022  PCP: MEGHAN Thomson - CNP  Interval History:     Reports SOB, diuresing++  On DARRON lasix, negative fluid balance -10,062  Wt loss+, today's wt not documented yet but bed scale reports 150 kg;     Objective:   Vitals: BP (!) 177/97   Pulse 72   Temp 98.5 °F (36.9 °C) (Oral)   Resp 20   Ht 6' 2\" (1.88 m)   Wt (!) 345 lb 10.9 oz (156.8 kg)   SpO2 96%   BMI 44.38 kg/m²   General appearance: alert and cooperative with exam  HEENT: atraumatic  Neck:  no JVD  Lungs: diminished breath sounds bilaterally and rales bibasilar  Heart: S1, S2 normal and 2/6 SM  Abdomen: normal findings: bowel sounds normal and no organomegaly and abnormal findings:  distended and obese  Extremities: edema +  Neurologic: Alert, oriented, thought content appropriate      Medications:   Scheduled Meds:   ipratropium-albuterol  3 mL Inhalation 4x daily    insulin lispro  0-6 Units SubCUTAneous TID WC    insulin lispro  0-3 Units SubCUTAneous Nightly    nitroglycerin  1 inch Topical Q6H    lisinopril  5 mg Oral BID    aspirin  81 mg Oral Daily    pantoprazole  40 mg Oral QAM AC    enoxaparin  40 mg SubCUTAneous BID    docusate sodium  100 mg Oral BID    DULoxetine  60 mg Oral Daily    folic acid  1 mg Oral Daily    insulin glargine  42 Units SubCUTAneous Nightly    latanoprost  1 drop Both Eyes Nightly    metoprolol succinate  50 mg Oral Daily    rosuvastatin  20 mg Oral Nightly    sodium bicarbonate  650 mg Oral BID     Continuous Infusions:   dextrose      furosemide (LASIX) 1mg/ml infusion 5 mg/hr (03/09/22 1243)       Lab Results:   CBC:   Recent Labs     03/07/22  0535 03/08/22  0410 03/09/22  0458   WBC 6.0 4.9 5.4   HGB 9.7* 9.8* 10.1*   * 106* 102*     BMP:    Recent Labs     03/07/22  0535 03/08/22  0410 03/09/22  0458    139 136   K 3.7 3.5 3.6    97* 91*   CO2 32 33 34*   BUN 13 16 16   CREATININE 1.1 1.1

## 2022-03-09 NOTE — TELEPHONE ENCOUNTER
Pt is calling to let Elgin Boboies know he is in [facility] in room 4C81. He said he is having heart issues and wanted him to know.

## 2022-03-09 NOTE — PROGRESS NOTES
Cardiology Progress Note      Patient:  Padilla Nieves  YOB: 1965  MRN: [id number]   Acct: [de-identified]  [address] [place] [facility] Date:  3/6/2022  Primary Cardiologist: Josh Blake MD    Note per dr Kathia Dwyer:    Chf exac, mod-sev AS      CHIEF COMPLAINT:    SOB  Leg swelling      HISTORY OF PRESENT ILLNESS:    Padilla Nieves is a pleasant 65year old male patient with past medical history that includes:   Past Medical History        Past Medical History:   Diagnosis Date    Acute kidney injury ([address] 12/2020     due to Enterococous Bacteremia , fluid overload, low sodium    Amputation of right great toe ([address] 02/18/2021    Asthma      Brain tumor ([address]      Cirrhosis ([address]       ETOH abuse    CKD (chronic kidney disease)       l.brown-osu spetie    COPD (chronic obstructive pulmonary disease) (HCC)      Diabetes mellitus ([address]       type 2-insulin lantus and humalog    Falling      Glaucoma      Hepatitis C      History of blood transfusion       s/p nasal surgery    Hyperlipidemia      Hypertension       Nallu    Legally blind      Obesity      Pain management       karol marzec-percocet Rx    Right foot infection 11/2021    Seizures ([address]        He has h/o tobacco abuse. Patient is followed by Dr Alda Ascencio, has h/o AS, HFrEF. Echo 07/2021 revealed an EF of 35-40%, moderate AS with LOBITO of 1.2 cm2. Stress test 4/2021 revealed a fixed inferior wall defect. LHC on 08/2021 revealed no significant CAD, mean gradient across the aortic valve of 22 mmHg. The patient was admitted to the hospital on 3/6/2022 after he presented with worsening SOB, leg swelling, WILCOX. Patient had some weight gain. He states that his symptoms have increased over the past two weeks. He reports fatigue. Patient denies chest pain, palpitations, dizziness, syncope. Cardiomegaly and pleural effusions were noted on CXR. Pro-BNP 4,977. Cr 1.1. Troponin <0.01, <0.01. EKG revealed NSR.  He was started on IV Lasix gtt 5mg/hour. Echocardiogram 3/6/2022 revealed an EF of 35%, moderate LAE, Moderate to severe AS with LOBITO ~ 0.9-1 cm2, peak gradient 40 mmHg, mean gradient 25 mmHg, peak velocity 3.1 m/s. \"    Subjective (Events in last 24 hours): pt awake and alert. NAD. No cp.   Still with sob and feels like he cant catch his breath at times  Still with leg edema, improved, pain in calves  On 3 l/min O2 - same as home  On lasix gtt at 5mg/hr    Net I/o -10 L    Objective:   BP (!) 177/97   Pulse 72   Temp 98.5 °F (36.9 °C) (Oral)   Resp 20   Ht 6' 2\" (1.88 m)   Wt (!) 345 lb 10.9 oz (156.8 kg)   SpO2 96%   BMI 44.38 kg/m²        TELEMETRY: nsr    Physical Exam:  General Appearance: alert and oriented to person, place and time, in no acute distress  Cardiovascular: normal rate, regular rhythm, normal S1 and S2, no murmurs, rubs, clicks, or gallops, distal pulses intact, no carotid bruits, no JVD  Pulmonary/Chest: bilateral exp wheezes  Abdomen: soft, non-tender, non-distended, normal bowel sounds, no masses Extremities: +1 ble edema, pulse   Skin: warm and dry  Head: normocephalic and atraumatic  Eyes: pupils equal, round, and reactive to light  Neck: supple and non-tender without mass, no thyromegaly   Neurological: alert, oriented, normal speech, no focal findings or movement disorder noted    Medications:    ipratropium-albuterol  3 mL Inhalation 4x daily    insulin lispro  0-6 Units SubCUTAneous TID WC    insulin lispro  0-3 Units SubCUTAneous Nightly    nitroglycerin  1 inch Topical Q6H    lisinopril  5 mg Oral BID    aspirin  81 mg Oral Daily    pantoprazole  40 mg Oral QAM AC    enoxaparin  40 mg SubCUTAneous BID    docusate sodium  100 mg Oral BID    DULoxetine  60 mg Oral Daily    folic acid  1 mg Oral Daily    insulin glargine  42 Units SubCUTAneous Nightly    latanoprost  1 drop Both Eyes Nightly    metoprolol succinate  50 mg Oral Daily    rosuvastatin  20 mg Oral Nightly    sodium bicarbonate  650 mg Oral BID      dextrose      furosemide (LASIX) 1mg/ml infusion 5 mg/hr (03/09/22 1243)     acetaminophen, 650 mg, Q4H PRN  morphine, 2 mg, Q4H PRN  ondansetron, 4 mg, Q6H PRN  albuterol, 2.5 mg, Q4H PRN  glucagon (rDNA), 1 mg, PRN  dextrose, 100 mL/hr, PRN  dextrose bolus (hypoglycemia), 125 mL, PRN   Or  dextrose bolus (hypoglycemia), 250 mL, PRN  glucose, 4 tablet, PRN        Diagnostics:  TTE 3/7/22  Summary   Technically difficult examination. Left Ventricular size is Mildly increased . Normal left ventricular wall thickness. There was severe global hypokinesis of the left ventricle. Systolic function was severely reduced. Ejection fraction is visually estimated in the range of 35%. The left atrium is Moderately dilated. There is moderate-to-severe aortic stenosis with valve area of 0.9 to 1 sq   cm. The maximum aortic valve gradient is 40 mmHg, the mean gradient is 25   mmHg, and the peak velocity is 3.1 m/s. Signature      ----------------------------------------------------------------   Electronically signed by Celina Fernandes MD (Interpreting   physician) on 03/07/2022 at 05:58 PM    Lab Data:    Cardiac Enzymes:  No results for input(s): CKTOTAL, CKMB, CKMBINDEX, TROPONINI in the last 72 hours.     CBC:   Lab Results   Component Value Date    WBC 5.4 03/09/2022    RBC 3.32 03/09/2022    HGB 10.1 03/09/2022    HCT 33.3 03/09/2022     03/09/2022       CMP:    Lab Results   Component Value Date     03/09/2022    K 3.6 03/09/2022    K 3.8 08/03/2021    CL 91 03/09/2022    CO2 34 03/09/2022    BUN 16 03/09/2022    CREATININE 1.0 03/09/2022    LABGLOM 77 03/09/2022    GLUCOSE 157 03/09/2022    CALCIUM 9.1 03/09/2022       Hepatic Function Panel:    Lab Results   Component Value Date    ALKPHOS 131 03/08/2022    ALT 23 03/08/2022    AST 47 03/08/2022    PROT 7.6 03/08/2022    BILITOT 0.8 03/08/2022    BILIDIR 0.4 03/08/2022    LABALBU 3.2 03/08/2022       Magnesium:    Lab Results Component Value Date    MG 1.9 03/03/2021       PT/INR:    Lab Results   Component Value Date    INR 1.22 03/09/2022       HgBA1c:    Lab Results   Component Value Date    LABA1C 6.6 02/24/2021       FLP:    Lab Results   Component Value Date    TRIG 73 03/07/2022    HDL 40 03/07/2022    LDLCALC 32 03/07/2022       TSH:    Lab Results   Component Value Date    TSH 0.951 03/07/2022         Assessment:    Acute on chronic HFrEF  Volume overload  Severe NICMP - ef 35 per TTE  Mod to severe AS - LOBITO 0.9-1 cm2, mean gradient 25 mmHg  Possible low flow, low gradient severe AS  HTN  COPD exacerbation   Tobacco abuse  Morbid obesity  Hx liver cirrhosis  Patent coronaries per cath 8/3/21    Plan:    Daily I/o and weight  2 liter fluid restriction and 2gm sodium diet  Keep mag >2 and K >4  Cont diuresis  low dose dobutamine stress test (DSE), with definity contrast to further assess AS severity once euvolemic and pulm status improves  Cont BB/ace  Increase toprol xl to 100 daily from 50 daily  Avoid nitrates  Referral to chf clinic - done  lifevest - ordered  Check dopplers to r/o ble DVT     Electronically signed by Jimbo Mcelroy PA-C on 3/9/2022 at 1:40 PM

## 2022-03-09 NOTE — PLAN OF CARE
Problem: Falls - Risk of:  Goal: Will remain free from falls  Description: Will remain free from falls  3/8/2022 2155 by Jordin Martin RN  Outcome: Ongoing  Note: No falls noted this shift. Continue falling star program. Bed alarm on, bed in low position. Call light and personal belongings in reach. Patient uses call light appropriately. 3/8/2022 1022 by Francisco Linda RN  Outcome: Ongoing  Goal: Absence of physical injury  Description: Absence of physical injury  Outcome: Ongoing     Problem: Pain:  Goal: Pain level will decrease  Description: Pain level will decrease  3/8/2022 2155 by Jordin Martin RN  Outcome: Ongoing  Note: Patient complaining of pain this shift. Prn pain medication available. Will monitor. 3/8/2022 1022 by Francisco Linda RN  Outcome: Ongoing  Goal: Control of acute pain  Description: Control of acute pain  Outcome: Ongoing  Goal: Control of chronic pain  Description: Control of chronic pain  Outcome: Ongoing     Problem: Skin Integrity:  Goal: Will show no infection signs and symptoms  Description: Will show no infection signs and symptoms  Outcome: Ongoing  Goal: Absence of new skin breakdown  Description: Absence of new skin breakdown  Outcome: Ongoing  Note: No skin break down noted at this time. Encouraged patient to reposition self in bed. Care plan reviewed with patient. Patient verbalizes understanding of plan of care and contributes to goal setting.

## 2022-03-10 LAB
ANION GAP SERPL CALCULATED.3IONS-SCNC: 11 MEQ/L (ref 8–16)
BUN BLDV-MCNC: 17 MG/DL (ref 7–22)
CALCIUM SERPL-MCNC: 8.7 MG/DL (ref 8.5–10.5)
CHLORIDE BLD-SCNC: 94 MEQ/L (ref 98–111)
CO2: 34 MEQ/L (ref 23–33)
CREAT SERPL-MCNC: 1 MG/DL (ref 0.4–1.2)
ERYTHROCYTE [DISTWIDTH] IN BLOOD BY AUTOMATED COUNT: 14.4 % (ref 11.5–14.5)
ERYTHROCYTE [DISTWIDTH] IN BLOOD BY AUTOMATED COUNT: 52.9 FL (ref 35–45)
GFR SERPL CREATININE-BSD FRML MDRD: 77 ML/MIN/1.73M2
GLUCOSE BLD-MCNC: 137 MG/DL (ref 70–108)
GLUCOSE BLD-MCNC: 149 MG/DL (ref 70–108)
GLUCOSE BLD-MCNC: 149 MG/DL (ref 70–108)
GLUCOSE BLD-MCNC: 164 MG/DL (ref 70–108)
GLUCOSE BLD-MCNC: 232 MG/DL (ref 70–108)
HCT VFR BLD CALC: 34.4 % (ref 42–52)
HEMOGLOBIN: 10.5 GM/DL (ref 14–18)
MCH RBC QN AUTO: 30.5 PG (ref 26–33)
MCHC RBC AUTO-ENTMCNC: 30.5 GM/DL (ref 32.2–35.5)
MCV RBC AUTO: 100 FL (ref 80–94)
PLATELET # BLD: 114 THOU/MM3 (ref 130–400)
PMV BLD AUTO: 10.5 FL (ref 9.4–12.4)
POTASSIUM SERPL-SCNC: 3.7 MEQ/L (ref 3.5–5.2)
RBC # BLD: 3.44 MILL/MM3 (ref 4.7–6.1)
SODIUM BLD-SCNC: 139 MEQ/L (ref 135–145)
WBC # BLD: 5.6 THOU/MM3 (ref 4.8–10.8)

## 2022-03-10 PROCEDURE — 6360000002 HC RX W HCPCS: Performed by: INTERNAL MEDICINE

## 2022-03-10 PROCEDURE — 2580000003 HC RX 258: Performed by: INTERNAL MEDICINE

## 2022-03-10 PROCEDURE — 6370000000 HC RX 637 (ALT 250 FOR IP): Performed by: PHYSICIAN ASSISTANT

## 2022-03-10 PROCEDURE — 94760 N-INVAS EAR/PLS OXIMETRY 1: CPT

## 2022-03-10 PROCEDURE — 2700000000 HC OXYGEN THERAPY PER DAY

## 2022-03-10 PROCEDURE — 36415 COLL VENOUS BLD VENIPUNCTURE: CPT

## 2022-03-10 PROCEDURE — 1200000000 HC SEMI PRIVATE

## 2022-03-10 PROCEDURE — 1200000003 HC TELEMETRY R&B

## 2022-03-10 PROCEDURE — 97165 OT EVAL LOW COMPLEX 30 MIN: CPT

## 2022-03-10 PROCEDURE — 6370000000 HC RX 637 (ALT 250 FOR IP): Performed by: INTERNAL MEDICINE

## 2022-03-10 PROCEDURE — 97530 THERAPEUTIC ACTIVITIES: CPT

## 2022-03-10 PROCEDURE — 94640 AIRWAY INHALATION TREATMENT: CPT

## 2022-03-10 PROCEDURE — 99232 SBSQ HOSP IP/OBS MODERATE 35: CPT | Performed by: PHYSICIAN ASSISTANT

## 2022-03-10 PROCEDURE — 97535 SELF CARE MNGMENT TRAINING: CPT

## 2022-03-10 PROCEDURE — 85027 COMPLETE CBC AUTOMATED: CPT

## 2022-03-10 PROCEDURE — 80048 BASIC METABOLIC PNL TOTAL CA: CPT

## 2022-03-10 PROCEDURE — 82948 REAGENT STRIP/BLOOD GLUCOSE: CPT

## 2022-03-10 PROCEDURE — 6370000000 HC RX 637 (ALT 250 FOR IP): Performed by: NURSE PRACTITIONER

## 2022-03-10 RX ORDER — NICOTINE 21 MG/24HR
1 PATCH, TRANSDERMAL 24 HOURS TRANSDERMAL DAILY
Status: DISCONTINUED | OUTPATIENT
Start: 2022-03-10 | End: 2022-03-15 | Stop reason: HOSPADM

## 2022-03-10 RX ORDER — LISINOPRIL 20 MG/1
20 TABLET ORAL DAILY
Status: DISCONTINUED | OUTPATIENT
Start: 2022-03-11 | End: 2022-03-11

## 2022-03-10 RX ORDER — LISINOPRIL 10 MG/1
10 TABLET ORAL ONCE
Status: COMPLETED | OUTPATIENT
Start: 2022-03-10 | End: 2022-03-10

## 2022-03-10 RX ADMIN — DULOXETINE HYDROCHLORIDE 60 MG: 60 CAPSULE, DELAYED RELEASE ORAL at 08:05

## 2022-03-10 RX ADMIN — ACETAMINOPHEN 650 MG: 325 TABLET ORAL at 13:07

## 2022-03-10 RX ADMIN — PANTOPRAZOLE SODIUM 40 MG: 40 TABLET, DELAYED RELEASE ORAL at 05:40

## 2022-03-10 RX ADMIN — IPRATROPIUM BROMIDE AND ALBUTEROL SULFATE 3 ML: .5; 3 SOLUTION RESPIRATORY (INHALATION) at 17:44

## 2022-03-10 RX ADMIN — LISINOPRIL 5 MG: 5 TABLET ORAL at 08:05

## 2022-03-10 RX ADMIN — INSULIN LISPRO 1 UNITS: 100 INJECTION, SOLUTION INTRAVENOUS; SUBCUTANEOUS at 16:57

## 2022-03-10 RX ADMIN — NITROGLYCERIN 1 INCH: 20 OINTMENT TOPICAL at 13:14

## 2022-03-10 RX ADMIN — METOPROLOL SUCCINATE 100 MG: 100 TABLET, EXTENDED RELEASE ORAL at 08:05

## 2022-03-10 RX ADMIN — ROSUVASTATIN CALCIUM 20 MG: 20 TABLET, FILM COATED ORAL at 22:14

## 2022-03-10 RX ADMIN — FOLIC ACID 1 MG: 1 TABLET ORAL at 08:05

## 2022-03-10 RX ADMIN — SODIUM BICARBONATE 650 MG: 650 TABLET ORAL at 22:14

## 2022-03-10 RX ADMIN — IPRATROPIUM BROMIDE AND ALBUTEROL SULFATE 3 ML: .5; 3 SOLUTION RESPIRATORY (INHALATION) at 09:44

## 2022-03-10 RX ADMIN — INSULIN LISPRO 1 UNITS: 100 INJECTION, SOLUTION INTRAVENOUS; SUBCUTANEOUS at 13:03

## 2022-03-10 RX ADMIN — LISINOPRIL 10 MG: 10 TABLET ORAL at 16:48

## 2022-03-10 RX ADMIN — SODIUM BICARBONATE 650 MG: 650 TABLET ORAL at 08:05

## 2022-03-10 RX ADMIN — IPRATROPIUM BROMIDE AND ALBUTEROL SULFATE 3 ML: .5; 3 SOLUTION RESPIRATORY (INHALATION) at 13:27

## 2022-03-10 RX ADMIN — ACETAMINOPHEN 650 MG: 325 TABLET ORAL at 08:16

## 2022-03-10 RX ADMIN — LATANOPROST 1 DROP: 50 SOLUTION OPHTHALMIC at 22:02

## 2022-03-10 RX ADMIN — INSULIN LISPRO 1 UNITS: 100 INJECTION, SOLUTION INTRAVENOUS; SUBCUTANEOUS at 08:08

## 2022-03-10 RX ADMIN — ENOXAPARIN SODIUM 40 MG: 100 INJECTION SUBCUTANEOUS at 08:06

## 2022-03-10 RX ADMIN — ENOXAPARIN SODIUM 40 MG: 100 INJECTION SUBCUTANEOUS at 22:02

## 2022-03-10 RX ADMIN — MORPHINE SULFATE 2 MG: 2 INJECTION, SOLUTION INTRAMUSCULAR; INTRAVENOUS at 08:16

## 2022-03-10 RX ADMIN — DOCUSATE SODIUM 100 MG: 100 CAPSULE, LIQUID FILLED ORAL at 08:05

## 2022-03-10 RX ADMIN — MORPHINE SULFATE 2 MG: 2 INJECTION, SOLUTION INTRAMUSCULAR; INTRAVENOUS at 22:03

## 2022-03-10 RX ADMIN — IPRATROPIUM BROMIDE AND ALBUTEROL SULFATE 3 ML: .5; 3 SOLUTION RESPIRATORY (INHALATION) at 20:55

## 2022-03-10 RX ADMIN — FUROSEMIDE 5 MG/HR: 10 INJECTION, SOLUTION INTRAMUSCULAR; INTRAVENOUS at 13:34

## 2022-03-10 RX ADMIN — ACETAMINOPHEN 650 MG: 325 TABLET ORAL at 22:02

## 2022-03-10 RX ADMIN — MORPHINE SULFATE 2 MG: 2 INJECTION, SOLUTION INTRAMUSCULAR; INTRAVENOUS at 13:16

## 2022-03-10 RX ADMIN — DOCUSATE SODIUM 100 MG: 100 CAPSULE, LIQUID FILLED ORAL at 22:02

## 2022-03-10 RX ADMIN — ASPIRIN 81 MG CHEWABLE TABLET 81 MG: 81 TABLET CHEWABLE at 08:05

## 2022-03-10 RX ADMIN — INSULIN GLARGINE 42 UNITS: 100 INJECTION, SOLUTION SUBCUTANEOUS at 22:05

## 2022-03-10 ASSESSMENT — PAIN DESCRIPTION - ORIENTATION
ORIENTATION: LOWER
ORIENTATION: LOWER

## 2022-03-10 ASSESSMENT — PAIN SCALES - GENERAL
PAINLEVEL_OUTOF10: 8
PAINLEVEL_OUTOF10: 8
PAINLEVEL_OUTOF10: 7
PAINLEVEL_OUTOF10: 8

## 2022-03-10 ASSESSMENT — PAIN DESCRIPTION - PAIN TYPE
TYPE: CHRONIC PAIN
TYPE: CHRONIC PAIN

## 2022-03-10 ASSESSMENT — PAIN DESCRIPTION - DESCRIPTORS: DESCRIPTORS: OTHER (COMMENT)

## 2022-03-10 ASSESSMENT — PAIN DESCRIPTION - FREQUENCY: FREQUENCY: CONTINUOUS

## 2022-03-10 ASSESSMENT — PAIN DESCRIPTION - LOCATION
LOCATION: BACK;LEG
LOCATION: BACK;LEG

## 2022-03-10 NOTE — PROGRESS NOTES
Cardiology Progress Note      Patient:  Padilla Nieves  YOB: 1965  MRN: [id number]   Acct: [de-identified]  [facility] Date:  3/6/2022  Primary Cardiologist: Josh Blake MD    Note per dr Kathia Dwyer:    Chf exac, mod-sev AS      CHIEF COMPLAINT:    SOB  Leg swelling      HISTORY OF PRESENT ILLNESS:    Padilla Nieves is a pleasant 65year old male patient with past medical history that includes:   Past Medical History        Past Medical History:   Diagnosis Date    Acute kidney injury ([address] 12/2020     due to Enterococous Bacteremia , fluid overload, low sodium    Amputation of right great toe ([address] 02/18/2021    Asthma      Brain tumor ([address]      Cirrhosis ([address]       ETOH abuse    CKD (chronic kidney disease)       l.brown-osu spetie    COPD (chronic obstructive pulmonary disease) (HCC)      Diabetes mellitus ([address]       type 2-insulin lantus and humalog    Falling      Glaucoma      Hepatitis C      History of blood transfusion       s/p nasal surgery    Hyperlipidemia      Hypertension       Nallu    Legally blind      Obesity      Pain management       karol marzec-percocet Rx    Right foot infection 11/2021    Seizures ([address]        He has h/o tobacco abuse. Patient is followed by Dr Alda Ascencio, has h/o AS, HFrEF. Echo 07/2021 revealed an EF of 35-40%, moderate AS with LOBITO of 1.2 cm2. Stress test 4/2021 revealed a fixed inferior wall defect. LHC on 08/2021 revealed no significant CAD, mean gradient across the aortic valve of 22 mmHg. The patient was admitted to the hospital on 3/6/2022 after he presented with worsening SOB, leg swelling, WILCOX. Patient had some weight gain. He states that his symptoms have increased over the past two weeks. He reports fatigue. Patient denies chest pain, palpitations, dizziness, syncope. Cardiomegaly and pleural effusions were noted on CXR. Pro-BNP 4,977. Cr 1.1. Troponin <0.01, <0.01. EKG revealed NSR.  He was started on IV Lasix gtt 5mg/hour. Echocardiogram 3/6/2022 revealed an EF of 35%, moderate LAE, Moderate to severe AS with LOBITO ~ 0.9-1 cm2, peak gradient 40 mmHg, mean gradient 25 mmHg, peak velocity 3.1 m/s. \"    Subjective (Events in last 24 hours):  Pt awake and alert. NAD. No cp. Still with sob.   Leg edema improved  On 4 l/min O2 - on 3 l/min at home  On lasix gtt at 5mg/hr    Net I/o -11.9 L    Objective:   BP (!) 167/91   Pulse 65   Temp 98.4 °F (36.9 °C) (Oral)   Resp 22   Ht 6' 2\" (1.88 m)   Wt (!) 327 lb 6.1 oz (148.5 kg)   SpO2 98%   BMI 42.03 kg/m²        TELEMETRY: nsr - 60s    Physical Exam:  General Appearance: alert and oriented to person, place and time, in no acute distress  Cardiovascular: normal rate, regular rhythm, normal S1 and S2, no murmurs, rubs, clicks, or gallops, distal pulses intact, no carotid bruits, no JVD  Pulmonary/Chest: bilateral exp wheezes/rhonchi  Abdomen: soft, non-tender, non-distended, normal bowel sounds, no masses Extremities: trace ble edema, pulse   Skin: warm and dry  Head: normocephalic and atraumatic  Eyes: pupils equal, round, and reactive to light  Neck: supple and non-tender without mass, no thyromegaly   Neurological: alert, oriented, normal speech, no focal findings or movement disorder noted    Medications:    metoprolol succinate  100 mg Oral Daily    ipratropium-albuterol  3 mL Inhalation 4x daily    insulin lispro  0-6 Units SubCUTAneous TID WC    insulin lispro  0-3 Units SubCUTAneous Nightly    nitroglycerin  1 inch Topical Q6H    lisinopril  5 mg Oral BID    aspirin  81 mg Oral Daily    pantoprazole  40 mg Oral QAM AC    enoxaparin  40 mg SubCUTAneous BID    docusate sodium  100 mg Oral BID    DULoxetine  60 mg Oral Daily    folic acid  1 mg Oral Daily    insulin glargine  42 Units SubCUTAneous Nightly    latanoprost  1 drop Both Eyes Nightly    rosuvastatin  20 mg Oral Nightly    sodium bicarbonate  650 mg Oral BID      dextrose      furosemide (LASIX) 1mg/ml infusion 5 mg/hr (03/10/22 1334)     acetaminophen, 650 mg, Q4H PRN  morphine, 2 mg, Q4H PRN  ondansetron, 4 mg, Q6H PRN  albuterol, 2.5 mg, Q4H PRN  glucagon (rDNA), 1 mg, PRN  dextrose, 100 mL/hr, PRN  dextrose bolus (hypoglycemia), 125 mL, PRN   Or  dextrose bolus (hypoglycemia), 250 mL, PRN  glucose, 4 tablet, PRN        Diagnostics:  TTE 3/7/22  Summary   Technically difficult examination. Left Ventricular size is Mildly increased . Normal left ventricular wall thickness. There was severe global hypokinesis of the left ventricle. Systolic function was severely reduced. Ejection fraction is visually estimated in the range of 35%. The left atrium is Moderately dilated. There is moderate-to-severe aortic stenosis with valve area of 0.9 to 1 sq   cm. The maximum aortic valve gradient is 40 mmHg, the mean gradient is 25   mmHg, and the peak velocity is 3.1 m/s. Signature      ----------------------------------------------------------------   Electronically signed by Faizan Lebron MD (Interpreting   physician) on 03/07/2022 at 05:58 PM    Lab Data:    Cardiac Enzymes:  No results for input(s): CKTOTAL, CKMB, CKMBINDEX, TROPONINI in the last 72 hours.     CBC:   Lab Results   Component Value Date    WBC 5.6 03/10/2022    RBC 3.44 03/10/2022    HGB 10.5 03/10/2022    HCT 34.4 03/10/2022     03/10/2022       CMP:    Lab Results   Component Value Date     03/10/2022    K 3.7 03/10/2022    K 3.8 08/03/2021    CL 94 03/10/2022    CO2 34 03/10/2022    BUN 17 03/10/2022    CREATININE 1.0 03/10/2022    LABGLOM 77 03/10/2022    GLUCOSE 149 03/10/2022    CALCIUM 8.7 03/10/2022       Hepatic Function Panel:    Lab Results   Component Value Date    ALKPHOS 131 03/08/2022    ALT 23 03/08/2022    AST 47 03/08/2022    PROT 7.6 03/08/2022    BILITOT 0.8 03/08/2022    BILIDIR 0.4 03/08/2022    LABALBU 3.2 03/08/2022       Magnesium:    Lab Results   Component Value Date    MG 1.9 03/03/2021 PT/INR:    Lab Results   Component Value Date    INR 1.22 03/09/2022       HgBA1c:    Lab Results   Component Value Date    LABA1C 6.6 02/24/2021       FLP:    Lab Results   Component Value Date    TRIG 73 03/07/2022    HDL 40 03/07/2022    LDLCALC 32 03/07/2022       TSH:    Lab Results   Component Value Date    TSH 0.951 03/07/2022         Assessment:    Acute on chronic HFrEF - improving  Volume overload  Severe NICMP - ef 35 per TTE  Mod to severe AS - LOBITO 0.9-1 cm2, mean gradient 25 mmHg  Possible low flow, low gradient severe AS  HTN  COPD exacerbation   Tobacco abuse  Morbid obesity  Hx liver cirrhosis  Patent coronaries per cath 8/3/21    Plan:    Daily I/o and weight  2 liter fluid restriction and 2gm sodium diet  Keep mag >2 and K >4  Cont diuresis  low dose dobutamine stress test (DSE), with definity contrast to further assess AS severity once euvolemic and pulm status improves  Cont BB/ace   Avoid nitrates - stop nitro paste  Pt request nicotine patch - ordered  cxr and bnp in am  Daily bmp  Referral to chf clinic - done  lifevest - ordered  Check dopplers to r/o ble DVT - negative   Check cost farxiga with pharmacy   Recommend pulmonary consult  Change lisinopril to 20 daily     Electronically signed by Jimbo Mcelroy PA-C on 3/10/2022 at 2:47 PM

## 2022-03-10 NOTE — PLAN OF CARE
Problem: Falls - Risk of:  Goal: Will remain free from falls  Description: Will remain free from falls  Outcome: Ongoing  Note: No falls noted this shift. Continue falling star program. Bed alarm on, bed in low position. Call light and personal belongings in reach. Patient uses call light appropriately. Goal: Absence of physical injury  Description: Absence of physical injury  Outcome: Ongoing     Problem: Pain:  Goal: Pain level will decrease  Description: Pain level will decrease  Outcome: Ongoing  Note: Patient complaining of pain this shift. Prn pain medication available. Will monitor. Goal: Control of acute pain  Description: Control of acute pain  Outcome: Ongoing  Goal: Control of chronic pain  Description: Control of chronic pain  Outcome: Ongoing     Problem: Skin Integrity:  Goal: Will show no infection signs and symptoms  Description: Will show no infection signs and symptoms  Outcome: Ongoing  Goal: Absence of new skin breakdown  Description: Absence of new skin breakdown  Outcome: Ongoing  Note: No skin break down noted at this time. Encouraged patient to reposition self in bed. Care plan reviewed with patient. Patient verbalizes understanding of plan of care and contributes to goal setting.

## 2022-03-10 NOTE — PROGRESS NOTES
Evaluated patient cost of Farxiga 10mg for Fan TV, [address]. Medication is covered 100% by insurance and patient has zero co-pay. -Dylon Pruitt (ext. [id number]) 3/10/2022,4:48 PM

## 2022-03-10 NOTE — PLAN OF CARE
Problem: Impaired respiratory status  Goal: Clear lung sounds  Description: Clear lung sounds  Outcome: Not Met This Shift   Breath sounds expiratory wheezing, continuing home regimine.

## 2022-03-10 NOTE — PROGRESS NOTES
[address]  INPATIENT OCCUPATIONAL THERAPY  [facility] RENAL TELEMETRY 6K  EVALUATION    Time:   Time In: 1016  Time Out: 1049  Timed Code Treatment Minutes: 23 Minutes  Minutes: 33          Date: 3/10/2022  Patient Name: Annie Johnson,   Gender: male      MRN: [id number]  : 1965  (65 y.o.)  Referring Practitioner: Mona Franklin MD  Diagnosis: acute on chronic systolic CHF  Additional Pertinent Hx: per chart review; The patient is a 65 y.o. male who presents with insidious onset of shortness of breath, legs swelling and weight gain in the last few weeks. He endorses significant orthopnea and PND. He has cough,wheezes. He is on a diuretic at home. His weight gain persisted and the SOB got worse. He reports increased fatigue. He had a fall at home on account of increasing weakness and fatigue, bruised his right knee. No head injury and no loss of consciousness with the fall. Subsequently presented to ER. ER evaluation showed evidence of fluid overload with elevated BNP, pulmonary congestion. Restrictions/Precautions:  Restrictions/Precautions: Fall Risk,General Precautions  Position Activity Restriction  Other position/activity restrictions: legally blind, chronic back pain    Subjective  Chart Reviewed: Olga Lorenzo and Physical,Progress Notes,Imaging  Patient assessed for rehabilitation services?: Yes  Family / Caregiver Present: No    Subjective: RN approved session. patient side lying in bed upon OT arrival and agreeable to eval. A & O x 4. reports he is legally blind.     Pain:  Pain Assessment  Patient Currently in Pain: Yes  Pain Assessment: 0-10  Pain Level: 8  Pain Type: Chronic pain  Pain Location: Back;Leg  Pain Orientation: Lower    Vitals: Oxygen: on 3 L O2. did not demo SOB during eval    Social/Functional History:  Lives With: Alone  Type of Home: House  Home Layout: One level  Home Access: Level entry  Home Equipment: Oxygen,Electric scooter,Rolling walker,Wheelchair-manual,Reacher   Bathroom Shower/Tub: Walk-in shower  Bathroom Toilet: Handicap height  Bathroom Equipment: Grab bars in [facility] chair,3-in-1 commode  Bathroom Accessibility: Accessible,Walker accessible    Ruby 69 Help From: Family  ADL Assistance: Independent  Homemaking Assistance: Independent  Homemaking Responsibilities: Yes  Ambulation Assistance: Independent  Transfer Assistance: Independent    Active : No  Patient's  Info: wife provides all transportation  Occupation: On disability  Additional Comments: patient reports he has a service dog. reports he is , however he and his wife do not live together. wife checks in on patient and drives him places. reports his house is completely handicap accessible. VISION:reports he is legally blind    HEARING:  WFL    COGNITION: Decreased Insight and Decreased Safety Awareness    RANGE OF MOTION:  Right Upper Extremity: WFL  Left Upper Extremity:  WFL    STRENGTH:  Right Upper Extremity: WFL  Left Upper Extremity:  WFL    SENSATION:   Reports neuropathy in (B) hands and feet. ADL:   Grooming: with set-up.  to complete oral care with mouthwash seated in recliner  Lower Extremity Dressing: Maximum Assistance. to slipper socks seated EOB. BALANCE:  Sitting Balance:  Supervision. seated EOB  Standing Balance: Contact Guard Assistance. with use of s/w for support    BED MOBILITY:  Supine to Sit: Stand By Assistance use of bedrails and facial grimacing due to back pain    TRANSFERS:  Sit to Stand:  Contact Guard Assistance. cues for hand placement  Stand Pivot: Contact Guard Assistance. from EOB to recliner with assist to manage IV line and O2 tubing with s/w    FUNCTIONAL MOBILITY:  Assistive Device: Walker  Assist Level:  Contact Guard Assistance. Distance: took a few steps as pivoting from EOB to recliner        Activity Tolerance:  Patient tolerance of  treatment: good.  O2 dependent, motivated to participate in OT, legally blind, de-conditioned, no c/o SOB or demo fatigue , back pain     Assessment:  Assessment: patient demonstrates de-conditioning and decreased activity tolerance to safely complete his ADLs as prior and would benefit from continued, skilled OT to progress toward PLOF, decrease caregiver burden and prevent re-hospitalization. Performance deficits / Impairments: Decreased functional mobility ,Decreased ADL status,Decreased endurance  Prognosis: Good  REQUIRES OT FOLLOW UP: Yes  Decision Making: Low Complexity    Treatment Initiated: Treatment and education initiated within context of evaluation. Evaluation time included review of current medical information, gathering information related to past medical, social and functional history, completion of standardized testing, formal and informal observation of tasks, assessment of data and development of plan of care and goals. Treatment time included skilled education and facilitation of tasks to increase safety and independence with ADL's for improved functional independence and quality of life. Discharge Recommendations:  Continue to assess pending progress,Patient would benefit from continued therapy after discharge,Home with Home health OT,Subacute/Skilled Nursing Facility,ECF with OT,Home with assist PRN    Patient Education:  OT Education: Nell GONZALEZ/ Dorian Copeland [facility] [employer],Transfer Training  Patient Education: importance of increasing activity and changing positions for back pain relief  Barriers to Learning: low vision    Equipment Recommendations:  Equipment Needed: Yes  Mobility Devices: ADL Assistive Devices    Plan:  Times per week: 3-5x  Times per day: Daily  Current Treatment Recommendations: Endurance Training,Neuromuscular Re-education,Patient/Caregiver Education & Training,Self-Care / ADL,Equipment Evaluation, Education, & procurement,Safety Education & Training.   See long-term goal time frame for expected duration of plan of care. If no long-term goals established, a short length of stay is anticipated. Goals:  Patient goals : return home at [facility]  Short term goals  Time Frame for Short term goals: by discharge  Short term goal 1: patient will tolerate 4 min func dyn standing with (S) with 2 hand release and O2 >/= 90% to increase activity tolerance for toileting. Short term goal 2: patient will increase activity tolerance to functionally ambulate household distances with (S) with 0-1 verbal cues for safety and sequencing. Short term goal 3: patient will complete UB ADLs with s/u and LB ADLs with SBA and use of AE PRN. Following session, patient left in safe position with all fall risk precautions in place.

## 2022-03-10 NOTE — CARE COORDINATION
DISCHARGE/PLANNING EVALUATION  3/10/22, 1:44 PM EST    Reason for Referral: interested in [place] for med set-up (legally blind). Also would like to know if medicaid would cover any aide services for him. Mental Status: is alert and oriented  Decision Making: is making own decisions. Family/Social/Home Environment: Patient resides at home alone with a friend Keisha who assists him as she can. Patient stated that due to gas prices she does not come as often to see him. She does assist with transportation also when she needs to. Patient is agreeable to home health at discharge. Current Services including food security, transportation and housekeeping:  See above  Current Equipment: walker, wheelchair, and hover around  Payment Source: [facility] Medicare   Concerns or Barriers to Discharge: not at this time   Post acute provider list with quality measures, geographic area and applicable managed care information provided. Questions regarding selection process answered: offered and prefers any HH close to his home. Teach Back Method used with Patient regarding care plan and discharge plan. Patient  verbalize understanding of the plan of care and contribute to goal setting. Patient goals, treatment preferences and discharge plan: Patient return home with new HH. KENZIE spoke with [facility] and they are not accepting into [facility] at this time. Referral to [facility] [place] and left message. KENZIE left message with [facility] and then later spoke with her and they are able to receive.      Electronically signed by TAMARA Correia, CHON on 3/10/2022 at 1:44 PM

## 2022-03-10 NOTE — PROGRESS NOTES
INTERNAL MEDICINE Progress Note  3/10/2022 4:57 PM  Subjective:   Admit Date: 3/6/2022  PCP: MEGHAN Arthur - CNP  Interval History:     Patient is diuresing well, no SOB at rest  On DARRON lasix, negative fluid balance -12,102  Wt loss+,     Objective:   Vitals: BP (!) 158/92   Pulse 59   Temp 98.4 °F (36.9 °C) (Axillary)   Resp 18   Ht 6' 2\" (1.88 m)   Wt (!) 327 lb 6.1 oz (148.5 kg)   SpO2 98%   BMI 42.03 kg/m²   General appearance: alert and cooperative with exam  HEENT: atraumatic  Neck:  no JVD  Lungs: diminished breath sounds bilaterally and rales bibasilar-scant  Heart: S1, S2 normal and 2/6 SM  Abdomen: normal findings: bowel sounds normal and no organomegaly and abnormal findings:  distended and obese  Extremities: +edema, improved +  Neurologic: Alert, oriented, thought content appropriate      Medications:   Scheduled Meds:   nicotine  1 patch TransDERmal Daily    [START ON 3/11/2022] lisinopril  20 mg Oral Daily    metoprolol succinate  100 mg Oral Daily    ipratropium-albuterol  3 mL Inhalation 4x daily    insulin lispro  0-6 Units SubCUTAneous TID WC    insulin lispro  0-3 Units SubCUTAneous Nightly    aspirin  81 mg Oral Daily    pantoprazole  40 mg Oral QAM AC    enoxaparin  40 mg SubCUTAneous BID    docusate sodium  100 mg Oral BID    DULoxetine  60 mg Oral Daily    folic acid  1 mg Oral Daily    insulin glargine  42 Units SubCUTAneous Nightly    latanoprost  1 drop Both Eyes Nightly    rosuvastatin  20 mg Oral Nightly    sodium bicarbonate  650 mg Oral BID     Continuous Infusions:   dextrose      furosemide (LASIX) 1mg/ml infusion 5 mg/hr (03/10/22 1334)       Lab Results:   CBC:   Recent Labs     03/08/22  0410 03/09/22 0458 03/10/22  0544   WBC 4.9 5.4 5.6   HGB 9.8* 10.1* 10.5*   * 102* 114*     BMP:    Recent Labs     03/08/22  0410 03/09/22 0458 03/10/22  0544    136 139   K 3.5 3.6 3.7   CL 97* 91* 94*   CO2 33 34* 34*   BUN 16 16 17   CREATININE 1.1 1.0 1.0   GLUCOSE 151* 157* 149*     Hepatic:   Recent Labs     03/08/22  0410   AST 47*   ALT 23   BILITOT 0.8   ALKPHOS 131*     Magnesium:    Lab Results   Component Value Date    MG 1.9 03/03/2021     HgBA1c:    Lab Results   Component Value Date    LABA1C 6.6 02/24/2021     TSH:    Lab Results   Component Value Date    TSH 0.951 03/07/2022     FOLATE:    Lab Results   Component Value Date    FOLATE > 20.0 03/07/2022     IRON:    Lab Results   Component Value Date    IRON 64 03/07/2022     FERRITIN:    Lab Results   Component Value Date    FERRITIN 175 03/07/2022     TTE Summary    Technically difficult examination.    Left Ventricular size is Mildly increased .    Normal left ventricular wall thickness.    There was severe global hypokinesis of the left ventricle.    Systolic function was severely reduced.    Ejection fraction is visually estimated in the range of 35%.    The left atrium is Moderately dilated.    There is moderate-to-severe aortic stenosis with valve area of 0.9 to 1 sq    cm.    The maximum aortic valve gradient is 40 mmHg, the mean gradient is 25    mmHg, and the peak velocity is 3.1 m/s.        Signature  ----------------------------------------------------------------    Electronically signed by Primitivo Ambrocio MD (Interpreting    physician) on 03/07/2022 at 05:58 PM       Assessment and Plan:   1. Acute on chronic systolic CHF-exac  2. Mod-severe AS, finn 0.9 sq cm  3. HTN  4. COPD, stable  5. DM 2, stable BS  6. Pancytopenia, improved wbc  7. H/o HCV  8. H/o liver cirrhosis  9.  Morbid obesity     Diuresing well, cont DARRON lasix  Cont BB, adjusted ACEI, optimize meds  Cont Bronchodilators  O2 supplement  Lovenox,  Am labs    Maria Elena Dumont MD, MD

## 2022-03-10 NOTE — RT PROTOCOL NOTE
RT Inhaler-Nebulizer Bronchodilator Protocol Note    There is a bronchodilator order in the chart from a provider indicating to follow the RT Bronchodilator Protocol and there is an Initiate RT Inhaler-Nebulizer Bronchodilator Protocol order as well (see protocol at bottom of note). CXR Findings:  No results found. The findings from the last RT Protocol Assessment were as follows:   History Pulmonary Disease: Chronic pulmonary disease  Respiratory Pattern: Mild dyspnea at rest, irregular pattern, or RR 21-25 bpm  Breath Sounds: Inspiratory and expiratory or bilateral wheezing and/or rhonchi  Cough: Strong, productive  Indication for Bronchodilator Therapy: On home bronchodilators,Wheezing associated with pulm disorder  Bronchodilator Assessment Score: 13    Aerosolized bronchodilator medication orders have been revised according to the RT Inhaler-Nebulizer Bronchodilator Protocol below. Respiratory Therapist to perform RT Therapy Protocol Assessment initially then follow the protocol. Repeat RT Therapy Protocol Assessment PRN for score 0-3 or on second treatment, BID, and PRN for scores above 3. No Indications - adjust the frequency to every 6 hours PRN wheezing or bronchospasm, if no treatments needed after 48 hours then discontinue using Per Protocol order mode. If indication present, adjust the RT bronchodilator orders based on the Bronchodilator Assessment Score as indicated below. Use Inhaler orders unless patient has one or more of the following: on home nebulizer, not able to hold breath for 10 seconds, is not alert and oriented, cannot activate and use MDI correctly, or respiratory rate 25 breaths per minute or more, then use the equivalent nebulizer order(s) with same Frequency and PRN reasons based on the score. If a patient is on this medication at home then do not decrease Frequency below that used at home.     0-3 - enter or revise RT bronchodilator order(s) to equivalent RT Bronchodilator order with Frequency of every 4 hours PRN for wheezing or increased work of breathing using Per Protocol order mode. 4-6 - enter or revise RT Bronchodilator order(s) to two equivalent RT bronchodilator orders with one order with BID Frequency and one order with Frequency of every 4 hours PRN wheezing or increased work of breathing using Per Protocol order mode. 7-10 - enter or revise RT Bronchodilator order(s) to two equivalent RT bronchodilator orders with one order with TID Frequency and one order with Frequency of every 4 hours PRN wheezing or increased work of breathing using Per Protocol order mode. 11-13 - enter or revise RT Bronchodilator order(s) to one equivalent RT bronchodilator order with QID Frequency and an Albuterol order with Frequency of every 4 hours PRN wheezing or increased work of breathing using Per Protocol order mode. Greater than 13 - enter or revise RT Bronchodilator order(s) to one equivalent RT bronchodilator order with every 4 hours Frequency and an Albuterol order with Frequency of every 2 hours PRN wheezing or increased work of breathing using Per Protocol order mode. RT to enter RT Home Evaluation for COPD & MDI Assessment order using Per Protocol order mode.     Electronically signed by Harvey Casey RCP on 3/10/2022 at 9:49 AM

## 2022-03-11 ENCOUNTER — APPOINTMENT (OUTPATIENT)
Dept: GENERAL RADIOLOGY | Age: 57
DRG: 291 | End: 2022-03-11
Payer: MEDICARE

## 2022-03-11 LAB
ANION GAP SERPL CALCULATED.3IONS-SCNC: 10 MEQ/L (ref 8–16)
BUN BLDV-MCNC: 23 MG/DL (ref 7–22)
CALCIUM SERPL-MCNC: 8.6 MG/DL (ref 8.5–10.5)
CHLORIDE BLD-SCNC: 94 MEQ/L (ref 98–111)
CO2: 32 MEQ/L (ref 23–33)
CREAT SERPL-MCNC: 1.2 MG/DL (ref 0.4–1.2)
ERYTHROCYTE [DISTWIDTH] IN BLOOD BY AUTOMATED COUNT: 14.4 % (ref 11.5–14.5)
ERYTHROCYTE [DISTWIDTH] IN BLOOD BY AUTOMATED COUNT: 52 FL (ref 35–45)
GFR SERPL CREATININE-BSD FRML MDRD: 63 ML/MIN/1.73M2
GLUCOSE BLD-MCNC: 164 MG/DL (ref 70–108)
GLUCOSE BLD-MCNC: 211 MG/DL (ref 70–108)
GLUCOSE BLD-MCNC: 213 MG/DL (ref 70–108)
GLUCOSE BLD-MCNC: 223 MG/DL (ref 70–108)
GLUCOSE BLD-MCNC: 289 MG/DL (ref 70–108)
HCT VFR BLD CALC: 32.9 % (ref 42–52)
HEMOGLOBIN: 10.3 GM/DL (ref 14–18)
MCH RBC QN AUTO: 31 PG (ref 26–33)
MCHC RBC AUTO-ENTMCNC: 31.3 GM/DL (ref 32.2–35.5)
MCV RBC AUTO: 99.1 FL (ref 80–94)
PLATELET # BLD: 219 THOU/MM3 (ref 130–400)
PMV BLD AUTO: 11.6 FL (ref 9.4–12.4)
POTASSIUM SERPL-SCNC: 3.6 MEQ/L (ref 3.5–5.2)
PRO-BNP: 2713 PG/ML (ref 0–900)
RBC # BLD: 3.32 MILL/MM3 (ref 4.7–6.1)
SODIUM BLD-SCNC: 136 MEQ/L (ref 135–145)
WBC # BLD: 5.3 THOU/MM3 (ref 4.8–10.8)

## 2022-03-11 PROCEDURE — 1200000003 HC TELEMETRY R&B

## 2022-03-11 PROCEDURE — 85027 COMPLETE CBC AUTOMATED: CPT

## 2022-03-11 PROCEDURE — 6370000000 HC RX 637 (ALT 250 FOR IP): Performed by: INTERNAL MEDICINE

## 2022-03-11 PROCEDURE — 2700000000 HC OXYGEN THERAPY PER DAY

## 2022-03-11 PROCEDURE — 87077 CULTURE AEROBIC IDENTIFY: CPT

## 2022-03-11 PROCEDURE — 94640 AIRWAY INHALATION TREATMENT: CPT

## 2022-03-11 PROCEDURE — 87070 CULTURE OTHR SPECIMN AEROBIC: CPT

## 2022-03-11 PROCEDURE — 87205 SMEAR GRAM STAIN: CPT

## 2022-03-11 PROCEDURE — 36415 COLL VENOUS BLD VENIPUNCTURE: CPT

## 2022-03-11 PROCEDURE — 94761 N-INVAS EAR/PLS OXIMETRY MLT: CPT

## 2022-03-11 PROCEDURE — 6370000000 HC RX 637 (ALT 250 FOR IP): Performed by: PHYSICIAN ASSISTANT

## 2022-03-11 PROCEDURE — 99222 1ST HOSP IP/OBS MODERATE 55: CPT | Performed by: INTERNAL MEDICINE

## 2022-03-11 PROCEDURE — 99232 SBSQ HOSP IP/OBS MODERATE 35: CPT | Performed by: PHYSICIAN ASSISTANT

## 2022-03-11 PROCEDURE — 83880 ASSAY OF NATRIURETIC PEPTIDE: CPT

## 2022-03-11 PROCEDURE — 1200000000 HC SEMI PRIVATE

## 2022-03-11 PROCEDURE — 82948 REAGENT STRIP/BLOOD GLUCOSE: CPT

## 2022-03-11 PROCEDURE — 80048 BASIC METABOLIC PNL TOTAL CA: CPT

## 2022-03-11 PROCEDURE — 2580000003 HC RX 258: Performed by: INTERNAL MEDICINE

## 2022-03-11 PROCEDURE — 71046 X-RAY EXAM CHEST 2 VIEWS: CPT

## 2022-03-11 PROCEDURE — 6360000002 HC RX W HCPCS: Performed by: INTERNAL MEDICINE

## 2022-03-11 PROCEDURE — 94760 N-INVAS EAR/PLS OXIMETRY 1: CPT

## 2022-03-11 RX ORDER — HYDRALAZINE HYDROCHLORIDE 10 MG/1
10 TABLET, FILM COATED ORAL EVERY 8 HOURS SCHEDULED
Status: DISCONTINUED | OUTPATIENT
Start: 2022-03-11 | End: 2022-03-12

## 2022-03-11 RX ORDER — PREDNISONE 20 MG/1
40 TABLET ORAL DAILY
Status: COMPLETED | OUTPATIENT
Start: 2022-03-11 | End: 2022-03-15

## 2022-03-11 RX ORDER — ISOSORBIDE DINITRATE 10 MG/1
10 TABLET ORAL 3 TIMES DAILY
Status: DISCONTINUED | OUTPATIENT
Start: 2022-03-11 | End: 2022-03-12

## 2022-03-11 RX ORDER — DOXYCYCLINE HYCLATE 100 MG
100 TABLET ORAL EVERY 12 HOURS SCHEDULED
Status: DISCONTINUED | OUTPATIENT
Start: 2022-03-11 | End: 2022-03-15 | Stop reason: HOSPADM

## 2022-03-11 RX ORDER — METOLAZONE 2.5 MG/1
2.5 TABLET ORAL ONCE
Status: COMPLETED | OUTPATIENT
Start: 2022-03-11 | End: 2022-03-11

## 2022-03-11 RX ADMIN — INSULIN GLARGINE 42 UNITS: 100 INJECTION, SOLUTION SUBCUTANEOUS at 21:57

## 2022-03-11 RX ADMIN — METOLAZONE 2.5 MG: 2.5 TABLET ORAL at 13:03

## 2022-03-11 RX ADMIN — PREDNISONE 40 MG: 20 TABLET ORAL at 13:05

## 2022-03-11 RX ADMIN — IPRATROPIUM BROMIDE AND ALBUTEROL SULFATE 3 ML: .5; 3 SOLUTION RESPIRATORY (INHALATION) at 12:24

## 2022-03-11 RX ADMIN — DOCUSATE SODIUM 100 MG: 100 CAPSULE, LIQUID FILLED ORAL at 09:50

## 2022-03-11 RX ADMIN — INSULIN LISPRO 2 UNITS: 100 INJECTION, SOLUTION INTRAVENOUS; SUBCUTANEOUS at 09:56

## 2022-03-11 RX ADMIN — PANTOPRAZOLE SODIUM 40 MG: 40 TABLET, DELAYED RELEASE ORAL at 06:12

## 2022-03-11 RX ADMIN — IPRATROPIUM BROMIDE AND ALBUTEROL SULFATE 3 ML: .5; 3 SOLUTION RESPIRATORY (INHALATION) at 10:12

## 2022-03-11 RX ADMIN — FUROSEMIDE 5 MG/HR: 10 INJECTION, SOLUTION INTRAMUSCULAR; INTRAVENOUS at 09:49

## 2022-03-11 RX ADMIN — DOCUSATE SODIUM 100 MG: 100 CAPSULE, LIQUID FILLED ORAL at 21:55

## 2022-03-11 RX ADMIN — ISOSORBIDE DINITRATE 10 MG: 10 TABLET ORAL at 21:55

## 2022-03-11 RX ADMIN — FOLIC ACID 1 MG: 1 TABLET ORAL at 11:57

## 2022-03-11 RX ADMIN — MORPHINE SULFATE 2 MG: 2 INJECTION, SOLUTION INTRAMUSCULAR; INTRAVENOUS at 21:58

## 2022-03-11 RX ADMIN — ACETAMINOPHEN 650 MG: 325 TABLET ORAL at 22:00

## 2022-03-11 RX ADMIN — DOXYCYCLINE HYCLATE 100 MG: 100 TABLET, COATED ORAL at 21:55

## 2022-03-11 RX ADMIN — DOXYCYCLINE HYCLATE 100 MG: 100 TABLET, COATED ORAL at 13:04

## 2022-03-11 RX ADMIN — ACETAMINOPHEN 650 MG: 325 TABLET ORAL at 09:50

## 2022-03-11 RX ADMIN — ASPIRIN 81 MG CHEWABLE TABLET 81 MG: 81 TABLET CHEWABLE at 09:50

## 2022-03-11 RX ADMIN — MORPHINE SULFATE 2 MG: 2 INJECTION, SOLUTION INTRAMUSCULAR; INTRAVENOUS at 17:58

## 2022-03-11 RX ADMIN — METOPROLOL SUCCINATE 100 MG: 100 TABLET, EXTENDED RELEASE ORAL at 13:03

## 2022-03-11 RX ADMIN — INSULIN LISPRO 2 UNITS: 100 INJECTION, SOLUTION INTRAVENOUS; SUBCUTANEOUS at 18:01

## 2022-03-11 RX ADMIN — ACETAMINOPHEN 650 MG: 325 TABLET ORAL at 17:58

## 2022-03-11 RX ADMIN — SODIUM BICARBONATE 650 MG: 650 TABLET ORAL at 21:55

## 2022-03-11 RX ADMIN — ROSUVASTATIN CALCIUM 20 MG: 20 TABLET, FILM COATED ORAL at 21:55

## 2022-03-11 RX ADMIN — ENOXAPARIN SODIUM 40 MG: 100 INJECTION SUBCUTANEOUS at 21:55

## 2022-03-11 RX ADMIN — HYDRALAZINE HYDROCHLORIDE 10 MG: 10 TABLET, FILM COATED ORAL at 21:55

## 2022-03-11 RX ADMIN — DULOXETINE HYDROCHLORIDE 60 MG: 60 CAPSULE, DELAYED RELEASE ORAL at 11:57

## 2022-03-11 RX ADMIN — LATANOPROST 1 DROP: 50 SOLUTION OPHTHALMIC at 21:55

## 2022-03-11 RX ADMIN — SODIUM BICARBONATE 650 MG: 650 TABLET ORAL at 11:57

## 2022-03-11 RX ADMIN — ISOSORBIDE DINITRATE 10 MG: 10 TABLET ORAL at 13:04

## 2022-03-11 RX ADMIN — MORPHINE SULFATE 2 MG: 2 INJECTION, SOLUTION INTRAMUSCULAR; INTRAVENOUS at 09:50

## 2022-03-11 RX ADMIN — ENOXAPARIN SODIUM 40 MG: 100 INJECTION SUBCUTANEOUS at 09:50

## 2022-03-11 RX ADMIN — IPRATROPIUM BROMIDE AND ALBUTEROL SULFATE 3 ML: .5; 3 SOLUTION RESPIRATORY (INHALATION) at 15:18

## 2022-03-11 RX ADMIN — INSULIN LISPRO 1 UNITS: 100 INJECTION, SOLUTION INTRAVENOUS; SUBCUTANEOUS at 11:59

## 2022-03-11 RX ADMIN — HYDRALAZINE HYDROCHLORIDE 10 MG: 10 TABLET, FILM COATED ORAL at 13:04

## 2022-03-11 RX ADMIN — MORPHINE SULFATE 2 MG: 2 INJECTION, SOLUTION INTRAMUSCULAR; INTRAVENOUS at 03:57

## 2022-03-11 ASSESSMENT — PAIN SCALES - GENERAL
PAINLEVEL_OUTOF10: 9
PAINLEVEL_OUTOF10: 8
PAINLEVEL_OUTOF10: 9

## 2022-03-11 ASSESSMENT — PAIN DESCRIPTION - LOCATION: LOCATION: BACK

## 2022-03-11 NOTE — PROGRESS NOTES
A home oxygen evaluation has been completed. [x]Patient is an inpatient. It is expected that the patient will be discharged within the next 48 hours. Qualified provider to write order for home prescription if patient qualifies. Social service/care managers will arrange for home oxygen. If patient is active, arrange for Home Medical supplier to assess for Oxygen Conserving Device per pulse oximetry. []Patient is an outpatient. Results will be faxed to the ordering provider. Qualified provider to write order for home prescription if patient qualifies and arranges for home oxygen. Patient was placed on room air for 10 minutes. SpO2 was 85 % on room air at rest.  Patient was not taken for a walk due to decreased SpO2. Oxygen was applied at 4 lpm via nasal cannula to maintain a SpO2 of 91%     RN stated that home O2 Eval would be repeated on Monday 03/14/2022     Note: For any SpO2 at 10% see policy and procedure for possible qualifications.

## 2022-03-11 NOTE — CONSULTS
[facility]    Patient - Glo Garner   MRN -  [id number]   [facility]   - 1965      Date of Admission -  3/6/2022  2:23 PM  Date of evaluation -  3/11/2022  Room - [address] [place] [address] MD Ed Primary Care Physician - Cherylene Jewel, APRN - CNP     Problem List      C/Aria Vega Mj 1106 Problems    Diagnosis Date Noted    Acute on chronic systolic congestive heart failure [facility]) [I50.23] 2022     Reason for Consult    For management of COPD and Hypoxic respiratory failure. On oxygen via nasal cannula. HPI   History Obtained From: Patient and electronic medical record. Glo Garner is a 65 y.o. male  was initially admitted under hospitalist service. Pulmonary medicine was consulted for further management of COPD and Hypoxic respiratory failure. He is currently on oxygen via nasal cannula. He is a 59-year-old pleasant gentleman with chronic use of tobacco smoking for 40 years with 1 pack of cigarettes per day. He is still smoking until his current hospitalization to [facility]. He was in his usual state of health until 2 weeks back. He started having worsening of shortness of breath, cough with yellow sputum production for the last 2 weeks. He denies any fever or chills. He also noticed bilateral leg swelling with 2+ edema. He gained a good amount of weight over the last few weeks. He gave a history of orthopnea and paroxysmal nocturnal dyspnea prior to the hospitalization. He was admitted under Dr. Afshan Hung MD service on 2022 for acute decompensation of chronic systolic congestive heart failure. Patient had a history of severe COPD. Pulmonary medicine was consulted for further evaluation. He is having cough: Yes  Duration of cough: for 2 weeks. His cough is associated with sputum production: Yes   The sputum color: yellow  Hemoptysis:No  Diurnal variation: None. SURGERY      NASAL SINUS SURGERY Bilateral 11/29/2020    FLEXIBLE BRONCHOSCOPY WITH BRONCHOALVEOLAR LAVAGE WITH CULTURES. NASAL ENDOSCOPY WITH POSSIBLE CAUTERY ADN NASAL PACKING performed by Angeles Vallejo MD at [facility]  01/2020    SPINE SURGERY N/A 02/19/2021    KYPHOPLASTY at T4, L2, L4 performed by Rocky Barillas MD at [address]  N/A 8/24/2021    LUMBAR 3 AND LUMBAR 5 KYPHOPLASTY performed by Gee Licona MD at [address] Right 09/23/2020    AMPUTATION DISTAL APSECT RIGHT HALLUX, REMOVAL OF HARDWARE RIGHT HALLUX performed by Keo Verdugo DPM at [address] [facility] N/A 04/25/2019    EGD BIOPSY performed by Damon Ibanez MD at [address] [facility]     Meds    Current Medications    nicotine  1 patch TransDERmal Daily    lisinopril  20 mg Oral Daily    metoprolol succinate  100 mg Oral Daily    ipratropium-albuterol  3 mL Inhalation 4x daily    insulin lispro  0-6 Units SubCUTAneous TID WC    insulin lispro  0-3 Units SubCUTAneous Nightly    aspirin  81 mg Oral Daily    pantoprazole  40 mg Oral QAM AC    enoxaparin  40 mg SubCUTAneous BID    docusate sodium  100 mg Oral BID    DULoxetine  60 mg Oral Daily    folic acid  1 mg Oral Daily    insulin glargine  42 Units SubCUTAneous Nightly    latanoprost  1 drop Both Eyes Nightly    rosuvastatin  20 mg Oral Nightly    sodium bicarbonate  650 mg Oral BID     acetaminophen, morphine, ondansetron, albuterol, glucagon (rDNA), dextrose, dextrose bolus (hypoglycemia) **OR** dextrose bolus (hypoglycemia), glucose  IV Drips/Infusions   dextrose      furosemide (LASIX) 1mg/ml infusion 5 mg/hr (03/11/22 0949)     Home Medications  Medications Prior to Admission: oxyCODONE-acetaminophen (PERCOCET) 7.5-325 MG per tablet, Take 1 tablet by mouth every 8 hours as needed for Pain for up to 30 days.   sodium bicarbonate 650 MG tablet, Take 650 mg by mouth 2 times daily  tamsulosin (FLOMAX) 0.4 MG capsule, Take 1 capsule by mouth nightly  DULoxetine (CYMBALTA) 60 MG extended release capsule, Take 1 capsule by mouth daily  insulin glargine (LANTUS) 100 UNIT/ML injection vial, Inject 84 Units into the skin nightly  docusate sodium (COLACE, DULCOLAX) 100 MG CAPS, Take 100 mg by mouth 2 times daily  OXYGEN, Inhale into the lungs daily as needed (nightly)   latanoprost (XALATAN) 0.005 % ophthalmic solution, Place 1 drop into both eyes nightly  rosuvastatin (CRESTOR) 20 MG tablet, Take 20 mg by mouth nightly   azithromycin (ZITHROMAX) 250 MG tablet, TAKE TWO (2) TABLETS THE 1ST DOSE, THEN TAKE ONE (1) TABLET EVERY DAY UNTIL GONE FOR INFECTION. senna (SENOKOT) 8.6 MG tablet, Take 2 tablets by mouth daily  gabapentin (NEURONTIN) 300 MG capsule, Take 1 capsule by mouth nightly for 30 days. nitroGLYCERIN (NITROSTAT) 0.4 MG SL tablet, up to max of 3 total doses. If no relief after 1 dose, call 911. metoprolol succinate (TOPROL XL) 50 MG extended release tablet, Take 1 tablet by mouth daily  insulin lispro (HUMALOG) 100 UNIT/ML injection vial, Inject 10 Units into the skin 3 times daily (before meals) Plus Sliding Scale (Patient taking differently: Inject 12 Units into the skin 3 times daily (before meals) Plus Sliding Scale )  insulin lispro (HUMALOG) 100 UNIT/ML injection vial, Glucose: Dose:  No Insulin, 140-199 2 Units, 200-249 4 Units, 250-299 6 Units, 300-349 8 Units, 350-400 10 Units, Over 028 12 Units  folic acid (FOLVITE) 1 MG tablet, Take 1 mg by mouth daily  albuterol sulfate  (90 Base) MCG/ACT inhaler, Inhale 1 puff into the lungs every 4-6 hours as needed  ipratropium-albuterol (DUONEB) 0.5-2.5 (3) MG/3ML SOLN nebulizer solution, Inhale 3 mLs into the lungs every 4 hours as needed for Shortness of Breath  tiotropium (SPIRIVA) 18 MCG inhalation capsule, Inhale 1 capsule into the lungs daily  Diet    ADULT DIET;  Regular; 4 carb choices (60 gm/meal)  Allergies Adhesive tape and Keppra [levetiracetam]  Family History          Problem Relation Age of Onset    Heart Attack Father     Heart Attack Brother         pneumonia    No Known Problems Mother     Cancer Sister         breast    No Known Problems Maternal Grandmother     No Known Problems Maternal Grandfather     Liver Cancer Paternal Grandmother     Heart Attack Paternal Grandfather     Heart Disease Brother         stents    Colon Cancer Neg Hx     Colon Polyps Neg Hx      Sleep History    Never diagnosed with sleep apnea in the past.    Social History     Social History     Tobacco Use    Smoking status: Current Every Day Smoker     Packs/day: 1.00     Years: 30.00     Pack years: 30.00     Types: Cigarettes    Smokeless tobacco: Never Used    Tobacco comment: Currently smoking electronic cigarettes   Vaping Use    Vaping Use: Never used   Substance Use Topics    Alcohol use: Yes     Alcohol/week: 3.0 standard drinks     Types: 3 Cans of beer per week    Drug use: Not Currently     Types: Marijuana Robe Martín)     Comment: medical marijuana not currently using       Riview of systems   General/Constitutional:  Recent weight loss: Yes. He lost a significant amount of weight after being admitted to [facility] due to diuresis  Appetite changes: Normal.  Fever:No  Chills:No  HENT: Negative. Eyes: Negative. Upper respiratory tract: No nasal stuffiness with no post nasal drip. Lower respiratory tract/ lungs: See HPI for details. No hemoptysis. Cardiovascular: No palpitations or chest pain. Gastrointestinal: No nausea or vomiting. Neurological: No focal neurologiacal weakness. Extremities: No edema. Musculoskeletal: No complaints. Genitourinary: No complaints. Hematological: Negative. Psychiatric/Behavioral: Negative. Skin: No itching. Vitals     height is 6' 2\" (1.88 m) and weight is 326 lb 4.5 oz (148 kg) (abnormal). His oral temperature is 98.3 °F (36.8 °C).  His blood pressure is 171/97 (abnormal) and his pulse is 72. His respiration is 18 and oxygen saturation is 98%. Body mass index is 41.89 kg/m². SUPPLEMENTAL O2: O2 Flow Rate (L/min): 3 L/min       I/O        Intake/Output Summary (Last 24 hours) at 3/11/2022 1034  Last data filed at 3/11/2022 0614  Gross per 24 hour   Intake 1220 ml   Output 2275 ml   Net -1055 ml     I/O last 3 completed shifts: In: 2180 [P.O.:2180]  Out: 7519 [Urine:4350]   Patient Vitals for the past 96 hrs (Last 3 readings):   Weight   03/11/22 0600 (!) 326 lb 4.5 oz (148 kg)   03/10/22 0409 (!) 327 lb 6.1 oz (148.5 kg)   03/08/22 0600 (!) 345 lb 10.9 oz (156.8 kg)       Exam   General Appearance: well built, well nourished in no acute distress on 3LPM via nasal cannula. HEENT: Normal, Head is normocephalic, atraumatic. Oropharynx is clear and moist.  No oral thrush. PERRL. Neck - Supple, No JVD present. No tracheal deviation. Lungs - Bilateral air entry present. Decreased breath sounds at bases on both sides of chest.  Bilateral expiratory wheezes. Bilateral basal rales. Cardiovascular - Heart sounds are normal.  Regular rhythm normal rate without murmur, gallop or rub. Abdomen - Soft, nontender, nondistended, no masses or organomegaly  Neurologic - Awake, alert, oriented. There are no focal motor or sensory deficits. Extremities - No cyanosis, clubbing. 1+ bilateral leg edema. Musculoskeletal: Normal range of motion. Patient exhibits no tenderness. Lymphadenopathy:  No cervical adenopathy. Psychiatric: Patient  has a normal mood and affect. Skin - No bruising or bleeding.     Labs  - Old records and notes have been reviewed in [facility]   ABG  No results found for: PH, PO2, PCO2, HCO3, O2SAT  No results found for: Nakul Miu, SETPEEP  CBC  Recent Labs     03/09/22  0458 03/10/22  0544 03/11/22  0543   WBC 5.4 5.6 5.3   RBC 3.32* 3.44* 3.32*   HGB 10.1* 10.5* 10.3*   HCT 33.3* 34.4* 32.9*   .3* 100.0* 99.1*   MCH 30.4 30.5 31.0   MCHC 30.3* 30.5* 31.3*   * 114* 219   MPV 10.2 10.5 11.6      BMP  Recent Labs     03/09/22  0458 03/10/22  0544 03/11/22  0543    139 136   K 3.6 3.7 3.6   CL 91* 94* 94*   CO2 34* 34* 32   BUN 16 17 23*   CREATININE 1.0 1.0 1.2   GLUCOSE 157* 149* 211*   CALCIUM 9.1 8.7 8.6     LFT  No results for input(s): AST, ALT, ALB, BILITOT, ALKPHOS, LIPASE in the last 72 hours. Invalid input(s): AMYLASE  TROP  Lab Results   Component Value Date    TROPONINT < 0.010 03/06/2022    TROPONINT < 0.010 03/06/2022    TROPONINT < 0.010 03/19/2019     BNP  No results for input(s): BNP in the last 72 hours. Lactic Acid  No results for input(s): LACTA in the last 72 hours. INR  Recent Labs     03/09/22  0458   INR 1.22*     PTT  No results for input(s): APTT in the last 72 hours. Glucose  Recent Labs     03/10/22  1627 03/10/22  2044 03/11/22  0628   POCGLU 137* 232* 213*     UA No results for input(s): SPECGRAV, PHUR, COLORU, CLARITYU, MUCUS, PROTEINU, BLOODU, RBCUA, WBCUA, BACTERIA, NITRU, GLUCOSEU, BILIRUBINUR, UROBILINOGEN, KETUA, LABCAST, LABCASTTY, AMORPHOS in the last 72 hours. Invalid input(s): CRYSTALS. PFTs 3/20/2019       Sleep studies   None in epic    Cultures    COVID-19 test performed on 6 March 2022: Not detected    EKG       Echocardiogram   Roseline Guadalupe MD  [phone] 3/7/2022   Narrative & Impression  Transthoracic Echocardiography Report (TTE)  Conclusions      Summary   Technically difficult examination. Left Ventricular size is Mildly increased . Normal left ventricular wall thickness. There was severe global hypokinesis of the left ventricle. Systolic function was severely reduced. Ejection fraction is visually estimated in the range of 35%. The left atrium is Moderately dilated. There is moderate-to-severe aortic stenosis with valve area of 0.9 to 1 sq   cm.    The maximum aortic valve gradient is 40 mmHg, the mean gradient is 25   mmHg, and the peak optimization of CHF by primary service.  -He is currently on Lasix drip at 5 mg/h  - Patient educated to quit smoking as soon as possible. Patient verbalizes understanding of adverse consequences of tobacco smoking.  -Send sputum cultures  -We will start patient on doxycycline 100 mg p.o. twice daily for 7 days.  -Prednisone 40 mg p.o. daily for 5 days and stop  -Continue DuoNebs 3ml nebulization every 4 hourly while awake.  -Schedule patient for full pulmonary function tests before clinic visit. -Needs a home O2 evaluation at the time of discharge  -Titrate Oxygen to keep Spo2 >90%. -Deep Venous Thrombosis Prophylaxis: lovenox. \"Thank you for asking us to see this patient\"     Katya Cotton educated about my impression and plan. He verbalizes understanding. Questions and concerns addressed.     Electronically signed by   Jcarlos Stovall MD on 3/11/2022 at 10:34 AM

## 2022-03-11 NOTE — PROGRESS NOTES
Cardiology Progress Note      Patient:  Deloris Seals  YOB: 1965  MRN: [id number]   Acct: [de-identified]  [facility] Date:  3/6/2022  Primary Cardiologist: Perez Torres MD    Note per dr Hellen Key:    Chf exac, mod-sev AS      CHIEF COMPLAINT:    SOB  Leg swelling      HISTORY OF PRESENT ILLNESS:    Deloris Seals is a pleasant 65year old male patient with past medical history that includes:   Past Medical History        Past Medical History:   Diagnosis Date    Acute kidney injury ([address] 12/2020     due to Enterococous Bacteremia , fluid overload, low sodium    Amputation of right great toe ([address] 02/18/2021    Asthma      Brain tumor ([address]      Cirrhosis ([address]       ETOH abuse    CKD (chronic kidney disease)       l.brown-osu spetie    COPD (chronic obstructive pulmonary disease) (HCC)      Diabetes mellitus ([address]       type 2-insulin lantus and humalog    Falling      Glaucoma      Hepatitis C      History of blood transfusion       s/p nasal surgery    Hyperlipidemia      Hypertension       Nallu    Legally blind      Obesity      Pain management       karol quiana-percocet Rx    Right foot infection 11/2021    Seizures ([address]        He has h/o tobacco abuse. Patient is followed by Dr Dawkins Friday, has h/o AS, HFrEF. Echo 07/2021 revealed an EF of 35-40%, moderate AS with LOBITO of 1.2 cm2. Stress test 4/2021 revealed a fixed inferior wall defect. LHC on 08/2021 revealed no significant CAD, mean gradient across the aortic valve of 22 mmHg. The patient was admitted to the hospital on 3/6/2022 after he presented with worsening SOB, leg swelling, WILCOX. Patient had some weight gain. He states that his symptoms have increased over the past two weeks. He reports fatigue. Patient denies chest pain, palpitations, dizziness, syncope. Cardiomegaly and pleural effusions were noted on CXR. Pro-BNP 4,977. Cr 1.1. Troponin <0.01, <0.01. EKG revealed NSR.  He was started on IV Lasix gtt Eyes Nightly    rosuvastatin  20 mg Oral Nightly    sodium bicarbonate  650 mg Oral BID      dextrose      furosemide (LASIX) 1mg/ml infusion 5 mg/hr (03/11/22 0949)     acetaminophen, 650 mg, Q4H PRN  morphine, 2 mg, Q4H PRN  ondansetron, 4 mg, Q6H PRN  glucagon (rDNA), 1 mg, PRN  dextrose, 100 mL/hr, PRN  dextrose bolus (hypoglycemia), 125 mL, PRN   Or  dextrose bolus (hypoglycemia), 250 mL, PRN  glucose, 4 tablet, PRN        Diagnostics:  TTE 3/7/22  Summary   Technically difficult examination. Left Ventricular size is Mildly increased . Normal left ventricular wall thickness. There was severe global hypokinesis of the left ventricle. Systolic function was severely reduced. Ejection fraction is visually estimated in the range of 35%. The left atrium is Moderately dilated. There is moderate-to-severe aortic stenosis with valve area of 0.9 to 1 sq   cm. The maximum aortic valve gradient is 40 mmHg, the mean gradient is 25   mmHg, and the peak velocity is 3.1 m/s. Signature      ----------------------------------------------------------------   Electronically signed by Faizan Lebron MD (Interpreting   physician) on 03/07/2022 at 05:58 PM    Lab Data:    Cardiac Enzymes:  No results for input(s): CKTOTAL, CKMB, CKMBINDEX, TROPONINI in the last 72 hours.     CBC:   Lab Results   Component Value Date    WBC 5.3 03/11/2022    RBC 3.32 03/11/2022    HGB 10.3 03/11/2022    HCT 32.9 03/11/2022     03/11/2022       CMP:    Lab Results   Component Value Date     03/11/2022    K 3.6 03/11/2022    K 3.8 08/03/2021    CL 94 03/11/2022    CO2 32 03/11/2022    BUN 23 03/11/2022    CREATININE 1.2 03/11/2022    LABGLOM 63 03/11/2022    GLUCOSE 211 03/11/2022    CALCIUM 8.6 03/11/2022       Hepatic Function Panel:    Lab Results   Component Value Date    ALKPHOS 131 03/08/2022    ALT 23 03/08/2022    AST 47 03/08/2022    PROT 7.6 03/08/2022    BILITOT 0.8 03/08/2022    BILIDIR 0.4 03/08/2022 LABALBU 3.2 03/08/2022       Magnesium:    Lab Results   Component Value Date    MG 1.9 03/03/2021       PT/INR:    Lab Results   Component Value Date    INR 1.22 03/09/2022       HgBA1c:    Lab Results   Component Value Date    LABA1C 6.6 02/24/2021       FLP:    Lab Results   Component Value Date    TRIG 73 03/07/2022    HDL 40 03/07/2022    LDLCALC 32 03/07/2022       TSH:    Lab Results   Component Value Date    TSH 0.951 03/07/2022         Assessment:    Acute on chronic HFrEF - improving  Volume overload  Severe NICMP - ef 35 per TTE  Mod to severe AS - LOBITO 0.9-1 cm2, mean gradient 25 mmHg  Possible low flow, low gradient severe AS  HTN  COPD exacerbation  Due to acute bronchitis - pulm following  Tobacco abuse  Morbid obesity  Hx liver cirrhosis  Patent coronaries per cath 8/3/21    Plan:    Daily I/o and weight  2 liter fluid restriction and 2gm sodium diet  Keep mag >2 and K >4  Cont diuresis, zaroxolyn 2.5 po x 1 today  low dose dobutamine stress test (DSE), with definity contrast to further assess AS severity once euvolemic and pulm status improves - will schedule for monday  Cont BB  Stop lisinopril and start entresto after 36 hour washout - ok to start tomorrow am  Add hydralazine and isordil  Avoid nitrates - stop nitro paste  Daily bmp  Referral to chf clinic - done  lifevest - ordered  Check dopplers to r/o ble DVT - negative   Check cost farxiga with pharmacy - 100% covered and $0 copay - start upon dc  Smoking cessation advised     Electronically signed by Dhaval Read PA-C on 3/11/2022 at 11:38 AM

## 2022-03-11 NOTE — PLAN OF CARE
Problem: Falls - Risk of:  Goal: Will remain free from falls  Description: Will remain free from falls  Outcome: Ongoing  Goal: Absence of physical injury  Description: Absence of physical injury  Outcome: Ongoing     Problem: Pain:  Goal: Pain level will decrease  Description: Pain level will decrease  Outcome: Ongoing  Goal: Control of acute pain  Description: Control of acute pain  Outcome: Ongoing  Goal: Control of chronic pain  Description: Control of chronic pain  Outcome: Ongoing     Problem: Skin Integrity:  Goal: Will show no infection signs and symptoms  Description: Will show no infection signs and symptoms  Outcome: Ongoing  Goal: Absence of new skin breakdown  Description: Absence of new skin breakdown  Outcome: Ongoing     Problem: DISCHARGE BARRIERS  Goal: Patient's continuum of care needs are met  3/11/2022 0134 by Vera Jacobs RN  Outcome: Ongoing  3/10/2022 1454 by TAMARA Paige, LSW  Outcome: Ongoing  Note: Discharge home and agreeable to home health. See SW notes 3/10/22.

## 2022-03-11 NOTE — CARE COORDINATION
3/11/22, 11:12 AM EST    DISCHARGE PLANNING EVALUATION    Patient is a potential discharge for this weekend. Referral has been made to [facility]. KENZIE spoke with patient and advised him of possible discharge this weekend and that [facility] has accepted referral. Patient stated his understanding. Call to [facility] with [facility]-left message for her to contact KENZIE. Directions on chart for staff for weekend discharge. Update: received call from [facility] with [facility] and confirmed that they have received referral and advised her of discharge this weekend.

## 2022-03-11 NOTE — FLOWSHEET NOTE
Message left with Antione Hilario from Stress test asking if we are to hold beta blockers prior to stress test Monday.

## 2022-03-11 NOTE — PROGRESS NOTES
Recent Labs     03/09/22  0458 03/10/22  0544 03/11/22  0543    139 136   K 3.6 3.7 3.6   CL 91* 94* 94*   CO2 34* 34* 32   BUN 16 17 23*   CREATININE 1.0 1.0 1.2   GLUCOSE 157* 149* 211*     Magnesium:    Lab Results   Component Value Date    MG 1.9 03/03/2021     HgBA1c:    Lab Results   Component Value Date    LABA1C 6.6 02/24/2021     TSH:    Lab Results   Component Value Date    TSH 0.951 03/07/2022     FOLATE:    Lab Results   Component Value Date    FOLATE > 20.0 03/07/2022     IRON:    Lab Results   Component Value Date    IRON 64 03/07/2022     FERRITIN:    Lab Results   Component Value Date    FERRITIN 175 03/07/2022     TTE Summary    Technically difficult examination.    Left Ventricular size is Mildly increased .    Normal left ventricular wall thickness.    There was severe global hypokinesis of the left ventricle.    Systolic function was severely reduced.    Ejection fraction is visually estimated in the range of 35%.    The left atrium is Moderately dilated.    There is moderate-to-severe aortic stenosis with valve area of 0.9 to 1 sq    cm.    The maximum aortic valve gradient is 40 mmHg, the mean gradient is 25    mmHg, and the peak velocity is 3.1 m/s.        Signature  ----------------------------------------------------------------    Electronically signed by Mehnaz Jha MD (Interpreting    physician) on 03/07/2022 at 05:58 PM       Assessment and Plan:   1. Acute on chronic systolic CHF-exac  2. Mod-severe AS, finn 0.9 sq cm  3. HTN  4. COPD, stable  5. DM 2, stable BS  6. Pancytopenia, improved wbc, plt  7. H/o HCV  8. H/o liver cirrhosis  9. Morbid obesity     Diuresing well, improved CXR, cont DARRON lasix  Cont BB,   To start ARNI   Cont Bronchodilators  O2 supplement  Lovenox,  Am labs  Dr Haydee Odell will see patient over this weekend.       Celeste Pagan MD, MD

## 2022-03-12 LAB
ANION GAP SERPL CALCULATED.3IONS-SCNC: 10 MEQ/L (ref 8–16)
BUN BLDV-MCNC: 31 MG/DL (ref 7–22)
CALCIUM SERPL-MCNC: 9.2 MG/DL (ref 8.5–10.5)
CHLORIDE BLD-SCNC: 93 MEQ/L (ref 98–111)
CO2: 32 MEQ/L (ref 23–33)
CREAT SERPL-MCNC: 1.2 MG/DL (ref 0.4–1.2)
GFR SERPL CREATININE-BSD FRML MDRD: 63 ML/MIN/1.73M2
GLUCOSE BLD-MCNC: 201 MG/DL (ref 70–108)
GLUCOSE BLD-MCNC: 228 MG/DL (ref 70–108)
GLUCOSE BLD-MCNC: 269 MG/DL (ref 70–108)
GLUCOSE BLD-MCNC: 360 MG/DL (ref 70–108)
POTASSIUM SERPL-SCNC: 3.5 MEQ/L (ref 3.5–5.2)
SODIUM BLD-SCNC: 135 MEQ/L (ref 135–145)

## 2022-03-12 PROCEDURE — 6360000002 HC RX W HCPCS: Performed by: NURSE PRACTITIONER

## 2022-03-12 PROCEDURE — 1200000003 HC TELEMETRY R&B

## 2022-03-12 PROCEDURE — 6360000002 HC RX W HCPCS: Performed by: INTERNAL MEDICINE

## 2022-03-12 PROCEDURE — 2700000000 HC OXYGEN THERAPY PER DAY

## 2022-03-12 PROCEDURE — 6370000000 HC RX 637 (ALT 250 FOR IP): Performed by: PHYSICIAN ASSISTANT

## 2022-03-12 PROCEDURE — 94640 AIRWAY INHALATION TREATMENT: CPT

## 2022-03-12 PROCEDURE — 82948 REAGENT STRIP/BLOOD GLUCOSE: CPT

## 2022-03-12 PROCEDURE — 2580000003 HC RX 258: Performed by: INTERNAL MEDICINE

## 2022-03-12 PROCEDURE — 99232 SBSQ HOSP IP/OBS MODERATE 35: CPT | Performed by: INTERNAL MEDICINE

## 2022-03-12 PROCEDURE — 94760 N-INVAS EAR/PLS OXIMETRY 1: CPT

## 2022-03-12 PROCEDURE — 6370000000 HC RX 637 (ALT 250 FOR IP): Performed by: INTERNAL MEDICINE

## 2022-03-12 PROCEDURE — 80048 BASIC METABOLIC PNL TOTAL CA: CPT

## 2022-03-12 PROCEDURE — 36415 COLL VENOUS BLD VENIPUNCTURE: CPT

## 2022-03-12 PROCEDURE — 99232 SBSQ HOSP IP/OBS MODERATE 35: CPT | Performed by: NURSE PRACTITIONER

## 2022-03-12 PROCEDURE — 6370000000 HC RX 637 (ALT 250 FOR IP): Performed by: NURSE PRACTITIONER

## 2022-03-12 RX ORDER — FUROSEMIDE 10 MG/ML
40 INJECTION INTRAMUSCULAR; INTRAVENOUS 2 TIMES DAILY
Status: DISCONTINUED | OUTPATIENT
Start: 2022-03-12 | End: 2022-03-13

## 2022-03-12 RX ORDER — HYDRALAZINE HYDROCHLORIDE 25 MG/1
25 TABLET, FILM COATED ORAL EVERY 8 HOURS SCHEDULED
Status: DISCONTINUED | OUTPATIENT
Start: 2022-03-12 | End: 2022-03-13

## 2022-03-12 RX ORDER — SPIRONOLACTONE 25 MG/1
25 TABLET ORAL DAILY
Status: DISCONTINUED | OUTPATIENT
Start: 2022-03-12 | End: 2022-03-15 | Stop reason: HOSPADM

## 2022-03-12 RX ADMIN — SACUBITRIL AND VALSARTAN 1 TABLET: 24; 26 TABLET, FILM COATED ORAL at 21:25

## 2022-03-12 RX ADMIN — FUROSEMIDE 5 MG/HR: 10 INJECTION, SOLUTION INTRAMUSCULAR; INTRAVENOUS at 05:59

## 2022-03-12 RX ADMIN — DULOXETINE HYDROCHLORIDE 60 MG: 60 CAPSULE, DELAYED RELEASE ORAL at 08:33

## 2022-03-12 RX ADMIN — FUROSEMIDE 40 MG: 10 INJECTION, SOLUTION INTRAMUSCULAR; INTRAVENOUS at 17:53

## 2022-03-12 RX ADMIN — MORPHINE SULFATE 2 MG: 2 INJECTION, SOLUTION INTRAMUSCULAR; INTRAVENOUS at 08:26

## 2022-03-12 RX ADMIN — ASPIRIN 81 MG CHEWABLE TABLET 81 MG: 81 TABLET CHEWABLE at 08:26

## 2022-03-12 RX ADMIN — ACETAMINOPHEN 650 MG: 325 TABLET ORAL at 13:58

## 2022-03-12 RX ADMIN — IPRATROPIUM BROMIDE AND ALBUTEROL SULFATE 3 ML: .5; 3 SOLUTION RESPIRATORY (INHALATION) at 16:16

## 2022-03-12 RX ADMIN — INSULIN LISPRO 2 UNITS: 100 INJECTION, SOLUTION INTRAVENOUS; SUBCUTANEOUS at 08:39

## 2022-03-12 RX ADMIN — DOCUSATE SODIUM 100 MG: 100 CAPSULE, LIQUID FILLED ORAL at 21:29

## 2022-03-12 RX ADMIN — ISOSORBIDE DINITRATE 10 MG: 10 TABLET ORAL at 08:34

## 2022-03-12 RX ADMIN — SODIUM BICARBONATE 650 MG: 650 TABLET ORAL at 21:25

## 2022-03-12 RX ADMIN — IPRATROPIUM BROMIDE AND ALBUTEROL SULFATE 3 ML: .5; 3 SOLUTION RESPIRATORY (INHALATION) at 12:37

## 2022-03-12 RX ADMIN — SPIRONOLACTONE 25 MG: 25 TABLET ORAL at 14:08

## 2022-03-12 RX ADMIN — METOPROLOL SUCCINATE 100 MG: 100 TABLET, EXTENDED RELEASE ORAL at 08:34

## 2022-03-12 RX ADMIN — MORPHINE SULFATE 2 MG: 2 INJECTION, SOLUTION INTRAMUSCULAR; INTRAVENOUS at 19:50

## 2022-03-12 RX ADMIN — PREDNISONE 40 MG: 20 TABLET ORAL at 08:33

## 2022-03-12 RX ADMIN — DOCUSATE SODIUM 100 MG: 100 CAPSULE, LIQUID FILLED ORAL at 08:26

## 2022-03-12 RX ADMIN — MORPHINE SULFATE 2 MG: 2 INJECTION, SOLUTION INTRAMUSCULAR; INTRAVENOUS at 13:57

## 2022-03-12 RX ADMIN — ROSUVASTATIN CALCIUM 20 MG: 20 TABLET, FILM COATED ORAL at 21:25

## 2022-03-12 RX ADMIN — DOXYCYCLINE HYCLATE 100 MG: 100 TABLET, COATED ORAL at 08:34

## 2022-03-12 RX ADMIN — MORPHINE SULFATE 2 MG: 2 INJECTION, SOLUTION INTRAMUSCULAR; INTRAVENOUS at 03:43

## 2022-03-12 RX ADMIN — FOLIC ACID 1 MG: 1 TABLET ORAL at 08:34

## 2022-03-12 RX ADMIN — HYDRALAZINE HYDROCHLORIDE 25 MG: 25 TABLET, FILM COATED ORAL at 14:09

## 2022-03-12 RX ADMIN — HYDRALAZINE HYDROCHLORIDE 10 MG: 10 TABLET, FILM COATED ORAL at 05:58

## 2022-03-12 RX ADMIN — ENOXAPARIN SODIUM 40 MG: 100 INJECTION SUBCUTANEOUS at 08:26

## 2022-03-12 RX ADMIN — HYDRALAZINE HYDROCHLORIDE 25 MG: 25 TABLET, FILM COATED ORAL at 21:25

## 2022-03-12 RX ADMIN — SACUBITRIL AND VALSARTAN 1 TABLET: 24; 26 TABLET, FILM COATED ORAL at 08:33

## 2022-03-12 RX ADMIN — DOXYCYCLINE HYCLATE 100 MG: 100 TABLET, COATED ORAL at 21:25

## 2022-03-12 RX ADMIN — INSULIN GLARGINE 42 UNITS: 100 INJECTION, SOLUTION SUBCUTANEOUS at 21:24

## 2022-03-12 RX ADMIN — IPRATROPIUM BROMIDE AND ALBUTEROL SULFATE 3 ML: .5; 3 SOLUTION RESPIRATORY (INHALATION) at 09:21

## 2022-03-12 RX ADMIN — LATANOPROST 1 DROP: 50 SOLUTION OPHTHALMIC at 21:25

## 2022-03-12 RX ADMIN — INSULIN LISPRO 3 UNITS: 100 INJECTION, SOLUTION INTRAVENOUS; SUBCUTANEOUS at 17:52

## 2022-03-12 RX ADMIN — PANTOPRAZOLE SODIUM 40 MG: 40 TABLET, DELAYED RELEASE ORAL at 05:58

## 2022-03-12 RX ADMIN — SODIUM BICARBONATE 650 MG: 650 TABLET ORAL at 08:33

## 2022-03-12 RX ADMIN — ENOXAPARIN SODIUM 40 MG: 100 INJECTION SUBCUTANEOUS at 21:25

## 2022-03-12 RX ADMIN — IPRATROPIUM BROMIDE AND ALBUTEROL SULFATE 3 ML: .5; 3 SOLUTION RESPIRATORY (INHALATION) at 20:21

## 2022-03-12 ASSESSMENT — PAIN SCALES - GENERAL
PAINLEVEL_OUTOF10: 9

## 2022-03-12 NOTE — PLAN OF CARE
Problem: Falls - Risk of:  Goal: Will remain free from falls  Description: Will remain free from falls  Outcome: Ongoing  Goal: Absence of physical injury  Description: Absence of physical injury  Outcome: Ongoing     Problem: Pain:  Goal: Pain level will decrease  Description: Pain level will decrease  Outcome: Ongoing  Goal: Control of acute pain  Description: Control of acute pain  Outcome: Ongoing  Goal: Control of chronic pain  Description: Control of chronic pain  Outcome: Ongoing     Problem: Skin Integrity:  Goal: Will show no infection signs and symptoms  Description: Will show no infection signs and symptoms  Outcome: Ongoing  Goal: Absence of new skin breakdown  Description: Absence of new skin breakdown  Outcome: Ongoing     Problem: DISCHARGE BARRIERS  Goal: Patient's continuum of care needs are met  Outcome: Ongoing

## 2022-03-12 NOTE — PLAN OF CARE
Problem: Falls - Risk of:  Goal: Will remain free from falls  Description: Will remain free from falls  Outcome: Ongoing  Note: Fall precautions in place. Pt educated on safety. No falls or injury this shift. Problem: Pain:  Goal: Pain level will decrease  Description: Pain level will decrease  Outcome: Ongoing  Note: Pain assessed every 4 hours with head to toe and as needed. PRN pain meds given as needed. Non-pharmacologic interventions offered. Problem: Pain:  Goal: Control of acute pain  Description: Control of acute pain  Outcome: Ongoing  Note: No acute pain this shift. Problem: Pain:  Goal: Control of chronic pain  Description: Control of chronic pain  Outcome: Ongoing  Note: Morphine and tylenol administered for chronic back pain as needed. Problem: Skin Integrity:  Goal: Will show no infection signs and symptoms  Description: Will show no infection signs and symptoms  Outcome: Ongoing  Note: No signs and symptoms of infection this shift. Vital signs and labs WNL. Problem: Skin Integrity:  Goal: Absence of new skin breakdown  Description: Absence of new skin breakdown  Outcome: Ongoing  Note: No skin breakdown assessed. Problem: DISCHARGE BARRIERS  Goal: Patient's continuum of care needs are met  Outcome: Ongoing  Note: POC updated and discussed with pt. Home O2 eval and PT/OT eval will be completed at discharge. Care plan reviewed with patient. Patient verbalizes understanding of the plan of care and contributes to goal setting.

## 2022-03-12 NOTE — PROGRESS NOTES
IM Progress Note  Dr. Brinda Álvarez for Dr Korey Martinez  3/12/2022 11:10 AM      Patient name Charline Albright  DOB1965  PCP: MEGHAN Holguin CNP  Admit Date: 3/6/2022  Acct No. [de-identified]    Subjective: Interval History:   Pt on 3L nC  D/w staff  Pt states breathing not worse      Diet: ADULT DIET; Regular; 4 carb choices (60 gm/meal)    I/O last 3 completed shifts: In: 4055.5 [P.O.:3500; I.V.:555.5]  Out: 7282 [Urine:6375]  I/O this shift:  In: -   Out: 275 [Urine:275]        Admission weight: (!) 368 lb 2.7 oz (167 kg) as of 3/6/2022  2:23 PM  Wt Readings from Last 3 Encounters:   03/12/22 (!) 317 lb 6 oz (144 kg)   01/11/22 (!) 305 lb (138.3 kg)   11/09/21 (!) 305 lb (138.3 kg)     Body mass index is 40.75 kg/m².     ROS   CVS;  no cp or palpitation  Resp: no SOB or cough  Neuro:  No numbness or weakness or dizziness  Abd: no nausea or vomiting or abd pain      Medications:   Scheduled Meds:   doxycycline hyclate  100 mg Oral 2 times per day    predniSONE  40 mg Oral Daily    hydrALAZINE  10 mg Oral 3 times per day    isosorbide dinitrate  10 mg Oral TID    sacubitril-valsartan  1 tablet Oral BID    nicotine  1 patch TransDERmal Daily    metoprolol succinate  100 mg Oral Daily    ipratropium-albuterol  3 mL Inhalation 4x daily    insulin lispro  0-6 Units SubCUTAneous TID WC    insulin lispro  0-3 Units SubCUTAneous Nightly    aspirin  81 mg Oral Daily    pantoprazole  40 mg Oral QAM AC    enoxaparin  40 mg SubCUTAneous BID    docusate sodium  100 mg Oral BID    DULoxetine  60 mg Oral Daily    folic acid  1 mg Oral Daily    insulin glargine  42 Units SubCUTAneous Nightly    latanoprost  1 drop Both Eyes Nightly    rosuvastatin  20 mg Oral Nightly    sodium bicarbonate  650 mg Oral BID     Continuous Infusions:   dextrose      furosemide (LASIX) 1mg/ml infusion 5 mg/hr (03/12/22 6159)       Labs :     CBC:   Recent Labs     03/10/22  3954 03/11/22  0543   WBC 5.6 5.3   HGB 10.5* 10.3* * 219     BMP:    Recent Labs     03/10/22  0544 03/11/22  0543 03/12/22  0414    136 135   K 3.7 3.6 3.5   CL 94* 94* 93*   CO2 34* 32 32   BUN 17 23* 31*   CREATININE 1.0 1.2 1.2   GLUCOSE 149* 211* 228*     Hepatic: No results for input(s): AST, ALT, ALB, BILITOT, ALKPHOS in the last 72 hours. Troponin: No results for input(s): TROPONINI in the last 72 hours. BNP: No results for input(s): BNP in the last 72 hours. Lipids: No results for input(s): CHOL, HDL in the last 72 hours. Invalid input(s): LDLCALCU  INR: No results for input(s): INR in the last 72 hours.     Radiology    Objective:   Vitals: BP (!) 153/75   Pulse 73   Temp 98.8 °F (37.1 °C) (Oral)   Resp 18   Ht 6' 2\" (1.88 m)   Wt (!) 317 lb 6 oz (144 kg)   SpO2 97%   BMI 40.75 kg/m²   HEENT: Head:pupils react  Neck: supple  Lungs: clear to auscultation  Heart: regular rate and rhythm + systolic murmur   Abdomen: soft BS heard   Extremities: warm ++ edema  Neurologic:  Alert, oriented X3    Impression:   :   Acute on chronic CHF sec to systolicy dysfunction  Mod to severe AS  NICMP  Anemia  COPD  CKD  Cirrhosis Hep C  Obesity never been diagnosed with TAPAN  Pancytopenia on admission    Plan:    Cont IV lasix drip  And pt clinically has been showing improvement  Plans are for stress ECHo next week and further eval for AS  Cont ACEI  Monitor chems and cont lantus  bronchodilators  Smoking cessation      Shan Beal MD, MD

## 2022-03-12 NOTE — FLOWSHEET NOTE
Amando Garcia from stress lab called. We will not have test time until Monday morning due to her needing schedule from echo as well. Sunday night shift RN to double check order dates at time of release Sunday at midnight. They should read 3/14/22.  If they still read 3/11, stress lab will not know to order test. Amando Garcia states there is generally no need to hold meds prior to test, but that it is up to the physician performing the test. This question was asked per Shabnam James NP.

## 2022-03-12 NOTE — PROGRESS NOTES
[facility]    Patient - Angela Batres   MRN -  [id number]   [facility]   - 1965      Date of Admission -  3/6/2022  2:23 PM  Date of evaluation -  3/12/2022  Room - [facility]. Lora José MD Primary Care Physician - MEGHAN Woodward - CNP     Problem List      C/Ariapriscilla [address] Problems    Diagnosis Date Noted    Acute on chronic systolic congestive heart failure [facility]) [I50.23] 2022     Reason for Consult    For management of COPD and Hypoxic respiratory failure. On oxygen via nasal cannula. HPI   History Obtained From: Patient and electronic medical record. Angela Batres is a 65 y.o. male  was initially admitted under hospitalist service. Pulmonary medicine was consulted for further management of COPD and Hypoxic respiratory failure. He is currently on oxygen via nasal cannula. He is a 61-year-old pleasant gentleman with chronic use of tobacco smoking for 40 years with 1 pack of cigarettes per day. He is still smoking until his current hospitalization to [address] [place] [address]. He was in his usual state of health until 2 weeks back. He started having worsening of shortness of breath, cough with yellow sputum production for the last 2 weeks. He denies any fever or chills. He also noticed bilateral leg swelling with 2+ edema. He gained a good amount of weight over the last few weeks. He gave a history of orthopnea and paroxysmal nocturnal dyspnea prior to the hospitalization. He was admitted under Dr. Amita Oneil MD service on 2022 for acute decompensation of chronic systolic congestive heart failure. Patient had a history of severe COPD. Pulmonary medicine was consulted for further evaluation. He is having cough: Yes  Duration of cough: for 2 weeks. His cough is associated with sputum production: Yes   The sputum color: yellow  Hemoptysis:No  Diurnal variation: None. He gives a history of fever: No  Chills & rigors:No  Associated night sweats: No.  Recent travel history:No.    Rrecent sick contacts: No.      He admits to orthopnea and paroxysmal nocturnal dyspnea. Prior to current current hospitalization:  He used to be on 4 L of oxygen via nasal cannula at home  He was never diagnosed with pulmonary diseases I.e bronchial asthma, pulmonary embolism,pulmonary hypertension or pleural effusion/s in the past.  He was diagnosed with a COPD by his family physician. Patient currently on treatment with the Spiriva 18 mcg 1 capsule inhalation daily, duo nebs 3 Gabriela nebulization every 4hours as needed and albuterol HFA 2puffs every 6 hourly as needed at home. He was never diagnosed with pulmonary tuberculosis in the past. He denies any recent exposure to any patients with tuberculosis. He denies any recent travel to endemic places of tuberculosis.       Events last 24 h:    On 3 lpm NC, comfortable good sats at rest  Better, lest SOB  On Lasix  Gtt  LE edema improved  UO 4.7L 24h  Net (-) 2L    PMHx   Past Medical History      Diagnosis Date    Acute kidney injury ([address] 12/2020    due to Enterococous Bacteremia , fluid overload, low sodium    Amputation of right great toe ([address] 02/18/2021    Asthma     Brain tumor ([address]     Cirrhosis (HCC)     ETOH abuse    CKD (chronic kidney disease)     l.brown-osu spetie    COPD (chronic obstructive pulmonary disease) (HCC)     Diabetes mellitus (HCC)     type 2-insulin lantus and humalog    Falling     Glaucoma     Hepatitis C     History of blood transfusion     s/p nasal surgery    Hyperlipidemia     Hypertension     Nallu    Legally blind     Obesity     Pain management     karol marzec-percocet Rx    Right foot infection 11/2021    Seizures ([address]       Past Surgical History        Procedure Laterality Date    BACK SURGERY      CARDIAC CATHETERIZATION  08/03/2021    EYE SURGERY      FIBULA FRACTURE SURGERY Left 03/21/2019    LEFT FIBULAR SHAFT ORIF performed by Celi Cooper DPM at [address] Right     Steel pins in place    FRACTURE SURGERY      NASAL SINUS SURGERY Bilateral 11/29/2020    FLEXIBLE BRONCHOSCOPY WITH BRONCHOALVEOLAR LAVAGE WITH CULTURES.  NASAL ENDOSCOPY WITH POSSIBLE CAUTERY ADN NASAL PACKING performed by Danie Juarez MD at [facility]  01/2020    SPINE SURGERY N/A 02/19/2021    KYPHOPLASTY at T4, L2, L4 performed by Jinny Bravo MD at [address] [facility] N/A 8/24/2021    LUMBAR 3 AND LUMBAR 5 KYPHOPLASTY performed by Song Rodgers MD at [address] [facility] [place], operated by [facility] Right 09/23/2020    AMPUTATION DISTAL APSECT RIGHT HALLUX, REMOVAL OF HARDWARE RIGHT HALLUX performed by Rosario Lopez DPM at [facility] N/A 04/25/2019    EGD BIOPSY performed by Zoey Buck MD at [facility]     Meds    Current Medications    doxycycline hyclate  100 mg Oral 2 times per day    predniSONE  40 mg Oral Daily    hydrALAZINE  10 mg Oral 3 times per day    isosorbide dinitrate  10 mg Oral TID    sacubitril-valsartan  1 tablet Oral BID    nicotine  1 patch TransDERmal Daily    metoprolol succinate  100 mg Oral Daily    ipratropium-albuterol  3 mL Inhalation 4x daily    insulin lispro  0-6 Units SubCUTAneous TID WC    insulin lispro  0-3 Units SubCUTAneous Nightly    aspirin  81 mg Oral Daily    pantoprazole  40 mg Oral QAM AC    enoxaparin  40 mg SubCUTAneous BID    docusate sodium  100 mg Oral BID    DULoxetine  60 mg Oral Daily    folic acid  1 mg Oral Daily    insulin glargine  42 Units SubCUTAneous Nightly    latanoprost  1 drop Both Eyes Nightly    rosuvastatin  20 mg Oral Nightly    sodium bicarbonate  650 mg Oral BID     acetaminophen, morphine, ondansetron, glucagon (rDNA), dextrose, dextrose bolus (hypoglycemia) **OR** dextrose bolus (hypoglycemia), glucose  IV Drips/Infusions   dextrose      furosemide (LASIX) 1mg/ml infusion 5 mg/hr (03/12/22 9435)     Home Medications  Medications Prior to Admission: oxyCODONE-acetaminophen (PERCOCET) 7.5-325 MG per tablet, Take 1 tablet by mouth every 8 hours as needed for Pain for up to 30 days. sodium bicarbonate 650 MG tablet, Take 650 mg by mouth 2 times daily  tamsulosin (FLOMAX) 0.4 MG capsule, Take 1 capsule by mouth nightly  DULoxetine (CYMBALTA) 60 MG extended release capsule, Take 1 capsule by mouth daily  insulin glargine (LANTUS) 100 UNIT/ML injection vial, Inject 84 Units into the skin nightly  docusate sodium (COLACE, DULCOLAX) 100 MG CAPS, Take 100 mg by mouth 2 times daily  OXYGEN, Inhale into the lungs daily as needed (nightly)   latanoprost (XALATAN) 0.005 % ophthalmic solution, Place 1 drop into both eyes nightly  rosuvastatin (CRESTOR) 20 MG tablet, Take 20 mg by mouth nightly   azithromycin (ZITHROMAX) 250 MG tablet, TAKE TWO (2) TABLETS THE 1ST DOSE, THEN TAKE ONE (1) TABLET EVERY DAY UNTIL GONE FOR INFECTION. senna (SENOKOT) 8.6 MG tablet, Take 2 tablets by mouth daily  gabapentin (NEURONTIN) 300 MG capsule, Take 1 capsule by mouth nightly for 30 days. nitroGLYCERIN (NITROSTAT) 0.4 MG SL tablet, up to max of 3 total doses. If no relief after 1 dose, call 911.   metoprolol succinate (TOPROL XL) 50 MG extended release tablet, Take 1 tablet by mouth daily  insulin lispro (HUMALOG) 100 UNIT/ML injection vial, Inject 10 Units into the skin 3 times daily (before meals) Plus Sliding Scale (Patient taking differently: Inject 12 Units into the skin 3 times daily (before meals) Plus Sliding Scale )  insulin lispro (HUMALOG) 100 UNIT/ML injection vial, Glucose: Dose:  No Insulin, 140-199 2 Units, 200-249 4 Units, 250-299 6 Units, 300-349 8 Units, 350-400 10 Units, Over 134 12 Units  folic acid (FOLVITE) 1 MG tablet, Take 1 mg by mouth daily  albuterol sulfate  (90 Base) MCG/ACT inhaler, Inhale 1 puff into the lungs every 4-6 hours as weakness. Extremities: No edema. Musculoskeletal: No complaints. Genitourinary: No complaints. Hematological: Negative. Psychiatric/Behavioral: Negative. Skin: No itching. Vitals     height is 6' 2\" (1.88 m) and weight is 317 lb 6 oz (144 kg) (abnormal). His oral temperature is 98.8 °F (37.1 °C). His blood pressure is 153/75 (abnormal) and his pulse is 73. His respiration is 18 and oxygen saturation is 97%. Body mass index is 40.75 kg/m². SUPPLEMENTAL O2: O2 Flow Rate (L/min): 3 L/min       I/O        Intake/Output Summary (Last 24 hours) at 3/12/2022 1005  Last data filed at 3/12/2022 0852  Gross per 24 hour   Intake 2355.53 ml   Output 5050 ml   Net -2694.47 ml     I/O last 3 completed shifts: In: 4055.5 [P.O.:3500; I.V.:555.5]  Out: 6375 [IGBDZ:2664]   Patient Vitals for the past 96 hrs (Last 3 readings):   Weight   03/12/22 0600 (!) 317 lb 6 oz (144 kg)   03/11/22 0600 (!) 326 lb 4.5 oz (148 kg)   03/10/22 0409 (!) 327 lb 6.1 oz (148.5 kg)       Exam   General Appearance: well built, well nourished in no acute distress on 3LPM via nasal cannula. HEENT: Normal, Head is normocephalic, atraumatic. Oropharynx is clear and moist.  No oral thrush. PERRL. Neck - Supple, No JVD present. No tracheal deviation. Lungs - Bilateral air entry present. Decreased breath sounds at bases on both sides of chest.  Bilateral expiratory wheezes. Bilateral basal rales. Cardiovascular - Heart sounds are normal.  Regular rhythm normal rate without murmur, gallop or rub. Abdomen - Soft, nontender, nondistended, no masses or organomegaly  Neurologic - Awake, alert, oriented. There are no focal motor or sensory deficits. Extremities - No cyanosis, clubbing. 1+ bilateral leg edema. Musculoskeletal: Normal range of motion. Patient exhibits no tenderness. Lymphadenopathy:  No cervical adenopathy. Psychiatric: Patient  has a normal mood and affect. Skin - No bruising or bleeding.     Labs  - Old records and notes have been reviewed in [facility]   ABG  No results found for: PH, PO2, PCO2, HCO3, O2SAT  No results found for: IFIO2, MODE, SETTIDVOL, SETPEEP  CBC  Recent Labs     03/10/22  0544 03/11/22  0543   WBC 5.6 5.3   RBC 3.44* 3.32*   HGB 10.5* 10.3*   HCT 34.4* 32.9*   .0* 99.1*   MCH 30.5 31.0   MCHC 30.5* 31.3*   * 219   MPV 10.5 11.6      BMP  Recent Labs     03/10/22  0544 03/11/22  0543 03/12/22  0414    136 135   K 3.7 3.6 3.5   CL 94* 94* 93*   CO2 34* 32 32   BUN 17 23* 31*   CREATININE 1.0 1.2 1.2   GLUCOSE 149* 211* 228*   CALCIUM 8.7 8.6 9.2     LFT  No results for input(s): AST, ALT, ALB, BILITOT, ALKPHOS, LIPASE in the last 72 hours. Invalid input(s): AMYLASE  TROP  Lab Results   Component Value Date    TROPONINT < 0.010 03/06/2022    TROPONINT < 0.010 03/06/2022    TROPONINT < 0.010 03/19/2019     BNP  No results for input(s): BNP in the last 72 hours. Lactic Acid  No results for input(s): LACTA in the last 72 hours. INR  No results for input(s): INR, PROTIME in the last 72 hours. PTT  No results for input(s): APTT in the last 72 hours. Glucose  Recent Labs     03/11/22  1632 03/11/22  2034 03/12/22  0656   POCGLU 223* 289* 201*     UA No results for input(s): SPECGRAV, PHUR, COLORU, CLARITYU, MUCUS, PROTEINU, BLOODU, RBCUA, WBCUA, BACTERIA, NITRU, GLUCOSEU, BILIRUBINUR, UROBILINOGEN, KETUA, LABCAST, LABCASTTY, AMORPHOS in the last 72 hours. Invalid input(s): CRYSTALS. PFTs 3/20/2019       Sleep studies   None in epic    Cultures    COVID-19 test performed on 6 March 2022: Not detected    EKG       Echocardiogram   Sydney Mccullough MD  [phone] 3/7/2022   Narrative & Impression  Transthoracic Echocardiography Report (TTE)  Conclusions      Summary   Technically difficult examination. Left Ventricular size is Mildly increased . Normal left ventricular wall thickness. There was severe global hypokinesis of the left ventricle.    Systolic function was severely reduced. Ejection fraction is visually estimated in the range of 35%. The left atrium is Moderately dilated. There is moderate-to-severe aortic stenosis with valve area of 0.9 to 1 sq   cm. The maximum aortic valve gradient is 40 mmHg, the mean gradient is 25   mmHg, and the peak velocity is 3.1 m/s. Signature      ----------------------------------------------------------------   Electronically signed by Xavi Vasquez MD (Interpreting   physician) on 03/07/2022 at 05:58 PM   ----------------------------------------------------------------  Right Ventricle   The right ventricular size was normal with normal systolic function and   wall thickness. Radiology    CXR  Fri Mar 11, 2022  9:00 AM   Two-view chest.   Comparison: 12/05/2020. Chest x-ray performed on 11 March 2022 unilateral views:  Impression: Mild subsegmental atelectasis in the left mid and left lower lung zones. Equivocal minor left pleural effusion versus subpleural subsegmental atelectasis. Stable cardiomegaly. No definitive pulmonary edema. Chest Xray:  Sun Mar 6, 2022  4:15 PM   PROCEDURE: XR CHEST (2 VW)   CLINICAL INFORMATION: SOB   1. Left lower lobe consolidation that can relate to infiltrate and/or atelectasis. 2. Small bilateral pleural effusions. 3. Moderate cardiomegaly. Venous duplex scan of the lower lower extremities:  Wed Mar 9, 2022  4:33 PM   PROCEDURE: VL DUP LOWER EXTREMITY VENOUS BILATERAL   CLINICAL INFORMATION: Bilateral lower extremity edema/calf pain, r/o DVT   COMPARISON: No prior study. Normal venous ultrasound. No evidence for deep venous thrombosis. Assessment   -Acute hypoxic respiratory failure due to decompensated chronic systolic congestive heart failure VS COPD VS other etiologies.    -Volume overload slowly improving with good response to diuresis  -COPD exacerbation due to acute bronchitis with  bacterial Vs viral etiology.  -Acute decompensation of chronic-systolic congestive heart failure.  -Abnormal chest x-ray- ? Left-sided pleural effusion-Improved on a chest x-ray performed on 11 March 2022.  -Severe chronic obstructive pulmonary disease.  -Chronic use of tobacco smoking for 40 years with 1 pack of cigarettes per day. -Type 2 diabetes mellitus  -Essential hypertension.  -Legally blind      Plan   -Continue diuresis and optimization of CHF by primary service.  -He is currently on Lasix drip at 5 mg/h  - Patient educated to quit smoking as soon as possible. Patient verbalizes understanding of adverse consequences of tobacco smoking.  -Send sputum cultures  -Continue patient on doxycycline 100 mg p.o. twice daily for 7 days.  -Prednisone 40 mg p.o. daily for 5 days and stop  -Continue DuoNebs 3ml nebulization every 4 hourly while awake.  -Schedule patient for full pulmonary function tests before clinic visit. -Needs a home O2 evaluation at the time of discharge  -Titrate Oxygen to keep Spo2 >90%. -Deep Venous Thrombosis Prophylaxis: lovenox. \"Thank you for asking us to see this patient\"     Elena Nicely educated about my impression and plan. He verbalizes understanding. Questions and concerns addressed.     Electronically signed by   Riat Espinal MD on 3/12/2022 at 10:05 AM

## 2022-03-12 NOTE — PROGRESS NOTES
Cardiology Progress Note      Patient:  Fernando Cadet  YOB: 1965  MRN: [id number]   Acct: [de-identified]  [facility] Date:  3/6/2022  Primary Cardiologist:   Seen by dr. Komal Hernández    Per prior cardiology consult note-  REASON FOR CONSULT:    Chf exac, mod-sev AS      CHIEF COMPLAINT:    SOB  Leg swelling      HISTORY OF PRESENT ILLNESS:    Fernando Cadet is a pleasant 65year old male patient with past medical history that includes:   Past Medical History        Past Medical History:   Diagnosis Date    Acute kidney injury ([address] 12/2020     due to Enterococous Bacteremia , fluid overload, low sodium    Amputation of right great toe ([address] 02/18/2021    Asthma      Brain tumor ([address]      Cirrhosis ([address]       ETOH abuse    CKD (chronic kidney disease)       l.brown-osu spetie    COPD ([facility] disease) [facility]      Diabetes mellitus ([address]       type 2-insulin lantus and humalog    Falling      Glaucoma      Hepatitis C      History of blood transfusion       s/p nasal surgery    Hyperlipidemia      Hypertension       Faith    Legally blind      Obesity      Pain management       karol marzec-percocet Rx    Right foot infection 11/2021    Seizures ([address]        He has h/o tobacco abuse. Patient is followed by Dr Robert Martin, has h/o AS, HFrEF. Echo 07/2021 revealed an EF of 35-40%, moderate AS with LOBITO of 1.2 cm2. Stress test 4/2021 revealed a fixed inferior wall defect. LHC on 08/2021 revealed no significant CAD, mean gradient across the aortic valve of 22 mmHg. The patient was admitted to the hospital on 3/6/2022 after he presented with worsening SOB, leg swelling, WILCOX. Patient had some weight gain. He states that his symptoms have increased over the past two weeks. He reports fatigue. Patient denies chest pain, palpitations, dizziness, syncope. Cardiomegaly and pleural effusions were noted on CXR. Pro-BNP 4,977. Cr 1.1. Troponin <0.01, <0.01. EKG revealed NSR.  He was started on IV Lasix gtt 5mg/hour. Echocardiogram 3/6/2022 revealed an EF of 35%, moderate LAE, Moderate to severe AS with LOBITO ~ 0.9-1 cm2, peak gradient 40 mmHg, mean gradient 25 mmHg, peak velocity 3.1 m/s.        Subjective (Events in last 24 hours):     Pt sitting in bed   No chest pain   Still c/o SOB - improved but still has it     Feeling well  VSS  Tele SR no ectopy         Objective:   BP (!) 150/81   Pulse 69   Temp 98.4 °F (36.9 °C) (Oral)   Resp 20   Ht 6' 2\" (1.88 m)   Wt (!) 317 lb 6 oz (144 kg)   SpO2 100%   BMI 40.75 kg/m²        TELEMETRY: SR no ectopy     Physical Exam:  General Appearance: alert and oriented to person, place and time, in no acute distress  Cardiovascular: normal rate, regular rhythm, normal S1 and S2, no murmurs, rubs, clicks, or gallops, distal pulses intact,   Pulmonary/Chest: clear to auscultation bilaterally- no wheezes, rales or rhonchi, normal air movement, no respiratory distress  Abdomen: soft, non-tender, non-distended, normal bowel sounds, no masses Extremities: no cyanosis, clubbing or edema, pulses present    Musculoskeletal: normal range of motion, no joint swelling, deformity or tenderness  Neurological: alert, oriented, normal speech, no focal findings or movement disorder noted    Medications:    doxycycline hyclate  100 mg Oral 2 times per day    predniSONE  40 mg Oral Daily    hydrALAZINE  10 mg Oral 3 times per day    isosorbide dinitrate  10 mg Oral TID    sacubitril-valsartan  1 tablet Oral BID    nicotine  1 patch TransDERmal Daily    metoprolol succinate  100 mg Oral Daily    ipratropium-albuterol  3 mL Inhalation 4x daily    insulin lispro  0-6 Units SubCUTAneous TID WC    insulin lispro  0-3 Units SubCUTAneous Nightly    aspirin  81 mg Oral Daily    pantoprazole  40 mg Oral QAM AC    enoxaparin  40 mg SubCUTAneous BID    docusate sodium  100 mg Oral BID    DULoxetine  60 mg Oral Daily    folic acid  1 mg Oral Daily    insulin glargine 42 Units SubCUTAneous Nightly    latanoprost  1 drop Both Eyes Nightly    rosuvastatin  20 mg Oral Nightly    sodium bicarbonate  650 mg Oral BID      dextrose      furosemide (LASIX) 1mg/ml infusion 5 mg/hr (03/12/22 0555)     acetaminophen, 650 mg, Q4H PRN  morphine, 2 mg, Q4H PRN  ondansetron, 4 mg, Q6H PRN  glucagon (rDNA), 1 mg, PRN  dextrose, 100 mL/hr, PRN  dextrose bolus (hypoglycemia), 125 mL, PRN   Or  dextrose bolus (hypoglycemia), 250 mL, PRN  glucose, 4 tablet, PRN        Diagnostics:  Echo:   Electronically signed by Yari Sharma MD (Interpreting   physician) on 03/07/2022 at 05:58 PM   ----------------------------------------------------------------      Findings      Mitral Valve   The mitral valve structure was normal with normal leaflet separation. DOPPLER: The transmitral velocity was within the normal range with no   evidence for mitral stenosis. Mild mitral regurgitation is present. Aortic Valve   The aortic valve leaflets were not well visualized. Aortic valve leaflets   are Moderately calcified. Leaflets exhibited moderately increased   thickness and severely reduced cuspal separation of the aortic valve. No   evidence of aortic valve regurgitation . There is moderate-to-severe aortic stenosis with valve area of 0.9 to 1 sq   cm. The maximum aortic valve gradient is 40 mmHg, the mean gradient is 25   mmHg, and the peak velocity is 3.1 m/s. Tricuspid Valve   The tricuspid valve structure was normal with normal leaflet separation. DOPPLER: There was no evidence of tricuspid stenosis. Mild tricuspid   regurgitation visualized. Right ventricular systolic pressure measures 60   mmhg. Pulmonic Valve   The pulmonic valve leaflets exhibited normal thickness, no calcification,   and normal cuspal separation. DOPPLER: The transpulmonic velocity was   within the normal range with no evidence for regurgitation. Left Atrium   The left atrium is Moderately dilated. Left Ventricle   Left Ventricular size is Mildly increased . Normal left ventricular wall thickness. There was severe global hypokinesis of the left ventricle. Systolic function was severely reduced. Ejection fraction is visually estimated in the range of 35%. Right Atrium   Right atrial size was normal.      Right Ventricle   The right ventricular size was normal with normal systolic function and   wall thickness. Pericardial Effusion   The pericardium was normal in appearance with no evidence of a pericardial   effusion. Pleural Effusion   No evidence of pleural effusion. Aorta / Great Vessels   -Aortic root dimension within normal limits.   -The Pulmonary artery is within normal limits. -IVC size is within normal limits with normal respiratory phasic changes. Left Heart Cath:   Procedure Summary   Patent coronaries      Recommendations   Optimize heart failure therapies   Proceed with scheduled surgery      Estimated Blood Loss:5 ml. Complications:No complications. Signatures      ----------------------------------------------------------------   Electronically signed by Shahnaz Montgomery MD (Performing   Physician) on 08/03/2021     Lab Data:    Cardiac Enzymes:  No results for input(s): CKTOTAL, CKMB, CKMBINDEX, TROPONINI in the last 72 hours.     CBC:   Lab Results   Component Value Date    WBC 5.3 03/11/2022    RBC 3.32 03/11/2022    HGB 10.3 03/11/2022    HCT 32.9 03/11/2022     03/11/2022       CMP:    Lab Results   Component Value Date     03/12/2022    K 3.5 03/12/2022    K 3.8 08/03/2021    CL 93 03/12/2022    CO2 32 03/12/2022    BUN 31 03/12/2022    CREATININE 1.2 03/12/2022    LABGLOM 63 03/12/2022    GLUCOSE 228 03/12/2022    CALCIUM 9.2 03/12/2022       Hepatic Function Panel:    Lab Results   Component Value Date    ALKPHOS 131 03/08/2022    ALT 23 03/08/2022    AST 47 03/08/2022    PROT 7.6 03/08/2022    BILITOT 0.8 03/08/2022    BILIDIR 0.4 03/08/2022    LABALBU 3.2 03/08/2022       Magnesium:    Lab Results   Component Value Date    MG 1.9 03/03/2021       PT/INR:    Lab Results   Component Value Date    INR 1.22 03/09/2022       HgBA1c:    Lab Results   Component Value Date    LABA1C 6.6 02/24/2021       FLP:    Lab Results   Component Value Date    TRIG 73 03/07/2022    HDL 40 03/07/2022    LDLCALC 32 03/07/2022       TSH:    Lab Results   Component Value Date    TSH 0.951 03/07/2022         Assessment:    Acute on chronic systolic chf -- improving     New Severe NICDMP EF 35%    Moderate to severe AS (Valve 0.9)    HTN   Stable   HLP   LDL 32    COPD exacerbation   Tobacco abuse     Hx liver cirrhosis     Patent coronaries per cath 8/3/2021      Plan:  · Dc nitrates with severe AS please   · Increase apresoline to 25 mg po TID   · Add aldactone   · Stop GTT lasix and start IV   ·           CHF guidelines:  BB: yes  ACE / ARB/ entresto: yes  Diuretics: yes  Aldactone: yes  Farxiga: check with insurance - 100% covered and $0 copay            Electronically signed by MEGHAN Dial CNP on 3/12/2022 at 12:24 PM

## 2022-03-13 LAB
ANION GAP SERPL CALCULATED.3IONS-SCNC: 12 MEQ/L (ref 8–16)
BUN BLDV-MCNC: 35 MG/DL (ref 7–22)
CALCIUM SERPL-MCNC: 9.4 MG/DL (ref 8.5–10.5)
CHLORIDE BLD-SCNC: 95 MEQ/L (ref 98–111)
CO2: 30 MEQ/L (ref 23–33)
CREAT SERPL-MCNC: 1.3 MG/DL (ref 0.4–1.2)
GFR SERPL CREATININE-BSD FRML MDRD: 57 ML/MIN/1.73M2
GLUCOSE BLD-MCNC: 185 MG/DL (ref 70–108)
GLUCOSE BLD-MCNC: 209 MG/DL (ref 70–108)
GLUCOSE BLD-MCNC: 215 MG/DL (ref 70–108)
GLUCOSE BLD-MCNC: 249 MG/DL (ref 70–108)
GLUCOSE BLD-MCNC: 311 MG/DL (ref 70–108)
MAGNESIUM: 1.6 MG/DL (ref 1.6–2.4)
POTASSIUM REFLEX MAGNESIUM: 3.6 MEQ/L (ref 3.5–5.2)
SODIUM BLD-SCNC: 137 MEQ/L (ref 135–145)

## 2022-03-13 PROCEDURE — 2700000000 HC OXYGEN THERAPY PER DAY

## 2022-03-13 PROCEDURE — 6360000002 HC RX W HCPCS: Performed by: NURSE PRACTITIONER

## 2022-03-13 PROCEDURE — 6370000000 HC RX 637 (ALT 250 FOR IP): Performed by: NURSE PRACTITIONER

## 2022-03-13 PROCEDURE — 94760 N-INVAS EAR/PLS OXIMETRY 1: CPT

## 2022-03-13 PROCEDURE — 82948 REAGENT STRIP/BLOOD GLUCOSE: CPT

## 2022-03-13 PROCEDURE — 99232 SBSQ HOSP IP/OBS MODERATE 35: CPT | Performed by: INTERNAL MEDICINE

## 2022-03-13 PROCEDURE — 6370000000 HC RX 637 (ALT 250 FOR IP): Performed by: INTERNAL MEDICINE

## 2022-03-13 PROCEDURE — 6370000000 HC RX 637 (ALT 250 FOR IP): Performed by: PHYSICIAN ASSISTANT

## 2022-03-13 PROCEDURE — 83735 ASSAY OF MAGNESIUM: CPT

## 2022-03-13 PROCEDURE — 99232 SBSQ HOSP IP/OBS MODERATE 35: CPT | Performed by: NURSE PRACTITIONER

## 2022-03-13 PROCEDURE — 94640 AIRWAY INHALATION TREATMENT: CPT

## 2022-03-13 PROCEDURE — 36415 COLL VENOUS BLD VENIPUNCTURE: CPT

## 2022-03-13 PROCEDURE — 6360000002 HC RX W HCPCS: Performed by: INTERNAL MEDICINE

## 2022-03-13 PROCEDURE — 1200000003 HC TELEMETRY R&B

## 2022-03-13 PROCEDURE — 80048 BASIC METABOLIC PNL TOTAL CA: CPT

## 2022-03-13 RX ORDER — AMLODIPINE BESYLATE 10 MG/1
10 TABLET ORAL DAILY
Status: DISCONTINUED | OUTPATIENT
Start: 2022-03-13 | End: 2022-03-14

## 2022-03-13 RX ORDER — HYDRALAZINE HYDROCHLORIDE 50 MG/1
50 TABLET, FILM COATED ORAL EVERY 8 HOURS SCHEDULED
Status: DISCONTINUED | OUTPATIENT
Start: 2022-03-13 | End: 2022-03-15 | Stop reason: HOSPADM

## 2022-03-13 RX ORDER — FUROSEMIDE 10 MG/ML
20 INJECTION INTRAMUSCULAR; INTRAVENOUS 2 TIMES DAILY
Status: DISCONTINUED | OUTPATIENT
Start: 2022-03-13 | End: 2022-03-13

## 2022-03-13 RX ORDER — INSULIN GLARGINE 100 [IU]/ML
8 INJECTION, SOLUTION SUBCUTANEOUS EVERY MORNING
Status: DISCONTINUED | OUTPATIENT
Start: 2022-03-13 | End: 2022-03-15 | Stop reason: HOSPADM

## 2022-03-13 RX ORDER — FUROSEMIDE 10 MG/ML
20 INJECTION INTRAMUSCULAR; INTRAVENOUS 3 TIMES DAILY
Status: DISCONTINUED | OUTPATIENT
Start: 2022-03-13 | End: 2022-03-14

## 2022-03-13 RX ADMIN — ENOXAPARIN SODIUM 40 MG: 100 INJECTION SUBCUTANEOUS at 20:09

## 2022-03-13 RX ADMIN — SACUBITRIL AND VALSARTAN 1 TABLET: 24; 26 TABLET, FILM COATED ORAL at 09:11

## 2022-03-13 RX ADMIN — SACUBITRIL AND VALSARTAN 1 TABLET: 24; 26 TABLET, FILM COATED ORAL at 20:09

## 2022-03-13 RX ADMIN — FUROSEMIDE 20 MG: 10 INJECTION, SOLUTION INTRAMUSCULAR; INTRAVENOUS at 14:35

## 2022-03-13 RX ADMIN — ENOXAPARIN SODIUM 40 MG: 100 INJECTION SUBCUTANEOUS at 09:09

## 2022-03-13 RX ADMIN — SPIRONOLACTONE 25 MG: 25 TABLET ORAL at 09:11

## 2022-03-13 RX ADMIN — PREDNISONE 40 MG: 20 TABLET ORAL at 09:10

## 2022-03-13 RX ADMIN — DOCUSATE SODIUM 100 MG: 100 CAPSULE, LIQUID FILLED ORAL at 09:09

## 2022-03-13 RX ADMIN — MORPHINE SULFATE 2 MG: 2 INJECTION, SOLUTION INTRAMUSCULAR; INTRAVENOUS at 01:53

## 2022-03-13 RX ADMIN — INSULIN LISPRO 2 UNITS: 100 INJECTION, SOLUTION INTRAVENOUS; SUBCUTANEOUS at 08:57

## 2022-03-13 RX ADMIN — SODIUM BICARBONATE 650 MG: 650 TABLET ORAL at 20:09

## 2022-03-13 RX ADMIN — IPRATROPIUM BROMIDE AND ALBUTEROL SULFATE 3 ML: .5; 3 SOLUTION RESPIRATORY (INHALATION) at 15:56

## 2022-03-13 RX ADMIN — SODIUM BICARBONATE 650 MG: 650 TABLET ORAL at 09:10

## 2022-03-13 RX ADMIN — ASPIRIN 81 MG CHEWABLE TABLET 81 MG: 81 TABLET CHEWABLE at 09:09

## 2022-03-13 RX ADMIN — IPRATROPIUM BROMIDE AND ALBUTEROL SULFATE 3 ML: .5; 3 SOLUTION RESPIRATORY (INHALATION) at 21:09

## 2022-03-13 RX ADMIN — INSULIN GLARGINE 8 UNITS: 100 INJECTION, SOLUTION SUBCUTANEOUS at 12:02

## 2022-03-13 RX ADMIN — AMLODIPINE BESYLATE 10 MG: 10 TABLET ORAL at 09:55

## 2022-03-13 RX ADMIN — LATANOPROST 1 DROP: 50 SOLUTION OPHTHALMIC at 20:08

## 2022-03-13 RX ADMIN — INSULIN GLARGINE 42 UNITS: 100 INJECTION, SOLUTION SUBCUTANEOUS at 21:32

## 2022-03-13 RX ADMIN — HYDRALAZINE HYDROCHLORIDE 25 MG: 25 TABLET, FILM COATED ORAL at 13:55

## 2022-03-13 RX ADMIN — IPRATROPIUM BROMIDE AND ALBUTEROL SULFATE 3 ML: .5; 3 SOLUTION RESPIRATORY (INHALATION) at 12:16

## 2022-03-13 RX ADMIN — MORPHINE SULFATE 2 MG: 2 INJECTION, SOLUTION INTRAMUSCULAR; INTRAVENOUS at 20:13

## 2022-03-13 RX ADMIN — IPRATROPIUM BROMIDE AND ALBUTEROL SULFATE 3 ML: .5; 3 SOLUTION RESPIRATORY (INHALATION) at 08:40

## 2022-03-13 RX ADMIN — DOXYCYCLINE HYCLATE 100 MG: 100 TABLET, COATED ORAL at 20:09

## 2022-03-13 RX ADMIN — HYDRALAZINE HYDROCHLORIDE 50 MG: 50 TABLET, FILM COATED ORAL at 23:26

## 2022-03-13 RX ADMIN — FUROSEMIDE 40 MG: 10 INJECTION, SOLUTION INTRAMUSCULAR; INTRAVENOUS at 09:09

## 2022-03-13 RX ADMIN — FUROSEMIDE 20 MG: 10 INJECTION, SOLUTION INTRAMUSCULAR; INTRAVENOUS at 20:09

## 2022-03-13 RX ADMIN — MORPHINE SULFATE 2 MG: 2 INJECTION, SOLUTION INTRAMUSCULAR; INTRAVENOUS at 14:47

## 2022-03-13 RX ADMIN — INSULIN LISPRO 1 UNITS: 100 INJECTION, SOLUTION INTRAVENOUS; SUBCUTANEOUS at 12:02

## 2022-03-13 RX ADMIN — DOXYCYCLINE HYCLATE 100 MG: 100 TABLET, COATED ORAL at 09:11

## 2022-03-13 RX ADMIN — FOLIC ACID 1 MG: 1 TABLET ORAL at 09:11

## 2022-03-13 RX ADMIN — MORPHINE SULFATE 2 MG: 2 INJECTION, SOLUTION INTRAMUSCULAR; INTRAVENOUS at 09:08

## 2022-03-13 RX ADMIN — METOPROLOL SUCCINATE 100 MG: 100 TABLET, EXTENDED RELEASE ORAL at 09:10

## 2022-03-13 RX ADMIN — INSULIN LISPRO 2 UNITS: 100 INJECTION, SOLUTION INTRAVENOUS; SUBCUTANEOUS at 16:35

## 2022-03-13 RX ADMIN — PANTOPRAZOLE SODIUM 40 MG: 40 TABLET, DELAYED RELEASE ORAL at 05:48

## 2022-03-13 RX ADMIN — ROSUVASTATIN CALCIUM 20 MG: 20 TABLET, FILM COATED ORAL at 20:09

## 2022-03-13 RX ADMIN — DULOXETINE HYDROCHLORIDE 60 MG: 60 CAPSULE, DELAYED RELEASE ORAL at 09:10

## 2022-03-13 RX ADMIN — HYDRALAZINE HYDROCHLORIDE 25 MG: 25 TABLET, FILM COATED ORAL at 05:47

## 2022-03-13 ASSESSMENT — PAIN DESCRIPTION - ONSET: ONSET: ON-GOING

## 2022-03-13 ASSESSMENT — PAIN SCALES - GENERAL
PAINLEVEL_OUTOF10: 9
PAINLEVEL_OUTOF10: 7
PAINLEVEL_OUTOF10: 6
PAINLEVEL_OUTOF10: 7
PAINLEVEL_OUTOF10: 5
PAINLEVEL_OUTOF10: 9

## 2022-03-13 ASSESSMENT — PAIN DESCRIPTION - PROGRESSION: CLINICAL_PROGRESSION: NOT CHANGED

## 2022-03-13 ASSESSMENT — PAIN DESCRIPTION - FREQUENCY: FREQUENCY: CONTINUOUS

## 2022-03-13 ASSESSMENT — PAIN DESCRIPTION - ORIENTATION
ORIENTATION: LOWER
ORIENTATION: LOWER

## 2022-03-13 ASSESSMENT — PAIN DESCRIPTION - LOCATION
LOCATION: BACK
LOCATION: BACK

## 2022-03-13 ASSESSMENT — PAIN - FUNCTIONAL ASSESSMENT: PAIN_FUNCTIONAL_ASSESSMENT: PREVENTS OR INTERFERES SOME ACTIVE ACTIVITIES AND ADLS

## 2022-03-13 ASSESSMENT — PAIN DESCRIPTION - PAIN TYPE
TYPE: CHRONIC PAIN
TYPE: CHRONIC PAIN

## 2022-03-13 ASSESSMENT — PAIN DESCRIPTION - DESCRIPTORS: DESCRIPTORS: CONSTANT

## 2022-03-13 NOTE — PROGRESS NOTES
[facility]    Patient - Marcio Ashton   MRN -  [id number]   [facility] # - [de-identified]   - 1965      Date of Admission -  3/6/2022  2:23 PM  Date of evaluation -  3/13/2022  Room - [address], MD Primary Care Physician - MEGHAN Alcantar - CNP     Problem List      [address] Problems    Diagnosis Date Noted    Acute on chronic systolic congestive heart failure [facility]) [I50.23] 2022     Reason for Consult    For management of COPD and Hypoxic respiratory failure. On oxygen via nasal cannula. HPI   History Obtained From: Patient and electronic medical record. Marcio Ashton is a 65 y.o. male  was initially admitted under hospitalist service. Pulmonary medicine was consulted for further management of COPD and Hypoxic respiratory failure. He is currently on oxygen via nasal cannula. He is a 60-year-old pleasant gentleman with chronic use of tobacco smoking for 40 years with 1 pack of cigarettes per day. He is still smoking until his current hospitalization to [address] [place] [address]. He was in his usual state of health until 2 weeks back. He started having worsening of shortness of breath, cough with yellow sputum production for the last 2 weeks. He denies any fever or chills. He also noticed bilateral leg swelling with 2+ edema. He gained a good amount of weight over the last few weeks. He gave a history of orthopnea and paroxysmal nocturnal dyspnea prior to the hospitalization. He was admitted under Dr. Judy Daniels MD service on 2022 for acute decompensation of chronic systolic congestive heart failure. Patient had a history of severe COPD. Pulmonary medicine was consulted for further evaluation. He is having cough: Yes  Duration of cough: for 2 weeks. His cough is associated with sputum production: Yes   The sputum color: yellow  Hemoptysis:No  Diurnal variation: None. He gives a history of fever: No  Chills & rigors:No  Associated night sweats: No.  Recent travel history:No.    Rrecent sick contacts: No.      He admits to orthopnea and paroxysmal nocturnal dyspnea. Prior to current current hospitalization:  He used to be on 4 L of oxygen via nasal cannula at home  He was never diagnosed with pulmonary diseases I.e bronchial asthma, pulmonary embolism,pulmonary hypertension or pleural effusion/s in the past.  He was diagnosed with a COPD by his family physician. Patient currently on treatment with the Spiriva 18 mcg 1 capsule inhalation daily, duo nebs 3 Gabriela nebulization every 4hours as needed and albuterol HFA 2puffs every 6 hourly as needed at home. He was never diagnosed with pulmonary tuberculosis in the past. He denies any recent exposure to any patients with tuberculosis. He denies any recent travel to endemic places of tuberculosis.       Events last 24 h:    On 2 lpm NC, comfortable   Breathing improved less SOB  UO 4.9L, net 3.9L (-) balance, of Lasix gtt now  LE edema improved      PMHx   Past Medical History      Diagnosis Date    Acute kidney injury ([address] 12/2020    due to Enterococous Bacteremia , fluid overload, low sodium    Amputation of right great toe ([address] 02/18/2021    Asthma     Brain tumor ([address]     Cirrhosis (HCC)     ETOH abuse    CKD (chronic kidney disease)     l.brown-osu spetie    COPD (chronic obstructive pulmonary disease) (HCC)     Diabetes mellitus (HCC)     type 2-insulin lantus and humalog    Falling     Glaucoma     Hepatitis C     History of blood transfusion     s/p nasal surgery    Hyperlipidemia     Hypertension     [place]    Legally blind     Obesity     Pain management     karol marzec-percocet Rx    Right foot infection 11/2021    Seizures ([address]       Past Surgical History        Procedure Laterality Date    BACK SURGERY      CARDIAC CATHETERIZATION  08/03/2021    EYE SURGERY      FIBULA FRACTURE SURGERY dextrose bolus (hypoglycemia) **OR** dextrose bolus (hypoglycemia), glucose  IV Drips/Infusions   dextrose       Home Medications  Medications Prior to Admission: oxyCODONE-acetaminophen (PERCOCET) 7.5-325 MG per tablet, Take 1 tablet by mouth every 8 hours as needed for Pain for up to 30 days. sodium bicarbonate 650 MG tablet, Take 650 mg by mouth 2 times daily  tamsulosin (FLOMAX) 0.4 MG capsule, Take 1 capsule by mouth nightly  DULoxetine (CYMBALTA) 60 MG extended release capsule, Take 1 capsule by mouth daily  insulin glargine (LANTUS) 100 UNIT/ML injection vial, Inject 84 Units into the skin nightly  docusate sodium (COLACE, DULCOLAX) 100 MG CAPS, Take 100 mg by mouth 2 times daily  OXYGEN, Inhale into the lungs daily as needed (nightly)   latanoprost (XALATAN) 0.005 % ophthalmic solution, Place 1 drop into both eyes nightly  rosuvastatin (CRESTOR) 20 MG tablet, Take 20 mg by mouth nightly   azithromycin (ZITHROMAX) 250 MG tablet, TAKE TWO (2) TABLETS THE 1ST DOSE, THEN TAKE ONE (1) TABLET EVERY DAY UNTIL GONE FOR INFECTION. senna (SENOKOT) 8.6 MG tablet, Take 2 tablets by mouth daily  gabapentin (NEURONTIN) 300 MG capsule, Take 1 capsule by mouth nightly for 30 days. nitroGLYCERIN (NITROSTAT) 0.4 MG SL tablet, up to max of 3 total doses. If no relief after 1 dose, call 911.   metoprolol succinate (TOPROL XL) 50 MG extended release tablet, Take 1 tablet by mouth daily  insulin lispro (HUMALOG) 100 UNIT/ML injection vial, Inject 10 Units into the skin 3 times daily (before meals) Plus Sliding Scale (Patient taking differently: Inject 12 Units into the skin 3 times daily (before meals) Plus Sliding Scale )  insulin lispro (HUMALOG) 100 UNIT/ML injection vial, Glucose: Dose:  No Insulin, 140-199 2 Units, 200-249 4 Units, 250-299 6 Units, 300-349 8 Units, 350-400 10 Units, Over 766 12 Units  folic acid (FOLVITE) 1 MG tablet, Take 1 mg by mouth daily  albuterol sulfate  (90 Base) MCG/ACT inhaler, Inhale 1 puff into the lungs every 4-6 hours as needed  ipratropium-albuterol (DUONEB) 0.5-2.5 (3) MG/3ML SOLN nebulizer solution, Inhale 3 mLs into the lungs every 4 hours as needed for Shortness of Breath  tiotropium (SPIRIVA) 18 MCG inhalation capsule, Inhale 1 capsule into the lungs daily  Diet    ADULT DIET; Regular; 4 carb choices (60 gm/meal)  Allergies    Adhesive tape and Keppra [levetiracetam]  Family History          Problem Relation Age of Onset    Heart Attack Father     Heart Attack Brother         pneumonia    No Known Problems Mother     Cancer Sister         breast    No Known Problems Maternal Grandmother     No Known Problems Maternal Grandfather     Liver Cancer Paternal Grandmother     Heart Attack Paternal Grandfather     Heart Disease Brother         stents    Colon Cancer Neg Hx     Colon Polyps Neg Hx      Sleep History    Never diagnosed with sleep apnea in the past.    Social History     Social History     Tobacco Use    Smoking status: Current Every Day Smoker     Packs/day: 1.00     Years: 30.00     Pack years: 30.00     Types: Cigarettes    Smokeless tobacco: Never Used    Tobacco comment: Currently smoking electronic cigarettes   Vaping Use    Vaping Use: Never used   Substance Use Topics    Alcohol use: Yes     Alcohol/week: 3.0 standard drinks     Types: 3 Cans of beer per week    Drug use: Not Currently     Types: Marijuana Gelene Chasten)     Comment: medical marijuana not currently using       Riview of systems   General/Constitutional:  Recent weight loss: Yes. He lost a significant amount of weight after being admitted to [facility] due to diuresis  Appetite changes: Normal.  Fever:No  Chills:No  HENT: Negative. Eyes: Negative. Upper respiratory tract: No nasal stuffiness with no post nasal drip. Lower respiratory tract/ lungs: See HPI for details. No hemoptysis. Cardiovascular: No palpitations or chest pain. Gastrointestinal: No nausea or vomiting. Neurological: No focal neurologiacal weakness. Extremities: No edema. Musculoskeletal: No complaints. Genitourinary: No complaints. Hematological: Negative. Psychiatric/Behavioral: Negative. Skin: No itching. Vitals     height is 6' 2\" (1.88 m) and weight is 316 lb 9.3 oz (143.6 kg) (abnormal). His oral temperature is 97.9 °F (36.6 °C). His blood pressure is 175/105 (abnormal) and his pulse is 76. His respiration is 18 and oxygen saturation is 98%. Body mass index is 40.65 kg/m². SUPPLEMENTAL O2: O2 Flow Rate (L/min): 3 L/min       I/O        Intake/Output Summary (Last 24 hours) at 3/13/2022 1245  Last data filed at 3/13/2022 0928  Gross per 24 hour   Intake 1520 ml   Output 4550 ml   Net -3030 ml     I/O last 3 completed shifts: In: 3755.5 [P.O.:3200; I.V.:555.5]  Out: 8150 [Urine:8150]   Patient Vitals for the past 96 hrs (Last 3 readings):   Weight   03/13/22 0414 (!) 316 lb 9.3 oz (143.6 kg)   03/12/22 0600 (!) 317 lb 6 oz (144 kg)   03/11/22 0600 (!) 326 lb 4.5 oz (148 kg)       Exam   General Appearance: well built, well nourished in no acute distress on 3LPM via nasal cannula. HEENT: Normal, Head is normocephalic, atraumatic. Oropharynx is clear and moist.  No oral thrush. PERRL. Neck - Supple, No JVD present. No tracheal deviation. Lungs - Bilateral air entry present. Decreased breath sounds at bases on both sides of chest.  Bilateral expiratory wheezes. Bilateral basal rales. Cardiovascular - Heart sounds are normal.  Regular rhythm normal rate without murmur, gallop or rub. Abdomen - Soft, nontender, nondistended, no masses or organomegaly  Neurologic - Awake, alert, oriented. There are no focal motor or sensory deficits. Extremities - No cyanosis, clubbing. 1+ bilateral leg edema. Musculoskeletal: Normal range of motion. Patient exhibits no tenderness. Lymphadenopathy:  No cervical adenopathy. Psychiatric: Patient  has a normal mood and affect.    Skin - No bruising or bleeding. Labs  - Old records and notes have been reviewed in [facility]   ABG  No results found for: PH, PO2, PCO2, HCO3, O2SAT  No results found for: IFIO2, MODE, SETTIDVOL, SETPEEP  CBC  Recent Labs     03/11/22  0543   WBC 5.3   RBC 3.32*   HGB 10.3*   HCT 32.9*   MCV 99.1*   MCH 31.0   MCHC 31.3*      MPV 11.6      BMP  Recent Labs     03/11/22  0543 03/12/22  0414 03/13/22  0537    135 137   K 3.6 3.5 3.6   CL 94* 93* 95*   CO2 32 32 30   BUN 23* 31* 35*   CREATININE 1.2 1.2 1.3*   GLUCOSE 211* 228* 215*   MG  --   --  1.6   CALCIUM 8.6 9.2 9.4     LFT  No results for input(s): AST, ALT, ALB, BILITOT, ALKPHOS, LIPASE in the last 72 hours. Invalid input(s): AMYLASE  TROP  Lab Results   Component Value Date    TROPONINT < 0.010 03/06/2022    TROPONINT < 0.010 03/06/2022    TROPONINT < 0.010 03/19/2019     BNP  No results for input(s): BNP in the last 72 hours. Lactic Acid  No results for input(s): LACTA in the last 72 hours. INR  No results for input(s): INR, PROTIME in the last 72 hours. PTT  No results for input(s): APTT in the last 72 hours. Glucose  Recent Labs     03/12/22  1949 03/13/22  0643 03/13/22  1044   POCGLU 360* 209* 185*     UA No results for input(s): SPECGRAV, PHUR, COLORU, CLARITYU, MUCUS, PROTEINU, BLOODU, RBCUA, WBCUA, BACTERIA, NITRU, GLUCOSEU, BILIRUBINUR, UROBILINOGEN, KETUA, LABCAST, LABCASTTY, AMORPHOS in the last 72 hours. Invalid input(s): CRYSTALS. PFTs 3/20/2019       Sleep studies   None in epic    Cultures    COVID-19 test performed on 6 March 2022: Not detected    EKG       Echocardiogram   Daryl Cuevas MD  [phone] 3/7/2022   Narrative & Impression  Transthoracic Echocardiography Report (TTE)  Conclusions      Summary   Technically difficult examination. Left Ventricular size is Mildly increased . Normal left ventricular wall thickness. There was severe global hypokinesis of the left ventricle. Systolic function was severely reduced. Ejection fraction is visually estimated in the range of 35%. The left atrium is Moderately dilated. There is moderate-to-severe aortic stenosis with valve area of 0.9 to 1 sq   cm. The maximum aortic valve gradient is 40 mmHg, the mean gradient is 25   mmHg, and the peak velocity is 3.1 m/s. Signature      ----------------------------------------------------------------   Electronically signed by Jac Lowery MD (Interpreting   physician) on 03/07/2022 at 05:58 PM   ----------------------------------------------------------------  Right Ventricle   The right ventricular size was normal with normal systolic function and   wall thickness. Radiology    CXR  Fri Mar 11, 2022  9:00 AM   Two-view chest.   Comparison: 12/05/2020. Chest x-ray performed on 11 March 2022 unilateral views:  Impression: Mild subsegmental atelectasis in the left mid and left lower lung zones. Equivocal minor left pleural effusion versus subpleural subsegmental atelectasis. Stable cardiomegaly. No definitive pulmonary edema. Chest Xray:  Sun Mar 6, 2022  4:15 PM   PROCEDURE: XR CHEST (2 VW)   CLINICAL INFORMATION: SOB   1. Left lower lobe consolidation that can relate to infiltrate and/or atelectasis. 2. Small bilateral pleural effusions. 3. Moderate cardiomegaly. Venous duplex scan of the lower lower extremities:  Wed Mar 9, 2022  4:33 PM   PROCEDURE: VL DUP LOWER EXTREMITY VENOUS BILATERAL   CLINICAL INFORMATION: Bilateral lower extremity edema/calf pain, r/o DVT   COMPARISON: No prior study. Normal venous ultrasound. No evidence for deep venous thrombosis.        Assessment   -Acute hypoxic respiratory failure due to decompensated chronic systolic congestive heart failure VS COPD VS other etiologies, improving with diuresis and other interventions  -Volume overload slowly improving with good response to diuresis  -COPD exacerbation due to acute bronchitis with  bacterial Vs viral etiology.  -Acute decompensation of chronic-systolic congestive heart failure.  -Abnormal chest x-ray- ? Left-sided pleural effusion-Improved on a chest x-ray performed on 11 March 2022.  -Severe chronic obstructive pulmonary disease.  -Chronic use of tobacco smoking for 40 years with 1 pack of cigarettes per day. -Type 2 diabetes mellitus  -Essential hypertension.  -Legally blind      Plan   -Continue diuresis and optimization of CHF by primary service.  -Adjust diuresis  - Patient educated to quit smoking as soon as possible. Patient verbalizes understanding of adverse consequences of tobacco smoking.  -Send sputum cultures  -Continue patient on doxycycline 100 mg p.o. twice daily for 7 days.  -Prednisone 40 mg p.o. daily for 5 days and stop  -Continue DuoNebs 3ml nebulization every 4 hourly while awake.  -Schedule patient for full pulmonary function tests before clinic visit. -Needs a home O2 evaluation at the time of discharge  -Titrate Oxygen to keep Spo2 >90%. -Deep Venous Thrombosis Prophylaxis: lovenox. \"Thank you for asking us to see this patient\"     José Miguel Rizzo educated about my impression and plan. He verbalizes understanding. Questions and concerns addressed.     Electronically signed by   Erna Ibarra MD on 3/13/2022 at 12:45 PM

## 2022-03-13 NOTE — PROGRESS NOTES
Cardiology Progress Note      Patient:  Lucrecia Gore  YOB: 1965  MRN: [id number]   Acct: [de-identified]  [facility] Date:  3/6/2022  Primary Cardiologist:   Seen by dr. Perfecto Vivas    Per prior cardiology consult note-  REASON FOR CONSULT:    Chf exac, mod-sev AS      CHIEF COMPLAINT:    SOB  Leg swelling      HISTORY OF PRESENT ILLNESS:    Lucrecia Gore is a pleasant 65year old male patient with past medical history that includes:   Past Medical History        Past Medical History:   Diagnosis Date    Acute kidney injury ([address] 12/2020     due to Enterococous Bacteremia , fluid overload, low sodium    Amputation of right great toe ([facility] 02/18/2021    Asthma      Brain tumor ([address]      Cirrhosis ([address]       ETOH abuse    CKD (chronic kidney disease)       l.brown-osu spetie    COPD (chronic obstructive pulmonary disease) ([facility]      Diabetes mellitus ([facility]       type 2-insulin lantus and humalog    Falling      Glaucoma      Hepatitis C      History of blood transfusion       s/p nasal surgery    Hyperlipidemia      Hypertension       Faith    Legally blind      Obesity      Pain management       karol marzec-percocet Rx    Right foot infection 11/2021    Seizures ([address]        He has h/o tobacco abuse. Patient is followed by Dr Lobito Truong, has h/o AS, HFrEF. Echo 07/2021 revealed an EF of 35-40%, moderate AS with LOBITO of 1.2 cm2. Stress test 4/2021 revealed a fixed inferior wall defect. LHC on 08/2021 revealed no significant CAD, mean gradient across the aortic valve of 22 mmHg. The patient was admitted to the hospital on 3/6/2022 after he presented with worsening SOB, leg swelling, WILCOX. Patient had some weight gain. He states that his symptoms have increased over the past two weeks. He reports fatigue. Patient denies chest pain, palpitations, dizziness, syncope. Cardiomegaly and pleural effusions were noted on CXR. Pro-BNP 4,977. Cr 1.1. Troponin <0.01, <0.01. EKG revealed NSR.  He was started on IV Lasix gtt 5mg/hour. Echocardiogram 3/6/2022 revealed an EF of 35%, moderate LAE, Moderate to severe AS with LOBITO ~ 0.9-1 cm2, peak gradient 40 mmHg, mean gradient 25 mmHg, peak velocity 3.1 m/s.        Subjective (Events in last 24 hours):     Pt sitting in bed   No chest pain   Still c/o SOB - improved but still has it     Feeling well  VSS  Tele SR no ectopy       3/13/22  Pt in bed   States feeling a little better - still with SOB   On 4L NC    VSS  Tele SR no ectopy     Objective:   BP (!) 154/93   Pulse 76   Temp 97.9 °F (36.6 °C) (Oral)   Resp 18   Ht 6' 2\" (1.88 m)   Wt (!) 316 lb 9.3 oz (143.6 kg)   SpO2 98%   BMI 40.65 kg/m²        TELEMETRY: SR no ectopy     Physical Exam:  General Appearance: alert and oriented to person, place and time, in no acute distress  Cardiovascular: normal rate, regular rhythm, normal S1 and S2, no murmurs, rubs, clicks, or gallops, distal pulses intact,   Pulmonary/Chest: clear to auscultation bilaterally- no wheezes, rales or rhonchi, normal air movement, no respiratory distress  Abdomen: soft, non-tender, non-distended, normal bowel sounds, no masses Extremities: no cyanosis, clubbing or edema, pulses present    Musculoskeletal: normal range of motion, no joint swelling, deformity or tenderness  Neurological: alert, oriented, normal speech, no focal findings or movement disorder noted    Medications:    amLODIPine  10 mg Oral Daily    insulin glargine  8 Units SubCUTAneous QAM    hydrALAZINE  25 mg Oral 3 times per day    furosemide  40 mg IntraVENous BID    spironolactone  25 mg Oral Daily    doxycycline hyclate  100 mg Oral 2 times per day    predniSONE  40 mg Oral Daily    sacubitril-valsartan  1 tablet Oral BID    nicotine  1 patch TransDERmal Daily    metoprolol succinate  100 mg Oral Daily    ipratropium-albuterol  3 mL Inhalation 4x daily    insulin lispro  0-6 Units SubCUTAneous TID WC    insulin lispro  0-3 Units SubCUTAneous Nightly  aspirin  81 mg Oral Daily    pantoprazole  40 mg Oral QAM AC    enoxaparin  40 mg SubCUTAneous BID    docusate sodium  100 mg Oral BID    DULoxetine  60 mg Oral Daily    folic acid  1 mg Oral Daily    insulin glargine  42 Units SubCUTAneous Nightly    latanoprost  1 drop Both Eyes Nightly    rosuvastatin  20 mg Oral Nightly    sodium bicarbonate  650 mg Oral BID      dextrose       acetaminophen, 650 mg, Q4H PRN  morphine, 2 mg, Q4H PRN  ondansetron, 4 mg, Q6H PRN  glucagon (rDNA), 1 mg, PRN  dextrose, 100 mL/hr, PRN  dextrose bolus (hypoglycemia), 125 mL, PRN   Or  dextrose bolus (hypoglycemia), 250 mL, PRN  glucose, 4 tablet, PRN        Diagnostics:  Echo:   Electronically signed by Chase Macedo MD (Interpreting   physician) on 03/07/2022 at 05:58 PM   ----------------------------------------------------------------      Findings      Mitral Valve   The mitral valve structure was normal with normal leaflet separation. DOPPLER: The transmitral velocity was within the normal range with no   evidence for mitral stenosis. Mild mitral regurgitation is present. Aortic Valve   The aortic valve leaflets were not well visualized. Aortic valve leaflets   are Moderately calcified. Leaflets exhibited moderately increased   thickness and severely reduced cuspal separation of the aortic valve. No   evidence of aortic valve regurgitation . There is moderate-to-severe aortic stenosis with valve area of 0.9 to 1 sq   cm. The maximum aortic valve gradient is 40 mmHg, the mean gradient is 25   mmHg, and the peak velocity is 3.1 m/s. Tricuspid Valve   The tricuspid valve structure was normal with normal leaflet separation. DOPPLER: There was no evidence of tricuspid stenosis. Mild tricuspid   regurgitation visualized. Right ventricular systolic pressure measures 60   mmhg. Pulmonic Valve   The pulmonic valve leaflets exhibited normal thickness, no calcification,   and normal cuspal separation. DOPPLER: The transpulmonic velocity was   within the normal range with no evidence for regurgitation. Left Atrium   The left atrium is Moderately dilated. Left Ventricle   Left Ventricular size is Mildly increased . Normal left ventricular wall thickness. There was severe global hypokinesis of the left ventricle. Systolic function was severely reduced. Ejection fraction is visually estimated in the range of 35%. Right Atrium   Right atrial size was normal.      Right Ventricle   The right ventricular size was normal with normal systolic function and   wall thickness. Pericardial Effusion   The pericardium was normal in appearance with no evidence of a pericardial   effusion. Pleural Effusion   No evidence of pleural effusion. Aorta / Great Vessels   -Aortic root dimension within normal limits.   -The Pulmonary artery is within normal limits. -IVC size is within normal limits with normal respiratory phasic changes. Left Heart Cath:   Procedure Summary   Patent coronaries      Recommendations   Optimize heart failure therapies   Proceed with scheduled surgery      Estimated Blood Loss:5 ml. Complications:No complications. Signatures      ----------------------------------------------------------------   Electronically signed by Karen Zimmerman MD (Performing   Physician) on 08/03/2021     Lab Data:    Cardiac Enzymes:  No results for input(s): CKTOTAL, CKMB, CKMBINDEX, TROPONINI in the last 72 hours.     CBC:   Lab Results   Component Value Date    WBC 5.3 03/11/2022    RBC 3.32 03/11/2022    HGB 10.3 03/11/2022    HCT 32.9 03/11/2022     03/11/2022       CMP:    Lab Results   Component Value Date     03/13/2022    K 3.6 03/13/2022    CL 95 03/13/2022    CO2 30 03/13/2022    BUN 35 03/13/2022    CREATININE 1.3 03/13/2022    LABGLOM 57 03/13/2022    GLUCOSE 215 03/13/2022    CALCIUM 9.4 03/13/2022       Hepatic Function Panel:    Lab Results

## 2022-03-13 NOTE — PROGRESS NOTES
IM Progress Note  Dr. Elif Ortega for Dr Heidi Knight  3/13/2022 9:43 AM      Patient name Alejo Rodriguez  DOB1965  PCP: MEGHAN Roque CNP  Admit Date: 3/6/2022  Acct No. [de-identified]    Subjective: Interval History:   Pt off lasix drip and now on IVP   Breathing stable      Diet: ADULT DIET; Regular; 4 carb choices (60 gm/meal)    I/O last 3 completed shifts: In: 3755.5 [P.O.:3200; I.V.:555.5]  Out: 8150 [Urine:8150]  I/O this shift:  In: -   Out: 350 [Urine:350]        Admission weight: (!) 368 lb 2.7 oz (167 kg) as of 3/6/2022  2:23 PM  Wt Readings from Last 3 Encounters:   03/13/22 (!) 316 lb 9.3 oz (143.6 kg)   01/11/22 (!) 305 lb (138.3 kg)   11/09/21 (!) 305 lb (138.3 kg)     Body mass index is 40.65 kg/m².     ROS   CVS;  no cp or palpitation  Resp: no SOB or cough  Neuro:  No numbness or weakness or dizziness  Abd: no nausea or vomiting or abd pain      Medications:   Scheduled Meds:   amLODIPine  10 mg Oral Daily    hydrALAZINE  25 mg Oral 3 times per day    furosemide  40 mg IntraVENous BID    spironolactone  25 mg Oral Daily    doxycycline hyclate  100 mg Oral 2 times per day    predniSONE  40 mg Oral Daily    sacubitril-valsartan  1 tablet Oral BID    nicotine  1 patch TransDERmal Daily    metoprolol succinate  100 mg Oral Daily    ipratropium-albuterol  3 mL Inhalation 4x daily    insulin lispro  0-6 Units SubCUTAneous TID WC    insulin lispro  0-3 Units SubCUTAneous Nightly    aspirin  81 mg Oral Daily    pantoprazole  40 mg Oral QAM AC    enoxaparin  40 mg SubCUTAneous BID    docusate sodium  100 mg Oral BID    DULoxetine  60 mg Oral Daily    folic acid  1 mg Oral Daily    insulin glargine  42 Units SubCUTAneous Nightly    latanoprost  1 drop Both Eyes Nightly    rosuvastatin  20 mg Oral Nightly    sodium bicarbonate  650 mg Oral BID     Continuous Infusions:   dextrose         Labs :     CBC:   Recent Labs     03/11/22  0543   WBC 5.3   HGB 10.3*        BMP: Recent Labs     03/11/22  0543 03/12/22  0414 03/13/22  0537    135 137   K 3.6 3.5 3.6   CL 94* 93* 95*   CO2 32 32 30   BUN 23* 31* 35*   CREATININE 1.2 1.2 1.3*   GLUCOSE 211* 228* 215*     Hepatic: No results for input(s): AST, ALT, ALB, BILITOT, ALKPHOS in the last 72 hours. Troponin: No results for input(s): TROPONINI in the last 72 hours. BNP: No results for input(s): BNP in the last 72 hours. Lipids: No results for input(s): CHOL, HDL in the last 72 hours. Invalid input(s): LDLCALCU  INR: No results for input(s): INR in the last 72 hours.     Radiology    Objective:   Vitals: BP (!) 151/84   Pulse 70   Temp 98 °F (36.7 °C) (Oral)   Resp 18   Ht 6' 2\" (1.88 m)   Wt (!) 316 lb 9.3 oz (143.6 kg)   SpO2 95%   BMI 40.65 kg/m²   HEENT: Head:pupils react  Neck: supple  Lungs: clear to auscultation  Heart: regular rate and rhythm + systolic murmur   Abdomen: soft BS heard   Extremities: warm ++ edema  Neurologic:  Alert, oriented X3    Impression:   :   Acute on chronic CHF sec to systolicy dysfunction  Mod to severe AS  NICMP  Anemia  COPD  CKD  Cirrhosis Hep C  Obesity never been diagnosed with TAPAN  Pancytopenia on admission    Plan:    Cont IV lasix and monitor I/O  Await stress ECHO to be done tomorrow  Cont ACEI  chems elevated and will adjust  lantus  Smoking cessation  increase activity as tolerated    Gabby Merritt MD, MD

## 2022-03-13 NOTE — PLAN OF CARE
Problem: Falls - Risk of:  Goal: Will remain free from falls  Description: Will remain free from falls  3/13/2022 0133 by Dean Haynes RN  Outcome: Ongoing  3/13/2022 0133 by Dean Haynes RN  Outcome: Ongoing  3/12/2022 1829 by Jl Elliott RN  Outcome: Ongoing  Note: Fall precautions in place. Pt educated on safety. No falls or injury this shift. Goal: Absence of physical injury  Description: Absence of physical injury  3/12/2022 1829 by Jl Elliott RN  Outcome: Completed  Note: N/a duplicate information     Problem: Pain:  Goal: Pain level will decrease  Description: Pain level will decrease  3/13/2022 0133 by Dean Haynes RN  Outcome: Ongoing  3/13/2022 0133 by Dean Haynes RN  Outcome: Ongoing  3/12/2022 1829 by Jl Elliott RN  Outcome: Ongoing  Note: Pain assessed every 4 hours with head to toe and as needed. PRN pain meds given as needed. Non-pharmacologic interventions offered. Goal: Control of acute pain  Description: Control of acute pain  3/13/2022 0133 by Dean Haynes RN  Outcome: Ongoing  3/13/2022 0133 by Dean Haynes RN  Outcome: Ongoing  3/12/2022 1829 by Jl Elliott RN  Outcome: Ongoing  Note: No acute pain this shift. Goal: Control of chronic pain  Description: Control of chronic pain  3/13/2022 0133 by Dean Haynes RN  Outcome: Ongoing  3/13/2022 0133 by Dean Haynes RN  Outcome: Ongoing  3/12/2022 1829 by Jl Elliott RN  Outcome: Ongoing  Note: Morphine and tylenol administered for chronic back pain as needed. Problem: Pain:  Goal: Control of acute pain  Description: Control of acute pain  3/13/2022 0133 by Dean Haynes RN  Outcome: Ongoing  3/13/2022 0133 by Dean Haynes RN  Outcome: Ongoing  3/12/2022 1829 by Jl Elliott RN  Outcome: Ongoing  Note: No acute pain this shift.       Problem: Skin Integrity:  Goal: Will show no infection signs and symptoms  Description: Will show no infection signs and symptoms  3/13/2022 0133 by Dean Haynes RN  Outcome: Ongoing Problem: Skin Integrity:  Goal: Will show no infection signs and symptoms  Description: Will show no infection signs and symptoms  3/13/2022 0133 by Carolyn Boyer RN  Outcome: Ongoing  3/13/2022 0133 by Carolyn Boyer RN  Outcome: Ongoing  3/12/2022 1829 by Jeison Thao RN  Outcome: Ongoing  Note: No signs and symptoms of infection this shift. Vital signs and labs WNL. Goal: Absence of new skin breakdown  Description: Absence of new skin breakdown  3/13/2022 0133 by Carolyn Boyer RN  Outcome: Ongoing  3/13/2022 0133 by Carolyn Boyer RN  Outcome: Ongoing  3/12/2022 1829 by Jeison Thao RN  Outcome: Ongoing  Note: No skin breakdown assessed.

## 2022-03-14 LAB
ANION GAP SERPL CALCULATED.3IONS-SCNC: 12 MEQ/L (ref 8–16)
BUN BLDV-MCNC: 38 MG/DL (ref 7–22)
CALCIUM SERPL-MCNC: 9.4 MG/DL (ref 8.5–10.5)
CHLORIDE BLD-SCNC: 94 MEQ/L (ref 98–111)
CO2: 31 MEQ/L (ref 23–33)
CREAT SERPL-MCNC: 1.2 MG/DL (ref 0.4–1.2)
ERYTHROCYTE [DISTWIDTH] IN BLOOD BY AUTOMATED COUNT: 14.1 % (ref 11.5–14.5)
ERYTHROCYTE [DISTWIDTH] IN BLOOD BY AUTOMATED COUNT: 51.1 FL (ref 35–45)
GFR SERPL CREATININE-BSD FRML MDRD: 63 ML/MIN/1.73M2
GLUCOSE BLD-MCNC: 164 MG/DL (ref 70–108)
GLUCOSE BLD-MCNC: 175 MG/DL (ref 70–108)
GLUCOSE BLD-MCNC: 188 MG/DL (ref 70–108)
GLUCOSE BLD-MCNC: 305 MG/DL (ref 70–108)
GLUCOSE BLD-MCNC: 389 MG/DL (ref 70–108)
GRAM STAIN RESULT: ABNORMAL
HCT VFR BLD CALC: 40.5 % (ref 42–52)
HEMOGLOBIN: 12.6 GM/DL (ref 14–18)
MCH RBC QN AUTO: 30.5 PG (ref 26–33)
MCHC RBC AUTO-ENTMCNC: 31.1 GM/DL (ref 32.2–35.5)
MCV RBC AUTO: 98.1 FL (ref 80–94)
ORGANISM: ABNORMAL
PLATELET # BLD: 174 THOU/MM3 (ref 130–400)
PMV BLD AUTO: 11.2 FL (ref 9.4–12.4)
POTASSIUM SERPL-SCNC: 3.7 MEQ/L (ref 3.5–5.2)
RBC # BLD: 4.13 MILL/MM3 (ref 4.7–6.1)
RESPIRATORY CULTURE: ABNORMAL
RESPIRATORY CULTURE: ABNORMAL
SODIUM BLD-SCNC: 137 MEQ/L (ref 135–145)
WBC # BLD: 14.1 THOU/MM3 (ref 4.8–10.8)

## 2022-03-14 PROCEDURE — 6360000002 HC RX W HCPCS

## 2022-03-14 PROCEDURE — 93350 STRESS TTE ONLY: CPT

## 2022-03-14 PROCEDURE — 99232 SBSQ HOSP IP/OBS MODERATE 35: CPT | Performed by: PHYSICIAN ASSISTANT

## 2022-03-14 PROCEDURE — 97116 GAIT TRAINING THERAPY: CPT

## 2022-03-14 PROCEDURE — 6370000000 HC RX 637 (ALT 250 FOR IP): Performed by: INTERNAL MEDICINE

## 2022-03-14 PROCEDURE — 36415 COLL VENOUS BLD VENIPUNCTURE: CPT

## 2022-03-14 PROCEDURE — 85027 COMPLETE CBC AUTOMATED: CPT

## 2022-03-14 PROCEDURE — APPSS30 APP SPLIT SHARED TIME 16-30 MINUTES: Performed by: NURSE PRACTITIONER

## 2022-03-14 PROCEDURE — 80048 BASIC METABOLIC PNL TOTAL CA: CPT

## 2022-03-14 PROCEDURE — 97162 PT EVAL MOD COMPLEX 30 MIN: CPT

## 2022-03-14 PROCEDURE — 6360000002 HC RX W HCPCS: Performed by: INTERNAL MEDICINE

## 2022-03-14 PROCEDURE — 6370000000 HC RX 637 (ALT 250 FOR IP): Performed by: NURSE PRACTITIONER

## 2022-03-14 PROCEDURE — 82948 REAGENT STRIP/BLOOD GLUCOSE: CPT

## 2022-03-14 PROCEDURE — 99232 SBSQ HOSP IP/OBS MODERATE 35: CPT | Performed by: INTERNAL MEDICINE

## 2022-03-14 PROCEDURE — 94640 AIRWAY INHALATION TREATMENT: CPT

## 2022-03-14 PROCEDURE — 1200000003 HC TELEMETRY R&B

## 2022-03-14 PROCEDURE — 6360000002 HC RX W HCPCS: Performed by: NURSE PRACTITIONER

## 2022-03-14 PROCEDURE — 93017 CV STRESS TEST TRACING ONLY: CPT | Performed by: INTERNAL MEDICINE

## 2022-03-14 PROCEDURE — 6370000000 HC RX 637 (ALT 250 FOR IP): Performed by: PHYSICIAN ASSISTANT

## 2022-03-14 PROCEDURE — 2700000000 HC OXYGEN THERAPY PER DAY

## 2022-03-14 PROCEDURE — 94760 N-INVAS EAR/PLS OXIMETRY 1: CPT

## 2022-03-14 RX ORDER — SODIUM CHLORIDE 9 MG/ML
500 INJECTION, SOLUTION INTRAVENOUS CONTINUOUS PRN
Status: DISCONTINUED | OUTPATIENT
Start: 2022-03-14 | End: 2022-03-14

## 2022-03-14 RX ORDER — SODIUM CHLORIDE 0.9 % (FLUSH) 0.9 %
10 SYRINGE (ML) INJECTION PRN
Status: DISCONTINUED | OUTPATIENT
Start: 2022-03-14 | End: 2022-03-14

## 2022-03-14 RX ORDER — AMLODIPINE BESYLATE 5 MG/1
5 TABLET ORAL DAILY
Status: DISCONTINUED | OUTPATIENT
Start: 2022-03-15 | End: 2022-03-15

## 2022-03-14 RX ORDER — FUROSEMIDE 20 MG/1
20 TABLET ORAL 2 TIMES DAILY
Status: DISCONTINUED | OUTPATIENT
Start: 2022-03-14 | End: 2022-03-15 | Stop reason: HOSPADM

## 2022-03-14 RX ORDER — DOBUTAMINE HYDROCHLORIDE 200 MG/100ML
10 INJECTION INTRAVENOUS CONTINUOUS
Status: DISCONTINUED | OUTPATIENT
Start: 2022-03-14 | End: 2022-03-14

## 2022-03-14 RX ADMIN — SPIRONOLACTONE 25 MG: 25 TABLET ORAL at 09:15

## 2022-03-14 RX ADMIN — DOXYCYCLINE HYCLATE 100 MG: 100 TABLET, COATED ORAL at 20:11

## 2022-03-14 RX ADMIN — MORPHINE SULFATE 2 MG: 2 INJECTION, SOLUTION INTRAMUSCULAR; INTRAVENOUS at 02:12

## 2022-03-14 RX ADMIN — ROSUVASTATIN CALCIUM 20 MG: 20 TABLET, FILM COATED ORAL at 20:11

## 2022-03-14 RX ADMIN — MORPHINE SULFATE 2 MG: 2 INJECTION, SOLUTION INTRAMUSCULAR; INTRAVENOUS at 13:20

## 2022-03-14 RX ADMIN — ENOXAPARIN SODIUM 40 MG: 100 INJECTION SUBCUTANEOUS at 20:10

## 2022-03-14 RX ADMIN — SACUBITRIL AND VALSARTAN 1 TABLET: 24; 26 TABLET, FILM COATED ORAL at 20:12

## 2022-03-14 RX ADMIN — PREDNISONE 40 MG: 20 TABLET ORAL at 09:14

## 2022-03-14 RX ADMIN — FOLIC ACID 1 MG: 1 TABLET ORAL at 09:15

## 2022-03-14 RX ADMIN — LATANOPROST 1 DROP: 50 SOLUTION OPHTHALMIC at 20:11

## 2022-03-14 RX ADMIN — MORPHINE SULFATE 2 MG: 2 INJECTION, SOLUTION INTRAMUSCULAR; INTRAVENOUS at 20:11

## 2022-03-14 RX ADMIN — HYDRALAZINE HYDROCHLORIDE 50 MG: 50 TABLET, FILM COATED ORAL at 13:56

## 2022-03-14 RX ADMIN — DOXYCYCLINE HYCLATE 100 MG: 100 TABLET, COATED ORAL at 09:15

## 2022-03-14 RX ADMIN — SODIUM BICARBONATE 650 MG: 650 TABLET ORAL at 20:11

## 2022-03-14 RX ADMIN — INSULIN GLARGINE 42 UNITS: 100 INJECTION, SOLUTION SUBCUTANEOUS at 20:11

## 2022-03-14 RX ADMIN — FUROSEMIDE 20 MG: 10 INJECTION, SOLUTION INTRAMUSCULAR; INTRAVENOUS at 09:17

## 2022-03-14 RX ADMIN — IPRATROPIUM BROMIDE AND ALBUTEROL SULFATE 3 ML: .5; 3 SOLUTION RESPIRATORY (INHALATION) at 09:29

## 2022-03-14 RX ADMIN — INSULIN LISPRO 4 UNITS: 100 INJECTION, SOLUTION INTRAVENOUS; SUBCUTANEOUS at 16:14

## 2022-03-14 RX ADMIN — FUROSEMIDE 20 MG: 20 TABLET ORAL at 20:11

## 2022-03-14 RX ADMIN — SODIUM BICARBONATE 650 MG: 650 TABLET ORAL at 09:14

## 2022-03-14 RX ADMIN — INSULIN GLARGINE 8 UNITS: 100 INJECTION, SOLUTION SUBCUTANEOUS at 09:12

## 2022-03-14 RX ADMIN — ASPIRIN 81 MG CHEWABLE TABLET 81 MG: 81 TABLET CHEWABLE at 09:17

## 2022-03-14 RX ADMIN — INSULIN LISPRO 1 UNITS: 100 INJECTION, SOLUTION INTRAVENOUS; SUBCUTANEOUS at 09:12

## 2022-03-14 RX ADMIN — IPRATROPIUM BROMIDE AND ALBUTEROL SULFATE 3 ML: .5; 3 SOLUTION RESPIRATORY (INHALATION) at 19:44

## 2022-03-14 RX ADMIN — IPRATROPIUM BROMIDE AND ALBUTEROL SULFATE 3 ML: .5; 3 SOLUTION RESPIRATORY (INHALATION) at 16:12

## 2022-03-14 RX ADMIN — PANTOPRAZOLE SODIUM 40 MG: 40 TABLET, DELAYED RELEASE ORAL at 05:19

## 2022-03-14 RX ADMIN — METOPROLOL SUCCINATE 100 MG: 100 TABLET, EXTENDED RELEASE ORAL at 09:13

## 2022-03-14 RX ADMIN — ENOXAPARIN SODIUM 40 MG: 100 INJECTION SUBCUTANEOUS at 09:17

## 2022-03-14 RX ADMIN — INSULIN LISPRO 1 UNITS: 100 INJECTION, SOLUTION INTRAVENOUS; SUBCUTANEOUS at 12:34

## 2022-03-14 RX ADMIN — AMLODIPINE BESYLATE 10 MG: 10 TABLET ORAL at 09:15

## 2022-03-14 RX ADMIN — DULOXETINE HYDROCHLORIDE 60 MG: 60 CAPSULE, DELAYED RELEASE ORAL at 09:13

## 2022-03-14 RX ADMIN — HYDRALAZINE HYDROCHLORIDE 50 MG: 50 TABLET, FILM COATED ORAL at 05:19

## 2022-03-14 RX ADMIN — DOCUSATE SODIUM 100 MG: 100 CAPSULE, LIQUID FILLED ORAL at 09:17

## 2022-03-14 RX ADMIN — MORPHINE SULFATE 2 MG: 2 INJECTION, SOLUTION INTRAMUSCULAR; INTRAVENOUS at 09:17

## 2022-03-14 RX ADMIN — IPRATROPIUM BROMIDE AND ALBUTEROL SULFATE 3 ML: .5; 3 SOLUTION RESPIRATORY (INHALATION) at 13:24

## 2022-03-14 RX ADMIN — SACUBITRIL AND VALSARTAN 1 TABLET: 24; 26 TABLET, FILM COATED ORAL at 09:14

## 2022-03-14 ASSESSMENT — PAIN SCALES - GENERAL
PAINLEVEL_OUTOF10: 10
PAINLEVEL_OUTOF10: 5
PAINLEVEL_OUTOF10: 8
PAINLEVEL_OUTOF10: 9
PAINLEVEL_OUTOF10: 7
PAINLEVEL_OUTOF10: 9
PAINLEVEL_OUTOF10: 9

## 2022-03-14 ASSESSMENT — PAIN DESCRIPTION - LOCATION: LOCATION: BACK

## 2022-03-14 ASSESSMENT — PAIN DESCRIPTION - PROGRESSION: CLINICAL_PROGRESSION: NOT CHANGED

## 2022-03-14 ASSESSMENT — PAIN DESCRIPTION - DESCRIPTORS: DESCRIPTORS: CONSTANT

## 2022-03-14 ASSESSMENT — PAIN - FUNCTIONAL ASSESSMENT: PAIN_FUNCTIONAL_ASSESSMENT: PREVENTS OR INTERFERES SOME ACTIVE ACTIVITIES AND ADLS

## 2022-03-14 ASSESSMENT — PAIN DESCRIPTION - PAIN TYPE: TYPE: CHRONIC PAIN

## 2022-03-14 ASSESSMENT — PAIN DESCRIPTION - ORIENTATION: ORIENTATION: LOWER

## 2022-03-14 ASSESSMENT — PAIN DESCRIPTION - FREQUENCY: FREQUENCY: CONTINUOUS

## 2022-03-14 ASSESSMENT — PAIN DESCRIPTION - ONSET: ONSET: ON-GOING

## 2022-03-14 NOTE — CARE COORDINATION
Discharge Planning Update:    Using 3L O2, 96%. Lasix gtt off, now on IVP tid. Cardio planning dobutamine stress echo today. PT ordered on 3/9, have not yet evaluated patient. Plans return to home with [facility] services. Home O2 eval prior to discharge.   Electronically signed by Bereket Cole RN on 3/14/2022 at 8:40 AM

## 2022-03-14 NOTE — PROGRESS NOTES
[address] [address]  INPATIENT PHYSICAL THERAPY  EVALUATION  [facility] RENAL TELEMETRY 6K - 6K-18/018-A    Time In: 2211  Time Out: 1015  Timed Code Treatment Minutes: 8 Minutes  Minutes: 16          Date: 3/14/2022  Patient Name: José Miguel Rizzo,  Gender:  male        MRN: [id number]  : 1965  (65 y.o.)      Referring Practitioner: Gay Elliott MD  Diagnosis: Shortness of breath  Additional Pertinent Hx: The patient is a 65 y.o. male who presents with insidious onset of shortness of breath, legs swelling and weight gain in the last few weeks. He endorses significant orthopnea and PND. He has cough,wheezes. He is on a diuretic at home. His weight gain persisted and the SOB got worse. He reports increased fatigue. He had a fall at home on account of increasing weakness and fatigue, bruised his right knee. No head injury and no loss of consciousness with the fall. Subsequently presented to ER. ER evaluation showed evidence of fluid overload with elevated BNP, pulmonary congestion. Restrictions/Precautions:  Restrictions/Precautions: Fall Risk,General Precautions  Position Activity Restriction  Other position/activity restrictions: legally blind, chronic back pain    Subjective:  Chart Reviewed: Yes  Patient assessed for rehabilitation services?: Yes  Family / Caregiver Present: No  Subjective: RN approved session. Pt pleasant and agreeable to therapy session.     General:  Overall Orientation Status: Within Functional Limits  Follows Commands: Within Functional Limits    Vision: Impaired  Vision Exceptions: Legally blind    Hearing: Within functional limits         Pain:  7/10: chronic back pain     Vitals: Oxygen: 4L O2; pt takes it off to ambulate    Social/Functional History:    Lives With: Alone  Type of Home: House  Home Layout: One level  Home Access: Level entry  Home Equipment: Oxygen,Electric scooter,Rolling walker,Wheelchair-manual,Reacher     Bathroom Shower/Tub: Walk-in shower  Bathroom Toilet: Handicap height  Bathroom Equipment: Grab bars in shower,Shower chair,3-in-1 commode  Bathroom Accessibility: Accessible,Walker accessible    Receives Help From: Family  ADL Assistance: Independent  Homemaking Assistance: Independent  Homemaking Responsibilities: Yes  Ambulation Assistance:  (with use of RW; does use electric scooter for longer distances)  Transfer Assistance: Independent    Active : No  Occupation: On disability  Additional Comments: patient reports he has a service dog. reports he is , however he and his wife do not live together. wife checks in on patient and drives him places. reports his house is completely handicap accessible. OBJECTIVE:  Range of Motion:  Bilateral Lower Extremity: WFL    Strength:  Bilateral Lower Extremity: Impaired - grossly deconditioned     Balance:  Static Sitting Balance:  Supervision  Static Standing Balance: Stand By Assistance    Bed Mobility:  Supine to Sit: Supervision    Transfers:  Sit to Stand: [employer], with verbal cues  Stand to Sit:Contact Carroll Schwab, X 1, with verbal cues    Ambulation:  Contact Guard Assistance  Distance: 60'   Surface: Level Tile  Device:Standard Walker  Gait Deviations:  Slow Joanna, Decreased Step Length Bilaterally, Decreased Gait Speed and Unsteady Gait    Functional Outcome Measures: Completed  AM-PAC Inpatient Mobility Raw Score : 17  AM-PAC Inpatient T-Scale Score : 42.13    ASSESSMENT:  Activity Tolerance:  Patient tolerance of  treatment: good. Pt with one significant LOB requiring Min A to correct. Maximal verbal cues for pt to place all 4 legs of walker on ground before stepping/placing weight through it. Pt did present with SOB with ambulation and required several standing rest breaks. Treatment Initiated: Treatment and education initiated within context of evaluation.   Evaluation time included review of current medical information, gathering information related to past medical, social and functional history, completion of standardized testing, formal and informal observation of tasks, assessment of data and development of plan of care and goals. Treatment time included skilled education and facilitation of tasks to increase safety and independence with functional mobility for improved independence and quality of life. Assessment: Body structures, Functions, Activity limitations: Decreased functional mobility ,Decreased strength,Decreased endurance,Decreased balance,Decreased posture  Assessment: Pt demonstrates a decrease in baseline by way of bed mobility, transfers and ambulation secondary to decreased activity tolerance, strength, fatigue, and balance deficits. Pt will benefit from skilled PT services throughout admission and beyond hospital discharge for improvements in functional mobility and in order to decrease fall risk and return pt to OF. Prognosis: Good    REQUIRES PT FOLLOW UP: Yes    Discharge Recommendations:  Discharge Recommendations: Home with Home health PT    Patient Education:  PT Education: Plan of Care,PT Role,Goals,Functional Mobility Training    Equipment Recommendations:  Equipment Needed: No    Plan:  Times per week: 5x GM  Times per day: Daily  Current Treatment Recommendations: Strengthening,Endurance Training,Gait Training,Functional Mobility Training    Goals:  Patient goals : go home  Short term goals  Time Frame for Short term goals: by discharge  Short term goal 1: bed mobility with HOB flat, no rails, mod I for increased functional ind  Short term goal 2: sit<>stand from various surfaces with LRD mod I for safe transfers  Short term goal 3: ambulate Sarah  1560' with LRD mod I for safe household distances  Long term goals  Time Frame for Long term goals : NA d/t short ELOS    Following session, patient left in safe position with all fall risk precautions in place.     Jo Lefort PT, DPT

## 2022-03-14 NOTE — PROGRESS NOTES
[facility]    Patient - Peggy August   MRN -  [id number]   [facility]   - 1965      Date of Admission -  3/6/2022  2:23 PM  Date of evaluation -  3/14/2022  Room - [address] [place] [address] MD Gomez [address] [facility], APRN - [facility]     Problem List      C/Aria Barker 1106 Problems    Diagnosis Date Noted    Acute on chronic systolic congestive heart failure [facility]) [I50.23] 2022     Reason for Consult    COPD exacerbation with acute hypoxic respiratory failure  HPI   History Obtained From: Patient and electronic medical record. Peggy August is a 65 y.o. male  was initially admitted under hospitalist service. Pulmonary medicine was consulted for further management of COPD and Hypoxic respiratory failure. He is currently on oxygen via nasal cannula. He is a 60-year-old pleasant gentleman with chronic use of tobacco smoking for 40 years with 1 pack of cigarettes per day. He is still smoking until his current hospitalization to [facility]. He was in his usual state of health until 2 weeks back. He started having worsening of shortness of breath, cough with yellow sputum production for the last 2 weeks. He denies any fever or chills. He also noticed bilateral leg swelling with 2+ edema. He gained a good amount of weight over the last few weeks. He gave a history of orthopnea and paroxysmal nocturnal dyspnea prior to the hospitalization. He was admitted under Dr. Ede Barnes MD service on 2022 for acute decompensation of chronic systolic congestive heart failure. Patient had a history of severe COPD. Pulmonary medicine was consulted for further evaluation. He is having cough: Yes  Duration of cough: for 2 weeks. His cough is associated with sputum production: Yes   The sputum color: yellow  Hemoptysis:No  Diurnal variation: None.          He gives a history of fever: No  Chills & rigors:No  Associated night sweats: No.  Recent travel history:No.    Rrecent sick contacts: No.      He admits to orthopnea and paroxysmal nocturnal dyspnea. Prior to current current hospitalization:  He used to be on 4 L of oxygen via nasal cannula at home  He was never diagnosed with pulmonary diseases I.e bronchial asthma, pulmonary embolism,pulmonary hypertension or pleural effusion/s in the past.  He was diagnosed with a COPD by his family physician. Patient currently on treatment with the Spiriva 18 mcg 1 capsule inhalation daily, duo nebs 3 Gabriela nebulization every 4hours as needed and albuterol HFA 2puffs every 6 hourly as needed at home. He was never diagnosed with pulmonary tuberculosis in the past. He denies any recent exposure to any patients with tuberculosis. He denies any recent travel to endemic places of tuberculosis.       Past 24 hrs   -On 3 LPM via NC  -Afebrile   -Completed stress test today  -Net I/O (20.1L) down approx 30 pounds from admission  -Still SOB improved since admission  -Resp Cx growing Moraxella catarrhalis   All other systems reviewed    PMHx   Past Medical History      Diagnosis Date    Acute kidney injury ([address] 12/2020    due to Enterococous Bacteremia , fluid overload, low sodium    Amputation of right great toe ([address] 02/18/2021    Asthma     Brain tumor ([address]     Cirrhosis (HCC)     ETOH abuse    CKD (chronic kidney disease)     l.brown-osu spetie    COPD (chronic obstructive pulmonary disease) (HCC)     Diabetes mellitus (HCC)     type 2-insulin lantus and humalog    Falling     Glaucoma     Hepatitis C     History of blood transfusion     s/p nasal surgery    Hyperlipidemia     Hypertension     Nallu    Legally blind     Obesity     Pain management     karol marzec-percocet Rx    Right foot infection 11/2021    Seizures ([address]       Past Surgical History        Procedure Laterality Date    BACK SURGERY      CARDIAC CATHETERIZATION 08/03/2021    EYE SURGERY      FIBULA FRACTURE SURGERY Left 03/21/2019    LEFT FIBULAR SHAFT ORIF performed by Raúl Hoskins DPM at [address] Right     Steel pins in place    FRACTURE SURGERY      NASAL SINUS SURGERY Bilateral 11/29/2020    FLEXIBLE BRONCHOSCOPY WITH BRONCHOALVEOLAR LAVAGE WITH CULTURES.  NASAL ENDOSCOPY WITH POSSIBLE CAUTERY ADN NASAL PACKING performed by Rush Salmeron MD at [facility]  01/2020    SPINE SURGERY N/A 02/19/2021    KYPHOPLASTY at T4, L2, L4 performed by Angelia Elizabeth MD at [address] N/A 8/24/2021    LUMBAR 3 AND LUMBAR 5 KYPHOPLASTY performed by Sandra Andrea MD at [address] Right 09/23/2020    AMPUTATION DISTAL APSECT RIGHT HALLUX, REMOVAL OF HARDWARE RIGHT HALLUX performed by Wisam Vinson DPM at [facility] N/A 04/25/2019    EGD BIOPSY performed by Lesvia Simmons MD at [facility]     Meds    Current Medications    [START ON 3/15/2022] amLODIPine  5 mg Oral Daily    furosemide  20 mg Oral BID    insulin glargine  8 Units SubCUTAneous QAM    hydrALAZINE  50 mg Oral 3 times per day    spironolactone  25 mg Oral Daily    doxycycline hyclate  100 mg Oral 2 times per day    predniSONE  40 mg Oral Daily    sacubitril-valsartan  1 tablet Oral BID    nicotine  1 patch TransDERmal Daily    metoprolol succinate  100 mg Oral Daily    ipratropium-albuterol  3 mL Inhalation 4x daily    insulin lispro  0-6 Units SubCUTAneous TID WC    insulin lispro  0-3 Units SubCUTAneous Nightly    aspirin  81 mg Oral Daily    pantoprazole  40 mg Oral QAM AC    enoxaparin  40 mg SubCUTAneous BID    docusate sodium  100 mg Oral BID    DULoxetine  60 mg Oral Daily    folic acid  1 mg Oral Daily    insulin glargine  42 Units SubCUTAneous Nightly    latanoprost  1 drop Both Eyes Nightly    rosuvastatin  20 mg Oral Nightly    sodium bicarbonate  650 mg Oral BID perflutren lipid microspheres, acetaminophen, morphine, ondansetron, glucagon (rDNA), dextrose, dextrose bolus (hypoglycemia) **OR** dextrose bolus (hypoglycemia), glucose  IV Drips/Infusions   DOBUTamine      dextrose       Home Medications  Medications Prior to Admission: oxyCODONE-acetaminophen (PERCOCET) 7.5-325 MG per tablet, Take 1 tablet by mouth every 8 hours as needed for Pain for up to 30 days. sodium bicarbonate 650 MG tablet, Take 650 mg by mouth 2 times daily  tamsulosin (FLOMAX) 0.4 MG capsule, Take 1 capsule by mouth nightly  DULoxetine (CYMBALTA) 60 MG extended release capsule, Take 1 capsule by mouth daily  insulin glargine (LANTUS) 100 UNIT/ML injection vial, Inject 84 Units into the skin nightly  docusate sodium (COLACE, DULCOLAX) 100 MG CAPS, Take 100 mg by mouth 2 times daily  OXYGEN, Inhale into the lungs daily as needed (nightly)   latanoprost (XALATAN) 0.005 % ophthalmic solution, Place 1 drop into both eyes nightly  rosuvastatin (CRESTOR) 20 MG tablet, Take 20 mg by mouth nightly   azithromycin (ZITHROMAX) 250 MG tablet, TAKE TWO (2) TABLETS THE 1ST DOSE, THEN TAKE ONE (1) TABLET EVERY DAY UNTIL GONE FOR INFECTION. senna (SENOKOT) 8.6 MG tablet, Take 2 tablets by mouth daily  gabapentin (NEURONTIN) 300 MG capsule, Take 1 capsule by mouth nightly for 30 days. nitroGLYCERIN (NITROSTAT) 0.4 MG SL tablet, up to max of 3 total doses. If no relief after 1 dose, call 911.   metoprolol succinate (TOPROL XL) 50 MG extended release tablet, Take 1 tablet by mouth daily  insulin lispro (HUMALOG) 100 UNIT/ML injection vial, Inject 10 Units into the skin 3 times daily (before meals) Plus Sliding Scale (Patient taking differently: Inject 12 Units into the skin 3 times daily (before meals) Plus Sliding Scale )  insulin lispro (HUMALOG) 100 UNIT/ML injection vial, Glucose: Dose:  No Insulin, 140-199 2 Units, 200-249 4 Units, 250-299 6 Units, 300-349 8 Units, 350-400 10 Units, Over 400 12 Units  folic acid (FOLVITE) 1 MG tablet, Take 1 mg by mouth daily  albuterol sulfate  (90 Base) MCG/ACT inhaler, Inhale 1 puff into the lungs every 4-6 hours as needed  ipratropium-albuterol (DUONEB) 0.5-2.5 (3) MG/3ML SOLN nebulizer solution, Inhale 3 mLs into the lungs every 4 hours as needed for Shortness of Breath  tiotropium (SPIRIVA) 18 MCG inhalation capsule, Inhale 1 capsule into the lungs daily  Diet    ADULT DIET; Regular; 4 carb choices (60 gm/meal); Low Sodium (2 gm)  Allergies    Adhesive tape and Keppra [levetiracetam]  Family History          Problem Relation Age of Onset    Heart Attack Father     Heart Attack Brother         pneumonia    No Known Problems Mother     Cancer Sister         breast    No Known Problems Maternal Grandmother     No Known Problems Maternal Grandfather     Liver Cancer Paternal Grandmother     Heart Attack Paternal Grandfather     Heart Disease Brother         stents    Colon Cancer Neg Hx     Colon Polyps Neg Hx      Sleep History    Never diagnosed with sleep apnea in the past.    Social History     Social History     Tobacco Use    Smoking status: Current Every Day Smoker     Packs/day: 1.00     Years: 30.00     Pack years: 30.00     Types: Cigarettes    Smokeless tobacco: Never Used    Tobacco comment: Currently smoking electronic cigarettes   Vaping Use    Vaping Use: Never used   Substance Use Topics    Alcohol use: Yes     Alcohol/week: 3.0 standard drinks     Types: 3 Cans of beer per week    Drug use: Not Currently     Types: Marijuana Ardia Lidya)     Comment: medical marijuana not currently using     Vitals     height is 6' 2\" (1.88 m) and weight is 314 lb 13.1 oz (142.8 kg) (abnormal). His oral temperature is 97.7 °F (36.5 °C). His blood pressure is 112/69 and his pulse is 70. His respiration is 18 and oxygen saturation is 97%. Body mass index is 40.42 kg/m².     SUPPLEMENTAL O2: O2 Flow Rate (L/min): 3 L/min       I/O        Intake/Output Summary (Last 24 hours) at 3/14/2022 1439  Last data filed at 3/14/2022 0424  Gross per 24 hour   Intake 500 ml   Output 2500 ml   Net -2000 ml     I/O last 3 completed shifts: In: 0482 [P.O.:1460]  Out: 5350 [TQLKP:3895]   Patient Vitals for the past 96 hrs (Last 3 readings):   Weight   03/14/22 0424 (!) 314 lb 13.1 oz (142.8 kg)   03/13/22 0414 (!) 316 lb 9.3 oz (143.6 kg)   03/12/22 0600 (!) 317 lb 6 oz (144 kg)       Exam   Physical Exam   Constitutional: No distress on O2 per NC. Patient appears moderately built and  moderately nourished. Head: Normocephalic and atraumatic. Mouth/Throat: Oropharynx is clear and moist.  No oral thrush. Eyes: Conjunctivae are normal. Pupils are equal, round. No scleral icterus. Neck: Neck supple. No tracheal deviation present. Cardiovascular: S1 and S2 with no murmur. BLE edema  Pulmonary/Chest: Normal effort with bilateral air entry, bilateral rales at bases. No stridor. No respiratory distress. Patient exhibits no tenderness. Abdominal: Soft. Bowel sounds audible. No distension or tenderness to palp. Musculoskeletal: Moves all extremities  Neurological: Patient is alert and follows simple commands     Labs  - Old records and notes have been reviewed in [facility]   ABG  No results found for: PH, PO2, PCO2, HCO3, O2SAT  No results found for: IFIO2, MODE, SETTIDVOL, SETPEEP  CBC  Recent Labs     03/14/22  0748   WBC 14.1*   RBC 4.13*   HGB 12.6*   HCT 40.5*   MCV 98.1*   MCH 30.5   MCHC 31.1*      MPV 11.2      BMP  Recent Labs     03/12/22  0414 03/13/22  0537 03/14/22  0748    137 137   K 3.5 3.6 3.7   CL 93* 95* 94*   CO2 32 30 31   BUN 31* 35* 38*   CREATININE 1.2 1.3* 1.2   GLUCOSE 228* 215* 164*   MG  --  1.6  --    CALCIUM 9.2 9.4 9.4     LFT  No results for input(s): AST, ALT, ALB, BILITOT, ALKPHOS, LIPASE in the last 72 hours. Invalid input(s):   AMYLASE  TROP  Lab Results   Component Value Date    TROPONINT < 0.010 03/06/2022    TROPONINT < 0.010 03/06/2022    TROPONINT < 0.010 03/19/2019     BNP  No results for input(s): BNP in the last 72 hours. Lactic Acid  No results for input(s): LACTA in the last 72 hours. INR  No results for input(s): INR, PROTIME in the last 72 hours. PTT  No results for input(s): APTT in the last 72 hours. Glucose  Recent Labs     03/13/22  2109 03/14/22  0634 03/14/22  1233   POCGLU 311* 175* 188*     UA No results for input(s): SPECGRAV, PHUR, COLORU, CLARITYU, MUCUS, PROTEINU, BLOODU, RBCUA, WBCUA, BACTERIA, NITRU, GLUCOSEU, BILIRUBINUR, UROBILINOGEN, KETUA, LABCAST, LABCASTTY, AMORPHOS in the last 72 hours. Invalid input(s): CRYSTALS. PFTs 3/20/2019       Sleep studies   None in epic    Cultures    COVID-19 test performed on 6 March 2022: Not detected  Sputum Culture-Moraxella catarrhalis     EKG       Echocardiogram   Ema Ames MD  [phone] 3/7/2022   Narrative & Impression  Transthoracic Echocardiography Report (TTE)  Conclusions      Summary   Technically difficult examination. Left Ventricular size is Mildly increased . Normal left ventricular wall thickness. There was severe global hypokinesis of the left ventricle. Systolic function was severely reduced. Ejection fraction is visually estimated in the range of 35%. The left atrium is Moderately dilated. There is moderate-to-severe aortic stenosis with valve area of 0.9 to 1 sq   cm. The maximum aortic valve gradient is 40 mmHg, the mean gradient is 25   mmHg, and the peak velocity is 3.1 m/s. Signature      ----------------------------------------------------------------   Electronically signed by González Hodges MD (Interpreting   physician) on 03/07/2022 at 05:58 PM   ----------------------------------------------------------------  Right Ventricle   The right ventricular size was normal with normal systolic function and   wall thickness.       Radiology    CXR  Fri Mar 11, 2022  9:00 AM   Two-view chest.   Comparison: 12/05/2020. Chest x-ray performed on 11 March 2022 unilateral views:  Impression: Mild subsegmental atelectasis in the left mid and left lower lung zones. Equivocal minor left pleural effusion versus subpleural subsegmental atelectasis. Stable cardiomegaly. No definitive pulmonary edema. Chest Xray:  Sun Mar 6, 2022  4:15 PM   PROCEDURE: XR CHEST (2 VW)   CLINICAL INFORMATION: SOB   1. Left lower lobe consolidation that can relate to infiltrate and/or atelectasis. 2. Small bilateral pleural effusions. 3. Moderate cardiomegaly. Venous duplex scan of the lower lower extremities:  Wed Mar 9, 2022  4:33 PM   PROCEDURE: VL DUP LOWER EXTREMITY VENOUS BILATERAL   CLINICAL INFORMATION: Bilateral lower extremity edema/calf pain, r/o DVT   COMPARISON: No prior study. Normal venous ultrasound. No evidence for deep venous thrombosis. Assessment   -Acute hypoxic respiratory failure due to decompensated chronic systolic congestive heart failure VS COPD VS other etiologies, improving with diuresis and other interventions  -Volume overload slowly improving with good response to diuresis  -COPD exacerbation due to moraxella catarrhalis   -Acute decompensation of chronic-systolic congestive heart failure.  -Abnormal chest x-ray- ? Left-sided pleural effusion-Improved on a chest x-ray performed on 11 March 2022.  -Severe chronic obstructive pulmonary disease.  -Chronic use of tobacco smoking for 40 years with 1 pack of cigarettes per day. -Type 2 diabetes mellitus  -Essential hypertension    Plan   -Continue diuresis and optimization of CHF by primary service/ Cardiology  - Patient educated to quit smoking as soon as possible.  Patient verbalizes understanding of adverse consequences of tobacco smoking.  -Continue patient on doxycycline 100 mg p.o. twice daily for 7 days   -Prednisone 40 mg p.o. daily for 5 days and stop  -Continue DuoNebs 3ml nebulization every 4 hourly while awake.  -Schedule patient for full pulmonary function tests before clinic visit. -Needs a home O2 evaluation at the time of discharge  -Cardiology following will await results of stress test   -Titrate Oxygen to keep Spo2 >90%. -Deep Venous Thrombosis Prophylaxis: lovenox. Mary Mendoza educated about my impression and plan. He verbalizes understanding. Questions and concerns addressed. Electronically signed by   MEGHAN Mixon - CNP on 3/14/2022 at 2:39 PM     Addendum by Dr. Fernanda Cabrera MD:  I have seen and examined the patient independently. Face to face evaluation and examination was performed. The above evaluation and note has been reviewed. Labs and radiographs were reviewed. I Have discussed with Mr. Urbinajay RONALDO Michael about this patient in detail. Physical exam was performed by me. See below for details. More than half of the total time for this encounter spent by me. The above assessment and plan has been reviewed. Please see my modifications mentioned below. My modifications:    Physical Exam:  Constitutional: Patient appears in no distress on 3LPM via nasal cannula. Mouth/Throat: Oropharynx is clear and moist.   Neck: Neck supple. Cardiovascular: S1 and S2 heard. No murmurs. Pulmonary/Chest: Bilateral air entry present. Good breath sounds on both sides, diminished at bases. No wheezes. No rales. Abdominal: Soft. No tenderness. Extremities: Patient exhibits no edema. Neurological: Patient is awake and alert.          Concepción Han MD 3/14/2022 5:19 PM

## 2022-03-14 NOTE — PLAN OF CARE
Problem: Falls - Risk of:  Goal: Will remain free from falls  Description: Will remain free from falls  Outcome: Ongoing  Note: No falls noted this shift. Continue falling star program. Bed alarm on, bed in low position. Call light and personal belongings in reach. Patient uses call light appropriately. Problem: Pain:  Goal: Pain level will decrease  Description: Pain level will decrease  Outcome: Ongoing  Goal: Control of acute pain  Description: Control of acute pain  Outcome: Ongoing  Goal: Control of chronic pain  Description: Control of chronic pain  Outcome: Ongoing  Note: Patient complaining of pain this shift. Prn pain medication available. Will monitor. Problem: Skin Integrity:  Goal: Will show no infection signs and symptoms  Description: Will show no infection signs and symptoms  Outcome: Ongoing  Goal: Absence of new skin breakdown  Description: Absence of new skin breakdown  Outcome: Ongoing  Note: No skin break down noted at this time. Encouraged patient to reposition self in bed. Problem: DISCHARGE BARRIERS  Goal: Patient's continuum of care needs are met  Outcome: Ongoing     Care plan reviewed with patient. Patient verbalizes understanding of plan of care and contributes to goal setting.

## 2022-03-14 NOTE — PROGRESS NOTES
Cardiology Progress Note      Patient:  Vinnie Joel  YOB: 1965  MRN: [id number]   Acct: [de-identified]  [facility] Date:  3/6/2022  Primary Cardiologist: Evita Scruggs MD    Note per dr Mayfield Knife:    Chf exac, mod-sev AS      CHIEF COMPLAINT:    SOB  Leg swelling      HISTORY OF PRESENT ILLNESS:    Vinnie Joel is a pleasant 65year old male patient with past medical history that includes:   Past Medical History        Past Medical History:   Diagnosis Date    Acute kidney injury ([address] 12/2020     due to Enterococous Bacteremia , fluid overload, low sodium    Amputation of right great toe ([address] 02/18/2021    Asthma      Brain tumor ([address]      Cirrhosis ([address]       ETOH abuse    CKD (chronic kidney disease)       l.brown-osu spetie    COPD (chronic obstructive pulmonary disease) (HCC)      Diabetes mellitus ([address]       type 2-insulin lantus and humalog    Falling      Glaucoma      Hepatitis C      History of blood transfusion       s/p nasal surgery    Hyperlipidemia      Hypertension       Nallu    Legally blind      Obesity      Pain management       karol marzec-percocet Rx    Right foot infection 11/2021    Seizures ([address]        He has h/o tobacco abuse. Patient is followed by Dr Ayesha Wong, has h/o AS, HFrEF. Echo 07/2021 revealed an EF of 35-40%, moderate AS with LOBITO of 1.2 cm2. Stress test 4/2021 revealed a fixed inferior wall defect. LHC on 08/2021 revealed no significant CAD, mean gradient across the aortic valve of 22 mmHg. The patient was admitted to the hospital on 3/6/2022 after he presented with worsening SOB, leg swelling, WILCOX. Patient had some weight gain. He states that his symptoms have increased over the past two weeks. He reports fatigue. Patient denies chest pain, palpitations, dizziness, syncope. Cardiomegaly and pleural effusions were noted on CXR. Pro-BNP 4,977. Cr 1.1. Troponin <0.01, <0.01. EKG revealed NSR.  He was started on IV Lasix gtt 5mg/hour. Echocardiogram 3/6/2022 revealed an EF of 35%, moderate LAE, Moderate to severe AS with LOBITO ~ 0.9-1 cm2, peak gradient 40 mmHg, mean gradient 25 mmHg, peak velocity 3.1 m/s. \"    Subjective (Events in last 24 hours):  Pt awake and alert. NAD. No cp. Sob has improved.  No edema    On 3 l/min O2 - same as home      Net I/o -20 L    Objective:   /80   Pulse 70   Temp 97.6 °F (36.4 °C) (Oral)   Resp 18   Ht 6' 2\" (1.88 m)   Wt (!) 314 lb 13.1 oz (142.8 kg)   SpO2 98%   BMI 40.42 kg/m²        TELEMETRY: nsr - 70s    Physical Exam:  General Appearance: alert and oriented to person, place and time, in no acute distress  Cardiovascular: normal rate, regular rhythm, normal S1 and S2, no murmurs, rubs, clicks, or gallops, distal pulses intact, no carotid bruits, no JVD  Pulmonary/Chest: diminished otherwise CTAB  Abdomen: soft, non-tender, non-distended, normal bowel sounds, no masses Extremities: trace ble edema, pulse   Skin: warm and dry  Head: normocephalic and atraumatic  Eyes: pupils equal, round, and reactive to light  Neck: supple and non-tender without mass, no thyromegaly   Neurological: alert, oriented, normal speech, no focal findings or movement disorder noted    Medications:    amLODIPine  10 mg Oral Daily    insulin glargine  8 Units SubCUTAneous QAM    hydrALAZINE  50 mg Oral 3 times per day    furosemide  20 mg IntraVENous TID    spironolactone  25 mg Oral Daily    doxycycline hyclate  100 mg Oral 2 times per day    predniSONE  40 mg Oral Daily    sacubitril-valsartan  1 tablet Oral BID    nicotine  1 patch TransDERmal Daily    metoprolol succinate  100 mg Oral Daily    ipratropium-albuterol  3 mL Inhalation 4x daily    insulin lispro  0-6 Units SubCUTAneous TID WC    insulin lispro  0-3 Units SubCUTAneous Nightly    aspirin  81 mg Oral Daily    pantoprazole  40 mg Oral QAM AC    enoxaparin  40 mg SubCUTAneous BID    docusate sodium  100 mg Oral BID    DULoxetine  60 mg Oral Daily    folic acid  1 mg Oral Daily    insulin glargine  42 Units SubCUTAneous Nightly    latanoprost  1 drop Both Eyes Nightly    rosuvastatin  20 mg Oral Nightly    sodium bicarbonate  650 mg Oral BID      DOBUTamine      sodium chloride      dextrose       perflutren lipid microspheres, 1.5 mL, ONCE PRN  sodium chloride flush, 10 mL, PRN  sodium chloride, 500 mL, Continuous PRN  acetaminophen, 650 mg, Q4H PRN  morphine, 2 mg, Q4H PRN  ondansetron, 4 mg, Q6H PRN  glucagon (rDNA), 1 mg, PRN  dextrose, 100 mL/hr, PRN  dextrose bolus (hypoglycemia), 125 mL, PRN   Or  dextrose bolus (hypoglycemia), 250 mL, PRN  glucose, 4 tablet, PRN        Diagnostics:  TTE 3/7/22  Summary   Technically difficult examination. Left Ventricular size is Mildly increased . Normal left ventricular wall thickness. There was severe global hypokinesis of the left ventricle. Systolic function was severely reduced. Ejection fraction is visually estimated in the range of 35%. The left atrium is Moderately dilated. There is moderate-to-severe aortic stenosis with valve area of 0.9 to 1 sq   cm. The maximum aortic valve gradient is 40 mmHg, the mean gradient is 25   mmHg, and the peak velocity is 3.1 m/s. Signature      ----------------------------------------------------------------   Electronically signed by Sarah Salazar MD (Interpreting   physician) on 03/07/2022 at 05:58 PM    Lab Data:    Cardiac Enzymes:  No results for input(s): CKTOTAL, CKMB, CKMBINDEX, TROPONINI in the last 72 hours.     CBC:   Lab Results   Component Value Date    WBC 14.1 03/14/2022    RBC 4.13 03/14/2022    HGB 12.6 03/14/2022    HCT 40.5 03/14/2022     03/14/2022       CMP:    Lab Results   Component Value Date     03/14/2022    K 3.7 03/14/2022    K 3.6 03/13/2022    CL 94 03/14/2022    CO2 31 03/14/2022    BUN 38 03/14/2022    CREATININE 1.2 03/14/2022    LABGLOM 63 03/14/2022    GLUCOSE 164 03/14/2022    CALCIUM 9.4 03/14/2022       Hepatic Function Panel:    Lab Results   Component Value Date    ALKPHOS 131 03/08/2022    ALT 23 03/08/2022    AST 47 03/08/2022    PROT 7.6 03/08/2022    BILITOT 0.8 03/08/2022    BILIDIR 0.4 03/08/2022    LABALBU 3.2 03/08/2022       Magnesium:    Lab Results   Component Value Date    MG 1.6 03/13/2022       PT/INR:    Lab Results   Component Value Date    INR 1.22 03/09/2022       HgBA1c:    Lab Results   Component Value Date    LABA1C 6.6 02/24/2021       FLP:    Lab Results   Component Value Date    TRIG 73 03/07/2022    HDL 40 03/07/2022    LDLCALC 32 03/07/2022       TSH:    Lab Results   Component Value Date    TSH 0.951 03/07/2022         Assessment:    Acute on chronic HFrEF - improving  Volume overload  Severe NICMP - ef 35 per TTE  Mod to severe AS - LOBITO 0.9-1 cm2, mean gradient 25 mmHg  Possible low flow, low gradient severe AS  HTN  COPD exacerbation  Due to acute bronchitis - pulm following  Tobacco abuse  Morbid obesity  Hx liver cirrhosis  Patent coronaries per cath 8/3/21    Plan:    Daily I/o and weight  2 liter fluid restriction and 2gm sodium diet  Keep mag >2 and K >4  Cont diuresis, zaroxolyn 2.5 po x 1 today  dobutamine stress test (DSE) done - results pending  Cont BB/entresto/aldactone/hydralazine  Decrease norvasc to 5, uptitrate entresto as needed  Change lasix to po 20 bid  Avoid nitrates - stop nitro paste  Daily bmp  Referral to chf clinic - done  lifevest - ordered  Check dopplers to r/o ble DVT - negative   Check cost farxiga with pharmacy - 100% covered and $0 copay - start upon dc  Smoking cessation advised     Electronically signed by Charlie Snow PA-C on 3/14/2022 at 11:22 AM

## 2022-03-14 NOTE — PROGRESS NOTES
INTERNAL MEDICINE Progress Note  3/14/2022 6:24 PM  Subjective:   Admit Date: 3/6/2022  PCP: Roxanne Martínez, APRN - CNP  Interval History:     No new c/o  Wt loss++,     Objective:   Vitals: /75   Pulse 66   Temp 97.7 °F (36.5 °C) (Oral)   Resp 18   Ht 6' 2\" (1.88 m)   Wt (!) 314 lb 13.1 oz (142.8 kg)   SpO2 99%   BMI 40.42 kg/m²   General appearance: alert and cooperative with exam  HEENT: atraumatic  Neck:  no JVD  Lungs: improved AE melly  Heart: S1, S2 normal and 2/6 SM  Abdomen: normal findings: bowel sounds normal and no organomegaly and abnormal findings:  distended and obese  Extremities: no pitting edema  Neurologic: Alert, oriented, thought content appropriate      Medications:   Scheduled Meds:   [START ON 3/15/2022] amLODIPine  5 mg Oral Daily    furosemide  20 mg Oral BID    insulin glargine  8 Units SubCUTAneous QAM    hydrALAZINE  50 mg Oral 3 times per day    spironolactone  25 mg Oral Daily    doxycycline hyclate  100 mg Oral 2 times per day    predniSONE  40 mg Oral Daily    sacubitril-valsartan  1 tablet Oral BID    nicotine  1 patch TransDERmal Daily    metoprolol succinate  100 mg Oral Daily    ipratropium-albuterol  3 mL Inhalation 4x daily    insulin lispro  0-6 Units SubCUTAneous TID WC    insulin lispro  0-3 Units SubCUTAneous Nightly    aspirin  81 mg Oral Daily    pantoprazole  40 mg Oral QAM AC    enoxaparin  40 mg SubCUTAneous BID    docusate sodium  100 mg Oral BID    DULoxetine  60 mg Oral Daily    folic acid  1 mg Oral Daily    insulin glargine  42 Units SubCUTAneous Nightly    latanoprost  1 drop Both Eyes Nightly    rosuvastatin  20 mg Oral Nightly    sodium bicarbonate  650 mg Oral BID     Continuous Infusions:   DOBUTamine      dextrose         Lab Results:   CBC:   Recent Labs     03/14/22  0748   WBC 14.1*   HGB 12.6*        BMP:    Recent Labs     03/12/22  0414 03/13/22  0537 03/14/22  0748    137 137   K 3.5 3.6 3.7   CL 93* 95* 94*   CO2 32 30 31   BUN 31* 35* 38*   CREATININE 1.2 1.3* 1.2   GLUCOSE 228* 215* 164*     Magnesium:    Lab Results   Component Value Date    MG 1.6 03/13/2022     HgBA1c:    Lab Results   Component Value Date    LABA1C 6.6 02/24/2021     TSH:    Lab Results   Component Value Date    TSH 0.951 03/07/2022     FOLATE:    Lab Results   Component Value Date    FOLATE > 20.0 03/07/2022     IRON:    Lab Results   Component Value Date    IRON 64 03/07/2022     FERRITIN:    Lab Results   Component Value Date    FERRITIN 175 03/07/2022     TTE Summary    Technically difficult examination.    Left Ventricular size is Mildly increased .    Normal left ventricular wall thickness.    There was severe global hypokinesis of the left ventricle.    Systolic function was severely reduced.    Ejection fraction is visually estimated in the range of 35%.    The left atrium is Moderately dilated.    There is moderate-to-severe aortic stenosis with valve area of 0.9 to 1 sq    cm.    The maximum aortic valve gradient is 40 mmHg, the mean gradient is 25    mmHg, and the peak velocity is 3.1 m/s.        Signature  ----------------------------------------------------------------    Electronically signed by Shirin Bray MD (Interpreting    physician) on 03/07/2022 at 05:58 PM       Assessment and Plan:   1. Acute on chronic systolic CHF-exac, improved  2. Mod-severe AS, finn 0.9 sq cm  3. HTN  4. COPD, stable  5. DM 2, stable BS  6. Pancytopenia, improved wbc  7. H/o HCV  8. H/o liver cirrhosis  9. Morbid obesity     Cont po lasix, entresto  Cont BB,   Cont Bronchodilators  O2 supplement / home O2 eval  Life vest at dc  Am labs.     Zahra King MD, MD

## 2022-03-14 NOTE — PROGRESS NOTES
03/14/22 0943   RT Protocol   History Pulmonary Disease 2   Respiratory pattern 4   Breath sounds 2   Cough 0   Indications for Bronchodilator Therapy Decreased or absent breath sounds; On home bronchodilators   Bronchodilator Assessment Score 8

## 2022-03-14 NOTE — PROGRESS NOTES
Comprehensive Nutrition Assessment    Type and Reason for Visit:  Initial (LOS)    Nutrition Recommendations/Plan:   Continue current diet. Reinforced diet - encouraged compliance. Pt. With written HF educational materials - provided RD number. Nutrition Assessment:      Pt. Seen - reports eating very well; tolerating diet. States good appetite pta. Reports tries to watch sodium and CHO intake pta. Consumes ~2 meals/day and snack. Reports fluid overload on admit - states was not on diuretic pta however will be taking one when discharged. Rx includes Deltasone, Insulin, Lasix  3/14: Glucose 164, BUN 38, Cr 1.2  PMH: CKD, Cirrhosis, COPD, Seizures, HTN, DM    Malnutrition Assessment:  Malnutrition Status:  No malnutrition    Context:  Chronic Illness       Wounds:  None       Current Nutrition Therapies:    ADULT DIET; Regular; 4 carb choices (60 gm/meal); Low Sodium (2 gm)    Anthropometric Measures:  · Height: 6' 2\" (188 cm)  · Current Body Weight: 314 lb 13.1 oz (142.8 kg) (3/14 +1 edema - on diuretics)   · Admission Body Weight: 345 lb 10.9 oz (156.8 kg) (3/8 +1, +3 edema)    · Usual Body Weight:  (per pt. 308#; per EMR: 4/6/21: 317# 9.6 oz, 8/23/21: 309# 3.2 oz)     · Ideal Body Weight: 190 lbs;      · BMI: 40.4  · BMI Categories: Obese Class 3 (BMI 40.0 or greater)       Nutrition Diagnosis:   · No nutrition diagnosis at this time related to   as evidenced by        Nutrition Interventions:   Food and/or Nutrient Delivery:  Continue Current Diet  Nutrition Education/Counseling:  Education initiated (3/14 Encouraged compliance with low sodium, CHO controlled diet.   Reviewed FR.)   Coordination of Nutrition Care:  Continue to monitor while inpatient    Nutrition Monitoring and Evaluation:   Behavioral-Environmental Outcomes:  None Identified   Food/Nutrient Intake Outcomes:  Diet Advancement/Tolerance,Food and Nutrient Intake  Physical Signs/Symptoms Outcomes:  Biochemical Data,GI Status,Fluid Status or Edema,Nutrition Focused Physical Findings,Skin,Weight     Discharge Planning:     Too soon to determine     Electronically signed by Bijan Calderon RD, LD on 3/14/22 at 2:45 PM EDT    Contact: [phone]

## 2022-03-14 NOTE — RT PROTOCOL NOTE
RT Nebulizer Bronchodilator Protocol Note    There is a bronchodilator order in the chart from a provider indicating to follow the RT Bronchodilator Protocol and there is an Initiate RT Bronchodilator Protocol order as well (see protocol at bottom of note). CXR Findings:  No results found. The findings from the last RT Protocol Assessment were as follows:  Smoking: Chronic pulmonary disease  Respiratory Pattern: Mild dyspnea at rest, irregular pattern, or RR 21-25 bpm  Breath Sounds: Slightly diminished and/or crackles  Cough: Strong, spontaneous, non-productive  Indication for Bronchodilator Therapy: Decreased or absent breath sounds,On home bronchodilators  Bronchodilator Assessment Score: 8    Aerosolized bronchodilator medication orders have been revised according to the RT Nebulizer Bronchodilator Protocol below. Respiratory Therapist to perform RT Therapy Protocol Assessment initially then follow the protocol. Repeat RT Therapy Protocol Assessment PRN for score 0-3 or on second treatment, BID, and PRN for scores above 3. No Indications - adjust the frequency to every 6 hours PRN wheezing or bronchospasm, if no treatments needed after 48 hours then discontinue using Per Protocol order mode. If indication present, adjust the RT bronchodilator orders based on the Bronchodilator Assessment Score as indicated below. If a patient is on this medication at home then do not decrease Frequency below that used at home. 0-3 - enter or revise RT bronchodilator order(s) to equivalent RT Bronchodilator order with Frequency of every 4 hours PRN for wheezing or increased work of breathing using Per Protocol order mode. 4-6 - enter or revise RT Bronchodilator order(s) to two equivalent RT bronchodilator orders with one order with BID Frequency and one order with Frequency of every 4 hours PRN wheezing or increased work of breathing using Per Protocol order mode.          7-10 - enter or revise RT Bronchodilator order(s) to two equivalent RT bronchodilator orders with one order with TID Frequency and one order with Frequency of every 4 hours PRN wheezing or increased work of breathing using Per Protocol order mode. 11-13 - enter or revise RT Bronchodilator order(s) to one equivalent RT bronchodilator order with QID Frequency and an Albuterol order with Frequency of every 4 hours PRN wheezing or increased work of breathing using Per Protocol order mode. Greater than 13 - enter or revise RT Bronchodilator order(s) to one equivalent RT bronchodilator order with every 4 hours Frequency and an Albuterol order with Frequency of every 2 hours PRN wheezing or increased work of breathing using Per Protocol order mode. RT to enter RT Home Evaluation for COPD & MDI Assessment order using Per Protocol order mode.     PATIENT TAKES DUONEB AT HOME QID SO WILL REMAIN AT QID  Electronically signed by Ayesha Hollins RCP on 3/14/2022 at 9:44 AM

## 2022-03-15 VITALS
HEIGHT: 74 IN | RESPIRATION RATE: 20 BRPM | TEMPERATURE: 97.5 F | BODY MASS INDEX: 40.43 KG/M2 | HEART RATE: 73 BPM | SYSTOLIC BLOOD PRESSURE: 113 MMHG | WEIGHT: 315 LBS | OXYGEN SATURATION: 93 % | DIASTOLIC BLOOD PRESSURE: 57 MMHG

## 2022-03-15 LAB
ANION GAP SERPL CALCULATED.3IONS-SCNC: 14 MEQ/L (ref 8–16)
BUN BLDV-MCNC: 49 MG/DL (ref 7–22)
CALCIUM SERPL-MCNC: 9.2 MG/DL (ref 8.5–10.5)
CHLORIDE BLD-SCNC: 97 MEQ/L (ref 98–111)
CO2: 25 MEQ/L (ref 23–33)
CREAT SERPL-MCNC: 1.4 MG/DL (ref 0.4–1.2)
ERYTHROCYTE [DISTWIDTH] IN BLOOD BY AUTOMATED COUNT: 14.1 % (ref 11.5–14.5)
ERYTHROCYTE [DISTWIDTH] IN BLOOD BY AUTOMATED COUNT: 50 FL (ref 35–45)
GFR SERPL CREATININE-BSD FRML MDRD: 52 ML/MIN/1.73M2
GLUCOSE BLD-MCNC: 168 MG/DL (ref 70–108)
GLUCOSE BLD-MCNC: 233 MG/DL (ref 70–108)
GLUCOSE BLD-MCNC: 248 MG/DL (ref 70–108)
HCT VFR BLD CALC: 38.9 % (ref 42–52)
HEMOGLOBIN: 12.4 GM/DL (ref 14–18)
MCH RBC QN AUTO: 30.7 PG (ref 26–33)
MCHC RBC AUTO-ENTMCNC: 31.9 GM/DL (ref 32.2–35.5)
MCV RBC AUTO: 96.3 FL (ref 80–94)
PLATELET # BLD: 184 THOU/MM3 (ref 130–400)
PMV BLD AUTO: 11 FL (ref 9.4–12.4)
POTASSIUM SERPL-SCNC: 3.6 MEQ/L (ref 3.5–5.2)
RBC # BLD: 4.04 MILL/MM3 (ref 4.7–6.1)
SODIUM BLD-SCNC: 136 MEQ/L (ref 135–145)
WBC # BLD: 14.2 THOU/MM3 (ref 4.8–10.8)

## 2022-03-15 PROCEDURE — 6370000000 HC RX 637 (ALT 250 FOR IP): Performed by: INTERNAL MEDICINE

## 2022-03-15 PROCEDURE — 6360000002 HC RX W HCPCS: Performed by: INTERNAL MEDICINE

## 2022-03-15 PROCEDURE — 85027 COMPLETE CBC AUTOMATED: CPT

## 2022-03-15 PROCEDURE — 94640 AIRWAY INHALATION TREATMENT: CPT

## 2022-03-15 PROCEDURE — 6370000000 HC RX 637 (ALT 250 FOR IP): Performed by: NURSE PRACTITIONER

## 2022-03-15 PROCEDURE — 36415 COLL VENOUS BLD VENIPUNCTURE: CPT

## 2022-03-15 PROCEDURE — 6370000000 HC RX 637 (ALT 250 FOR IP): Performed by: PHYSICIAN ASSISTANT

## 2022-03-15 PROCEDURE — APPSS30 APP SPLIT SHARED TIME 16-30 MINUTES: Performed by: NURSE PRACTITIONER

## 2022-03-15 PROCEDURE — 99232 SBSQ HOSP IP/OBS MODERATE 35: CPT | Performed by: INTERNAL MEDICINE

## 2022-03-15 PROCEDURE — 80048 BASIC METABOLIC PNL TOTAL CA: CPT

## 2022-03-15 PROCEDURE — 82948 REAGENT STRIP/BLOOD GLUCOSE: CPT

## 2022-03-15 RX ORDER — DOXYCYCLINE HYCLATE 100 MG
100 TABLET ORAL EVERY 12 HOURS SCHEDULED
Qty: 7 TABLET | Refills: 0 | Status: SHIPPED | OUTPATIENT
Start: 2022-03-15 | End: 2022-03-19

## 2022-03-15 RX ORDER — METOPROLOL SUCCINATE 100 MG/1
100 TABLET, EXTENDED RELEASE ORAL DAILY
Qty: 30 TABLET | Refills: 3 | Status: SHIPPED | OUTPATIENT
Start: 2022-03-16

## 2022-03-15 RX ORDER — FUROSEMIDE 20 MG/1
20 TABLET ORAL 2 TIMES DAILY
Qty: 60 TABLET | Refills: 3 | Status: SHIPPED | OUTPATIENT
Start: 2022-03-15 | End: 2022-03-31

## 2022-03-15 RX ORDER — UMECLIDINIUM BROMIDE AND VILANTEROL TRIFENATATE 62.5; 25 UG/1; UG/1
1 POWDER RESPIRATORY (INHALATION) DAILY
Qty: 1 EACH | Refills: 11 | Status: SHIPPED | OUTPATIENT
Start: 2022-03-15 | End: 2023-03-15

## 2022-03-15 RX ORDER — HYDRALAZINE HYDROCHLORIDE 50 MG/1
50 TABLET, FILM COATED ORAL EVERY 8 HOURS SCHEDULED
Qty: 90 TABLET | Refills: 3 | Status: SHIPPED | OUTPATIENT
Start: 2022-03-15

## 2022-03-15 RX ORDER — INSULIN GLARGINE 100 [IU]/ML
46 INJECTION, SOLUTION SUBCUTANEOUS NIGHTLY
Status: DISCONTINUED | OUTPATIENT
Start: 2022-03-15 | End: 2022-03-15 | Stop reason: HOSPADM

## 2022-03-15 RX ORDER — NICOTINE 21 MG/24HR
1 PATCH, TRANSDERMAL 24 HOURS TRANSDERMAL DAILY
Qty: 30 PATCH | Refills: 3 | Status: SHIPPED | OUTPATIENT
Start: 2022-03-16

## 2022-03-15 RX ORDER — ASPIRIN 81 MG/1
81 TABLET, CHEWABLE ORAL DAILY
Qty: 30 TABLET | Refills: 3 | Status: ON HOLD | OUTPATIENT
Start: 2022-03-16 | End: 2022-05-12 | Stop reason: HOSPADM

## 2022-03-15 RX ORDER — ALBUTEROL SULFATE 2.5 MG/3ML
2.5 SOLUTION RESPIRATORY (INHALATION) EVERY 6 HOURS PRN
Qty: 120 EACH | Refills: 11 | Status: ON HOLD | OUTPATIENT
Start: 2022-03-15 | End: 2022-04-07 | Stop reason: SDUPTHER

## 2022-03-15 RX ORDER — AMLODIPINE BESYLATE 5 MG/1
5 TABLET ORAL DAILY
Qty: 30 TABLET | Refills: 3 | Status: SHIPPED | OUTPATIENT
Start: 2022-03-16 | End: 2022-03-15 | Stop reason: HOSPADM

## 2022-03-15 RX ORDER — SPIRONOLACTONE 25 MG/1
25 TABLET ORAL DAILY
Qty: 30 TABLET | Refills: 3 | Status: SHIPPED | OUTPATIENT
Start: 2022-03-16 | End: 2022-03-21

## 2022-03-15 RX ADMIN — SACUBITRIL AND VALSARTAN 1 TABLET: 24; 26 TABLET, FILM COATED ORAL at 08:48

## 2022-03-15 RX ADMIN — INSULIN LISPRO 1 UNITS: 100 INJECTION, SOLUTION INTRAVENOUS; SUBCUTANEOUS at 11:41

## 2022-03-15 RX ADMIN — HYDRALAZINE HYDROCHLORIDE 50 MG: 50 TABLET, FILM COATED ORAL at 08:48

## 2022-03-15 RX ADMIN — INSULIN GLARGINE 8 UNITS: 100 INJECTION, SOLUTION SUBCUTANEOUS at 08:44

## 2022-03-15 RX ADMIN — SPIRONOLACTONE 25 MG: 25 TABLET ORAL at 08:48

## 2022-03-15 RX ADMIN — DOCUSATE SODIUM 100 MG: 100 CAPSULE, LIQUID FILLED ORAL at 08:52

## 2022-03-15 RX ADMIN — ASPIRIN 81 MG CHEWABLE TABLET 81 MG: 81 TABLET CHEWABLE at 08:52

## 2022-03-15 RX ADMIN — IPRATROPIUM BROMIDE AND ALBUTEROL SULFATE 3 ML: .5; 3 SOLUTION RESPIRATORY (INHALATION) at 12:45

## 2022-03-15 RX ADMIN — FOLIC ACID 1 MG: 1 TABLET ORAL at 08:46

## 2022-03-15 RX ADMIN — SODIUM BICARBONATE 650 MG: 650 TABLET ORAL at 08:46

## 2022-03-15 RX ADMIN — DOXYCYCLINE HYCLATE 100 MG: 100 TABLET, COATED ORAL at 08:48

## 2022-03-15 RX ADMIN — MORPHINE SULFATE 2 MG: 2 INJECTION, SOLUTION INTRAMUSCULAR; INTRAVENOUS at 08:46

## 2022-03-15 RX ADMIN — DULOXETINE HYDROCHLORIDE 60 MG: 60 CAPSULE, DELAYED RELEASE ORAL at 08:47

## 2022-03-15 RX ADMIN — PREDNISONE 40 MG: 20 TABLET ORAL at 08:47

## 2022-03-15 RX ADMIN — MORPHINE SULFATE 2 MG: 2 INJECTION, SOLUTION INTRAMUSCULAR; INTRAVENOUS at 12:43

## 2022-03-15 RX ADMIN — ENOXAPARIN SODIUM 40 MG: 100 INJECTION SUBCUTANEOUS at 08:52

## 2022-03-15 RX ADMIN — FUROSEMIDE 20 MG: 20 TABLET ORAL at 08:46

## 2022-03-15 RX ADMIN — AMLODIPINE BESYLATE 5 MG: 5 TABLET ORAL at 08:47

## 2022-03-15 RX ADMIN — IPRATROPIUM BROMIDE AND ALBUTEROL SULFATE 3 ML: .5; 3 SOLUTION RESPIRATORY (INHALATION) at 09:34

## 2022-03-15 RX ADMIN — METOPROLOL SUCCINATE 100 MG: 100 TABLET, EXTENDED RELEASE ORAL at 08:47

## 2022-03-15 RX ADMIN — INSULIN LISPRO 2 UNITS: 100 INJECTION, SOLUTION INTRAVENOUS; SUBCUTANEOUS at 08:45

## 2022-03-15 ASSESSMENT — PAIN SCALES - GENERAL
PAINLEVEL_OUTOF10: 9
PAINLEVEL_OUTOF10: 6
PAINLEVEL_OUTOF10: 8
PAINLEVEL_OUTOF10: 8
PAINLEVEL_OUTOF10: 6

## 2022-03-15 NOTE — PLAN OF CARE
Problem: Falls - Risk of:  Goal: Will remain free from falls  Description: Will remain free from falls  Outcome: Ongoing  Note: No falls noted this shift. Continue falling star program. Bed alarm on, bed in low position. Call light and personal belongings in reach. Patient uses call light appropriately. Problem: Pain:  Goal: Pain level will decrease  Description: Pain level will decrease  Outcome: Ongoing  Note: Patient complaining of pain this shift. Prn pain medication available. Will monitor. Goal: Control of acute pain  Description: Control of acute pain  Outcome: Ongoing  Goal: Control of chronic pain  Description: Control of chronic pain  Outcome: Ongoing     Problem: Skin Integrity:  Goal: Will show no infection signs and symptoms  Description: Will show no infection signs and symptoms  Outcome: Ongoing  Goal: Absence of new skin breakdown  Description: Absence of new skin breakdown  Outcome: Ongoing  Note: No skin break down noted at this time. Encouraged patient to reposition self in bed. Problem: DISCHARGE BARRIERS  Goal: Patient's continuum of care needs are met  Outcome: Ongoing  Note: Patient plans to return home with spouse at discharge. Will continue to assess further needs. Care plan reviewed with patient. Patient verbalizes understanding of plan of care and contributes to goal setting.

## 2022-03-15 NOTE — CARE COORDINATION
Discharge Planning Update:    Creat up to 1.4.  98% on 3L. Changed to po lasix. Stress echo showed severe AS. Home O2 eval needed. Possible home today/tomorrow? ?  Plans home with [facility], has home oxygen at  from [employer].  Electronically signed by Walter Queen RN on 3/15/2022 at 9:00 AM

## 2022-03-15 NOTE — CARE COORDINATION
3/15/22, 1:43 PM EDT    Patient goals/plan/ treatment preferences discussed by  and . Patient goals/plan/ treatment preferences reviewed with patient/ family. Patient/ family verbalize understanding of discharge plan and are in agreement with goal/plan/treatment preferences. Understanding was demonstrated using the teach back method. AVS provided by RN at time of discharge, which includes all necessary medical information pertaining to the patients current course of illness, treatment, post-discharge goals of care, and treatment preferences. Services After Discharge  Services At/After Discharge: Nursing Services,OT,PT [facility])   IMM Letter  IMM Letter given to Patient/Family/Significant other/Guardian/POA/by[de-identified] Eliel Hadley CM  IMM Letter date given[de-identified] 03/15/22  IMM Letter time given[de-identified] 6912     Pt is being discharged home today. SW left message with [facility] regarding discharge plan today. RN updated.
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
## 2022-03-15 NOTE — CARE COORDINATION
Pt verbalized understanding and gave permission for possible discharge within 4 hours of receiving IMM.

## 2022-03-15 NOTE — TELEPHONE ENCOUNTER
Patient calling in to let Wesley Reynolds know that he should be discharged home from [address] [place] [address] sometime yet today 3/15/2022. He is dealing with some heart issues and does not think he will be able to come into the office soon, can he make his next appt a virtual visit? Please call his friend and CONNER Valdes back at [phone] to advise.

## 2022-03-15 NOTE — PROGRESS NOTES
03/15/22 0954   Resting (Room Air)   SpO2 90   HR 70   During Walk (Room Air)   SpO2 93   HR 84   Walk/Assistance Device Walker   Rate of Dyspnea 2   After Walk   Does the Patient Qualify for Home O2 No   Does the Patient Need Portable Oxygen Tanks No

## 2022-03-15 NOTE — PROGRESS NOTES
[facility]    Patient - Alejo Rodriguez   MRN -  [id number]   [facility] # - [de-identified]   - 1965      Date of Admission -  3/6/2022  2:23 PM  Date of evaluation -  3/15/2022  Room - [address] [place] [address] Lou Keith MD Primary Care Physician - MEGHAN Roque - CNP     Problem List      [address] Problems    Diagnosis Date Noted    Tobacco abuse [Z72.0]     Acute on chronic systolic congestive heart failure ([address] [I50.23] 2022    Diabetic neuropathy ([facility] [E11.40] 2021    Stage 3 severe COPD by GOLD classification ([facility] [J44.9] 2019    HTN (hypertension) [I10] 2019    Type 2 diabetes mellitus ([facility] [E11.9] 2019    Cirrhosis, alcoholic ([facility] [S11.93]      Reason for Consult    COPD exacerbation  HPI   History Obtained From: Patient and electronic medical record. Alejo Rodriguez is a 65 y.o. male  was initially admitted under hospitalist service. Pulmonary medicine was consulted for further management of COPD and Hypoxic respiratory failure. He is currently on oxygen via nasal cannula. He is a 60-year-old pleasant gentleman with chronic use of tobacco smoking for 40 years with 1 pack of cigarettes per day. He is still smoking until his current hospitalization to [address] [place] [address]. He was in his usual state of health until 2 weeks back. He started having worsening of shortness of breath, cough with yellow sputum production for the last 2 weeks. He denies any fever or chills. He also noticed bilateral leg swelling with 2+ edema. He gained a good amount of weight over the last few weeks. He gave a history of orthopnea and paroxysmal nocturnal dyspnea prior to the hospitalization. He was admitted under Dr. Therese Fung MD service on 2022 for acute decompensation of chronic systolic congestive heart failure. Patient had a history of severe COPD.   Pulmonary medicine was consulted for further evaluation. He is having cough: Yes  Duration of cough: for 2 weeks. His cough is associated with sputum production: Yes   The sputum color: yellow  Hemoptysis:No  Diurnal variation: None. He gives a history of fever: No  Chills & rigors:No  Associated night sweats: No.  Recent travel history:No.    Rrecent sick contacts: No.      He admits to orthopnea and paroxysmal nocturnal dyspnea. Prior to current current hospitalization:  He used to be on 4 L of oxygen via nasal cannula at home  He was never diagnosed with pulmonary diseases I.e bronchial asthma, pulmonary embolism,pulmonary hypertension or pleural effusion/s in the past.  He was diagnosed with a COPD by his family physician. Patient currently on treatment with the Spiriva 18 mcg 1 capsule inhalation daily, duo nebs 3 Gabriela nebulization every 4hours as needed and albuterol HFA 2puffs every 6 hourly as needed at home. He was never diagnosed with pulmonary tuberculosis in the past. He denies any recent exposure to any patients with tuberculosis. He denies any recent travel to endemic places of tuberculosis.       Past 24 hrs   -On RA, home o2 eval completed no need for O2  -Report SOB present but improved   -reports motivated to no longer smoke  All other systems reviewed    PMHx   Past Medical History      Diagnosis Date    Acute kidney injury ([address] 12/2020    due to Enterococous Bacteremia , fluid overload, low sodium    Amputation of right great toe ([address] 02/18/2021    Asthma     Brain tumor ([facility]     Cirrhosis (HCC)     ETOH abuse    CKD (chronic kidney disease)     l.brown-osu spetie    COPD (chronic obstructive pulmonary disease) (HCC)     Diabetes mellitus (HCC)     type 2-insulin lantus and humalog    Falling     Glaucoma     Hepatitis C     History of blood transfusion     s/p nasal surgery    Hyperlipidemia     Hypertension     Nallu    Legally blind     Obesity     Pain management karol araya-percocet Rx    Right foot infection 11/2021    Seizures ([address]       Past Surgical History        Procedure Laterality Date    BACK SURGERY      CARDIAC CATHETERIZATION  08/03/2021    EYE SURGERY      FIBULA FRACTURE SURGERY Left 03/21/2019    LEFT FIBULAR SHAFT ORIF performed by Vincenzo Reeves DPM at [address] Right     Steel pins in place    FRACTURE SURGERY      NASAL SINUS SURGERY Bilateral 11/29/2020    FLEXIBLE BRONCHOSCOPY WITH BRONCHOALVEOLAR LAVAGE WITH CULTURES.  NASAL ENDOSCOPY WITH POSSIBLE CAUTERY ADN NASAL PACKING performed by Shreya Evans MD at [facility]  01/2020    SPINE SURGERY N/A 02/19/2021    KYPHOPLASTY at T4, L2, L4 performed by Achilles Bosworth, MD at [address]  N/A 8/24/2021    LUMBAR 3 AND LUMBAR 5 KYPHOPLASTY performed by Aamir Eden MD at [address] Right 09/23/2020    AMPUTATION DISTAL APSECT RIGHT HALLUX, REMOVAL OF HARDWARE RIGHT HALLUX performed by Jf Gottlieb DPM at [facility] N/A 04/25/2019    EGD BIOPSY performed by Luigi Chow MD at [facility]     Meds    Current Medications    insulin glargine  46 Units SubCUTAneous Nightly    amLODIPine  5 mg Oral Daily    furosemide  20 mg Oral BID    insulin glargine  8 Units SubCUTAneous QAM    hydrALAZINE  50 mg Oral 3 times per day    spironolactone  25 mg Oral Daily    doxycycline hyclate  100 mg Oral 2 times per day    sacubitril-valsartan  1 tablet Oral BID    nicotine  1 patch TransDERmal Daily    metoprolol succinate  100 mg Oral Daily    ipratropium-albuterol  3 mL Inhalation 4x daily    insulin lispro  0-6 Units SubCUTAneous TID WC    insulin lispro  0-3 Units SubCUTAneous Nightly    aspirin  81 mg Oral Daily    pantoprazole  40 mg Oral QAM AC    enoxaparin  40 mg SubCUTAneous BID    docusate sodium  100 mg Oral BID    DULoxetine  60 mg Oral Daily    folic acid 1 mg Oral Daily    latanoprost  1 drop Both Eyes Nightly    rosuvastatin  20 mg Oral Nightly    sodium bicarbonate  650 mg Oral BID     perflutren lipid microspheres, acetaminophen, morphine, ondansetron, glucagon (rDNA), dextrose, dextrose bolus (hypoglycemia) **OR** dextrose bolus (hypoglycemia), glucose  IV Drips/Infusions   dextrose       Home Medications  Medications Prior to Admission: oxyCODONE-acetaminophen (PERCOCET) 7.5-325 MG per tablet, Take 1 tablet by mouth every 8 hours as needed for Pain for up to 30 days. sodium bicarbonate 650 MG tablet, Take 650 mg by mouth 2 times daily  tamsulosin (FLOMAX) 0.4 MG capsule, Take 1 capsule by mouth nightly  DULoxetine (CYMBALTA) 60 MG extended release capsule, Take 1 capsule by mouth daily  insulin glargine (LANTUS) 100 UNIT/ML injection vial, Inject 84 Units into the skin nightly  docusate sodium (COLACE, DULCOLAX) 100 MG CAPS, Take 100 mg by mouth 2 times daily  OXYGEN, Inhale into the lungs daily as needed (nightly)   latanoprost (XALATAN) 0.005 % ophthalmic solution, Place 1 drop into both eyes nightly  rosuvastatin (CRESTOR) 20 MG tablet, Take 20 mg by mouth nightly   [DISCONTINUED] azithromycin (ZITHROMAX) 250 MG tablet, TAKE TWO (2) TABLETS THE 1ST DOSE, THEN TAKE ONE (1) TABLET EVERY DAY UNTIL GONE FOR INFECTION. senna (SENOKOT) 8.6 MG tablet, Take 2 tablets by mouth daily  gabapentin (NEURONTIN) 300 MG capsule, Take 1 capsule by mouth nightly for 30 days. nitroGLYCERIN (NITROSTAT) 0.4 MG SL tablet, up to max of 3 total doses. If no relief after 1 dose, call 911.   insulin lispro (HUMALOG) 100 UNIT/ML injection vial, Inject 10 Units into the skin 3 times daily (before meals) Plus Sliding Scale (Patient taking differently: Inject 12 Units into the skin 3 times daily (before meals) Plus Sliding Scale )  insulin lispro (HUMALOG) 100 UNIT/ML injection vial, Glucose: Dose:  No Insulin, 140-199 2 Units, 200-249 4 Units, 250-299 6 Units, 300-349 8 Units, 350-400 10 Units, Over 400 12 Units  [DISCONTINUED] metoprolol succinate (TOPROL XL) 50 MG extended release tablet, Take 1 tablet by mouth daily  folic acid (FOLVITE) 1 MG tablet, Take 1 mg by mouth daily  albuterol sulfate  (90 Base) MCG/ACT inhaler, Inhale 1 puff into the lungs every 4-6 hours as needed  [DISCONTINUED] ipratropium-albuterol (DUONEB) 0.5-2.5 (3) MG/3ML SOLN nebulizer solution, Inhale 3 mLs into the lungs every 4 hours as needed for Shortness of Breath  [DISCONTINUED] tiotropium (SPIRIVA) 18 MCG inhalation capsule, Inhale 1 capsule into the lungs daily  Diet    ADULT DIET; Regular; 4 carb choices (60 gm/meal); Low Sodium (2 gm)  Allergies    Adhesive tape and Keppra [levetiracetam]  Family History          Problem Relation Age of Onset    Heart Attack Father     Heart Attack Brother         pneumonia    No Known Problems Mother     Cancer Sister         breast    No Known Problems Maternal Grandmother     No Known Problems Maternal Grandfather     Liver Cancer Paternal Grandmother     Heart Attack Paternal Grandfather     Heart Disease Brother         stents    Colon Cancer Neg Hx     Colon Polyps Neg Hx      Sleep History    Never diagnosed with sleep apnea in the past.    Social History     Social History     Tobacco Use    Smoking status: Current Every Day Smoker     Packs/day: 1.00     Years: 40.00     Pack years: 40.00     Types: Cigarettes    Smokeless tobacco: Never Used    Tobacco comment: Currently smoking electronic cigarettes   Vaping Use    Vaping Use: Never used   Substance Use Topics    Alcohol use: Yes     Alcohol/week: 3.0 standard drinks     Types: 3 Cans of beer per week    Drug use: Not Currently     Types: Marijuana Erik Barajas)     Comment: medical marijuana not currently using     Vitals     height is 6' 2\" (1.88 m) and weight is 318 lb 12.6 oz (144.6 kg) (abnormal). His oral temperature is 97.5 °F (36.4 °C).  His blood pressure is 103/56 (abnormal) and his pulse is 73. His respiration is 20 and oxygen saturation is 93%. Body mass index is 40.93 kg/m². SUPPLEMENTAL O2: O2 Flow Rate (L/min): 3 L/min       I/O        Intake/Output Summary (Last 24 hours) at 3/15/2022 1155  Last data filed at 3/15/2022 1112  Gross per 24 hour   Intake 260 ml   Output 1150 ml   Net -890 ml     I/O last 3 completed shifts: In: 500 [P.O.:500]  Out: 2550 [Urine:2550]   Patient Vitals for the past 96 hrs (Last 3 readings):   Weight   03/15/22 0411 (!) 318 lb 12.6 oz (144.6 kg)   03/14/22 0424 (!) 314 lb 13.1 oz (142.8 kg)   03/13/22 0414 (!) 316 lb 9.3 oz (143.6 kg)       Exam   Physical Exam   Constitutional: No distress on RA. Patient appears moderately built and  moderately nourished. Head: Normocephalic and atraumatic. Mouth/Throat: Oropharynx is clear and moist.  No oral thrush. Eyes: Conjunctivae are normal. Pupils are equal, round. No scleral icterus. Neck: Neck supple. No tracheal deviation present. Cardiovascular: S1 and S2 with no murmur. No peripheral edema  Pulmonary/Chest: Normal effort with bilateral air entry, clear breath sounds. No stridor. No respiratory distress. Patient exhibits no tenderness. Abdominal: Soft. Bowel sounds audible. No distension or tenderness to palp.    Musculoskeletal: Moves all extremities  Neurological: Patient is alert and follows simple commands     Labs  - Old records and notes have been reviewed in [facility]   ABG  No results found for: PH, PO2, PCO2, HCO3, O2SAT  No results found for: IFIO2, MODE, SETTIDVOL, SETPEEP  CBC  Recent Labs     03/14/22  0748 03/15/22  0532   WBC 14.1* 14.2*   RBC 4.13* 4.04*   HGB 12.6* 12.4*   HCT 40.5* 38.9*   MCV 98.1* 96.3*   MCH 30.5 30.7   MCHC 31.1* 31.9*    184   MPV 11.2 11.0      BMP  Recent Labs     03/13/22  0537 03/14/22  0748 03/15/22  0532    137 136   K 3.6 3.7 3.6   CL 95* 94* 97*   CO2 30 31 25   BUN 35* 38* 49*   CREATININE 1.3* 1.2 1.4*   GLUCOSE 215* 164* 248*   MG 1.6  --   --    CALCIUM 9.4 9.4 9.2     LFT  No results for input(s): AST, ALT, ALB, BILITOT, ALKPHOS, LIPASE in the last 72 hours. Invalid input(s): AMYLASE  TROP  Lab Results   Component Value Date    TROPONINT < 0.010 03/06/2022    TROPONINT < 0.010 03/06/2022    TROPONINT < 0.010 03/19/2019     BNP  No results for input(s): BNP in the last 72 hours. Lactic Acid  No results for input(s): LACTA in the last 72 hours. INR  No results for input(s): INR, PROTIME in the last 72 hours. PTT  No results for input(s): APTT in the last 72 hours. Glucose  Recent Labs     03/14/22  2003 03/15/22  0623 03/15/22  1117   POCGLU 389* 233* 168*     UA No results for input(s): SPECGRAV, PHUR, COLORU, CLARITYU, MUCUS, PROTEINU, BLOODU, RBCUA, WBCUA, BACTERIA, NITRU, GLUCOSEU, BILIRUBINUR, UROBILINOGEN, KETUA, LABCAST, LABCASTTY, AMORPHOS in the last 72 hours. Invalid input(s): CRYSTALS. PFTs 3/20/2019       Sleep studies   None in epic    Cultures    COVID-19 test performed on 6 March 2022: Not detected  Sputum Culture-Moraxella catarrhalis     EKG       Echocardiogram   Lexie Gifford MD  [phone] 3/7/2022   Narrative & Impression  Transthoracic Echocardiography Report (TTE)  Conclusions      Summary   Technically difficult examination. Left Ventricular size is Mildly increased . Normal left ventricular wall thickness. There was severe global hypokinesis of the left ventricle. Systolic function was severely reduced. Ejection fraction is visually estimated in the range of 35%. The left atrium is Moderately dilated. There is moderate-to-severe aortic stenosis with valve area of 0.9 to 1 sq   cm. The maximum aortic valve gradient is 40 mmHg, the mean gradient is 25   mmHg, and the peak velocity is 3.1 m/s.       Signature      ----------------------------------------------------------------   Electronically signed by Mehnaz Jha MD (Interpreting   physician) on 03/07/2022 at 05:58 PM ----------------------------------------------------------------  Right Ventricle   The right ventricular size was normal with normal systolic function and   wall thickness. Radiology    CXR  Fri Mar 11, 2022  9:00 AM   Two-view chest.   Comparison: 12/05/2020. Chest x-ray performed on 11 March 2022 unilateral views:  Impression: Mild subsegmental atelectasis in the left mid and left lower lung zones. Equivocal minor left pleural effusion versus subpleural subsegmental atelectasis. Stable cardiomegaly. No definitive pulmonary edema. Chest Xray:  Sun Mar 6, 2022  4:15 PM   PROCEDURE: XR CHEST (2 VW)   CLINICAL INFORMATION: SOB   1. Left lower lobe consolidation that can relate to infiltrate and/or atelectasis. 2. Small bilateral pleural effusions. 3. Moderate cardiomegaly. Venous duplex scan of the lower lower extremities:  Wed Mar 9, 2022  4:33 PM   PROCEDURE: VL DUP LOWER EXTREMITY VENOUS BILATERAL   CLINICAL INFORMATION: Bilateral lower extremity edema/calf pain, r/o DVT   COMPARISON: No prior study. Normal venous ultrasound. No evidence for deep venous thrombosis. Assessment   -Acute hypoxic respiratory failure due to decompensated chronic systolic congestive heart failure VS COPD VS other etiologies, improving with diuresis and other interventions  -Volume overload slowly improving with good response to diuresis  -COPD exacerbation due to moraxella catarrhalis   -Acute decompensation of chronic-systolic congestive heart failure.  -Abnormal chest x-ray- ? Left-sided pleural effusion-Improved on a chest x-ray performed on 11 March 2022.  -Severe chronic obstructive pulmonary disease.  -Chronic use of tobacco smoking for 40 years with 1 pack of cigarettes per day. -Type 2 diabetes mellitus  -Essential hypertension    Plan   -Continue diuresis and optimization of CHF by primary service/ Cardiology  - Patient educated to quit smoking as soon as possible.  Patient verbalizes understanding of adverse consequences of tobacco smoking.  -Continue patient on doxycycline 100 mg p.o. twice daily for 7 days   -Prednisone 40 mg p.o. daily completed  -Continue DuoNebs 3ml nebulization every 4 hourly while awake.  -Home O2 evaluation completed no need for supplemental O2 per documentation from [facility]  -Cardiology following   -Deep Venous Thrombosis Prophylaxis: lovenox.    -Discussed indication for LDCT given smoking history >20 pack yrs and still smoking age 65 after discussion patient agreeable to go for LDCT   -Stable from Pulmonary standpoint, schedule patient for follow-[facility] at [facility]. [facility] in 3 months with Full PFT and LDCT approximately 3 days prior to appointment with Sylwia Wall CNP or MD Shana Rodriguez educated about my impression and plan. He verbalizes understanding. Questions and concerns addressed. Electronically signed by   Verner Fleischer, APRN - CNP on 3/15/2022 at 11:55 AM     Addendum by Dr. John Villalba MD:  I have seen and examined the patient independently. Face to face evaluation and examination was performed. The above evaluation and note has been reviewed. Labs and radiographs were reviewed. I Have discussed with . Sylwia Wall CNP about this patient in detail. Physical exam was performed by me. See below for details. More than half of the total time for this encounter spent by me. The above assessment and plan has been reviewed. Please see my modifications mentioned below. My modifications:    Physical Exam:  Constitutional: Patient appears in no distress. Mouth/Throat: Oropharynx is clear and moist.   Neck: Neck supple. Cardiovascular: S1 and S2 heard. No murmurs. Pulmonary/Chest: Bilateral air entry present. Good breath sounds on both sides, diminished at bases. No wheezes. No rales. Abdominal: Soft. No tenderness. Extremities: Patient exhibits no edema.    Neurological: Patient is awake and alert.    Home O2 evaluation on the day of discharge I.e 15 March 2022:    Torrie Serrano RCP   Respiratory Therapist   Specialty:  Respiratory Therapy   Progress Notes       Signed   Date of Service:  3/15/2022 10:12 AM                 Signed                 03/15/22 0954   Resting (Room Air)   SpO2 90   HR 70   During Walk (Room Air)   SpO2 93   HR 84   Walk/Assistance Device Walker   Rate of Dyspnea 2   After Walk   Does the Patient Qualify for Home O2 No   Does the Patient Need Portable Oxygen Tanks No                  Plan:  Follow up as above. - Marcio Ashton educated about my impression and plan. He verbalizes understanding.     Jaun Hay MD 3/15/2022 5:42 PM

## 2022-03-15 NOTE — PROGRESS NOTES
INTERNAL MEDICINE Progress Note  3/15/2022 10:45 AM  Subjective:   Admit Date: 3/6/2022  PCP: Roxanne Martínez, MEGHAN - RONALDO  Interval History:     No new c/o  Diuresing well    Objective:   Vitals: /79   Pulse 78   Temp 97.8 °F (36.6 °C) (Oral)   Resp 20   Ht 6' 2\" (1.88 m)   Wt (!) 318 lb 12.6 oz (144.6 kg)   SpO2 98%   BMI 40.93 kg/m²   General appearance: alert and cooperative with exam  HEENT: atraumatic  Neck:  no JVD  Lungs: improved AE melly  Heart: S1, S2 normal and 2/6 SM  Abdomen: normal findings: bowel sounds normal and no organomegaly and abnormal findings:  distended and obese  Extremities: no edema  Neurologic: Alert, oriented, thought content appropriate      Medications:   Scheduled Meds:   amLODIPine  5 mg Oral Daily    furosemide  20 mg Oral BID    insulin glargine  8 Units SubCUTAneous QAM    hydrALAZINE  50 mg Oral 3 times per day    spironolactone  25 mg Oral Daily    doxycycline hyclate  100 mg Oral 2 times per day    sacubitril-valsartan  1 tablet Oral BID    nicotine  1 patch TransDERmal Daily    metoprolol succinate  100 mg Oral Daily    ipratropium-albuterol  3 mL Inhalation 4x daily    insulin lispro  0-6 Units SubCUTAneous TID WC    insulin lispro  0-3 Units SubCUTAneous Nightly    aspirin  81 mg Oral Daily    pantoprazole  40 mg Oral QAM AC    enoxaparin  40 mg SubCUTAneous BID    docusate sodium  100 mg Oral BID    DULoxetine  60 mg Oral Daily    folic acid  1 mg Oral Daily    insulin glargine  42 Units SubCUTAneous Nightly    latanoprost  1 drop Both Eyes Nightly    rosuvastatin  20 mg Oral Nightly    sodium bicarbonate  650 mg Oral BID     Continuous Infusions:   dextrose         Lab Results:   CBC:   Recent Labs     03/14/22  0748 03/15/22  0532   WBC 14.1* 14.2*   HGB 12.6* 12.4*    184     BMP:    Recent Labs     03/13/22  0537 03/14/22  0748 03/15/22  0532    137 136   K 3.6 3.7 3.6   CL 95* 94* 97*   CO2 30 31 25   BUN 35* 38* 49* CREATININE 1.3* 1.2 1.4*   GLUCOSE 215* 164* 248*     Magnesium:    Lab Results   Component Value Date    MG 1.6 03/13/2022     HgBA1c:    Lab Results   Component Value Date    LABA1C 6.6 02/24/2021     TSH:    Lab Results   Component Value Date    TSH 0.951 03/07/2022     FOLATE:    Lab Results   Component Value Date    FOLATE > 20.0 03/07/2022     IRON:    Lab Results   Component Value Date    IRON 64 03/07/2022     FERRITIN:    Lab Results   Component Value Date    FERRITIN 175 03/07/2022     TTE Summary    Technically difficult examination.    Left Ventricular size is Mildly increased .    Normal left ventricular wall thickness.    There was severe global hypokinesis of the left ventricle.    Systolic function was severely reduced.    Ejection fraction is visually estimated in the range of 35%.    The left atrium is Moderately dilated.    There is moderate-to-severe aortic stenosis with valve area of 0.9 to 1 sq    cm.    The maximum aortic valve gradient is 40 mmHg, the mean gradient is 25    mmHg, and the peak velocity is 3.1 m/s.        Signature  ----------------------------------------------------------------    Electronically signed by Zina Bonilla MD (Interpreting    physician) on 03/07/2022 at 05:58 PM       STRESS ECHO Pharmacological Stress Imaging    Summary    Baseline/ pre-dobutamine / Rest / finding    ef 35 to 40% ( 37%)    LOBITO 1 cm2    peak and mean gradient of 35 and 23 mmhg respectively    Peak velocity of 3 m/sec        POST PEAK DOBUTAMINE INFUSION OF 20 MC/KG/MIN RESULTS    LV FUNCTION/EF/ IMPROVED TO 50% SHOWING GOOD CONTRACTILE RESERVE    NO SEGMENTAL WALL MOTION ABNORMALITY INDUCED    PEAK TRANSAORTIC VELOCITY OF 4.1M/SEC    PEAK AND MEAN TRANSAORTIC GRADIENT OF 70 MMHG AND 38 MMHG RESPECTIVELY    AORTIC VALVE AREA WORSENED TO 0.8 CM2    THIS IS CONSISTENT WITH SEVERE AORTIC STENOSIS        Signature  ----------------------------------------------------------------     Electronically signed by Corey Robles MD (Interpreting    [id number]) on 03/14/2022 at 07:04 PM         Assessment and Plan:   1. Acute on chronic systolic CHF-exac, improved  2. Mod-severe AS, finn 0.9 sq cm  3. HTN  4. COPD, stable  5. DM 2, stable BS  6. Pancytopenia, improved wbc, platelet  7. H/o HCV  8. H/o liver cirrhosis  9. Morbid obesity  10. MATTIE prob related to diuretics  11. DM 2, resume home insulin regimen     Cont po lasix, entresto  Cont BB,   Cont Bronchodilators  Life vest at [place]  Will f/up with cardiology as OP.     Gino Ibarra MD, MD

## 2022-03-15 NOTE — PROGRESS NOTES
Cardiology Progress Note      Patient:  Nguyễn Richard  YOB: 1965  MRN: [id number]   Acct: [de-identified]  [facility] Date:  3/6/2022  Primary Cardiologist:   Seen by dr. Erlin Dumas    Per prior cardiology consult note-  REASON FOR CONSULT:    Chf exac, mod-sev AS      CHIEF COMPLAINT:    SOB  Leg swelling      HISTORY OF PRESENT ILLNESS:    Nguyễn Richard is a pleasant 65year old male patient with past medical history that includes:   Past Medical History        Past Medical History:   Diagnosis Date    Acute kidney injury ([address] 12/2020     due to Enterococous Bacteremia , fluid overload, low sodium    Amputation of right great toe ([facility] [address] 02/18/2021    Asthma      Brain tumor ([address]      Cirrhosis ([address]       ETOH abuse    CKD (chronic kidney disease)       l.brown-osu spetie    COPD (chronic obstructive pulmonary disease) ([address]      Diabetes mellitus ([address]       type 2-insulin lantus and humalog    Falling      Glaucoma      Hepatitis C      History of blood transfusion       s/p nasal surgery    Hyperlipidemia      Hypertension       Faith    Legally blind      Obesity      Pain management       karol marzec-percocet Rx    Right foot infection 11/2021    Seizures ([address]        He has h/o tobacco abuse. Patient is followed by Dr Murlean Gilford, has h/o AS, HFrEF. Echo 07/2021 revealed an EF of 35-40%, moderate AS with LOBITO of 1.2 cm2. Stress test 4/2021 revealed a fixed inferior wall defect. LHC on 08/2021 revealed no significant CAD, mean gradient across the aortic valve of 22 mmHg. The patient was admitted to the hospital on 3/6/2022 after he presented with worsening SOB, leg swelling, WILCOX. Patient had some weight gain. He states that his symptoms have increased over the past two weeks. He reports fatigue. Patient denies chest pain, palpitations, dizziness, syncope. Cardiomegaly and pleural effusions were noted on CXR. Pro-BNP 4,977. Cr 1.1. Troponin <0.01, <0.01. EKG revealed NSR.  He was started on IV Lasix gtt 5mg/hour. Echocardiogram 3/6/2022 revealed an EF of 35%, moderate LAE, Moderate to severe AS with LOBITO ~ 0.9-1 cm2, peak gradient 40 mmHg, mean gradient 25 mmHg, peak velocity 3.1 m/s.        Subjective (Events in last 24 hours):     Pt sitting in bed   No chest pain   Still c/o SOB - improved but still has it     Feeling well  VSS  Tele SR no ectopy       3/13/22  Pt in bed   States feeling a little better - still with SOB   On 4L NC    VSS  Tele SR no ectopy         3/15/22  Dobutamine stress echo reviewedrt as OP    Discussed with pt - dobutamine  stress echo results and referal to CTS and Structural heart     VSS  Tele SR no ectopy       Objective:   /70   Pulse 73   Temp 97.5 °F (36.4 °C) (Oral)   Resp 20   Ht 6' 2\" (1.88 m)   Wt (!) 318 lb 12.6 oz (144.6 kg)   SpO2 93%   BMI 40.93 kg/m²        TELEMETRY: SR no ectopy     Physical Exam:  General Appearance: alert and oriented to person, place and time, in no acute distress  Cardiovascular: normal rate, regular rhythm, normal S1 and S2, no murmurs, rubs, clicks, or gallops, distal pulses intact,   Pulmonary/Chest: clear to auscultation bilaterally- no wheezes, rales or rhonchi, normal air movement, no respiratory distress  Abdomen: soft, non-tender, non-distended, normal bowel sounds, no masses Extremities: no cyanosis, clubbing or edema, pulses present    Musculoskeletal: normal range of motion, no joint swelling, deformity or tenderness  Neurological: alert, oriented, normal speech, no focal findings or movement disorder noted    Medications:    insulin glargine  46 Units SubCUTAneous Nightly    amLODIPine  5 mg Oral Daily    furosemide  20 mg Oral BID    insulin glargine  8 Units SubCUTAneous QAM    hydrALAZINE  50 mg Oral 3 times per day    spironolactone  25 mg Oral Daily    doxycycline hyclate  100 mg Oral 2 times per day    sacubitril-valsartan  1 tablet Oral BID    nicotine  1 patch TransDERmal Daily    metoprolol succinate  100 mg Oral Daily    ipratropium-albuterol  3 mL Inhalation 4x daily    insulin lispro  0-6 Units SubCUTAneous TID WC    insulin lispro  0-3 Units SubCUTAneous Nightly    aspirin  81 mg Oral Daily    pantoprazole  40 mg Oral QAM AC    enoxaparin  40 mg SubCUTAneous BID    docusate sodium  100 mg Oral BID    DULoxetine  60 mg Oral Daily    folic acid  1 mg Oral Daily    latanoprost  1 drop Both Eyes Nightly    rosuvastatin  20 mg Oral Nightly    sodium bicarbonate  650 mg Oral BID      dextrose       perflutren lipid microspheres, 1.5 mL, ONCE PRN  acetaminophen, 650 mg, Q4H PRN  morphine, 2 mg, Q4H PRN  ondansetron, 4 mg, Q6H PRN  glucagon (rDNA), 1 mg, PRN  dextrose, 100 mL/hr, PRN  dextrose bolus (hypoglycemia), 125 mL, PRN   Or  dextrose bolus (hypoglycemia), 250 mL, PRN  glucose, 4 tablet, PRN        Diagnostics:  Echo:   Electronically signed by Yari Sharma MD (Interpreting   physician) on 03/07/2022 at 05:58 PM   ----------------------------------------------------------------      Findings      Mitral Valve   The mitral valve structure was normal with normal leaflet separation. DOPPLER: The transmitral velocity was within the normal range with no   evidence for mitral stenosis. Mild mitral regurgitation is present. Aortic Valve   The aortic valve leaflets were not well visualized. Aortic valve leaflets   are Moderately calcified. Leaflets exhibited moderately increased   thickness and severely reduced cuspal separation of the aortic valve. No   evidence of aortic valve regurgitation . There is moderate-to-severe aortic stenosis with valve area of 0.9 to 1 sq   cm. The maximum aortic valve gradient is 40 mmHg, the mean gradient is 25   mmHg, and the peak velocity is 3.1 m/s. Tricuspid Valve   The tricuspid valve structure was normal with normal leaflet separation. DOPPLER: There was no evidence of tricuspid stenosis.  Mild tricuspid   regurgitation visualized. Right ventricular systolic pressure measures 60   mmhg. Pulmonic Valve   The pulmonic valve leaflets exhibited normal thickness, no calcification,   and normal cuspal separation. DOPPLER: The transpulmonic velocity was   within the normal range with no evidence for regurgitation. Left Atrium   The left atrium is Moderately dilated. Left Ventricle   Left Ventricular size is Mildly increased . Normal left ventricular wall thickness. There was severe global hypokinesis of the left ventricle. Systolic function was severely reduced. Ejection fraction is visually estimated in the range of 35%. Right Atrium   Right atrial size was normal.      Right Ventricle   The right ventricular size was normal with normal systolic function and   wall thickness. Pericardial Effusion   The pericardium was normal in appearance with no evidence of a pericardial   effusion. Pleural Effusion   No evidence of pleural effusion. Aorta / Great Vessels   -Aortic root dimension within normal limits.   -The Pulmonary artery is within normal limits. -IVC size is within normal limits with normal respiratory phasic changes. Left Heart Cath:   Procedure Summary   Patent coronaries      Recommendations   Optimize heart failure therapies   Proceed with scheduled surgery      Estimated Blood Loss:5 ml. Complications:No complications. Signatures      ----------------------------------------------------------------   Electronically signed by Vivi Romero MD (Performing   Physician) on 08/03/2021     Lab Data:    Cardiac Enzymes:  No results for input(s): CKTOTAL, CKMB, CKMBINDEX, TROPONINI in the last 72 hours.     CBC:   Lab Results   Component Value Date    WBC 14.2 03/15/2022    RBC 4.04 03/15/2022    HGB 12.4 03/15/2022    HCT 38.9 03/15/2022     03/15/2022       CMP:    Lab Results   Component Value Date     03/15/2022    K 3.6 03/15/2022    K 3.6 03/13/2022 CL 97 03/15/2022    CO2 25 03/15/2022    BUN 49 03/15/2022    CREATININE 1.4 03/15/2022    LABGLOM 52 03/15/2022    GLUCOSE 248 03/15/2022    CALCIUM 9.2 03/15/2022       Hepatic Function Panel:    Lab Results   Component Value Date    ALKPHOS 131 03/08/2022    ALT 23 03/08/2022    AST 47 03/08/2022    PROT 7.6 03/08/2022    BILITOT 0.8 03/08/2022    BILIDIR 0.4 03/08/2022    LABALBU 3.2 03/08/2022       Magnesium:    Lab Results   Component Value Date    MG 1.6 03/13/2022       PT/INR:    Lab Results   Component Value Date    INR 1.22 03/09/2022       HgBA1c:    Lab Results   Component Value Date    LABA1C 6.6 02/24/2021       FLP:    Lab Results   Component Value Date    TRIG 73 03/07/2022    HDL 40 03/07/2022    LDLCALC 32 03/07/2022       TSH:    Lab Results   Component Value Date    TSH 0.951 03/07/2022         Assessment:    Acute on chronic systolic chf -- resolved      New Severe NICDMP EF 35%    Moderate to severe AS (Valve 0.9)  Last year 7/2021 Valve 1.2   dobutamine stress echo reviewed     HTN   Stable   HLP   LDL 32    COPD exacerbation   Tobacco abuse     Hx liver cirrhosis     Patent coronaries per cath 8/3/2021      Plan:  · Ok to DC home today   · Follow with CTS and structural heart as OP        CHF guidelines:  BB: yes  ACE / ARB/ entresto: yes  Diuretics: yes  Aldactone: yes  Farxiga: check with insurance - 100% covered and $0 copay            Electronically signed by MEGHAN Mann CNP on 3/15/2022 at 12:12 PM

## 2022-03-18 ENCOUNTER — TELEPHONE (OUTPATIENT)
Dept: CARDIOLOGY CLINIC | Age: 57
End: 2022-03-18

## 2022-03-18 NOTE — TELEPHONE ENCOUNTER
The patient has established follow up with Dr Josh Enriquez, I only saw during recent hospital stay   Chart reviewed, low dose DSE confirms LF/LG severe AS.  Will defer further management to primary cardiologist, Dr Josh Enriquez

## 2022-03-18 NOTE — TELEPHONE ENCOUNTER
Dr. Tim Warren had placed in her note to follow with CTS or Structural heart as OP following the patient's recent DSE.  Please let me know if you would like a referral.

## 2022-03-21 ENCOUNTER — OFFICE VISIT (OUTPATIENT)
Dept: CARDIOLOGY CLINIC | Age: 57
End: 2022-03-21
Payer: MEDICARE

## 2022-03-21 VITALS
SYSTOLIC BLOOD PRESSURE: 118 MMHG | HEART RATE: 72 BPM | DIASTOLIC BLOOD PRESSURE: 80 MMHG | OXYGEN SATURATION: 96 % | BODY MASS INDEX: 40.43 KG/M2 | HEIGHT: 74 IN | WEIGHT: 315 LBS

## 2022-03-21 DIAGNOSIS — I35.0 AORTIC VALVE STENOSIS, ETIOLOGY OF CARDIAC VALVE DISEASE UNSPECIFIED: ICD-10-CM

## 2022-03-21 DIAGNOSIS — I50.22 CHRONIC SYSTOLIC CONGESTIVE HEART FAILURE, NYHA CLASS 3 (HCC): Primary | ICD-10-CM

## 2022-03-21 PROCEDURE — 1111F DSCHRG MED/CURRENT MED MERGE: CPT | Performed by: NURSE PRACTITIONER

## 2022-03-21 PROCEDURE — G8427 DOCREV CUR MEDS BY ELIG CLIN: HCPCS | Performed by: NURSE PRACTITIONER

## 2022-03-21 PROCEDURE — 4004F PT TOBACCO SCREEN RCVD TLK: CPT | Performed by: NURSE PRACTITIONER

## 2022-03-21 PROCEDURE — 99213 OFFICE O/P EST LOW 20 MIN: CPT | Performed by: NURSE PRACTITIONER

## 2022-03-21 PROCEDURE — G8417 CALC BMI ABV UP PARAM F/U: HCPCS | Performed by: NURSE PRACTITIONER

## 2022-03-21 PROCEDURE — G8484 FLU IMMUNIZE NO ADMIN: HCPCS | Performed by: NURSE PRACTITIONER

## 2022-03-21 PROCEDURE — 3017F COLORECTAL CA SCREEN DOC REV: CPT | Performed by: NURSE PRACTITIONER

## 2022-03-21 ASSESSMENT — ENCOUNTER SYMPTOMS
VOMITING: 0
ABDOMINAL DISTENTION: 0
COUGH: 0
SHORTNESS OF BREATH: 1
NAUSEA: 0

## 2022-03-21 NOTE — PATIENT INSTRUCTIONS
You may receive a survey regarding the care you received during your visit. Your input is valuable to us. We encourage you to complete and return your survey. We hope you will choose us in the future for your healthcare needs. Continue:  · Continue current medications  · Daily weights and record  · Fluid restriction of 2 Liters per day  · Limit sodium in diet to around 9193-6974 mg/day  · Monitor BP  · Activity as tolerated     Call the [facility] for any of the following symptoms: [phone]   Weight gain of 2-3 pounds in 1 day or 5 pounds in 1 week   Increased shortness of breath   Shortness of breath while laying down   Cough   Chest pain   Swelling in feet, ankles or legs   Tenderness or bloating in the abdomen   Fatigue    Decreased appetite or nausea    Confusion       Repeat blood work on 3/28. BP/HR stable   Stopping Aldactone today, will reevaluate if KCl is needed with repeat labs. Continue diet/fluid adherence  Continue daily wts. F/U w/ Cardiology, Dr. Aman Roy in two days, valve work up being done.    F/U in clinic in 4 weeks

## 2022-03-21 NOTE — TELEPHONE ENCOUNTER
Patient calling in again regarding this because it has been a week and no one has called him back. Please advise if ok for virtual visit or if needs seen in office?

## 2022-03-21 NOTE — TELEPHONE ENCOUNTER
No will need to see in person because of the medications he is on and he recently had a hospital visit

## 2022-03-21 NOTE — PROGRESS NOTES
Heart Failure Clinic       Visit Date: 3/21/2022  Cardiologist:  Dr. Aman Roy  Primary Care Physician: Dr. Melinda Knutson, APRN - CNP    Matthew Wyatt is a 65 y.o. male who presents today for:  Chief Complaint   Patient presents with    Congestive Heart Failure       HPI:   Matthew Wyatt is a 65 y.o. male who presents to the office for a new patient visit in the heart failure clinic. Accompanied by Alphonsus Severe, friend. Following Pulmonary   Acute hypoxic respiratory failure due to decompensated chronic systolic congestive heart failure VS COPD VS other etiologies. -COPD exacerbation due to acute bronchitis with  bacterial Vs viral etiology.  -Acute decompensation of chronicsystolic congestive heart failure.  -Abnormal chest x-ray- ? Left-sided pleural effusion-Improved on a chest x-ray performed on 11 March 2022.  -Severe chronic obstructive pulmonary disease.  -Chronic use of tobacco smoking for 40 years with 1 pack of cigarettes per day. -Type 2 diabetes mellitus  -Essential hypertension.  -Legally blind       TYPE HF: HFrEF 35 (2021)  Cause: nonsichemic, valvular (moderate as noted on 11/2020 echo)  Device: lifevest  HX: COPD, CKD, Hep C, HLD, HTN, Cirrhosis d/t ETOH abuse, Brain tumor    Dry Wt:  unknown    Hospitalization:      3/6/22-3/15/22    The patient is a 65 y.o. male who presents with insidious onset of shortness of breath, legs swelling and weight gain in the last few weeks. He endorses significant orthopnea and PND. He has cough,wheezes. He is on a diuretic at home. His weight gain persisted and the SOB got worse. He reports increased fatigue. He had a fall at home on account of increasing weakness and fatigue, bruised his right knee. No head injury and no loss of consciousness with the fall. Subsequently presented to ER. ER evaluation showed evidence of fluid overload with elevated BNP, pulmonary congestion      Concerns today: total of 80lbs lost since starting diuretics.  Notes improvement of SOB w/ water loss and new inhaler. Improvement of leg swelling and bloating. Still wearing oxygen at night that was prescribed to him by his PCP prior to hospital admission. His diet consists primarily of soup and rickey noodles. Also eats a lot of frozen meat loaf  Weight trend: 318 at D/C; 322 today; 360 prior to admit        Activity: ADLs performed with limitations. Becomes SOB, has to sit down and take multiple breaks  Diet: educated today    Patient has:  Chest Pain: no  SOB: yes  Orthopnea/PND: yes improved    TAPAN: oxygen at night, negative for TAPAN  Edema: yes improved  Fatigue: no  Abdominal bloating: yes improved  Cough: no  Appetite: good  Home weight: daily in the morning  Home blood pressure: no, going to get a cuff    Past Medical History:   Diagnosis Date    Acute kidney injury ([address] 12/2020    due to Enterococous Bacteremia , fluid overload, low sodium    Amputation of right great toe ([address] 02/18/2021    Asthma     Brain tumor ([address]     Cirrhosis (HCC)     ETOH abuse    CKD (chronic kidney disease)     l.brown-osu spetie    COPD (chronic obstructive pulmonary disease) (HCC)     Diabetes mellitus (HCC)     type 2-insulin lantus and humalog    Falling     Glaucoma     Hepatitis C     History of blood transfusion     s/p nasal surgery    Hyperlipidemia     Hypertension     Nallu    Legally blind     Obesity     Pain management     karol marzec-percocet Rx    Right foot infection 11/2021    Seizures ([address]      Past Surgical History:   Procedure Laterality Date    BACK SURGERY      CARDIAC CATHETERIZATION  08/03/2021    EYE SURGERY      FIBULA FRACTURE SURGERY Left 03/21/2019    LEFT FIBULAR SHAFT ORIF performed by Ayesha White DPM at [address] Right     Steel pins in place    FRACTURE SURGERY      NASAL SINUS SURGERY Bilateral 11/29/2020    FLEXIBLE BRONCHOSCOPY WITH BRONCHOALVEOLAR LAVAGE WITH CULTURES.  NASAL ENDOSCOPY WITH POSSIBLE CAUTERY ADN NASAL PACKING performed by Matt Andrews MD at [facility]  01/2020    SPINE SURGERY N/A 02/19/2021    KYPHOPLASTY at T4, L2, L4 performed by Mandy Chung MD at [address] N/A 8/24/2021    LUMBAR 3 AND LUMBAR 5 KYPHOPLASTY performed by Clinton Sosa MD at [address] Right 09/23/2020    [address], REMOVAL OF HARDWARE RIGHT HALLUX performed by Evans Mckoy DPM at [address] [facility] N/A 04/25/2019    EGD BIOPSY performed by Neha Beck MD at [facility]     Family History   Problem Relation Age of Onset    Heart Attack Father     Heart Attack Brother         pneumonia    No Known Problems Mother     Cancer Sister         breast    No Known Problems Maternal Grandmother     No Known Problems Maternal Grandfather     Liver Cancer Paternal Grandmother     Heart Attack Paternal Grandfather     Heart Disease Brother         stents    Colon Cancer Neg Hx     Colon Polyps Neg Hx      Social History     Tobacco Use    Smoking status: Current Every Day Smoker     Packs/day: 1.00     Years: 40.00     Pack years: 40.00     Types: Cigarettes    Smokeless tobacco: Never Used    Tobacco comment: Currently smoking electronic cigarettes   Substance Use Topics    Alcohol use:  Yes     Alcohol/week: 3.0 standard drinks     Types: 3 Cans of beer per week     Current Outpatient Medications   Medication Sig Dispense Refill    metoprolol succinate (TOPROL XL) 100 MG extended release tablet Take 1 tablet by mouth daily 30 tablet 3    aspirin 81 MG chewable tablet Take 1 tablet by mouth daily 30 tablet 3    furosemide (LASIX) 20 MG tablet Take 1 tablet by mouth 2 times daily 60 tablet 3    hydrALAZINE (APRESOLINE) 50 MG tablet Take 1 tablet by mouth every 8 hours 90 tablet 3    nicotine (NICODERM CQ) 21 MG/24HR Place 1 patch onto the skin daily 30 patch 3    sacubitril-valsartan (ENTRESTO) 24-26 MG per tablet Take 1 tablet by mouth 2 times daily 60 tablet 3    umeclidinium-vilanterol (ANORO ELLIPTA) 62.5-25 MCG/INH AEPB inhaler Inhale 1 puff into the lungs daily 1 each 11    albuterol (PROVENTIL) (2.5 MG/3ML) 0.083% nebulizer solution Take 3 mLs by nebulization every 6 hours as needed for Wheezing or Shortness of Breath 120 each 11    dapagliflozin (FARXIGA) 10 MG tablet Take 1 tablet by mouth every morning 90 tablet 1    oxyCODONE-acetaminophen (PERCOCET) 7.5-325 MG per tablet Take 1 tablet by mouth every 8 hours as needed for Pain for up to 30 days. 90 tablet 0    sodium bicarbonate 650 MG tablet Take 650 mg by mouth 2 times daily      senna (SENOKOT) 8.6 MG tablet Take 2 tablets by mouth daily      tamsulosin (FLOMAX) 0.4 MG capsule Take 1 capsule by mouth nightly 30 capsule 0    DULoxetine (CYMBALTA) 60 MG extended release capsule Take 1 capsule by mouth daily 30 capsule 3    gabapentin (NEURONTIN) 300 MG capsule Take 1 capsule by mouth nightly for 30 days.  90 capsule 0    insulin glargine (LANTUS) 100 UNIT/ML injection vial Inject 84 Units into the skin nightly 1 vial 3    insulin lispro (HUMALOG) 100 UNIT/ML injection vial Inject 10 Units into the skin 3 times daily (before meals) Plus Sliding Scale (Patient taking differently: Inject 12 Units into the skin 3 times daily (before meals) Plus Sliding Scale ) 1 vial 0    insulin lispro (HUMALOG) 100 UNIT/ML injection vial Glucose: Dose:  No Insulin, 140-199 2 Units, 200-249 4 Units, 250-299 6 Units, 300-349 8 Units, 350-400 10 Units, Over 400 12 Units 1 vial 0    docusate sodium (COLACE, DULCOLAX) 100 MG CAPS Take 100 mg by mouth 2 times daily 60 capsule 0    folic acid (FOLVITE) 1 MG tablet Take 1 mg by mouth daily      albuterol sulfate  (90 Base) MCG/ACT inhaler Inhale 1 puff into the lungs every 4-6 hours as needed      OXYGEN Inhale into the lungs daily as needed (nightly)       latanoprost (XALATAN) 0.005 % ophthalmic solution Place 1 drop into both eyes nightly 1 Bottle 3    rosuvastatin (CRESTOR) 20 MG tablet Take 20 mg by mouth nightly        No current facility-administered medications for this visit. Allergies   Allergen Reactions    Adhesive Tape Rash     Bandaid    Keppra [Levetiracetam] Rash       SUBJECTIVE:   Review of Systems   Constitutional: Negative for activity change, appetite change, diaphoresis and fatigue. Respiratory: Positive for shortness of breath. Negative for cough. Cardiovascular: Negative for chest pain, palpitations and leg swelling. Gastrointestinal: Negative for abdominal distention, nausea and vomiting. Neurological: Positive for dizziness. Negative for weakness, light-headedness and headaches. Hematological: Negative for adenopathy. Psychiatric/Behavioral: Negative for sleep disturbance. OBJECTIVE:   Today's Vitals:  /80   Pulse 72   Ht 6' 2\" (1.88 m)   Wt (!) 322 lb 5.1 oz (146.2 kg)   SpO2 96%   BMI 41.38 kg/m²     Physical Exam  Vitals reviewed. Constitutional:       General: He is not in acute distress. Appearance: Normal appearance. He is well-developed. He is not diaphoretic. HENT:      Head: Normocephalic and atraumatic. Eyes:      Conjunctiva/sclera: Conjunctivae normal.   Cardiovascular:      Rate and Rhythm: Normal rate and regular rhythm. Heart sounds: Murmur heard. Pulmonary:      Effort: Pulmonary effort is normal. No respiratory distress. Breath sounds: Normal breath sounds. No wheezing or rales. Abdominal:      General: Bowel sounds are normal. There is no distension. Palpations: Abdomen is soft. Tenderness: There is no abdominal tenderness. Musculoskeletal:         General: Normal range of motion. Cervical back: Normal range of motion and neck supple. Right lower leg: No edema. Left lower leg: No edema. Skin:     General: Skin is warm and dry.       Capillary Refill: Capillary refill takes less than 2 seconds. Neurological:      Mental Status: He is alert and oriented to person, place, and time. Coordination: Coordination normal.   Psychiatric:         Behavior: Behavior normal.         Wt Readings from Last 3 Encounters:   03/21/22 (!) 322 lb 5.1 oz (146.2 kg)   03/15/22 (!) 318 lb 12.6 oz (144.6 kg)   01/11/22 (!) 305 lb (138.3 kg)     BP Readings from Last 3 Encounters:   03/21/22 118/80   03/15/22 (!) 113/57   01/11/22 118/82     Pulse Readings from Last 3 Encounters:   03/21/22 72   03/15/22 73   08/24/21 71     Body mass index is 41.38 kg/m². ECHO:        Summary   Baseline/ pre-dobutamine / Rest / finding   ef 35 to 40% ( 37%)   LOBITO 1 cm2   peak and mean gradient of 35 and 23 mmhg respectively   Peak velocity of 3 m/sec      POST PEAK DOBUTAMINE INFUSION OF 20 MC/KG/MIN RESULTS   LV FUNCTION/EF/ IMPROVED TO 50% SHOWING GOOD CONTRACTILE RESERVE   NO SEGMENTAL WALL MOTION ABNORMALITY INDUCED   PEAK TRANSAORTIC VELOCITY OF 4.1M/SEC   PEAK AND MEAN TRANSAORTIC GRADIENT OF 70 MMHG AND 38 MMHG RESPECTIVELY   AORTIC VALVE AREA WORSENED TO 0.8 CM2   THIS IS CONSISTENT WITH SEVERE AORTIC STENOSIS      Signature      ----------------------------------------------------------------   Electronically signed by Zina Bonilla MD (Interpreting   physician) on 03/14/2022 at 07:04 PM    Summary  Technically difficult examination. Left Ventricular size is Mildly increased . Normal left ventricular wall thickness. There was severe global hypokinesis of the left ventricle. Systolic function was severely reduced. Ejection fraction is visually estimated in the range of 35%. The left atrium is Moderately dilated. There is moderate-to-severe aortic stenosis with valve area of 0.9 to 1 sq cm.   The maximum aortic valve gradient is 40 mmHg, the mean gradient is 25  mmHg, and the peak velocity is 3.1 m/s.     Signature     ----------------------------------------------------------------  Electronically signed by Jac Lowery MD (Interpreting  physician) on 03/07/2022 at 05:58 PM  ----------------------------------------------------------------      Summary   Technically difficult study due to poor acoustic windows.   Left ventricular size is normal and systolic function is severely reduced.   Ejection fraction was estimated at 35-40%. LV wall thickness is withinnormal limits.   Moderately dilated left atrium.   Thickened and calcified aortic valve leaflets with reduced excursion.   DOPPLER: Transaortic peak velocity 3 m/s, mean gradient of 20 mm Hg and   calculated LOBITO was 1.2 cm2. There is moderate aortic stenosis present.   Mild aortic regurgitation is noted.   Calcification of the mitral valve noted.   Mild mitral regurgitation is present.   Mild tricuspid regurgitation visualized.      Signature   ----------------------------------------------------------------   Electronically signed by Ольга Farrell MD (Interpreting   physician) on 07/22/2021 at 10:00 PM    CATH/STRESS:      Summary   Lexiscan EKG stress test is non-diagnostic for ischemia.   Calculated gated LVEF 47 %.   The T.I.D. ratio was 1.14 .   There was a small to moderate sized, moderate in intensity, fixed   myocardial perfusion defect of the inferior wall.   This study was negative for ischemia.      Recommendation   Clinical correlation is recommended.   Aggressive risk factor management.   Medical management.     Cath:8/2021  CORONARY ANATOMY:  Right Coronary: Dominant and patent  Left Main: Patent  Left Circumflex: Patent  Left Anterior Descending: Patent     LVEDP was measured at 14 mmHg. LVEF was estimated at 35%.   Mean gradient of 22mm Hg across aortic valve      Results reviewed:  BNP: No results found for: BNP  CBC:   Lab Results   Component Value Date    WBC 14.2 03/15/2022    RBC 4.04 03/15/2022    HGB 12.4 03/15/2022    HCT 38.9 03/15/2022     03/15/2022     CMP:    Lab Results   Component Value Date     03/15/2022    K 3.6 03/15/2022    K 3.6 03/13/2022    CL 97 03/15/2022    CO2 25 03/15/2022    BUN 49 03/15/2022    CREATININE 1.4 03/15/2022    LABGLOM 52 03/15/2022    GLUCOSE 248 03/15/2022    CALCIUM 9.2 03/15/2022     Hepatic Function Panel:    Lab Results   Component Value Date    ALKPHOS 131 03/08/2022    ALT 23 03/08/2022    AST 47 03/08/2022    PROT 7.6 03/08/2022    BILITOT 0.8 03/08/2022    BILIDIR 0.4 03/08/2022    LABALBU 3.2 03/08/2022     Magnesium:    Lab Results   Component Value Date    MG 1.6 03/13/2022     PT/INR:    Lab Results   Component Value Date    INR 1.22 03/09/2022     Lipids:    Lab Results   Component Value Date    TRIG 73 03/07/2022    HDL 40 03/07/2022    LDLCALC 32 03/07/2022       ASSESSMENT AND PLAN:   The patient's condition/symptoms are Stable: No clinical evidence of fluid overload today. Continue current medical regimen without changes at present time. Diagnosis Orders   1. Chronic systolic congestive heart failure, NYHA class 3 ([address]     2. Aortic valve stenosis, etiology of cardiac valve disease unspecified       Continue:  GDMT:   ACE/ARB/ARNI - Entresto 24/26 BID   BB - Toprol 100 daily   Diuretic - Lasix 20 BID  AA - aldactone 25 daily **Stopping today**  SGLT2 -  none  Vasodilator - hydralazine 50 Q 8  Other - ASA    Tolerating above noted HF meds, no ill side effects noted. Will continue to monitor kidney function and electrolytes. Will optimize as tolerated. Pt is compliant w/ medications. Total visit time of 45  minutes has been spent with patient on education of symptoms, management, medication, and plan of care; as well as review of chart: labs, ECHO, radiology reports, etc.   I personally spent more then 50% of the appt time face to face with the patient.   · Daily weights  · Fluid restriction of 2 Liters per day  · Limit sodium in diet to around 4453-2486 mg/day  · Monitor BP  · Activity as tolerated     Stable, appears Euvolemic. SOB is noted on exam.   Lab reviewed - significant jump in Cr from 1.4 at d/c to 1.98 with lab work done last week. K is 3.9, not on KCl  Repeat blood work on 3/28. BP/HR stable   Stopping Aldactone today d/t Cr, will reevaluate if KCl is needed with repeat labs. Start diet/fluid adherence  Continue daily wts. F/U w/ Cardiology, Dr. Nicole Young in two days, valve work up being done. F/U in clinic in 4 weeks      Patient was instructed to call the [facility] for any changes in symptoms as noted in AVS.      Return in about 4 weeks (around 4/18/2022). or sooner if needed     Patient given educational materials - see patient instructions. We discussed the importance of weighing oneself and recording daily. We also discussed the importance of a low sodium diet, higher sodium foods to avoid and better low sodium food options. Patient verbalizes understanding of plan of care using teach back method, and is agreeable to the treatment plan.        Electronically signed by MEGHAN Leyva CNP on 3/21/2022 at 11:00 AM

## 2022-03-21 NOTE — TELEPHONE ENCOUNTER
Spoke to Dr. Siomara Vieira and he has requested an appointment with this patient this week. Appointment scheduled for Wednesday at 1300. Patient notified.

## 2022-03-23 ENCOUNTER — OFFICE VISIT (OUTPATIENT)
Dept: CARDIOLOGY CLINIC | Age: 57
End: 2022-03-23
Payer: MEDICARE

## 2022-03-23 VITALS
HEIGHT: 74 IN | DIASTOLIC BLOOD PRESSURE: 72 MMHG | BODY MASS INDEX: 40.43 KG/M2 | SYSTOLIC BLOOD PRESSURE: 106 MMHG | HEART RATE: 76 BPM | WEIGHT: 315 LBS

## 2022-03-23 DIAGNOSIS — I35.0 SEVERE AORTIC STENOSIS: Primary | ICD-10-CM

## 2022-03-23 PROCEDURE — 99213 OFFICE O/P EST LOW 20 MIN: CPT | Performed by: INTERNAL MEDICINE

## 2022-03-23 PROCEDURE — 3017F COLORECTAL CA SCREEN DOC REV: CPT | Performed by: INTERNAL MEDICINE

## 2022-03-23 PROCEDURE — G8484 FLU IMMUNIZE NO ADMIN: HCPCS | Performed by: INTERNAL MEDICINE

## 2022-03-23 PROCEDURE — G8427 DOCREV CUR MEDS BY ELIG CLIN: HCPCS | Performed by: INTERNAL MEDICINE

## 2022-03-23 PROCEDURE — 4004F PT TOBACCO SCREEN RCVD TLK: CPT | Performed by: INTERNAL MEDICINE

## 2022-03-23 PROCEDURE — 1111F DSCHRG MED/CURRENT MED MERGE: CPT | Performed by: INTERNAL MEDICINE

## 2022-03-23 PROCEDURE — G8417 CALC BMI ABV UP PARAM F/U: HCPCS | Performed by: INTERNAL MEDICINE

## 2022-03-23 NOTE — PROGRESS NOTES
[address] [address] [place] [address]  Dept: [phone]  Dept Fax: [phone]  Loc: [phone]    Visit Date: 3/23/2022    Mr. Dot Islas is a 65 y.o. male  who presented for:  Chief Complaint   Patient presents with    Follow-up     SEVERE AS       HPI:   64 yo M c hx of Brain tumor, Cirrhosis, Hep C, COPD, CKD, HTN, HLD and Seizure is here to discuss aortic stenosis. Recent DSE echo showed peak velocity of 4.1 m/s and mean gradient of 38mm Hg. He has baseline moderate shortness of breath. Unable to do any meaningful exertion. He is also obese with BMI of 41 (321 lbs). Continues to smoke.      Current Outpatient Medications:     metoprolol succinate (TOPROL XL) 100 MG extended release tablet, Take 1 tablet by mouth daily, Disp: 30 tablet, Rfl: 3    aspirin 81 MG chewable tablet, Take 1 tablet by mouth daily, Disp: 30 tablet, Rfl: 3    furosemide (LASIX) 20 MG tablet, Take 1 tablet by mouth 2 times daily, Disp: 60 tablet, Rfl: 3    hydrALAZINE (APRESOLINE) 50 MG tablet, Take 1 tablet by mouth every 8 hours (Patient taking differently: Take 50 mg by mouth every 8 hours DO NOT TAKE IF SBP IS <110), Disp: 90 tablet, Rfl: 3    nicotine (NICODERM CQ) 21 MG/24HR, Place 1 patch onto the skin daily, Disp: 30 patch, Rfl: 3    sacubitril-valsartan (ENTRESTO) 24-26 MG per tablet, Take 1 tablet by mouth 2 times daily, Disp: 60 tablet, Rfl: 3    umeclidinium-vilanterol (ANORO ELLIPTA) 62.5-25 MCG/INH AEPB inhaler, Inhale 1 puff into the lungs daily, Disp: 1 each, Rfl: 11    albuterol (PROVENTIL) (2.5 MG/3ML) 0.083% nebulizer solution, Take 3 mLs by nebulization every 6 hours as needed for Wheezing or Shortness of Breath, Disp: 120 each, Rfl: 11    dapagliflozin (FARXIGA) 10 MG tablet, Take 1 tablet by mouth every morning, Disp: 90 tablet, Rfl: 1    oxyCODONE-acetaminophen (PERCOCET) 7.5-325 MG per tablet, Take 1 tablet by mouth every 8 hours as needed for Pain for up to 30 days. , Disp: 90 tablet, Rfl: 0    sodium bicarbonate 650 MG tablet, Take 650 mg by mouth 2 times daily, Disp: , Rfl:     senna (SENOKOT) 8.6 MG tablet, Take 2 tablets by mouth daily, Disp: , Rfl:     tamsulosin (FLOMAX) 0.4 MG capsule, Take 1 capsule by mouth nightly, Disp: 30 capsule, Rfl: 0    DULoxetine (CYMBALTA) 60 MG extended release capsule, Take 1 capsule by mouth daily, Disp: 30 capsule, Rfl: 3    insulin glargine (LANTUS) 100 UNIT/ML injection vial, Inject 84 Units into the skin nightly, Disp: 1 vial, Rfl: 3    insulin lispro (HUMALOG) 100 UNIT/ML injection vial, Inject 10 Units into the skin 3 times daily (before meals) Plus Sliding Scale (Patient taking differently: Inject 12 Units into the skin 3 times daily (before meals) Plus Sliding Scale ), Disp: 1 vial, Rfl: 0    insulin lispro (HUMALOG) 100 UNIT/ML injection vial, Glucose: Dose:  No Insulin, 140-199 2 Units, 200-249 4 Units, 250-299 6 Units, 300-349 8 Units, 350-400 10 Units, Over 400 12 Units, Disp: 1 vial, Rfl: 0    docusate sodium (COLACE, DULCOLAX) 100 MG CAPS, Take 100 mg by mouth 2 times daily, Disp: 60 capsule, Rfl: 0    folic acid (FOLVITE) 1 MG tablet, Take 1 mg by mouth daily, Disp: , Rfl:     albuterol sulfate  (90 Base) MCG/ACT inhaler, Inhale 1 puff into the lungs every 4-6 hours as needed, Disp: , Rfl:     OXYGEN, Inhale into the lungs daily as needed (nightly) , Disp: , Rfl:     latanoprost (XALATAN) 0.005 % ophthalmic solution, Place 1 drop into both eyes nightly, Disp: 1 Bottle, Rfl: 3    rosuvastatin (CRESTOR) 20 MG tablet, Take 20 mg by mouth nightly , Disp: , Rfl:     gabapentin (NEURONTIN) 300 MG capsule, Take 1 capsule by mouth nightly for 30 days. , Disp: 90 capsule, Rfl: 0    Past Medical History  Mook Agarwal  has a past medical history of Acute kidney injury ([address] Amputation of right great toe ([facility] Asthma, Brain tumor ([facility] [address] [facility] Cirrhosis ([facility] [address] [facility] CKD (chronic kidney disease), COPD (chronic obstructive pulmonary disease) ([facility] Diabetes mellitus ([facility] Falling, Glaucoma, Hepatitis C, History of blood transfusion, Hyperlipidemia, Hypertension, Legally blind, Obesity, Pain management, Right foot infection, and Seizures ([facility] Social History  Uday Gallagher  reports that he has been smoking cigarettes. He has a 40.00 pack-year smoking history. He has never used smokeless tobacco. He reports current alcohol use of about 3.0 standard drinks of alcohol per week. He reports previous drug use. Drug: Marijuana [place]). Family History  Uday Gallagher family history includes Cancer in his sister; Heart Attack in his brother, father, and paternal grandfather; Heart Disease in his brother; Tanya Setting in his paternal grandmother; No Known Problems in his maternal grandfather, maternal grandmother, and mother. Past Surgical History   Past Surgical History:   Procedure Laterality Date    BACK SURGERY      CARDIAC CATHETERIZATION  08/03/2021    EYE SURGERY      FIBULA FRACTURE SURGERY Left 03/21/2019    LEFT FIBULAR SHAFT ORIF performed by Luis Callejas DPM at [address] Right     Steel pins in place    FRACTURE SURGERY      NASAL SINUS SURGERY Bilateral 11/29/2020    FLEXIBLE BRONCHOSCOPY WITH BRONCHOALVEOLAR LAVAGE WITH CULTURES.  NASAL ENDOSCOPY WITH POSSIBLE CAUTERY ADN NASAL PACKING performed by Justine Alvarez MD at [facility]  01/2020    SPINE SURGERY N/A 02/19/2021    KYPHOPLASTY at T4, L2, L4 performed by Vania Palacios MD at [address] [facility] N/A 8/24/2021    LUMBAR 3 AND LUMBAR 5 KYPHOPLASTY performed by Mar Dalton MD at [address] [place] [address] Right 09/23/2020    AMPUTATION DISTAL APSECT RIGHT HALLUX, REMOVAL OF HARDWARE RIGHT HALLUX performed by Carol Storm DPM at [address] [facility] N/A 04/25/2019    EGD BIOPSY performed by Trupti Morrison MD at [facility] Endoscopy Results   Component Value Date    WBC 14.2 03/15/2022    RBC 4.04 03/15/2022    HGB 12.4 03/15/2022    HCT 38.9 03/15/2022    MCV 96.3 03/15/2022    MCH 30.7 03/15/2022    MCHC 31.9 03/15/2022    RDW 16.6 06/03/2020     03/15/2022    MPV 11.0 03/15/2022       Lab Results   Component Value Date     03/15/2022    K 3.6 03/15/2022    K 3.6 03/13/2022    CL 97 03/15/2022    CO2 25 03/15/2022    BUN 49 03/15/2022    LABALBU 3.2 03/08/2022    CREATININE 1.4 03/15/2022    CALCIUM 9.2 03/15/2022    LABGLOM 52 03/15/2022    GLUCOSE 248 03/15/2022       Lab Results   Component Value Date    ALKPHOS 131 03/08/2022    ALT 23 03/08/2022    AST 47 03/08/2022    PROT 7.6 03/08/2022    BILITOT 0.8 03/08/2022    BILIDIR 0.4 03/08/2022    LABALBU 3.2 03/08/2022       Lab Results   Component Value Date    MG 1.6 03/13/2022       Lab Results   Component Value Date    INR 1.22 (H) 03/09/2022    INR 1.06 08/03/2021    INR 1.16 (H) 02/17/2021         Lab Results   Component Value Date    LABA1C 6.6 02/24/2021       Lab Results   Component Value Date    TRIG 73 03/07/2022    HDL 40 03/07/2022    LDLCALC 32 03/07/2022       Lab Results   Component Value Date    TSH 0.951 03/07/2022         Testing Reviewed:      I haveindividually reviewed the below cardiac tests    EKG:    ECHO:   Results for orders placed during the hospital encounter of 11/25/20   ECHO Complete 2D W Doppler W Color    Narrative Transthoracic Echocardiography Report (TTE)     Demographics      Patient Name   [facility]       Gender               Male                  [address]      MR #           [id number]        Race                                                       Ethnicity      Account #      [de-identified]        Room Number          0018      Accession      [id number]       Date of Study        11/27/2020   Number      Date of Birth  1965       Referring Physician  Crystal Ricardo MD      Age            48 year(s)       Sonographer Abran Red [facility]                                      Interpreting         Echo reader of the                                   Physician            week                                                        Karen Shaw MD     Procedure    Type of Study      TTE procedure:ECHOCARDIOGRAM COMPLETE 2D W DOPPLER W COLOR. Procedure Date  Date: 11/27/2020 Start: 02:42 PM    Study Location: Bedside  Technical Quality: Limited visualization due to body habitus. Indications:Lower extremity edema. Additional Medical History:Hypertension, Hyperlipidemia, Smoker, Alcohol  abuse, Diabetes, COPD, Anemia, Hep C, Brain tumor, Sepsis, Family history of  heart disease    Patient Status: Routine    Height: 73.62 inches Weight: 347.01 pounds BSA: 2.74 m^2 BMI: 45.01 kg/m^2    BP: 145/85 mmHg     Conclusions      Summary   Technically difficult examination. Normal left ventricle size and systolic function. Ejection fraction was   estimated at 55 %. There were no regional left ventricular wall motion   abnormalities and wall thickness was within normal limits. The left atrium is Mildly dilated. There is moderate aortic stenosis with valve area of 1.2 sq cm. The maximum aortic valve gradient is 48 mmHg, the mean gradient is 33   mmHg, and the peak velocity is 3.5 cm/s. Signature      ----------------------------------------------------------------   Electronically signed by Karen Shaw MD (Interpreting   physician) on 11/27/2020 at 08:42 PM   ----------------------------------------------------------------      Findings      Mitral Valve   The mitral valve structure was normal with normal leaflet separation. DOPPLER: The transmitral velocity was within the normal range with no   evidence for mitral stenosis. Mild mitral regurgitation is present. Moderate annular calcification. Aortic Valve   The aortic valve leaflets were not well visualized. Aortic valve leaflets   are Moderately calcified. Leaflets exhibited moderately increased   thickness and moderately reduced cuspal separation of the aortic valve. No   evidence of aortic valve regurgitation . There is moderate aortic stenosis with valve area of 1.2 sq cm. The maximum aortic valve gradient is 48 mmHg, the mean gradient is 33   mmHg, and the peak velocity is 3.5 cm/s. Tricuspid Valve   The tricuspid valve structure was normal with normal leaflet separation. DOPPLER: There was no evidence of tricuspid stenosis. Mild tricuspid regurgitation visualized. Pulmonic Valve   The pulmonic valve leaflets exhibited normal thickness, no calcification,   and normal cuspal separation. DOPPLER: The transpulmonic velocity was   within the normal range with no evidence for regurgitation. Left Atrium   The left atrium is Mildly dilated. Left Ventricle   Normal left ventricle size and systolic function. Ejection fraction was   estimated at 55 %. There were no regional left ventricular wall motion   abnormalities and wall thickness was within normal limits. Right Atrium   Right atrial size was normal.      Right Ventricle   The right ventricular size was normal with normal systolic function and   wall thickness. Pericardial Effusion   The pericardium was normal in appearance with no evidence of a pericardial   effusion. Pleural Effusion   No evidence of pleural effusion. Aorta / Great Vessels   -Aortic root dimension within normal limits.   -The Pulmonary artery is within normal limits. -IVC size is within normal limits with normal respiratory phasic changes.      M-Mode/2D Measurements & Calculations      LV Diastolic   LV Systolic Dimension:    AV Cusp Separation: 1.1 cmLA   Dimension: 6.2 5.2 cm                    Dimension: 4.4 cmAO Root   cm             LV Volume Diastolic: 924  Dimension: 3.5 cmLA Area: 31.7   LV FS:16.1 %   ml                        cm^2   LV PW          LV Volume Systolic: 304.6   Diastolic: 0.9 ml   cm             LV EDV/LV EDV Index: 238   Septum         ml/87 m^2LV ESV/LV ESV    RV Diastolic Dimension: 3.6 cm   Diastolic: 0.9 Index: 510.3 ml/38 m^2   cm             EF Calculated: 55.9 %     LA/Aorta: 1.26                     LV Length: 10.2 cm        LA volume/Index: 118.1 ml /43m^2      LV Area        LVOT: 2.3 cm   Diastolic:   18.7 cm^2   LV Area   Systolic: 68.8   cm^2     Doppler Measurements & Calculations      MV Peak E-Wave:    AV Peak Velocity: 346    LVOT Peak Velocity: 114 cm/s   134 cm/s           cm/s                     LVOT Mean Velocity: 81.4 cm/s   MV Peak A-Wave:    AV Peak Gradient: 47.89  LVOT Peak Gradient: 5 mmHgLVOT   68.1 cm/s          mmHg                     Mean Gradient: 3 mmHg   MV E/A Ratio: 1.97 AV Mean Velocity: 275   MV Peak Gradient:  cm/s                     TV Peak E-Wave: 75 cm/s   7.18 mmHg          AV Mean Gradient: 23     TV Peak A-Wave: 61.7 cm/s                      mmHg   MV Deceleration    AV VTI: 60.2 cm          TV Peak Gradient: 2.25 mmHg   Time: 222 msec     AV Area                  TR Velocity:281 cm/s   MV P1/2t: 65 msec  (Continuity):1.69 cm^2   TR Gradient:31.58 mmHg   MVA by PHT:3.38   cm^2               LVOT VTI: 24.5 cm                      IVRT: 63 msec                         AV DVI (VTI): 0.41AV DVI   MR Velocity: 394   (Vmax):0.33   cm/s     [email or web address] in the [email or web address]? [id number]       STRESS:    CATH:    Assessment/Plan       Diagnosis Orders   1. Severe aortic stenosis       Severe AS   LV dysfunction 35-40%  COPD  Cirrhosis  Hep C  HTN  HLD  Brain tumor/Seizure    Advised to stop smoking because smoking increases risk of heart disease, morbidity, mortality and end organ damage. Cannot exert due to back issues  Severe AS with 4.1 m/s and Mean gradient of 38 mm Hg on dobutamine stress echo  Discussed options of SAVR vs TAVR  ? Bicuspid  Aortic valve not well visualized  Will refer to CTS for possible AVR  Had normal coronaries in 8/3/21  Continue rest of the management  The patient is asked to make an attempt to improve diet and exercise patterns to aid in medical management of this problem. Advised more plant based nutrition/meditarrean diet   Advised patient to call office or seek immediate medical attention if there is any new onset of  any chest pain, sob, palpitations, lightheadedness, dizziness, orthopnea, PND or pedal edema. All medication side effects were discussed in details. Thank youfor allowing me to participate in the care of this patient. Please do not hesitate to contact me for any further questions. Return in about 6 months (around 9/23/2022), or if symptoms worsen or fail to improve, for Review testing, Regular follow up.        Electronically signed by Lisbet Whitehead MD [facility]  3/23/2022 at 3:14 PM EDT

## 2022-03-24 ENCOUNTER — OFFICE VISIT (OUTPATIENT)
Dept: PHYSICAL MEDICINE AND REHAB | Age: 57
End: 2022-03-24
Payer: OTHER MISCELLANEOUS

## 2022-03-24 VITALS
SYSTOLIC BLOOD PRESSURE: 106 MMHG | HEIGHT: 74 IN | DIASTOLIC BLOOD PRESSURE: 70 MMHG | WEIGHT: 315 LBS | BODY MASS INDEX: 40.43 KG/M2

## 2022-03-24 DIAGNOSIS — M54.50 LUMBAR PAIN: ICD-10-CM

## 2022-03-24 DIAGNOSIS — I35.0 AORTIC VALVE STENOSIS, ETIOLOGY OF CARDIAC VALVE DISEASE UNSPECIFIED: ICD-10-CM

## 2022-03-24 DIAGNOSIS — I50.9 CHF WITH UNKNOWN LVEF (HCC): ICD-10-CM

## 2022-03-24 DIAGNOSIS — M54.2 NECK PAIN: ICD-10-CM

## 2022-03-24 DIAGNOSIS — Z98.1 HISTORY OF FUSION OF CERVICAL SPINE: ICD-10-CM

## 2022-03-24 DIAGNOSIS — M47.816 SPONDYLOSIS OF LUMBAR REGION WITHOUT MYELOPATHY OR RADICULOPATHY: Primary | ICD-10-CM

## 2022-03-24 DIAGNOSIS — G62.9 NEUROPATHY: ICD-10-CM

## 2022-03-24 DIAGNOSIS — G89.4 CHRONIC PAIN SYNDROME: ICD-10-CM

## 2022-03-24 DIAGNOSIS — Z98.890 S/P KYPHOPLASTY: ICD-10-CM

## 2022-03-24 DIAGNOSIS — S32.050K: ICD-10-CM

## 2022-03-24 DIAGNOSIS — S32.030D CLOSED COMPRESSION FRACTURE OF L3 LUMBAR VERTEBRA WITH ROUTINE HEALING, SUBSEQUENT ENCOUNTER: ICD-10-CM

## 2022-03-24 PROCEDURE — G8484 FLU IMMUNIZE NO ADMIN: HCPCS | Performed by: NURSE PRACTITIONER

## 2022-03-24 PROCEDURE — 3017F COLORECTAL CA SCREEN DOC REV: CPT | Performed by: NURSE PRACTITIONER

## 2022-03-24 PROCEDURE — 1111F DSCHRG MED/CURRENT MED MERGE: CPT | Performed by: NURSE PRACTITIONER

## 2022-03-24 PROCEDURE — G8427 DOCREV CUR MEDS BY ELIG CLIN: HCPCS | Performed by: NURSE PRACTITIONER

## 2022-03-24 PROCEDURE — 4004F PT TOBACCO SCREEN RCVD TLK: CPT | Performed by: NURSE PRACTITIONER

## 2022-03-24 PROCEDURE — 99214 OFFICE O/P EST MOD 30 MIN: CPT | Performed by: NURSE PRACTITIONER

## 2022-03-24 PROCEDURE — G8417 CALC BMI ABV UP PARAM F/U: HCPCS | Performed by: NURSE PRACTITIONER

## 2022-03-24 ASSESSMENT — ENCOUNTER SYMPTOMS
CHEST TIGHTNESS: 1
COUGH: 1
SHORTNESS OF BREATH: 1
EYES NEGATIVE: 1
BACK PAIN: 1
GASTROINTESTINAL NEGATIVE: 1

## 2022-03-24 NOTE — PROGRESS NOTES
[address] [place], [place] [address]  Dept: [phone]  Dept Fax: [phone]: [phone]    Visit Date: 3/24/2022    Functionality Assessment/Goals Worksheet     On a scale of 0 (Does not Interfere) to 10 (Completely Interferes)     1. Which number describes how during the past week pain has interfered with       the following:  A. General Activity:  5  B. Mood: 0  C. Walking Ability:  6  D. Normal Work (Includes both work outside the home and housework):  5  E. Relations with Other People:   0  F. Sleep:   0  G. Enjoyment of Life:   0    2. Patient Prefers to Take their Pain Medications:     []  On a regular basis   [x]  Only when necessary    []  Does not take pain medications    3. What are the Patient's Goals/Expectations for Visiting Pain Management? []  Learn about my pain    [x]  Receive Medication   []  Physical Therapy     []  Treat Depression   [x]  Receive Injections    []  Treat Sleep   []  Deal with Anxiety and Stress   []  Treat Opoid Dependence/Addiction   []  Other:      HPI:   Marcio Ashton is a 65 y.o. male is here today for    Chief Complaint: Low back pain     HPI   2.5 month FU. Patient has had multiple medical issues and was admitted into the hospital at [address] [place] [address] for cardiac issues, CHF and severe aortic stenosis. Following with Cardiology and being referred to Cardio thoracic surgery. Patient voiced that he is worried about surgery. Wearing a cardiac life vest today. Intermittent angina. Currently getting home health and doing therapies but limited d/t weakness    Continues to have pain in low back- grinding steady aching pain. Pain is up and down. Current pain medications continue to help. Unsteady and had had a fall which led him to the hospital. States that he had a little increased pain. Not using any assist devices.  Broke walker when he fell and is working on getting another one. His pcp wrote prescription   Pain increases with bending, lifting, twisting , turning torso, getting up and down and housework or working at job. Medications reviewed. Patient denies side effects with medications. Patient states he is taking medications as prescribed. Hedenies receiving pain medications from other sources. He had 1 ER visit since last visit     Pain scale with out pain medications or at its worst is 8/10. Pain scale with pain medications or at its best is 4/10. Last dose of Percocet was today   Drug screen reviewed from 1/11/2022 and was appropriate  Pill count completed  today and WNL: Yes      The patientis allergic to adhesive tape and keppra [levetiracetam]. Subjective:      Review of Systems   Constitutional: Positive for activity change, fatigue and unexpected weight change. Negative for appetite change, diaphoresis and fever. HENT: Negative. Eyes: Negative. Respiratory: Positive for cough, chest tightness and shortness of breath. Continues to use tobacco    Cardiovascular: Positive for leg swelling. Negative for chest pain. Gastrointestinal: Negative. Musculoskeletal: Positive for arthralgias, back pain, gait problem, joint swelling, myalgias, neck pain and neck stiffness. Skin: Negative for wound. Neurological: Positive for dizziness, weakness and numbness. Psychiatric/Behavioral: Positive for sleep disturbance. The patient is nervous/anxious. Objective:     Vitals:    03/24/22 0804   BP: 106/70   Weight: (!) 321 lb (145.6 kg)   Height: 6' 2\" (1.88 m)       Physical Exam  Constitutional:       General: He is not in acute distress. Appearance: Normal appearance. He is ill-appearing. HENT:      Head: Normocephalic and atraumatic. Mouth/Throat:      Mouth: Mucous membranes are moist.   Cardiovascular:      Rate and Rhythm: Normal rate and regular rhythm. Pulses: Normal pulses.       Heart sounds: Normal heart sounds. Pulmonary:      Effort: Pulmonary effort is normal.      Comments: Decreased, some short of breath at rest   Chest:      Chest wall: No tenderness. Abdominal:      General: Abdomen is flat. Palpations: Abdomen is soft. Musculoskeletal:         General: Swelling and tenderness present. Right wrist: Tenderness present. Decreased range of motion. Left wrist: Tenderness and bony tenderness present. Decreased range of motion. Cervical back: Tenderness and bony tenderness present. Muscular tenderness present. Thoracic back: Tenderness and bony tenderness present. Decreased range of motion. Lumbar back: Tenderness and bony tenderness present. Decreased range of motion. Negative right straight leg raise test and negative left straight leg raise test.        Back:       Right lower leg: Tenderness present. Edema present. Left lower leg: Tenderness present. Edema present. Right ankle: Decreased range of motion. Left ankle: Decreased range of motion. Skin:     Coloration: Skin is pale. Skin is not jaundiced. Neurological:      General: No focal deficit present. Mental Status: He is alert and oriented to person, place, and time. Sensory: Sensory deficit present. Motor: Weakness present. Coordination: Coordination abnormal.      Gait: Gait abnormal.      Deep Tendon Reflexes:      Reflex Scores:       Tricep reflexes are 1+ on the right side and 1+ on the left side. Bicep reflexes are 1+ on the right side and 1+ on the left side. Brachioradialis reflexes are 1+ on the right side and 1+ on the left side. Patellar reflexes are 1+ on the right side and 1+ on the left side. Achilles reflexes are 1+ on the right side and 1+ on the left side.      Comments: Decreased sensation bilateral lower extremity and upper extremity    Psychiatric:         Mood and Affect: Mood normal.         ANJUM  Patricks test positive  Yeoman's  positive  Natalie's  positive       Assessment:     1. Spondylosis of lumbar region without myelopathy or radiculopathy    2. Lumbar pain    3. Closed compression fracture of L3 lumbar vertebra with routine healing, subsequent encounter    4. Closed compression fracture of L5 lumbar vertebra with nonunion, subsequent encounter    5. S/P kyphoplasty    6. Neuropathy    7. Neck pain    8. History of fusion of cervical spine    9. CHF with unknown LVEF ([address]    10. Aortic valve stenosis, etiology of cardiac valve disease unspecified    11. Chronic pain syndrome            Plan:      · OARRS reviewed. Current MED: 33.75  · Patient was offered naloxone for home. · Discussed long term side effects of medications, tolerance, dependency and addiction. · Previous UDS reviewed  · UDS preformed today for compliance. · Patient told can not receive any pain medications from any other source. · No evidence of abuse, diversion or aberrant behavior.  Medications and/or procedures to improve function and quality of life- patient understanding with this and that may not be pain free   Discussed with patient about safe storage of medications at home   Discussed possible weaning of medication dosing dependent on treatment/procedure results.  Discussed with patient about risks with procedure including infection, reaction to medication, increased pain, or bleeding.  Reviewed hospital notes. Not a candidate for any procedures d/t extensive cardiac issues Wearing cardiac life vest severe aortic stenosis. Reviewed cardiac notes    Continues exercises with home health   · Medications remain very effective- continue Percocet 7.5/325 TID prn- filled 2/28/2022- has plenty still because of his hospitalization   · Recommend him going to get his new walker that was prescribed from pcp      Meds. Prescribed:   No orders of the defined types were placed in this encounter.       Return in about 2 months (around 5/24/2022), or if symptoms worsen or fail to improve, for follow up  for medications.                Electronically signed by Janus Mcburney, APRN - CNP on3/24/2022 at 8:27 AM

## 2022-03-28 LAB
B-TYPE NATRIURETIC PEPTIDE: 293 PG/ML
BUN BLDV-MCNC: 26 MG/DL
CALCIUM SERPL-MCNC: 9.2 MG/DL
CHLORIDE BLD-SCNC: 102 MMOL/L
CO2: 22 MMOL/L
CREAT SERPL-MCNC: 1.21 MG/DL
GFR CALCULATED: >60
GLUCOSE BLD-MCNC: 224 MG/DL
MAGNESIUM: 2 MG/DL
POTASSIUM SERPL-SCNC: 4.1 MMOL/L
SODIUM BLD-SCNC: 136 MMOL/L

## 2022-03-30 DIAGNOSIS — G89.4 CHRONIC PAIN SYNDROME: ICD-10-CM

## 2022-03-30 RX ORDER — OXYCODONE AND ACETAMINOPHEN 7.5; 325 MG/1; MG/1
1 TABLET ORAL EVERY 8 HOURS PRN
Qty: 90 TABLET | Refills: 0 | Status: SHIPPED | OUTPATIENT
Start: 2022-03-30 | End: 2022-04-28 | Stop reason: SDUPTHER

## 2022-03-30 NOTE — TELEPHONE ENCOUNTER
Matthew Wyatt called requesting a refill on the following medications:  Requested Prescriptions     Pending Prescriptions Disp Refills    oxyCODONE-acetaminophen (PERCOCET) 7.5-325 MG per tablet 90 tablet 0     Sig: Take 1 tablet by mouth every 8 hours as needed for Pain for up to 30 days. Pharmacy verified:[facility]      Date of last visit:   Date of next visit (if applicable): 3/60/4334

## 2022-03-30 NOTE — TELEPHONE ENCOUNTER
OARRS reviewed. UDS: + for  Oxycodone duloxetine consistent. Last seen: 3/24/2022.  Follow-up:   Future Appointments   Date Time Provider Kenny Arrington   4/13/2022 10:30 AM Leobardo Cranker, MD Keene Hodgkins CT/CV [facility] [facility]   4/19/2022 10:00 AM Jhon Matias APRN - CNP N SRPX CHF [facility] [place] [facility]   5/26/2022 11:15 AM Jerry Payne APRN - CNP N SRPX Pain [facility] [place] [facility]   6/7/2022  1:20 PM STR CT IMAGING RM1  OP [facility] STRZ OUT EXP STR Radiolog   6/7/2022  2:00 PM STR PULMONARY FUNCTION ROOM 1 STRZ PFT [facility]   6/14/2022 11:20 AM MD Maria Victoria Nassar [facility] [place]   9/29/2022  3:45 PM Evita Scruggs MD N [id number] [facility]

## 2022-03-31 ENCOUNTER — TELEPHONE (OUTPATIENT)
Dept: CARDIOLOGY CLINIC | Age: 57
End: 2022-03-31

## 2022-03-31 DIAGNOSIS — I50.22 CHRONIC SYSTOLIC CONGESTIVE HEART FAILURE, NYHA CLASS 3 (HCC): Primary | ICD-10-CM

## 2022-03-31 RX ORDER — POTASSIUM CHLORIDE 20 MEQ/1
20 TABLET, EXTENDED RELEASE ORAL DAILY
Qty: 60 TABLET | Refills: 5 | Status: ON HOLD | OUTPATIENT
Start: 2022-03-31 | End: 2022-05-12 | Stop reason: HOSPADM

## 2022-03-31 RX ORDER — FUROSEMIDE 20 MG/1
TABLET ORAL
Qty: 180 TABLET | Refills: 5 | Status: ON HOLD | OUTPATIENT
Start: 2022-03-31 | End: 2022-05-12 | Stop reason: HOSPADM

## 2022-03-31 NOTE — TELEPHONE ENCOUNTER
Making lasix 40 am, 20 pm. Adding 20 of KCl daily. Continue to monitor weight and symptoms. Ask if he has been following diet.  BMP two weeks

## 2022-03-31 NOTE — TELEPHONE ENCOUNTER
Merged with [facility] nurse, Sandro Cuellar, called in with weight gain. 3/25- 316 lb, today 3/31- 321.6 lb. Feels bloated, has some wheezing, SOB the same. OV 3/21 weight was 322 lb. Aldactone was stopped.

## 2022-04-06 ENCOUNTER — APPOINTMENT (OUTPATIENT)
Dept: GENERAL RADIOLOGY | Age: 57
End: 2022-04-06
Payer: MEDICARE

## 2022-04-06 ENCOUNTER — HOSPITAL ENCOUNTER (OUTPATIENT)
Age: 57
Setting detail: OBSERVATION
Discharge: HOME HEALTH | End: 2022-04-07
Attending: INTERNAL MEDICINE | Admitting: INTERNAL MEDICINE
Payer: MEDICARE

## 2022-04-06 DIAGNOSIS — J44.1 COPD WITH ACUTE EXACERBATION (HCC): ICD-10-CM

## 2022-04-06 DIAGNOSIS — J44.9 STAGE 3 SEVERE COPD BY GOLD CLASSIFICATION (HCC): ICD-10-CM

## 2022-04-06 DIAGNOSIS — R07.9 CHEST PAIN, UNSPECIFIED TYPE: Primary | ICD-10-CM

## 2022-04-06 LAB
ALBUMIN SERPL-MCNC: 3 G/DL (ref 3.5–5.1)
ALP BLD-CCNC: 125 U/L (ref 38–126)
ALT SERPL-CCNC: 36 U/L (ref 11–66)
ANION GAP SERPL CALCULATED.3IONS-SCNC: 11 MEQ/L (ref 8–16)
AST SERPL-CCNC: 48 U/L (ref 5–40)
BASOPHILS # BLD: 0.7 %
BASOPHILS ABSOLUTE: 0 THOU/MM3 (ref 0–0.1)
BILIRUB SERPL-MCNC: 0.5 MG/DL (ref 0.3–1.2)
BILIRUBIN DIRECT: 0.4 MG/DL (ref 0–0.3)
BUN BLDV-MCNC: 39 MG/DL (ref 7–22)
CALCIUM SERPL-MCNC: 8.4 MG/DL (ref 8.5–10.5)
CHLORIDE BLD-SCNC: 98 MEQ/L (ref 98–111)
CO2: 21 MEQ/L (ref 23–33)
CREAT SERPL-MCNC: 1.5 MG/DL (ref 0.4–1.2)
EKG ATRIAL RATE: 65 BPM
EKG P AXIS: 65 DEGREES
EKG P-R INTERVAL: 172 MS
EKG Q-T INTERVAL: 436 MS
EKG QRS DURATION: 112 MS
EKG QTC CALCULATION (BAZETT): 453 MS
EKG R AXIS: 29 DEGREES
EKG T AXIS: 62 DEGREES
EKG VENTRICULAR RATE: 65 BPM
EOSINOPHIL # BLD: 2.6 %
EOSINOPHILS ABSOLUTE: 0.2 THOU/MM3 (ref 0–0.4)
ERYTHROCYTE [DISTWIDTH] IN BLOOD BY AUTOMATED COUNT: 14.4 % (ref 11.5–14.5)
ERYTHROCYTE [DISTWIDTH] IN BLOOD BY AUTOMATED COUNT: 50.5 FL (ref 35–45)
GFR SERPL CREATININE-BSD FRML MDRD: 48 ML/MIN/1.73M2
GLUCOSE BLD-MCNC: 258 MG/DL (ref 70–108)
HCT VFR BLD CALC: 36 % (ref 42–52)
HEMOGLOBIN: 11.6 GM/DL (ref 14–18)
IMMATURE GRANS (ABS): 0.02 THOU/MM3 (ref 0–0.07)
IMMATURE GRANULOCYTES: 0.3 %
LIPASE: 72.5 U/L (ref 5.6–51.3)
LYMPHOCYTES # BLD: 23.4 %
LYMPHOCYTES ABSOLUTE: 1.4 THOU/MM3 (ref 1–4.8)
MCH RBC QN AUTO: 30.9 PG (ref 26–33)
MCHC RBC AUTO-ENTMCNC: 32.2 GM/DL (ref 32.2–35.5)
MCV RBC AUTO: 95.7 FL (ref 80–94)
MONOCYTES # BLD: 10.8 %
MONOCYTES ABSOLUTE: 0.7 THOU/MM3 (ref 0.4–1.3)
NUCLEATED RED BLOOD CELLS: 0 /100 WBC
OSMOLALITY CALCULATION: 279.1 MOSMOL/KG (ref 275–300)
PLATELET # BLD: 104 THOU/MM3 (ref 130–400)
PMV BLD AUTO: 11.6 FL (ref 9.4–12.4)
POTASSIUM REFLEX MAGNESIUM: 4.4 MEQ/L (ref 3.5–5.2)
PRO-BNP: 152.9 PG/ML (ref 0–900)
RBC # BLD: 3.76 MILL/MM3 (ref 4.7–6.1)
SEG NEUTROPHILS: 62.2 %
SEGMENTED NEUTROPHILS ABSOLUTE COUNT: 3.8 THOU/MM3 (ref 1.8–7.7)
SODIUM BLD-SCNC: 130 MEQ/L (ref 135–145)
TOTAL PROTEIN: 6.9 G/DL (ref 6.1–8)
TROPONIN T: < 0.01 NG/ML
WBC # BLD: 6.1 THOU/MM3 (ref 4.8–10.8)

## 2022-04-06 PROCEDURE — 94640 AIRWAY INHALATION TREATMENT: CPT

## 2022-04-06 PROCEDURE — 99283 EMERGENCY DEPT VISIT LOW MDM: CPT

## 2022-04-06 PROCEDURE — 96374 THER/PROPH/DIAG INJ IV PUSH: CPT

## 2022-04-06 PROCEDURE — 6370000000 HC RX 637 (ALT 250 FOR IP): Performed by: INTERNAL MEDICINE

## 2022-04-06 PROCEDURE — 6370000000 HC RX 637 (ALT 250 FOR IP): Performed by: NURSE PRACTITIONER

## 2022-04-06 PROCEDURE — 96372 THER/PROPH/DIAG INJ SC/IM: CPT

## 2022-04-06 PROCEDURE — 83880 ASSAY OF NATRIURETIC PEPTIDE: CPT

## 2022-04-06 PROCEDURE — 93005 ELECTROCARDIOGRAM TRACING: CPT | Performed by: EMERGENCY MEDICINE

## 2022-04-06 PROCEDURE — 2580000003 HC RX 258: Performed by: NURSE PRACTITIONER

## 2022-04-06 PROCEDURE — 36415 COLL VENOUS BLD VENIPUNCTURE: CPT

## 2022-04-06 PROCEDURE — 94761 N-INVAS EAR/PLS OXIMETRY MLT: CPT

## 2022-04-06 PROCEDURE — 71045 X-RAY EXAM CHEST 1 VIEW: CPT

## 2022-04-06 PROCEDURE — 80076 HEPATIC FUNCTION PANEL: CPT

## 2022-04-06 PROCEDURE — 85025 COMPLETE CBC W/AUTO DIFF WBC: CPT

## 2022-04-06 PROCEDURE — 80048 BASIC METABOLIC PNL TOTAL CA: CPT

## 2022-04-06 PROCEDURE — 2700000000 HC OXYGEN THERAPY PER DAY

## 2022-04-06 PROCEDURE — 6360000002 HC RX W HCPCS: Performed by: INTERNAL MEDICINE

## 2022-04-06 PROCEDURE — 83690 ASSAY OF LIPASE: CPT

## 2022-04-06 PROCEDURE — G0378 HOSPITAL OBSERVATION PER HR: HCPCS

## 2022-04-06 PROCEDURE — 1200000003 HC TELEMETRY R&B

## 2022-04-06 PROCEDURE — 93010 ELECTROCARDIOGRAM REPORT: CPT | Performed by: INTERNAL MEDICINE

## 2022-04-06 PROCEDURE — 84484 ASSAY OF TROPONIN QUANT: CPT

## 2022-04-06 PROCEDURE — 6360000002 HC RX W HCPCS: Performed by: NURSE PRACTITIONER

## 2022-04-06 RX ORDER — DOCUSATE SODIUM 100 MG/1
100 CAPSULE, LIQUID FILLED ORAL DAILY
Status: DISCONTINUED | OUTPATIENT
Start: 2022-04-06 | End: 2022-04-07 | Stop reason: HOSPADM

## 2022-04-06 RX ORDER — METHYLPREDNISOLONE SODIUM SUCCINATE 125 MG/2ML
125 INJECTION, POWDER, LYOPHILIZED, FOR SOLUTION INTRAMUSCULAR; INTRAVENOUS ONCE
Status: COMPLETED | OUTPATIENT
Start: 2022-04-06 | End: 2022-04-06

## 2022-04-06 RX ORDER — FUROSEMIDE 10 MG/ML
40 INJECTION INTRAMUSCULAR; INTRAVENOUS 2 TIMES DAILY
Status: DISCONTINUED | OUTPATIENT
Start: 2022-04-06 | End: 2022-04-07 | Stop reason: HOSPADM

## 2022-04-06 RX ORDER — ALBUTEROL SULFATE 2.5 MG/3ML
2.5 SOLUTION RESPIRATORY (INHALATION) EVERY 6 HOURS PRN
Status: DISCONTINUED | OUTPATIENT
Start: 2022-04-06 | End: 2022-04-07 | Stop reason: HOSPADM

## 2022-04-06 RX ORDER — ASPIRIN 81 MG/1
81 TABLET, CHEWABLE ORAL DAILY
Status: DISCONTINUED | OUTPATIENT
Start: 2022-04-06 | End: 2022-04-07 | Stop reason: HOSPADM

## 2022-04-06 RX ORDER — 0.9 % SODIUM CHLORIDE 0.9 %
500 INTRAVENOUS SOLUTION INTRAVENOUS ONCE
Status: COMPLETED | OUTPATIENT
Start: 2022-04-06 | End: 2022-04-06

## 2022-04-06 RX ORDER — NICOTINE 21 MG/24HR
1 PATCH, TRANSDERMAL 24 HOURS TRANSDERMAL EVERY 24 HOURS
Status: DISCONTINUED | OUTPATIENT
Start: 2022-04-06 | End: 2022-04-07 | Stop reason: HOSPADM

## 2022-04-06 RX ORDER — INSULIN GLARGINE 100 [IU]/ML
42 INJECTION, SOLUTION SUBCUTANEOUS NIGHTLY
Status: DISCONTINUED | OUTPATIENT
Start: 2022-04-06 | End: 2022-04-07 | Stop reason: HOSPADM

## 2022-04-06 RX ORDER — ACETAMINOPHEN 325 MG/1
650 TABLET ORAL EVERY 4 HOURS PRN
Status: DISCONTINUED | OUTPATIENT
Start: 2022-04-06 | End: 2022-04-07 | Stop reason: HOSPADM

## 2022-04-06 RX ORDER — ONDANSETRON 2 MG/ML
4 INJECTION INTRAMUSCULAR; INTRAVENOUS EVERY 4 HOURS PRN
Status: DISCONTINUED | OUTPATIENT
Start: 2022-04-06 | End: 2022-04-07 | Stop reason: HOSPADM

## 2022-04-06 RX ORDER — MORPHINE SULFATE 2 MG/ML
2 INJECTION, SOLUTION INTRAMUSCULAR; INTRAVENOUS EVERY 4 HOURS PRN
Status: DISCONTINUED | OUTPATIENT
Start: 2022-04-06 | End: 2022-04-07 | Stop reason: HOSPADM

## 2022-04-06 RX ORDER — OXYCODONE HYDROCHLORIDE AND ACETAMINOPHEN 5; 325 MG/1; MG/1
1 TABLET ORAL EVERY 4 HOURS PRN
Status: DISCONTINUED | OUTPATIENT
Start: 2022-04-06 | End: 2022-04-07 | Stop reason: HOSPADM

## 2022-04-06 RX ORDER — IPRATROPIUM BROMIDE AND ALBUTEROL SULFATE 2.5; .5 MG/3ML; MG/3ML
1 SOLUTION RESPIRATORY (INHALATION)
Status: DISCONTINUED | OUTPATIENT
Start: 2022-04-07 | End: 2022-04-07 | Stop reason: HOSPADM

## 2022-04-06 RX ORDER — IPRATROPIUM BROMIDE AND ALBUTEROL SULFATE 2.5; .5 MG/3ML; MG/3ML
1 SOLUTION RESPIRATORY (INHALATION) ONCE
Status: COMPLETED | OUTPATIENT
Start: 2022-04-06 | End: 2022-04-06

## 2022-04-06 RX ADMIN — SODIUM CHLORIDE 500 ML: 9 INJECTION, SOLUTION INTRAVENOUS at 16:41

## 2022-04-06 RX ADMIN — METOPROLOL TARTRATE 25 MG: 25 TABLET, FILM COATED ORAL at 22:03

## 2022-04-06 RX ADMIN — OXYCODONE AND ACETAMINOPHEN 1 TABLET: 5; 325 TABLET ORAL at 22:03

## 2022-04-06 RX ADMIN — METHYLPREDNISOLONE SODIUM SUCCINATE 125 MG: 125 INJECTION, POWDER, FOR SOLUTION INTRAMUSCULAR; INTRAVENOUS at 16:45

## 2022-04-06 RX ADMIN — ENOXAPARIN SODIUM 40 MG: 40 INJECTION SUBCUTANEOUS at 22:02

## 2022-04-06 RX ADMIN — MOMETASONE FUROATE AND FORMOTEROL FUMARATE DIHYDRATE 2 PUFF: 200; 5 AEROSOL RESPIRATORY (INHALATION) at 22:37

## 2022-04-06 RX ADMIN — INSULIN LISPRO 6 UNITS: 100 INJECTION, SOLUTION INTRAVENOUS; SUBCUTANEOUS at 22:08

## 2022-04-06 RX ADMIN — IPRATROPIUM BROMIDE AND ALBUTEROL SULFATE 1 AMPULE: .5; 3 SOLUTION RESPIRATORY (INHALATION) at 16:17

## 2022-04-06 RX ADMIN — FUROSEMIDE 40 MG: 10 INJECTION, SOLUTION INTRAMUSCULAR; INTRAVENOUS at 22:03

## 2022-04-06 RX ADMIN — INSULIN GLARGINE 42 UNITS: 100 INJECTION, SOLUTION SUBCUTANEOUS at 22:08

## 2022-04-06 ASSESSMENT — PAIN SCALES - GENERAL
PAINLEVEL_OUTOF10: 9
PAINLEVEL_OUTOF10: 9
PAINLEVEL_OUTOF10: 7
PAINLEVEL_OUTOF10: 6
PAINLEVEL_OUTOF10: 0

## 2022-04-06 ASSESSMENT — PAIN DESCRIPTION - FREQUENCY
FREQUENCY: CONTINUOUS

## 2022-04-06 ASSESSMENT — PAIN DESCRIPTION - PAIN TYPE
TYPE: ACUTE PAIN
TYPE: CHRONIC PAIN
TYPE: CHRONIC PAIN
TYPE: ACUTE PAIN

## 2022-04-06 ASSESSMENT — PAIN - FUNCTIONAL ASSESSMENT
PAIN_FUNCTIONAL_ASSESSMENT: 0-10
PAIN_FUNCTIONAL_ASSESSMENT: 0-10

## 2022-04-06 ASSESSMENT — PAIN DESCRIPTION - LOCATION
LOCATION: BACK
LOCATION: CHEST
LOCATION: CHEST
LOCATION: BACK

## 2022-04-06 ASSESSMENT — PAIN DESCRIPTION - ORIENTATION
ORIENTATION: LOWER
ORIENTATION: LOWER

## 2022-04-06 ASSESSMENT — PAIN DESCRIPTION - ONSET
ONSET: ON-GOING
ONSET: ON-GOING

## 2022-04-06 ASSESSMENT — PAIN DESCRIPTION - DESCRIPTORS
DESCRIPTORS: SHARP
DESCRIPTORS: ACHING;DULL

## 2022-04-06 NOTE — ED PROVIDER NOTES
[facility]  EMERGENCY MEDICINE     Pt Name: Abby Allen  MRN: [id number]  [place] 1965  Date of evaluation: 4/6/2022  PCP:    Alex Robb MD  Provider: MEGHAN Capone - CNP    CHIEF COMPLAINT       Chief Complaint   Patient presents with   Kaba Other    Chest Pain           HISTORY OF PRESENT ILLNESS    Abby Allen is a 65 y.o. male patient that presents to ER with complaint that his LifeVest shocked him and he had chest pain. Patient states he is short of breath, but states he is always short of breath. He does state that he is more short of breath than usual.  Patient denies nausea, vomiting, diarrhea or fever. Lung sounds have wheezes throughout. Patient is tachypneic, but O2 saturation is 94 to 96% on room air. Patient does appear to be short of breath, but not in acute distress. Triage notes and Nursing notes were reviewed by myself. Any discrepancies are addressed above.     PAST MEDICAL HISTORY     Past Medical History:   Diagnosis Date    Acute kidney injury ([address] 12/2020    due to Enterococous Bacteremia , fluid overload, low sodium    Amputation of right great toe (HCC) 02/18/2021    Asthma     Brain tumor ([address]     Cirrhosis (HCC)     ETOH abuse    CKD (chronic kidney disease)     l.brown-osu spetie    COPD (chronic obstructive pulmonary disease) (HCC)     Diabetes mellitus (HCC)     type 2-insulin lantus and humalog    Falling     Glaucoma     Hepatitis C     History of blood transfusion     s/p nasal surgery    Hyperlipidemia     Hypertension     Nallu    Legally blind     Obesity     Pain management     karol marzec-percocet Rx    Right foot infection 11/2021    Seizures ([address]        SURGICAL HISTORY       Past Surgical History:   Procedure Laterality Date    BACK SURGERY      CARDIAC CATHETERIZATION  08/03/2021    EYE SURGERY      FIBULA FRACTURE SURGERY Left 03/21/2019    LEFT FIBULAR SHAFT ORIF performed by Lisa Mishra DPM at [facility]    FOOT SURGERY Right     Steel pins in place    FRACTURE SURGERY      NASAL SINUS SURGERY Bilateral 11/29/2020    FLEXIBLE BRONCHOSCOPY WITH BRONCHOALVEOLAR LAVAGE WITH CULTURES. NASAL ENDOSCOPY WITH POSSIBLE CAUTERY ADN NASAL PACKING performed by Kristen Luna MD at [facility]  01/2020    SPINE SURGERY N/A 02/19/2021    KYPHOPLASTY at T4, L2, L4 performed by Prabhjot Rios MD at [address] [facility] N/A 8/24/2021    LUMBAR 3 AND LUMBAR 5 KYPHOPLASTY performed by Jacki Buck MD at [address] Right 09/23/2020    AMPUTATION DISTAL APSECT RIGHT HALLUX, REMOVAL OF HARDWARE RIGHT HALLUX performed by Bria Serrato DPM at [address] [facility] N/A 04/25/2019    EGD BIOPSY performed by Sergio Hardy MD at [address] [facility] Medication List as of 4/7/2022  4:38 PM      CONTINUE these medications which have NOT CHANGED    Details   furosemide (LASIX) 20 MG tablet Take 2 tablets by mouth every morning AND 1 tablet every evening., Disp-180 tablet, R-5Normal      potassium chloride (KLOR-CON M) 20 MEQ extended release tablet Take 1 tablet by mouth daily, Disp-60 tablet, R-5Normal      oxyCODONE-acetaminophen (PERCOCET) 7.5-325 MG per tablet Take 1 tablet by mouth every 8 hours as needed for Pain for up to 30 days. , Disp-90 tablet, R-0Normal      metoprolol succinate (TOPROL XL) 100 MG extended release tablet Take 1 tablet by mouth daily, Disp-30 tablet, R-3Normal      aspirin 81 MG chewable tablet Take 1 tablet by mouth daily, Disp-30 tablet, R-3Normal      hydrALAZINE (APRESOLINE) 50 MG tablet Take 1 tablet by mouth every 8 hours, Disp-90 tablet, R-3Normal      nicotine (NICODERM CQ) 21 MG/24HR Place 1 patch onto the skin daily, Disp-30 patch, R-3Normal      sacubitril-valsartan (ENTRESTO) 24-26 MG per tablet Take 1 tablet by mouth 2 times daily, Disp-60 tablet, R-3Normal umeclidinium-vilanterol (ANORO ELLIPTA) 62.5-25 MCG/INH AEPB inhaler Inhale 1 puff into the lungs daily, Disp-1 each, R-11Normal      dapagliflozin (FARXIGA) 10 MG tablet Take 1 tablet by mouth every morning, Disp-90 tablet, R-1Normal      sodium bicarbonate 650 MG tablet Take 650 mg by mouth 2 times dailyHistorical Med      senna (SENOKOT) 8.6 MG tablet Take 2 tablets by mouth dailyHistorical Med      tamsulosin (FLOMAX) 0.4 MG capsule Take 1 capsule by mouth nightly, Disp-30 capsule, R-0Adjust Sig      DULoxetine (CYMBALTA) 60 MG extended release capsule Take 1 capsule by mouth daily, Disp-30 capsule, R-3Adjust Sig      gabapentin (NEURONTIN) 300 MG capsule Take 1 capsule by mouth nightly for 30 days. , Disp-90 capsule, R-0Adjust Sig      insulin glargine (LANTUS) 100 UNIT/ML injection vial Inject 84 Units into the skin nightly, Disp-1 vial, R-3NO PRINT      !! insulin lispro (HUMALOG) 100 UNIT/ML injection vial Inject 10 Units into the skin 3 times daily (before meals) Plus Sliding Scale, Disp-1 vial, R-0NO PRINT      !! insulin lispro (HUMALOG) 100 UNIT/ML injection vial Glucose: Dose:  No Insulin, 140-199 2 Units, 200-249 4 Units, 250-299 6 Units, 300-349 8 Units, 350-400 10 Units, Over 400 12 Units, Disp-1 vial, R-0NO PRINT      docusate sodium (COLACE, DULCOLAX) 100 MG CAPS Take 100 mg by mouth 2 times daily, Disp-60 capsule, [id number]      folic acid (FOLVITE) 1 MG tablet Take 1 mg by mouth dailyHistorical Med      albuterol sulfate  (90 Base) MCG/ACT inhaler Inhale 1 puff into the lungs every 4-6 hours as neededHistorical Med      OXYGEN Inhale into the lungs daily as needed (nightly) Historical Med      latanoprost (XALATAN) 0.005 % ophthalmic solution Place 1 drop into both eyes nightly, Disp-1 Bottle, R-3DC to SNF      rosuvastatin (CRESTOR) 20 MG tablet Take 20 mg by mouth nightly Historical Med       !! - Potential duplicate medications found. Please discuss with provider. ALLERGIES       Allergies   Allergen Reactions    Adhesive Tape Rash     Bandaid    Keppra [Levetiracetam] Rash       FAMILY HISTORY       Family History   Problem Relation Age of Onset    Heart Attack Father     Heart Attack Brother         pneumonia    No Known Problems Mother     Cancer Sister         breast    No Known Problems Maternal Grandmother     No Known Problems Maternal Grandfather     Liver Cancer Paternal Grandmother     Heart Attack Paternal Grandfather     Heart Disease Brother         stents    Colon Cancer Neg Hx     Colon Polyps Neg Hx         SOCIAL HISTORY       Social History     Socioeconomic History    Marital status: Single     Spouse name: None    Number of children: 0    Years of education: None    Highest education level: None   Occupational History    None   Tobacco Use    Smoking status: Current Every Day Smoker     Packs/day: 1.00     Years: 40.00     Pack years: 40.00     Types: Cigarettes    Smokeless tobacco: Never Used    Tobacco comment: Currently smoking electronic cigarettes   Vaping Use    Vaping Use: Never used   Substance and Sexual Activity    Alcohol use: Yes     Alcohol/week: 3.0 standard drinks     Types: 3 Cans of beer per week    Drug use: Not Currently     Types: Marijuana Garon Kettle)     Comment: medical marijuana not currently using    Sexual activity: Not Currently     Partners: Female   Other Topics Concern    None   Social History Narrative    None     Social Determinants of Health     Financial Resource Strain:     Difficulty of Paying Living Expenses: Not on file   Food Insecurity:     Worried About Running Out of Food in the Last Year: Not on file    Saba of Food in the Last Year: Not on file   Transportation Needs:     Lack of Transportation (Medical): Not on file    Lack of Transportation (Non-Medical):  Not on file   Physical Activity:     Days of Exercise per Week: Not on file    Minutes of Exercise per Session: Not on file   Stress:     Feeling of Stress : Not on file   Social Connections:     Frequency of Communication with Friends and Family: Not on file    Frequency of Social Gatherings with Friends and Family: Not on file    Attends [employer] Services: Not on file    Active Member of Clubs or Organizations: Not on file    Attends Club or Organization Meetings: Not on file    Marital Status: Not on file   Intimate Partner Violence:     Fear of Current or Ex-Partner: Not on file    Emotionally Abused: Not on file    Physically Abused: Not on file    Sexually Abused: Not on file   Housing Stability:     Unable to Pay for Housing in the Last Year: Not on file    Number of [place] in the Last Year: Not on file    Unstable Housing in the Last Year: Not on file       REVIEW OF SYSTEMS     Review of Systems   Constitutional: Negative for appetite change, chills, fatigue, fever and unexpected weight change. HENT: Negative for ear pain, rhinorrhea and sinus pressure. Eyes: Negative for pain and visual disturbance. Respiratory: Positive for shortness of breath and wheezing. Negative for cough. Cardiovascular: Positive for chest pain. Negative for palpitations and leg swelling. Gastrointestinal: Negative for abdominal pain, blood in stool, constipation, diarrhea, nausea and vomiting. Genitourinary: Negative for dysuria, frequency and hematuria. Musculoskeletal: Negative for arthralgias and joint swelling. Skin: Negative for rash. Neurological: Negative for dizziness, syncope, weakness, light-headedness and headaches. Hematological: Does not bruise/bleed easily. Except as noted above the remainder of the review of systems was reviewed and is negative.   SCREENINGS        Sebastián Coma Scale  Eye Opening: Spontaneous  Best Verbal Response: Oriented  Best Motor Response: Obeys commands  Sebastián Coma Scale Score: 15               PHYSICAL EXAM    (up to 7 for level 4, 8 or more for level 5)     ED Triage Vitals [02/19/22 1512]   BP Temp Temp Source Pulse Resp SpO2 Height Weight   (!) 134/95 98.2 °F (36.8 °C) Oral 124 16 100 % -- --       Physical Exam  Vitals and nursing note reviewed. Constitutional:       General: He is not in acute distress. Appearance: He is well-developed. He is not diaphoretic. HENT:      Head: Normocephalic and atraumatic. Eyes:      Conjunctiva/sclera: Conjunctivae normal.      Pupils: Pupils are equal, round, and reactive to light. Cardiovascular:      Rate and Rhythm: Normal rate and regular rhythm. Heart sounds: Normal heart sounds. No murmur heard. No gallop. Pulmonary:      Effort: Pulmonary effort is normal. No respiratory distress. Breath sounds: Examination of the right-upper field reveals wheezing. Examination of the left-upper field reveals wheezing. Examination of the right-lower field reveals wheezing. Examination of the left-lower field reveals wheezing. Wheezing present. No rales. Abdominal:      General: Bowel sounds are normal.      Palpations: Abdomen is soft. Musculoskeletal:         General: Normal range of motion. Cervical back: Normal range of motion. Skin:     General: Skin is warm and dry. Neurological:      Mental Status: He is alert and oriented to person, place, and time. DIAGNOSTIC RESULTS     EKG:(none if blank)  All EKGs are interpreted by the Emergency Department Physician who either signs or Co-signs this chart in the absence of a cardiologist.    Normal sinus rhythm with a ventricular rate of 65. NH interval 172 ms,  ms,  ms and QTC is 453 ms. RADIOLOGY: (none if blank)   I directly visualized the following images and reviewed the radiologist interpretations. Interpretation per the Radiologist below, if available at the time of this note:  XR CHEST PORTABLE   Final Result   1. Life vest in place. 2. Mild cardiomegaly and increased pulmonary vascularity.    3. Thickening of the interstitial lung markings. .               **This report has been created using voice recognition software. It may contain minor errors which are inherent in voice recognition technology. **      Final report electronically signed by DR Bobby Escamilla on 4/6/2022 3:34 PM          LABS:  Labs Reviewed   CBC WITH AUTO DIFFERENTIAL - Abnormal; Notable for the following components:       Result Value    RBC 3.76 (*)     Hemoglobin 11.6 (*)     Hematocrit 36.0 (*)     MCV 95.7 (*)     RDW-SD 50.5 (*)     Platelets 733 (*)     All other components within normal limits   BASIC METABOLIC PANEL W/ REFLEX TO MG FOR LOW K - Abnormal; Notable for the following components:    Sodium 130 (*)     CO2 21 (*)     Glucose 258 (*)     BUN 39 (*)     CREATININE 1.5 (*)     Calcium 8.4 (*)     All other components within normal limits   HEPATIC FUNCTION PANEL - Abnormal; Notable for the following components:    Albumin 3.0 (*)     Bilirubin, Direct 0.4 (*)     AST 48 (*)     All other components within normal limits   LIPASE - Abnormal; Notable for the following components:    Lipase 72.5 (*)     All other components within normal limits   GLOMERULAR FILTRATION RATE, ESTIMATED - Abnormal; Notable for the following components:    Est, Glom Filt Rate 48 (*)     All other components within normal limits   BASIC METABOLIC PANEL - Abnormal; Notable for the following components:    Sodium 134 (*)     CO2 18 (*)     Glucose 301 (*)     BUN 40 (*)     CREATININE 1.5 (*)     All other components within normal limits   CBC - Abnormal; Notable for the following components:    WBC 4.6 (*)     RBC 4.07 (*)     Hemoglobin 12.4 (*)     Hematocrit 39.2 (*)     MCV 96.3 (*)     MCHC 31.6 (*)     RDW-CV 14.6 (*)     RDW-SD 51.8 (*)     All other components within normal limits   GLOMERULAR FILTRATION RATE, ESTIMATED - Abnormal; Notable for the following components:    Est, Glom Filt Rate 48 (*)     All other components within normal limits   TROPONIN   BRAIN NATRIURETIC PEPTIDE   ANION GAP   OSMOLALITY   ANION GAP       All other labs were within normal range or not returned as of this dictation. Please note, any cultures that may have been sent were not resulted at the time of this patient visit. EMERGENCY DEPARTMENT COURSE and Medical Decision Making:     Vitals:    Vitals:    04/07/22 1114 04/07/22 1151 04/07/22 1525 04/07/22 1552   BP: (!) 183/81  (!) 166/82    Pulse: 69  67    Resp: 18 18 18 18   Temp: 97.5 °F (36.4 °C)  97.4 °F (36.3 °C)    TempSrc: Oral  Axillary    SpO2: 96% 92% 95% 94%   Weight:       Height:           PROCEDURES: (None if blank)  Procedures       MDM  Number of Diagnoses or Management Options  Chest pain, unspecified type: established, worsening  COPD with acute exacerbation ([facility]): established, worsening     Amount and/or Complexity of Data Reviewed  Clinical lab tests: reviewed and ordered  Tests in the radiology section of CPT®: ordered and reviewed  Tests in the medicine section of CPT®: ordered and reviewed  Review and summarize past medical records: yes  Discuss the patient with other providers: yes  Independent visualization of images, tracings, or specimens: yes      Patient presents to ER with complaint of LifeVest \"shocking him\", chest pain and shortness of breath. Differential diagnosis includes but not limited to cardiac arrhythmia, COPD exacerbation, NSTEMI, CHF exacerbation or less likely pulmonary embolism. Patient continues to have this severe wheezing following breathing treatments and IV Solu-Medrol. Chest x-ray and EKG are both reassuring. Labs reveal a creatinine of 1.5, but are otherwise unremarkable. Dr. Tequila Lyle was consulted and he requests patient be admitted as he sent patient to be checked out. It does not appear that patient's LifeVest discharged as patient does not have the gel that discharges with the shock. Patient to be admitted for definitive care.   Patient and wife are updated at bedside and are agreeable with this plan. Strict return precautions and follow up instructions were discussed with the patient with which the patient agrees    ED Medications administered this visit:    Medications   ipratropium-albuterol (DUONEB) nebulizer solution 1 ampule (1 ampule Inhalation Given 4/6/22 1617)   methylPREDNISolone sodium (SOLU-MEDROL) injection 125 mg (125 mg IntraVENous Given 4/6/22 1645)   0.9 % sodium chloride bolus (0 mLs IntraVENous Stopped 4/6/22 1928)         FINAL IMPRESSION      1. Chest pain, unspecified type    2.  COPD with acute exacerbation ([facility] [address]    3. Stage 3 severe COPD by GOLD classification [facility])          DISPOSITION/PLAN   DISPOSITION Admitted 04/06/2022 06:03:23 PM      PATIENT REFERRED TO:  Wyatt Lopez [address]  [phone]    On 4/20/2022  Follow-up appointment April 20, 2022 at 12:20pm.    [facility]  [address]  [phone]            DISCHARGE MEDICATIONS:  Discharge Medication List as of 4/7/2022  4:38 PM                 MEGHAN Bond - CNP (electronically signed)           MEGHAN Bond - RONALDO  04/08/22 0121

## 2022-04-06 NOTE — ED NOTES
Pt resting on cot with eyes closes. Awakens to name called. Pt states his life vest shocked him. Nurse unable to verify on telemetry and pt states \"I don't know maybe it didn't. VSS. resp easy and unlabored. Call light in reach. Denies needs. Nurse will continue to monitor.       Yolanda Pena RN  04/06/22 1930

## 2022-04-06 NOTE — ED NOTES
Tried to contact floor regarding patient transport and floor answered phone call and then the call was hung up. Tried to call 8B x2 more times and no answer. Patient transported off floor in stable condition to 8B-30.       Jackie Albright  04/06/22 1936

## 2022-04-07 VITALS
TEMPERATURE: 97.4 F | WEIGHT: 315 LBS | DIASTOLIC BLOOD PRESSURE: 82 MMHG | HEART RATE: 67 BPM | SYSTOLIC BLOOD PRESSURE: 166 MMHG | HEIGHT: 75 IN | RESPIRATION RATE: 18 BRPM | OXYGEN SATURATION: 94 % | BODY MASS INDEX: 39.17 KG/M2

## 2022-04-07 PROBLEM — N18.9 CKD (CHRONIC KIDNEY DISEASE): Status: ACTIVE | Noted: 2022-04-07

## 2022-04-07 PROBLEM — R07.9 CHEST PAIN: Status: ACTIVE | Noted: 2022-04-07

## 2022-04-07 LAB
ANION GAP SERPL CALCULATED.3IONS-SCNC: 13 MEQ/L (ref 8–16)
BUN BLDV-MCNC: 40 MG/DL (ref 7–22)
CALCIUM SERPL-MCNC: 8.5 MG/DL (ref 8.5–10.5)
CHLORIDE BLD-SCNC: 103 MEQ/L (ref 98–111)
CO2: 18 MEQ/L (ref 23–33)
CREAT SERPL-MCNC: 1.5 MG/DL (ref 0.4–1.2)
ERYTHROCYTE [DISTWIDTH] IN BLOOD BY AUTOMATED COUNT: 14.6 % (ref 11.5–14.5)
ERYTHROCYTE [DISTWIDTH] IN BLOOD BY AUTOMATED COUNT: 51.8 FL (ref 35–45)
GFR SERPL CREATININE-BSD FRML MDRD: 48 ML/MIN/1.73M2
GLUCOSE BLD-MCNC: 301 MG/DL (ref 70–108)
HCT VFR BLD CALC: 39.2 % (ref 42–52)
HEMOGLOBIN: 12.4 GM/DL (ref 14–18)
MCH RBC QN AUTO: 30.5 PG (ref 26–33)
MCHC RBC AUTO-ENTMCNC: 31.6 GM/DL (ref 32.2–35.5)
MCV RBC AUTO: 96.3 FL (ref 80–94)
PLATELET # BLD: 134 THOU/MM3 (ref 130–400)
PMV BLD AUTO: 12 FL (ref 9.4–12.4)
POTASSIUM SERPL-SCNC: 4.4 MEQ/L (ref 3.5–5.2)
RBC # BLD: 4.07 MILL/MM3 (ref 4.7–6.1)
SODIUM BLD-SCNC: 134 MEQ/L (ref 135–145)
WBC # BLD: 4.6 THOU/MM3 (ref 4.8–10.8)

## 2022-04-07 PROCEDURE — 6370000000 HC RX 637 (ALT 250 FOR IP): Performed by: INTERNAL MEDICINE

## 2022-04-07 PROCEDURE — 2700000000 HC OXYGEN THERAPY PER DAY

## 2022-04-07 PROCEDURE — 94640 AIRWAY INHALATION TREATMENT: CPT

## 2022-04-07 PROCEDURE — 36415 COLL VENOUS BLD VENIPUNCTURE: CPT

## 2022-04-07 PROCEDURE — 80048 BASIC METABOLIC PNL TOTAL CA: CPT

## 2022-04-07 PROCEDURE — 85027 COMPLETE CBC AUTOMATED: CPT

## 2022-04-07 PROCEDURE — 6360000002 HC RX W HCPCS: Performed by: INTERNAL MEDICINE

## 2022-04-07 PROCEDURE — 96372 THER/PROPH/DIAG INJ SC/IM: CPT

## 2022-04-07 PROCEDURE — G0378 HOSPITAL OBSERVATION PER HR: HCPCS

## 2022-04-07 PROCEDURE — 99215 OFFICE O/P EST HI 40 MIN: CPT | Performed by: INTERNAL MEDICINE

## 2022-04-07 PROCEDURE — 94760 N-INVAS EAR/PLS OXIMETRY 1: CPT

## 2022-04-07 RX ORDER — ALBUTEROL SULFATE 2.5 MG/3ML
2.5 SOLUTION RESPIRATORY (INHALATION) EVERY 6 HOURS PRN
Qty: 120 EACH | Refills: 11 | Status: SHIPPED | OUTPATIENT
Start: 2022-04-07 | End: 2023-04-07

## 2022-04-07 RX ADMIN — DOCUSATE SODIUM 100 MG: 100 CAPSULE ORAL at 09:24

## 2022-04-07 RX ADMIN — IPRATROPIUM BROMIDE AND ALBUTEROL SULFATE 1 AMPULE: .5; 3 SOLUTION RESPIRATORY (INHALATION) at 11:51

## 2022-04-07 RX ADMIN — OXYCODONE AND ACETAMINOPHEN 1 TABLET: 5; 325 TABLET ORAL at 02:39

## 2022-04-07 RX ADMIN — ENOXAPARIN SODIUM 40 MG: 40 INJECTION SUBCUTANEOUS at 12:48

## 2022-04-07 RX ADMIN — IPRATROPIUM BROMIDE AND ALBUTEROL SULFATE 1 AMPULE: .5; 3 SOLUTION RESPIRATORY (INHALATION) at 15:52

## 2022-04-07 RX ADMIN — OXYCODONE AND ACETAMINOPHEN 1 TABLET: 5; 325 TABLET ORAL at 06:40

## 2022-04-07 RX ADMIN — INSULIN LISPRO 6 UNITS: 100 INJECTION, SOLUTION INTRAVENOUS; SUBCUTANEOUS at 12:47

## 2022-04-07 RX ADMIN — METOPROLOL TARTRATE 25 MG: 25 TABLET, FILM COATED ORAL at 09:25

## 2022-04-07 RX ADMIN — OXYCODONE AND ACETAMINOPHEN 1 TABLET: 5; 325 TABLET ORAL at 15:28

## 2022-04-07 RX ADMIN — IPRATROPIUM BROMIDE AND ALBUTEROL SULFATE 1 AMPULE: .5; 3 SOLUTION RESPIRATORY (INHALATION) at 07:25

## 2022-04-07 RX ADMIN — FUROSEMIDE 40 MG: 10 INJECTION, SOLUTION INTRAMUSCULAR; INTRAVENOUS at 09:24

## 2022-04-07 RX ADMIN — MOMETASONE FUROATE AND FORMOTEROL FUMARATE DIHYDRATE 2 PUFF: 200; 5 AEROSOL RESPIRATORY (INHALATION) at 07:25

## 2022-04-07 RX ADMIN — ASPIRIN 81 MG 81 MG: 81 TABLET ORAL at 09:24

## 2022-04-07 RX ADMIN — OXYCODONE AND ACETAMINOPHEN 1 TABLET: 5; 325 TABLET ORAL at 10:45

## 2022-04-07 ASSESSMENT — PAIN SCALES - GENERAL
PAINLEVEL_OUTOF10: 8
PAINLEVEL_OUTOF10: 8
PAINLEVEL_OUTOF10: 6
PAINLEVEL_OUTOF10: 8
PAINLEVEL_OUTOF10: 0
PAINLEVEL_OUTOF10: 8

## 2022-04-07 ASSESSMENT — PAIN DESCRIPTION - ONSET
ONSET: ON-GOING
ONSET: ON-GOING

## 2022-04-07 ASSESSMENT — PAIN DESCRIPTION - LOCATION
LOCATION: BACK
LOCATION: BACK

## 2022-04-07 ASSESSMENT — PAIN DESCRIPTION - PAIN TYPE
TYPE: CHRONIC PAIN
TYPE: CHRONIC PAIN

## 2022-04-07 ASSESSMENT — PAIN DESCRIPTION - FREQUENCY
FREQUENCY: CONTINUOUS
FREQUENCY: CONTINUOUS

## 2022-04-07 ASSESSMENT — PAIN DESCRIPTION - PROGRESSION: CLINICAL_PROGRESSION: NOT CHANGED

## 2022-04-07 ASSESSMENT — PAIN DESCRIPTION - DESCRIPTORS: DESCRIPTORS: ACHING

## 2022-04-07 ASSESSMENT — PAIN DESCRIPTION - ORIENTATION
ORIENTATION: LOWER
ORIENTATION: LOWER

## 2022-04-07 ASSESSMENT — PAIN - FUNCTIONAL ASSESSMENT: PAIN_FUNCTIONAL_ASSESSMENT: ACTIVITIES ARE NOT PREVENTED
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
## 2022-04-07 NOTE — CARE COORDINATION
4/7/22, 2:09 PM EDT    DISCHARGE PLANNING EVALUATION      SW consult \"current with [facility] services, [facility]\". Patient is a readmit and will be returning home with spouse and resume [facility]. Confirmed [facility] services with Donna Vallejo at [facility]. Informed Donna Vallejo of discharge today. 4/7/22, 2:20 PM EDT    Patient goals/plan/ treatment preferences discussed by  and . Patient goals/plan/ treatment preferences reviewed with patient/ family. Patient/ family verbalize understanding of discharge plan and are in agreement with goal/plan/treatment preferences. Understanding was demonstrated using the teach back method. AVS provided by RN at time of discharge, which includes all necessary medical information pertaining to the patients current course of illness, treatment, post-discharge goals of care, and treatment preferences.     Services After Discharge  Services At/After Discharge: Nursing Services,PT,OT [facility])   IMM Letter  IMM Letter date given[de-identified] 04/06/22  IMM Letter time given[de-identified] 6199

## 2022-04-07 NOTE — CARE COORDINATION
4/7/22, 10:49 AM EDT  DISCHARGE PLANNING EVALUATION:    German Roa       Admitted: 4/6/2022/ [facility] day: 1   Location: [facility]30/030-A Reason for admit: COPD exacerbation ([address] [J44.1]  COPD with acute exacerbation ([address] [J44.1]  Chest pain, unspecified type [R07.9]   PMH:  has a past medical history of Acute kidney injury ([address] Amputation of right great toe ([address] Asthma, Brain tumor ([address] Cirrhosis ([address] CKD (chronic kidney disease), COPD (chronic obstructive pulmonary disease) ([address] Diabetes mellitus ([address] Falling, Glaucoma, Hepatitis C, History of blood transfusion, Hyperlipidemia, Hypertension, Legally blind, Obesity, Pain management, Right foot infection, and Seizures ([address] Procedure: No  Barriers to Discharge: To ER with CP. Cardiology consulted. Trop neg. Await visit and plan. PCP: Armani Chaves MD  Readmission Risk Score: 22.7 ( )%    Patient Goals/Plan/Treatment Preferences: Met with Carlos this morning. He reports he is partially blind. He resides with an adult nephew and his service dog. He has [facility] services per [facility], nursing and PT/OT. He has home O2 at 3L/NC at night per [facility]. He has a Life Vest, a walker and a nebulizer. He states he has a rollator on order. Transportation/Food Security/Housekeeping Addressed:  No issues identified. 4/7/22, 1:51 PM EDT    Patient goals/plan/ treatment preferences discussed by  and . Patient goals/plan/ treatment preferences reviewed with patient/ family. Patient/ family verbalize understanding of discharge plan and are in agreement with goal/plan/treatment preferences. Understanding was demonstrated using the teach back method. AVS provided by RN at time of discharge, which includes all necessary medical information pertaining to the patients current course of illness, treatment, post-discharge goals of care, and treatment preferences.         IMM Letter  IMM Letter date given[de-identified] 04/06/22  IMM Letter time given[de-identified] 1826       Discharge likely later today. Current with [facility]. No new needs voiced.
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
## 2022-04-07 NOTE — CARE COORDINATION
04/07/22 1346   Readmission Assessment   Number of Days since last admission? 8-30 days   Previous Disposition Home with Home Health   Who is being Interviewed Patient   What was the patient's/caregiver's perception as to why they think they needed to return back to the hospital?   (Life Vest shock.)   Did you visit your Primary Care Physician after you left the hospital, before you returned this time? (Was to see as needed.)   Did you see a specialist, such as Cardiac, Pulmonary, Orthopedic Physician, etc. after you left the hospital? Yes   Who advised the patient to return to the hospital? [facility] Staff   Does the patient report anything that got in the way of taking their medications? No   In our efforts to provide the best possible care to you and others like you, can you think of anything that we could have done to help you after you left the hospital the first time, so that you might not have needed to return so soon?  Other (Comment)  (Pt felt he had all he needed at discharge.)

## 2022-04-07 NOTE — PROGRESS NOTES
Educated on discharge instructions, medications, and follow up appointments. No further questions or concerns voiced. Discharged to home with POA.
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
## 2022-04-08 ASSESSMENT — ENCOUNTER SYMPTOMS
SHORTNESS OF BREATH: 1
EYE PAIN: 0
WHEEZING: 1
ABDOMINAL PAIN: 0
CONSTIPATION: 0
SINUS PRESSURE: 0
NAUSEA: 0
COUGH: 0
VOMITING: 0
BLOOD IN STOOL: 0
DIARRHEA: 0
RHINORRHEA: 0

## 2022-04-19 LAB
BUN BLDV-MCNC: 23 MG/DL
CALCIUM SERPL-MCNC: 9.4 MG/DL
CHLORIDE BLD-SCNC: 103 MMOL/L
CO2: 22 MMOL/L
CREAT SERPL-MCNC: 1.08 MG/DL
GFR CALCULATED: >60
GLUCOSE BLD-MCNC: 310 MG/DL
POTASSIUM SERPL-SCNC: 4.4 MMOL/L
SODIUM BLD-SCNC: 138 MMOL/L

## 2022-04-20 ENCOUNTER — OFFICE VISIT (OUTPATIENT)
Dept: CARDIOTHORACIC SURGERY | Age: 57
End: 2022-04-20
Payer: MEDICARE

## 2022-04-20 ENCOUNTER — OFFICE VISIT (OUTPATIENT)
Dept: CARDIOLOGY CLINIC | Age: 57
End: 2022-04-20
Payer: MEDICARE

## 2022-04-20 VITALS
WEIGHT: 315 LBS | BODY MASS INDEX: 40.43 KG/M2 | HEART RATE: 67 BPM | DIASTOLIC BLOOD PRESSURE: 83 MMHG | SYSTOLIC BLOOD PRESSURE: 124 MMHG | HEIGHT: 74 IN

## 2022-04-20 VITALS — SYSTOLIC BLOOD PRESSURE: 122 MMHG | HEART RATE: 100 BPM | DIASTOLIC BLOOD PRESSURE: 62 MMHG

## 2022-04-20 DIAGNOSIS — I35.0 NONRHEUMATIC AORTIC VALVE STENOSIS: Primary | ICD-10-CM

## 2022-04-20 DIAGNOSIS — I50.22 CHRONIC SYSTOLIC CONGESTIVE HEART FAILURE, NYHA CLASS 3 (HCC): ICD-10-CM

## 2022-04-20 DIAGNOSIS — I35.0 SEVERE AORTIC STENOSIS: Primary | ICD-10-CM

## 2022-04-20 PROCEDURE — 4004F PT TOBACCO SCREEN RCVD TLK: CPT | Performed by: INTERNAL MEDICINE

## 2022-04-20 PROCEDURE — G8427 DOCREV CUR MEDS BY ELIG CLIN: HCPCS | Performed by: THORACIC SURGERY (CARDIOTHORACIC VASCULAR SURGERY)

## 2022-04-20 PROCEDURE — 3017F COLORECTAL CA SCREEN DOC REV: CPT | Performed by: THORACIC SURGERY (CARDIOTHORACIC VASCULAR SURGERY)

## 2022-04-20 PROCEDURE — 3017F COLORECTAL CA SCREEN DOC REV: CPT | Performed by: INTERNAL MEDICINE

## 2022-04-20 PROCEDURE — G8427 DOCREV CUR MEDS BY ELIG CLIN: HCPCS | Performed by: INTERNAL MEDICINE

## 2022-04-20 PROCEDURE — 99205 OFFICE O/P NEW HI 60 MIN: CPT | Performed by: THORACIC SURGERY (CARDIOTHORACIC VASCULAR SURGERY)

## 2022-04-20 PROCEDURE — G8417 CALC BMI ABV UP PARAM F/U: HCPCS | Performed by: THORACIC SURGERY (CARDIOTHORACIC VASCULAR SURGERY)

## 2022-04-20 PROCEDURE — 4004F PT TOBACCO SCREEN RCVD TLK: CPT | Performed by: THORACIC SURGERY (CARDIOTHORACIC VASCULAR SURGERY)

## 2022-04-20 PROCEDURE — 99215 OFFICE O/P EST HI 40 MIN: CPT | Performed by: INTERNAL MEDICINE

## 2022-04-20 PROCEDURE — G8417 CALC BMI ABV UP PARAM F/U: HCPCS | Performed by: INTERNAL MEDICINE

## 2022-04-20 ASSESSMENT — ENCOUNTER SYMPTOMS
ABDOMINAL PAIN: 0
BACK PAIN: 1
CHEST TIGHTNESS: 1
SHORTNESS OF BREATH: 1
EYE DISCHARGE: 0
COLOR CHANGE: 1

## 2022-04-20 NOTE — PROGRESS NOTES
[address] [facility] [address] [facility] [address]  Dept: [phone]  Dept Fax: [phone]  Loc: [phone]    Visit Date: 4/20/2022    Mr. Cheryl Carbajal is a 65 y.o. male  who presented for:  Chief Complaint   Patient presents with    New Patient    Cardiac Valve Problem     severe AS       HPI:   HPI   64 yo M hx of Hep C, COPD, CKD, HTN, cirrhosis, brain tumor, DM II since birth who has worsening cardiomyopathy. He is tired, fatigued, and sob. Was sent by prior cardiologist to CTS, but is too high risk. He was a prior alcoholic. Cr 1.1.  Hgb 12, Plt 134. Had DSE showing contractile reserve. He is on Lasix, BB, hydralazine, Entresto. His LFTs have improved. He has come down in water weight. He becomes lightheaded and dizzy. He has been in the hospital multiple times. He has been shocked by [address]  All male family members have had open heart surgery. Patient does not want risk of surgery given his comorbidities. Coronary angiogram is without obstruction. He has no teeth. He is not sure if anyone has had valve replacement surgeries. ECG with NSR. No further shocks. His significant other is present and she concurs. Current Outpatient Medications:     albuterol (PROVENTIL) (2.5 MG/3ML) 0.083% nebulizer solution, Take 3 mLs by nebulization every 6 hours as needed for Wheezing or Shortness of Breath, Disp: 120 each, Rfl: 11    furosemide (LASIX) 20 MG tablet, Take 2 tablets by mouth every morning AND 1 tablet every evening., Disp: 180 tablet, Rfl: 5    potassium chloride (KLOR-CON M) 20 MEQ extended release tablet, Take 1 tablet by mouth daily, Disp: 60 tablet, Rfl: 5    oxyCODONE-acetaminophen (PERCOCET) 7.5-325 MG per tablet, Take 1 tablet by mouth every 8 hours as needed for Pain for up to 30 days. , Disp: 90 tablet, Rfl: 0    metoprolol succinate (TOPROL XL) 100 MG extended release tablet, Take 1 tablet by mouth daily, Disp: 30 tablet, Rfl: 3    aspirin 81 MG chewable tablet, Take 1 tablet by mouth daily, Disp: 30 tablet, Rfl: 3    hydrALAZINE (APRESOLINE) 50 MG tablet, Take 1 tablet by mouth every 8 hours (Patient taking differently: Take 50 mg by mouth every 8 hours DO NOT TAKE IF SBP IS <110), Disp: 90 tablet, Rfl: 3    nicotine (NICODERM CQ) 21 MG/24HR, Place 1 patch onto the skin daily, Disp: 30 patch, Rfl: 3    sacubitril-valsartan (ENTRESTO) 24-26 MG per tablet, Take 1 tablet by mouth 2 times daily, Disp: 60 tablet, Rfl: 3    umeclidinium-vilanterol (ANORO ELLIPTA) 62.5-25 MCG/INH AEPB inhaler, Inhale 1 puff into the lungs daily, Disp: 1 each, Rfl: 11    dapagliflozin (FARXIGA) 10 MG tablet, Take 1 tablet by mouth every morning, Disp: 90 tablet, Rfl: 1    sodium bicarbonate 650 MG tablet, Take 650 mg by mouth 2 times daily, Disp: , Rfl:     senna (SENOKOT) 8.6 MG tablet, Take 2 tablets by mouth daily, Disp: , Rfl:     tamsulosin (FLOMAX) 0.4 MG capsule, Take 1 capsule by mouth nightly, Disp: 30 capsule, Rfl: 0    DULoxetine (CYMBALTA) 60 MG extended release capsule, Take 1 capsule by mouth daily, Disp: 30 capsule, Rfl: 3    insulin glargine (LANTUS) 100 UNIT/ML injection vial, Inject 84 Units into the skin nightly, Disp: 1 vial, Rfl: 3    insulin lispro (HUMALOG) 100 UNIT/ML injection vial, Inject 10 Units into the skin 3 times daily (before meals) Plus Sliding Scale (Patient taking differently: Inject 12 Units into the skin 3 times daily (before meals) Plus Sliding Scale ), Disp: 1 vial, Rfl: 0    insulin lispro (HUMALOG) 100 UNIT/ML injection vial, Glucose: Dose:  No Insulin, 140-199 2 Units, 200-249 4 Units, 250-299 6 Units, 300-349 8 Units, 350-400 10 Units, Over 400 12 Units, Disp: 1 vial, Rfl: 0    docusate sodium (COLACE, DULCOLAX) 100 MG CAPS, Take 100 mg by mouth 2 times daily, Disp: 60 capsule, Rfl: 0    folic acid (FOLVITE) 1 MG tablet, Take 1 mg by mouth daily, Disp: , Rfl:    albuterol sulfate  (90 Base) MCG/ACT inhaler, Inhale 1 puff into the lungs every 4-6 hours as needed, Disp: , Rfl:     OXYGEN, Inhale into the lungs daily as needed (nightly) , Disp: , Rfl:     latanoprost (XALATAN) 0.005 % ophthalmic solution, Place 1 drop into both eyes nightly, Disp: 1 Bottle, Rfl: 3    rosuvastatin (CRESTOR) 20 MG tablet, Take 20 mg by mouth nightly , Disp: , Rfl:     gabapentin (NEURONTIN) 300 MG capsule, Take 1 capsule by mouth nightly for 30 days. , Disp: 90 capsule, Rfl: 0    Past Medical History  Cedrick Palacios  has a past medical history of Acute kidney injury ([address] Amputation of right great toe ([address] Asthma, Brain tumor ([address] Cirrhosis ([address] CKD (chronic kidney disease), COPD (chronic obstructive pulmonary disease) ([address] Diabetes mellitus ([address] Falling, Glaucoma, Hepatitis C, History of blood transfusion, Hyperlipidemia, Hypertension, Legally blind, Obesity, Pain management, Right foot infection, and Seizures ([address] Social History  Cedrick Palacios  reports that he has been smoking cigarettes. He has a 40.00 pack-year smoking history. He has never used smokeless tobacco. He reports current alcohol use of about 3.0 standard drinks of alcohol per week. He reports previous drug use. Drug: Marijuana Ronnie Randy). Family History  Cedrick Palacios family history includes Cancer in his sister; Heart Attack in his brother, father, and paternal grandfather; Heart Disease in his brother; Lyle Shadeland in his paternal grandmother; No Known Problems in his maternal grandfather, maternal grandmother, and mother. There is no family history of bicuspid aortic valve, aneurysms, heart transplant, pacemakers, defibrillators, or sudden cardiac death.       Past Surgical History   Past Surgical History:   Procedure Laterality Date    BACK SURGERY      CARDIAC CATHETERIZATION  08/03/2021    EYE SURGERY      FIBULA FRACTURE SURGERY Left 03/21/2019    LEFT FIBULAR SHAFT ORIF performed by Ladd Runner, DPM at [facility] OR    FOOT SURGERY Right     Steel pins in place    FRACTURE SURGERY      NASAL SINUS SURGERY Bilateral 11/29/2020    FLEXIBLE BRONCHOSCOPY WITH BRONCHOALVEOLAR LAVAGE WITH CULTURES. NASAL ENDOSCOPY WITH POSSIBLE CAUTERY ADN NASAL PACKING performed by Mohsen Sullivan MD at [facility]  01/2020    SPINE SURGERY N/A 02/19/2021    KYPHOPLASTY at T4, L2, L4 performed by Keshia Self MD at [facility] N/A 8/24/2021    LUMBAR 3 AND LUMBAR 5 KYPHOPLASTY performed by Zan Vanegas MD at \[facility]"" 82 Right 09/23/2020    [facility], REMOVAL OF HARDWARE RIGHT HALLUX performed by Sarika Swain DPM at [facility] N/A 04/25/2019    EGD BIOPSY performed by Beti Shelley MD at [facility]       Review of Systems   Constitutional: Negative for chills and fever  HENT: Negative for congestion, sinus pressure, sneezing and sore throat. Eyes: Negative for pain, discharge, redness and itching. Respiratory: Negative for apnea, cough  Gastrointestinal: Negative for blood in stool, constipation, diarrhea   Endocrine: Negative for cold intolerance, heat intolerance, polydipsia. Genitourinary: Negative for dysuria, enuresis, flank pain and hematuria. Musculoskeletal: Negative for arthralgias, joint swelling and neck pain. Neurological: Negative for numbness and headaches. Psychiatric/Behavioral: Negative for agitation, confusion, decreased concentration and dysphoric mood. Objective:     /62   Pulse 100     Wt Readings from Last 3 Encounters:   04/20/22 (!) 322 lb (146.1 kg)   04/06/22 (!) 324 lb 15.3 oz (147.4 kg)   03/24/22 (!) 321 lb (145.6 kg)     BP Readings from Last 3 Encounters:   04/20/22 122/62   04/20/22 124/83   04/07/22 (!) 166/82       Nursing note and vitals reviewed. Physical Exam   Constitutional: Oriented to person, place, and time.  Appears well-developed and well-nourished. HENT:   Head: Normocephalic and atraumatic. Eyes: EOM are normal. Pupils are equal, round, and reactive to light. Neck: Normal range of motion. Neck supple. No JVD present. Cardiovascular: Normal rate, regular rhythm, normal heart sounds and intact distal pulses. 3/6 PETER. Pulmonary/Chest: Effort normal and breath sounds normal. No respiratory distress. No wheezes. No rales. Abdominal: Soft. Bowel sounds are normal. No distension. There is no tenderness. Musculoskeletal: Normal range of motion. No edema. Neurological: Alert and oriented to person, place, and time. No cranial nerve deficit. Coordination normal.   Skin: Skin is warm and dry. Psychiatric: Normal mood and affect.        Lab Results   Component Value Date    CKTOTAL 103 02/20/2021    CKTOTAL 54 11/30/2020       Lab Results   Component Value Date    WBC 4.6 04/07/2022    RBC 4.07 04/07/2022    HGB 12.4 04/07/2022    HCT 39.2 04/07/2022    MCV 96.3 04/07/2022    MCH 30.5 04/07/2022    MCHC 31.6 04/07/2022    RDW 16.6 06/03/2020     04/07/2022    MPV 12.0 04/07/2022       Lab Results   Component Value Date     04/19/2022    K 4.4 04/19/2022    K 4.4 04/06/2022     04/19/2022    CO2 22 04/19/2022    BUN 23 04/19/2022    LABALBU 3.0 04/06/2022    CREATININE 1.08 04/19/2022    CALCIUM 9.4 04/19/2022    LABGLOM >60 04/19/2022    LABGLOM 48 04/07/2022    GLUCOSE 310 04/19/2022       Lab Results   Component Value Date    ALKPHOS 125 04/06/2022    ALT 36 04/06/2022    AST 48 04/06/2022    PROT 6.9 04/06/2022    BILITOT 0.5 04/06/2022    BILIDIR 0.4 04/06/2022    LABALBU 3.0 04/06/2022       Lab Results   Component Value Date    MG 2.0 03/28/2022       Lab Results   Component Value Date    INR 1.22 (H) 03/09/2022    INR 1.06 08/03/2021    INR 1.16 (H) 02/17/2021         Lab Results   Component Value Date    LABA1C 6.6 02/24/2021       Lab Results   Component Value Date    TRIG 73 03/07/2022    HDL 40 03/07/2022 [address] 32 03/07/2022       Lab Results   Component Value Date    TSH 0.951 03/07/2022         Testing Reviewed:      I have individually reviewed the cardiac test below:    ECHO: Results for orders placed during the hospital encounter of 03/06/22    Echocardiogram 2D/ M-Mode/ Colorflow/ Do    Narrative  Transthoracic Echocardiography Report (TTE)    Demographics    Patient Name  [facility]     Gender             Male  [address]    MR #          [id number]      Race                   Ethnicity    Account #     [de-identified]      Room Number        0018    Accession     [id number]     Date of Study      03/07/2022  Number    Date of Birth 1965     Referring          Liang Simmons MD  Physician    Age           65 year(s)     Sonographer        SANCHEZ Goodwin, RDCS,  RDMS, RVT    Interpreting       Echo reader of the week  Physician          Sunshine Welch MD    Procedure    Type of Study    TTE procedure:ECHOCARDIOGRAM COMPLETE 2D W DOPPLER W COLOR. Procedure Date  Date: 03/07/2022 Start: 05:53 AM    Study Location: Bedside  Technical Quality: Limited visualization due to body habitus. Indications:Congestive heart failure. Additional Medical History:congestive heart failure, cirrhosis, alcohol  withdrawal, chronic obstructive pulmonary disease, hypertension,  hyperlipidemia, anemia, alcohol abuse, acute kidney injury, asthma,  Hepatitis C    Patient Status: Routine    Height: 74 inches Weight: 368.01 pounds BSA: 2.82 m^2 BMI: 47.25 kg/m^2    BP: 160/92 mmHg    Conclusions    Summary  Technically difficult examination. Left Ventricular size is Mildly increased . Normal left ventricular wall thickness. There was severe global hypokinesis of the left ventricle. Systolic function was severely reduced. Ejection fraction is visually estimated in the range of 35%. The left atrium is Moderately dilated. There is moderate-to-severe aortic stenosis with valve area of 0.9 to 1 sq  cm.   The maximum aortic valve gradient is 40 mmHg, the mean gradient is 25  mmHg, and the peak velocity is 3.1 m/s. Signature    ----------------------------------------------------------------  Electronically signed by Judi Shea MD (Interpreting  physician) on 03/07/2022 at 05:58 PM  ----------------------------------------------------------------    Findings    Mitral Valve  The mitral valve structure was normal with normal leaflet separation. DOPPLER: The transmitral velocity was within the normal range with no  evidence for mitral stenosis. Mild mitral regurgitation is present. Aortic Valve  The aortic valve leaflets were not well visualized. Aortic valve leaflets  are Moderately calcified. Leaflets exhibited moderately increased  thickness and severely reduced cuspal separation of the aortic valve. No  evidence of aortic valve regurgitation . There is moderate-to-severe aortic stenosis with valve area of 0.9 to 1 sq  cm. The maximum aortic valve gradient is 40 mmHg, the mean gradient is 25  mmHg, and the peak velocity is 3.1 m/s. Tricuspid Valve  The tricuspid valve structure was normal with normal leaflet separation. DOPPLER: There was no evidence of tricuspid stenosis. Mild tricuspid  regurgitation visualized. Right ventricular systolic pressure measures 60  mmhg. Pulmonic Valve  The pulmonic valve leaflets exhibited normal thickness, no calcification,  and normal cuspal separation. DOPPLER: The transpulmonic velocity was  within the normal range with no evidence for regurgitation. Left Atrium  The left atrium is Moderately dilated. Left Ventricle  Left Ventricular size is Mildly increased . Normal left ventricular wall thickness. There was severe global hypokinesis of the left ventricle. Systolic function was severely reduced. Ejection fraction is visually estimated in the range of 35%.     Right Atrium  Right atrial size was normal.    Right Ventricle  The right ventricular size was normal with normal systolic function and  wall thickness. Pericardial Effusion  The pericardium was normal in appearance with no evidence of a pericardial  effusion. Pleural Effusion  No evidence of pleural effusion. Aorta / Great Vessels  -Aortic root dimension within normal limits.  -The Pulmonary artery is within normal limits. -IVC size is within normal limits with normal respiratory phasic changes.     M-Mode/2D Measurements & Calculations    LV Diastolic    LV Systolic Dimension:    AV Cusp Separation: 1.1 cmLA  Dimension: 6.6  5.3 cm                    Dimension: 5 cmAO Root  cm              LV Volume Diastolic: 996  Dimension: 3.3 cmLA Area: 32.4  LV FS:19.7 %    ml                        cm^2  LV PW           LV Volume Systolic: 857  Diastolic: 1 cm ml  Septum          LV EDV/LV EDV Index: 559  Diastolic: 1.1  MW/18 F^8RU ESV/LV ESV    RV Diastolic Dimension: 2.2 cm  cm              Index: 135 ml/48 m^2  EF Calculated: 39.7 %     LA/Aorta: 1.52    LV Length: 10.6 cm        LA volume/Index: 122.9 ml /44m^2  LV Area  Diastolic: 74.8 LVOT: 2 cm  cm^2  LV Area  Systolic: 81.9  cm^2    Doppler Measurements & Calculations    MV Peak E-Wave: 145 AV Peak Velocity: 304    LVOT Peak Velocity: 91.7 cm/s  cm/s                cm/s                     LVOT Mean Velocity: 72.2 cm/s  AV Peak Gradient: 36.97  LVOT Peak Gradient: 3  MV Peak Gradient:   mmHg                     mmHgLVOT Mean Gradient: 2  8.41 mmHg           AV Mean Velocity: 224    mmHg  cm/s  MV Deceleration     AV Mean Gradient: 24     TV Peak E-Wave: 44.1 cm/s  Time: 158 msec      mmHg                     TV Peak A-Wave: 49.7 cm/s  AV VTI: 58.5 cm  AV Area                  TV Peak Gradient: 0.78 mmHg  (Continuity):0.99 cm^2   TR Velocity:190 cm/s  TR Gradient:14.44 mmHg  LVOT VTI: 18.5 cm        PV Peak Velocity: 53.6 cm/s  MR Velocity: 507                             PV Peak Gradient: 1.15 mmHg  cm/s    AV DVI (VTI): 0.32AV DVI  (Vmax):0.3    [id number]       Assessment/Plan   Severe Symptomatic Aortic Stenosis - Given the STS scores, frailty, comorbidities, and the imaging findings, the patient is clinically a candidate for TAVR (See PreTAVR Assesment). Discussed transcatheter aortic valve replacement (TAVR) with the patient/family regarding risks, benefits, alternatives to the procedure. The patient understands the associated visits with CT surgery and also understands the need for TAVR CTA and [facility] +/- PCI. The patient is FULL CODE. The POA is listed in the chart. We will schedule the above as appropriate. Once complete and appropriate based on the findings, a procedure date will be aligned at [facility], and we will notify the patient of further instructions. We had a long discussion with myself, family, patient, and structural heart coordinators. The family and patient were explained the risks/benefits/alternatives of the procedure, how the procedure works, the work-up, post-procedural care, and all of the possible complications of the procedure, including, but not limited to vascular injury, debilitating stroke, need for permanent pacemaker, and death. The patient/family would like to pursue TAVR at our facility and we will work towards this goal.        We discussed the fact that given the patient's age, that they would likely need another AVR in the future no matter which option he chose SAVR vs TAVR. He could undergo SAVR with a mechanical prosthesis which would give the longevity but the tradeoff is permanent [address] which the patient does not want. Further, the various possibilities of valve-in-valve were discussed and the possibility of coronary obstruction in the future.   Finally, the possibility of the need for a future surgical AVR based on other issues that arise including the need for future concomitant CABG, ascending aortic aneurysm repair, or the need for a concomitant other valve repair/replacement was emphasized which would weigh into the decision at that time. After hearing all of the issues, the patient/family would like to pursue TAVR at our facility and we will work towards this goal.     The patient and family understand that should there be any findings or issues that arise during the evaluation that would preclude TAVR, that this will need to be evaluated prior to proceeding with a percutaneous approach. Further, a unified heart team approach will be performed prior to proceeding with TAVR which includes the patient's primary care givers, cardiologist, and the entire structural heart team (interventionalist, CT surgeons, structural heart NP). The patient and family appeared to understand everything, all of their questions were answered to their satisfaction and they are agreeable to proceed with further evaluation for TAVR. Thank you for allowing us to participate in the care of this patient. Please do not hesitate to call us with questions.        Disposition:  TAVR work-up         Electronically signed by Judy Logan MD   4/20/2022 at 1:08 PM EDT

## 2022-04-20 NOTE — PROGRESS NOTES
[address] [place] [address]  Dept: [phone]  Dept Fax: [phone]  Loc: [phone]    Visit Date: 4/20/2022    Mr. Yuriy Michael is a 65 y.o. male  who presented for:  Chief Complaint   Patient presents with    New Patient    Cardiac Valve Problem     severe AS     HPI:   Cori Anderson is a 66 yo M with hx brain tumor, Hep C cirrhosis, COPD on intermittent home oxygen, CKD2, HTN, HLD, morbid obesity, and seizures who was referred by Dr. Batsheva Hartman to our clinic today for TAVR evaluation. He's been severely dyspneic on exertion over the past several months and worsening. Orthopneic now with 2 pillows. Dobutamine stress echo on 03/14/22 revealed EF 35-40% LOBITO ~0.8 cm2, peak gradient 38 mmHg, PeakV 4.1 m/s. Concern he may have bicuspid aortic valve, was not visualized well. LHC unremarkable with clean coronaries as of 08/2021. Was referred by Dr. Sarah Cook to [facility]TS on 03/23/22 for possible SAVR but was deemed a poor surgical candidate d/t his cirrhosis and other multiple comorbidities. Patient otherwise with no other complaints or concerns today. Denies actively having any chest pain, headache, SOB, diaphoresis, syncope, dizziness. Current Outpatient Medications:     albuterol (PROVENTIL) (2.5 MG/3ML) 0.083% nebulizer solution, Take 3 mLs by nebulization every 6 hours as needed for Wheezing or Shortness of Breath, Disp: 120 each, Rfl: 11    furosemide (LASIX) 20 MG tablet, Take 2 tablets by mouth every morning AND 1 tablet every evening., Disp: 180 tablet, Rfl: 5    potassium chloride (KLOR-CON M) 20 MEQ extended release tablet, Take 1 tablet by mouth daily, Disp: 60 tablet, Rfl: 5    oxyCODONE-acetaminophen (PERCOCET) 7.5-325 MG per tablet, Take 1 tablet by mouth every 8 hours as needed for Pain for up to 30 days. , Disp: 90 tablet, Rfl: 0    metoprolol succinate (TOPROL XL) 100 MG extended release tablet, Take 1 tablet by mouth daily, Disp: 30 tablet, Rfl: 3    aspirin 81 MG chewable tablet, Take 1 tablet by mouth daily, Disp: 30 tablet, Rfl: 3    hydrALAZINE (APRESOLINE) 50 MG tablet, Take 1 tablet by mouth every 8 hours (Patient taking differently: Take 50 mg by mouth every 8 hours DO NOT TAKE IF SBP IS <110), Disp: 90 tablet, Rfl: 3    nicotine (NICODERM CQ) 21 MG/24HR, Place 1 patch onto the skin daily, Disp: 30 patch, Rfl: 3    sacubitril-valsartan (ENTRESTO) 24-26 MG per tablet, Take 1 tablet by mouth 2 times daily, Disp: 60 tablet, Rfl: 3    umeclidinium-vilanterol (ANORO ELLIPTA) 62.5-25 MCG/INH AEPB inhaler, Inhale 1 puff into the lungs daily, Disp: 1 each, Rfl: 11    dapagliflozin (FARXIGA) 10 MG tablet, Take 1 tablet by mouth every morning, Disp: 90 tablet, Rfl: 1    sodium bicarbonate 650 MG tablet, Take 650 mg by mouth 2 times daily, Disp: , Rfl:     senna (SENOKOT) 8.6 MG tablet, Take 2 tablets by mouth daily, Disp: , Rfl:     tamsulosin (FLOMAX) 0.4 MG capsule, Take 1 capsule by mouth nightly, Disp: 30 capsule, Rfl: 0    DULoxetine (CYMBALTA) 60 MG extended release capsule, Take 1 capsule by mouth daily, Disp: 30 capsule, Rfl: 3    insulin glargine (LANTUS) 100 UNIT/ML injection vial, Inject 84 Units into the skin nightly, Disp: 1 vial, Rfl: 3    insulin lispro (HUMALOG) 100 UNIT/ML injection vial, Inject 10 Units into the skin 3 times daily (before meals) Plus Sliding Scale (Patient taking differently: Inject 12 Units into the skin 3 times daily (before meals) Plus Sliding Scale ), Disp: 1 vial, Rfl: 0    insulin lispro (HUMALOG) 100 UNIT/ML injection vial, Glucose: Dose:  No Insulin, 140-199 2 Units, 200-249 4 Units, 250-299 6 Units, 300-349 8 Units, 350-400 10 Units, Over 400 12 Units, Disp: 1 vial, Rfl: 0    docusate sodium (COLACE, DULCOLAX) 100 MG CAPS, Take 100 mg by mouth 2 times daily, Disp: 60 capsule, Rfl: 0    folic acid (FOLVITE) 1 MG tablet, Take 1 mg by mouth daily, Disp: , Rfl:     albuterol sulfate  (90 Base) MCG/ACT inhaler, Inhale 1 puff into the lungs every 4-6 hours as needed, Disp: , Rfl:     OXYGEN, Inhale into the lungs daily as needed (nightly) , Disp: , Rfl:     latanoprost (XALATAN) 0.005 % ophthalmic solution, Place 1 drop into both eyes nightly, Disp: 1 Bottle, Rfl: 3    rosuvastatin (CRESTOR) 20 MG tablet, Take 20 mg by mouth nightly , Disp: , Rfl:     gabapentin (NEURONTIN) 300 MG capsule, Take 1 capsule by mouth nightly for 30 days. , Disp: 90 capsule, Rfl: 0    Past Medical History  Valerie Pressley  has a past medical history of Acute kidney injury ([address] Amputation of right great toe ([address] Asthma, Brain tumor ([address] Cirrhosis ([address] CKD (chronic kidney disease), COPD (chronic obstructive pulmonary disease) ([address] Diabetes mellitus ([address] Falling, Glaucoma, Hepatitis C, History of blood transfusion, Hyperlipidemia, Hypertension, Legally blind, Obesity, Pain management, Right foot infection, and Seizures ([address] Social History  Valerie Pressley  reports that he has been smoking cigarettes. He has a 40.00 pack-year smoking history. He has never used smokeless tobacco. He reports current alcohol use of about 3.0 standard drinks of alcohol per week. He reports previous drug use. Drug: Marijuana [facility]). Family History  Valerie Pressley family history includes Cancer in his sister; Heart Attack in his brother, father, and paternal grandfather; Heart Disease in his brother; Sandra Gabriela in his paternal grandmother; No Known Problems in his maternal grandfather, maternal grandmother, and mother. There is no family history of bicuspid aortic valve, aneurysms, heart transplant, pacemakers, defibrillators, or sudden cardiac death.       Past Surgical History   Past Surgical History:   Procedure Laterality Date    BACK SURGERY      CARDIAC CATHETERIZATION  08/03/2021    EYE SURGERY      FIBULA FRACTURE SURGERY Left 03/21/2019    LEFT FIBULAR SHAFT ORIF performed by Alva WILCOX Helio Monk DPM at [address] Right     Steel pins in place    FRACTURE SURGERY      NASAL SINUS SURGERY Bilateral 11/29/2020    FLEXIBLE BRONCHOSCOPY WITH BRONCHOALVEOLAR LAVAGE WITH CULTURES. NASAL ENDOSCOPY WITH POSSIBLE CAUTERY ADN NASAL PACKING performed by Melquiades Carvajal MD at [facility]  01/2020    SPINE SURGERY N/A 02/19/2021    KYPHOPLASTY at T4, L2, L4 performed by Hossein Sutton MD at [address] [facility] N/A 8/24/2021    LUMBAR 3 AND LUMBAR 5 KYPHOPLASTY performed by Anisa Guzman MD at [address] Right 09/23/2020    [address], REMOVAL OF HARDWARE RIGHT HALLUX performed by Becky Chow DPM at [facility] N/A 04/25/2019    EGD BIOPSY performed by Freddy Moya MD at [facility]       Review of Systems   Constitutional: Negative for chills and fever, +fatigue  HENT: Negative for congestion, sinus pressure, sneezing and sore throat. Eyes: Negative for pain, discharge, redness and itching. Respiratory: Negative for apnea, cough, +shortness of breath on exertion, +orthopnea   Gastrointestinal: Negative for blood in stool, constipation, diarrhea   Endocrine: Negative for cold intolerance, heat intolerance, polydipsia. Genitourinary: Negative for dysuria, enuresis, flank pain and hematuria. Musculoskeletal: +arthralgias, +back pain, joint swelling and neck pain. Neurological: Negative for numbness and headaches. Psychiatric/Behavioral: Negative for agitation, confusion, decreased concentration and dysphoric mood. Objective:     /62   Pulse 100     Wt Readings from Last 3 Encounters:   04/20/22 (!) 322 lb (146.1 kg)   04/06/22 (!) 324 lb 15.3 oz (147.4 kg)   03/24/22 (!) 321 lb (145.6 kg)     BP Readings from Last 3 Encounters:   04/20/22 122/62   04/20/22 124/83   04/07/22 (!) 166/82       Nursing note and vitals reviewed.     Physical Exam Constitutional: Oriented to person, place, and time. Appears well-developed and well-nourished. HENT:   Head: Normocephalic and atraumatic. Eyes: EOM are normal. Pupils are equal, round, and reactive to light. Neck: Normal range of motion. Neck supple. No JVD present. Cardiovascular: Normal rate, regular rhythm, normal heart sounds and intact distal pulses. No murmur heard. Pulmonary/Chest: Effort normal and breath sounds normal. No respiratory distress. No wheezes. No rales. Abdominal: Soft. Bowel sounds are normal. No distension. There is no tenderness. Musculoskeletal: Normal range of motion. No edema. Neurological: Alert and oriented to person, place, and time. No cranial nerve deficit. Coordination normal.   Skin: Skin is warm and dry. Psychiatric: Normal mood and affect.        Lab Results   Component Value Date    CKTOTAL 103 02/20/2021    CKTOTAL 54 11/30/2020       Lab Results   Component Value Date    WBC 4.6 04/07/2022    RBC 4.07 04/07/2022    HGB 12.4 04/07/2022    HCT 39.2 04/07/2022    MCV 96.3 04/07/2022    MCH 30.5 04/07/2022    MCHC 31.6 04/07/2022    RDW 16.6 06/03/2020     04/07/2022    MPV 12.0 04/07/2022       Lab Results   Component Value Date     04/19/2022    K 4.4 04/19/2022    K 4.4 04/06/2022     04/19/2022    CO2 22 04/19/2022    BUN 23 04/19/2022    LABALBU 3.0 04/06/2022    CREATININE 1.08 04/19/2022    CALCIUM 9.4 04/19/2022    LABGLOM >60 04/19/2022    LABGLOM 48 04/07/2022    GLUCOSE 310 04/19/2022       Lab Results   Component Value Date    ALKPHOS 125 04/06/2022    ALT 36 04/06/2022    AST 48 04/06/2022    PROT 6.9 04/06/2022    BILITOT 0.5 04/06/2022    BILIDIR 0.4 04/06/2022    LABALBU 3.0 04/06/2022       Lab Results   Component Value Date    MG 2.0 03/28/2022       Lab Results   Component Value Date    INR 1.22 (H) 03/09/2022    INR 1.06 08/03/2021    INR 1.16 (H) 02/17/2021     Lab Results   Component Value Date    LABA1C 6.6 02/24/2021 Lab Results   Component Value Date    TRIG 73 03/07/2022    HDL 40 03/07/2022    LDLCALC 32 03/07/2022     Lab Results   Component Value Date    TSH 0.951 03/07/2022         Testing Reviewed:      I have individually reviewed the cardiac test below:    ECHO: Results for orders placed during the hospital encounter of 03/06/22    Echocardiogram 2D/ M-Mode/ Colorflow/ Do    Narrative  Transthoracic Echocardiography Report (TTE)    Demographics    [facility]     Gender             Male  [address]    MR #          [id number]      Race                   Ethnicity    Account #     [de-identified]      Room Number        0018    Accession     [id number]     Date of Study      03/07/2022  Number    Date of Birth 1965     Referring          Natalie Carrasquillo MD  Physician    Age           65 year(s)     Sonographer        D. Lazarus Myrtle, RDCS,  RDMS, RVT    Interpreting       Echo reader of the week  Physician          Wing Hebert GOLDSTEIN    Procedure    Type of Study    TTE procedure:ECHOCARDIOGRAM COMPLETE 2D W DOPPLER W COLOR. Procedure Date  Date: 03/07/2022 Start: 05:53 AM    Study Location: Bedside  Technical Quality: Limited visualization due to body habitus. Indications:Congestive heart failure. Additional Medical History:congestive heart failure, cirrhosis, alcohol  withdrawal, chronic obstructive pulmonary disease, hypertension,  hyperlipidemia, anemia, alcohol abuse, acute kidney injury, asthma,  Hepatitis C    Patient Status: Routine    Height: 74 inches Weight: 368.01 pounds BSA: 2.82 m^2 BMI: 47.25 kg/m^2    BP: 160/92 mmHg    Conclusions    Summary  Technically difficult examination. Left Ventricular size is Mildly increased . Normal left ventricular wall thickness. There was severe global hypokinesis of the left ventricle. Systolic function was severely reduced. Ejection fraction is visually estimated in the range of 35%. The left atrium is Moderately dilated.   There is moderate-to-severe aortic stenosis with valve area of 0.9 to 1 sq  cm. The maximum aortic valve gradient is 40 mmHg, the mean gradient is 25  mmHg, and the peak velocity is 3.1 m/s. Signature    ----------------------------------------------------------------  Electronically signed by Shantanu Penaloza MD (Interpreting  physician) on 03/07/2022 at 05:58 PM  ----------------------------------------------------------------    Findings    Mitral Valve  The mitral valve structure was normal with normal leaflet separation. DOPPLER: The transmitral velocity was within the normal range with no  evidence for mitral stenosis. Mild mitral regurgitation is present. Aortic Valve  The aortic valve leaflets were not well visualized. Aortic valve leaflets  are Moderately calcified. Leaflets exhibited moderately increased  thickness and severely reduced cuspal separation of the aortic valve. No  evidence of aortic valve regurgitation . There is moderate-to-severe aortic stenosis with valve area of 0.9 to 1 sq  cm. The maximum aortic valve gradient is 40 mmHg, the mean gradient is 25  mmHg, and the peak velocity is 3.1 m/s. Tricuspid Valve  The tricuspid valve structure was normal with normal leaflet separation. DOPPLER: There was no evidence of tricuspid stenosis. Mild tricuspid  regurgitation visualized. Right ventricular systolic pressure measures 60  mmhg. Pulmonic Valve  The pulmonic valve leaflets exhibited normal thickness, no calcification,  and normal cuspal separation. DOPPLER: The transpulmonic velocity was  within the normal range with no evidence for regurgitation. Left Atrium  The left atrium is Moderately dilated. Left Ventricle  Left Ventricular size is Mildly increased . Normal left ventricular wall thickness. There was severe global hypokinesis of the left ventricle. Systolic function was severely reduced. Ejection fraction is visually estimated in the range of 35%.     Right Atrium  Right atrial size was normal.    Right Ventricle  The right ventricular size was normal with normal systolic function and  wall thickness. Pericardial Effusion  The pericardium was normal in appearance with no evidence of a pericardial  effusion. Pleural Effusion  No evidence of pleural effusion. Aorta / Great Vessels  -Aortic root dimension within normal limits.  -The Pulmonary artery is within normal limits. -IVC size is within normal limits with normal respiratory phasic changes.     M-Mode/2D Measurements & Calculations    LV Diastolic    LV Systolic Dimension:    AV Cusp Separation: 1.1 cmLA  Dimension: 6.6  5.3 cm                    Dimension: 5 cmAO Root  cm              LV Volume Diastolic: 662  Dimension: 3.3 cmLA Area: 32.4  LV FS:19.7 %    ml                        cm^2  LV PW           LV Volume Systolic: 601  Diastolic: 1 cm ml  Septum          LV EDV/LV EDV Index: 068  Diastolic: 1.1  NE/85 K^2KZ ESV/LV ESV    RV Diastolic Dimension: 2.2 cm  cm              Index: 135 ml/48 m^2  EF Calculated: 39.7 %     LA/Aorta: 1.52    LV Length: 10.6 cm        LA volume/Index: 122.9 ml /44m^2  LV Area  Diastolic: 29.7 LVOT: 2 cm  cm^2  LV Area  Systolic: 10.1  cm^2    Doppler Measurements & Calculations    MV Peak E-Wave: 145 AV Peak Velocity: 304    LVOT Peak Velocity: 91.7 cm/s  cm/s                cm/s                     LVOT Mean Velocity: 72.2 cm/s  AV Peak Gradient: 36.97  LVOT Peak Gradient: 3  MV Peak Gradient:   mmHg                     mmHgLVOT Mean Gradient: 2  8.41 mmHg           AV Mean Velocity: 224    mmHg  cm/s  MV Deceleration     AV Mean Gradient: 24     TV Peak E-Wave: 44.1 cm/s  Time: 158 msec      mmHg                     TV Peak A-Wave: 49.7 cm/s  AV VTI: 58.5 cm  AV Area                  TV Peak Gradient: 0.78 mmHg  (Continuity):0.99 cm^2   TR Velocity:190 cm/s  TR Gradient:14.44 mmHg  LVOT VTI: 18.5 cm        PV Peak Velocity: 53.6 cm/s  MR Velocity: 507                             PV Peak Gradient: 1.15 mmHg  cm/s    AV DVI (VTI): 0.32AV DVI  (Vmax):0.3    [id number]     Assessment/Plan       Diagnosis Orders   1. Severe aortic stenosis         ***  Disposition:  Return in about 6 months (around 10/20/2022).     Electronically signed by Asiya Arceo DO   4/20/2022 at 1:05 PM EDT

## 2022-04-20 NOTE — PROGRESS NOTES
[address] SURGERY  [address] [facility] [address]  Dept: [phone]  Dept Fax: [phone]: [phone]    Visit Date: 4/20/2022    Mr. Merline Grain is a 65 y.o.male  who presented for:  Chief Complaint   Patient presents with    New Patient     Severe aortic stenosis, ECHO 3/6       HPI:   65year old medically complex (HCV cirrhosis, dilated cardiomyopathy, CRI, IDDM, morbid obesity, intermittent home O2) referred with low-flow aortic stenosis, with gradient on dobutamine at 40 mm Hg. Patient is minimally ambulatory, with severe dyspnea on minimal exertion. Denies chest pain. Reports 2-pillow orthopnea. No presyncope. Recent LHC clear. Referred for consideration for minimally invasive AVR. Current Outpatient Medications:     albuterol (PROVENTIL) (2.5 MG/3ML) 0.083% nebulizer solution, Take 3 mLs by nebulization every 6 hours as needed for Wheezing or Shortness of Breath, Disp: 120 each, Rfl: 11    furosemide (LASIX) 20 MG tablet, Take 2 tablets by mouth every morning AND 1 tablet every evening., Disp: 180 tablet, Rfl: 5    potassium chloride (KLOR-CON M) 20 MEQ extended release tablet, Take 1 tablet by mouth daily, Disp: 60 tablet, Rfl: 5    oxyCODONE-acetaminophen (PERCOCET) 7.5-325 MG per tablet, Take 1 tablet by mouth every 8 hours as needed for Pain for up to 30 days. , Disp: 90 tablet, Rfl: 0    metoprolol succinate (TOPROL XL) 100 MG extended release tablet, Take 1 tablet by mouth daily, Disp: 30 tablet, Rfl: 3    aspirin 81 MG chewable tablet, Take 1 tablet by mouth daily, Disp: 30 tablet, Rfl: 3    hydrALAZINE (APRESOLINE) 50 MG tablet, Take 1 tablet by mouth every 8 hours (Patient taking differently: Take 50 mg by mouth every 8 hours DO NOT TAKE IF SBP IS <110), Disp: 90 tablet, Rfl: 3    nicotine (NICODERM CQ) 21 MG/24HR, Place 1 patch onto the skin daily, Disp: 30 patch, Rfl: 3    sacubitril-valsartan (ENTRESTO) 24-26 MG per tablet, Take 1 tablet by mouth 2 times daily, Disp: 60 tablet, Rfl: 3    umeclidinium-vilanterol (ANORO ELLIPTA) 62.5-25 MCG/INH AEPB inhaler, Inhale 1 puff into the lungs daily, Disp: 1 each, Rfl: 11    dapagliflozin (FARXIGA) 10 MG tablet, Take 1 tablet by mouth every morning, Disp: 90 tablet, Rfl: 1    sodium bicarbonate 650 MG tablet, Take 650 mg by mouth 2 times daily, Disp: , Rfl:     senna (SENOKOT) 8.6 MG tablet, Take 2 tablets by mouth daily, Disp: , Rfl:     tamsulosin (FLOMAX) 0.4 MG capsule, Take 1 capsule by mouth nightly, Disp: 30 capsule, Rfl: 0    DULoxetine (CYMBALTA) 60 MG extended release capsule, Take 1 capsule by mouth daily, Disp: 30 capsule, Rfl: 3    insulin glargine (LANTUS) 100 UNIT/ML injection vial, Inject 84 Units into the skin nightly, Disp: 1 vial, Rfl: 3    insulin lispro (HUMALOG) 100 UNIT/ML injection vial, Inject 10 Units into the skin 3 times daily (before meals) Plus Sliding Scale (Patient taking differently: Inject 12 Units into the skin 3 times daily (before meals) Plus Sliding Scale ), Disp: 1 vial, Rfl: 0    insulin lispro (HUMALOG) 100 UNIT/ML injection vial, Glucose: Dose:  No Insulin, 140-199 2 Units, 200-249 4 Units, 250-299 6 Units, 300-349 8 Units, 350-400 10 Units, Over 400 12 Units, Disp: 1 vial, Rfl: 0    docusate sodium (COLACE, DULCOLAX) 100 MG CAPS, Take 100 mg by mouth 2 times daily, Disp: 60 capsule, Rfl: 0    folic acid (FOLVITE) 1 MG tablet, Take 1 mg by mouth daily, Disp: , Rfl:     albuterol sulfate  (90 Base) MCG/ACT inhaler, Inhale 1 puff into the lungs every 4-6 hours as needed, Disp: , Rfl:     OXYGEN, Inhale into the lungs daily as needed (nightly) , Disp: , Rfl:     latanoprost (XALATAN) 0.005 % ophthalmic solution, Place 1 drop into both eyes nightly, Disp: 1 Bottle, Rfl: 3    rosuvastatin (CRESTOR) 20 MG tablet, Take 20 mg by mouth nightly , Disp: , Rfl:     gabapentin (NEURONTIN) 300 MG capsule, Take 1 capsule by mouth nightly for 30 days. , Disp: 90 capsule, Rfl: 0    Allergies   Allergen Reactions    Adhesive Tape Rash     Bandaid    Keppra [Levetiracetam] Rash       Past Medical History  Judy Burkett  has a past medical history of Acute kidney injury ([facility] [address] Amputation of right great toe ([facility] [address] Asthma, Brain tumor ([facility] [address] Cirrhosis ([facility] [address] CKD (chronic kidney disease), COPD (chronic obstructive pulmonary disease) ([facility] [address] Diabetes mellitus ([facility] [address] Falling, Glaucoma, Hepatitis C, History of blood transfusion, Hyperlipidemia, Hypertension, Legally blind, Obesity, Pain management, Right foot infection, and Seizures ([facility] Social History  Judy Burkett  reports that he has been smoking cigarettes. He has a 40.00 pack-year smoking history. He has never used smokeless tobacco. He reports current alcohol use of about 3.0 standard drinks of alcohol per week. He reports previous drug use. Drug: Marijuana [place] Breath). Family History  [place] family history includes Cancer in his sister; Heart Attack in his brother, father, and paternal grandfather; Heart Disease in his brother; Curlee Randall in his paternal grandmother; No Known Problems in his maternal grandfather, maternal grandmother, and mother. There is no family history of bicuspid aortic valve, aneurysms, heart transplant, pacemakers, defibrillators, or sudden cardiac death. Past Surgical History   Past Surgical History:   Procedure Laterality Date    BACK SURGERY      CARDIAC CATHETERIZATION  08/03/2021    EYE SURGERY      FIBULA FRACTURE SURGERY Left 03/21/2019    LEFT FIBULAR SHAFT ORIF performed by Anival Mejia DPM at [facility] Right     Steel pins in place    FRACTURE SURGERY      NASAL SINUS SURGERY Bilateral 11/29/2020    FLEXIBLE BRONCHOSCOPY WITH BRONCHOALVEOLAR LAVAGE WITH CULTURES.  NASAL ENDOSCOPY WITH POSSIBLE CAUTERY ADN NASAL PACKING performed by Martin Rivera MD at [facility] 01/2020    SPINE SURGERY N/A 02/19/2021    KYPHOPLASTY at T4, L2, L4 performed by Cassy Escoto MD at [facility] 8/24/2021    LUMBAR 3 AND LUMBAR 5 KYPHOPLASTY performed by Shay Go MD at [address] Right 09/23/2020    AMPUTATION DISTAL APSECT RIGHT HALLUX, REMOVAL OF HARDWARE RIGHT HALLUX performed by Brett Leos DPM at [facility] N/A 04/25/2019    EGD BIOPSY performed by Teetee Kidd MD at [facility]       Subjective:     Review of Systems   Constitutional: Positive for activity change and fatigue. HENT: Negative for congestion. Eyes: Negative for discharge. Respiratory: Positive for chest tightness and shortness of breath. Cardiovascular: Positive for leg swelling. Negative for chest pain. Gastrointestinal: Negative for abdominal pain. Endocrine: Negative for cold intolerance. Genitourinary: Negative for difficulty urinating. Musculoskeletal: Positive for arthralgias, back pain and gait problem. Skin: Positive for color change. Allergic/Immunologic: Negative for environmental allergies. Neurological: Negative for dizziness. Hematological: Bruises/bleeds easily. Psychiatric/Behavioral: Negative for agitation. Objective:     /83 (Site: Right Lower Arm, Position: Sitting, Cuff Size: Medium Adult)   Pulse 67   Ht 6' 2\" (1.88 m)   Wt (!) 322 lb (146.1 kg)   BMI 41.34 kg/m²     Wt Readings from Last 3 Encounters:   04/20/22 (!) 322 lb (146.1 kg)   04/06/22 (!) 324 lb 15.3 oz (147.4 kg)   03/24/22 (!) 321 lb (145.6 kg)     BP Readings from Last 3 Encounters:   04/20/22 124/83   04/07/22 (!) 166/82   03/24/22 106/70       Physical Exam  Constitutional:       Appearance: Normal appearance. He is obese. HENT:      Head: Normocephalic and atraumatic.       Right Ear: Ear canal and external ear normal.      Left Ear: Ear canal and external ear normal.      Nose: Nose normal. No congestion. Mouth/Throat:      Mouth: Mucous membranes are dry. Eyes:      Extraocular Movements: Extraocular movements intact. Conjunctiva/sclera: Conjunctivae normal.      Pupils: Pupils are equal, round, and reactive to light. Neck:      Vascular: No carotid bruit. Cardiovascular:      Rate and Rhythm: Normal rate and regular rhythm. Heart sounds: Murmur heard. Pulmonary:      Effort: Pulmonary effort is normal.      Breath sounds: Normal breath sounds. Abdominal:      Palpations: Abdomen is soft. Tenderness: There is no abdominal tenderness. Musculoskeletal:         General: Swelling present. Normal range of motion. Cervical back: Normal range of motion and neck supple. Skin:     General: Skin is warm and dry. Capillary Refill: Capillary refill takes 2 to 3 seconds. Neurological:      General: No focal deficit present. Mental Status: He is alert and oriented to person, place, and time.    Psychiatric:         Mood and Affect: Mood normal.         Lab Results   Component Value Date    WBC 4.6 04/07/2022    RBC 4.07 04/07/2022    HGB 12.4 04/07/2022    HCT 39.2 04/07/2022    MCV 96.3 04/07/2022    MCH 30.5 04/07/2022    MCHC 31.6 04/07/2022    RDW 16.6 06/03/2020     04/07/2022    MPV 12.0 04/07/2022       Lab Results   Component Value Date     04/19/2022    K 4.4 04/19/2022    K 4.4 04/06/2022     04/19/2022    CO2 22 04/19/2022    BUN 23 04/19/2022    LABALBU 3.0 04/06/2022    CREATININE 1.08 04/19/2022    CALCIUM 9.4 04/19/2022    LABGLOM >60 04/19/2022    LABGLOM 48 04/07/2022    GLUCOSE 310 04/19/2022       Lab Results   Component Value Date    ALKPHOS 125 04/06/2022    ALT 36 04/06/2022    AST 48 04/06/2022    PROT 6.9 04/06/2022    BILITOT 0.5 04/06/2022    BILIDIR 0.4 04/06/2022    LABALBU 3.0 04/06/2022       Lab Results   Component Value Date    MG 2.0 03/28/2022       Lab Results   Component Value Date    INR 1.22 (H) 03/09/2022 INR 1.06 08/03/2021    INR 1.16 (H) 02/17/2021         Lab Results   Component Value Date    LABA1C 6.6 02/24/2021       Lab Results   Component Value Date    TRIG 73 03/07/2022    HDL 40 03/07/2022    LDLCALC 32 03/07/2022       Lab Results   Component Value Date    TSH 0.951 03/07/2022         Testing Reviewed:      I have individually reviewed the images of the following tests:    CT chest: no calcification asc Ao    Echocardiogram:Results for orders placed during the hospital encounter of 03/06/22    Echocardiogram 2D/ M-Mode/ Colorflow/ Do    Narrative  Transthoracic Echocardiography Report (TTE)    Demographics    Patient Name  [facility]     Gender             Male  [address]    MR #          [id number]      Race                   Ethnicity    Account #     [de-identified]      Room Number        0018    Accession     [id number]     Date of Study      03/07/2022  Number    Date of Birth 1965     Referring          Yeimi Samuel MD  Physician    Age           65 year(s)     Sonographer        SANCHEZ Sarmiento Cera, RDCS,  RDMS, RVT    Interpreting       Echo reader of the week  Physician          Nadege Varela MD    Procedure    Type of Study    TTE procedure:ECHOCARDIOGRAM COMPLETE 2D W DOPPLER W COLOR. Procedure Date  Date: 03/07/2022 Start: 05:53 AM    Study Location: Bedside  Technical Quality: Limited visualization due to body habitus. Indications:Congestive heart failure. Additional Medical History:congestive heart failure, cirrhosis, alcohol  withdrawal, chronic obstructive pulmonary disease, hypertension,  hyperlipidemia, anemia, alcohol abuse, acute kidney injury, asthma,  Hepatitis C    Patient Status: Routine    Height: 74 inches Weight: 368.01 pounds BSA: 2.82 m^2 BMI: 47.25 kg/m^2    BP: 160/92 mmHg    Conclusions    Summary  Technically difficult examination. Left Ventricular size is Mildly increased . Normal left ventricular wall thickness.   There was severe global hypokinesis of the left ventricle. Systolic function was severely reduced. Ejection fraction is visually estimated in the range of 35%. The left atrium is Moderately dilated. There is moderate-to-severe aortic stenosis with valve area of 0.9 to 1 sq  cm. The maximum aortic valve gradient is 40 mmHg, the mean gradient is 25  mmHg, and the peak velocity is 3.1 m/s. Signature    ----------------------------------------------------------------  Electronically signed by Ahmet Mays MD (Interpreting  physician) on 03/07/2022 at 05:58 PM  ----------------------------------------------------------------    Findings    Mitral Valve  The mitral valve structure was normal with normal leaflet separation. DOPPLER: The transmitral velocity was within the normal range with no  evidence for mitral stenosis. Mild mitral regurgitation is present. Aortic Valve  The aortic valve leaflets were not well visualized. Aortic valve leaflets  are Moderately calcified. Leaflets exhibited moderately increased  thickness and severely reduced cuspal separation of the aortic valve. No  evidence of aortic valve regurgitation . There is moderate-to-severe aortic stenosis with valve area of 0.9 to 1 sq  cm. The maximum aortic valve gradient is 40 mmHg, the mean gradient is 25  mmHg, and the peak velocity is 3.1 m/s. Tricuspid Valve  The tricuspid valve structure was normal with normal leaflet separation. DOPPLER: There was no evidence of tricuspid stenosis. Mild tricuspid  regurgitation visualized. Right ventricular systolic pressure measures 60  mmhg. Pulmonic Valve  The pulmonic valve leaflets exhibited normal thickness, no calcification,  and normal cuspal separation. DOPPLER: The transpulmonic velocity was  within the normal range with no evidence for regurgitation. Left Atrium  The left atrium is Moderately dilated. Left Ventricle  Left Ventricular size is Mildly increased . Normal left ventricular wall thickness.   There was severe global hypokinesis of the left ventricle. Systolic function was severely reduced. Ejection fraction is visually estimated in the range of 35%. Right Atrium  Right atrial size was normal.    Right Ventricle  The right ventricular size was normal with normal systolic function and  wall thickness. Pericardial Effusion  The pericardium was normal in appearance with no evidence of a pericardial  effusion. Pleural Effusion  No evidence of pleural effusion. Aorta / Great Vessels  -Aortic root dimension within normal limits.  -The Pulmonary artery is within normal limits. -IVC size is within normal limits with normal respiratory phasic changes.     M-Mode/2D Measurements & Calculations    LV Diastolic    LV Systolic Dimension:    AV Cusp Separation: 1.1 cmLA  Dimension: 6.6  5.3 cm                    Dimension: 5 cmAO Root  cm              LV Volume Diastolic: 077  Dimension: 3.3 cmLA Area: 32.4  LV FS:19.7 %    ml                        cm^2  LV PW           LV Volume Systolic: 914  Diastolic: 1 cm ml  Septum          LV EDV/LV EDV Index: 358  Diastolic: 1.1  LY/46 U^5IB ESV/LV ESV    RV Diastolic Dimension: 2.2 cm  cm              Index: 135 ml/48 m^2  EF Calculated: 39.7 %     LA/Aorta: 1.52    LV Length: 10.6 cm        LA volume/Index: 122.9 ml /44m^2  LV Area  Diastolic: 63.1 LVOT: 2 cm  cm^2  LV Area  Systolic: 63.0  cm^2    Doppler Measurements & Calculations    MV Peak E-Wave: 145 AV Peak Velocity: 304    LVOT Peak Velocity: 91.7 cm/s  cm/s                cm/s                     LVOT Mean Velocity: 72.2 cm/s  AV Peak Gradient: 36.97  LVOT Peak Gradient: 3  MV Peak Gradient:   mmHg                     mmHgLVOT Mean Gradient: 2  8.41 mmHg           AV Mean Velocity: 224    mmHg  cm/s  MV Deceleration     AV Mean Gradient: 24     TV Peak E-Wave: 44.1 cm/s  Time: 158 msec      mmHg                     TV Peak A-Wave: 49.7 cm/s  AV VTI: 58.5 cm  AV Area                  TV Peak Gradient: 0.78 mmHg  (Continuity):0.99 cm^2   TR Velocity:190 cm/s  TR Gradient:14.44 mmHg  LVOT VTI: 18.5 cm        PV Peak Velocity: 53.6 cm/s  MR Velocity: 507                             PV Peak Gradient: 1.15 mmHg  cm/s    AV DVI (VTI): 0.32AV DVI  (Vmax):0.3    [id number]%3d       Left heart catheterization: no CAD    Assessment/Plan     65year old medically complex male with severe symptomatic aortic stenosis. Long conversation with the patient to explain complex medical reasoning. Patient's dilated cardiomyopathy (LVEDD 6.5 cm) independently associated with significant risk of post-CPB LV failure, which would require aggressive chemical and mechanical support. Also, cirrhosis patients additionally have risk of medically refractory hypotension post-CPB. Further, with history of cirrhosis, anticoagulation and thus mechanical AVR would be contraindicated. Therefore, since bioprosthetic AVR only option, and with the aforementioned comorbidities portending high surgical risk, this patient is best served with TAVR. Reassured patient that he could be re-TAVR'd twice, thus providing him with an expected 30-year longevity following the initial procedure. He understands and wishes to proceed. Total time spent on patient: 80 minutes, excluding any procedure.       Electronically signed by Vinicius Armenta MD   4/20/2022 at 12:08 PM EDT

## 2022-04-25 ENCOUNTER — TELEPHONE (OUTPATIENT)
Dept: CARDIOLOGY CLINIC | Age: 57
End: 2022-04-25

## 2022-04-25 DIAGNOSIS — I35.0 NONRHEUMATIC AORTIC VALVE STENOSIS: Primary | ICD-10-CM

## 2022-04-25 DIAGNOSIS — R55 SYNCOPE, UNSPECIFIED SYNCOPE TYPE: ICD-10-CM

## 2022-04-25 NOTE — TELEPHONE ENCOUNTER
Orders received from Dr. Ayse Tijerina to schedule this patient for his TAVR workup: US of the Carotids and TAVR CTA. Cardiac cath: 8/2/2022  CHF: 5/4/2022  CT/CV surgery: 4/20/2022  Dentures top and bottom  CTA/US: 4/27/2022 at 1130 and 1300.

## 2022-04-27 ENCOUNTER — HOSPITAL ENCOUNTER (OUTPATIENT)
Dept: CT IMAGING | Age: 57
Discharge: HOME | End: 2022-04-27
Payer: MEDICARE

## 2022-04-27 ENCOUNTER — HOSPITAL ENCOUNTER (OUTPATIENT)
Dept: INTERVENTIONAL RADIOLOGY/VASCULAR | Age: 57
Discharge: HOME | End: 2022-04-27
Payer: MEDICARE

## 2022-04-27 DIAGNOSIS — R55 SYNCOPE, UNSPECIFIED SYNCOPE TYPE: ICD-10-CM

## 2022-04-27 DIAGNOSIS — I35.0 NONRHEUMATIC AORTIC VALVE STENOSIS: ICD-10-CM

## 2022-04-27 DIAGNOSIS — G89.4 CHRONIC PAIN SYNDROME: ICD-10-CM

## 2022-04-27 PROCEDURE — 6360000004 HC RX CONTRAST MEDICATION: Performed by: INTERNAL MEDICINE

## 2022-04-27 PROCEDURE — 74174 CTA ABD&PLVS W/CONTRAST: CPT

## 2022-04-27 PROCEDURE — 71275 CT ANGIOGRAPHY CHEST: CPT

## 2022-04-27 PROCEDURE — 93880 EXTRACRANIAL BILAT STUDY: CPT

## 2022-04-27 RX ADMIN — IOPAMIDOL 125 ML: 755 INJECTION, SOLUTION INTRAVENOUS at 12:50

## 2022-04-27 NOTE — TELEPHONE ENCOUNTER
Shaquille Brown called requesting a refill on the following medications:  Requested Prescriptions     Pending Prescriptions Disp Refills    oxyCODONE-acetaminophen (PERCOCET) 7.5-325 MG per tablet 90 tablet 0     Sig: Take 1 tablet by mouth every 8 hours as needed for Pain for up to 30 days. [facility] verified:[facility]      Date of last visit:   Date of next visit (if applicable): 6/63/5819

## 2022-04-28 RX ORDER — OXYCODONE AND ACETAMINOPHEN 7.5; 325 MG/1; MG/1
1 TABLET ORAL EVERY 8 HOURS PRN
Qty: 90 TABLET | Refills: 0 | Status: SHIPPED | OUTPATIENT
Start: 2022-04-29 | End: 2022-05-24 | Stop reason: SDUPTHER

## 2022-04-28 NOTE — TELEPHONE ENCOUNTER
OARRS reviewed. UDS: + for  Oxycodone duloxetine consistent. Last seen: 3/24/2022.  Follow-up:   Future Appointments   Date Time Provider Kenny Nury   5/4/2022  2:30 PM MEGHAN Darby - CNP N SRPX CHF [facility]   5/26/2022 11:15 AM MEGHAN Singh CNP N SRPX Pain [facility]   6/7/2022  1:20 PM STR CT IMAGING RM1  [facility] OUT EXP STR Radiolog   6/7/2022  2:00 PM STR PULMONARY FUNCTION ROOM 1 STRZ PFT [facility] HOD   6/14/2022 11:20 AM MD Scooby Buenrostroma [place] [facility]   9/29/2022  3:45 PM Sunitha Michael MD N [id number]

## 2022-05-03 ENCOUNTER — TELEPHONE (OUTPATIENT)
Dept: CARDIOLOGY CLINIC | Age: 57
End: 2022-05-03

## 2022-05-03 DIAGNOSIS — Z95.2 S/P TAVR (TRANSCATHETER AORTIC VALVE REPLACEMENT): Primary | ICD-10-CM

## 2022-05-03 NOTE — TELEPHONE ENCOUNTER
Orders received from Dr. aMry Amanda to schedule the patient for a TAVR procedure and have the patient hold the follow medications the morning of the TAVR procedure: Lasix, Metoprolol, Hydralazine, Entresto, Tamsulosin, Lantus and Humalog. TAVR: 5/17/2022 at 1100 with an arrival of 0900  Discharge home with significant other    Post TAVR instructions per Dr. Mary Amanda: have the patient be seen in the [facility] 7 days post TAVR, obtain a CBC, BMP and ECHO prior to the 30 day post TAVR follow up appointment.     7 day post TAVR appointment: 5/24/2022 at 36  [facility] follow up appointment 5/31/2022 at 1130  CBC/BMP/ECHO:6/27/2022 at 1015  30 day post TAVR appointment: 6/29/2022 at 46

## 2022-05-04 ENCOUNTER — HOSPITAL ENCOUNTER (OUTPATIENT)
Age: 57
Discharge: HOME | End: 2022-05-04
Payer: MEDICARE

## 2022-05-04 ENCOUNTER — OFFICE VISIT (OUTPATIENT)
Dept: CARDIOLOGY CLINIC | Age: 57
End: 2022-05-04
Payer: MEDICARE

## 2022-05-04 ENCOUNTER — HOSPITAL ENCOUNTER (OUTPATIENT)
Dept: GENERAL RADIOLOGY | Age: 57
Discharge: HOME | End: 2022-05-04
Payer: MEDICARE

## 2022-05-04 VITALS
OXYGEN SATURATION: 96 % | WEIGHT: 315 LBS | HEIGHT: 75 IN | BODY MASS INDEX: 39.17 KG/M2 | DIASTOLIC BLOOD PRESSURE: 80 MMHG | HEART RATE: 88 BPM | SYSTOLIC BLOOD PRESSURE: 116 MMHG

## 2022-05-04 DIAGNOSIS — I35.0 AORTIC VALVE STENOSIS, ETIOLOGY OF CARDIAC VALVE DISEASE UNSPECIFIED: ICD-10-CM

## 2022-05-04 DIAGNOSIS — I50.21 ACUTE SYSTOLIC CONGESTIVE HEART FAILURE, NYHA CLASS 2 (HCC): Primary | ICD-10-CM

## 2022-05-04 DIAGNOSIS — I50.21 ACUTE SYSTOLIC CONGESTIVE HEART FAILURE, NYHA CLASS 2 (HCC): ICD-10-CM

## 2022-05-04 LAB — PRO-BNP: 120.1 PG/ML (ref 0–900)

## 2022-05-04 PROCEDURE — 4004F PT TOBACCO SCREEN RCVD TLK: CPT | Performed by: NURSE PRACTITIONER

## 2022-05-04 PROCEDURE — 83880 ASSAY OF NATRIURETIC PEPTIDE: CPT

## 2022-05-04 PROCEDURE — 99213 OFFICE O/P EST LOW 20 MIN: CPT | Performed by: NURSE PRACTITIONER

## 2022-05-04 PROCEDURE — G8417 CALC BMI ABV UP PARAM F/U: HCPCS | Performed by: NURSE PRACTITIONER

## 2022-05-04 PROCEDURE — 3017F COLORECTAL CA SCREEN DOC REV: CPT | Performed by: NURSE PRACTITIONER

## 2022-05-04 PROCEDURE — 36415 COLL VENOUS BLD VENIPUNCTURE: CPT

## 2022-05-04 PROCEDURE — G8427 DOCREV CUR MEDS BY ELIG CLIN: HCPCS | Performed by: NURSE PRACTITIONER

## 2022-05-04 PROCEDURE — 71046 X-RAY EXAM CHEST 2 VIEWS: CPT

## 2022-05-04 ASSESSMENT — ENCOUNTER SYMPTOMS
ABDOMINAL DISTENTION: 0
COUGH: 0
SHORTNESS OF BREATH: 1
VOMITING: 0
NAUSEA: 0

## 2022-05-04 NOTE — PATIENT INSTRUCTIONS
You may receive a survey regarding the care you received during your visit. Your input is valuable to us. We encourage you to complete and return your survey. We hope you will choose us in the future for your healthcare needs. Continue:  · Continue current medications  · Daily weights and record  · Fluid restriction of 2 Liters per day  · Limit sodium in diet to around 7392-6233 mg/day  · Monitor BP  · Activity as tolerated     Call the Heart Failure Clinic for any of the following symptoms: [phone]   Weight gain of 2-3 pounds in 1 day or 5 pounds in 1 week   Increased shortness of breath   Shortness of breath while laying down   Cough   Chest pain   Swelling in feet, ankles or legs   Tenderness or bloating in the abdomen   Fatigue    Decreased appetite or nausea    Confusion       BNP today  Chest xray today  TAVR on 5/17/22     BP/HR stable     Continue diet/fluid adherence  Continue daily wts.   F/U w/ Cardiology,   F/U in clinic in 5/31

## 2022-05-05 ENCOUNTER — TELEPHONE (OUTPATIENT)
Dept: CARDIOLOGY CLINIC | Age: 57
End: 2022-05-05

## 2022-05-05 NOTE — TELEPHONE ENCOUNTER
----- Message from MEGHAN Baeza CNP sent at 5/5/2022  7:15 AM EDT -----  Chest xray and BNP reviewed, will continue as is prior to TAVR.

## 2022-05-06 NOTE — TELEPHONE ENCOUNTER
Patient called and stating he is SOB and legs are swollen. He states that he has not weighted himself. Patient states but will call with an update. Spoke to Garrett and she has ordered a BMP Monday, increase Lasix to 40 mg BID and KCL 20  meq BID, and have the patient come in Tuesday to be seen. Message left with patient.

## 2022-05-10 ENCOUNTER — HOSPITAL ENCOUNTER (INPATIENT)
Age: 57
LOS: 2 days | Discharge: HOME | DRG: 314 | End: 2022-05-12
Attending: FAMILY MEDICINE | Admitting: INTERNAL MEDICINE
Payer: MEDICARE

## 2022-05-10 ENCOUNTER — OFFICE VISIT (OUTPATIENT)
Dept: CARDIOLOGY CLINIC | Age: 57
End: 2022-05-10
Payer: MEDICARE

## 2022-05-10 ENCOUNTER — APPOINTMENT (OUTPATIENT)
Dept: GENERAL RADIOLOGY | Age: 57
DRG: 314 | End: 2022-05-10
Payer: MEDICARE

## 2022-05-10 ENCOUNTER — APPOINTMENT (OUTPATIENT)
Dept: CT IMAGING | Age: 57
DRG: 314 | End: 2022-05-10
Payer: MEDICARE

## 2022-05-10 VITALS
OXYGEN SATURATION: 92 % | SYSTOLIC BLOOD PRESSURE: 69 MMHG | DIASTOLIC BLOOD PRESSURE: 45 MMHG | TEMPERATURE: 96.6 F | BODY MASS INDEX: 39.17 KG/M2 | WEIGHT: 315 LBS | HEART RATE: 101 BPM | HEIGHT: 75 IN

## 2022-05-10 DIAGNOSIS — I35.0 AORTIC STENOSIS, SEVERE: Primary | ICD-10-CM

## 2022-05-10 DIAGNOSIS — I50.9 CHF WITH UNKNOWN LVEF (HCC): ICD-10-CM

## 2022-05-10 DIAGNOSIS — I51.9 LV DYSFUNCTION: ICD-10-CM

## 2022-05-10 DIAGNOSIS — R53.1 GENERALIZED WEAKNESS: ICD-10-CM

## 2022-05-10 DIAGNOSIS — D69.6 THROMBOCYTOPENIA (HCC): ICD-10-CM

## 2022-05-10 DIAGNOSIS — S09.90XA CLOSED HEAD INJURY, INITIAL ENCOUNTER: ICD-10-CM

## 2022-05-10 DIAGNOSIS — I50.23 ACUTE ON CHRONIC SYSTOLIC CONGESTIVE HEART FAILURE (HCC): ICD-10-CM

## 2022-05-10 DIAGNOSIS — N17.9 AKI (ACUTE KIDNEY INJURY) (HCC): ICD-10-CM

## 2022-05-10 DIAGNOSIS — R55 SYNCOPE AND COLLAPSE: ICD-10-CM

## 2022-05-10 DIAGNOSIS — I35.0 SEVERE AORTIC STENOSIS: Primary | ICD-10-CM

## 2022-05-10 DIAGNOSIS — D64.9 ANEMIA, UNSPECIFIED TYPE: ICD-10-CM

## 2022-05-10 DIAGNOSIS — N18.9 CHRONIC KIDNEY DISEASE, UNSPECIFIED CKD STAGE: ICD-10-CM

## 2022-05-10 PROBLEM — R57.0 CARDIOGENIC SHOCK (HCC): Status: ACTIVE | Noted: 2022-05-10

## 2022-05-10 LAB
ALBUMIN SERPL-MCNC: 3.5 G/DL (ref 3.5–5.1)
ALP BLD-CCNC: 129 U/L (ref 38–126)
ALT SERPL-CCNC: 48 U/L (ref 11–66)
ANION GAP SERPL CALCULATED.3IONS-SCNC: 17 MEQ/L (ref 8–16)
AST SERPL-CCNC: 60 U/L (ref 5–40)
BACTERIA: ABNORMAL
BASE EXCESS MIXED: -0.5 MMOL/L (ref -2–3)
BASE EXCESS MIXED: -1.2 MMOL/L (ref -2–3)
BASOPHILS # BLD: 0.7 %
BASOPHILS ABSOLUTE: 0.1 THOU/MM3 (ref 0–0.1)
BILIRUB SERPL-MCNC: 0.7 MG/DL (ref 0.3–1.2)
BILIRUBIN DIRECT: 0.4 MG/DL (ref 0–0.3)
BILIRUBIN URINE: NEGATIVE
BLOOD, URINE: ABNORMAL
BUN BLDV-MCNC: 33 MG/DL (ref 7–22)
C-REACTIVE PROTEIN: < 0.3 MG/DL (ref 0–1)
CALCIUM SERPL-MCNC: 8.8 MG/DL (ref 8.5–10.5)
CASTS: ABNORMAL /LPF
CASTS: ABNORMAL /LPF
CHARACTER, URINE: CLEAR
CHLORIDE BLD-SCNC: 94 MEQ/L (ref 98–111)
CO2: 21 MEQ/L (ref 23–33)
COLLECTED BY:: ABNORMAL
COLLECTED BY:: NORMAL
COLOR: YELLOW
CREAT SERPL-MCNC: 3 MG/DL (ref 0.4–1.2)
CRYSTALS: ABNORMAL
DEVICE: ABNORMAL
DEVICE: NORMAL
EKG ATRIAL RATE: 101 BPM
EKG P AXIS: 44 DEGREES
EKG P-R INTERVAL: 138 MS
EKG Q-T INTERVAL: 386 MS
EKG QRS DURATION: 110 MS
EKG QTC CALCULATION (BAZETT): 500 MS
EKG R AXIS: 13 DEGREES
EKG T AXIS: 14 DEGREES
EKG VENTRICULAR RATE: 101 BPM
EOSINOPHIL # BLD: 1.7 %
EOSINOPHILS ABSOLUTE: 0.2 THOU/MM3 (ref 0–0.4)
EPITHELIAL CELLS, UA: ABNORMAL /HPF
ERYTHROCYTE [DISTWIDTH] IN BLOOD BY AUTOMATED COUNT: 14.8 % (ref 11.5–14.5)
ERYTHROCYTE [DISTWIDTH] IN BLOOD BY AUTOMATED COUNT: 51 FL (ref 35–45)
FLU A ANTIGEN: NEGATIVE
FLU B ANTIGEN: NEGATIVE
GFR SERPL CREATININE-BSD FRML MDRD: 22 ML/MIN/1.73M2
GLUCOSE BLD-MCNC: 175 MG/DL (ref 70–108)
GLUCOSE BLD-MCNC: 208 MG/DL (ref 70–108)
GLUCOSE, URINE: 250 MG/DL
HCO3, MIXED: 24 MMOL/L (ref 23–28)
HCO3, MIXED: 25 MMOL/L (ref 23–28)
HCT VFR BLD CALC: 38.5 % (ref 42–52)
HEMOGLOBIN: 12.1 GM/DL (ref 14–18)
IMMATURE GRANS (ABS): 0.06 THOU/MM3 (ref 0–0.07)
IMMATURE GRANULOCYTES: 0.6 %
INR BLD: 1.12 (ref 0.85–1.13)
KETONES, URINE: NEGATIVE
LACTIC ACID, SEPSIS: 2.3 MMOL/L (ref 0.5–1.9)
LACTIC ACID, SEPSIS: 4.4 MMOL/L (ref 0.5–1.9)
LACTIC ACID: 1.9 MMOL/L (ref 0.5–2)
LEUKOCYTE EST, POC: NEGATIVE
LV EF: 33 %
LVEF MODALITY: NORMAL
LYMPHOCYTES # BLD: 26.2 %
LYMPHOCYTES ABSOLUTE: 2.8 THOU/MM3 (ref 1–4.8)
MAGNESIUM: 1.8 MG/DL (ref 1.6–2.4)
MCH RBC QN AUTO: 29.9 PG (ref 26–33)
MCHC RBC AUTO-ENTMCNC: 31.4 GM/DL (ref 32.2–35.5)
MCV RBC AUTO: 95.1 FL (ref 80–94)
MISCELLANEOUS LAB TEST RESULT: ABNORMAL
MONOCYTES # BLD: 8.1 %
MONOCYTES ABSOLUTE: 0.9 THOU/MM3 (ref 0.4–1.3)
MRSA SCREEN RT-PCR: NEGATIVE
NITRITE, URINE: NEGATIVE
NUCLEATED RED BLOOD CELLS: 0 /100 WBC
O2 SAT, MIXED: 58 %
O2 SAT, MIXED: 73 %
PCO2, MIXED VENOUS: 40 MMHG (ref 41–51)
PCO2, MIXED VENOUS: 45 MMHG (ref 41–51)
PH UA: 5.5 (ref 5–9)
PH, MIXED: 7.36 (ref 7.31–7.41)
PH, MIXED: 7.38 (ref 7.31–7.41)
PLATELET # BLD: 145 THOU/MM3 (ref 130–400)
PMV BLD AUTO: 11.3 FL (ref 9.4–12.4)
PO2 MIXED: 31 MMHG (ref 25–40)
PO2 MIXED: 40 MMHG (ref 25–40)
POTASSIUM REFLEX MAGNESIUM: 3.7 MEQ/L (ref 3.5–5.2)
PRO-BNP: 827.2 PG/ML (ref 0–900)
PROCALCITONIN: 0.17 NG/ML (ref 0.01–0.09)
PROTEIN UA: NEGATIVE MG/DL
RBC # BLD: 4.05 MILL/MM3 (ref 4.7–6.1)
RBC URINE: ABNORMAL /HPF
RENAL EPITHELIAL, UA: ABNORMAL
SARS-COV-2, NAAT: NOT  DETECTED
SEG NEUTROPHILS: 62.7 %
SEGMENTED NEUTROPHILS ABSOLUTE COUNT: 6.6 THOU/MM3 (ref 1.8–7.7)
SITE: ABNORMAL
SODIUM BLD-SCNC: 132 MEQ/L (ref 135–145)
SPECIFIC GRAVITY UA: 1.01 (ref 1–1.03)
TOTAL PROTEIN: 7.8 G/DL (ref 6.1–8)
TROPONIN T: 0.03 NG/ML
TROPONIN T: 0.04 NG/ML
UROBILINOGEN, URINE: 0.2 EU/DL (ref 0–1)
VANCOMYCIN RESISTANT ENTEROCOCCUS: NEGATIVE
WBC # BLD: 10.6 THOU/MM3 (ref 4.8–10.8)
WBC UA: ABNORMAL /HPF
YEAST: ABNORMAL

## 2022-05-10 PROCEDURE — 99213 OFFICE O/P EST LOW 20 MIN: CPT | Performed by: NURSE PRACTITIONER

## 2022-05-10 PROCEDURE — 84145 PROCALCITONIN (PCT): CPT

## 2022-05-10 PROCEDURE — 99223 1ST HOSP IP/OBS HIGH 75: CPT | Performed by: INTERNAL MEDICINE

## 2022-05-10 PROCEDURE — 3017F COLORECTAL CA SCREEN DOC REV: CPT | Performed by: NURSE PRACTITIONER

## 2022-05-10 PROCEDURE — P9047 ALBUMIN (HUMAN), 25%, 50ML: HCPCS | Performed by: NURSE PRACTITIONER

## 2022-05-10 PROCEDURE — 2580000003 HC RX 258: Performed by: STUDENT IN AN ORGANIZED HEALTH CARE EDUCATION/TRAINING PROGRAM

## 2022-05-10 PROCEDURE — 93503 INSERT/PLACE HEART CATHETER: CPT | Performed by: INTERNAL MEDICINE

## 2022-05-10 PROCEDURE — 6360000002 HC RX W HCPCS: Performed by: NURSE PRACTITIONER

## 2022-05-10 PROCEDURE — 6360000002 HC RX W HCPCS: Performed by: PHYSICIAN ASSISTANT

## 2022-05-10 PROCEDURE — 87040 BLOOD CULTURE FOR BACTERIA: CPT

## 2022-05-10 PROCEDURE — 96360 HYDRATION IV INFUSION INIT: CPT

## 2022-05-10 PROCEDURE — 71045 X-RAY EXAM CHEST 1 VIEW: CPT

## 2022-05-10 PROCEDURE — 4004F PT TOBACCO SCREEN RCVD TLK: CPT | Performed by: NURSE PRACTITIONER

## 2022-05-10 PROCEDURE — 85025 COMPLETE CBC W/AUTO DIFF WBC: CPT

## 2022-05-10 PROCEDURE — 87801 DETECT AGNT MULT DNA AMPLI: CPT

## 2022-05-10 PROCEDURE — 93005 ELECTROCARDIOGRAM TRACING: CPT | Performed by: STUDENT IN AN ORGANIZED HEALTH CARE EDUCATION/TRAINING PROGRAM

## 2022-05-10 PROCEDURE — 93010 ELECTROCARDIOGRAM REPORT: CPT | Performed by: NUCLEAR MEDICINE

## 2022-05-10 PROCEDURE — 70450 CT HEAD/BRAIN W/O DYE: CPT

## 2022-05-10 PROCEDURE — 83880 ASSAY OF NATRIURETIC PEPTIDE: CPT

## 2022-05-10 PROCEDURE — 2500000003 HC RX 250 WO HCPCS: Performed by: STUDENT IN AN ORGANIZED HEALTH CARE EDUCATION/TRAINING PROGRAM

## 2022-05-10 PROCEDURE — 93306 TTE W/DOPPLER COMPLETE: CPT

## 2022-05-10 PROCEDURE — 6370000000 HC RX 637 (ALT 250 FOR IP): Performed by: NURSE PRACTITIONER

## 2022-05-10 PROCEDURE — G8428 CUR MEDS NOT DOCUMENT: HCPCS | Performed by: NURSE PRACTITIONER

## 2022-05-10 PROCEDURE — 85610 PROTHROMBIN TIME: CPT

## 2022-05-10 PROCEDURE — 02HV33Z INSERTION OF INFUSION DEVICE INTO SUPERIOR VENA CAVA, PERCUTANEOUS APPROACH: ICD-10-PCS | Performed by: INTERNAL MEDICINE

## 2022-05-10 PROCEDURE — 83735 ASSAY OF MAGNESIUM: CPT

## 2022-05-10 PROCEDURE — 83605 ASSAY OF LACTIC ACID: CPT

## 2022-05-10 PROCEDURE — 99285 EMERGENCY DEPT VISIT HI MDM: CPT

## 2022-05-10 PROCEDURE — 87500 VANOMYCIN DNA AMP PROBE: CPT

## 2022-05-10 PROCEDURE — 51702 INSERT TEMP BLADDER CATH: CPT

## 2022-05-10 PROCEDURE — 36415 COLL VENOUS BLD VENIPUNCTURE: CPT

## 2022-05-10 PROCEDURE — 86140 C-REACTIVE PROTEIN: CPT

## 2022-05-10 PROCEDURE — 36556 INSERT NON-TUNNEL CV CATH: CPT | Performed by: INTERNAL MEDICINE

## 2022-05-10 PROCEDURE — 36592 COLLECT BLOOD FROM PICC: CPT

## 2022-05-10 PROCEDURE — 81001 URINALYSIS AUTO W/SCOPE: CPT

## 2022-05-10 PROCEDURE — 87635 SARS-COV-2 COVID-19 AMP PRB: CPT

## 2022-05-10 PROCEDURE — 80076 HEPATIC FUNCTION PANEL: CPT

## 2022-05-10 PROCEDURE — C9254 INJECTION, LACOSAMIDE: HCPCS | Performed by: PHYSICIAN ASSISTANT

## 2022-05-10 PROCEDURE — 80048 BASIC METABOLIC PNL TOTAL CA: CPT

## 2022-05-10 PROCEDURE — 99255 IP/OBS CONSLTJ NEW/EST HI 80: CPT | Performed by: PHYSICIAN ASSISTANT

## 2022-05-10 PROCEDURE — 2580000003 HC RX 258: Performed by: NURSE PRACTITIONER

## 2022-05-10 PROCEDURE — 82803 BLOOD GASES ANY COMBINATION: CPT

## 2022-05-10 PROCEDURE — 99291 CRITICAL CARE FIRST HOUR: CPT | Performed by: INTERNAL MEDICINE

## 2022-05-10 PROCEDURE — 84484 ASSAY OF TROPONIN QUANT: CPT

## 2022-05-10 PROCEDURE — G8417 CALC BMI ABV UP PARAM F/U: HCPCS | Performed by: NURSE PRACTITIONER

## 2022-05-10 PROCEDURE — 82948 REAGENT STRIP/BLOOD GLUCOSE: CPT

## 2022-05-10 PROCEDURE — 6360000002 HC RX W HCPCS

## 2022-05-10 PROCEDURE — 87804 INFLUENZA ASSAY W/OPTIC: CPT

## 2022-05-10 PROCEDURE — 87147 CULTURE TYPE IMMUNOLOGIC: CPT

## 2022-05-10 PROCEDURE — 87641 MR-STAPH DNA AMP PROBE: CPT

## 2022-05-10 PROCEDURE — 2580000003 HC RX 258: Performed by: PHYSICIAN ASSISTANT

## 2022-05-10 PROCEDURE — 02HP32Z INSERTION OF MONITORING DEVICE INTO PULMONARY TRUNK, PERCUTANEOUS APPROACH: ICD-10-PCS | Performed by: INTERNAL MEDICINE

## 2022-05-10 PROCEDURE — 36556 INSERT NON-TUNNEL CV CATH: CPT

## 2022-05-10 PROCEDURE — 87086 URINE CULTURE/COLONY COUNT: CPT

## 2022-05-10 PROCEDURE — 2000000000 HC ICU R&B

## 2022-05-10 RX ORDER — DULOXETIN HYDROCHLORIDE 60 MG/1
60 CAPSULE, DELAYED RELEASE ORAL DAILY
Status: DISCONTINUED | OUTPATIENT
Start: 2022-05-11 | End: 2022-05-12 | Stop reason: HOSPADM

## 2022-05-10 RX ORDER — ASPIRIN 81 MG/1
81 TABLET, CHEWABLE ORAL DAILY
Status: DISCONTINUED | OUTPATIENT
Start: 2022-05-11 | End: 2022-05-12

## 2022-05-10 RX ORDER — OXYCODONE AND ACETAMINOPHEN 7.5; 325 MG/1; MG/1
1 TABLET ORAL EVERY 8 HOURS PRN
Status: DISCONTINUED | OUTPATIENT
Start: 2022-05-10 | End: 2022-05-11

## 2022-05-10 RX ORDER — INSULIN LISPRO 100 [IU]/ML
0-12 INJECTION, SOLUTION INTRAVENOUS; SUBCUTANEOUS
Status: DISCONTINUED | OUTPATIENT
Start: 2022-05-10 | End: 2022-05-12 | Stop reason: HOSPADM

## 2022-05-10 RX ORDER — ACETAMINOPHEN 325 MG/1
650 TABLET ORAL EVERY 6 HOURS PRN
Status: DISCONTINUED | OUTPATIENT
Start: 2022-05-10 | End: 2022-05-12 | Stop reason: HOSPADM

## 2022-05-10 RX ORDER — ALBUTEROL SULFATE 2.5 MG/3ML
2.5 SOLUTION RESPIRATORY (INHALATION) EVERY 6 HOURS PRN
Status: DISCONTINUED | OUTPATIENT
Start: 2022-05-10 | End: 2022-05-12 | Stop reason: HOSPADM

## 2022-05-10 RX ORDER — SODIUM CHLORIDE 9 MG/ML
INJECTION, SOLUTION INTRAVENOUS CONTINUOUS
Status: DISCONTINUED | OUTPATIENT
Start: 2022-05-10 | End: 2022-05-12 | Stop reason: HOSPADM

## 2022-05-10 RX ORDER — SODIUM CHLORIDE, SODIUM LACTATE, POTASSIUM CHLORIDE, AND CALCIUM CHLORIDE .6; .31; .03; .02 G/100ML; G/100ML; G/100ML; G/100ML
500 INJECTION, SOLUTION INTRAVENOUS ONCE
Status: COMPLETED | OUTPATIENT
Start: 2022-05-10 | End: 2022-05-10

## 2022-05-10 RX ORDER — DEXTROSE MONOHYDRATE 50 MG/ML
100 INJECTION, SOLUTION INTRAVENOUS PRN
Status: DISCONTINUED | OUTPATIENT
Start: 2022-05-10 | End: 2022-05-12 | Stop reason: HOSPADM

## 2022-05-10 RX ORDER — MORPHINE SULFATE 2 MG/ML
INJECTION, SOLUTION INTRAMUSCULAR; INTRAVENOUS
Status: COMPLETED
Start: 2022-05-10 | End: 2022-05-10

## 2022-05-10 RX ORDER — LATANOPROST 50 UG/ML
1 SOLUTION/ DROPS OPHTHALMIC NIGHTLY
Status: DISCONTINUED | OUTPATIENT
Start: 2022-05-10 | End: 2022-05-12 | Stop reason: HOSPADM

## 2022-05-10 RX ORDER — SODIUM CHLORIDE 0.9 % (FLUSH) 0.9 %
10 SYRINGE (ML) INJECTION EVERY 12 HOURS SCHEDULED
Status: DISCONTINUED | OUTPATIENT
Start: 2022-05-10 | End: 2022-05-12 | Stop reason: HOSPADM

## 2022-05-10 RX ORDER — ROSUVASTATIN CALCIUM 20 MG/1
20 TABLET, COATED ORAL NIGHTLY
Status: DISCONTINUED | OUTPATIENT
Start: 2022-05-10 | End: 2022-05-12

## 2022-05-10 RX ORDER — FUROSEMIDE 20 MG/1
TABLET ORAL
Qty: 235 TABLET | Refills: 1 | Status: CANCELLED | OUTPATIENT
Start: 2022-05-10

## 2022-05-10 RX ORDER — METOPROLOL SUCCINATE 100 MG/1
100 TABLET, EXTENDED RELEASE ORAL DAILY
Qty: 90 TABLET | Refills: 1 | Status: CANCELLED | OUTPATIENT
Start: 2022-05-10

## 2022-05-10 RX ORDER — ALBUMIN (HUMAN) 12.5 G/50ML
25 SOLUTION INTRAVENOUS EVERY 6 HOURS
Status: COMPLETED | OUTPATIENT
Start: 2022-05-10 | End: 2022-05-12

## 2022-05-10 RX ORDER — INSULIN LISPRO 100 [IU]/ML
0-6 INJECTION, SOLUTION INTRAVENOUS; SUBCUTANEOUS NIGHTLY
Status: DISCONTINUED | OUTPATIENT
Start: 2022-05-10 | End: 2022-05-12 | Stop reason: HOSPADM

## 2022-05-10 RX ORDER — ONDANSETRON 4 MG/1
4 TABLET, ORALLY DISINTEGRATING ORAL EVERY 8 HOURS PRN
Status: DISCONTINUED | OUTPATIENT
Start: 2022-05-10 | End: 2022-05-10

## 2022-05-10 RX ORDER — NOREPINEPHRINE BIT/0.9 % NACL 16MG/250ML
2-100 INFUSION BOTTLE (ML) INTRAVENOUS CONTINUOUS
Status: DISCONTINUED | OUTPATIENT
Start: 2022-05-10 | End: 2022-05-10

## 2022-05-10 RX ORDER — POTASSIUM CHLORIDE 20 MEQ/1
20 TABLET, EXTENDED RELEASE ORAL DAILY
Qty: 90 TABLET | Refills: 1 | Status: CANCELLED | OUTPATIENT
Start: 2022-05-10

## 2022-05-10 RX ORDER — INSULIN GLARGINE 100 [IU]/ML
84 INJECTION, SOLUTION SUBCUTANEOUS NIGHTLY
Status: DISCONTINUED | OUTPATIENT
Start: 2022-05-10 | End: 2022-05-10

## 2022-05-10 RX ORDER — ONDANSETRON 2 MG/ML
4 INJECTION INTRAMUSCULAR; INTRAVENOUS EVERY 6 HOURS PRN
Status: DISCONTINUED | OUTPATIENT
Start: 2022-05-10 | End: 2022-05-10

## 2022-05-10 RX ORDER — INSULIN GLARGINE 100 [IU]/ML
42 INJECTION, SOLUTION SUBCUTANEOUS 2 TIMES DAILY
Status: DISCONTINUED | OUTPATIENT
Start: 2022-05-10 | End: 2022-05-12 | Stop reason: HOSPADM

## 2022-05-10 RX ORDER — SODIUM CHLORIDE 9 MG/ML
INJECTION, SOLUTION INTRAVENOUS PRN
Status: DISCONTINUED | OUTPATIENT
Start: 2022-05-10 | End: 2022-05-12 | Stop reason: HOSPADM

## 2022-05-10 RX ORDER — ACETAMINOPHEN 650 MG/1
650 SUPPOSITORY RECTAL EVERY 6 HOURS PRN
Status: DISCONTINUED | OUTPATIENT
Start: 2022-05-10 | End: 2022-05-12 | Stop reason: HOSPADM

## 2022-05-10 RX ORDER — POLYETHYLENE GLYCOL 3350 17 G/17G
17 POWDER, FOR SOLUTION ORAL DAILY PRN
Status: DISCONTINUED | OUTPATIENT
Start: 2022-05-10 | End: 2022-05-12 | Stop reason: HOSPADM

## 2022-05-10 RX ORDER — SODIUM CHLORIDE 0.9 % (FLUSH) 0.9 %
10 SYRINGE (ML) INJECTION PRN
Status: DISCONTINUED | OUTPATIENT
Start: 2022-05-10 | End: 2022-05-12 | Stop reason: HOSPADM

## 2022-05-10 RX ADMIN — OXYCODONE AND ACETAMINOPHEN 1 TABLET: 7.5; 325 TABLET ORAL at 19:02

## 2022-05-10 RX ADMIN — MORPHINE SULFATE 2 MG: 2 INJECTION, SOLUTION INTRAMUSCULAR; INTRAVENOUS at 18:42

## 2022-05-10 RX ADMIN — ALBUMIN (HUMAN) 25 G: 0.25 INJECTION, SOLUTION INTRAVENOUS at 18:00

## 2022-05-10 RX ADMIN — ROSUVASTATIN CALCIUM 20 MG: 20 TABLET, FILM COATED ORAL at 20:25

## 2022-05-10 RX ADMIN — SODIUM CHLORIDE: 9 INJECTION, SOLUTION INTRAVENOUS at 17:49

## 2022-05-10 RX ADMIN — SODIUM CHLORIDE, POTASSIUM CHLORIDE, SODIUM LACTATE AND CALCIUM CHLORIDE 500 ML: 600; 310; 30; 20 INJECTION, SOLUTION INTRAVENOUS at 12:48

## 2022-05-10 RX ADMIN — SODIUM CHLORIDE 100 MG: 9 INJECTION, SOLUTION INTRAVENOUS at 18:50

## 2022-05-10 RX ADMIN — ACETAMINOPHEN 650 MG: 325 TABLET ORAL at 23:30

## 2022-05-10 RX ADMIN — INSULIN GLARGINE 42 UNITS: 100 INJECTION, SOLUTION SUBCUTANEOUS at 20:25

## 2022-05-10 RX ADMIN — LATANOPROST 1 DROP: 50 SOLUTION OPHTHALMIC at 20:25

## 2022-05-10 RX ADMIN — INSULIN LISPRO 2 UNITS: 100 INJECTION, SOLUTION INTRAVENOUS; SUBCUTANEOUS at 18:00

## 2022-05-10 RX ADMIN — Medication 5 MCG/MIN: at 13:22

## 2022-05-10 RX ADMIN — ALBUMIN (HUMAN) 25 G: 0.25 INJECTION, SOLUTION INTRAVENOUS at 23:27

## 2022-05-10 ASSESSMENT — PAIN DESCRIPTION - ORIENTATION
ORIENTATION: RIGHT;LEFT
ORIENTATION: LOWER
ORIENTATION: POSTERIOR

## 2022-05-10 ASSESSMENT — ENCOUNTER SYMPTOMS
ABDOMINAL PAIN: 0
COLOR CHANGE: 0
NAUSEA: 0
WHEEZING: 0
CHEST TIGHTNESS: 0
VOMITING: 0
PHOTOPHOBIA: 0
NAUSEA: 1
VOMITING: 1
BACK PAIN: 0
TROUBLE SWALLOWING: 0
SHORTNESS OF BREATH: 0
SHORTNESS OF BREATH: 1
DIARRHEA: 0
RHINORRHEA: 0
CONSTIPATION: 0
SORE THROAT: 0
COUGH: 0

## 2022-05-10 ASSESSMENT — PAIN SCALES - GENERAL
PAINLEVEL_OUTOF10: 8
PAINLEVEL_OUTOF10: 7
PAINLEVEL_OUTOF10: 8
PAINLEVEL_OUTOF10: 4
PAINLEVEL_OUTOF10: 9
PAINLEVEL_OUTOF10: 4

## 2022-05-10 ASSESSMENT — PAIN DESCRIPTION - LOCATION
LOCATION: BACK
LOCATION: NECK
LOCATION: BACK
LOCATION: NECK

## 2022-05-10 ASSESSMENT — PAIN DESCRIPTION - DESCRIPTORS
DESCRIPTORS: ACHING

## 2022-05-10 ASSESSMENT — PAIN - FUNCTIONAL ASSESSMENT
PAIN_FUNCTIONAL_ASSESSMENT: 0-10

## 2022-05-10 ASSESSMENT — PAIN DESCRIPTION - ONSET: ONSET: ON-GOING

## 2022-05-10 ASSESSMENT — PAIN DESCRIPTION - FREQUENCY: FREQUENCY: CONTINUOUS

## 2022-05-10 NOTE — ED NOTES
Pt presents to the ER for hypotension, vomiting, weakness and not urinating very much the past few days.      Cely Herron  05/10/22 3646

## 2022-05-10 NOTE — ED NOTES
Pt to ED from heart failure clinic. Per HF clinic pt was hypotensive. Upon arrival to ED pt appears pale and fatigued. Pt reports they have been vomiting or several days and has not had much of an appetite. Pt sees Dr Siri Benitez for cardiology and pt reports they were to have a valve replacement next week. Pt has lifevest in place at this time. VSS. EKG completed. IV inserted.       Jai Cárdenas RN  05/10/22 1841

## 2022-05-10 NOTE — ED NOTES
Pt resting in cot. Reports pain 8/10 in neck from fall yesterday. VSS at this time.  Will monitor      Hunter Lopez RN  05/10/22 1459

## 2022-05-10 NOTE — PROCEDURES
SWAN ISMAEL CATHETER    Indication: Lactic acidosis, hypovolemia    Complications: None    Procedure:  After timeout was taken and informed consent was obtained, and after Cordis catheter was placed sterilely. Patient was prepped and utilizing sterile gloves, sterile gown, sterile towels, in addition to mask and hair net. Cordis sheath was attached. [place]-Ismael was flushed through all 3 ports and verification of transducer occurred. [place]-Ismael with placed in the sterile sheath. [place]-Ismael catheter was advanced to 25 cm. Balloon was inflated. [place]-Ismael catheter was advanced watching waveforms from the right atrium to the right ventricle to the pulmonary artery. Pressures were recorded by the nurse. Catheter was placed in the pulmonary artery by waveforms and secured at 65 centimeters. There were no complications. This procedure was done with Dr. Adriana Sargent at the bedside. He did montalvo all key components of procedure. Electronically signed by William Montero.  Ludwin Woods CNP on 5/10/2022 at 5:22 PM

## 2022-05-10 NOTE — CONSULTS
[facility]'s  Consult    Patient's Name/Date of Birth: Gill Westbrook / 1965 (10 y.o.)    Date: May 10, 2022     Referring Provider: Sujata Rodrigues MD    CHIEF COMPLAINT: Hypotension and Feeling Ill    Opening Statement:  The patient is an unfortunate 65year old male every day smoker with a 40 pack year history with a past medical history notable for alcoholism, severe aortic stenosis, systolic and diastolic heart failure with lifevest, hypertension, hyperlipidemia, diabetes, COPD, seizures, \"brain tumor,\" hepatitis C, CKD, obesity, and previous amputation of the right great toe who presented with a chief complaint of shortness of breath and who we are primarily managing for CHF, volume depletion, and severe aortic stenosis. HPI/Summary Hospital Course: The patient had a follow up appointment with the [facility] on 5/10/2022. He was noted to be hypotensive and lethargic at this time. Therefore, the patient was sent to the Emergency Room. The patient was noted to be hypotensive in the ED and he was therefore given a small fluid bolus and was started on Levophed. The patient was successfully weaned off of the Levophed as soon as he came to the ICU. A central line and a [place] catheter were placed. The patient was suspected to to be volume depleted on physical exam which was confirmed on the [place] with a CVP of 1. Therefore, the patient was started on fluid resuscitation with normal saline at 125 cc/hr and albumin. Patient had a seizure on presentation and has a known history of a \"brain tumor. \" Therefore, Neurology was consulted, the patient was started on Vimpat, and a CT of the head was ordered. MRI of the brain with and without contrast when kidney function recovers. Troponin T elevated above recent baseline and EKG demonstrated new onset T-wave inversion in lead III probably related to demand ischemia.    Previous cardiac catheterization on 8/3/2021 demonstrated a reduced ejection fraction and moderate aortic stenosis but patent coronary arteries. Plan for TAVR when the patient is more medically stable. The patient states that he has been feeling tired, sick, and short of breath for the past several days. States that he feel after getting up from a sitting position yesterday. Otherwise affirms story as above. Patient states that he is hungry. Denies chest pain and palpitations. Affirms shortness of breath with rest and exertion and dry cough. Affirms orthostatic dizziness. Affirms medical history as above. Affirms smoking history as above but states that he has cut down to a half a pack a day. Stopped drinking two weeks ago. Used to drink three 25 ounce beers per day on average prior to that. Denies illicit drug use. Echo: Results for orders placed during the hospital encounter of 05/10/22    ECHO Complete 2D W Doppler W Color    Narrative  Transthoracic Echocardiography Report (TTE)    Demographics    Patient Name    [facility]       Gender               Male  [address] [place] [address]    MR #            [id number]        Race                     Ethnicity    Account #       [de-identified]        Room Number          0002    Accession       [id number]       Date of Study        05/10/2022  Number    Date of Birth   1965       Referring Physician  Lawyer Charlene Jacobsen CNP    Age             65 year(s)       Sonographer          Alma Salgado MD  Physician    Procedure    Type of Study    TTE procedure:ECHOCARDIOGRAM COMPLETE 2D W DOPPLER W COLOR. Procedure Date  Date: 05/10/2022 Start: 03:00 PM    Study Location: Bedside  Technical Quality: Limited visualization due to body habitus. Indications:Heart Failure.     Additional Medical History:Hyperlipidemia, hypertension, COPD, alcohol  abuse, diabetic, chronic kidney disease, severe aortic stenosis    Patient Status: Routine    Height: 75.2 inches Weight: 328.49 pounds BSA: 2.71 m^2 BMI: 40.84 kg/m^2    BP: 143/82 mmHg    Conclusions    Summary  Normal left ventricular size and severely reduced systolic function. There was severe global hypokinesis. Wall thickness was within normal limits. Ejection fraction was estimated at 30-35%. Doppler parameters were consistent with abnormal left ventricular  relaxation (grade 1 diastolic dysfunction). The aortic valve was trileaflet with increased thickness and reduced  leaflet mobility. DOPPLER: Transaortic velocity was 3.5 m/s, mean gradient  30 mmHg, max gradient 48 mmHg, LOBITO 0.99 cm2, consistent with severe LF-LG  aortic stenosis. LOBITO is overestimated due to LVOT measurement error. There  was no evidence of aortic regurgitation. IVC size is not well seen. The pericardium was normal in appearance with no evidence of a pericardial  effusion    Signature    ----------------------------------------------------------------  Electronically signed by Darleen Vasquez MD (Interpreting  physician) on 05/10/2022 at 04:09 PM  ----------------------------------------------------------------    Findings    Mitral Valve  The mitral valve structure demonstrated thickened PMVL and reduced  mobility. DOPPLER: The transmitral velocity was within the normal range  with no evidence for mitral stenosis. There was no evidence of mitral  regurgitation. Aortic Valve  The aortic valve was trileaflet with increased thickness and reduced  leaflet mobility. DOPPLER: Transaortic velocity was 3.5 m/s, mean gradient  30 mmHg, max gradient 48 mmHg, LOBITO 0.99 cm2, consistent with severe LF-LG  aortic stenosis. LOBITO is overestimated due to LVOT measurement error. There  was no evidence of aortic regurgitation. Tricuspid Valve  The tricuspid valve structure was normal with normal leaflet separation. DOPPLER: There was no evidence of tricuspid stenosis.  There was no  evidence of tricuspid regurgitation. Pulmonic Valve  The pulmonic valve leaflets were not well seen. DOPPLER: The transpulmonic  velocity was within the normal range with no evidence for regurgitation. Left Atrium  Left atrial size was normal.    Left Ventricle  Normal left ventricular size and severely reduced systolic function. There was severe global hypokinesis. Wall thickness was within normal limits. Ejection fraction was estimated at 30-35%. Doppler parameters were consistent with abnormal left ventricular  relaxation (grade 1 diastolic dysfunction). Right Atrium  Right atrial size was normal.    Right Ventricle  The right ventricular size was normal with normal systolic function and  wall thickness. Pericardial Effusion  The pericardium was normal in appearance with no evidence of a pericardial  effusion. Pleural Effusion  No evidence of pleural effusion. Aorta / Great Vessels  IVC size is not well seen.     M-Mode/2D Measurements & Calculations    LV Diastolic      LV Systolic Dimension: 4.9 cm    LA Dimension: 3.9 cmAO  Dimension: 5.8 cm LV Volume Diastolic: 131 ml      Root Dimension: 3.9 cm  LV FS:15.5 %      LV Volume Systolic: 82.9 ml  LV PW Diastolic:  LV EDV/LV EDV Index: 133 ml/49  1 cm              m^2LV ESV/LV ESV Index: 12.4  Septum Diastolic: QJ/58 m^2                        RV Diastolic Dimension:  1 cm              EF Calculated: 35 %              3.5 cm    LV Length: 9.2 cm                LA/Aorta: 1    LV Area           LVOT: 2 cm  Diastolic: 00.8  cm^2  LV Area Systolic:  76.9 cm^2    Doppler Measurements & Calculations    MV Peak E-Wave:     AV Peak Velocity: 307    LVOT Peak Velocity: 125 cm/s  72.4 cm/s           cm/s                     LVOT Mean Velocity: 103 cm/s  MV Peak A-Wave: 114 AV Peak Gradient: 37.7   LVOT Peak Gradient: 5  cm/s                mmHg                     mmHgLVOT Mean Gradient: 4  MV E/A Ratio: 0.64  AV Mean Velocity: 228    mmHg  MV Peak Gradient:   cm/s  2.1 mmHg AV Mean Gradient: 27  mmHg  MV Deceleration     AV VTI: 57 cm            TR Velocity:293 cm/s  Time: 198 msec      AV Area                  TR Gradient:34.34 mmHg  MV P1/2t: 58 msec   (Continuity):1.22 cm^2  MVA by PHT:3.79  cm^2                LVOT VTI: 22.1 cm    MV E' Septal  Velocity: 6.4 cm/s  MV A' Septal        AV DVI (VTI): 0.39AV DVI  Velocity: 12.2 cm/s (Vmax):0.41  MV E' Lateral  Velocity: 9.5 cm/s  MV A' Lateral  Velocity: 13.1 cm/s  E/E' septal: 11.31  E/E' lateral: 7.62    [id number]       All labs, EKG's, diagnostic testing and images as well as cardiac cath, stress testing were reviewed during this encounter    Past Medical History:   Diagnosis Date    Acute kidney injury ([address] 12/2020    due to Enterococous Bacteremia , fluid overload, low sodium    Amputation of right great toe ([address] 02/18/2021    Asthma     Brain tumor ([facility] [address] [address]     Cirrhosis ([address]     ETOH abuse    CKD (chronic kidney disease)     l.brown-osu spetie    COPD (chronic obstructive pulmonary disease) ([address]     Diabetes mellitus ([address]     type 2-insulin lantus and humalog    Falling     Glaucoma     Hepatitis C     History of blood transfusion     s/p nasal surgery    Hyperlipidemia     Hypertension     Nallu    Legally blind     Obesity     Pain management     karol marzec-percocet Rx    Right foot infection 11/2021    Seizures ([address]      Past Surgical History:   Procedure Laterality Date    BACK SURGERY      CARDIAC CATHETERIZATION  08/03/2021    EYE SURGERY      FIBULA FRACTURE SURGERY Left 03/21/2019    LEFT FIBULAR SHAFT ORIF performed by Sonia Traore DPM at [facility] Right     Steel pins in place    FRACTURE SURGERY      NASAL SINUS SURGERY Bilateral 11/29/2020    FLEXIBLE BRONCHOSCOPY WITH BRONCHOALVEOLAR LAVAGE WITH CULTURES.  NASAL ENDOSCOPY WITH POSSIBLE CAUTERY ADN NASAL PACKING performed by Shanice Fatima MD at [facility]  01/2020    SPINE SURGERY N/A 02/19/2021    KYPHOPLASTY at T4, L2, L4 performed by Maryam Walsh MD at [address] [facility] 8/24/2021    LUMBAR 3 AND LUMBAR 5 KYPHOPLASTY performed by Adenike Moralez MD at [address] Right 09/23/2020    AMPUTATION DISTAL APSECT RIGHT HALLUX, REMOVAL OF HARDWARE RIGHT HALLUX performed by Roselia Stanley DPM at [facility] N/A 04/25/2019    EGD BIOPSY performed by Sydnee Kelly MD at [address] [facility]     Current Facility-Administered Medications   Medication Dose Route Frequency Provider Last Rate Last Admin    albuterol (PROVENTIL) nebulizer solution 2.5 mg  2.5 mg Nebulization Q6H PRN Rosendo Dials, APRN - CNP        [START ON 5/11/2022] aspirin chewable tablet 81 mg  81 mg Oral Daily Rosendo Dials, APRN - CNP        [START ON 5/11/2022] DULoxetine (CYMBALTA) extended release capsule 60 mg  60 mg Oral Daily Rosendo Dials, APRN - CNP        latanoprost (XALATAN) 0.005 % ophthalmic solution 1 drop  1 drop Both Eyes Nightly Rosendo Dials, APRN - CNP   1 drop at 05/10/22 2025    rosuvastatin (CRESTOR) tablet 20 mg  20 mg Oral Nightly Rosendo Dials, APRN - CNP   20 mg at 05/10/22 2025    glucose chewable tablet 16 g  4 tablet Oral PRN Rosendo Dials, APRN - CNP        dextrose bolus 10% 125 mL  125 mL IntraVENous PRN Rosendo Dials, APRN - CNP        Or    dextrose bolus 10% 250 mL  250 mL IntraVENous PRN Rosendo Dials, APRN - CNP        glucagon (rDNA) injection 1 mg  1 mg IntraMUSCular PRN Rosendo Dials, APRN - CNP        dextrose 5 % solution  100 mL/hr IntraVENous PRN Rosendo Dials, APRN - CNP        sodium chloride flush 0.9 % injection 10 mL  10 mL IntraVENous 2 times per day Rosendo Dials, APRN - CNP        sodium chloride flush 0.9 % injection 10 mL  10 mL IntraVENous PRN Rosendo Dials, APRN - CNP        0.9 % sodium chloride infusion   IntraVENous PRN MEGHAN Machado CNP        polyethylene glycol (GLYCOLAX) packet 17 g  17 g Oral Daily PRN MEGHAN Machado CNP        acetaminophen (TYLENOL) tablet 650 mg  650 mg Oral Q6H PRN MEGHAN Machado CNP        Or    acetaminophen (TYLENOL) suppository 650 mg  650 mg Rectal Q6H PRN MEGHAN Machado CNP        insulin glargine (LANTUS) injection vial 42 Units  42 Units SubCUTAneous BID MEGHAN Machado CNP   42 Units at 05/10/22 2025    insulin lispro (HUMALOG) injection vial 0-12 Units  0-12 Units SubCUTAneous TID WC MEGHAN Machado CNP   2 Units at 05/10/22 1800    insulin lispro (HUMALOG) injection vial 0-6 Units  0-6 Units SubCUTAneous Nightly MEGHAN Machado CNP        [START ON 5/11/2022] lacosamide (VIMPAT) 50 mg in sodium chloride 0.9 % 55 mL IVPB  50 mg IntraVENous BID Arnold Sanford PA-C        0.9 % sodium chloride infusion   IntraVENous Continuous MEGHAN Machado  mL/hr at 05/10/22 1749 New Bag at 05/10/22 1749    albumin human 25 % IV solution 25 g  25 g IntraVENous Q6H MEGHAN Machado  mL/hr at 05/10/22 1800 25 g at 05/10/22 1800    oxyCODONE-acetaminophen (PERCOCET) 7.5-325 MG per tablet 1 tablet  1 tablet Oral Q8H PRN MEGHAN Machado CNP   1 tablet at 05/10/22 1902     Prior to Admission medications    Medication Sig Start Date End Date Taking? Authorizing Provider   oxyCODONE-acetaminophen (PERCOCET) 7.5-325 MG per tablet Take 1 tablet by mouth every 8 hours as needed for Pain for up to 30 days. 4/29/22 5/29/22  MEGHAN Fontanez CNP   albuterol (PROVENTIL) (2.5 MG/3ML) 0.083% nebulizer solution Take 3 mLs by nebulization every 6 hours as needed for Wheezing or Shortness of Breath 4/7/22 4/7/23  Caity Cobian MD   furosemide (LASIX) 20 MG tablet Take 2 tablets by mouth every morning AND 1 tablet every evening.   Patient taking differently: Take 2 tablets by mouth every morning AND 2 tablet every evening. 3/31/22   MEGHAN Rausch CNP   potassium chloride (KLOR-CON M) 20 MEQ extended release tablet Take 1 tablet by mouth daily 3/31/22   MEGHAN Rausch CNP   metoprolol succinate (TOPROL XL) 100 MG extended release tablet Take 1 tablet by mouth daily 3/16/22   Will Mata MD   aspirin 81 MG chewable tablet Take 1 tablet by mouth daily 3/16/22   Will Mata, MD   hydrALAZINE (APRESOLINE) 50 MG tablet Take 1 tablet by mouth every 8 hours  Patient taking differently: Take 50 mg by mouth every 8 hours DO NOT TAKE IF SBP IS <110 3/15/22   Will Mata, MD   nicotine (NICODERM CQ) 21 MG/24HR Place 1 patch onto the skin daily 3/16/22   Will Mata, MD   sacubitril-valsartan (ENTRESTO) 24-26 MG per tablet Take 1 tablet by mouth 2 times daily 3/15/22   Will Mata, MD   umeclidinium-vilanterol (ANORO ELLIPTA) 62.5-25 MCG/INH AEPB inhaler Inhale 1 puff into the lungs daily 3/15/22 3/15/23  Lindie Seip Neumeier, APRN - CNP   dapagliflozin (FARXIGA) 10 MG tablet Take 1 tablet by mouth every morning 3/15/22   MEGHAN Barbosa CNP   senna (SENOKOT) 8.6 MG tablet Take 2 tablets by mouth daily    Historical Provider, MD   tamsulosin (FLOMAX) 0.4 MG capsule Take 1 capsule by mouth nightly 8/3/21   Nathen Durán MD   DULoxetine (CYMBALTA) 60 MG extended release capsule Take 1 capsule by mouth daily 8/4/21   Nathen Durán MD   insulin glargine (LANTUS) 100 UNIT/ML injection vial Inject 84 Units into the skin nightly 3/12/21   MEGHAN Iyer CNP   insulin lispro (HUMALOG) 100 UNIT/ML injection vial Inject 10 Units into the skin 3 times daily (before meals) Plus Sliding Scale  Patient taking differently: Inject 12 Units into the skin 3 times daily (before meals) Plus Sliding Scale  3/12/21   Nyla L Anspach, APRN - CNP   insulin lispro (HUMALOG) 100 UNIT/ML injection vial Glucose: Dose:  No Insulin, 140-199 2 Units, 200-249 4 Units, 250-299 6 Units, 300-349 8 Units, 350-400 10 Units, Over 400 12 Units 3/12/21   MEGHAN Iyer - CNP   albuterol sulfate  (90 Base) MCG/ACT inhaler Inhale 1 puff into the lungs every 4-6 hours as needed 1/14/21   Historical Provider, MD   OXYGEN Inhale into the lungs daily as needed (nightly)     Historical Provider, MD   latanoprost (XALATAN) 0.005 % ophthalmic solution Place 1 drop into both eyes nightly 3/26/19   Clifford Brumfield MD   rosuvastatin (CRESTOR) 20 MG tablet Take 20 mg by mouth nightly     Historical Provider, MD   Scheduled Meds:   [START ON 5/11/2022] aspirin  81 mg Oral Daily    [START ON 5/11/2022] DULoxetine  60 mg Oral Daily    latanoprost  1 drop Both Eyes Nightly    rosuvastatin  20 mg Oral Nightly    sodium chloride flush  10 mL IntraVENous 2 times per day    insulin glargine  42 Units SubCUTAneous BID    insulin lispro  0-12 Units SubCUTAneous TID WC    insulin lispro  0-6 Units SubCUTAneous Nightly    [START ON 5/11/2022] lacosamide (VIMPAT) IVPB  50 mg IntraVENous BID    albumin human  25 g IntraVENous Q6H     Continuous Infusions:   dextrose      sodium chloride      sodium chloride 125 mL/hr at 05/10/22 1749     PRN Meds:.albuterol, glucose, dextrose bolus **OR** dextrose bolus, glucagon (rDNA), dextrose, sodium chloride flush, sodium chloride, polyethylene glycol, acetaminophen **OR** acetaminophen, oxyCODONE-acetaminophen    Allergies   Allergen Reactions    Adhesive Tape Rash     Bandaid    Keppra [Levetiracetam] Rash     Family History   Problem Relation Age of Onset    Heart Attack Father     Heart Attack Brother         pneumonia    No Known Problems Mother     Cancer Sister         breast    No Known Problems Maternal Grandmother     No Known Problems Maternal Grandfather     Liver Cancer Paternal Grandmother     Heart Attack Paternal Grandfather     Heart Disease Brother         stents    Colon Cancer Neg Hx     Colon Polyps Neg Hx      Social History     Socioeconomic History    Marital status: Single     Spouse name: Not on file    Number of children: 0    Years of education: Not on file    Highest education level: Not on file   Occupational History    Not on file   Tobacco Use    Smoking status: Current Every Day Smoker     Packs/day: 1.00     Years: 40.00     Pack years: 40.00     Types: Cigarettes    Smokeless tobacco: Never Used    Tobacco comment: Currently smoking electronic cigarettes   Vaping Use    Vaping Use: Never used   Substance and Sexual Activity    Alcohol use: Yes     Alcohol/week: 3.0 standard drinks     Types: 3 Cans of beer per week    Drug use: Not Currently     Types: Marijuana [employer])     Comment: medical marijuana not currently using    Sexual activity: Not Currently     Partners: Female   Other Topics Concern    Not on file   Social History Narrative    Not on file     Social Determinants of Health     Financial Resource Strain:     Difficulty of Paying Living Expenses: Not on file   Food Insecurity:     Worried About Running Out of Food in the Last Year: Not on file    Saba of Food in the Last Year: Not on file   Transportation Needs:     Lack of Transportation (Medical): Not on file    Lack of Transportation (Non-Medical):  Not on file   Physical Activity:     Days of Exercise per Week: Not on file    Minutes of Exercise per Session: Not on file   Stress:     Feeling of Stress : Not on file   Social Connections:     Frequency of Communication with Friends and Family: Not on file    Frequency of Social Gatherings with Friends and Family: Not on file    Attends Confucianism Services: Not on file    Active Member of Clubs or Organizations: Not on file    Attends Club or Organization Meetings: Not on file    Marital Status: Not on file   Intimate Partner Violence:     Fear of Current or Ex-Partner: Not on file    Emotionally Abused: Not on file    Physically Abused: Not on file    Sexually Abused: Not on file   Housing Stability:     Unable to Pay for Housing in the Last Year: Not on file    Number of Places Lived in the Last Year: Not on file    Unstable Housing in the Last Year: Not on file     Review of Systems   Constitutional: Negative for chills and fever. Eyes: Positive for visual disturbance (Chronic). Negative for pain and itching. Respiratory: Positive for shortness of breath (Chronic). Negative for cough. Cardiovascular: Negative for chest pain and palpitations. Gastrointestinal: Positive for nausea. Negative for abdominal pain. Genitourinary: Positive for difficulty urinating (Has not been able to urinate prior to ledbetter. ). Musculoskeletal: Positive for back pain and neck pain. Skin: Negative for color change, rash and wound. Neurological: Positive for dizziness (Only orthostatic) and headaches. Psychiatric/Behavioral: Negative for dysphoric mood, self-injury and suicidal ideas. The patient is nervous/anxious. ROS negative except as above. Labs:  CBC:   Recent Labs     05/10/22  1050   WBC 10.6   HGB 12.1*   HCT 38.5*   MCV 95.1*        BMP:   Recent Labs     05/10/22  1050   *   K 3.7   CL 94*   CO2 21*   BUN 33*   CREATININE 3.0*     Accucheck Glucoses:   Recent Labs     05/10/22  1550   POCGLU 175*     Cardiac Enzymes: No results for input(s): CKTOTAL, CKMB, CKMBINDEX, TROPONINI in the last 72 hours. PT/INR:   Recent Labs     05/10/22  1050   INR 1.12     APTT: No results for input(s): APTT in the last 72 hours.   Liver Profile:  Lab Results   Component Value Date    AST 60 05/10/2022    ALT 48 05/10/2022    BILIDIR 0.4 05/10/2022    BILITOT 0.7 05/10/2022    ALKPHOS 129 05/10/2022     03/18/2019     Lab Results   Component Value Date    CHOL 87 03/07/2022    HDL 40 03/07/2022    TRIG 73 03/07/2022     TSH:   Lab Results   Component Value Date    TSH 0.951 03/07/2022     UA:   Lab Results   Component Value Date    COLORU YELLOW 05/10/2022    PHUR 5.5 05/10/2022    LABCAST NONE SEEN 05/10/2022    LABCAST NONE SEEN 05/10/2022    WBCUA 0-2 05/10/2022    RBCUA 3-5 05/10/2022    MUCUS THREADS 10/31/2018    YEAST NONE SEEN 05/10/2022    BACTERIA NONE SEEN 05/10/2022    SPECGRAV 1.007 05/10/2022    LEUKOCYTESUR NEGATIVE 05/10/2022    LEUKOCYTESUR NEGATIVE 03/09/2021    UROBILINOGEN 0.2 05/10/2022    BILIRUBINUR NEGATIVE 05/10/2022    BLOODU TRACE 05/10/2022    GLUCOSEU NEGATIVE 03/09/2021    AMORPHOUS URATES 11/28/2020         Physical Exam:  Vitals:    05/10/22 2000   BP: 130/74   Pulse: 102   Resp: 18   Temp:    SpO2: 97%        Intake/Output Summary (Last 24 hours) at 5/10/2022 2052  Last data filed at 5/10/2022 1800  Gross per 24 hour   Intake --   Output 1000 ml   Net -1000 ml      Physical Exam  Constitutional:       General: He is not in acute distress. Appearance: Normal appearance. He is obese. He is ill-appearing. He is not toxic-appearing. Comments: The patient is awake, alert, and is ill but in no acute distress. The patient is pleasant and extremely talkative. HENT:      Right Ear: External ear normal.      Left Ear: External ear normal.      Mouth/Throat:      Mouth: Mucous membranes are moist.      Pharynx: Oropharynx is clear. Eyes:      General: No scleral icterus. Pupils: Pupils are equal, round, and reactive to light. Cardiovascular:      Rate and Rhythm: Normal rate and regular rhythm. Pulses: Normal pulses. Heart sounds: Normal heart sounds. Pulmonary:      Effort: Pulmonary effort is normal. No respiratory distress. Breath sounds: No wheezing or rales. Abdominal:      General: Abdomen is flat. There is no distension. Palpations: Abdomen is soft. Tenderness: There is no abdominal tenderness. There is no guarding. Comments: Obvious hepatomegaly. Musculoskeletal:      Right lower leg: No edema. Left lower leg: No edema.    Skin:     General: Skin is warm and dry. Capillary Refill: Capillary refill takes less than 2 seconds. Neurological:      Mental Status: He is alert. Psychiatric:         Mood and Affect: Mood normal.         Behavior: Behavior normal.           Assessment/Plan:  1. Hypotension Secondary to Volume Depletion. No overt signs of volume overload and \"dry\" on physical exam.   [place] catheter shows a CVP of 1, confirming intravascular volume depletion. Appropriately holding Lasix, Toprol XL, Hydralazine, Entresto, and Farxiga. Will add back as necessary and able. Monitor by CVP on the [place]. Volume resuscitation with albumin and normal saline 125 cc/hr. 2. Severe Aortic Stenosis. Progressively worsening on recent echocardiogram as compared to echo obtained on 3/7/2022. TAVR after resolution of hypotension and MATTIE. 3. Chronic Systolic and Diastolic Heart Failure Not in Acute Exacerbation Secondary to Severe Aortic Stenosis. Previous cardiac catheterization on 8/3/2021 demonstrated a reduced ejection fraction and moderate aortic stenosis but patent coronary arteries. Echocardiogram on 5/10/2022 demonstrated severe global hypokinesis, an EF of 40-82%, grade 1 diastolic dysfunction, and severe aortic stenosis. Appropriately holding cardioprotective and diuretic medications as above given presentation of hypotension. Add back these medications as necessary and able. If all cardioprotective medications are added back as above, and if volume status and blood pressure can tolerate, add Aldactone. 4. Demand Ischemia, unspecified. EKG demonstrated isolated new T-wave inversion in lead III. Troponin T elevated compared to previous. No chest pain. Repeat Troponin T and EKG in the AM.   Anticipate complete resolution with volume resuscitation. Thank you for allowing us to participate in the care of this patient. Please do not hesitate to call us with questions.   Electronically signed by Teresita Urena MD, MPH, [facility] on 5/10/2022 at 8:52 PM  Supervised by Dr. Ayse Cordero      Attending Supervising Physician's [address] [place] [address] performed a history and physical examination on the patient and discussed the management with the resident physician. I reviewed and agree with the findings and plan as documented in the resident's note except for as noted below. 65year old male every day smoker with a 40 pack year history with a past medical history notable for alcoholism, severe aortic stenosis, systolic and diastolic heart failure with lifevest, hypertension, hyperlipidemia, diabetes, COPD, seizures, \"brain tumor,\" hepatitis C, CKD, obesity, and previous amputation of the right great toe who presented with a chief complaint of shortness of breath. He appeared fatigued, exhausted, and has minimal UOP. Admitted with hypotension, started on IV NE, SGC placed showing hypovolemia. TTE shows low EF and severe AS. IV hydration--use IV Albumin predominantly, hold diuretics, titrate to CVP 10-12, monitor UOP, continue GDMT for CHF after pressors titrated off. Please place art line if cannot titrate off pressors. If CI is low, then may need to consider BAV vs inpatient TAVR. Consider GI consult to discuss liver disease. He was a drinker and has stopped recently in an effort to try to get better. Apparently had seizure, Neurology following. Further recommendations based on results and clinical course.       Electronically signed by Alison Barrios MD on 5/11/22 at 7:30 AM EDT    Interventional Cardiology - [facility]

## 2022-05-10 NOTE — PROCEDURES
Central Line Placement: Risks and benefits to the procedure were discussed. Alternatives and their risks were discussed as well. Patient was placed in the attention position. Patient was placed in Trendelenburg position. Patient was prepped using Chlorhexidene prep. Patient was draped utilizing sterile gloves, hair net, mask, sterile gown and sterile OR towels. Maximum barrier precautions were utilized. Utilizing the left subclavian approach, patient received 5 cc of 1% lidocaine. Utilizing an introducer needle, it was placed subcutaneously advanced under the clavicle and leveled flat. It was subsequently advanced toward the thoracic inlet, until venous return was obtained. A guide wire was placed through the introducer needle. The introducer needle was subsequently withdrawn leaving the guide wire in place. Utilizing a scalpel, a small incision was made in the skin. A punch dilator was placed over the guide wire and subsequently withdrawn leaving the guide wire in place. A cordis catheter was subsequently placed over the guide wire. The guide wire was subsequently withdrawn leaving this catheter in place. All ports had good venous return and were subsequently flushed with saline. The catheter was secured using 2.0 silk. Secondary cleansing with chlorhexidine prep was subsequently placed. Tegaderm with bio- patch dressing was utilized for secondary securing and protection. There were no complications. EBL:   Less than 5 mL      Indication: Severe volume contraction. I supervised the resident throughout the procedure of Cordis placement. Electronically signed by Lequita Bern Chipper Lesches MD

## 2022-05-10 NOTE — ED NOTES
Central line placed successfully. Pt tolerated very well.       Mckenna Montero, JERRELL  05/10/22 2292

## 2022-05-10 NOTE — CONSULTS
Neurology Consult Note    Date:5/10/2022       Room:[address] [place] [address]-[address]  Patient Shae Beckwith     YOB: 1965     Age:57 y.o. Requesting Physician: Hilaria Downing MD     Reason for Consult:  Evaluate for seizure      Chief Complaint:   Chief Complaint   Patient presents with    Hypotension    Fatigue       Subjective     Faheem Rangel is a 65 y.o. male with a history of seizure disorder, cirrhosis, congestive heart failure, brain mass, severe aortic stenosis, hypertension, hyperlipidemia, hepatitis C, diabetes, neuropathy, and cardiomyopathy with LifeVest who is currently admitted for hypotension and MATTIE. The patient notes over the past few days he has not felt well and was feeling extremely fatigued. He was also having decreased urine output. He presented to the emergency department and was noted to be lethargic and hypotensive. He was found to have an acute kidney injury and subsequently started on Levophed and admitted to the ICU. Upon arrival to the ICU, the patient had 30 second witnessed seizure. The seizure resolved spontaneously without administration of Ativan. The patient does have a known history of seizure disorder. He followed with Dr. Shyam Dodge in the past and was previously on Keppra, which gave him a rash, and Vimpat. Per Dr. Heather Ho notes, the patient's seizures were well controlled on Vimpat at a dose of 50 mg twice daily. However, the patient states he stopped taking the Vimpat on his own. He states his last seizure was approximately a year ago. He is uncertain if he had auras prior to his seizures or not. He describes his seizures as myoclonic and tonic activity of the left upper and to a lesser extent the left lower extremity. He is sometimes aware during the seizures.   He does have a history of a known arachnoid cyst and was seen by a neurosurgeon who deemed the arachnoid cyst was not likely contributing to the patient's seizures and therefore no surgical intervention was needed. The patient has chronic left-sided weakness and also has neuropathy which disproportionately impacts his left upper and lower extremities. Review of Systems   Review of Systems   Constitutional: Positive for fatigue. Negative for chills and fever. HENT: Negative for rhinorrhea and sore throat. Eyes: Negative for visual disturbance. Respiratory: Negative for cough and shortness of breath. Cardiovascular: Negative for chest pain and palpitations. Gastrointestinal: Positive for nausea (due to decreased PO intake per patient) and vomiting (due to decreased PO intake per patient). Negative for abdominal pain. Genitourinary: Negative for dysuria. Musculoskeletal: Negative for arthralgias and myalgias. Skin: Negative for rash. Neurological: Positive for seizures, weakness and numbness. Negative for dizziness, facial asymmetry, light-headedness and headaches. Psychiatric/Behavioral: The patient is not nervous/anxious.       Medications   Scheduled Meds:    [START ON 5/11/2022] aspirin  81 mg Oral Daily    [START ON 5/11/2022] DULoxetine  60 mg Oral Daily    latanoprost  1 drop Both Eyes Nightly    rosuvastatin  20 mg Oral Nightly    sodium chloride flush  10 mL IntraVENous 2 times per day    insulin glargine  42 Units SubCUTAneous BID    insulin lispro  0-12 Units SubCUTAneous TID WC    insulin lispro  0-6 Units SubCUTAneous Nightly    morphine        [START ON 5/11/2022] lacosamide (VIMPAT) IVPB  50 mg IntraVENous BID    lacosamide (VIMPAT) IVPB  100 mg IntraVENous Once    albumin human  25 g IntraVENous Q6H     Continuous Infusions:    dextrose      sodium chloride      sodium chloride 125 mL/hr at 05/10/22 2245     PRN Meds: albuterol, glucose, dextrose bolus **OR** dextrose bolus, glucagon (rDNA), dextrose, sodium chloride flush, sodium chloride, polyethylene glycol, acetaminophen **OR** acetaminophen, oxyCODONE-acetaminophen  Medications Prior to Admission: No current facility-administered medications on file prior to encounter. Current Outpatient Medications on File Prior to Encounter   Medication Sig Dispense Refill    oxyCODONE-acetaminophen (PERCOCET) 7.5-325 MG per tablet Take 1 tablet by mouth every 8 hours as needed for Pain for up to 30 days. 90 tablet 0    albuterol (PROVENTIL) (2.5 MG/3ML) 0.083% nebulizer solution Take 3 mLs by nebulization every 6 hours as needed for Wheezing or Shortness of Breath 120 each 11    furosemide (LASIX) 20 MG tablet Take 2 tablets by mouth every morning AND 1 tablet every evening.  (Patient taking differently: Take 2 tablets by mouth every morning AND 2 tablet every evening.) 180 tablet 5    potassium chloride (KLOR-CON M) 20 MEQ extended release tablet Take 1 tablet by mouth daily 60 tablet 5    metoprolol succinate (TOPROL XL) 100 MG extended release tablet Take 1 tablet by mouth daily 30 tablet 3    aspirin 81 MG chewable tablet Take 1 tablet by mouth daily 30 tablet 3    hydrALAZINE (APRESOLINE) 50 MG tablet Take 1 tablet by mouth every 8 hours (Patient taking differently: Take 50 mg by mouth every 8 hours DO NOT TAKE IF SBP IS <110) 90 tablet 3    nicotine (NICODERM CQ) 21 MG/24HR Place 1 patch onto the skin daily 30 patch 3    sacubitril-valsartan (ENTRESTO) 24-26 MG per tablet Take 1 tablet by mouth 2 times daily 60 tablet 3    umeclidinium-vilanterol (ANORO ELLIPTA) 62.5-25 MCG/INH AEPB inhaler Inhale 1 puff into the lungs daily 1 each 11    dapagliflozin (FARXIGA) 10 MG tablet Take 1 tablet by mouth every morning 90 tablet 1    senna (SENOKOT) 8.6 MG tablet Take 2 tablets by mouth daily      tamsulosin (FLOMAX) 0.4 MG capsule Take 1 capsule by mouth nightly 30 capsule 0    DULoxetine (CYMBALTA) 60 MG extended release capsule Take 1 capsule by mouth daily 30 capsule 3    insulin glargine (LANTUS) 100 UNIT/ML injection vial Inject 84 Units into the skin nightly 1 vial 3    insulin lispro (HUMALOG) 100 blind)  Right visual field deficit: none  Left visual field deficit: none     CN III, IV, VI   Pupils are equal, round, and reactive to light. Extraocular motions are normal.   Right pupil: Shape: regular. Reactivity: brisk. Left pupil: Shape: regular. Reactivity: brisk. CN V   Facial sensation intact. Right facial sensation deficit: none  Left facial sensation deficit: none    CN VII   Facial expression full, symmetric. Right facial weakness: none  Left facial weakness: none    CN VIII   CN VIII normal.   Hearing: intact    CN IX, X   CN IX normal.   CN X normal.   Palate: symmetric    CN XII   CN XII normal.   Tongue: not atrophic  Fasciculations: absent  Tongue deviation: none  CN XI testing deferred due to lines being in place     Motor Exam   Muscle bulk: normal  Overall muscle tone: normal  Right arm tone: normal  Left arm tone: normal  Right leg tone: normal  Left leg tone: normal  Muscle strength 5/5 in RUE, 4+/5 in RLE, 4-/5 in LUE, and 3/5 in LLE. Sensory Exam   Sensation diminished from mid shin distally to light touch in BLE. Sensation diminished in LUE from wrist distally. Labs/Imaging/Diagnostics   Labs:  CBC:  Recent Labs     05/10/22  1050   WBC 10.6   RBC 4.05*   HGB 12.1*   HCT 38.5*   MCV 95.1*        CHEMISTRIES:  Recent Labs     05/10/22  1050   *   K 3.7   CL 94*   CO2 21*   BUN 33*   CREATININE 3.0*   GLUCOSE 208*     PT/INR:  Recent Labs     05/10/22  1050   INR 1.12     APTT:No results for input(s): APTT in the last 72 hours.   LIVER PROFILE:  Recent Labs     05/10/22  1050   AST 60*   ALT 48   BILIDIR 0.4*   BILITOT 0.7   ALKPHOS 129*     Imaging Last 24 Hours:  ECHO Complete 2D W Doppler W Color    Result Date: 5/10/2022  Transthoracic Echocardiography Report (TTE)  Demographics   Patient Name    [facility]       Gender               Male                  [address]   MR #            [id number]        Race                  Ethnicity   Account #       [de-identified]        Room Number          0002   Accession       [id number]       Date of Study        05/10/2022  Number   Date of Birth   1965       Referring Physician  January Mcnamara MD                                                        Senora Nations CNP   Age             65 year(s)       Sonographer          Ethelda Sandifer MD                                   Physician  Procedure Type of Study   TTE procedure:ECHOCARDIOGRAM COMPLETE 2D W DOPPLER W COLOR. Procedure Date Date: 05/10/2022 Start: 03:00 PM Study Location: Bedside Technical Quality: Limited visualization due to body habitus. Indications:Heart Failure. Additional Medical History:Hyperlipidemia, hypertension, COPD, alcohol abuse, diabetic, chronic kidney disease, severe aortic stenosis Patient Status: Routine Height: 75.2 inches Weight: 328.49 pounds BSA: 2.71 m^2 BMI: 40.84 kg/m^2 BP: 143/82 mmHg  Conclusions   Summary  Normal left ventricular size and severely reduced systolic function. There was severe global hypokinesis. Wall thickness was within normal limits. Ejection fraction was estimated at 30-35%. Doppler parameters were consistent with abnormal left ventricular  relaxation (grade 1 diastolic dysfunction). The aortic valve was trileaflet with increased thickness and reduced  leaflet mobility. DOPPLER: Transaortic velocity was 3.5 m/s, mean gradient  30 mmHg, max gradient 48 mmHg, LOBITO 0.99 cm2, consistent with severe LF-LG  aortic stenosis. LOBITO is overestimated due to LVOT measurement error. There  was no evidence of aortic regurgitation. IVC size is not well seen.   The pericardium was normal in appearance with no evidence of a pericardial  effusion   Signature ----------------------------------------------------------------  Electronically signed by Chele Macias MD (Interpreting  physician) on 05/10/2022 at 04:09 PM  ----------------------------------------------------------------   Findings   Mitral Valve  The mitral valve structure demonstrated thickened PMVL and reduced  mobility. DOPPLER: The transmitral velocity was within the normal range  with no evidence for mitral stenosis. There was no evidence of mitral  regurgitation. Aortic Valve  The aortic valve was trileaflet with increased thickness and reduced  leaflet mobility. DOPPLER: Transaortic velocity was 3.5 m/s, mean gradient  30 mmHg, max gradient 48 mmHg, LOBITO 0.99 cm2, consistent with severe LF-LG  aortic stenosis. LOBITO is overestimated due to LVOT measurement error. There  was no evidence of aortic regurgitation. Tricuspid Valve  The tricuspid valve structure was normal with normal leaflet separation. DOPPLER: There was no evidence of tricuspid stenosis. There was no  evidence of tricuspid regurgitation. Pulmonic Valve  The pulmonic valve leaflets were not well seen. DOPPLER: The transpulmonic  velocity was within the normal range with no evidence for regurgitation. Left Atrium  Left atrial size was normal.   Left Ventricle  Normal left ventricular size and severely reduced systolic function. There was severe global hypokinesis. Wall thickness was within normal limits. Ejection fraction was estimated at 30-35%. Doppler parameters were consistent with abnormal left ventricular  relaxation (grade 1 diastolic dysfunction). Right Atrium  Right atrial size was normal.   Right Ventricle  The right ventricular size was normal with normal systolic function and  wall thickness. Pericardial Effusion  The pericardium was normal in appearance with no evidence of a pericardial  effusion. Pleural Effusion  No evidence of pleural effusion. Aorta / Great Vessels  IVC size is not well seen.   M-Mode/2D Measurements & Calculations   LV Diastolic      LV Systolic Dimension: 4.9 cm    LA Dimension: 3.9 cmAO  Dimension: 5.8 cm LV Volume Diastolic: 717 ml      Root Dimension: 3.9 cm  LV FS:15.5 %      LV Volume Systolic: 35.2 ml  LV PW Diastolic:  LV EDV/LV EDV Index: 133 ml/49  1 cm              m^2LV ESV/LV ESV Index: 58.2  Septum Diastolic: PO/39 m^2                        RV Diastolic Dimension:  1 cm              EF Calculated: 35 %              3.5 cm                     LV Length: 9.2 cm                LA/Aorta: 1   LV Area           LVOT: 2 cm  Diastolic: 45.9  cm^2  LV Area Systolic:  95.8 cm^2  Doppler Measurements & Calculations   MV Peak E-Wave:     AV Peak Velocity: 307    LVOT Peak Velocity: 125 cm/s  72.4 cm/s           cm/s                     LVOT Mean Velocity: 103 cm/s  MV Peak A-Wave: 114 AV Peak Gradient: 37.7   LVOT Peak Gradient: 5  cm/s                mmHg                     mmHgLVOT Mean Gradient: 4  MV E/A Ratio: 0.64  AV Mean Velocity: 228    mmHg  MV Peak Gradient:   cm/s  2.1 mmHg            AV Mean Gradient: 27                      mmHg  MV Deceleration     AV VTI: 57 cm            TR Velocity:293 cm/s  Time: 198 msec      AV Area                  TR Gradient:34.34 mmHg  MV P1/2t: 58 msec   (Continuity):1.22 cm^2  MVA by PHT:3.79  cm^2                LVOT VTI: 22.1 cm   MV E' Septal  Velocity: 6.4 cm/s  MV A' Septal        AV DVI (VTI): 0.39AV DVI  Velocity: 12.2 cm/s (Vmax):0.41  MV E' Lateral  Velocity: 9.5 cm/s  MV A' Lateral  Velocity: 13.1 cm/s  E/E' septal: 11.31  E/E' lateral: 7.62  [email or web address] [id number]    XR CHEST PORTABLE    Result Date: 5/10/2022  PROCEDURE: XR CHEST PORTABLE CLINICAL INFORMATION: post CVC placement COMPARISON: Chest radiograph 5/10/2022, 5/4/2022. TECHNIQUE: AP portable chest radiograph performed.  FINDINGS: The tip of the right internal jugular venous catheter terminates overlying the region of the superior vena cava. The costophrenic angles and lung bases are not well imaged on this study. Nonspecific devices are seen overlying the chest. No focal pulmonary consolidation. Cardiac silhouette is not enlarged. No obvious pneumothorax. No acute bony abnormality. 1. The tip of the right internal jugular venous catheter terminates overlying the region of the superior vena cava. 2. No obvious right pneumothorax is seen. **This report has been created using voice recognition software. It may contain minor errors which are inherent in voice recognition technology. ** Final report electronically signed by Dr Dong Conrad on 5/10/2022 12:38 PM    XR CHEST PORTABLE    Result Date: 5/10/2022  PROCEDURE: XR CHEST PORTABLE CLINICAL INFORMATION: Nausea COMPARISON: Chest radiograph 5/4/2022 TECHNIQUE: AP portable chest radiograph performed. FINDINGS: Apparently the patient was imaged with life vest. No focal pulmonary consolidation. Cardiac silhouette is not enlarged. No pleural effusion. No pneumothorax. No acute bony abnormality. 1. No acute cardiopulmonary finding. **This report has been created using voice recognition software. It may contain minor errors which are inherent in voice recognition technology. ** Final report electronically signed by Dr Dong Conrad on 5/10/2022 11:36 AM        Assessment and Plan:          1. Breakthrough seizure  · CT head without contrast ordered  · Start Vimpat. We will load patient with 100 mg tonight and start 50 mg twice daily. We will consider titrating this as the necessity arises. · Optimize medical condition. · Seizure precautions. IV Ativan as needed for acute seizure. Call with any seizure activity.   · Will obtain MRI of brain with and without contrast when patient's creatinine normalizes  · Neurology following    Electronically signed by Akash Matos PA-C on 5/10/22 at 5:49 PM EDT

## 2022-05-10 NOTE — ED PROVIDER NOTES
[address] [address]          Pt Name: Brittny Zavaleta  MRN: [id number]  [place] 1965  Date of Evaluation: 5/10/2022  Treating Resident Physician: Mariano Ivey MD  Supervising Physician: Gia Wilder MD    [address] [place] [address]       Chief Complaint   Patient presents with    Hypotension    Fatigue     History obtained from chart review and the patient. HISTORY OF PRESENT ILLNESS    HPI    Brittny Zavaleta is a 65 y.o. male with a past medical history significant for CHF, cardiomyopathy, severe aortic stenosis presents to the emergency department for evaluation of unwell, decreased urine output, fatigue, hypotension in cardiology office earlier today. Aydee Reynolds has severe aortic stenosis, wears a LifeVest due to low LVEF, and has been shocked twice by his LifeVest.  He is also fluid restricted to 64 ounces a day which he states has been sticking to. States his weight is stable. Was seen earlier today and cardiology office and referred to emergency department for hypotension, fatigue, feeling unwell. The patient has no other acute complaints at this time. REVIEW OF SYSTEMS   Review of Systems   Constitutional: Positive for activity change and fatigue. Negative for appetite change, chills, diaphoresis, fever and unexpected weight change. HENT: Negative for congestion, rhinorrhea, sneezing and sore throat. Respiratory: Positive for shortness of breath. Negative for cough, chest tightness and wheezing. Cardiovascular: Positive for leg swelling. Negative for chest pain and palpitations. Gastrointestinal: Positive for nausea and vomiting. Negative for abdominal pain, constipation and diarrhea. Endocrine: Negative for polyphagia and polyuria. Genitourinary: Positive for decreased urine volume. Negative for dysuria and urgency. Musculoskeletal: Positive for neck pain. Negative for back pain.    Skin: Negative for color change, pallor, rash and wound. Allergic/Immunologic: Negative for environmental allergies, food allergies and immunocompromised state. Neurological: Negative for dizziness, seizures, syncope, weakness and headaches. Psychiatric/Behavioral: Negative for agitation and confusion. All other systems reviewed and are negative. PAST MEDICAL AND SURGICAL HISTORY     Past Medical History:   Diagnosis Date    Acute kidney injury ([facility] [facility] 12/2020    due to Enterococous Bacteremia , fluid overload, low sodium    Amputation of right great toe (HCC) 02/18/2021    Asthma     Brain tumor ([facility] [address]     Cirrhosis (HCC)     ETOH abuse    CKD (chronic kidney disease)     l.brown-osu spetie    COPD (chronic obstructive pulmonary disease) (HCC)     Diabetes mellitus (HCC)     type 2-insulin lantus and humalog    Falling     Glaucoma     Hepatitis C     History of blood transfusion     s/p nasal surgery    Hyperlipidemia     Hypertension     Nallu    Legally blind     Obesity     Pain management     karol marzec-percocet Rx    Right foot infection 11/2021    Seizures ([facility] [address]      Past Surgical History:   Procedure Laterality Date    BACK SURGERY      CARDIAC CATHETERIZATION  08/03/2021    EYE SURGERY      FIBULA FRACTURE SURGERY Left 03/21/2019    LEFT FIBULAR SHAFT ORIF performed by Brooks Hoskins DPM at [address] [place] [address] Rd Right     Steel pins in place    FRACTURE SURGERY      NASAL SINUS SURGERY Bilateral 11/29/2020    FLEXIBLE BRONCHOSCOPY WITH BRONCHOALVEOLAR LAVAGE WITH CULTURES.  NASAL ENDOSCOPY WITH POSSIBLE CAUTERY ADN NASAL PACKING performed by Mohsen Sullivan MD at [address] [facility]  01/2020    SPINE SURGERY N/A 02/19/2021    KYPHOPLASTY at T4, L2, L4 performed by Keshia Self MD at [address] [facility] N/A 8/24/2021    LUMBAR 3 AND LUMBAR 5 KYPHOPLASTY performed by Zan Vanegas MD at [address] [facility] Right 09/23/2020    AMPUTATION DISTAL APSECT RIGHT HALLUX, REMOVAL OF HARDWARE RIGHT HALLUX performed by Min Sanchez DPM at [address] [facility] N/A 04/25/2019    EGD BIOPSY performed by Danielle Holt MD at [address] [facility]         MEDICATIONS     Current Facility-Administered Medications:     albuterol (PROVENTIL) nebulizer solution 2.5 mg, 2.5 mg, Nebulization, Q6H PRN, Berryora MEGHAN Bailey - RONALDO  Kaba  [START ON 5/11/2022] aspirin chewable tablet 81 mg, 81 mg, Oral, Daily, [facility]  Kaba  [START ON 5/11/2022] DULoxetine (CYMBALTA) extended release capsule 60 mg, 60 mg, Oral, Daily, [facility] C[facility]    latanoprost (XALATAN) 0.005 % ophthalmic solution 1 drop, 1 drop, Both Eyes, Nightly, [facility] CNP    rosuvastatin (CRESTOR) tablet 20 mg, 20 mg, Oral, Nightly, [facility], APRN - CNP    glucose chewable tablet 16 g, 4 tablet, Oral, PRN, [facility] CNP    dextrose bolus 10% 125 mL, 125 mL, IntraVENous, PRN **OR** dextrose bolus 10% 250 mL, 250 mL, IntraVENous, PRN, [facility] CNP    glucagon (rDNA) injection 1 mg, 1 mg, IntraMUSCular, PRN, [facility] - CNP    dextrose 5 % solution, 100 mL/hr, IntraVENous, PRN [facility] CNP    sodium chloride flush 0.9 % injection 10 mL, 10 mL, IntraVENous, 2 times per day, [facility]  CNP    sodium chloride flush 0.9 % injection 10 mL, 10 mL, IntraVENous, PRN, [facility] - CNP    0.9 % sodium chloride infusion, , IntraVENous, PRN, [facility] - CNP    polyethylene glycol (GLYCOLAX) packet 17 g, 17 g, Oral, Daily PRN, [facility] - CNP    acetaminophen (TYLENOL) tablet 650 mg, 650 mg, Oral, Q6H PRN **OR** acetaminophen (TYLENOL) suppository 650 mg, 650 mg, Rectal, Q6H PRN, [facility] - CNP    insulin glargine (LANTUS) injection vial 42 Units, 42 Units, SubCUTAneous, BID, [facility], APRN - CNP    insulin lispro (HUMALOG) injection vial 0-12 Units, 0-12 Units, SubCUTAneous, TID WC, MEGHAN Martinez CNP, 2 Units at 05/10/22 1800    insulin lispro (HUMALOG) injection vial 0-6 Units, 0-6 Units, SubCUTAneous, Nightly, MEGHAN Martinez CNP  [facility]  [START ON 5/11/2022] lacosamide (VIMPAT) 50 mg in sodium chloride 0.9 % 55 mL IVPB, 50 mg, IntraVENous, BID, Arnold Sanford PA-C    0.9 % sodium chloride infusion, , IntraVENous, Continuous, MEGHAN Martinez CNP, Last Rate: 125 mL/hr at 05/10/22 1749, New Bag at 05/10/22 1749    albumin human 25 % IV solution 25 g, 25 g, IntraVENous, Q6H, MEGHAN Martinez CNP, Last Rate: 100 mL/hr at 05/10/22 1800, 25 g at 05/10/22 1800    oxyCODONE-acetaminophen (PERCOCET) 7.5-325 MG per tablet 1 tablet, 1 tablet, Oral, Q8H PRN, MGEHAN Martinez CNP, 1 tablet at 05/10/22 1902      SOCIAL HISTORY     Social History     Social History Narrative    Not on file     Social History     Tobacco Use    Smoking status: Current Every Day Smoker     Packs/day: 1.00     Years: 40.00     Pack years: 40.00     Types: Cigarettes    Smokeless tobacco: Never Used    Tobacco comment: Currently smoking electronic cigarettes   Vaping Use    Vaping Use: Never used   Substance Use Topics    Alcohol use:  Yes     Alcohol/week: 3.0 standard drinks     Types: 3 Cans of beer per week    Drug use: Not Currently     Types: Marijuana Shirley Luevano     Comment: medical marijuana not currently using         ALLERGIES     Allergies   Allergen Reactions    Adhesive Tape Rash     Bandaid    Keppra [Levetiracetam] Rash         FAMILY HISTORY     Family History   Problem Relation Age of Onset    Heart Attack Father     Heart Attack Brother         pneumonia    No Known Problems Mother     Cancer Sister         breast    No Known Problems Maternal Grandmother     No Known Problems Maternal Grandfather     Liver Cancer Paternal Grandmother     Heart Attack Paternal Grandfather     Heart Disease Brother         stents    Colon Cancer Neg Hx     Colon Polyps Neg Hx          PREVIOUS RECORDS   Previous records reviewed: Prior Notes from Cardiology reviewed. PHYSICAL EXAM     ED Triage Vitals   BP Temp Temp Source Pulse Resp SpO2 Height Weight   -- 05/10/22 1047 05/10/22 1045 05/10/22 1045 05/10/22 1045 05/10/22 1045 05/10/22 1045 05/10/22 1045    97.8 °F (36.6 °C) Oral 97 18 93 % 6' 3\" (1.905 m) (!) 326 lb (147.9 kg)     Initial vital signs and nursing assessment reviewed and abnormal from hypotensive. Body mass index is 41.2 kg/m². Pulsoximetry is normal per my interpretation. Additional Vital Signs:  Vitals:    05/10/22 1600   BP: 114/75   Pulse: 98   Resp: 16   Temp:    SpO2:        Physical Exam  Vitals and nursing note reviewed. Constitutional:       General: He is not in acute distress. Appearance: Normal appearance. He is normal weight. He is not ill-appearing, toxic-appearing or diaphoretic. HENT:      Head: Normocephalic. Nose: Nose normal.      Mouth/Throat:      Mouth: Mucous membranes are moist.      Pharynx: Oropharynx is clear. Eyes:      General: No scleral icterus. Right eye: No discharge. Left eye: No discharge. Conjunctiva/sclera: Conjunctivae normal.   Neck:      Comments: Distant heart sounds  Cardiovascular:      Rate and Rhythm: Normal rate and regular rhythm. Pulses: Normal pulses. Pulmonary:      Effort: Pulmonary effort is normal.      Breath sounds: Normal breath sounds. Abdominal:      General: Abdomen is flat. There is distension. Palpations: Abdomen is soft. Musculoskeletal:         General: Normal range of motion. Cervical back: Normal range of motion. Skin:     General: Skin is warm and dry. Capillary Refill: Capillary refill takes 2 to 3 seconds. Neurological:      Mental Status: He is alert and oriented to person, place, and time.    Psychiatric:         Mood and Affect: Mood normal.         Behavior: Behavior normal.             MEDICAL DECISION MAKING   Initial Differential Diagnosis: (includes but is not limited to)  1. Cardiomyopathy  2. Aortic stenosis, severe  3. MATTIE    Plan:    ECG then continuous monitor   IV access   CBC, BMP, LFT, troponin, BNP, Pro-Mikey, lactate, blood cultures   Chest x-ray   Swabs for COVID-19 and influenza   500 mils fluid bolus   Pressors   Central line admit to ICU    Summary:  Patient with aortic stenosis, worsening fatigue, dizziness, feeling unwell for the past few days, decreased urine output. Patient is fluid restricted to 64 ounces a day which says he has been drinking every day. Was seen earlier today in cardiology office and sent to emergency department for evaluation. Initial lactate 4.4, new MATTIE with a creatinine of 3.0. Appeared dry on exam, gave 500 fluid bolus, followed by central line and initiation of norepinephrine. Dr. Teetee Blackwell consulted -who commends admission to ICU with initiation of pressors, also recommend Butler line placement once arrives in ICU.     ICU consulted for admission      ED RESULTS   Laboratory results:  Labs Reviewed   CBC WITH AUTO DIFFERENTIAL - Abnormal; Notable for the following components:       Result Value    RBC 4.05 (*)     Hemoglobin 12.1 (*)     Hematocrit 38.5 (*)     MCV 95.1 (*)     MCHC 31.4 (*)     RDW-CV 14.8 (*)     RDW-SD 51.0 (*)     All other components within normal limits   BASIC METABOLIC PANEL W/ REFLEX TO MG FOR LOW K - Abnormal; Notable for the following components:    Sodium 132 (*)     Chloride 94 (*)     CO2 21 (*)     Glucose 208 (*)     BUN 33 (*)     CREATININE 3.0 (*)     All other components within normal limits   HEPATIC FUNCTION PANEL - Abnormal; Notable for the following components:    Bilirubin, Direct 0.4 (*)     Alkaline Phosphatase 129 (*)     AST 60 (*)     All other components within normal limits   TROPONIN - Abnormal; Notable for the following components:    Troponin T 0.039 (*)     All other components within normal limits LACTATE, SEPSIS - Abnormal; Notable for the following components:    Lactic Acid, Sepsis 4.4 (*)     All other components within normal limits   LACTATE, SEPSIS - Abnormal; Notable for the following components:    Lactic Acid, Sepsis 2.3 (*)     All other components within normal limits   GLOMERULAR FILTRATION RATE, ESTIMATED - Abnormal; Notable for the following components:    Est, Glom Filt Rate 22 (*)     All other components within normal limits   ANION GAP - Abnormal; Notable for the following components:    Anion Gap 17.0 (*)     All other components within normal limits   PROCALCITONIN - Abnormal; Notable for the following components:    Procalcitonin 0.17 (*)     All other components within normal limits   MICROSCOPIC URINALYSIS - Abnormal; Notable for the following components:    Glucose, Urine 250 (*)     Blood, Urine TRACE (*)     All other components within normal limits   BLOOD GAS, VENOUS - Abnormal; Notable for the following components:    PCO2, MIXED VENOUS 40 (*)     All other components within normal limits   POCT GLUCOSE - Abnormal; Notable for the following components:    POC Glucose 175 (*)     All other components within normal limits   COVID-19, RAPID   RAPID INFLUENZA A/B ANTIGENS   VRE SCREEN BY PCR   CULTURE, BLOOD 1    Narrative:     Epic Plan - [id number]   CULTURE, BLOOD 2   CULTURE, URINE    Narrative:     Epic Plan - [id number]   BRAIN NATRIURETIC PEPTIDE   PROTIME-INR   C-REACTIVE PROTEIN   MRSA BY PCR   URINALYSIS WITH MICROSCOPIC   TROPONIN    Narrative:     Epic Plan - [id number]   LACTIC ACID   LACTIC ACID   LACTIC ACID   BASIC METABOLIC PANEL W/ REFLEX TO MG FOR LOW K   CBC WITH AUTO DIFFERENTIAL   MAGNESIUM   LACTIC ACID   POCT GLUCOSE   POCT GLUCOSE       Radiologic studies results:  CT HEAD WO CONTRAST   Final Result   Impression:   Complex mass within the left middle cranial fossa.       This document has been electronically signed by: Melquiades Crawford MD on    05/10/2022 07:05 PM All CTs at this facility use dose modulation techniques and iterative    reconstructions, and/or weight-based dosing   when appropriate to reduce radiation to a low as reasonably achievable. XR CHEST PORTABLE   Final Result   1. The tip of the right internal jugular venous catheter terminates overlying the region of the superior vena cava. 2. No obvious right pneumothorax is seen. **This report has been created using voice recognition software. It may contain minor errors which are inherent in voice recognition technology. **      Final report electronically signed by Dr Dunn Members on 5/10/2022 12:38 PM      XR CHEST PORTABLE   Final Result   1. No acute cardiopulmonary finding. **This report has been created using voice recognition software. It may contain minor errors which are inherent in voice recognition technology. **      Final report electronically signed by Dr Dunn Members on 5/10/2022 11:36 AM          ED Medications administered this visit:   Medications   albuterol (PROVENTIL) nebulizer solution 2.5 mg (has no administration in time range)   aspirin chewable tablet 81 mg (has no administration in time range)   DULoxetine (CYMBALTA) extended release capsule 60 mg (has no administration in time range)   latanoprost (XALATAN) 0.005 % ophthalmic solution 1 drop (has no administration in time range)   rosuvastatin (CRESTOR) tablet 20 mg (has no administration in time range)   glucose chewable tablet 16 g (has no administration in time range)   dextrose bolus 10% 125 mL (has no administration in time range)     Or   dextrose bolus 10% 250 mL (has no administration in time range)   glucagon (rDNA) injection 1 mg (has no administration in time range)   dextrose 5 % solution (has no administration in time range)   sodium chloride flush 0.9 % injection 10 mL (has no administration in time range)   sodium chloride flush 0.9 % injection 10 mL (has no administration in time range)   0.9 % sodium chloride infusion (has no administration in time range)   polyethylene glycol (GLYCOLAX) packet 17 g (has no administration in time range)   acetaminophen (TYLENOL) tablet 650 mg (has no administration in time range)     Or   acetaminophen (TYLENOL) suppository 650 mg (has no administration in time range)   insulin glargine (LANTUS) injection vial 42 Units (has no administration in time range)   insulin lispro (HUMALOG) injection vial 0-12 Units (2 Units SubCUTAneous Given 5/10/22 1800)   insulin lispro (HUMALOG) injection vial 0-6 Units (has no administration in time range)   lacosamide (VIMPAT) 50 mg in sodium chloride 0.9 % 55 mL IVPB (has no administration in time range)   0.9 % sodium chloride infusion ( IntraVENous New Bag 5/10/22 1749)   albumin human 25 % IV solution 25 g (25 g IntraVENous New Bag 5/10/22 1800)   oxyCODONE-acetaminophen (PERCOCET) 7.5-325 MG per tablet 1 tablet (1 tablet Oral Given 5/10/22 1902)   lactated ringers bolus (500 mLs IntraVENous Given by Other Clinician 5/10/22 1248)   morphine 2 MG/ML injection (2 mg  Given 5/10/22 1842)   lacosamide (VIMPAT) 100 mg in sodium chloride 0.9 % 60 mL IVPB (100 mg IntraVENous New Bag 5/10/22 1850)         ED COURSE     ED Course as of 05/10/22 1921   Tue May 10, 2022   1128 Lactic Acid, Sepsis(!): 4.4 [SC]   1147 Troponin T(!): 0.039 [SC]   1147 Creatinine(!!): 3.0 [SC]   1225 Discussed with Dr. Rocio Grider - admit to ICU, start norepinephrine at 5 mcg, ask ICU to place swan. [SC]   1300 Call to ICU - no answer [SC]   1319 Discussed  with Td Stokes NP (ICU).   Will admit to ICU [SC]      ED Course User Index  [SC] Prasanna Berry MD         PROCEDURES   Central Line    Date/Time: 5/10/2022 12:15 PM  Performed by: Prasanna Berry MD  Authorized by: Tahira Marshall MD     Consent:     Consent obtained:  Written    Consent given by:  Patient    Risks discussed:  Arterial puncture, incorrect placement, nerve damage, pneumothorax, infection and bleeding    Alternatives discussed:  No treatment and delayed treatment  Pre-procedure details:     Hand hygiene: Hand hygiene performed prior to insertion      Sterile barrier technique: All elements of maximal sterile technique followed      Skin preparation:  2% chlorhexidine    Skin preparation agent: Skin preparation agent completely dried prior to procedure    Anesthesia (see MAR for exact dosages): Anesthesia method:  Local infiltration    Local anesthetic:  Lidocaine 1% w/o epi  Procedure details:     Location:  R internal jugular    Patient position:  Trendelenburg    Procedural supplies:  Triple lumen    Catheter size:  7 Fr (16cm)    Landmarks identified: yes      Ultrasound guidance: yes      Sterile ultrasound techniques: Sterile gel and sterile probe covers were used      Number of attempts:  1    Successful placement: yes    Post-procedure details:     Post-procedure:  Dressing applied and line sutured    Assessment:  Blood return through all ports, no pneumothorax on x-ray, free fluid flow and placement verified by x-ray    Patient tolerance of procedure: Tolerated well, no immediate complications          MEDICATION CHANGES     Current Discharge Medication List            FINAL DISPOSITION     Final diagnoses: Aortic stenosis, severe   Generalized weakness   Syncope and collapse   Closed head injury, initial encounter   MATTIE (acute kidney injury) ([facility] [address]       Condition: condition: serious  Dispo: Admit to CCU/ICU      This transcription was electronically signed. Parts of this transcriptions may have been dictated by use of voice recognition software and electronically transcribed, and parts may have been transcribed with the assistance of an ED scribe. The transcription may contain errors not detected in proofreading. Please refer to my supervising physician's documentation if my documentation differs.     Electronically Signed: Ravinder Brady MD, 05/10/22, 7:21 PM         Azra Stone

## 2022-05-10 NOTE — ED NOTES
Pt to room 2. No distress noted. Respirations even and unlabored. /63.  Dr. Deya Roberts at bedside for central line placement     Shahriar Arzate RN  05/10/22 7397

## 2022-05-10 NOTE — ED NOTES
ED nurse-to-nurse bedside report    Chief Complaint   Patient presents with    Hypotension    Fatigue      LOC: alert and orientated to name, place, date  Vital signs   Vitals:    05/10/22 1123 05/10/22 1148 05/10/22 1205 05/10/22 1312   BP: 100/66 85/76 (!) 113/43 117/78   Pulse: 97  90 92   Resp: 20  16 18   Temp:       TempSrc:       SpO2: 94%  95% 96%   Weight:       Height:          Pain:    Pain Interventions: NA  Pain Goal: 2/10  Oxygen: No    Current needs required RA   Telemetry: Yes  LDAs:   Peripheral IV 05/10/22 Left Forearm (Active)   Site Assessment Clean, dry & intact 05/10/22 1125     Continuous Infusions:    norepinephrine       Mobility: Requires assistance * 1  Cronin Fall Risk Score: No flowsheet data found.   Fall Interventions: call light in reach, bed in low position with wheels locked, side rails up x2  Report given to: [employer] Silvia Meneses RN  05/10/22 3430

## 2022-05-10 NOTE — H&P
CRITICAL CARE PROGRESS NOTE      Patient:  Roddie Landau    Unit/Bed:4D-02/002-A  YOB: 1965  MRN: [id number]   PCP: Duke Tinoco MD  Date of Admission: 5/10/2022  Chief Complaint:- SOB    Assessment and Plan:      Severe aortic stenosis: Follows with Dr. Sadia Monk in the outpatient. Plans for TAVR. Echo pending. Acute on chronic kidney injury: Baseline creatinine 1.2-1.8. Current creatinine 3.0. Patient will undergo fluid resuscitation and albumin infusion. Grace Montanezuber shows that patient is fluid contracted. Anion gap metabolic acidosis:   Hypovolemia: Not in shock. Patient appears to be dry on exam.  Grace Nunez shows normal PA pressures with a CVP of 1. Lactic acidosis: Lactic elevated on arrival.  Trending down. Repeat 2.3. Patient has no signs of infection. No antibiotics at this time. Procalcitonin negative. Mildly elevated troponin: Likely demand ischemia. EKG does not show any ST elevation. Cardiology following. Diabetes mellitus: Continue home Lantus, sliding scale insulin. Monitor closely. Diabetic diet. Hyponatremia: Sodium 132. Continue with 0.9 saline resuscitation. Chronic macrocytic anemia: Hemoglobin 12.1, hematocrit 38.5, no active signs of bleeding. Appears to be at baseline. History of brain tumor: Patient did have seizure on admission to the ICU. Stat CT head will be ordered to rule out acute pathology. Plans for MRI with and without contrast once creatinine is improved. Neurology has been consulted. Vimpat has been given. This case was discussed with neuro PA who does agree. Cirrhosis: Elevated AST. Albumin has been initiated. Patient does not have significant edema. Monitor. COPD: Not in exacerbation. Patient wears no oxygen at baseline does sleep with 3 L. Seizures: Noted in patient's history. Patient was on no medication currently. Patient states he has allergy to Keppra. Patient was not started on Vimpat. Neurology was consulted.         INITIAL H AND P AND ICU COURSE:  Wesley Bacon is a 65year old white male who presnt[facility] to [facility] on 5/10[facility]2022 with complaints of hypotenison, weakness and vomiting. He has a past medical current every day smoker, hepatitis C, COPD, CKD, hypertension, cirrhosis, brain tumor, diabetes mellitus, cardiomyopathy. Per report patient is a longstanding cardiomyopathy patient he does wear a LifeVest.  He has severe aortic stenosis and follows with Dr. Berlin Valadez. He was followed up in heart failure clinic today when he was hypotensive and lethargic. Patient was advised to go to the emergency room. In the emergency room he was hypotensive. There were questions of unresponsive times. Patient was started on Levophed which was subsequently stopped on his arrival to the ICU. On my exam patient was alert and oriented x3. He only complains of pain in his back and neck which he states is chronic. He did not appear fluid overloaded. His blood pressure was within normal limits. Levophed has been stopped at this time. Patient states that he had not peed for a couple of days. Creatinine was noted to be elevated. He denies oxygen use at home except during sleep. He denies use of CPAP. He denies chest pain. He states he had had a cough but denied any sputum production. He denies fevers or chills. [place] catheter was placed which did reveal normal PA pressures. Patient appears to be dry. Patient will give fluid resuscitation along with albumin. This case was discussed with cardiology Dr. Berlin Valadez and Dr. Corrinne Angry. Past Medical History:  Per HPI   Family History: Father: MI  Social History: Current every day smoker, social alcohol use, denies drug use. Review of Systems   Constitutional: Positive for fatigue. Negative for fever. HENT: Negative for sore throat and trouble swallowing. Eyes: Negative for photophobia and visual disturbance. Respiratory: Positive for shortness of breath.  Negative for chest tightness and wheezing. Cardiovascular: Negative for chest pain and leg swelling. Gastrointestinal: Negative for abdominal pain, nausea and vomiting. Endocrine: Negative for polydipsia and polyphagia. Genitourinary: Positive for decreased urine volume. Negative for flank pain. Musculoskeletal: Negative for back pain and neck pain. Skin: Negative for color change, pallor and rash. Allergic/Immunologic: Negative for food allergies. Neurological: Positive for weakness. Negative for seizures, numbness and headaches. Hematological: Negative for adenopathy. Psychiatric/Behavioral: Negative for agitation and confusion. The patient is not nervous/anxious. Scheduled Meds:   aspirin  81 mg Oral Daily    DULoxetine  60 mg Oral Daily    insulin glargine  84 Units SubCUTAneous Nightly    latanoprost  1 drop Both Eyes Nightly    rosuvastatin  20 mg Oral Nightly    sodium chloride flush  10 mL IntraVENous 2 times per day     Continuous Infusions:   norepinephrine 5 mcg/min (05/10/22 1322)    dextrose      sodium chloride         PHYSICAL EXAMINATION:  T:  97.8. P:  92. RR:  18. B/P:  117/78. FiO2:  0. O2 Sat:  96.  I/O:  0  Body mass index is 41.2 kg/m². GCS:   15  General:   Acute on chronically ill-appearing white male  HEENT:  normocephalic and atraumatic. No scleral icterus. PERR  Neck: supple. No Thyromegaly. Lungs: Diminished to auscultation. No retractions  Cardiac: Sinus tachycardia. No JVD. Abdomen: soft. Nontender. Extremities:  No clubbing, cyanosis. Trace nonpitting lower extremity edema bilaterally  Vasculature: capillary refill < 3 seconds. Palpable dorsalis pedis pulses. Skin:  warm and dry. Psych:  Alert and oriented x3. Affect appropriate  Lymph:  No supraclavicular adenopathy. Neurologic:  No focal deficit. + seizures. Data: (All radiographs, tracings, PFTs, and imaging are personally viewed and interpreted unless otherwise noted).    Sodium 132, potassium 3.7, chloride 94, CO2 21, BUN 33, creatinine 3.0, anion gap 17.0, lactate 4.4, glucose 208. WBC 10.6, hemoglobin 12.1, hematocrit 38.5, platelet count 785  Telemetry shows normal sinus rhythm  Echocardiogram 3/14/2022 reports ejection fraction 35 to 40%, severe aortic stenosis with a valve area of 0.8 cm². Chest x-ray 5/10/2022 reports no acute cardiopulmonary findings. EKG 5/10/2022 reports sinus tachycardia        Meets Continued ICU Level Care Criteria:    [x] Yes   [] No - Transfer Planned to listed location:  [] HOSPITALIST CONTACTED-      Case and plan discussed with Dr. Chipper Lesches and Dr. Karla Delaney. Electronically signed by Bryce Holt. MEGHAN Chicas - [facility]  CRITICAL CARE SPECIALIST  Patient seen by me including key components of medical care. Case discussed with NP. Case discussed with Dr. Karla Delaney. New diagnosis of seizure. On Vimpat. Italicized font, if present,  represents changes to the note made by me. CC time 35 minutes. Time was discontiguous. Time does not include procedure. Time does include my direct assessment of the patient and coordination of care. Time represents more than 50% of the time involved with patient care by the [address] [place] [address] team.  Electronically signed by Preston Jim.  Chipper Lesches MD.

## 2022-05-11 ENCOUNTER — TELEPHONE (OUTPATIENT)
Dept: PHYSICAL MEDICINE AND REHAB | Age: 57
End: 2022-05-11

## 2022-05-11 LAB
ACINETOBACTER BAUMANNII FILM ARRAY: NOT DETECTED
ANION GAP SERPL CALCULATED.3IONS-SCNC: 11 MEQ/L (ref 8–16)
BASOPHILS # BLD: 0.3 %
BASOPHILS ABSOLUTE: 0 THOU/MM3 (ref 0–0.1)
BOTTLE TYPE: ABNORMAL
BUN BLDV-MCNC: 33 MG/DL (ref 7–22)
CALCIUM SERPL-MCNC: 8.3 MG/DL (ref 8.5–10.5)
CANDIDA ALBICANS FILM ARRAY: NOT DETECTED
CANDIDA GLABRATA FILM ARRAY: NOT DETECTED
CANDIDA KRUSEI FILM ARRAY: NOT DETECTED
CANDIDA PARAPSILOSIS FILM ARRAY: NOT DETECTED
CANDIDA TROPICALIS FILM ARRAY: NOT DETECTED
CARBAPENEM RESITANT FILM ARRAY: ABNORMAL
CHLORIDE BLD-SCNC: 102 MEQ/L (ref 98–111)
CO2: 25 MEQ/L (ref 23–33)
CREAT SERPL-MCNC: 2.1 MG/DL (ref 0.4–1.2)
EKG ATRIAL RATE: 79 BPM
EKG P AXIS: 43 DEGREES
EKG P-R INTERVAL: 142 MS
EKG Q-T INTERVAL: 392 MS
EKG QRS DURATION: 122 MS
EKG QTC CALCULATION (BAZETT): 449 MS
EKG R AXIS: 14 DEGREES
EKG T AXIS: 66 DEGREES
EKG VENTRICULAR RATE: 79 BPM
ENTERBACTER CLOACAE FILM ARRAY: NOT DETECTED
ENTERBACTERIACEAE FILM ARRAY: NOT DETECTED
ENTEROCOCCUS FILM ARRAY: NOT DETECTED
EOSINOPHIL # BLD: 1.9 %
EOSINOPHILS ABSOLUTE: 0.1 THOU/MM3 (ref 0–0.4)
ERYTHROCYTE [DISTWIDTH] IN BLOOD BY AUTOMATED COUNT: 14.6 % (ref 11.5–14.5)
ERYTHROCYTE [DISTWIDTH] IN BLOOD BY AUTOMATED COUNT: 50.9 FL (ref 35–45)
ESCHERICHIA COLI FILM ARRAY: NOT DETECTED
GFR SERPL CREATININE-BSD FRML MDRD: 33 ML/MIN/1.73M2
GLUCOSE BLD-MCNC: 168 MG/DL (ref 70–108)
HAEMOPHILUS INFLUENZA FILM ARRAY: NOT DETECTED
HCT VFR BLD CALC: 32 % (ref 42–52)
HEMOGLOBIN: 10.1 GM/DL (ref 14–18)
IMMATURE GRANS (ABS): 0.02 THOU/MM3 (ref 0–0.07)
IMMATURE GRANULOCYTES: 0.3 %
KLEBSIELLA OXYTOCA FILM ARRAY: NOT DETECTED
KLEBSIELLA PNEUMONIAE FILM ARRAY: NOT DETECTED
LACTIC ACID: 1.2 MMOL/L (ref 0.5–2)
LISTERIA MONOCYTOGENES FILM ARRAY: NOT DETECTED
LYMPHOCYTES # BLD: 25.4 %
LYMPHOCYTES ABSOLUTE: 1.6 THOU/MM3 (ref 1–4.8)
MAGNESIUM: 1.7 MG/DL (ref 1.6–2.4)
MCH RBC QN AUTO: 30.1 PG (ref 26–33)
MCHC RBC AUTO-ENTMCNC: 31.6 GM/DL (ref 32.2–35.5)
MCV RBC AUTO: 95.2 FL (ref 80–94)
METHICILLIN RESISTANT FILM ARRAY: DETECTED
MONOCYTES # BLD: 8.2 %
MONOCYTES ABSOLUTE: 0.5 THOU/MM3 (ref 0.4–1.3)
NEISSERIA MENIGITIDIS FILM ARRAY: NOT DETECTED
NUCLEATED RED BLOOD CELLS: 0 /100 WBC
PHOSPHORUS: 4.9 MG/DL (ref 2.4–4.7)
PLATELET # BLD: 89 THOU/MM3 (ref 130–400)
PMV BLD AUTO: 10.9 FL (ref 9.4–12.4)
POTASSIUM REFLEX MAGNESIUM: 3.6 MEQ/L (ref 3.5–5.2)
PROTEUS FILM ARRAY: NOT DETECTED
PSEUDOMONAS AERUGINOSA FILM ARRAY: NOT DETECTED
RBC # BLD: 3.36 MILL/MM3 (ref 4.7–6.1)
SEG NEUTROPHILS: 63.9 %
SEGMENTED NEUTROPHILS ABSOLUTE COUNT: 4 THOU/MM3 (ref 1.8–7.7)
SERRATIA MARCESCENS FILM ARRAY: NOT DETECTED
SODIUM BLD-SCNC: 138 MEQ/L (ref 135–145)
SOURCE OF BLOOD CULTURE: ABNORMAL
STAPH AUREUS FILM ARRAY: NOT DETECTED
STAPHYLOCOCCUS FILM ARRAY: DETECTED
STREP AGALACTIAE FILM ARRAY: NOT DETECTED
STREP PNEUMONIAE FILM ARRAY: NOT DETECTED
STREP PYOCGENES FILM ARRAY: NOT DETECTED
STREPTOCOCCUS FILM ARRAY: NOT DETECTED
TROPONIN T: 0.02 NG/ML
VANCOMYCIN RESISTANT FILM ARRAY: ABNORMAL
WBC # BLD: 6.3 THOU/MM3 (ref 4.8–10.8)

## 2022-05-11 PROCEDURE — 2580000003 HC RX 258: Performed by: PHYSICIAN ASSISTANT

## 2022-05-11 PROCEDURE — P9047 ALBUMIN (HUMAN), 25%, 50ML: HCPCS | Performed by: NURSE PRACTITIONER

## 2022-05-11 PROCEDURE — 99232 SBSQ HOSP IP/OBS MODERATE 35: CPT | Performed by: PHYSICIAN ASSISTANT

## 2022-05-11 PROCEDURE — 6370000000 HC RX 637 (ALT 250 FOR IP): Performed by: NURSE PRACTITIONER

## 2022-05-11 PROCEDURE — 93005 ELECTROCARDIOGRAM TRACING: CPT | Performed by: STUDENT IN AN ORGANIZED HEALTH CARE EDUCATION/TRAINING PROGRAM

## 2022-05-11 PROCEDURE — 97166 OT EVAL MOD COMPLEX 45 MIN: CPT

## 2022-05-11 PROCEDURE — 2580000003 HC RX 258: Performed by: NURSE PRACTITIONER

## 2022-05-11 PROCEDURE — 84100 ASSAY OF PHOSPHORUS: CPT

## 2022-05-11 PROCEDURE — 83605 ASSAY OF LACTIC ACID: CPT

## 2022-05-11 PROCEDURE — 6360000002 HC RX W HCPCS: Performed by: PHYSICIAN ASSISTANT

## 2022-05-11 PROCEDURE — 94761 N-INVAS EAR/PLS OXIMETRY MLT: CPT

## 2022-05-11 PROCEDURE — 84484 ASSAY OF TROPONIN QUANT: CPT

## 2022-05-11 PROCEDURE — 1200000003 HC TELEMETRY R&B

## 2022-05-11 PROCEDURE — 97110 THERAPEUTIC EXERCISES: CPT

## 2022-05-11 PROCEDURE — 99233 SBSQ HOSP IP/OBS HIGH 50: CPT | Performed by: INTERNAL MEDICINE

## 2022-05-11 PROCEDURE — 6360000002 HC RX W HCPCS: Performed by: NURSE PRACTITIONER

## 2022-05-11 PROCEDURE — 83735 ASSAY OF MAGNESIUM: CPT

## 2022-05-11 PROCEDURE — 85025 COMPLETE CBC W/AUTO DIFF WBC: CPT

## 2022-05-11 PROCEDURE — 80048 BASIC METABOLIC PNL TOTAL CA: CPT

## 2022-05-11 PROCEDURE — 93010 ELECTROCARDIOGRAM REPORT: CPT | Performed by: NUCLEAR MEDICINE

## 2022-05-11 PROCEDURE — C9254 INJECTION, LACOSAMIDE: HCPCS | Performed by: PHYSICIAN ASSISTANT

## 2022-05-11 RX ORDER — HYDRALAZINE HYDROCHLORIDE 50 MG/1
50 TABLET, FILM COATED ORAL EVERY 8 HOURS SCHEDULED
Status: DISCONTINUED | OUTPATIENT
Start: 2022-05-11 | End: 2022-05-12 | Stop reason: HOSPADM

## 2022-05-11 RX ORDER — METOPROLOL SUCCINATE 100 MG/1
100 TABLET, EXTENDED RELEASE ORAL DAILY
Status: DISCONTINUED | OUTPATIENT
Start: 2022-05-11 | End: 2022-05-12 | Stop reason: HOSPADM

## 2022-05-11 RX ORDER — OXYCODONE AND ACETAMINOPHEN 7.5; 325 MG/1; MG/1
1 TABLET ORAL EVERY 6 HOURS PRN
Status: DISCONTINUED | OUTPATIENT
Start: 2022-05-11 | End: 2022-05-12 | Stop reason: HOSPADM

## 2022-05-11 RX ADMIN — ALBUMIN (HUMAN) 25 G: 0.25 INJECTION, SOLUTION INTRAVENOUS at 12:01

## 2022-05-11 RX ADMIN — HYDRALAZINE HYDROCHLORIDE 50 MG: 50 TABLET, FILM COATED ORAL at 20:40

## 2022-05-11 RX ADMIN — SODIUM CHLORIDE: 9 INJECTION, SOLUTION INTRAVENOUS at 02:22

## 2022-05-11 RX ADMIN — OXYCODONE AND ACETAMINOPHEN 1 TABLET: 7.5; 325 TABLET ORAL at 20:40

## 2022-05-11 RX ADMIN — HYDRALAZINE HYDROCHLORIDE 50 MG: 50 TABLET, FILM COATED ORAL at 15:12

## 2022-05-11 RX ADMIN — ALBUMIN (HUMAN) 25 G: 0.25 INJECTION, SOLUTION INTRAVENOUS at 17:47

## 2022-05-11 RX ADMIN — LATANOPROST 1 DROP: 50 SOLUTION OPHTHALMIC at 20:40

## 2022-05-11 RX ADMIN — INSULIN GLARGINE 42 UNITS: 100 INJECTION, SOLUTION SUBCUTANEOUS at 09:52

## 2022-05-11 RX ADMIN — SODIUM CHLORIDE: 9 INJECTION, SOLUTION INTRAVENOUS at 19:19

## 2022-05-11 RX ADMIN — SODIUM CHLORIDE 50 MG: 9 INJECTION, SOLUTION INTRAVENOUS at 09:05

## 2022-05-11 RX ADMIN — INSULIN LISPRO 2 UNITS: 100 INJECTION, SOLUTION INTRAVENOUS; SUBCUTANEOUS at 17:49

## 2022-05-11 RX ADMIN — HYDRALAZINE HYDROCHLORIDE 50 MG: 50 TABLET, FILM COATED ORAL at 03:43

## 2022-05-11 RX ADMIN — ALBUMIN (HUMAN) 25 G: 0.25 INJECTION, SOLUTION INTRAVENOUS at 23:10

## 2022-05-11 RX ADMIN — INSULIN GLARGINE 42 UNITS: 100 INJECTION, SOLUTION SUBCUTANEOUS at 21:00

## 2022-05-11 RX ADMIN — ALBUMIN (HUMAN) 25 G: 0.25 INJECTION, SOLUTION INTRAVENOUS at 05:24

## 2022-05-11 RX ADMIN — DULOXETINE 60 MG: 60 CAPSULE, DELAYED RELEASE ORAL at 09:02

## 2022-05-11 RX ADMIN — METOPROLOL SUCCINATE 100 MG: 100 TABLET, FILM COATED, EXTENDED RELEASE ORAL at 09:02

## 2022-05-11 RX ADMIN — OXYCODONE AND ACETAMINOPHEN 1 TABLET: 7.5; 325 TABLET ORAL at 03:43

## 2022-05-11 RX ADMIN — ASPIRIN 81 MG CHEWABLE TABLET 81 MG: 81 TABLET CHEWABLE at 09:02

## 2022-05-11 RX ADMIN — OXYCODONE AND ACETAMINOPHEN 1 TABLET: 7.5; 325 TABLET ORAL at 11:23

## 2022-05-11 RX ADMIN — INSULIN LISPRO 4 UNITS: 100 INJECTION, SOLUTION INTRAVENOUS; SUBCUTANEOUS at 11:57

## 2022-05-11 RX ADMIN — ROSUVASTATIN CALCIUM 20 MG: 20 TABLET, FILM COATED ORAL at 20:40

## 2022-05-11 ASSESSMENT — PAIN DESCRIPTION - ORIENTATION
ORIENTATION: MID
ORIENTATION: MID

## 2022-05-11 ASSESSMENT — PAIN DESCRIPTION - LOCATION
LOCATION: NECK
LOCATION: NECK;BACK
LOCATION: NECK;BACK

## 2022-05-11 ASSESSMENT — ENCOUNTER SYMPTOMS
VOMITING: 0
NAUSEA: 1
VOMITING: 1
TROUBLE SWALLOWING: 0
EYE PAIN: 0
PHOTOPHOBIA: 0
NAUSEA: 0
COUGH: 0
COLOR CHANGE: 0
NAUSEA: 1
ABDOMINAL DISTENTION: 0
BACK PAIN: 1
COUGH: 0
WHEEZING: 0
BACK PAIN: 0
SHORTNESS OF BREATH: 0
ABDOMINAL PAIN: 0
SORE THROAT: 0
EYE ITCHING: 0
SHORTNESS OF BREATH: 0
CHEST TIGHTNESS: 0
SHORTNESS OF BREATH: 1

## 2022-05-11 ASSESSMENT — PAIN DESCRIPTION - FREQUENCY
FREQUENCY: CONTINUOUS
FREQUENCY: CONTINUOUS

## 2022-05-11 ASSESSMENT — PAIN DESCRIPTION - DESCRIPTORS
DESCRIPTORS: ACHING

## 2022-05-11 ASSESSMENT — PAIN DESCRIPTION - PAIN TYPE
TYPE: ACUTE PAIN;CHRONIC PAIN
TYPE: CHRONIC PAIN

## 2022-05-11 ASSESSMENT — PAIN DESCRIPTION - ONSET
ONSET: ON-GOING
ONSET: ON-GOING

## 2022-05-11 ASSESSMENT — PAIN SCALES - GENERAL
PAINLEVEL_OUTOF10: 6
PAINLEVEL_OUTOF10: 0
PAINLEVEL_OUTOF10: 8

## 2022-05-11 NOTE — SIGNIFICANT EVENT
Patient transferred to . Report to GABRIELA Méndez    Electronically signed by Kristi Hernandez.  MEGHAN Darden - CNP on 5/11/2022 at 1:28 PM

## 2022-05-11 NOTE — PLAN OF CARE
Problem: Discharge Planning  Goal: Discharge to home or other facility with appropriate resources  5/11/2022 1034 by Robe Bond RN  Outcome: Progressing  Flowsheets (Taken 5/11/2022 1034)  Discharge to home or other facility with appropriate resources:   Identify barriers to discharge with patient and caregiver   Identify discharge learning needs (meds, wound care, etc)  Note: Patient plans to be discharged home with support. Patient has transfer orders to move out of ICU. Discharge planning in process and discussed with patient/family. Social work consulted for any additional needs. Care manager aware of discharge needs. Problem: Pain  Goal: Verbalizes/displays adequate comfort level or baseline comfort level  5/11/2022 1034 by Robe Bond RN  Outcome: Progressing  Flowsheets (Taken 5/11/2022 1034)  Verbalizes/displays adequate comfort level or baseline comfort level:   Encourage patient to monitor pain and request assistance   Assess pain using appropriate pain scale  Note: Patient using 0-10 scale for pain rating. Patient having chronic pain. Pain goal is a 4. Patient receiving prn medications to treat pain. Problem: Skin/Tissue Integrity  Goal: Absence of new skin breakdown  Description: 1. Monitor for areas of redness and/or skin breakdown  2. Assess vascular access sites hourly  3. Every 4-6 hours minimum:  Change oxygen saturation probe site  4. Every 4-6 hours:  If on nasal continuous positive airway pressure, respiratory therapy assess nares and determine need for appliance change or resting period. Outcome: Progressing  Note: No signs of skin breakdown. Skin warm, dry, and intact. Mucous membranes pink and moist.  Assistance with turns/ambulation provided PRN. Will continue to monitor.        Problem: Chronic Conditions and Co-morbidities  Goal: Patient's chronic conditions and co-morbidity symptoms are monitored and maintained or improved  Outcome: Progressing  Flowsheets  Taken 5/11/2022 1034 by Jewels Ashton RN  Care Plan - Patient's Chronic Conditions and Co-Morbidity Symptoms are Monitored and Maintained or Improved:   Monitor and assess patient's chronic conditions and comorbid symptoms for stability, deterioration, or improvement   Collaborate with multidisciplinary team to address chronic and comorbid conditions and prevent exacerbation or deterioration      Problem: Safety - Adult  Goal: Free from fall injury  5/11/2022 1034 by Jewels Ashton RN  Outcome: Progressing  Flowsheets (Taken 5/11/2022 1034)  Free From Fall Injury:   Based on caregiver fall risk screen, instruct family/caregiver to ask for assistance with transferring infant if caregiver noted to have fall risk factors   Instruct family/caregiver on patient safety  Note: Call light in reach, bed in lowest position, and bed alarm activated. Education given on use of call light before ambulation and when in need of assistance. Patient expressed understanding. Hourly visual checks performed and charted. Toileting offered to patient. No falls this shift, at any time. Arm band and falling star in place. Will continue to monitor. Care plan reviewed with patient. Patient verbalizes understanding of the plan of care and contributed to goal setting.

## 2022-05-11 NOTE — PLAN OF CARE
Problem: Discharge Planning  Goal: Discharge to home or other facility with appropriate resources  Outcome: Not Progressing  Flowsheets (Taken 5/10/2022 2117)  Discharge to home or other facility with appropriate resources:   Identify barriers to discharge with patient and caregiver   Arrange for needed discharge resources and transportation as appropriate   Identify discharge learning needs (meds, wound care, etc)  Note: Patient admitted to the unit with severe AS and volume depletion. SGC placed for close volume management. Problem: Pain  Goal: Verbalizes/displays adequate comfort level or baseline comfort level  Outcome: Progressing  Flowsheets (Taken 5/10/2022 2125)  Verbalizes/displays adequate comfort level or baseline comfort level:   Encourage patient to monitor pain and request assistance   Assess pain using appropriate pain scale   Administer analgesics based on type and severity of pain and evaluate response   Implement non-pharmacological measures as appropriate and evaluate response  Note: Patient has history of chronic back pain. Satisfactory pain level is 4/10 which has been achieved with the administration of Percocet. Problem: Safety - Adult  Goal: Free from fall injury  Outcome: Progressing  Flowsheets (Taken 5/10/2022 2125)  Free From Fall Injury:   Instruct family/caregiver on patient safety   Based on caregiver fall risk screen, instruct family/caregiver to ask for assistance with transferring infant if caregiver noted to have fall risk factors  Note: Patient remains on bedrest at this time. SGC in place.

## 2022-05-11 NOTE — TELEPHONE ENCOUNTER
Patient calling in to let [place] know that he is currently admitted at [facility] in the ICU for his heart. And he is still scheduled for open heart surgery 5/18/2022 as of right now. He also said they have increased his percocet to every 6 hours while there. amadoui.

## 2022-05-11 NOTE — PROGRESS NOTES
Anabelle Sales 61  INPATIENT OCCUPATIONAL THERAPY  [facility] MED SURG 8B  EVALUATION    Time:    Time In: 1125  Time Out: 1150  Timed Code Treatment Minutes: 15 Minutes  Minutes: 25          Date: 2022  Patient Name: German Roa,   Gender: male      MRN: [id number]  : 1965  (65 y.o.)  Referring Practitioner: A. Leata Hashimoto CNP  Diagnosis: syncope and collapse  Additional Pertinent Hx: 65 y.o. male with a past medical history significant for CHF, cardiomyopathy, severe aortic stenosis presents to the emergency department for evaluation of unwell, decreased urine output, fatigue, hypotension in cardiology office earlier today. Marshall Justin has severe aortic stenosis, wears a LifeVest due to low LVEF, and has been shocked twice by his LifeVest.  He is also fluid restricted to 64 ounces a day which he states has been sticking to. States his weight is stable. Was seen earlier today and cardiology office and referred to emergency department for hypotension, fatigue, feeling unwell. Restrictions/Precautions:  Restrictions/Precautions: Fall Risk,General Precautions  Position Activity Restriction  Other position/activity restrictions: visual deficits    Subjective  Chart Reviewed: Yes,Orders,Progress Notes,History and Physical  Patient assessed for rehabilitation services?: Yes    Subjective: Pt lying in bed agreeable to OT session. Dee Ricardo RN initially present as well.     Pain:  0/10:      Vitals: all vitals remained stable throughout     Social/Functional History:  Lives With: Other (comment) (28 year old nephew)  Type of Home: House  Home Layout: One level  Home Equipment: Walker, rolling   Bathroom Shower/Tub: Tub/Shower unit  Bathroom Toilet: Standard  Bathroom Accessibility: Accessible  IADL Comments: Pt reporting he completes his own simple homemaking tasks       ADL Assistance: [address]: Independent  Transfer Assistance: Independent Additional Comments: pt stating he uses a RW for all mobility. Pt dung guerrero is indep with all his ADL tasks    VISION: Pt reports he is nearly blind. He can see shadows and objects but not distinguish any details. HEARING:  WNL    COGNITION: Decreased Insight, Decreased Problem Solving and Decreased Safety Awareness    RANGE OF MOTION:  Bilateral Upper Extremity:  WNL    STRENGTH:  Bilateral Upper Extremity:  WNL    SENSATION:   WFL    ADL:   Lower Extremity Dressing: Maximum Assistance. donning scosk. BALANCE:  Sitting Balance:  Stand By Assistance. at EOB   Standing Balance: [address] with bilateral UE support on walker     BED MOBILITY:  Supine to Sit: Stand By Assistance with cues for safety    TRANSFERS:  Sit to Stand:  Contact Guard Assistance. from eOB with cues for safety  Stand to Sit: Contact Guard Assistance. into bedside chair with cues for safety    FUNCTIONAL MOBILITY:  Assistive Device: Rolling Walker  Assist Level:  Contact Guard Assistance. Distance: bed to chair  Pt refusing use of gait belt this date even after education provided on safety. Pt at one time told therapist to get out of his way so he could move       Activity Tolerance:  Patient tolerance of  treatment: fair. Assessment:  Assessment: Pt admitted with hypotension. Pt demo decreased endurance and ability to complete ADL tasks at [facility]. Pt would benefit from further skilled OT services to increase his endurance and indpe with self care tasks and mobiltiyto return home. Performance deficits / Impairments: Decreased functional mobility ,Decreased safe awareness,Decreased balance,Decreased ADL status,Decreased endurance,Decreased strength  Prognosis: Fair  REQUIRES OT FOLLOW-UP: Yes  Decision Making: Medium Complexity    Treatment Initiated: Treatment and education initiated within context of evaluation.   Evaluation time included review of current medical information, gathering information related to past medical, social and functional history, completion of standardized testing, formal and informal observation of tasks, assessment of data and development of plan of care and goals. Treatment time included skilled education and facilitation of tasks to increase safety and independence with ADL's for improved functional independence and quality of life. Discharge Recommendations:  Continue to assess pending progress,Patient would benefit from continued therapy after discharge    Patient Education:     Patient Education  Education Given To: Patient  Education Provided: Role of Therapy,Precautions  Education Method: Demonstration,Verbal  Barriers to Learning: None  Education Outcome: Verbalized understanding,Continued education needed    Equipment Recommendations:  Equipment Needed: No    Plan:  Times per Week: 3-5x  Current Treatment Recommendations: Strengthening,Patient/Caregiver education & training,Self-Care / ADL,Equipment evaluation, education, & procurement,Balance training,Functional mobility training,Endurance training,Safety education & training. See long-term goal time frame for expected duration of plan of care. If no long-term goals established, a short length of stay is anticipated. Goals:  Patient goals : go home  Short Term Goals  Time Frame for Short term goals: by discharge  Short Term Goal 1: pt to complete BADL routine with SBA and no cues for safety  Short Term Goal 2: Pt to dmeo dynamic stanidng balance > 5 min with unilateral hand release with SBA to complet esimple homemaking tasks  Short Term Goal 3: Pt to increase endurance to navigate HH distances using AD wiht SBA and no cues for safety to be able to obtain needed items for ADL tasks         Following session, patient left in safe position with all fall risk precautions in place.

## 2022-05-11 NOTE — CARE COORDINATION
5/11/22, 2:32 PM EDT  DISCHARGE PLANNING EVALUATION:    Terra Sessions       Admitted: 5/10/2022/ [address] day: 1   Location: [facility]27/027 Reason for admit: Syncope and collapse [R55]  Cardiogenic shock ([address] [R57.0]  Generalized weakness [R53.1]  Aortic stenosis, severe [I35.0]  Closed head injury, initial encounter [S09.90XA]   PMH:  has a past medical history of Acute kidney injury ([address] Amputation of right great toe ([address] Asthma, Brain tumor ([address] Cirrhosis ([address] CKD (chronic kidney disease), COPD (chronic obstructive pulmonary disease) ([address] Diabetes mellitus ([address] Falling, Glaucoma, Hepatitis C, History of blood transfusion, Hyperlipidemia, Hypertension, Legally blind, Obesity, Pain management, Right foot infection, and Seizures ([address] Procedure:   5/10 CXR: no acute findings  5/10 IJ placed  5/10 CXR: The tip of the right internal jugular venous catheter terminates overlying the region of the superior vena cava. No obvious right pneumothorax is seen  5/10 CT Head: Complex mass within the left middle cranial fossa  5/10 ECHO: Normal left ventricular size and severely reduced systolic function. There was severe global hypokinesis. Wall thickness was within normal limits. Ejection fraction was estimated at 30-35%. Doppler parameters were consistent with abnormal left ventricular relaxation (grade 1 diastolic dysfunction). The aortic valve was trileaflet with increased thickness and reduced leaflet mobility. DOPPLER: Transaortic velocity was 3.5 m/s, mean gradient 30 mmHg, max gradient 48 mmHg, LOBITO 0.99 cm2, consistent with severe LF-LG aortic stenosis. LOBITO is overestimated due to LVOT measurement error. There was no evidence of aortic regurgitation. IVC size is not well seen. The pericardium was normal in appearance with no evidence of a pericardial effusion  Barriers to Discharge:  Admitted through ER for hypotension, weakness, and vomiting.  Hx aortic stenosis, follows with Dr Monico Nageotte and is planning for a TAVR. Acute on chronic kidney injury. BUN 33, Creat 2.1. Neuro consulted for a breakthrough seizure. Vimpat ordered to start. Plan MRI of brain when creat normalizes. Cardio following. On Room air. Last /82. Trop 0.039, 0.028, 0.020. Positive pneumonia panel. PCP: Naheed Jackson MD  Readmission Risk Score: 26.4 ( )%  Patient's Healthcare Decision Maker: Named in [address]  Patient Goals/Plan/Treatment Preferences: \"Carlos\" is form home with his nephew. His nephew provides transportation. He has a motorized wheelchair, 2 wheeled walker, cane. Wears 3 L O2 overnight from [employer]. He is not current with , but would like [facility] for nursing services. Transportation/Food Security/Housekeeping Addressed:  No issues identified.

## 2022-05-11 NOTE — PROGRESS NOTES
Pharmacy Note  [employer] Result    Len Rivas is a 65 y.o. male, with a positive blood culture result    [employer] message received from Torsten Diaz, laboratory employee on 5/11/2022 at 1:10 PM    First Gram stain result: gram positive cocci in clusters    BioFire BCID result: Staphylococcus (NOT aureus) mecA detected    [employer] BCID and gram stain congruent? Yes    Suspected source? Contaminant (only growing in 1/2 cultures)    Patient chart has been reviewed for signs/symptoms of infection: Yes  BP (!) 154/98   Pulse 98   Temp 98.4 °F (36.9 °C) (Oral)   Resp 21   Ht 6' 3\" (1.905 m)   Wt (!) 329 lb 9.4 oz (149.5 kg)   SpO2 94%   BMI 41.20 kg/m²   Lab Results   Component Value Date    WBC 6.3 05/11/2022     Allergies reviewed  Adhesive tape and Keppra [levetiracetam]    Renal function reviewed  Estimated Creatinine Clearance: 61 mL/min (A) (based on SCr of 2.1 mg/dL (H)). Current antibiotic regimen: n/a    Intervention needed: No    Individual contacted: Provider Dr. Latoya Leon    Recommendations: none    Recommendations accepted?  [address], [facility]  5/11/2022 1:10 PM

## 2022-05-11 NOTE — PROGRESS NOTES
Neurology Progress Note    Date:5/11/2022       Room:[facility]02/002-A  Patient Name:Scottie Lizarraga     YOB: 1965     Age:57 y.o. Chief Complaint:   Chief Complaint   Patient presents with    Hypotension    Fatigue       Subjective     Asya Williamson is a 65 y.o. male with a history of seizure disorder, cirrhosis, congestive heart failure, brain mass, severe aortic stenosis, hypertension, hyperlipidemia, hepatitis C, diabetes, neuropathy, and cardiomyopathy with LifeVest who is currently admitted for hypotension and MATTIE. The patient notes over the past few days he has not felt well and was feeling extremely fatigued. He was also having decreased urine output. He presented to the emergency department and was noted to be lethargic and hypotensive. He was found to have an acute kidney injury and subsequently started on Levophed and admitted to the ICU. Upon arrival to the ICU, the patient had 30 second witnessed seizure. The seizure resolved spontaneously without administration of Ativan. The patient does have a known history of seizure disorder. He followed with Dr. Malika Cardona in the past and was previously on Keppra, which gave him a rash, and Vimpat. Per Dr. Sam Marcos notes, the patient's seizures were well controlled on Vimpat at a dose of 50 mg twice daily. However, the patient states he stopped taking the Vimpat on his own. He states his last seizure was approximately a year ago. He is uncertain if he had auras prior to his seizures or not. He describes his seizures as myoclonic and tonic activity of the left upper and to a lesser extent the left lower extremity. He is sometimes aware during the seizures. He does have a history of a known arachnoid cyst and was seen by a neurosurgeon who deemed the arachnoid cyst was not likely contributing to the patient's seizures and therefore no surgical intervention was needed.   The patient has chronic left-sided weakness and also has neuropathy which disproportionately impacts his left upper and lower extremities. Interval history 5/11/22: No seizures overnight. No new neurologic deficits. Patient does have a mild headache and attributes it to not sleeping well. Head CT was stable. Shows stable complex mass consistent with patient's history of arachnoid cyst.  Review of Systems   Review of Systems   Eyes: Negative for visual disturbance. Musculoskeletal: Positive for neck pain (from lines). Neurological: Positive for headaches. Negative for dizziness, seizures, facial asymmetry, speech difficulty, weakness, light-headedness and numbness. Medications   Scheduled Meds:    hydrALAZINE  50 mg Oral 3 times per day    metoprolol succinate  100 mg Oral Daily    aspirin  81 mg Oral Daily    DULoxetine  60 mg Oral Daily    latanoprost  1 drop Both Eyes Nightly    rosuvastatin  20 mg Oral Nightly    sodium chloride flush  10 mL IntraVENous 2 times per day    insulin glargine  42 Units SubCUTAneous BID    insulin lispro  0-12 Units SubCUTAneous TID WC    insulin lispro  0-6 Units SubCUTAneous Nightly    lacosamide (VIMPAT) IVPB  50 mg IntraVENous BID    albumin human  25 g IntraVENous Q6H     Continuous Infusions:    dextrose      sodium chloride      sodium chloride 125 mL/hr at 05/11/22 0633     PRN Meds: albuterol, glucose, dextrose bolus **OR** dextrose bolus, glucagon (rDNA), dextrose, sodium chloride flush, sodium chloride, polyethylene glycol, acetaminophen **OR** acetaminophen, oxyCODONE-acetaminophen  Medications Prior to Admission:   No current facility-administered medications on file prior to encounter. Current Outpatient Medications on File Prior to Encounter   Medication Sig Dispense Refill    oxyCODONE-acetaminophen (PERCOCET) 7.5-325 MG per tablet Take 1 tablet by mouth every 8 hours as needed for Pain for up to 30 days.  90 tablet 0    albuterol (PROVENTIL) (2.5 MG/3ML) 0.083% nebulizer solution Take 3 mLs by nebulization every 6 hours as needed for Wheezing or Shortness of Breath 120 each 11    furosemide (LASIX) 20 MG tablet Take 2 tablets by mouth every morning AND 1 tablet every evening.  (Patient taking differently: Take 2 tablets by mouth every morning AND 2 tablet every evening.) 180 tablet 5    potassium chloride (KLOR-CON M) 20 MEQ extended release tablet Take 1 tablet by mouth daily 60 tablet 5    metoprolol succinate (TOPROL XL) 100 MG extended release tablet Take 1 tablet by mouth daily 30 tablet 3    aspirin 81 MG chewable tablet Take 1 tablet by mouth daily 30 tablet 3    hydrALAZINE (APRESOLINE) 50 MG tablet Take 1 tablet by mouth every 8 hours (Patient taking differently: Take 50 mg by mouth every 8 hours DO NOT TAKE IF SBP IS <110) 90 tablet 3    nicotine (NICODERM CQ) 21 MG/24HR Place 1 patch onto the skin daily 30 patch 3    sacubitril-valsartan (ENTRESTO) 24-26 MG per tablet Take 1 tablet by mouth 2 times daily 60 tablet 3    umeclidinium-vilanterol (ANORO ELLIPTA) 62.5-25 MCG/INH AEPB inhaler Inhale 1 puff into the lungs daily 1 each 11    dapagliflozin (FARXIGA) 10 MG tablet Take 1 tablet by mouth every morning 90 tablet 1    senna (SENOKOT) 8.6 MG tablet Take 2 tablets by mouth daily      tamsulosin (FLOMAX) 0.4 MG capsule Take 1 capsule by mouth nightly 30 capsule 0    DULoxetine (CYMBALTA) 60 MG extended release capsule Take 1 capsule by mouth daily 30 capsule 3    insulin glargine (LANTUS) 100 UNIT/ML injection vial Inject 84 Units into the skin nightly 1 vial 3    insulin lispro (HUMALOG) 100 UNIT/ML injection vial Inject 10 Units into the skin 3 times daily (before meals) Plus Sliding Scale (Patient taking differently: Inject 12 Units into the skin 3 times daily (before meals) Plus Sliding Scale ) 1 vial 0    insulin lispro (HUMALOG) 100 UNIT/ML injection vial Glucose: Dose:  No Insulin, 140-199 2 Units, 200-249 4 Units, 250-299 6 Units, 300-349 8 Units, 350-400 10 Units, Over 400 12 Units 1 vial 0    albuterol sulfate  (90 Base) MCG/ACT inhaler Inhale 1 puff into the lungs every 4-6 hours as needed      OXYGEN Inhale into the lungs daily as needed (nightly)       latanoprost (XALATAN) 0.005 % ophthalmic solution Place 1 drop into both eyes nightly 1 Bottle 3    rosuvastatin (CRESTOR) 20 MG tablet Take 20 mg by mouth nightly        Past History    Past Medical History:   has a past medical history of Acute kidney injury ([facility] Amputation of right great toe ([facility] [address] Asthma, Brain tumor ([facility] [address] Cirrhosis ([facility] [address] CKD (chronic kidney disease), COPD (chronic obstructive pulmonary disease) ([facility] [address] Diabetes mellitus ([facility] [address] Falling, Glaucoma, Hepatitis C, History of blood transfusion, Hyperlipidemia, Hypertension, Legally blind, Obesity, Pain management, Right foot infection, and Seizures ([facility] [address] Social History:   reports that he has been smoking cigarettes. He has a 40.00 pack-year smoking history. He has never used smokeless tobacco. He reports current alcohol use of about 3.0 standard drinks of alcohol per week. He reports previous drug use. Drug: Marijuana [employer]). Family History:   Family History   Problem Relation Age of Onset    Heart Attack Father     Heart Attack Brother         pneumonia    No Known Problems Mother     Cancer Sister         breast    No Known Problems Maternal Grandmother     No Known Problems Maternal Grandfather     Liver Cancer Paternal Grandmother     Heart Attack Paternal Grandfather     Heart Disease Brother         stents    Colon Cancer Neg Hx     Colon Polyps Neg Hx        Physical Examination      Vitals:  BP (!) 154/82   Pulse 108   Temp 98.1 °F (36.7 °C) (Core)   Resp 18   Ht 6' 3\" (1.905 m)   Wt (!) 329 lb 9.4 oz (149.5 kg)   SpO2 91%   BMI 41.20 kg/m²   Temp (24hrs), Av.6 °F (36.4 °C), Min:96.6 °F (35.9 °C), Max:98.1 °F (36.7 °C)      I/O (24Hr):     Intake/Output Summary (Last 24 hours) at 2022 0729  Last data filed at 5/11/2022 3442  Gross per 24 hour   Intake 2149.31 ml   Output 2275 ml   Net -125.69 ml         Physical Exam  Vitals reviewed. Constitutional:       General: He is not in acute distress. Appearance: He is obese. He is not ill-appearing. HENT:      Head: Normocephalic and atraumatic. Right Ear: External ear normal.      Left Ear: External ear normal.      Nose: Nose normal.      Mouth/Throat:      Mouth: Mucous membranes are moist.      Pharynx: No oropharyngeal exudate or posterior oropharyngeal erythema. Comments: No tongue bite  Eyes:      Extraocular Movements: EOM normal.      Pupils: Pupils are equal, round, and reactive to light. Genitourinary:     Comments: [place] in place  Musculoskeletal:      Right lower leg: No edema. Neurological:      Mental Status: He is alert and oriented to person, place, and time. Psychiatric:         Mood and Affect: Mood normal.         Speech: Speech normal.         Behavior: Behavior normal.       Neurologic Exam     Mental Status   Oriented to person, place, and time. Attention: normal. Concentration: normal.   Speech: speech is normal   Level of consciousness: alert  Normal comprehension. Cranial Nerves     CN II   Visual fields full to confrontation. Visual acuity: (patient legally blind)  Right visual field deficit: none  Left visual field deficit: none     CN III, IV, VI   Pupils are equal, round, and reactive to light. Extraocular motions are normal.   Right pupil: Shape: regular. Reactivity: brisk. Left pupil: Shape: regular. Reactivity: brisk. CN V   Facial sensation intact. Right facial sensation deficit: none  Left facial sensation deficit: none    CN VII   Facial expression full, symmetric.    Right facial weakness: none  Left facial weakness: none    CN VIII   CN VIII normal.   Hearing: intact    CN IX, X   CN IX normal.   CN X normal.   Palate: symmetric    CN XII   CN XII normal.   Tongue: not atrophic  Fasciculations: absent  Tongue deviation: none  CN XI testing deferred due to lines being in place     Motor Exam   Muscle bulk: normal  Overall muscle tone: normal  Right arm tone: normal  Left arm tone: normal  Right leg tone: normal  Left leg tone: normal  Muscle strength 5/5 in RUE, 4+/5 in RLE, 4-/5 in LUE, and 3/5 in LLE. Sensory Exam   Sensation diminished from mid shin distally to light touch in BLE. Sensation diminished in LUE from wrist distally. Labs/Imaging/Diagnostics   Labs:  CBC:  Recent Labs     05/10/22  1050 05/11/22  0350   WBC 10.6 6.3   RBC 4.05* 3.36*   HGB 12.1* 10.1*   HCT 38.5* 32.0*   MCV 95.1* 95.2*    89*     CHEMISTRIES:  Recent Labs     05/10/22  1050 05/10/22  1857 05/11/22  0350   *  --  138   K 3.7  --  3.6   CL 94*  --  102   CO2 21*  --  25   BUN 33*  --  33*   CREATININE 3.0*  --  2.1*   GLUCOSE 208*  --  168*   PHOS  --   --  4.9*   MG  --  1.8 1.7     PT/INR:  Recent Labs     05/10/22  1050   INR 1.12     APTT:No results for input(s): APTT in the last 72 hours.   LIVER PROFILE:  Recent Labs     05/10/22  1050   AST 60*   ALT 48   BILIDIR 0.4*   BILITOT 0.7   ALKPHOS 129*     Imaging Last 24 Hours:  ECHO Complete 2D W Doppler W Color    Result Date: 5/10/2022  Transthoracic Echocardiography Report (TTE)  Demographics   Patient Name    [facility]       Gender               Male                  [address]   MR #            [id number]        Race                                                     Ethnicity   Account #       [de-identified]        Room Number          0002   Accession       [id number]       Date of Study        05/10/2022  Number   Date of Birth   1965       Referring Physician  Meliza Sheridan CNP   Age             65 year(s)       Sonographer          Lilian Cosby, Korin Currie MD                                   Physician  Procedure Type of Study   TTE procedure:ECHOCARDIOGRAM COMPLETE 2D W DOPPLER W COLOR. Procedure Date Date: 05/10/2022 Start: 03:00 PM Study Location: Bedside Technical Quality: Limited visualization due to body habitus. Indications:Heart Failure. Additional Medical History:Hyperlipidemia, hypertension, COPD, alcohol abuse, diabetic, chronic kidney disease, severe aortic stenosis Patient Status: Routine Height: 75.2 inches Weight: 328.49 pounds BSA: 2.71 m^2 BMI: 40.84 kg/m^2 BP: 143/82 mmHg  Conclusions   Summary  Normal left ventricular size and severely reduced systolic function. There was severe global hypokinesis. Wall thickness was within normal limits. Ejection fraction was estimated at 30-35%. Doppler parameters were consistent with abnormal left ventricular  relaxation (grade 1 diastolic dysfunction). The aortic valve was trileaflet with increased thickness and reduced  leaflet mobility. DOPPLER: Transaortic velocity was 3.5 m/s, mean gradient  30 mmHg, max gradient 48 mmHg, LOBITO 0.99 cm2, consistent with severe LF-LG  aortic stenosis. OLBITO is overestimated due to LVOT measurement error. There  was no evidence of aortic regurgitation. IVC size is not well seen. The pericardium was normal in appearance with no evidence of a pericardial  effusion   Signature   ----------------------------------------------------------------  Electronically signed by Rocky Farias MD (Interpreting  physician) on 05/10/2022 at 04:09 PM  ----------------------------------------------------------------   Findings   Mitral Valve  The mitral valve structure demonstrated thickened PMVL and reduced  mobility. DOPPLER: The transmitral velocity was within the normal range  with no evidence for mitral stenosis. There was no evidence of mitral  regurgitation.    Aortic Valve The aortic valve was trileaflet with increased thickness and reduced  leaflet mobility. DOPPLER: Transaortic velocity was 3.5 m/s, mean gradient  30 mmHg, max gradient 48 mmHg, LOBITO 0.99 cm2, consistent with severe LF-LG  aortic stenosis. LOBITO is overestimated due to LVOT measurement error. There  was no evidence of aortic regurgitation. Tricuspid Valve  The tricuspid valve structure was normal with normal leaflet separation. DOPPLER: There was no evidence of tricuspid stenosis. There was no  evidence of tricuspid regurgitation. Pulmonic Valve  The pulmonic valve leaflets were not well seen. DOPPLER: The transpulmonic  velocity was within the normal range with no evidence for regurgitation. Left Atrium  Left atrial size was normal.   Left Ventricle  Normal left ventricular size and severely reduced systolic function. There was severe global hypokinesis. Wall thickness was within normal limits. Ejection fraction was estimated at 30-35%. Doppler parameters were consistent with abnormal left ventricular  relaxation (grade 1 diastolic dysfunction). Right Atrium  Right atrial size was normal.   Right Ventricle  The right ventricular size was normal with normal systolic function and  wall thickness. Pericardial Effusion  The pericardium was normal in appearance with no evidence of a pericardial  effusion. Pleural Effusion  No evidence of pleural effusion. Aorta / Great Vessels  IVC size is not well seen.   M-Mode/2D Measurements & Calculations   LV Diastolic      LV Systolic Dimension: 4.9 cm    LA Dimension: 3.9 cmAO  Dimension: 5.8 cm LV Volume Diastolic: 964 ml      Root Dimension: 3.9 cm  LV FS:15.5 %      LV Volume Systolic: 92.1 ml  LV PW Diastolic:  LV EDV/LV EDV Index: 133 ml/49  1 cm              m^2LV ESV/LV ESV Index: 82.9  Septum Diastolic: DF/76 m^2                        RV Diastolic Dimension:  1 cm              EF Calculated: 35 %              3.5 cm                     LV Length: 9.2 cm LA/Aorta: 1   LV Area           LVOT: 2 cm  Diastolic: 00.4  cm^2  LV Area Systolic:  48.8 cm^2  Doppler Measurements & Calculations   MV Peak E-Wave:     AV Peak Velocity: 307    LVOT Peak Velocity: 125 cm/s  72.4 cm/s           cm/s                     LVOT Mean Velocity: 103 cm/s  MV Peak A-Wave: 114 AV Peak Gradient: 37.7   LVOT Peak Gradient: 5  cm/s                mmHg                     mmHgLVOT Mean Gradient: 4  MV E/A Ratio: 0.64  AV Mean Velocity: 228    mmHg  MV Peak Gradient:   cm/s  2.1 mmHg            AV Mean Gradient: 27                      mmHg  MV Deceleration     AV VTI: 57 cm            TR Velocity:293 cm/s  Time: 198 msec      AV Area                  TR Gradient:34.34 mmHg  MV P1/2t: 58 msec   (Continuity):1.22 cm^2  MVA by PHT:3.79  cm^2                LVOT VTI: 22.1 cm   MV E' Septal  Velocity: 6.4 cm/s  MV A' Septal        AV DVI (VTI): 0.39AV DVI  Velocity: 12.2 cm/s (Vmax):0.41  MV E' Lateral  Velocity: 9.5 cm/s  MV A' Lateral  Velocity: 13.1 cm/s  E/E' septal: 11.31  E/E' lateral: 7.62  [email or web address]\[facility] [place] [facility]\""[email or web address]? [id number]    CT HEAD WO CONTRAST    Result Date: 5/10/2022  Noncontrast CT of the head Technique: Noncontrast CT of the head from the vertex through the skull base. Comparison:  CT,SR - CT HEAD WO CONTRAST - 03/03/2021 09:01 PM EST Findings: Complex mass within the left middle cranial fossa measuring 3.6 x 4.3 cm, unchanged. No intracranial hemorrhage or hydrocephalus. No evidence for acute ischemia by CT. No skull fractures. Normal aeration of visualized paranasal sinuses. Impression: Complex mass within the left middle cranial fossa.  This document has been electronically signed by: Cheri Nettles MD on 05/10/2022 07:05 PM All CTs at this facility use dose modulation techniques and iterative reconstructions, and/or weight-based dosing when appropriate to reduce radiation to a low as reasonably achievable. XR CHEST PORTABLE    Result Date: 5/10/2022  PROCEDURE: XR CHEST PORTABLE CLINICAL INFORMATION: post CVC placement COMPARISON: Chest radiograph 5/10/2022, 5/4/2022. TECHNIQUE: AP portable chest radiograph performed. FINDINGS: The tip of the right internal jugular venous catheter terminates overlying the region of the superior vena cava. The costophrenic angles and lung bases are not well imaged on this study. Nonspecific devices are seen overlying the chest. No focal pulmonary consolidation. Cardiac silhouette is not enlarged. No obvious pneumothorax. No acute bony abnormality. 1. The tip of the right internal jugular venous catheter terminates overlying the region of the superior vena cava. 2. No obvious right pneumothorax is seen. **This report has been created using voice recognition software. It may contain minor errors which are inherent in voice recognition technology. ** Final report electronically signed by Dr Izabel Mathews on 5/10/2022 12:38 PM    XR CHEST PORTABLE    Result Date: 5/10/2022  PROCEDURE: XR CHEST PORTABLE CLINICAL INFORMATION: Nausea COMPARISON: Chest radiograph 5/4/2022 TECHNIQUE: AP portable chest radiograph performed. FINDINGS: Apparently the patient was imaged with life vest. No focal pulmonary consolidation. Cardiac silhouette is not enlarged. No pleural effusion. No pneumothorax. No acute bony abnormality. 1. No acute cardiopulmonary finding. **This report has been created using voice recognition software. It may contain minor errors which are inherent in voice recognition technology. ** Final report electronically signed by Dr Izabel Matehws on 5/10/2022 11:36 AM      Assessment and Plan:          1. Breakthrough seizure  · CT head without contrast stable. Redemonstrates complex mass consistent with arachnoid cyst which is stable. · Continue Vimpat 50mg twice daily  · Optimize medical condition. · Seizure precautions. IV Ativan as needed for acute seizure. Call with any seizure activity. · Can defer MRI at this time. · No further workup from neurology at this time. Neurology will sign off. · The patient can follow up with Dr. Rob Quinteros in 1 month. · No driving, operating heavy machinery, or swimming for 6 months or until cleared by a neurologist.    This was discussed with Dr. Lor Christian and he is in agreement with the assessment and plan.     Electronically signed by Lianne Eldridge PA-C on 5/11/22 at 7:02 PM EDT

## 2022-05-11 NOTE — FLOWSHEET NOTE
Pt was alone at the time of the visit. He was dealing with cardiogenic shock. He said that he will be having open heart surgery next week (three bypasses). He was hopeful and wanted prayer to cope and heal. He was anointed. 05/11/22 1624   Encounter Summary   Encounter Overview/Reason  Initial Encounter   Service Provided For: Patient   Referral/Consult From: [facility] Family members   Last Encounter  05/11/22  (Anointed)   Complexity of Encounter Low   Begin Time 1305   End Time  1312   Total Time Calculated 7 min   Spiritual/Emotional needs   Type Spiritual Support   Rituals, Rites and Sacraments   Type Anointing   Assessment/Intervention/Outcome   Assessment Calm; Hopeful   Intervention Empowerment; Active listening   Outcome Acceptance;Encouraged

## 2022-05-11 NOTE — PROGRESS NOTES
CRITICAL CARE PROGRESS NOTE      Patient:  German Roa    Unit/Bed:4D-02/002-A  YOB: 1965  MRN: [id number]   PCP: Armani Chaves MD  Date of Admission: 5/10/2022  Chief Complaint:- SOB     Assessment and Plan:       Severe aortic stenosis: Follows with Dr. Teetee Blackwell in the outpatient. Plans for TAVR. Echo shows EF 92-46%, grade 1 diastolic dysfunction   Acute on chronic kidney injury: Baseline creatinine 1.2-1.8. Current creatinine 2.1. Patient will undergo fluid resuscitation and albumin infusion. Waunita Libel shows that patient is fluid contracted. Hypovolemia: Not in shock. Patient appears to be dry on exam.  Waunita Libel shows normal PA pressures with a CVP of 1. Lactic acidosis: resolved; Lactic elevated on arrival.  Trending down. Repeat 2.3. Patient has no signs of infection. No antibiotics at this time. Procalcitonin negative. Mildly elevated troponin: Likely demand ischemia. EKG does not show any ST elevation. Cardiology following. Diabetes mellitus: Continue home Lantus, sliding scale insulin. Monitor closely. Diabetic diet. Hyponatremia: Sodium 132. Continue with 0.9 saline resuscitation. Chronic macrocytic anemia: Hemoglobin 10.1, hematocrit 32.0, no active signs of bleeding. Appears to be at baseline. History of brain tumor: Patient did have seizure on admission to the ICU. Stat CT head will be ordered to rule out acute pathology. Plans for MRI with and without contrast once creatinine is improved. Neurology has been consulted. Vimpat has been given. This case was discussed with neuro PA who does agree. Cirrhosis: Elevated AST. Albumin has been initiated. Patient does not have significant edema. Monitor. COPD: Not in exacerbation. Patient wears no oxygen at baseline does sleep with 3 L. Seizures: Noted in patient's history. Patient was on no medication currently. Patient states he has allergy to Keppra. Patient was not started on Vimpat.   Neurology was consulted. Thrombocytopenia: Platelet count is reduced from 145 to 89. Monitor. No heparin exposure at this time. INITIAL H AND P AND ICU COURSE:  Aye Anderson is a 65year old white male who [facility] with complaints of hypotenison, weakness and vomiting. He has a past medical current every day smoker, hepatitis C, COPD, CKD, hypertension, cirrhosis, brain tumor, diabetes mellitus, cardiomyopathy. Per report patient is a longstanding cardiomyopathy patient he does wear a LifeVest.  He has severe aortic stenosis and follows with Dr. Peace Chen. He was followed up in heart failure clinic today when he was hypotensive and lethargic. Patient was advised to go to the emergency room. In the emergency room he was hypotensive. There were questions of unresponsive times. Patient was started on Levophed which was subsequently stopped on his arrival to the ICU. On my exam patient was alert and oriented x3. He only complains of pain in his back and neck which he states is chronic. He did not appear fluid overloaded. His blood pressure was within normal limits. Levophed has been stopped at this time. Patient states that he had not peed for a couple of days. Creatinine was noted to be elevated. He denies oxygen use at home except during sleep. He denies use of CPAP. He denies chest pain. He states he had had a cough but denied any sputum production. He denies fevers or chills. [place] catheter was placed which did reveal normal PA pressures. Patient appears to be dry. Patient will give fluid resuscitation along with albumin. This case was discussed with cardiology Dr. Peace Chen and Dr. Alberta Kelsey. 5/11 Marci Lulas catheter will be removed. Patient was volume contracted. Patient did have new onset seizure which is being managed by neurology. Plan for MRI.        Past Medical History:  Per HPI   Family History: Father: MI  Social History: Current every day smoker, social alcohol use, denies drug use. Review of Systems   Constitutional: Negative for fatigue and fever. HENT: Negative for sore throat and trouble swallowing. Eyes: Negative for photophobia and visual disturbance. Respiratory: Negative for chest tightness, shortness of breath and wheezing. Cardiovascular: Negative for chest pain and leg swelling. Gastrointestinal: Negative for abdominal pain, nausea and vomiting. Endocrine: Negative for polydipsia and polyphagia. Genitourinary: Negative for decreased urine volume and flank pain. Musculoskeletal: Negative for back pain and neck pain. Skin: Negative for color change, pallor and rash. Allergic/Immunologic: Negative for food allergies. Neurological: Negative for dizziness, weakness and headaches. Hematological: Negative for adenopathy. Psychiatric/Behavioral: Negative for agitation and confusion. The patient is not nervous/anxious. Scheduled Meds:   hydrALAZINE  50 mg Oral 3 times per day    metoprolol succinate  100 mg Oral Daily    aspirin  81 mg Oral Daily    DULoxetine  60 mg Oral Daily    latanoprost  1 drop Both Eyes Nightly    rosuvastatin  20 mg Oral Nightly    sodium chloride flush  10 mL IntraVENous 2 times per day    insulin glargine  42 Units SubCUTAneous BID    insulin lispro  0-12 Units SubCUTAneous TID WC    insulin lispro  0-6 Units SubCUTAneous Nightly    lacosamide (VIMPAT) IVPB  50 mg IntraVENous BID    albumin human  25 g IntraVENous Q6H     Continuous Infusions:   dextrose      sodium chloride      sodium chloride 125 mL/hr at 05/11/22 0222       PHYSICAL EXAMINATION:  T:  98.1. P:  90. RR:  22. B/P:  155/96. FiO2:  0. O2 Sat:  95.  I/O:  1155/1375  Body mass index is 41.2 kg/m². GCS: 15   General: Acute on chronically ill-appearing white male  HEENT:  normocephalic and atraumatic. No scleral icterus. PERR  Neck: supple. No Thyromegaly. Lungs: diminished  to auscultation. No retractions  Cardiac: RRR.   No JVD.  Abdomen: soft. Nontender. Extremities:  No clubbing, cyanosis, or edema x 4. Vasculature: capillary refill < 3 seconds. Palpable dorsalis pedis pulses. Skin:  warm and dry. Psych:  Alert and oriented x3. Affect appropriate  Lymph:  No supraclavicular adenopathy. Neurologic:  No focal deficit. No seizures. Data: (All radiographs, tracings, PFTs, and imaging are personally viewed and interpreted unless otherwise noted). Sodium 138, potassium 3.6, chloride 102, CO2 25, 33, creatinine 2.1, anion gap 11.0, glucose 168. WBC 6.3, hemoglobin 10.1, hematocrit 33.0, platelet count 89  Telemetry shows NSR   Echocardiogram 3/14/2022 reports ejection fraction 35 to 40%, severe aortic stenosis with a valve area of 0.8 cm². Chest x-ray 5/10/2022 reports no acute cardiopulmonary findings. EKG 5/10/2022 reports sinus tachycardia  CT head 5/10/2022 reports complex mass within the left middle cranial fossa. MRI to follow. Meets Continued ICU Level Care Criteria:    [] Yes   [x] No - Transfer Planned to listed location: awaiting bed location   [x] HOSPITALIST CONTACTED-      Case and plan discussed with Dr. Teagan Garza and Dr. Ezekiel Whitlock. Electronically signed by Babs Del Castillo. MEGHAN Pineda - CNP  CRITICAL CARE SPECIALIST  Patient seen by me including key components of medical care. Case discussed with NP. Case discussed with Dr. Ezekiel Whitlock. Pull SG. Transfer to floor. Await EEG. Cerebell screen is negative. MRI brain pending. Italicized font, if present,  represents changes to the note made by me. CC time 25 minutes. Time was discontiguous. Time does not include procedure. Time does include my direct assessment of the patient and coordination of care. Time represents more than 50% of the time involved with patient care by the [address] [place] [address] team.  Electronically signed by Ricky Jacinto.  Teagan Garza MD.

## 2022-05-12 VITALS
HEIGHT: 75 IN | SYSTOLIC BLOOD PRESSURE: 138 MMHG | WEIGHT: 315 LBS | HEART RATE: 78 BPM | DIASTOLIC BLOOD PRESSURE: 88 MMHG | OXYGEN SATURATION: 93 % | RESPIRATION RATE: 22 BRPM | TEMPERATURE: 98.3 F | BODY MASS INDEX: 39.17 KG/M2

## 2022-05-12 LAB
ANION GAP SERPL CALCULATED.3IONS-SCNC: 10 MEQ/L (ref 8–16)
BASOPHILS # BLD: 0.7 %
BASOPHILS ABSOLUTE: 0 THOU/MM3 (ref 0–0.1)
BLOOD CULTURE, ROUTINE: ABNORMAL
BLOOD CULTURE, ROUTINE: ABNORMAL
BUN BLDV-MCNC: 23 MG/DL (ref 7–22)
CALCIUM SERPL-MCNC: 8.5 MG/DL (ref 8.5–10.5)
CHLORIDE BLD-SCNC: 107 MEQ/L (ref 98–111)
CO2: 23 MEQ/L (ref 23–33)
CREAT SERPL-MCNC: 1.1 MG/DL (ref 0.4–1.2)
EOSINOPHIL # BLD: 2.4 %
EOSINOPHILS ABSOLUTE: 0.1 THOU/MM3 (ref 0–0.4)
ERYTHROCYTE [DISTWIDTH] IN BLOOD BY AUTOMATED COUNT: 14.6 % (ref 11.5–14.5)
ERYTHROCYTE [DISTWIDTH] IN BLOOD BY AUTOMATED COUNT: 53.2 FL (ref 35–45)
GFR SERPL CREATININE-BSD FRML MDRD: 69 ML/MIN/1.73M2
GLUCOSE BLD-MCNC: 145 MG/DL (ref 70–108)
HCT VFR BLD CALC: 31.2 % (ref 42–52)
HEMOGLOBIN: 9.7 GM/DL (ref 14–18)
IMMATURE GRANS (ABS): 0.02 THOU/MM3 (ref 0–0.07)
IMMATURE GRANULOCYTES: 0.3 %
LYMPHOCYTES # BLD: 24.1 %
LYMPHOCYTES ABSOLUTE: 1.4 THOU/MM3 (ref 1–4.8)
MCH RBC QN AUTO: 30.4 PG (ref 26–33)
MCHC RBC AUTO-ENTMCNC: 31.1 GM/DL (ref 32.2–35.5)
MCV RBC AUTO: 97.8 FL (ref 80–94)
MONOCYTES # BLD: 8.6 %
MONOCYTES ABSOLUTE: 0.5 THOU/MM3 (ref 0.4–1.3)
NUCLEATED RED BLOOD CELLS: 0 /100 WBC
ORGANISM: ABNORMAL
PLATELET # BLD: 75 THOU/MM3 (ref 130–400)
PMV BLD AUTO: 11.2 FL (ref 9.4–12.4)
POTASSIUM SERPL-SCNC: 4.2 MEQ/L (ref 3.5–5.2)
RBC # BLD: 3.19 MILL/MM3 (ref 4.7–6.1)
SCAN OF BLOOD SMEAR: NORMAL
SEG NEUTROPHILS: 63.9 %
SEGMENTED NEUTROPHILS ABSOLUTE COUNT: 3.8 THOU/MM3 (ref 1.8–7.7)
SODIUM BLD-SCNC: 140 MEQ/L (ref 135–145)
URINE CULTURE, ROUTINE: NORMAL
WBC # BLD: 5.9 THOU/MM3 (ref 4.8–10.8)

## 2022-05-12 PROCEDURE — 36415 COLL VENOUS BLD VENIPUNCTURE: CPT

## 2022-05-12 PROCEDURE — 99239 HOSP IP/OBS DSCHRG MGMT >30: CPT | Performed by: PHYSICIAN ASSISTANT

## 2022-05-12 PROCEDURE — 99232 SBSQ HOSP IP/OBS MODERATE 35: CPT | Performed by: PHYSICIAN ASSISTANT

## 2022-05-12 PROCEDURE — 2580000003 HC RX 258: Performed by: NURSE PRACTITIONER

## 2022-05-12 PROCEDURE — 85025 COMPLETE CBC W/AUTO DIFF WBC: CPT

## 2022-05-12 PROCEDURE — 6370000000 HC RX 637 (ALT 250 FOR IP): Performed by: NURSE PRACTITIONER

## 2022-05-12 PROCEDURE — 80048 BASIC METABOLIC PNL TOTAL CA: CPT

## 2022-05-12 RX ORDER — POTASSIUM CHLORIDE 750 MG/1
10 TABLET, EXTENDED RELEASE ORAL DAILY
Qty: 30 TABLET | Refills: 0 | Status: SHIPPED | OUTPATIENT
Start: 2022-05-13 | End: 2022-05-16

## 2022-05-12 RX ORDER — FUROSEMIDE 20 MG/1
20 TABLET ORAL DAILY
Qty: 30 TABLET | Refills: 0 | Status: SHIPPED | OUTPATIENT
Start: 2022-05-13 | End: 2022-05-16

## 2022-05-12 RX ADMIN — OXYCODONE AND ACETAMINOPHEN 1 TABLET: 7.5; 325 TABLET ORAL at 03:12

## 2022-05-12 RX ADMIN — INSULIN GLARGINE 42 UNITS: 100 INJECTION, SOLUTION SUBCUTANEOUS at 08:38

## 2022-05-12 RX ADMIN — METOPROLOL SUCCINATE 100 MG: 100 TABLET, FILM COATED, EXTENDED RELEASE ORAL at 08:38

## 2022-05-12 RX ADMIN — SODIUM CHLORIDE: 9 INJECTION, SOLUTION INTRAVENOUS at 04:31

## 2022-05-12 RX ADMIN — OXYCODONE AND ACETAMINOPHEN 1 TABLET: 7.5; 325 TABLET ORAL at 09:45

## 2022-05-12 RX ADMIN — ASPIRIN 81 MG CHEWABLE TABLET 81 MG: 81 TABLET CHEWABLE at 08:38

## 2022-05-12 RX ADMIN — DULOXETINE 60 MG: 60 CAPSULE, DELAYED RELEASE ORAL at 08:38

## 2022-05-12 RX ADMIN — HYDRALAZINE HYDROCHLORIDE 50 MG: 50 TABLET, FILM COATED ORAL at 06:35

## 2022-05-12 ASSESSMENT — PAIN SCALES - GENERAL
PAINLEVEL_OUTOF10: 9
PAINLEVEL_OUTOF10: 9

## 2022-05-12 ASSESSMENT — PAIN DESCRIPTION - ONSET
ONSET: ON-GOING
ONSET: ON-GOING

## 2022-05-12 ASSESSMENT — PAIN DESCRIPTION - DESCRIPTORS
DESCRIPTORS: ACHING
DESCRIPTORS: ACHING

## 2022-05-12 ASSESSMENT — PAIN DESCRIPTION - FREQUENCY
FREQUENCY: CONTINUOUS
FREQUENCY: CONTINUOUS

## 2022-05-12 ASSESSMENT — PAIN DESCRIPTION - LOCATION
LOCATION: BACK
LOCATION: NECK;BACK;CHEST

## 2022-05-12 ASSESSMENT — PAIN DESCRIPTION - PAIN TYPE
TYPE: ACUTE PAIN;CHRONIC PAIN
TYPE: CHRONIC PAIN

## 2022-05-12 ASSESSMENT — PAIN DESCRIPTION - ORIENTATION
ORIENTATION: LOWER
ORIENTATION: UPPER

## 2022-05-12 NOTE — PROGRESS NOTES
Cardiology Progress Note      Patient:  Aye Anderson  YOB: 1965  MRN: [id number]   Acct: [de-identified]  Admit Date:  5/10/2022  Primary Cardiologist: Cristo Hong MD    Note per dr Flavia Bray \"CHIEF COMPLAINT: Hypotension and Feeling Ill     Opening Statement:  The patient is an unfortunate 65year old male every day smoker with a 40 pack year history with a past medical history notable for alcoholism, severe aortic stenosis, systolic and diastolic heart failure with lifevest, hypertension, hyperlipidemia, diabetes, COPD, seizures, \"brain tumor,\" hepatitis C, CKD, obesity, and previous amputation of the right great toe who presented with a chief complaint of shortness of breath and who we are primarily managing for CHF, volume depletion, and severe aortic stenosis.      HPI/Summary Hospital Course: The patient had a follow up appointment with the [facility] on 5/10/2022. He was noted to be hypotensive and lethargic at this time. Therefore, the patient was sent to the Emergency Room. The patient was noted to be hypotensive in the ED and he was therefore given a small fluid bolus and was started on Levophed. The patient was successfully weaned off of the Levophed as soon as he came to the ICU. A central line and a [place] catheter were placed. The patient was suspected to to be volume depleted on physical exam which was confirmed on the [place] with a CVP of 1. Therefore, the patient was started on fluid resuscitation with normal saline at 125 cc/hr and albumin. Patient had a seizure on presentation and has a known history of a \"brain tumor. \" Therefore, Neurology was consulted, the patient was started on Vimpat, and a CT of the head was ordered. MRI of the brain with and without contrast when kidney function recovers. Troponin T elevated above recent baseline and EKG demonstrated new onset T-wave inversion in lead III probably related to demand ischemia.    Previous cardiac catheterization on 8/3/2021 demonstrated a reduced ejection fraction and moderate aortic stenosis but patent coronary arteries. Plan for TAVR when the patient is more medically stable.      The patient states that he has been feeling tired, sick, and short of breath for the past several days. States that he feel after getting up from a sitting position yesterday. Otherwise affirms story as above. Patient states that he is hungry. Denies chest pain and palpitations. Affirms shortness of breath with rest and exertion and dry cough. Affirms orthostatic dizziness. Affirms medical history as above. Affirms smoking history as above but states that he has cut down to a half a pack a day. Stopped drinking two weeks ago. Used to drink three 25 ounce beers per day on average prior to that. Denies illicit drug use. \"    Subjective (Events in last 24 hours): pt awake and alert. NAD. No cp. No acute sob. Feels at baseline. No edema. Feels like liver is swollen.   No orthopnea  On RA  Hoping to go home today      Objective:   /88   Pulse 78   Temp 98.3 °F (36.8 °C) (Oral)   Resp 22   Ht 6' 3\" (1.905 m)   Wt (!) 328 lb 1.6 oz (148.8 kg)   SpO2 93%   BMI 41.01 kg/m²        TELEMETRY: nsr    Physical Exam:  General Appearance: alert and oriented to person, place and time, in no acute distress  Cardiovascular: normal rate, regular rhythm, normal S1 and S2, +murmur  Pulmonary/Chest: clear to auscultation bilaterally- no wheezes, rales or rhonchi, normal air movement, no respiratory distress  Abdomen: soft, non-tender, non-distended, normal bowel sounds, no masses Extremities: no cyanosis, clubbing or edema, pulse   Skin: warm and dry  Head: normocephalic and atraumatic  Eyes: pupils equal, round, and reactive to light  Neck: supple and non-tender without mass, no thyromegaly   Neurological: alert, oriented, normal speech, no focal findings or movement disorder noted    Medications:    hydrALAZINE  50 mg Oral 3 times per day    metoprolol succinate  100 mg Oral Daily    aspirin  81 mg Oral Daily    DULoxetine  60 mg Oral Daily    latanoprost  1 drop Both Eyes Nightly    rosuvastatin  20 mg Oral Nightly    sodium chloride flush  10 mL IntraVENous 2 times per day    insulin glargine  42 Units SubCUTAneous BID    insulin lispro  0-12 Units SubCUTAneous TID WC    insulin lispro  0-6 Units SubCUTAneous Nightly    lacosamide (VIMPAT) IVPB  50 mg IntraVENous BID      dextrose      sodium chloride      sodium chloride 125 mL/hr at 05/12/22 0431     oxyCODONE-acetaminophen, 1 tablet, Q6H PRN  albuterol, 2.5 mg, Q6H PRN  glucose, 4 tablet, PRN  dextrose bolus, 125 mL, PRN   Or  dextrose bolus, 250 mL, PRN  glucagon (rDNA), 1 mg, PRN  dextrose, 100 mL/hr, PRN  sodium chloride flush, 10 mL, PRN  sodium chloride, , PRN  polyethylene glycol, 17 g, Daily PRN  acetaminophen, 650 mg, Q6H PRN   Or  acetaminophen, 650 mg, Q6H PRN        Diagnostics:  TTE 5/10/22    Lab Data:    Cardiac Enzymes:  No results for input(s): CKTOTAL, CKMB, CKMBINDEX, TROPONINI in the last 72 hours.     CBC:   Lab Results   Component Value Date    WBC 5.9 05/12/2022    RBC 3.19 05/12/2022    HGB 9.7 05/12/2022    HCT 31.2 05/12/2022    PLT 75 05/12/2022       CMP:    Lab Results   Component Value Date     05/12/2022    K 4.2 05/12/2022    K 3.6 05/11/2022     05/12/2022    CO2 23 05/12/2022    BUN 23 05/12/2022    CREATININE 1.1 05/12/2022    LABGLOM 69 05/12/2022    GLUCOSE 145 05/12/2022    CALCIUM 8.5 05/12/2022       Hepatic Function Panel:    Lab Results   Component Value Date    ALKPHOS 129 05/10/2022    ALT 48 05/10/2022    AST 60 05/10/2022    PROT 7.8 05/10/2022    BILITOT 0.7 05/10/2022    BILIDIR 0.4 05/10/2022    LABALBU 3.5 05/10/2022       Magnesium:    Lab Results   Component Value Date    MG 1.7 05/11/2022       PT/INR:    Lab Results   Component Value Date    INR 1.12 05/10/2022       HgBA1c:    Lab Results Component Value Date    LABA1C 6.6 02/24/2021       FLP:    Lab Results   Component Value Date    TRIG 73 03/07/2022    HDL 40 03/07/2022    LDLCALC 32 03/07/2022       TSH:    Lab Results   Component Value Date    TSH 0.951 03/07/2022         Assessment:    Hypotension secondary to volume depletion - resolved  Severe AS  Chronic systolic and diastolic CHF - compensated  NICMP/valvular - ef 30-35 with grade 1 DDfx per TTE 5/10/22  HTN  HLP  DM  MATTIE/CKD - improved  COPD  Tobacco abuse  Seizures - neurology following  Hx alcoholism  Hx cirrhosis  Patent coronaries per cath 8/2021  Thrombocytopenia - attending to follow  Complex mass within the left middle cranial fossa measuring 3.6    x 4.3 cm    Neurology following - consistent with arachnoid cyst and stable    Discussed with dr Yuly Morales:    Daily I/o and weights  2 liter fluid restriction and 2gm sodium diet  Keep mag >2 and K >4  Avoid nitrates  Cont BB/hydralazine  Keep entresto off for now - reevaluate as outpt  Hold statin for now with hx cirrhosis  Stop asa due to low PLT  Start lasix 20 daily starting tomorrow 5/13/22 with potassium 10 daily  Dr Caio Rodriguez following as outpt for TAVR - planned for next Tuesday 5/17/22  F/up chf clinic 5/16/22  CBC,BMP, BNP monday  Will follow prn     Electronically signed by Matilde Coello PA-C on 5/12/2022 at 8:51 AM

## 2022-05-12 NOTE — CARE COORDINATION
Pt transferred to [address] [place], [place] [address] , handoff report given to JERRELL Shrestha CM.

## 2022-05-12 NOTE — CARE COORDINATION
DISCHARGE/PLANNING EVALUATION  5/12/22, 11:38 AM EDT    Reason for Referral: Discharge planning  Mental Status: Alert and Oriented  Decision Making: Self  Family/Social/Home Environment: Patient reported that he lives in his own home and his nephew lives with him. He reported that he has supportive family and friends that are supportive. Current Services including food security, transportation and housekeeping: Patient reported that he is independent at home prior to admission. He reported that he does not have any current services in the home. Current Equipment: electric scooter, grab bars, ramp, toilet seat, O2  Payment Source: [facility] Medicare  Concerns or Barriers to Discharge: None  Post acute provider list with quality measures, geographic area and applicable managed care information provided. Questions regarding selection process answered: Offered    Teach Back Method used with patient regarding care plan and needs. Patient verbalize understanding of the plan of care and contribute to goal setting. Patient goals, treatment preferences and discharge plan: Patient plans to discharge home with [facility] RN. He reported that he has transport at discharge. SW called and made referral to [facility].     Electronically signed by CHON Waldrop on 5/12/2022 at 11:38 AM

## 2022-05-12 NOTE — CARE COORDINATION
5/12/22, 11:41 AM EDT    Patient goals/plan/ treatment preferences discussed by  and . Patient goals/plan/ treatment preferences reviewed with patient/ family. Patient/ family verbalize understanding of discharge plan and are in agreement with goal/plan/treatment preferences. Understanding was demonstrated using the teach back method. AVS provided by RN at time of discharge, which includes all necessary medical information pertaining to the patients current course of illness, treatment, post-discharge goals of care, and treatment preferences. Services At/After Discharge: Home Health [facility]       IMM Letter  IMM Letter given to Patient/Family/Significant other/Guardian/POA/by[de-identified] Pt Access  IMM Letter date given[de-identified] 05/10/22  IMM Letter time given[de-identified] 7700       Patient to discharge today with [facility].

## 2022-05-12 NOTE — PROGRESS NOTES
[facility] received scripts for potassium and furosemide for patient. Delivered only potassium RX due to Furosemide script being too early to fill. Spoke with patient's wife, she is aware and confirmed patient already has at home. -Dylon Pruitt (ext. [phone]) 5/12/2022,12:25 PM

## 2022-05-12 NOTE — PLAN OF CARE
Continue: Systane Balance (propylene glycol): drops: 0.6% Problem: Discharge Planning  Goal: Discharge to home or other facility with appropriate resources  Outcome: Progressing   SW consult received and completed. See SW note.

## 2022-05-12 NOTE — DISCHARGE SUMMARY
Hospitalist Discharge Summary        Patient: Claudine Garibay  YOB: 1965  MRN: [id number]   Acct: [de-identified]    Primary Care Physician: Martínez Ryder MD    Admit date  5/10/2022    Discharge date: 5/12/2022 12:48 PM    Chief Complaint on presentation :-Hypotension and feeling ill    Discharge Assessment and Plan:-   1. Hypotension, resolved: Secondary to volume depletion, BP has been stable. Resolved s/p IVFs. [facility] was done which showed normal PA pressures. 2. Severe aortic stenosis: Planning TAVR next week, follows with Dr. Charisse Yap. 3. MATTIE on CKD: Baseline creatinine 1.2-1.8. Creatinine was noted at 1.1 on day of discharge. 4. Lactic acidosis, resolved  5. Elevated troponin: Felt likely related to demand ischemia no ST elevation noted on EKG. Cardiology was following. 6. History of brain tumor/  ?Seizures: Patient reports that he was previously on Vimpat however this \"caused seizures\" and patient took himself off this. Neurology was consulted this admission for seizure-like activity. CT head without contrast is stable. Patient was recommended to continue continue Vimpat 50 mg twice daily, however he adamantly refused to do that at this and therefore it was not continued at discharge. MRI deferred. Follow-up with Dr. Dipak Willoughby in 1 month's time. Patient educated regarding his restrictions including driving operating machinery and swimming for 6 months. 7. Acute hypoxic respiratory failure, resolved    Initial H and P and Hospital course:-  \"Scottie Aden is a 65year old white male who presnted to [address] [place] [address] on 5/10/2022 with complaints of hypotenison, weakness and vomiting. He has a past medical current every day smoker, hepatitis C, COPD, CKD, hypertension, cirrhosis, brain tumor, diabetes mellitus, cardiomyopathy. Per report patient is a longstanding cardiomyopathy patient he does wear a LifeVest.  He has severe aortic stenosis and follows with Dr. Charisse Yap.   He was for discharge home.       Physical Exam:-  Vitals:   Patient Vitals for the past 24 hrs:   BP Temp Temp src Pulse Resp SpO2 Weight   05/12/22 0600 -- -- -- -- -- -- (!) 328 lb 1.6 oz (148.8 kg)   05/12/22 0342 -- -- -- -- 18 -- --   05/12/22 0300 (!) 166/70 97.9 °F (36.6 °C) Axillary 78 18 96 % --   05/11/22 2245 (!) 179/85 98.6 °F (37 °C) Axillary 73 18 97 % --   05/11/22 2110 -- -- -- -- 18 -- --   05/11/22 2030 (!) 149/70 98.2 °F (36.8 °C) Oral 87 18 94 % --   05/11/22 1654 114/67 97.8 °F (36.6 °C) Axillary 83 18 (!) 83 % --   05/11/22 1437 (!) 147/82 97.8 °F (36.6 °C) Oral 84 18 95 % --   05/11/22 1328 (!) 173/97 97.6 °F (36.4 °C) Oral 91 20 96 % --   05/11/22 1200 (!) 154/98 98.4 °F (36.9 °C) Oral 98 21 94 % --   05/11/22 1100 (!) 164/91 -- -- 106 -- 95 % --   05/11/22 1000 110/75 -- -- 107 21 94 % --   05/11/22 0917 -- -- -- -- -- 91 % --   05/11/22 0902 (!) 178/91 -- -- 111 -- -- --   05/11/22 0900 (!) 178/91 -- -- 113 19 93 % --     Weight:   Weight: (!) 328 lb 1.6 oz (148.8 kg)   24 hour intake/output:     Intake/Output Summary (Last 24 hours) at 5/12/2022 0821  Last data filed at 5/12/2022 2784  Gross per 24 hour   Intake --   Output 2700 ml   Net -2700 ml       1. General appearance: Severely obese, no apparent distress, appears stated age and cooperative. 2. HEENT: Normal cephalic, atraumatic without obvious deformity. Pupils equal, round, and reactive to light. Extra ocular muscles intact. Conjunctivae/corneas clear. 3. Neck: Supple, with full range of motion. No jugular venous distention. Trachea midline. 4. Respiratory:  Normal respiratory effort. Diminished to auscultation, bilaterally without Rales/Wheezes/Rhonchi. 5. Cardiovascular: Regular rate and rhythm with normal S1/S2 without murmurs, rubs or gallops. 6. Abdomen: Soft, non-tender, non-distended with normal bowel sounds. 7. Musculoskeletal:  No clubbing, cyanosis or edema bilaterally.   8. Skin: Skin color, texture, turgor normal.  No rashes or lesions. 9. Neurologic:  Neurovascularly intact without any focal sensory/motor deficits. Cranial nerves: II-XII intact, grossly non-focal.  10. Psychiatric: Alert and oriented x4, thought content appropriate, normal insight  11. Capillary Refill: Brisk,< 3 seconds   12. Peripheral Pulses: +2 palpable, equal bilaterally       Discharge Medications:-      Medication List      ASK your doctor about these medications    * albuterol sulfate  (90 Base) MCG/ACT inhaler     * albuterol (2.5 MG/3ML) 0.083% nebulizer solution  Commonly known as: PROVENTIL  Take 3 mLs by nebulization every 6 hours as needed for Wheezing or Shortness of Breath     Anoro Ellipta 62.5-25 MCG/INH Aepb inhaler  Generic drug: umeclidinium-vilanterol  Inhale 1 puff into the lungs daily     aspirin 81 MG chewable tablet  Take 1 tablet by mouth daily     dapagliflozin 10 MG tablet  Commonly known as: Farxiga  Take 1 tablet by mouth every morning     DULoxetine 60 MG extended release capsule  Commonly known as: CYMBALTA  Take 1 capsule by mouth daily     furosemide 20 MG tablet  Commonly known as: LASIX  Take 2 tablets by mouth every morning AND 1 tablet every evening.      hydrALAZINE 50 MG tablet  Commonly known as: APRESOLINE  Take 1 tablet by mouth every 8 hours     insulin glargine 100 UNIT/ML injection vial  Commonly known as: LANTUS  Inject 84 Units into the skin nightly     * insulin lispro 100 UNIT/ML injection vial  Commonly known as: HUMALOG  Inject 10 Units into the skin 3 times daily (before meals) Plus Sliding Scale     * insulin lispro 100 UNIT/ML injection vial  Commonly known as: HUMALOG  Glucose: Dose:  No Insulin, 140-199 2 Units, 200-249 4 Units, 250-299 6 Units, 300-349 8 Units, 350-400 10 Units, Over 400 12 Units     latanoprost 0.005 % ophthalmic solution  Commonly known as: XALATAN  Place 1 drop into both eyes nightly     metoprolol succinate 100 MG extended release tablet  Commonly known as: TOPROL XL  Take 1 tablet by mouth daily     nicotine 21 MG/24HR  Commonly known as: NICODERM CQ  Place 1 patch onto the skin daily     oxyCODONE-acetaminophen 7.5-325 MG per tablet  Commonly known as: PERCOCET  Take 1 tablet by mouth every 8 hours as needed for Pain for up to 30 days. OXYGEN     potassium chloride 20 MEQ extended release tablet  Commonly known as: KLOR-CON M  Take 1 tablet by mouth daily     rosuvastatin 20 MG tablet  Commonly known as: CRESTOR     sacubitril-valsartan 24-26 MG per tablet  Commonly known as: ENTRESTO  Take 1 tablet by mouth 2 times daily     senna 8.6 MG tablet  Commonly known as: SENOKOT     tamsulosin 0.4 MG capsule  Commonly known as: FLOMAX  Take 1 capsule by mouth nightly         * This list has 4 medication(s) that are the same as other medications prescribed for you. Read the directions carefully, and ask your doctor or other care provider to review them with you.                  Labs :-  Recent Results (from the past 72 hour(s))   EKG 12 Lead    Collection Time: 05/10/22 10:38 AM   Result Value Ref Range    Ventricular Rate 101 BPM    Atrial Rate 101 BPM    P-R Interval 138 ms    QRS Duration 110 ms    Q-T Interval 386 ms    QTc Calculation (Bazett) 500 ms    P Axis 44 degrees    R Axis 13 degrees    T Axis 14 degrees   CBC with Auto Differential    Collection Time: 05/10/22 10:50 AM   Result Value Ref Range    WBC 10.6 4.8 - 10.8 thou/mm3    RBC 4.05 (L) 4.70 - 6.10 mill/mm3    Hemoglobin 12.1 (L) 14.0 - 18.0 gm/dl    Hematocrit 38.5 (L) 42.0 - 52.0 %    MCV 95.1 (H) 80.0 - 94.0 fL    MCH 29.9 26.0 - 33.0 pg    MCHC 31.4 (L) 32.2 - 35.5 gm/dl    RDW-CV 14.8 (H) 11.5 - 14.5 %    RDW-SD 51.0 (H) 35.0 - 45.0 fL    Platelets 012 865 - 204 thou/mm3    MPV 11.3 9.4 - 12.4 fL    Seg Neutrophils 62.7 %    Lymphocytes 26.2 %    Monocytes 8.1 %    Eosinophils 1.7 %    Basophils 0.7 %    Immature Granulocytes 0.6 %    Segs Absolute 6.6 1.8 - 7.7 thou/mm3    Lymphocytes Absolute 2.8 1.0 - 4.8 thou/mm3    Monocytes Absolute 0.9 0.4 - 1.3 thou/mm3    Eosinophils Absolute 0.2 0.0 - 0.4 thou/mm3    Basophils Absolute 0.1 0.0 - 0.1 thou/mm3    Immature Grans (Abs) 0.06 0.00 - 0.07 thou/mm3    nRBC 0 /100 wbc   Basic Metabolic Panel w/ Reflex to MG    Collection Time: 05/10/22 10:50 AM   Result Value Ref Range    Sodium 132 (L) 135 - 145 meq/L    Potassium reflex Magnesium 3.7 3.5 - 5.2 meq/L    Chloride 94 (L) 98 - 111 meq/L    CO2 21 (L) 23 - 33 meq/L    Glucose 208 (H) 70 - 108 mg/dL    BUN 33 (H) 7 - 22 mg/dL    CREATININE 3.0 (HH) 0.4 - 1.2 mg/dL    Calcium 8.8 8.5 - 10.5 mg/dL   Hepatic Function Panel    Collection Time: 05/10/22 10:50 AM   Result Value Ref Range    Albumin 3.5 3.5 - 5.1 g/dL    Total Bilirubin 0.7 0.3 - 1.2 mg/dL    Bilirubin, Direct 0.4 (H) 0.0 - 0.3 mg/dL    Alkaline Phosphatase 129 (H) 38 - 126 U/L    AST 60 (H) 5 - 40 U/L    ALT 48 11 - 66 U/L    Total Protein 7.8 6.1 - 8.0 g/dL   Troponin    Collection Time: 05/10/22 10:50 AM   Result Value Ref Range    Troponin T 0.039 (A) ng/ml   Brain Natriuretic Peptide    Collection Time: 05/10/22 10:50 AM   Result Value Ref Range    Pro-.2 0.0 - 900.0 pg/mL   Protime-INR    Collection Time: 05/10/22 10:50 AM   Result Value Ref Range    INR 1.12 0.85 - 1.13   C-Reactive Protein    Collection Time: 05/10/22 10:50 AM   Result Value Ref Range    CRP < 0.30 0.00 - 1.00 mg/dl   Lactate, Sepsis    Collection Time: 05/10/22 10:50 AM   Result Value Ref Range    Lactic Acid, Sepsis 4.4 (H) 0.5 - 1.9 mmol/L   Glomerular Filtration Rate, Estimated    Collection Time: 05/10/22 10:50 AM   Result Value Ref Range    Est, Glom Filt Rate 22 (A) ml/min/1.73m2   Anion Gap    Collection Time: 05/10/22 10:50 AM   Result Value Ref Range    Anion Gap 17.0 (H) 8.0 - 16.0 meq/L   Procalcitonin    Collection Time: 05/10/22 10:50 AM   Result Value Ref Range    Procalcitonin 0.17 (H) 0.01 - 0.09 ng/mL   Culture, Blood 1    Collection Time: 05/10/22 12:01 PM Specimen: Blood   Result Value Ref Range    Blood Culture, Routine No growth-preliminary     Culture, Blood 2    Collection Time: 05/10/22 12:10 PM    Specimen: Blood   Result Value Ref Range    Blood Culture, Routine No growth-preliminary  (A)     Organism Staphylococcus (coagulase negative) (A)     Blood Culture, Routine       possible contamination; clinical correlation required    Blood ID, Molecular    Collection Time: 05/10/22 12:10 PM   Result Value Ref Range    CARBAPENEM RESITANT FILM ARRAY NA Not Detected    METHICILLIN RESISTANT FILM ARRAY Detected (A) Not Detected    VANCOMYCIN RESISTANT FILM ARRAY NA Not Detected    ENTEROCOCCUS FILM ARRAY Not Detected Not Detected    LISTERIA MONOCYTOGENES FILM ARRAY Not Detected Not Detected    STAPHYLOCOCCUS FILM ARRAY Detected (A) Not Detected    STAPH AUREUS FILM ARRAY Not Detected Not Detected    STREPTOCOCCUS FILM ARRAY Not Detected Not Detected    STREP AGALACTIAE FILM ARRAY Not Detected Not Detected    STREP PNEUMONIAE FILM ARRAY Not Detected Not Detected    STREP [address] Not Detected Not Detected    ACINETOBACTER BAUMANNII FILM ARRAY Not Detected Not Detected    ENTERBACTERIACEAE FILM ARRAY Not Detected Not Detected    ENTERBACTER CLOACAE FILM ARRAY Not Detected Not Detected    ESCHERICHIA COLI FILM ARRAY Not Detected Not Detected    KLEBSIELLA OXYTOCA FILM ARRAY Not Detected Not Detected    KLEBSIELLA PNEUMONIAE FILM ARRAY Not Detected Not Detected    PROTEUS FILM ARRAY Not Detected Not Detected    SERRATIA MARCESCENS FILM ARRAY Not Detected Not Detected    HAEMOPHILUS INFLUENZA FILM ARRAY Not Detected Not Detected    NEISSERIA MENIGITIDIS FILM ARRAY Not Detected Not Detected    PSEUDOMONAS AERUGINOSA FILM ARRAY Not Detected Not Detected    MONO ALBICANS FILM ARRAY Not Detected Not Detected    MNOO GLABRATA FILM ARRAY Not Detected Not Detected    MONO KRUSEI FILM ARRAY Not Detected Not Detected    CANDIDA PARAPSILOSIS FILM ARRAY Not Detected Not Detected    CANDIDA TROPICALIS FILM ARRAY Not Detected Not Detected    Bottle Type ANAEROBIC     Source of Blood Culture PERIPHERAL    COVID-19, Rapid    Collection Time: 05/10/22 12:20 PM    Specimen: Nasopharyngeal Swab   Result Value Ref Range    SARS-CoV-2, NAAT NOT  DETECTED NOT DETECTED   Rapid influenza A/B antigens    Collection Time: 05/10/22 12:20 PM    Specimen: Nasopharyngeal   Result Value Ref Range    Flu A Antigen Negative NEGATIVE    Flu B Antigen Negative NEGATIVE   Lactate, Sepsis    Collection Time: 05/10/22  2:05 PM   Result Value Ref Range    Lactic Acid, Sepsis 2.3 (H) 0.5 - 1.9 mmol/L   Culture, Urine    Collection Time: 05/10/22  2:05 PM    Specimen: Urine   Result Value Ref Range    Urine Culture, Routine No growth-preliminary No growth     Microscopic Urinalysis    Collection Time: 05/10/22  2:05 PM   Result Value Ref Range    Glucose, Urine 250 (A) NEGATIVE mg/dl    Bilirubin Urine NEGATIVE NEGATIVE    Ketones, Urine NEGATIVE NEGATIVE    Specific Gravity, UA 1.007 1.002 - 1.030    Blood, Urine TRACE (A) NEGATIVE    pH, UA 5.5 5.0 - 9.0    Protein, UA NEGATIVE NEGATIVE mg/dl    Urobilinogen, Urine 0.2 0.0 - 1.0 eu/dl    Nitrite, Urine NEGATIVE NEGATIVE    Leukocytes, UA NEGATIVE NEGATIVE    Color, UA YELLOW YELLOW-STRAW    Character, Urine CLEAR CLR-SL.[id number]    RBC, UA 3-5 0-2/hpf /hpf    WBC, UA 0-2 0-4/hpf /hpf    Epithelial Cells, UA 0-2 3-5/hpf /hpf    Bacteria, UA NONE SEEN FEW/NONE SEEN    Casts NONE SEEN NONE SEEN /lpf    Crystals NONE SEEN NONE SEEN    Renal Epithelial, UA NONE SEEN NONE SEEN    Yeast, UA NONE SEEN NONE SEEN    Casts NONE SEEN /lpf    Miscellaneous Lab Test Result NONE SEEN    VRE Screen by PCR    Collection Time: 05/10/22  2:05 PM   Result Value Ref Range    Vancomycin Resistant Enterococcus NEGATIVE    MRSA by PCR    Collection Time: 05/10/22  2:05 PM   Result Value Ref Range    MRSA SCREEN RT-PCR NEGATIVE    POCT glucose    Collection Time: 05/10/22 3:50 PM   Result Value Ref Range    POC Glucose 175 (H) 70 - 108 mg/dl   Blood Gas, Venous    Collection Time: 05/10/22  5:55 PM   Result Value Ref Range    PH MIXED 7.38 7.31 - 7.41    PCO2, MIXED VENOUS 40 (L) 41 - 51 mmhg    PO2, Mixed 31 25 - 40 mmhg    HCO3, Mixed 24 23 - 28 mmol/l    Base Exc, Mixed -1.2 -2.0 - 3.0 mmol/l    O2 Sat, Mixed 58 %    COLLECTED BY: [id number]     DEVICE Room Air     Site Bryan Phillips    Troponin    Collection Time: 05/10/22  6:57 PM   Result Value Ref Range    Troponin T 0.028 (A) ng/ml   Lactic Acid    Collection Time: 05/10/22  6:57 PM   Result Value Ref Range    Lactic Acid 1.9 0.5 - 2.0 mmol/L   Magnesium    Collection Time: 05/10/22  6:57 PM   Result Value Ref Range    Magnesium 1.8 1.6 - 2.4 mg/dL   Blood gas, venous    Collection Time: 05/10/22  9:20 PM   Result Value Ref Range    PH MIXED 7.36 7.31 - 7.41    PCO2, MIXED VENOUS 45 41 - 51 mmhg    PO2, Mixed 40 25 - 40 mmhg    HCO3, Mixed 25 23 - 28 mmol/l    Base Exc, Mixed -0.5 -2.0 - 3.0 mmol/l    O2 Sat, Mixed 73 %    COLLECTED BY: [id number]     DEVICE Room Air    EKG 12 Lead    Collection Time: 05/11/22  3:30 AM   Result Value Ref Range    Ventricular Rate 79 BPM    Atrial Rate 79 BPM    P-R Interval 142 ms    QRS Duration 122 ms    Q-T Interval 392 ms    QTc Calculation (Bazett) 449 ms    P Axis 43 degrees    R Axis 14 degrees    T Axis 66 degrees   Basic Metabolic Panel w/ Reflex to MG    Collection Time: 05/11/22  3:50 AM   Result Value Ref Range    Sodium 138 135 - 145 meq/L    Potassium reflex Magnesium 3.6 3.5 - 5.2 meq/L    Chloride 102 98 - 111 meq/L    CO2 25 23 - 33 meq/L    Glucose 168 (H) 70 - 108 mg/dL    BUN 33 (H) 7 - 22 mg/dL    CREATININE 2.1 (H) 0.4 - 1.2 mg/dL    Calcium 8.3 (L) 8.5 - 10.5 mg/dL   CBC with Auto Differential    Collection Time: 05/11/22  3:50 AM   Result Value Ref Range    WBC 6.3 4.8 - 10.8 thou/mm3    RBC 3.36 (L) 4.70 - 6.10 mill/mm3    Hemoglobin 10.1 (L) 14.0 - 18.0 gm/dl    Hematocrit 32.0 (L) 42.0 - 52.0 %    MCV 95.2 (H) 80.0 - 94.0 fL    MCH 30.1 26.0 - 33.0 pg    MCHC 31.6 (L) 32.2 - 35.5 gm/dl    RDW-CV 14.6 (H) 11.5 - 14.5 %    RDW-SD 50.9 (H) 35.0 - 45.0 fL    Platelets 89 (L) 199 - 400 thou/mm3    MPV 10.9 9.4 - 12.4 fL    Seg Neutrophils 63.9 %    Lymphocytes 25.4 %    Monocytes 8.2 %    Eosinophils 1.9 %    Basophils 0.3 %    Immature Granulocytes 0.3 %    Segs Absolute 4.0 1.8 - 7.7 thou/mm3    Lymphocytes Absolute 1.6 1.0 - 4.8 thou/mm3    Monocytes Absolute 0.5 0.4 - 1.3 thou/mm3    Eosinophils Absolute 0.1 0.0 - 0.4 thou/mm3    Basophils Absolute 0.0 0.0 - 0.1 thou/mm3    Immature Grans (Abs) 0.02 0.00 - 0.07 thou/mm3    nRBC 0 /100 wbc   Phosphorus    Collection Time: 05/11/22  3:50 AM   Result Value Ref Range    Phosphorus 4.9 (H) 2.4 - 4.7 mg/dL   Magnesium    Collection Time: 05/11/22  3:50 AM   Result Value Ref Range    Magnesium 1.7 1.6 - 2.4 mg/dL   Lactic Acid    Collection Time: 05/11/22  3:50 AM   Result Value Ref Range    Lactic Acid 1.2 0.5 - 2.0 mmol/L   Troponin    Collection Time: 05/11/22  3:50 AM   Result Value Ref Range    Troponin T 0.020 (A) ng/ml   Anion Gap    Collection Time: 05/11/22  3:50 AM   Result Value Ref Range    Anion Gap 11.0 8.0 - 16.0 meq/L   Glomerular Filtration Rate, Estimated    Collection Time: 05/11/22  3:50 AM   Result Value Ref Range    Est, Glom Filt Rate 33 (A) TY/SLX/6.34N6   Basic Metabolic Panel    Collection Time: 05/12/22  6:34 AM   Result Value Ref Range    Sodium 140 135 - 145 meq/L    Potassium 4.2 3.5 - 5.2 meq/L    Chloride 107 98 - 111 meq/L    CO2 23 23 - 33 meq/L    Glucose 145 (H) 70 - 108 mg/dL    BUN 23 (H) 7 - 22 mg/dL    CREATININE 1.1 0.4 - 1.2 mg/dL    Calcium 8.5 8.5 - 10.5 mg/dL   CBC with Auto Differential    Collection Time: 05/12/22  6:34 AM   Result Value Ref Range    WBC 5.9 4.8 - 10.8 thou/mm3    RBC 3.19 (L) 4.70 - 6.10 mill/mm3    Hemoglobin 9.7 (L) 14.0 - 18.0 gm/dl    Hematocrit 31.2 (L) 42.0 - 52.0 %    MCV 1.007 05/10/2022    GLUCOSEU NEGATIVE 03/09/2021       Radiology:-  ECHO Complete 2D W Doppler W Color    Result Date: 5/10/2022  Transthoracic Echocardiography Report (TTE)  Demographics   Patient Name    [facility]       Gender               Male                  [address]   MR #            [id number]        Race                                                     Ethnicity   Account #       [de-identified]        Room Number          0002   Accession       [id number]       Date of Study        05/10/2022  Number   Date of Birth   1965       Referring Physician  January Mcnamara MD                                                        Senora Nations CNP   Age             65 year(s)       Sonographer          Ethelda Sandifer MD                                   Physician  Procedure Type of Study   TTE procedure:ECHOCARDIOGRAM COMPLETE 2D W DOPPLER W COLOR. Procedure Date Date: 05/10/2022 Start: 03:00 PM Study Location: Bedside Technical Quality: Limited visualization due to body habitus. Indications:Heart Failure. Additional Medical History:Hyperlipidemia, hypertension, COPD, alcohol abuse, diabetic, chronic kidney disease, severe aortic stenosis Patient Status: Routine Height: 75.2 inches Weight: 328.49 pounds BSA: 2.71 m^2 BMI: 40.84 kg/m^2 BP: 143/82 mmHg  Conclusions   Summary  Normal left ventricular size and severely reduced systolic function. There was severe global hypokinesis. Wall thickness was within normal limits. Ejection fraction was estimated at 30-35%. Doppler parameters were consistent with abnormal left ventricular  relaxation (grade 1 diastolic dysfunction). The aortic valve was trileaflet with increased thickness and reduced  leaflet mobility.  DOPPLER: Transaortic velocity was 3.5 m/s, mean gradient  30 mmHg, max gradient 48 mmHg, LOBIOT 0.99 cm2, consistent with severe LF-LG  aortic stenosis. LOBITO is overestimated due to LVOT measurement error. There  was no evidence of aortic regurgitation. IVC size is not well seen. The pericardium was normal in appearance with no evidence of a pericardial  effusion   Signature   ----------------------------------------------------------------  Electronically signed by Darleen Vasquez MD (Interpreting  physician) on 05/10/2022 at 04:09 PM  ----------------------------------------------------------------   Findings   Mitral Valve  The mitral valve structure demonstrated thickened PMVL and reduced  mobility. DOPPLER: The transmitral velocity was within the normal range  with no evidence for mitral stenosis. There was no evidence of mitral  regurgitation. Aortic Valve  The aortic valve was trileaflet with increased thickness and reduced  leaflet mobility. DOPPLER: Transaortic velocity was 3.5 m/s, mean gradient  30 mmHg, max gradient 48 mmHg, LOBITO 0.99 cm2, consistent with severe LF-LG  aortic stenosis. LOBITO is overestimated due to LVOT measurement error. There  was no evidence of aortic regurgitation. Tricuspid Valve  The tricuspid valve structure was normal with normal leaflet separation. DOPPLER: There was no evidence of tricuspid stenosis. There was no  evidence of tricuspid regurgitation. Pulmonic Valve  The pulmonic valve leaflets were not well seen. DOPPLER: The transpulmonic  velocity was within the normal range with no evidence for regurgitation. Left Atrium  Left atrial size was normal.   Left Ventricle  Normal left ventricular size and severely reduced systolic function. There was severe global hypokinesis. Wall thickness was within normal limits. Ejection fraction was estimated at 30-35%. Doppler parameters were consistent with abnormal left ventricular  relaxation (grade 1 diastolic dysfunction).    Right Atrium  Right atrial size was normal. Right Ventricle  The right ventricular size was normal with normal systolic function and  wall thickness. Pericardial Effusion  The pericardium was normal in appearance with no evidence of a pericardial  effusion. Pleural Effusion  No evidence of pleural effusion. Aorta / Great Vessels  IVC size is not well seen.   M-Mode/2D Measurements & Calculations   LV Diastolic      LV Systolic Dimension: 4.9 cm    LA Dimension: 3.9 cmAO  Dimension: 5.8 cm LV Volume Diastolic: 894 ml      Root Dimension: 3.9 cm  LV FS:15.5 %      LV Volume Systolic: 15.6 ml  LV PW Diastolic:  LV EDV/LV EDV Index: 133 ml/49  1 cm              m^2LV ESV/LV ESV Index: 98.2  Septum Diastolic: JA/35 m^2                        RV Diastolic Dimension:  1 cm              EF Calculated: 35 %              3.5 cm                     LV Length: 9.2 cm                LA/Aorta: 1   LV Area           LVOT: 2 cm  Diastolic: 43.8  cm^2  LV Area Systolic:  72.7 cm^2  Doppler Measurements & Calculations   MV Peak E-Wave:     AV Peak Velocity: 307    LVOT Peak Velocity: 125 cm/s  72.4 cm/s           cm/s                     LVOT Mean Velocity: 103 cm/s  MV Peak A-Wave: 114 AV Peak Gradient: 37.7   LVOT Peak Gradient: 5  cm/s                mmHg                     mmHgLVOT Mean Gradient: 4  MV E/A Ratio: 0.64  AV Mean Velocity: 228    mmHg  MV Peak Gradient:   cm/s  2.1 mmHg            AV Mean Gradient: 27                      mmHg  MV Deceleration     AV VTI: 57 cm            TR Velocity:293 cm/s  Time: 198 msec      AV Area                  TR Gradient:34.34 mmHg  MV P1/2t: 58 msec   (Continuity):1.22 cm^2  MVA by PHT:3.79  cm^2                LVOT VTI: 22.1 cm   MV E' Septal  Velocity: 6.4 cm/s  MV A' Septal        AV DVI (VTI): 0.39AV DVI  Velocity: 12.2 cm/s (Vmax):0.41  MV E' Lateral  Velocity: 9.5 cm/s  MV A' Lateral  Velocity: 13.1 cm/s  E/E' septal: 11.31  E/E' lateral: 7.62 [id number]    CT HEAD WO CONTRAST    Result Date: 5/10/2022  Noncontrast CT of the head Technique: Noncontrast CT of the head from the vertex through the skull base. Comparison:  CT,SR - CT HEAD WO CONTRAST - 03/03/2021 09:01 PM EST Findings: Complex mass within the left middle cranial fossa measuring 3.6 x 4.3 cm, unchanged. No intracranial hemorrhage or hydrocephalus. No evidence for acute ischemia by CT. No skull fractures. Normal aeration of visualized paranasal sinuses. Impression: Complex mass within the left middle cranial fossa. This document has been electronically signed by: John Constantino MD on 05/10/2022 07:05 PM All CTs at this facility use dose modulation techniques and iterative reconstructions, and/or weight-based dosing when appropriate to reduce radiation to a low as reasonably achievable. XR CHEST PORTABLE    Result Date: 5/10/2022  PROCEDURE: XR CHEST PORTABLE CLINICAL INFORMATION: post CVC placement COMPARISON: Chest radiograph 5/10/2022, 5/4/2022. TECHNIQUE: AP portable chest radiograph performed. FINDINGS: The tip of the right internal jugular venous catheter terminates overlying the region of the superior vena cava. The costophrenic angles and lung bases are not well imaged on this study. Nonspecific devices are seen overlying the chest. No focal pulmonary consolidation. Cardiac silhouette is not enlarged. No obvious pneumothorax. No acute bony abnormality. 1. The tip of the right internal jugular venous catheter terminates overlying the region of the superior vena cava. 2. No obvious right pneumothorax is seen. **This report has been created using voice recognition software. It may contain minor errors which are inherent in voice recognition technology. ** Final report electronically signed by Dr Christine Deshpande on 5/10/2022 12:38 PM    XR CHEST PORTABLE    Result Date: 5/10/2022  PROCEDURE: XR CHEST PORTABLE CLINICAL INFORMATION: Nausea COMPARISON: Chest radiograph 5/4/2022 TECHNIQUE: AP portable chest radiograph performed. FINDINGS: Apparently the patient was imaged with life vest. No focal pulmonary consolidation. Cardiac silhouette is not enlarged. No pleural effusion. No pneumothorax. No acute bony abnormality. 1. No acute cardiopulmonary finding. **This report has been created using voice recognition software. It may contain minor errors which are inherent in voice recognition technology. ** Final report electronically signed by Dr America Nur on 5/10/2022 11:36 AM       Follow-up scheduled after discharge :-    in the next few days with Ran Godinez MD  in 4 weeks with Neuro   In the next few days with cardiology    Consultations during this hospital stay:-  [] NONE [x] Cardiology  [] Nephrology  [] Hemo onco   [] GI   [] ID  [] Endocrine  [] Pulm    [x] Neuro    [] Psych   [] Urology  [] ENT   [] G SURGERY   []Ortho    []CV surg    [] Palliative  [] Hospice [] Pain management   []    []TCU   [] PT/OT  OTHERS:-    Disposition: home  Condition at Discharge: Stable    Time Spent:- 45 minutes    Electronically signed by Hector Corbett PA-C on 5/12/22 at 8:21 AM EDT   Discharging Hospitalist

## 2022-05-12 NOTE — PROGRESS NOTES
Discharge teaching and instructions for diagnosis/procedure of cardiogenic shock completed with patient using teachback method. AVS reviewed. Printed prescriptions given to patient. Patient voiced understanding regarding prescriptions, follow up appointments, and care of self at home.  Discharged in a wheelchair to  home with support per family

## 2022-05-16 ENCOUNTER — HOSPITAL ENCOUNTER (OUTPATIENT)
Age: 57
Discharge: HOME | End: 2022-05-16
Payer: MEDICARE

## 2022-05-16 ENCOUNTER — OFFICE VISIT (OUTPATIENT)
Dept: CARDIOLOGY CLINIC | Age: 57
End: 2022-05-16
Payer: MEDICARE

## 2022-05-16 VITALS
SYSTOLIC BLOOD PRESSURE: 136 MMHG | BODY MASS INDEX: 40.62 KG/M2 | WEIGHT: 315 LBS | DIASTOLIC BLOOD PRESSURE: 86 MMHG | OXYGEN SATURATION: 97 % | HEART RATE: 84 BPM

## 2022-05-16 DIAGNOSIS — D64.9 ANEMIA, UNSPECIFIED TYPE: ICD-10-CM

## 2022-05-16 DIAGNOSIS — I50.21 ACUTE SYSTOLIC CONGESTIVE HEART FAILURE, NYHA CLASS 2 (HCC): ICD-10-CM

## 2022-05-16 DIAGNOSIS — N18.9 CHRONIC KIDNEY DISEASE, UNSPECIFIED CKD STAGE: ICD-10-CM

## 2022-05-16 DIAGNOSIS — I50.9 CHF WITH UNKNOWN LVEF (HCC): ICD-10-CM

## 2022-05-16 DIAGNOSIS — I35.0 SEVERE AORTIC STENOSIS: Primary | ICD-10-CM

## 2022-05-16 LAB
ANION GAP SERPL CALCULATED.3IONS-SCNC: 10 MEQ/L (ref 8–16)
BLOOD CULTURE, ROUTINE: NORMAL
BUN BLDV-MCNC: 21 MG/DL (ref 7–22)
CALCIUM SERPL-MCNC: 9.7 MG/DL (ref 8.5–10.5)
CHLORIDE BLD-SCNC: 99 MEQ/L (ref 98–111)
CO2: 26 MEQ/L (ref 23–33)
CREAT SERPL-MCNC: 1.1 MG/DL (ref 0.4–1.2)
ERYTHROCYTE [DISTWIDTH] IN BLOOD BY AUTOMATED COUNT: 14.6 % (ref 11.5–14.5)
ERYTHROCYTE [DISTWIDTH] IN BLOOD BY AUTOMATED COUNT: 49.6 FL (ref 35–45)
GFR SERPL CREATININE-BSD FRML MDRD: 69 ML/MIN/1.73M2
GLUCOSE BLD-MCNC: 235 MG/DL (ref 70–108)
HCT VFR BLD CALC: 36 % (ref 42–52)
HEMOGLOBIN: 11.6 GM/DL (ref 14–18)
MCH RBC QN AUTO: 30.4 PG (ref 26–33)
MCHC RBC AUTO-ENTMCNC: 32.2 GM/DL (ref 32.2–35.5)
MCV RBC AUTO: 94.2 FL (ref 80–94)
PLATELET # BLD: 104 THOU/MM3 (ref 130–400)
PMV BLD AUTO: 10.9 FL (ref 9.4–12.4)
POTASSIUM SERPL-SCNC: 4.7 MEQ/L (ref 3.5–5.2)
PRO-BNP: 5829 PG/ML (ref 0–900)
RBC # BLD: 3.82 MILL/MM3 (ref 4.7–6.1)
SODIUM BLD-SCNC: 135 MEQ/L (ref 135–145)
WBC # BLD: 6.3 THOU/MM3 (ref 4.8–10.8)

## 2022-05-16 PROCEDURE — G8427 DOCREV CUR MEDS BY ELIG CLIN: HCPCS | Performed by: NURSE PRACTITIONER

## 2022-05-16 PROCEDURE — 4004F PT TOBACCO SCREEN RCVD TLK: CPT | Performed by: NURSE PRACTITIONER

## 2022-05-16 PROCEDURE — 3017F COLORECTAL CA SCREEN DOC REV: CPT | Performed by: NURSE PRACTITIONER

## 2022-05-16 PROCEDURE — 1111F DSCHRG MED/CURRENT MED MERGE: CPT | Performed by: NURSE PRACTITIONER

## 2022-05-16 PROCEDURE — 83880 ASSAY OF NATRIURETIC PEPTIDE: CPT

## 2022-05-16 PROCEDURE — G8417 CALC BMI ABV UP PARAM F/U: HCPCS | Performed by: NURSE PRACTITIONER

## 2022-05-16 PROCEDURE — 36415 COLL VENOUS BLD VENIPUNCTURE: CPT

## 2022-05-16 PROCEDURE — 99213 OFFICE O/P EST LOW 20 MIN: CPT | Performed by: NURSE PRACTITIONER

## 2022-05-16 PROCEDURE — 80048 BASIC METABOLIC PNL TOTAL CA: CPT

## 2022-05-16 PROCEDURE — 85027 COMPLETE CBC AUTOMATED: CPT

## 2022-05-16 RX ORDER — POTASSIUM CHLORIDE 750 MG/1
20 TABLET, EXTENDED RELEASE ORAL DAILY
Qty: 180 TABLET | Refills: 3 | Status: SHIPPED | OUTPATIENT
Start: 2022-05-16 | End: 2022-06-06

## 2022-05-16 RX ORDER — FUROSEMIDE 20 MG/1
40 TABLET ORAL DAILY
Qty: 180 TABLET | Refills: 3 | Status: SHIPPED | OUTPATIENT
Start: 2022-05-16

## 2022-05-16 ASSESSMENT — ENCOUNTER SYMPTOMS
COUGH: 0
ABDOMINAL DISTENTION: 0
NAUSEA: 1
VOMITING: 1
SHORTNESS OF BREATH: 0

## 2022-05-16 NOTE — PROGRESS NOTES
Heart Failure Clinic       Visit Date: 5/16/2022  Cardiologist:  Dr. Aisha Malave  Primary Care Physician: Dr. Joey Sin MD    Mina Nissen is a 65 y.o. male who presents today for:  Chief Complaint   Patient presents with    Congestive Heart Failure       HPI:     TYPE HF: HFrEF 28 (2021)  Cause: nonsichemic, valvular (moderate as noted on 11/2020 echo)  Device: lifevest  HX: COPD, CKD, Hep C, HLD, HTN, Cirrhosis d/t ETOH abuse, Brain tumor  Dry Wt:  Unknown    Mina Nissen is a 65 y.o. male who presents to the office for a follow up patient visit in the heart failure clinic. Concerns today: pt was recently admitted d/t MATTIE. After discharge he became drunk and passed out, his nephew called the EMS an he was to be shipped to us but pt refused. Here today for f/u visit. Was to have TAVR tomorrow, this was cancelled d/t poor compliance. Visit on 5/10: pt called in on Friday c/o lower leg selling. His lasix was increased to 40 BID from 40 am, 20pm. He had just completed a BNP and chest xray that WNL. Since he has developed N/V and has had decreased urine output. Pt is also noting dizziness and feeling lightheaded. Visit on 5/4: no significant weight gain or weight loss. Still wearing oxygen most nights. States that he eats at [employer] and eats out after Dr. Dwight Oliveira. Still experiencing feeling lightheaded and dizzy, along with SOB with some improvement.      Visit on 3/21/22: total of 80lbs lost since starting diuretics. Notes improvement of SOB w/ water loss and new inhaler. Improvement of leg swelling and bloating. Still wearing oxygen at night that was prescribed to him by his PCP prior to hospital admission. His diet consists primarily of soup and rickey noodles.  Also eats a lot of frozen meat loaf  Weight trend: 318 at D/C; 322 today; 360 prior to admit      Following Pulmonary   Acute hypoxic respiratory failure due to decompensated chronic systolic congestive heart failure VS COPD VS other etiologies.   -COPD exacerbation due to acute bronchitis with  bacterial Vs viral etiology.  -Acute decompensation of chronic-systolic congestive heart failure.  -Abnormal chest x-ray- ?  Left-sided pleural effusion-Improved on a chest x-ray performed on 11 March 2022.  -Severe chronic obstructive pulmonary disease.  -Chronic use of tobacco smoking for 40 years with 1 pack of cigarettes per day. -Type 2 diabetes mellitus  -Essential hypertension.  -Legally blind     Hospitalization:       3/6/22-3/15/22     The patient is a 57 y. o. male who presents with insidious onset of shortness of breath, legs swelling and weight gain in the last few weeks. He endorses significant orthopnea and PND. He has cough,wheezes. He is on a diuretic at home. His weight gain persisted and the SOB got worse. He reports increased fatigue. He had a fall at home on account of increasing weakness and fatigue, bruised his right knee. No head injury and no loss of consciousness with the fall. Subsequently presented to ER. ER evaluation showed evidence of fluid overload with elevated BNP, pulmonary congestion        Activity: ADLs performed with limitations.  Becomes SOB, has to sit down and take multiple breaks  Diet: educated today     Patient has:  Chest Pain: no  SOB: yes  Orthopnea/PND: yes improved                        TAPAN: oxygen at night, negative for TAPAN  Edema: yes improved  Fatigue: no  Abdominal bloating: yes improved  Cough: no  Appetite: good  Home weight: daily in the morning  Home blood pressure: no, going to get a cuff    Past Medical History:   Diagnosis Date    Acute kidney injury ([facility] [address] 12/2020    due to Enterococous Bacteremia , fluid overload, low sodium    Amputation of right great toe ([facility] [address] 02/18/2021    Asthma     Brain tumor ([facility] [address]     Cirrhosis ([facility] [address]     ETOH abuse    CKD (chronic kidney disease)     l.brown-osu spetie    COPD (chronic obstructive pulmonary disease) (HCC)     Diabetes mellitus Pack years: 40.00     Types: Cigarettes    Smokeless tobacco: Never Used    Tobacco comment: Currently smoking electronic cigarettes   Substance Use Topics    Alcohol use: Yes     Alcohol/week: 3.0 standard drinks     Types: 3 Cans of beer per week     Current Outpatient Medications   Medication Sig Dispense Refill    furosemide (LASIX) 20 MG tablet Take 2 tablets by mouth daily 180 tablet 3    potassium chloride (KLOR-CON M) 10 MEQ extended release tablet Take 2 tablets by mouth daily 180 tablet 3    oxyCODONE-acetaminophen (PERCOCET) 7.5-325 MG per tablet Take 1 tablet by mouth every 8 hours as needed for Pain for up to 30 days.  90 tablet 0    albuterol (PROVENTIL) (2.5 MG/3ML) 0.083% nebulizer solution Take 3 mLs by nebulization every 6 hours as needed for Wheezing or Shortness of Breath 120 each 11    metoprolol succinate (TOPROL XL) 100 MG extended release tablet Take 1 tablet by mouth daily 30 tablet 3    umeclidinium-vilanterol (ANORO ELLIPTA) 62.5-25 MCG/INH AEPB inhaler Inhale 1 puff into the lungs daily 1 each 11    senna (SENOKOT) 8.6 MG tablet Take 2 tablets by mouth daily      tamsulosin (FLOMAX) 0.4 MG capsule Take 1 capsule by mouth nightly 30 capsule 0    DULoxetine (CYMBALTA) 60 MG extended release capsule Take 1 capsule by mouth daily 30 capsule 3    insulin glargine (LANTUS) 100 UNIT/ML injection vial Inject 84 Units into the skin nightly 1 vial 3    insulin lispro (HUMALOG) 100 UNIT/ML injection vial Inject 10 Units into the skin 3 times daily (before meals) Plus Sliding Scale (Patient taking differently: Inject 12 Units into the skin 3 times daily (before meals) Plus Sliding Scale ) 1 vial 0    insulin lispro (HUMALOG) 100 UNIT/ML injection vial Glucose: Dose:  No Insulin, 140-199 2 Units, 200-249 4 Units, 250-299 6 Units, 300-349 8 Units, 350-400 10 Units, Over 400 12 Units 1 vial 0    albuterol sulfate  (90 Base) MCG/ACT inhaler Inhale 1 puff into the lungs every 4-6 hours as needed      latanoprost (XALATAN) 0.005 % ophthalmic solution Place 1 drop into both eyes nightly 1 Bottle 3    hydrALAZINE (APRESOLINE) 50 MG tablet Take 1 tablet by mouth every 8 hours (Patient taking differently: Take 50 mg by mouth daily ) 90 tablet 3    nicotine (NICODERM CQ) 21 MG/24HR Place 1 patch onto the skin daily (Patient not taking: Reported on 5/16/2022) 30 patch 3    OXYGEN Inhale into the lungs daily as needed (nightly)        No current facility-administered medications for this visit. Allergies   Allergen Reactions    Adhesive Tape Rash     Bandaid    Keppra [Levetiracetam] Rash       SUBJECTIVE:   Review of Systems   Constitutional: Positive for fatigue. Negative for activity change, appetite change and diaphoresis. Respiratory: Negative for cough and shortness of breath. Cardiovascular: Negative for chest pain, palpitations and leg swelling. Gastrointestinal: Positive for nausea and vomiting. Negative for abdominal distention. Neurological: Positive for dizziness, weakness and light-headedness. Negative for headaches. Hematological: Negative for adenopathy. Psychiatric/Behavioral: Negative for sleep disturbance. OBJECTIVE:   Today's Vitals:  /86   Pulse 84   Wt (!) 325 lb (147.4 kg)   SpO2 97%   BMI 40.62 kg/m²     Physical Exam  Vitals reviewed. Constitutional:       General: He is not in acute distress. Appearance: Normal appearance. He is well-developed. He is not diaphoretic. HENT:      Head: Normocephalic and atraumatic. Eyes:      Conjunctiva/sclera: Conjunctivae normal.   Cardiovascular:      Rate and Rhythm: Normal rate and regular rhythm. Heart sounds: Normal heart sounds. No murmur heard. Pulmonary:      Effort: Pulmonary effort is normal. No respiratory distress. Breath sounds: Normal breath sounds. No wheezing or rales. Abdominal:      General: Bowel sounds are normal. There is no distension. Palpations: Abdomen is soft. Tenderness: There is no abdominal tenderness. Musculoskeletal:         General: Normal range of motion. Cervical back: Normal range of motion and neck supple. Right lower leg: No edema. Left lower leg: No edema. Skin:     General: Skin is warm and dry. Capillary Refill: Capillary refill takes less than 2 seconds. Neurological:      Mental Status: He is alert and oriented to person, place, and time. Coordination: Coordination normal.   Psychiatric:         Behavior: Behavior normal.         Wt Readings from Last 3 Encounters:   05/16/22 (!) 325 lb (147.4 kg)   05/12/22 (!) 328 lb 1.6 oz (148.8 kg)   05/10/22 (!) 326 lb (147.9 kg)     BP Readings from Last 3 Encounters:   05/16/22 136/86   05/12/22 138/88   05/10/22 (!) 69/45     Pulse Readings from Last 3 Encounters:   05/16/22 84   05/12/22 78   05/10/22 101     Body mass index is 40.62 kg/m². ECHO:    Summary   Baseline/ pre-dobutamine / Rest / finding   ef 35 to 40% ( 37%)   LOBITO 1 cm2   peak and mean gradient of 35 and 23 mmhg respectively   Peak velocity of 3 m/sec      POST PEAK DOBUTAMINE INFUSION OF 20 MC/KG/MIN RESULTS   LV FUNCTION/EF/ IMPROVED TO 50% SHOWING GOOD CONTRACTILE RESERVE   NO SEGMENTAL WALL MOTION ABNORMALITY INDUCED   PEAK TRANSAORTIC VELOCITY OF 4.1M/SEC   PEAK AND MEAN TRANSAORTIC GRADIENT OF 70 MMHG AND 38 MMHG RESPECTIVELY   AORTIC VALVE AREA WORSENED TO 0.8 CM2   THIS IS CONSISTENT WITH SEVERE AORTIC STENOSIS      Signature      ----------------------------------------------------------------   Electronically signed by Rachel Larios MD (Interpreting   physician) on 03/14/2022 at 07:04 PM     Summary  Technically difficult examination. Left Ventricular size is Mildly increased . Normal left ventricular wall thickness. There was severe global hypokinesis of the left ventricle. Systolic function was severely reduced.   Ejection fraction is visually estimated in the range of 35%. The left atrium is Moderately dilated. There is moderate-to-severe aortic stenosis with valve area of 0.9 to 1 sq cm. The maximum aortic valve gradient is 40 mmHg, the mean gradient is 25  mmHg, and the peak velocity is 3.1 m/s.     Signature     ----------------------------------------------------------------  Electronically signed by Marc Monson MD (Interpreting  physician) on 03/07/2022 at 05:58 PM  ----------------------------------------------------------------        Summary   Technically difficult study due to poor acoustic windows.   Left ventricular size is normal and systolic function is severely reduced.   Ejection fraction was estimated at 35-40%. LV wall thickness is withinnormal limits.   Moderately dilated left atrium.   Thickened and calcified aortic valve leaflets with reduced excursion.   DOPPLER: Transaortic peak velocity 3 m/s, mean gradient of 20 mm Hg and   calculated LOBITO was 1.2 cm2. There is moderate aortic stenosis present.   Mild aortic regurgitation is noted.   Calcification of the mitral valve noted.   Mild mitral regurgitation is present.   Mild tricuspid regurgitation visualized.      Signature   ----------------------------------------------------------------   Electronically signed by Bina Vee MD (Interpreting   physician) on 07/22/2021 at 10:00 PM     CATH/STRESS:       Summary   Lexiscan EKG stress test is non-diagnostic for ischemia.   Calculated gated LVEF 47 %.   The T.I.D. ratio was 1.14 .   There was a small to moderate sized, moderate in intensity, fixed   myocardial perfusion defect of the inferior wall.   This study was negative for ischemia.      Recommendation   Clinical correlation is recommended.   Aggressive risk factor management.   Medical management.     Cath:8/2021  CORONARY ANATOMY:  Right Coronary: Dominant and patent  Left Main: Patent  Left Circumflex: Patent  Left Anterior Descending: Patent     LVEDP was measured at 14 mmHg.   LVEF was estimated at 35%. Mean gradient of 22mm Hg across aortic valve      Results reviewed:  BNP:   Lab Results   Component Value Date     (H) 03/28/2022     CBC:   Lab Results   Component Value Date    WBC 6.3 05/16/2022    RBC 3.82 05/16/2022    HGB 11.6 05/16/2022    HCT 36.0 05/16/2022     05/16/2022     CMP:    Lab Results   Component Value Date     05/16/2022    K 4.7 05/16/2022    K 3.6 05/11/2022    CL 99 05/16/2022    CO2 26 05/16/2022    BUN 21 05/16/2022    CREATININE 1.1 05/16/2022    LABGLOM 69 05/16/2022    GLUCOSE 235 05/16/2022    CALCIUM 9.7 05/16/2022     Hepatic Function Panel:    Lab Results   Component Value Date    ALKPHOS 129 05/10/2022    ALT 48 05/10/2022    AST 60 05/10/2022    PROT 7.8 05/10/2022    BILITOT 0.7 05/10/2022    BILIDIR 0.4 05/10/2022    LABALBU 3.5 05/10/2022     Magnesium:    Lab Results   Component Value Date    MG 1.7 05/11/2022     PT/INR:    Lab Results   Component Value Date    INR 1.12 05/10/2022     Lipids:    Lab Results   Component Value Date    TRIG 73 03/07/2022    HDL 40 03/07/2022    LDLCALC 32 03/07/2022       ASSESSMENT AND PLAN:   The patient's condition/symptoms are unstable. Sent to ER        Diagnosis Orders   1. Severe aortic stenosis  Basic Metabolic Panel    Brain Natriuretic Peptide   2. Acute systolic congestive heart failure, NYHA class 2 (HCC)  Basic Metabolic Panel    Brain Natriuretic Peptide     Continue:  GDMT:    ACE/ARB/ARNI - Entresto 24/26 BID (stopped at discharge)              BB - Toprol 100 daily              Diuretic - Lasix 20 daily**increaseing to 40 daily  AA - caused bump in Cr  SGLT2 -  Farxiga  Vasodilator - hydralazine 50 Q 8               Other - ASA, KCl 10 daily **increasing to 20 daily      HFrEF at 35-40% improved to 50% w/ dobutamine stress  Severe AS  Stable, euvolemic on exam. BNP jumped since cutting back on lasix.  Will increase lasix and repeat BNP.      Labs in two weeks    Take lasix 40 daily, take KCl 20 daily     BP/HR stable      Continue diet/fluid adherence  Continue daily wts. F/U w/ Cardiology,   F/U appointment on 6/23      Tolerating above noted HF meds, no ill side effects noted. Will continue to monitor kidney function and electrolytes. Will optimize as tolerated. Pt is compliant w/ medications. Total visit time of 20 minutes has been spent with patient on education of symptoms, management, medication, and plan of care; as well as review of chart: labs, ECHO, radiology reports, etc.   I personally spent more then 50% of the appt time face to face with the patient. · Daily weights  · Fluid restriction of 2 Liters per day  · Limit sodium in diet to around 0411-1148 mg/day  · Monitor BP  · Activity as tolerated     Patient was instructed to call the [facility] for any changes in symptoms as noted in AVS.      Return in about 4 weeks (around 6/13/2022). or sooner if needed     Patient given educational materials - see patient instructions. We discussed the importance of weighing oneself and recording daily. We also discussed the importance of a low sodium diet, higher sodium foods to avoid and better low sodium food options. Patient verbalizes understanding of plan of care using teach back method, and is agreeable to the treatment plan.        Electronically signed by MEGHAN Richey CNP on 5/16/2022 at 3:51 PM

## 2022-05-16 NOTE — PATIENT INSTRUCTIONS
You may receive a survey regarding the care you received during your visit. Your input is valuable to us. We encourage you to complete and return your survey. We hope you will choose us in the future for your healthcare needs. Continue:  · Continue current medications  · Daily weights and record  · Fluid restriction of 2 Liters per day  · Limit sodium in diet to around 1371-8704 mg/day  · Monitor BP  · Activity as tolerated     Call the Heart Failure Clinic for any of the following symptoms: [phone]   Weight gain of 2-3 pounds in 1 day or 5 pounds in 1 week   Increased shortness of breath   Shortness of breath while laying down   Cough   Chest pain   Swelling in feet, ankles or legs   Tenderness or bloating in the abdomen   Fatigue    Decreased appetite or nausea    Confusion       Labs in two weeks    Take lasix 40 daily, take KCl 20 daily     BP/HR stable      Continue diet/fluid adherence  Continue daily wts.   F/U w/ Cardiology,   F/U appointment on 5/31

## 2022-05-23 DIAGNOSIS — G89.4 CHRONIC PAIN SYNDROME: ICD-10-CM

## 2022-05-23 NOTE — TELEPHONE ENCOUNTER
Jana Cordero called requesting a refill on the following medications:  Requested Prescriptions     Pending Prescriptions Disp Refills    oxyCODONE-acetaminophen (PERCOCET) 7.5-325 MG per tablet 90 tablet 0     Sig: Take 1 tablet by mouth every 8 hours as needed for Pain for up to 30 days. Pharmacy verified:[facility]  . pv      Date of last visit:3/24/22   Date of next visit (if applicable): 1/9/0449

## 2022-05-24 RX ORDER — OXYCODONE AND ACETAMINOPHEN 7.5; 325 MG/1; MG/1
1 TABLET ORAL EVERY 8 HOURS PRN
Qty: 90 TABLET | Refills: 0 | Status: SHIPPED | OUTPATIENT
Start: 2022-05-29 | End: 2022-06-29 | Stop reason: SDUPTHER

## 2022-05-24 NOTE — TELEPHONE ENCOUNTER
OARRS reviewed. UDS: + for  Duloxetine, Oxycodone -consistent. Last seen: 3/24/2022.  Follow-up: 7/5/2022

## 2022-05-25 ENCOUNTER — OFFICE VISIT (OUTPATIENT)
Dept: CARDIOLOGY CLINIC | Age: 57
End: 2022-05-25
Payer: MEDICARE

## 2022-05-25 VITALS
DIASTOLIC BLOOD PRESSURE: 80 MMHG | BODY MASS INDEX: 39.17 KG/M2 | HEIGHT: 75 IN | SYSTOLIC BLOOD PRESSURE: 110 MMHG | WEIGHT: 315 LBS | HEART RATE: 104 BPM

## 2022-05-25 DIAGNOSIS — I50.22 CHRONIC SYSTOLIC CHF (CONGESTIVE HEART FAILURE), NYHA CLASS 2 (HCC): ICD-10-CM

## 2022-05-25 DIAGNOSIS — I35.0 AORTIC VALVE STENOSIS, ETIOLOGY OF CARDIAC VALVE DISEASE UNSPECIFIED: Primary | ICD-10-CM

## 2022-05-25 PROCEDURE — G8417 CALC BMI ABV UP PARAM F/U: HCPCS | Performed by: INTERNAL MEDICINE

## 2022-05-25 PROCEDURE — G8427 DOCREV CUR MEDS BY ELIG CLIN: HCPCS | Performed by: INTERNAL MEDICINE

## 2022-05-25 PROCEDURE — 4004F PT TOBACCO SCREEN RCVD TLK: CPT | Performed by: INTERNAL MEDICINE

## 2022-05-25 PROCEDURE — 99213 OFFICE O/P EST LOW 20 MIN: CPT | Performed by: INTERNAL MEDICINE

## 2022-05-25 PROCEDURE — 3017F COLORECTAL CA SCREEN DOC REV: CPT | Performed by: INTERNAL MEDICINE

## 2022-05-25 PROCEDURE — 1111F DSCHRG MED/CURRENT MED MERGE: CPT | Performed by: INTERNAL MEDICINE

## 2022-05-25 NOTE — PROGRESS NOTES
Patient here to discuss TAVR. Didn't bring in a med list but doesn't think there have been any changes outside of [id number]. Lifevest in place.

## 2022-05-26 NOTE — PROGRESS NOTES
[address] [address] [address] [place] [address]  Dept: [phone]  Dept Fax: [phone]  Loc: [phone]    Visit Date: 5/25/2022    Mr. Inna Ambrocio is a 65 y.o. male  who presented for:  Chief Complaint   Patient presents with    Follow-up    Cardiac Valve Problem     severe AS       HPI:   HPI   66 yo M liver cirrhosis, COPD, DMII, obesity, presents to discuss next steps for severe AS. He has significant LV dysfunction. Follows with CHF clinic. Was set for TAVR but unfortunately had an alcoholic relapse. He basically decided to get drunk and then was admitted for recovery at OSH. His TAVR was cancelled. He wants to know when his valve replacement is going to be. He remains very sob and fatigued. He is very tired. He states he has not had a drink in 10 days. He is taking all of his meds. He also had an admission here earlier this month, for dehydration, and renal failure with associated hypotension that was treated with IVF hydration based on a SGC numbers. He did well, but again had a relapse that resulted in an admission. Current Outpatient Medications:     [START ON 5/29/2022] oxyCODONE-acetaminophen (PERCOCET) 7.5-325 MG per tablet, Take 1 tablet by mouth every 8 hours as needed for Pain for up to 30 days. , Disp: 90 tablet, Rfl: 0    furosemide (LASIX) 20 MG tablet, Take 2 tablets by mouth daily, Disp: 180 tablet, Rfl: 3    potassium chloride (KLOR-CON M) 10 MEQ extended release tablet, Take 2 tablets by mouth daily, Disp: 180 tablet, Rfl: 3    albuterol (PROVENTIL) (2.5 MG/3ML) 0.083% nebulizer solution, Take 3 mLs by nebulization every 6 hours as needed for Wheezing or Shortness of Breath, Disp: 120 each, Rfl: 11    metoprolol succinate (TOPROL XL) 100 MG extended release tablet, Take 1 tablet by mouth daily, Disp: 30 tablet, Rfl: 3    hydrALAZINE (APRESOLINE) 50 MG tablet, Take 1 tablet by mouth every 8 hours (Patient taking differently: Take 50 mg by mouth daily ), Disp: 90 tablet, Rfl: 3    nicotine (NICODERM CQ) 21 MG/24HR, Place 1 patch onto the skin daily, Disp: 30 patch, Rfl: 3    umeclidinium-vilanterol (ANORO ELLIPTA) 62.5-25 MCG/INH AEPB inhaler, Inhale 1 puff into the lungs daily, Disp: 1 each, Rfl: 11    senna (SENOKOT) 8.6 MG tablet, Take 2 tablets by mouth daily, Disp: , Rfl:     tamsulosin (FLOMAX) 0.4 MG capsule, Take 1 capsule by mouth nightly, Disp: 30 capsule, Rfl: 0    DULoxetine (CYMBALTA) 60 MG extended release capsule, Take 1 capsule by mouth daily, Disp: 30 capsule, Rfl: 3    insulin glargine (LANTUS) 100 UNIT/ML injection vial, Inject 84 Units into the skin nightly, Disp: 1 vial, Rfl: 3    insulin lispro (HUMALOG) 100 UNIT/ML injection vial, Inject 10 Units into the skin 3 times daily (before meals) Plus Sliding Scale (Patient taking differently: Inject 12 Units into the skin 3 times daily (before meals) Plus Sliding Scale ), Disp: 1 vial, Rfl: 0    insulin lispro (HUMALOG) 100 UNIT/ML injection vial, Glucose: Dose:  No Insulin, 140-199 2 Units, 200-249 4 Units, 250-299 6 Units, 300-349 8 Units, 350-400 10 Units, Over 400 12 Units, Disp: 1 vial, Rfl: 0    albuterol sulfate  (90 Base) MCG/ACT inhaler, Inhale 1 puff into the lungs every 4-6 hours as needed, Disp: , Rfl:     OXYGEN, Inhale into the lungs daily as needed (nightly) , Disp: , Rfl:     latanoprost (XALATAN) 0.005 % ophthalmic solution, Place 1 drop into both eyes nightly, Disp: 1 Bottle, Rfl: 3    Past Medical History  Latha Aponte  has a past medical history of Acute kidney injury ([facility] [address] Amputation of right great toe ([facility] [address] Asthma, Brain tumor ([address] Cirrhosis ([address] CKD (chronic kidney disease), COPD (chronic obstructive pulmonary disease) ([facility] [address] Diabetes mellitus ([facility] [address] Falling, Glaucoma, Hepatitis C, History of blood transfusion, Hyperlipidemia, Hypertension, Legally blind, Obesity, Pain management, Right foot infection, and Seizures ([facility] [address] Social History  Chralotte Thurston  reports that he has been smoking cigarettes. He has a 40.00 pack-year smoking history. He has never used smokeless tobacco. He reports current alcohol use of about 3.0 standard drinks of alcohol per week. He reports previous drug use. Drug: Marijuana Ayakanavjot Oliveira). Family History  Charlotte Thurston family history includes Cancer in his sister; Heart Attack in his brother, father, and paternal grandfather; Heart Disease in his brother; Cayla Nahed in his paternal grandmother; No Known Problems in his maternal grandfather, maternal grandmother, and mother. There is no family history of bicuspid aortic valve, aneurysms, heart transplant, pacemakers, defibrillators, or sudden cardiac death. Past Surgical History   Past Surgical History:   Procedure Laterality Date    BACK SURGERY      CARDIAC CATHETERIZATION  08/03/2021    EYE SURGERY      FIBULA FRACTURE SURGERY Left 03/21/2019    LEFT FIBULAR SHAFT ORIF performed by Abdi Lee DPM at [address] Right     Steel pins in place    FRACTURE SURGERY      NASAL SINUS SURGERY Bilateral 11/29/2020    FLEXIBLE BRONCHOSCOPY WITH BRONCHOALVEOLAR LAVAGE WITH CULTURES.  NASAL ENDOSCOPY WITH POSSIBLE CAUTERY ADN NASAL PACKING performed by Hilda Masterson MD at [facility]  01/2020    SPINE SURGERY N/A 02/19/2021    KYPHOPLASTY at T4, L2, L4 performed by Renee Nunez MD at [address] [place] [address] N/A 8/24/2021    LUMBAR 3 AND LUMBAR 5 KYPHOPLASTY performed by Gayathri Helms MD at [address] [place] [address] Drive Right 09/23/2020    [address] [facility], REMOVAL OF HARDWARE RIGHT HALLUX performed by Tara Esquivel DPM at [address] [place] [address] N/A 04/25/2019    EGD BIOPSY performed by Sidney Segal MD at [facility]       Review of Systems   Constitutional: Negative for chills and fever  HENT: Negative for congestion, sinus pressure, sneezing and sore throat. Eyes: Negative for pain, discharge, redness and itching. Respiratory: Negative for apnea, cough  Gastrointestinal: Negative for blood in stool, constipation, diarrhea   Endocrine: Negative for cold intolerance, heat intolerance, polydipsia. Genitourinary: Negative for dysuria, enuresis, flank pain and hematuria. Musculoskeletal: Negative for arthralgias, joint swelling and neck pain. Neurological: Negative for numbness and headaches. Psychiatric/Behavioral: Negative for agitation, confusion, decreased concentration and dysphoric mood. Objective:     /80   Pulse (!) 104   Ht 6' 3\" (1.905 m)   Wt (!) 321 lb (145.6 kg)   BMI 40.12 kg/m²     Wt Readings from Last 3 Encounters:   05/25/22 (!) 321 lb (145.6 kg)   05/16/22 (!) 325 lb (147.4 kg)   05/12/22 (!) 328 lb 1.6 oz (148.8 kg)     BP Readings from Last 3 Encounters:   05/25/22 110/80   05/16/22 136/86   05/12/22 138/88       Nursing note and vitals reviewed. Physical Exam   Constitutional: Oriented to person, place, and time. Appears well-developed and well-nourished. HENT:   Head: Normocephalic and atraumatic. Eyes: EOM are normal. Pupils are equal, round, and reactive to light. Neck: Normal range of motion. Neck supple. No JVD present. Cardiovascular: Normal rate, regular rhythm, normal heart sounds and intact distal pulses. 3/6 PETER  Pulmonary/Chest: Effort normal and breath sounds normal. No respiratory distress. No wheezes. No rales. Abdominal: Soft. Bowel sounds are normal. No distension. There is no tenderness. Obese. Musculoskeletal: Normal range of motion. No edema. Neurological: Alert and oriented to person, place, and time. No cranial nerve deficit. Coordination normal.   Skin: Skin is warm and dry. Psychiatric: Normal mood and affect.        Lab Results   Component Value Date    CKTOTAL 103 02/20/2021    CKTOTAL 54 11/30/2020       Lab Results   Component Value Date WBC 6.3 05/16/2022    RBC 3.82 05/16/2022    HGB 11.6 05/16/2022    HCT 36.0 05/16/2022    MCV 94.2 05/16/2022    MCH 30.4 05/16/2022    MCHC 32.2 05/16/2022    RDW 16.6 06/03/2020     05/16/2022    MPV 10.9 05/16/2022       Lab Results   Component Value Date     05/16/2022    K 4.7 05/16/2022    K 3.6 05/11/2022    CL 99 05/16/2022    CO2 26 05/16/2022    BUN 21 05/16/2022    LABALBU 3.5 05/10/2022    CREATININE 1.1 05/16/2022    CALCIUM 9.7 05/16/2022    LABGLOM 69 05/16/2022    GLUCOSE 235 05/16/2022       Lab Results   Component Value Date    ALKPHOS 129 05/10/2022    ALT 48 05/10/2022    AST 60 05/10/2022    PROT 7.8 05/10/2022    BILITOT 0.7 05/10/2022    BILIDIR 0.4 05/10/2022    LABALBU 3.5 05/10/2022       Lab Results   Component Value Date    MG 1.7 05/11/2022       Lab Results   Component Value Date    INR 1.12 05/10/2022    INR 1.22 (H) 03/09/2022    INR 1.06 08/03/2021         Lab Results   Component Value Date    LABA1C 6.6 02/24/2021       Lab Results   Component Value Date    TRIG 73 03/07/2022    HDL 40 03/07/2022    LDLCALC 32 03/07/2022       Lab Results   Component Value Date    TSH 0.951 03/07/2022         Testing Reviewed:      I have individually reviewed the cardiac test below:    ECHO: Results for orders placed during the hospital encounter of 05/10/22    ECHO Complete 2D W Doppler W Color    Narrative  Transthoracic Echocardiography Report (TTE)    Demographics    Patient Name    [facility]       Gender               Male  [address]    MR #            [id number]        Race                     Ethnicity    Account #       [de-identified]        Room Number          0002    Accession       [id number]       Date of Study        05/10/2022  Number    Date of Birth   1965       Referring Physician  Dion Downing MD Cherylene Boys MD Martell Kelch CNP    Age             65 year(s)       [address] [facility],  [facility]    Interpreting         Cherylene Boys MD  Physician    Procedure    Type of Study    TTE procedure:ECHOCARDIOGRAM COMPLETE 2D W DOPPLER W COLOR. Procedure Date  Date: 05/10/2022 Start: 03:00 PM    Study Location: Bedside  Technical Quality: Limited visualization due to body habitus. Indications:Heart Failure. Additional Medical History:Hyperlipidemia, hypertension, COPD, alcohol  abuse, diabetic, chronic kidney disease, severe aortic stenosis    Patient Status: Routine    Height: 75.2 inches Weight: 328.49 pounds BSA: 2.71 m^2 BMI: 40.84 kg/m^2    BP: 143/82 mmHg    Conclusions    Summary  Normal left ventricular size and severely reduced systolic function. There was severe global hypokinesis. Wall thickness was within normal limits. Ejection fraction was estimated at 30-35%. Doppler parameters were consistent with abnormal left ventricular  relaxation (grade 1 diastolic dysfunction). The aortic valve was trileaflet with increased thickness and reduced  leaflet mobility. DOPPLER: Transaortic velocity was 3.5 m/s, mean gradient  30 mmHg, max gradient 48 mmHg, LOBITO 0.99 cm2, consistent with severe LF-LG  aortic stenosis. LOBITO is overestimated due to LVOT measurement error. There  was no evidence of aortic regurgitation. IVC size is not well seen. The pericardium was normal in appearance with no evidence of a pericardial  effusion    Signature    ----------------------------------------------------------------  Electronically signed by Rocky Farias MD (Interpreting  physician) on 05/10/2022 at 04:09 PM  ----------------------------------------------------------------    Findings    Mitral Valve  The mitral valve structure demonstrated thickened PMVL and reduced  mobility. DOPPLER: The transmitral velocity was within the normal range  with no evidence for mitral stenosis. There was no evidence of mitral  regurgitation. Aortic Valve  The aortic valve was trileaflet with increased thickness and reduced  leaflet mobility.  DOPPLER: Transaortic velocity was 3.5 m/s, mean gradient  30 mmHg, max gradient 48 mmHg, LOBITO 0.99 cm2, consistent with severe LF-LG  aortic stenosis. LOBITO is overestimated due to LVOT measurement error. There  was no evidence of aortic regurgitation. Tricuspid Valve  The tricuspid valve structure was normal with normal leaflet separation. DOPPLER: There was no evidence of tricuspid stenosis. There was no  evidence of tricuspid regurgitation. Pulmonic Valve  The pulmonic valve leaflets were not well seen. DOPPLER: The transpulmonic  velocity was within the normal range with no evidence for regurgitation. Left Atrium  Left atrial size was normal.    Left Ventricle  Normal left ventricular size and severely reduced systolic function. There was severe global hypokinesis. Wall thickness was within normal limits. Ejection fraction was estimated at 30-35%. Doppler parameters were consistent with abnormal left ventricular  relaxation (grade 1 diastolic dysfunction). Right Atrium  Right atrial size was normal.    Right Ventricle  The right ventricular size was normal with normal systolic function and  wall thickness. Pericardial Effusion  The pericardium was normal in appearance with no evidence of a pericardial  effusion. Pleural Effusion  No evidence of pleural effusion. Aorta / Great Vessels  IVC size is not well seen.     M-Mode/2D Measurements & Calculations    LV Diastolic      LV Systolic Dimension: 4.9 cm    LA Dimension: 3.9 cmAO  Dimension: 5.8 cm LV Volume Diastolic: 792 ml      Root Dimension: 3.9 cm  LV FS:15.5 %      LV Volume Systolic: 35.8 ml  LV PW Diastolic:  LV EDV/LV EDV Index: 133 ml/49  1 cm              m^2LV ESV/LV ESV Index: 42.5  Septum Diastolic: OH/48 m^2                        RV Diastolic Dimension:  1 cm              EF Calculated: 35 %              3.5 cm    LV Length: 9.2 cm                LA/Aorta: 1    LV Area           LVOT: 2 cm  Diastolic: 11.5  cm^2  LV Area Systolic:  69.0 cm^2    Doppler Measurements & Calculations    MV Peak E-Wave:     AV Peak Velocity: 307    LVOT Peak Velocity: 125 cm/s  72.4 cm/s           cm/s                     LVOT Mean Velocity: 103 cm/s  MV Peak A-Wave: 114 AV Peak Gradient: 37.7   LVOT Peak Gradient: 5  cm/s                mmHg                     mmHgLVOT Mean Gradient: 4  MV E/A Ratio: 0.64  AV Mean Velocity: 228    mmHg  MV Peak Gradient:   cm/s  2.1 mmHg            AV Mean Gradient: 27  mmHg  MV Deceleration     AV VTI: 57 cm            TR Velocity:293 cm/s  Time: 198 msec      AV Area                  TR Gradient:34.34 mmHg  MV P1/2t: 58 msec   (Continuity):1.22 cm^2  MVA by PHT:3.79  cm^2                LVOT VTI: 22.1 cm    MV E' Septal  Velocity: 6.4 cm/s  MV A' Septal        AV DVI (VTI): 0.39AV DVI  Velocity: 12.2 cm/s (Vmax):0.41  MV E' Lateral  Velocity: 9.5 cm/s  MV A' Lateral  Velocity: 13.1 cm/s  E/E' septal: 11.31  E/E' lateral: 7.62    [id number]%3d       Assessment/Plan   Severe, symptomatic AS  Chronic CHF, NYHA II, EF 30-35%  Liver Cirrhosis - Alcoholic/Hep C  No sig CAD  CKD  HTN  Hx of brain tumor  Alcoholic relapse  He is not a candidate for TAVR in the current situation. Needs to comply without getting drunk and be willing to participate in his care. Based on heart team discussions, we will not be pursuing TAVR at this time, he will need to show abstinence and follow our recommendations. CHF clinic to follow. He may progress and decline, but at this time, he needs to be compliant with our recommendations. Should he have another relapse, our team will not be providing any TAVR, and we would recommend palliative care--he is free to get another opinion if he wishes, but at this time he wants to try to follow our recommendations and will f/u 3 months to discuss next steps. Disposition:  3 months         Electronically signed by Denver Hakim, MD   5/26/2022 at 7:55 AM EDT

## 2022-06-06 RX ORDER — POTASSIUM CHLORIDE 750 MG/1
TABLET, EXTENDED RELEASE ORAL
Qty: 180 TABLET | Refills: 3 | Status: SHIPPED | OUTPATIENT
Start: 2022-06-06

## 2022-06-07 ENCOUNTER — HOSPITAL ENCOUNTER (OUTPATIENT)
Dept: PULMONOLOGY | Age: 57
Discharge: HOME | End: 2022-06-07
Payer: MEDICARE

## 2022-06-07 ENCOUNTER — HOSPITAL ENCOUNTER (OUTPATIENT)
Dept: CT IMAGING | Age: 57
Discharge: HOME | End: 2022-06-07
Payer: MEDICARE

## 2022-06-07 ENCOUNTER — TELEPHONE (OUTPATIENT)
Dept: CARDIOLOGY CLINIC | Age: 57
End: 2022-06-07

## 2022-06-07 ENCOUNTER — HOSPITAL ENCOUNTER (OUTPATIENT)
Age: 57
Discharge: HOME | End: 2022-06-07
Payer: MEDICARE

## 2022-06-07 DIAGNOSIS — Z87.891 PERSONAL HISTORY OF TOBACCO USE: ICD-10-CM

## 2022-06-07 DIAGNOSIS — I50.22 CHRONIC SYSTOLIC CHF (CONGESTIVE HEART FAILURE), NYHA CLASS 2 (HCC): Primary | ICD-10-CM

## 2022-06-07 DIAGNOSIS — I50.21 ACUTE SYSTOLIC CONGESTIVE HEART FAILURE, NYHA CLASS 2 (HCC): ICD-10-CM

## 2022-06-07 DIAGNOSIS — I35.0 SEVERE AORTIC STENOSIS: ICD-10-CM

## 2022-06-07 DIAGNOSIS — J44.9 STAGE 3 SEVERE COPD BY GOLD CLASSIFICATION (HCC): ICD-10-CM

## 2022-06-07 LAB
ANION GAP SERPL CALCULATED.3IONS-SCNC: 13 MEQ/L (ref 8–16)
BUN BLDV-MCNC: 19 MG/DL (ref 7–22)
CALCIUM SERPL-MCNC: 8.7 MG/DL (ref 8.5–10.5)
CHLORIDE BLD-SCNC: 100 MEQ/L (ref 98–111)
CO2: 24 MEQ/L (ref 23–33)
CREAT SERPL-MCNC: 1.4 MG/DL (ref 0.4–1.2)
GFR SERPL CREATININE-BSD FRML MDRD: 52 ML/MIN/1.73M2
GLUCOSE BLD-MCNC: 335 MG/DL (ref 70–108)
POTASSIUM SERPL-SCNC: 4.3 MEQ/L (ref 3.5–5.2)
PRO-BNP: 550.2 PG/ML (ref 0–900)
SODIUM BLD-SCNC: 137 MEQ/L (ref 135–145)

## 2022-06-07 PROCEDURE — 80048 BASIC METABOLIC PNL TOTAL CA: CPT

## 2022-06-07 PROCEDURE — 94060 EVALUATION OF WHEEZING: CPT

## 2022-06-07 PROCEDURE — 36415 COLL VENOUS BLD VENIPUNCTURE: CPT

## 2022-06-07 PROCEDURE — 94729 DIFFUSING CAPACITY: CPT

## 2022-06-07 PROCEDURE — 94726 PLETHYSMOGRAPHY LUNG VOLUMES: CPT

## 2022-06-07 PROCEDURE — 83880 ASSAY OF NATRIURETIC PEPTIDE: CPT

## 2022-06-07 PROCEDURE — 71271 CT THORAX LUNG CANCER SCR C-: CPT

## 2022-06-07 NOTE — TELEPHONE ENCOUNTER
Labs reviewed, no changes at this time. Jump in Cr will repeat in another 6 weeks. To be drinking at least 1500cc of fluid a day.

## 2022-06-22 ENCOUNTER — TELEPHONE (OUTPATIENT)
Dept: CARDIOLOGY CLINIC | Age: 57
End: 2022-06-22

## 2022-06-22 NOTE — TELEPHONE ENCOUNTER
When Geovani Red called patient to confirm his appointment for tomorrow he said he does not want to see anyone in this office and he has transferred everything to [facility].

## 2022-06-28 DIAGNOSIS — G89.4 CHRONIC PAIN SYNDROME: ICD-10-CM

## 2022-06-28 NOTE — TELEPHONE ENCOUNTER
Amada Medrano called requesting a refill on the following medications:  Requested Prescriptions     Pending Prescriptions Disp Refills    oxyCODONE-acetaminophen (PERCOCET) 7.5-325 MG per tablet 90 tablet 0     Sig: Take 1 tablet by mouth every 8 hours as needed for Pain for up to 30 days. Pharmacy verified:[facility]  . pv      Date of last visit: 3-  Date of next visit (if applicable): 8/7/9593

## 2022-06-29 RX ORDER — OXYCODONE AND ACETAMINOPHEN 7.5; 325 MG/1; MG/1
1 TABLET ORAL EVERY 8 HOURS PRN
Qty: 90 TABLET | Refills: 0 | Status: SHIPPED | OUTPATIENT
Start: 2022-06-29 | End: 2022-07-28 | Stop reason: SDUPTHER

## 2022-06-29 NOTE — TELEPHONE ENCOUNTER
OARRS reviewed. UDS: + for Cotinine, Duloxetine, Oxycodone. Last seen: 3/24/2022.  Follow-up:   Future Appointments   Date Time Provider Kenny Arrington   7/5/2022 12:30 PM MEGHAN English - CNP N [facility] Pain [facility] [address]   8/9/2022 10:40 AM MD Yaron Alexander [facility] [address]   8/24/2022 11:15 AM Deborah Edgar MD N [facility] Heart [facility] [address]   9/29/2022  3:45 PM Alexis Almaguer MD N [address] [facility]
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
## 2022-07-27 DIAGNOSIS — G89.4 CHRONIC PAIN SYNDROME: ICD-10-CM

## 2022-07-27 NOTE — TELEPHONE ENCOUNTER
Cinthia Causey called requesting a refill on the following medications:  Requested Prescriptions     Pending Prescriptions Disp Refills    oxyCODONE-acetaminophen (PERCOCET) 7.5-325 MG per tablet 90 tablet 0     Sig: Take 1 tablet by mouth every 8 hours as needed for Pain for up to 30 days.      Pharmacy verified:  .[email or web address]  [facility] [address] [facility]    Date of last visit: 03/24/2022  Date of next visit (if applicable): 8/1/1197

## 2022-07-28 RX ORDER — OXYCODONE AND ACETAMINOPHEN 7.5; 325 MG/1; MG/1
1 TABLET ORAL EVERY 8 HOURS PRN
Qty: 90 TABLET | Refills: 0 | Status: SHIPPED | OUTPATIENT
Start: 2022-07-29 | End: 2022-09-01 | Stop reason: SDUPTHER

## 2022-08-08 ENCOUNTER — TELEPHONE (OUTPATIENT)
Dept: CARDIOLOGY CLINIC | Age: 57
End: 2022-08-08

## 2022-08-08 NOTE — TELEPHONE ENCOUNTER
Attempted to call patient but [facility]. Appt on 8/24/2022 can be cancelled with Dr. Jairo Hung. TAVR done at [facility]. Dr. Nilda Waters is primary cardiologist unless he is following up with [facility] physicians. Talked to patient on alternate number and he is following with [facility] cardiologists. Appts with Dr. Jairo Hung and Dr. Nilda Waters cancelled.

## 2022-08-09 ENCOUNTER — OFFICE VISIT (OUTPATIENT)
Dept: PHYSICAL MEDICINE AND REHAB | Age: 57
End: 2022-08-09
Payer: MEDICARE

## 2022-08-09 VITALS
DIASTOLIC BLOOD PRESSURE: 72 MMHG | BODY MASS INDEX: 39.17 KG/M2 | HEIGHT: 75 IN | SYSTOLIC BLOOD PRESSURE: 110 MMHG | WEIGHT: 315 LBS

## 2022-08-09 DIAGNOSIS — M54.50 LUMBAR PAIN: ICD-10-CM

## 2022-08-09 DIAGNOSIS — S32.030D CLOSED COMPRESSION FRACTURE OF L3 LUMBAR VERTEBRA WITH ROUTINE HEALING, SUBSEQUENT ENCOUNTER: ICD-10-CM

## 2022-08-09 DIAGNOSIS — Z98.1 HISTORY OF FUSION OF CERVICAL SPINE: ICD-10-CM

## 2022-08-09 DIAGNOSIS — I50.9 CHF WITH UNKNOWN LVEF (HCC): ICD-10-CM

## 2022-08-09 DIAGNOSIS — S32.050K: ICD-10-CM

## 2022-08-09 DIAGNOSIS — M54.2 NECK PAIN: ICD-10-CM

## 2022-08-09 DIAGNOSIS — I35.0 AORTIC VALVE STENOSIS, ETIOLOGY OF CARDIAC VALVE DISEASE UNSPECIFIED: ICD-10-CM

## 2022-08-09 DIAGNOSIS — M47.816 SPONDYLOSIS OF LUMBAR REGION WITHOUT MYELOPATHY OR RADICULOPATHY: Primary | ICD-10-CM

## 2022-08-09 DIAGNOSIS — Z98.890 S/P KYPHOPLASTY: ICD-10-CM

## 2022-08-09 DIAGNOSIS — G89.4 CHRONIC PAIN SYNDROME: ICD-10-CM

## 2022-08-09 DIAGNOSIS — G62.9 NEUROPATHY: ICD-10-CM

## 2022-08-09 PROCEDURE — 99214 OFFICE O/P EST MOD 30 MIN: CPT | Performed by: NURSE PRACTITIONER

## 2022-08-09 PROCEDURE — G8417 CALC BMI ABV UP PARAM F/U: HCPCS | Performed by: NURSE PRACTITIONER

## 2022-08-09 PROCEDURE — 4004F PT TOBACCO SCREEN RCVD TLK: CPT | Performed by: NURSE PRACTITIONER

## 2022-08-09 PROCEDURE — G8427 DOCREV CUR MEDS BY ELIG CLIN: HCPCS | Performed by: NURSE PRACTITIONER

## 2022-08-09 PROCEDURE — 3017F COLORECTAL CA SCREEN DOC REV: CPT | Performed by: NURSE PRACTITIONER

## 2022-08-09 ASSESSMENT — ENCOUNTER SYMPTOMS
SHORTNESS OF BREATH: 1
CHEST TIGHTNESS: 1
GASTROINTESTINAL NEGATIVE: 1
COUGH: 0
BACK PAIN: 1
EYES NEGATIVE: 1
WHEEZING: 0

## 2022-08-09 NOTE — PROGRESS NOTES
[address] [facility] [address] [place]  Dept: [phone]  Dept Fax: [phone]: [phone]    Visit Date: 8/9/2022    Functionality Assessment/Goals Worksheet     On a scale of 0 (Does not Interfere) to 10 (Completely Interferes)     1. Which number describes how during the past week pain has interfered with       the following:  A. General Activity:  8  B. Mood: 8  C. Walking Ability:  8  D. Normal Work (Includes both work outside the home and housework):  8  E. Relations with Other People:   8  F. Sleep:   8  G. Enjoyment of Life:   8    2. Patient Prefers to Take their Pain Medications:     [x]  On a regular basis   [x]  Only when necessary    []  Does not take pain medications    3. What are the Patient's Goals/Expectations for Visiting Pain Management? []  Learn about my pain    [x]  Receive Medication   []  Physical Therapy     []  Treat Depression   [x]  Receive Injections    []  Treat Sleep   []  Deal with Anxiety and Stress   []  Treat Opoid Dependence/Addiction   []  Other:      HPI:   Christina Velasco is a 65 y.o. male is here today for    Chief Complaint: Low back pain, neck pain     HPI   4.5 month FU. Has been in and out of the hospital d/t cardiac issues, aorticstenosis blood pressure issues. Has heart valve replacement 7/7/2022 at [facility]. Currently in cardiac rehab. Continues to have pain in low back-constant aching pain with sharp stabbing pains with certain movements. Pain is up and down. Current pain medications continue to help. States that cardiac rehab is really increasing his pain Monday, Wednesday and Friday     Also had several falls recently having posterior neck pain- aching. Ambulating with walker now. Pain increases with bending, lifting, twisting , walking, getting up and down, and housework or working at job.       Medications reviewed. Patient denies side effects with medications. Patient states he is taking medications as prescribed. Hedenies receiving pain medications from other sources. He  had several ER visits 3-4 since last visit    Pain scale with out pain medications or at its worst is 10/10. Pain scale with pain medications or at its best is 5-6/10. Last dose of Percocet was today   Drug screen reviewed from 3/24/2022 and was appropriate  Pill count completed  today and WNL: Yes      The patientis allergic to adhesive tape and keppra [levetiracetam]. Subjective:      Review of Systems   Constitutional:  Positive for activity change. Negative for appetite change, diaphoresis, fatigue, fever and unexpected weight change. HENT: Negative. Eyes: Negative. Respiratory:  Positive for chest tightness and shortness of breath. Negative for cough and wheezing. Continues to use tobacco, COPD   Cardiovascular:  Positive for leg swelling. Negative for chest pain. Gastrointestinal: Negative. Musculoskeletal:  Positive for arthralgias, back pain, gait problem, joint swelling, myalgias, neck pain and neck stiffness. Ambulating with walker    Skin:  Negative for wound. Neurological:  Positive for dizziness, weakness and numbness. Psychiatric/Behavioral:  Negative for dysphoric mood and sleep disturbance. The patient is not nervous/anxious. Objective:     Vitals:    08/09/22 1215   BP: 110/72   Weight: (!) 321 lb (145.6 kg)   Height: 6' 3\" (1.905 m)       Physical Exam  Constitutional:       General: He is not in acute distress. Appearance: He is obese. He is ill-appearing. HENT:      Head: Normocephalic and atraumatic. Mouth/Throat:      Mouth: Mucous membranes are moist.   Cardiovascular:      Rate and Rhythm: Normal rate and regular rhythm. Pulses: Normal pulses. Heart sounds: Normal heart sounds.    Pulmonary:      Effort: Pulmonary effort is normal.      Comments: Decreased, some short of breath at rest   Chest:      Chest wall: No tenderness. Abdominal:      General: Abdomen is flat. Palpations: Abdomen is soft. Musculoskeletal:         General: Swelling and tenderness present. Right wrist: Tenderness present. Decreased range of motion. Left wrist: Tenderness and bony tenderness present. Decreased range of motion. Cervical back: Rigidity, tenderness and bony tenderness present. Muscular tenderness present. Thoracic back: Tenderness and bony tenderness present. Decreased range of motion. Lumbar back: Tenderness and bony tenderness present. Decreased range of motion. Negative right straight leg raise test and negative left straight leg raise test.        Back:       Right lower leg: Tenderness present. Edema present. Left lower leg: Tenderness present. Edema present. Right ankle: Decreased range of motion. Left ankle: Decreased range of motion. Skin:     Coloration: Skin is pale. Skin is not jaundiced. Neurological:      General: No focal deficit present. Mental Status: He is alert and oriented to person, place, and time. Sensory: Sensory deficit present. Motor: Weakness present. Coordination: Coordination abnormal.      Gait: Gait abnormal.      Deep Tendon Reflexes:      Reflex Scores:       Tricep reflexes are 1+ on the right side and 1+ on the left side. Bicep reflexes are 1+ on the right side and 1+ on the left side. Brachioradialis reflexes are 1+ on the right side and 1+ on the left side. Patellar reflexes are 1+ on the right side and 1+ on the left side. Achilles reflexes are 1+ on the right side and 1+ on the left side. Comments: Decreased sensation bilateral lower extremity and upper extremity    Psychiatric:         Mood and Affect: Mood normal.     ANJUM  Patricks test  positive  Yeoman's  or Gaenslen's positive         Assessment:     1.  Spondylosis of lumbar region without myelopathy or radiculopathy    2. Lumbar pain    3. Closed compression fracture of L3 lumbar vertebra with routine healing, subsequent encounter    4. Closed compression fracture of L5 lumbar vertebra with nonunion, subsequent encounter    5. S/P kyphoplasty    6. Neuropathy    7. Neck pain    8. History of fusion of cervical spine    9. Aortic valve stenosis, etiology of cardiac valve disease unspecified    10. CHF with unknown LVEF ([address]    11. Chronic pain syndrome            Plan:      OARRS reviewed. Current MED: 33.75  Patient was offered naloxone for home. Discussed long term side effects of medications, tolerance, dependency and addiction. Previous UDS reviewed  UDS preformed today for compliance. Patient told can not receive any pain medications from any other source. No evidence of abuse, diversion or aberrant behavior. Medications and/or procedures to improve function and quality of life- patient understanding with this and that may not be pain free  Discussed with patient about safe storage of medications at home  Discussed possible weaning of medication dosing dependent on treatment/procedure results. Discussed with patient about risks with procedure including infection, reaction to medication, increased pain, or bleeding. Reviewed hospital notes. Not a candidate for any procedures d/t extensive cardiac issues. Healing from valve replacement and working with cardiac rehab  Discussed possible L-facet MBB in future when medically appropriate    Medications remain very effective- continue Percocet 7.5/325 TID prn- filled 7/29/2022. Updated UDS ordered   Discussed smoking cessation     Meds. Prescribed:   No orders of the defined types were placed in this encounter. Return in about 2 months (around 10/9/2022), or if symptoms worsen or fail to improve, for follow up  for medications.                Electronically signed by MEGHAN Salazar CNP on8/9/2022 at 12:39 PM

## 2022-08-11 ENCOUNTER — TELEPHONE (OUTPATIENT)
Dept: PULMONOLOGY | Age: 57
End: 2022-08-11

## 2022-08-11 NOTE — TELEPHONE ENCOUNTER
Patient and his wife came in today for an appt, which He was not scheduled for today, We explained to patient and his wife that his appt was changed from Dr. Unique Curry on 8/9 at 10:40am to [facility] for same day 8/9 at 10:20am, which we had no showed their appt since they didn't show up. Rosanne Daltonavril had Reschedule their appt to 9/30 with [facility] since that is the next opening. But then Patient wife was very upset stated they had an appt today, which we had never moved the appt to another day. I explain to that patient wife and which patient was seating in our lobby and both of them were very rude and cursing stating lets just leave and we are not coming back also that our staff doesn't know what their doing an making a scene in our lobby as other patients were checking in and waiting for their appts. As patient left she stated to canceled his appt and that they are going to [facility] instead. Also cursing going out into the hallway as our other patient coming in to our office. Which Rosanne Rizzo canceled his appt for 9/30. Can we discharge this patient for being combative behavior in our lobby?

## 2022-08-30 DIAGNOSIS — G89.4 CHRONIC PAIN SYNDROME: ICD-10-CM

## 2022-08-31 NOTE — TELEPHONE ENCOUNTER
OARRS reviewed. UDS: + for Oxycodone, Flexeril, Trazodone. Last seen: 8/9/2022.  Follow-up:   Future Appointments   Date Time Provider Kenny Nury   10/10/2022 12:15 PM MEGHAN Lerma - CNP N SRPX Pain P - [facility] [address] [address]

## 2022-09-01 RX ORDER — OXYCODONE AND ACETAMINOPHEN 7.5; 325 MG/1; MG/1
1 TABLET ORAL EVERY 8 HOURS PRN
Qty: 90 TABLET | Refills: 0 | Status: SHIPPED | OUTPATIENT
Start: 2022-09-01 | End: 2022-10-01

## 2022-09-20 NOTE — PROGRESS NOTES
[facility]  INPATIENT SPEECH THERAPY  [address] [facility]  DAILY NOTE    TIME   SLP Individual Minutes  Time In: 1430  Time Out: 1500  Minutes: 30  Timed Code Treatment Minutes: 30 Minutes       Date: 3/10/2021  Patient Name: Rusty Larson      CSN: [id number]   : 1965  (55 y.o.)  Gender: male   Referring Physician:  Arvind Peña PA-C  Diagnosis: Burst Fracture of Fourth Thoracic Vertebra  Secondary Diagnosis: Cognitive-Linguistic Deficit  Precautions: Fall Risk  Current Diet: Regular and Thin Liquids  Swallowing Strategies: Standard Universal Swallow Precautions  Date of Last MBS: Not Applicable    Pain:   - Pain location: did not specify. RN recently administered pain medication    Subjective:  Patient seen sitting upright in chair upon ST arrival. Pt was agreeable to participate in skilled speech therapy services this date. At end of session, ST assisted nursing staff with transfer pt from chair to bed. Short-Term Goals:  Goal 1: Pt will complete higher level attention tasks (divided, alternating) with fewer than 2 errors/redirections within 8 minute time span or task completion to increase ADL completion. INTERVENTIONS: Did not address this date d/t focus on other goals. Prior Session  Divergent Naming- Abstract - GOAL 11 words/minute  P Words - 12 words/minute independent  Things that sink- 11 words/minute independent  Things that are clear - 10 words/minute independent   Things that are inexpensive -11 words/minute      Divided Attention assessed during complex abstract divergent naming tasks with music playing in background while completing task. Pt with good divided and sustained attention observed within task this date.        SHORT TERM GOAL #2:   Goal 2: Pt will complete memory tasks (immediate/delayed, working) with 85% accuracy at a modified independent level to increase memory retention of medical and daily information  INTERVENTIONS:   WRAP MAILED   recall strategies  -4/4indep      STM fake appointment-5 unit recall:  Immediate:  indep  -delay (10 minute) / indep  -delay (15 minutes): / indep, 1/ SC  *excellent success this date       SHORT TERM GOAL #3:    Goal 3: Pt will complete HIGH level/complex verbal reasoning, problem solving, and executive functioning with 85% accuracy at a modified independent leve to increase IADL contribution. INTERVENTIONS:     Problem Solving (Safety Scenarios):  -ID problem: indep  -Rationale:  indep, 3/ min cues  *good insight    Time management (Word problem):  -6/10 indep, 3/10 min cues, /10 mod cues  *pt able to complete mental math computation. *good success this date    SHORT TERM GOAL #4:   Goal 4: Pt will complete sequencing tasks (i.e transfers) with 85% accuracy at a modified independent level in order to improve safety within transfers and particicpation within ADL/IADL  INTERVENTIONS:   Sequencing Transfer:  -pt indep recall of safety transfer in chronological order, required min cues to recall placing hands upon chair prior to initiation of transfer  *pt demonstrated awareness for safe transfer, however after session pt required mod cues to utilize call light for nursing staff to assist with transfer. Pt observed to utilize transfer strategies indep (I.e placing hand on bed prior to initiation of transfer).     Long-Term Goals:  Timeframe for Long-term Goals: 2 weeks    LONG TERM GOAL #1:  Goal 1: Pt will improve overall cognitive-linguistic skills to a Modified [place] or higher in order to safely discharge to least restrictive environment and complete ADL/IADL with least amount of supervision/assistance         Comprehension: 7 - Patient understands complex ideas (math/planning)  Expression: 7 - Patient expresses complex ideas/needs  Social Interaction: 7 - Patient has appropriate behavior/relations 100% of the time  Problem Solvin - Patient able to solve simple/routine tasks  Memory: 6 - Patient requires device to recall (e.g. memory book)    EDUCATION:  Learner: Patient and Significant Other  Education:  Reviewed ST goals and Plan of Care, Reviewed recommendations for follow-up, Education Related to Potential Risks and Complications Due to Impairment/Illness/Injury, Education Related to Prevention of Recurrence of Impairment/Illness/Injury, Education Related to [employer] and Wellness and Home Safety Education  Evaluation of Education: Verbalizes understanding, Demonstrates with assistance and Demonstrates without assistance    ASSESSMENT/PLAN:  Activity Tolerance:  Patient tolerance of  treatment: good. Assessment/Plan: Patient progressing toward established goals. Continues to require skilled care of licensed speech pathologist to progress toward achievement of established goals and plan of care. Plan for Next Session: Memory, Problem Solving/Reasoning, Executive Functioning    FLORENTIN Dye, Student Intern  [address], [facility] [address].  Jermaine Hoskins MA., CCC-SLP

## 2022-09-28 DIAGNOSIS — G89.4 CHRONIC PAIN SYNDROME: Primary | ICD-10-CM

## 2022-09-28 NOTE — TELEPHONE ENCOUNTER
Jaylan Manzano called requesting a refill on the following medications:  Requested Prescriptions     Pending Prescriptions Disp Refills    oxyCODONE-acetaminophen (PERCOCET) 7.5-325 MG per tablet 90 tablet 0     Sig: Take 1 tablet by mouth every 8 hours as needed for Pain for up to 30 days.      Pharmacy verified:  .yanick  [facility] [address] [facility]    Date of last visit: 08/09/2022  Date of next visit (if applicable): 35/71/7529

## 2022-10-03 RX ORDER — OXYCODONE AND ACETAMINOPHEN 7.5; 325 MG/1; MG/1
1 TABLET ORAL EVERY 8 HOURS PRN
Qty: 90 TABLET | Refills: 0 | OUTPATIENT
Start: 2022-10-03 | End: 2022-11-02

## 2022-10-03 RX ORDER — OXYCODONE AND ACETAMINOPHEN 7.5; 325 MG/1; MG/1
1 TABLET ORAL EVERY 8 HOURS PRN
Qty: 90 TABLET | Refills: 0 | Status: SHIPPED | OUTPATIENT
Start: 2022-10-03 | End: 2022-11-02 | Stop reason: SDUPTHER

## 2022-10-03 NOTE — TELEPHONE ENCOUNTER
OARRS reviewed. UDS: + for  Oxycodone -consistent. + for  Cyclobenzaprine , Trazodone  -inconsistent  Last seen: 8/9/2022.  Follow-up:   Future Appointments   Date Time Provider Kenny Arrington   10/10/2022 12:15 PM MEGHAN Blsa - CNP N SRPX Pain P - [address] [address]

## 2022-10-10 ENCOUNTER — OFFICE VISIT (OUTPATIENT)
Dept: PHYSICAL MEDICINE AND REHAB | Age: 57
End: 2022-10-10
Payer: MEDICARE

## 2022-10-10 VITALS
WEIGHT: 315 LBS | DIASTOLIC BLOOD PRESSURE: 80 MMHG | SYSTOLIC BLOOD PRESSURE: 116 MMHG | BODY MASS INDEX: 39.17 KG/M2 | HEIGHT: 75 IN

## 2022-10-10 DIAGNOSIS — G89.4 CHRONIC PAIN SYNDROME: ICD-10-CM

## 2022-10-10 DIAGNOSIS — S32.050K: ICD-10-CM

## 2022-10-10 DIAGNOSIS — M47.816 SPONDYLOSIS OF LUMBAR REGION WITHOUT MYELOPATHY OR RADICULOPATHY: Primary | ICD-10-CM

## 2022-10-10 DIAGNOSIS — S32.030D CLOSED COMPRESSION FRACTURE OF L3 LUMBAR VERTEBRA WITH ROUTINE HEALING, SUBSEQUENT ENCOUNTER: ICD-10-CM

## 2022-10-10 DIAGNOSIS — Z98.890 S/P KYPHOPLASTY: ICD-10-CM

## 2022-10-10 DIAGNOSIS — I50.9 CHF WITH UNKNOWN LVEF (HCC): ICD-10-CM

## 2022-10-10 DIAGNOSIS — Z98.1 HISTORY OF FUSION OF CERVICAL SPINE: ICD-10-CM

## 2022-10-10 DIAGNOSIS — M54.2 NECK PAIN: ICD-10-CM

## 2022-10-10 DIAGNOSIS — M54.50 LUMBAR PAIN: ICD-10-CM

## 2022-10-10 DIAGNOSIS — G62.9 NEUROPATHY: ICD-10-CM

## 2022-10-10 DIAGNOSIS — I35.0 AORTIC VALVE STENOSIS, ETIOLOGY OF CARDIAC VALVE DISEASE UNSPECIFIED: ICD-10-CM

## 2022-10-10 PROCEDURE — 99214 OFFICE O/P EST MOD 30 MIN: CPT | Performed by: NURSE PRACTITIONER

## 2022-10-10 PROCEDURE — G8484 FLU IMMUNIZE NO ADMIN: HCPCS | Performed by: NURSE PRACTITIONER

## 2022-10-10 PROCEDURE — 3017F COLORECTAL CA SCREEN DOC REV: CPT | Performed by: NURSE PRACTITIONER

## 2022-10-10 PROCEDURE — G8417 CALC BMI ABV UP PARAM F/U: HCPCS | Performed by: NURSE PRACTITIONER

## 2022-10-10 PROCEDURE — G8427 DOCREV CUR MEDS BY ELIG CLIN: HCPCS | Performed by: NURSE PRACTITIONER

## 2022-10-10 PROCEDURE — 4004F PT TOBACCO SCREEN RCVD TLK: CPT | Performed by: NURSE PRACTITIONER

## 2022-10-10 ASSESSMENT — ENCOUNTER SYMPTOMS
EYES NEGATIVE: 1
WHEEZING: 1
SHORTNESS OF BREATH: 1
GASTROINTESTINAL NEGATIVE: 1
BACK PAIN: 1
COUGH: 0
CHEST TIGHTNESS: 1

## 2022-10-10 NOTE — PROGRESS NOTES
[facility] [address]  Dept: [phone]  Dept Fax: [phone]: [phone]    Visit Date: 10/10/2022    Functionality Assessment/Goals Worksheet     On a scale of 0 (Does not Interfere) to 10 (Completely Interferes)     1. Which number describes how during the past week pain has interfered with       the following:  A. General Activity:  6  B. Mood: 6  C. Walking Ability:  6  D. Normal Work (Includes both work outside the home and housework):  7  E. Relations with Other People:   6  F. Sleep:   7  G. Enjoyment of Life:   6    2. Patient Prefers to Take their Pain Medications:     []  On a regular basis   []  Only when necessary    []  Does not take pain medications    3. What are the Patient's Goals/Expectations for Visiting Pain Management? []  Learn about my pain    [x]  Receive Medication   []  Physical Therapy     []  Treat Depression   [x]  Receive Injections    []  Treat Sleep   []  Deal with Anxiety and Stress   []  Treat Opoid Dependence/Addiction   []  Other:      HPI:   Mary Clement is a 65 y.o. male is here today for    Chief Complaint: Neck pain, low back pain     HPI   2 month FU. Continues to have pain in low back-constant aching and pain in posterior neck aching and stabbing pain. No changes or major spikes in regards to pain   Pain is up and down depending on weather as cold weather change increase pain and also activity. Continues to have unsteadiness on feed and states he has had some falls when he was not using walker. For the most part ambulates with walker. Continues to FU with Cardiologist. [facility]     Pain increases with bending, lifting, twisting , turning torso, reaching, pushing, pulling, turning head, walking, standing, getting up and down, and housework or working at job.       Percocet prn remains effective in decreasing pain to a tolerable level. Medications reviewed. Patient denies side effects with medications. Patient states he is taking medications as prescribed. Hedenies receiving pain medications from other sources. He denies any ER visits since last visit. Pain scale with out pain medications or at its worst is 9/10. Pain scale with pain medications or at its best is 4/10. Last dose of Percocet was today   Drug screen reviewed from 8/9/2022 and was appropriate  Pill count completed  today and WNL: Yes      The patientis allergic to adhesive tape and keppra [levetiracetam]. Subjective:      Review of Systems   Constitutional:  Positive for activity change and fatigue. Negative for appetite change, diaphoresis, fever and unexpected weight change. HENT: Negative. Eyes: Negative. Respiratory:  Positive for chest tightness, shortness of breath and wheezing. Negative for cough. Continues to use tobacco, COPD   Cardiovascular:  Positive for leg swelling. Negative for chest pain. Gastrointestinal: Negative. Musculoskeletal:  Positive for arthralgias, back pain, gait problem, joint swelling, myalgias, neck pain and neck stiffness. Ambulating with walker    Skin:  Negative for wound. Neurological:  Positive for dizziness, weakness and numbness. Psychiatric/Behavioral:  Positive for sleep disturbance. Negative for dysphoric mood. The patient is not nervous/anxious. Objective:     Vitals:    10/10/22 1208   BP: 116/80   Weight: (!) 321 lb (145.6 kg)   Height: 6' 3\" (1.905 m)       Physical Exam  Constitutional:       General: He is not in acute distress. Appearance: He is obese. He is ill-appearing. HENT:      Head: Normocephalic and atraumatic. Mouth/Throat:      Mouth: Mucous membranes are moist.   Cardiovascular:      Rate and Rhythm: Normal rate and regular rhythm. Pulses: Normal pulses. Heart sounds: Normal heart sounds.    Pulmonary:      Effort: Pulmonary effort is normal.      Comments: Decreased, some short of breath at rest   Chest:      Chest wall: No tenderness. Abdominal:      General: Abdomen is flat. Palpations: Abdomen is soft. Musculoskeletal:         General: Swelling and tenderness present. Right wrist: Tenderness present. Decreased range of motion. Left wrist: Tenderness and bony tenderness present. Decreased range of motion. Cervical back: Rigidity, tenderness and bony tenderness present. Muscular tenderness present. Thoracic back: Tenderness and bony tenderness present. Decreased range of motion. Lumbar back: Tenderness and bony tenderness present. Decreased range of motion. Negative right straight leg raise test and negative left straight leg raise test.        Back:       Right lower leg: Tenderness present. Edema present. Left lower leg: Tenderness present. Edema present. Right ankle: Decreased range of motion. Left ankle: Decreased range of motion. Skin:     Coloration: Skin is pale. Skin is not jaundiced. Neurological:      General: No focal deficit present. Mental Status: He is alert and oriented to person, place, and time. Sensory: Sensory deficit present. Motor: Weakness present. Coordination: Coordination abnormal.      Gait: Gait abnormal.      Deep Tendon Reflexes:      Reflex Scores:       Tricep reflexes are 1+ on the right side and 1+ on the left side. Bicep reflexes are 1+ on the right side and 1+ on the left side. Brachioradialis reflexes are 1+ on the right side and 1+ on the left side. Patellar reflexes are 1+ on the right side and 1+ on the left side. Achilles reflexes are 1+ on the right side and 1+ on the left side.      Comments: Decreased sensation bilateral lower extremity and upper extremity    Psychiatric:         Mood and Affect: Mood normal.     ANJUM  Patricks test  positive  Yeoman's  or Gaenslen's positive Assessment:     1. Spondylosis of lumbar region without myelopathy or radiculopathy    2. Lumbar pain    3. Closed compression fracture of L3 lumbar vertebra with routine healing, subsequent encounter    4. Closed compression fracture of L5 lumbar vertebra with nonunion, subsequent encounter    5. S/P kyphoplasty    6. Neuropathy    7. Neck pain    8. History of fusion of cervical spine    9. Aortic valve stenosis, etiology of cardiac valve disease unspecified    10. CHF with unknown LVEF ([address]    11. Chronic pain syndrome            Plan:      OARRS reviewed. Current MED: 33.75  Patient was offered naloxone for home. Discussed long term side effects of medications, tolerance, dependency and addiction. Previous UDS reviewed  UDS preformed today for compliance. Patient told can not receive any pain medications from any other source. No evidence of abuse, diversion or aberrant behavior. Medications and/or procedures to improve function and quality of life- patient understanding with this and that may not be pain free  Discussed with patient about safe storage of medications at home  Discussed possible weaning of medication dosing dependent on treatment/procedure results. Discussed with patient about risks with procedure including infection, reaction to medication, increased pain, or bleeding. Not a candidate for any procedures d/t extensive cardiac issues. Recent valve replacement Discussed possible L-facet MBB in future when medically appropriate    Medications remain very effective- continue Percocet 7.5/325 TID prn- filled 10/3/2022 and is compliant   Discussed smoking cessation again. He is not interested    Offered therapy for pain, mobility, and balance but he declined at this time   Is compliant. Meds. Prescribed:   No orders of the defined types were placed in this encounter.       Return in about 2 months (around 12/10/2022), or if symptoms worsen or fail to improve, for follow up  for medications.                Electronically signed by MEGHAN Armas CNP on10/10/2022 at 12:32 PM

## 2022-10-25 DIAGNOSIS — G89.4 CHRONIC PAIN SYNDROME: ICD-10-CM

## 2022-10-25 NOTE — TELEPHONE ENCOUNTER
Laura Sweeney called requesting a refill on the following medications:  Requested Prescriptions     Pending Prescriptions Disp Refills    oxyCODONE-acetaminophen (PERCOCET) 7.5-325 MG per tablet 90 tablet 0     Sig: Take 1 tablet by mouth every 8 hours as needed for Pain for up to 30 days.  Intended supply: 30 days     Pharmacy verified:  [facility]      Date of last visit: 10-10-22  Date of next visit (if applicable): 03/80/7664

## 2022-10-26 RX ORDER — OXYCODONE AND ACETAMINOPHEN 7.5; 325 MG/1; MG/1
1 TABLET ORAL EVERY 8 HOURS PRN
Qty: 90 TABLET | Refills: 0 | OUTPATIENT
Start: 2022-10-26 | End: 2022-11-25

## 2022-10-26 NOTE — TELEPHONE ENCOUNTER
OARRS reviewed. UDS: + for  Trazodone -consistent. Negative for Oxycodone. Last seen: 10/10/2022.  Follow-up:   Future Appointments   Date Time Provider Kenny Arrington   12/12/2022 12:00 PM MEGHAN Khan - CNP [facility] - [place] [place] [address]

## 2022-11-02 DIAGNOSIS — G89.4 CHRONIC PAIN SYNDROME: ICD-10-CM

## 2022-11-02 RX ORDER — OXYCODONE AND ACETAMINOPHEN 7.5; 325 MG/1; MG/1
1 TABLET ORAL EVERY 8 HOURS PRN
Qty: 90 TABLET | Refills: 0 | Status: SHIPPED | OUTPATIENT
Start: 2022-11-02 | End: 2022-11-29 | Stop reason: SDUPTHER

## 2022-11-02 NOTE — TELEPHONE ENCOUNTER
OARRS reviewed. UDS: + for  trazodone .    Last seen: 10/10/2022 Follow-up:   Future Appointments   Date Time Provider Kenny Arrington   12/12/2022 12:00 PM MEGHAN Burgess - CNP [facility] - [address]

## 2022-11-29 DIAGNOSIS — G89.4 CHRONIC PAIN SYNDROME: ICD-10-CM

## 2022-11-29 RX ORDER — OXYCODONE AND ACETAMINOPHEN 7.5; 325 MG/1; MG/1
1 TABLET ORAL EVERY 8 HOURS PRN
Qty: 90 TABLET | Refills: 0 | Status: SHIPPED | OUTPATIENT
Start: 2022-12-02 | End: 2023-01-01

## 2022-11-29 NOTE — TELEPHONE ENCOUNTER
Scottie Lora called requesting a refill on the following medications:  Requested Prescriptions     Pending Prescriptions Disp Refills    oxyCODONE-acetaminophen (PERCOCET) 7.5-325 MG per tablet 90 tablet 0     Sig: Take 1 tablet by mouth every 8 hours as needed for Pain for up to 30 days. Intended supply: 30 days     Pharmacy verified: [facility]  . pv      Date of last visit: 10/10/2022  Date of next visit (if applicable): 79/62/6289

## 2022-11-29 NOTE — TELEPHONE ENCOUNTER
OARRS reviewed. UDS: + for  Trazodone -consistent. Negative for Oxycodone. Last seen: 10/10/2022.  Follow-up:   Future Appointments   Date Time Provider Kenny Arrington   12/12/2022 12:00 PM MEGHAN Sousa - CNP [facility]

## 2022-12-12 ENCOUNTER — OFFICE VISIT (OUTPATIENT)
Dept: PHYSICAL MEDICINE AND REHAB | Age: 57
End: 2022-12-12
Payer: MEDICARE

## 2022-12-12 VITALS
HEIGHT: 75 IN | WEIGHT: 315 LBS | SYSTOLIC BLOOD PRESSURE: 118 MMHG | BODY MASS INDEX: 39.17 KG/M2 | DIASTOLIC BLOOD PRESSURE: 82 MMHG

## 2022-12-12 DIAGNOSIS — I50.9 CHF WITH UNKNOWN LVEF (HCC): ICD-10-CM

## 2022-12-12 DIAGNOSIS — S32.030D CLOSED COMPRESSION FRACTURE OF L3 LUMBAR VERTEBRA WITH ROUTINE HEALING, SUBSEQUENT ENCOUNTER: ICD-10-CM

## 2022-12-12 DIAGNOSIS — M54.2 NECK PAIN: ICD-10-CM

## 2022-12-12 DIAGNOSIS — M54.50 LUMBAR PAIN: ICD-10-CM

## 2022-12-12 DIAGNOSIS — G89.4 CHRONIC PAIN SYNDROME: ICD-10-CM

## 2022-12-12 DIAGNOSIS — I35.0 AORTIC VALVE STENOSIS, ETIOLOGY OF CARDIAC VALVE DISEASE UNSPECIFIED: ICD-10-CM

## 2022-12-12 DIAGNOSIS — S32.050K: ICD-10-CM

## 2022-12-12 DIAGNOSIS — M47.816 SPONDYLOSIS OF LUMBAR REGION WITHOUT MYELOPATHY OR RADICULOPATHY: Primary | ICD-10-CM

## 2022-12-12 DIAGNOSIS — Z98.890 S/P KYPHOPLASTY: ICD-10-CM

## 2022-12-12 DIAGNOSIS — G62.9 NEUROPATHY: ICD-10-CM

## 2022-12-12 DIAGNOSIS — Z98.1 HISTORY OF FUSION OF CERVICAL SPINE: ICD-10-CM

## 2022-12-12 PROCEDURE — G8427 DOCREV CUR MEDS BY ELIG CLIN: HCPCS | Performed by: NURSE PRACTITIONER

## 2022-12-12 PROCEDURE — G8484 FLU IMMUNIZE NO ADMIN: HCPCS | Performed by: NURSE PRACTITIONER

## 2022-12-12 PROCEDURE — 4004F PT TOBACCO SCREEN RCVD TLK: CPT | Performed by: NURSE PRACTITIONER

## 2022-12-12 PROCEDURE — 99214 OFFICE O/P EST MOD 30 MIN: CPT | Performed by: NURSE PRACTITIONER

## 2022-12-12 PROCEDURE — 3078F DIAST BP <80 MM HG: CPT | Performed by: NURSE PRACTITIONER

## 2022-12-12 PROCEDURE — 3074F SYST BP LT 130 MM HG: CPT | Performed by: NURSE PRACTITIONER

## 2022-12-12 PROCEDURE — G8417 CALC BMI ABV UP PARAM F/U: HCPCS | Performed by: NURSE PRACTITIONER

## 2022-12-12 PROCEDURE — 3017F COLORECTAL CA SCREEN DOC REV: CPT | Performed by: NURSE PRACTITIONER

## 2022-12-12 ASSESSMENT — ENCOUNTER SYMPTOMS
COUGH: 0
EYES NEGATIVE: 1
CHEST TIGHTNESS: 1
WHEEZING: 1
BACK PAIN: 1
SHORTNESS OF BREATH: 1
GASTROINTESTINAL NEGATIVE: 1

## 2022-12-12 NOTE — PROGRESS NOTES
[address] [facility] [address]  Dept: [phone]  Dept Fax: [phone]: [phone]    Visit Date: 12/12/2022    Functionality Assessment/Goals Worksheet     On a scale of 0 (Does not Interfere) to 10 (Completely Interferes)     1. Which number describes how during the past week pain has interfered with       the following:  A. General Activity:  6  B. Mood: 6  C. Walking Ability:  6  D. Normal Work (Includes both work outside the home and housework):  6  E. Relations with Other People:   6  F. Sleep:   0  G. Enjoyment of Life:   6    2. Patient Prefers to Take their Pain Medications:     []  On a regular basis   [x]  Only when necessary    []  Does not take pain medications    3. What are the Patient's Goals/Expectations for Visiting Pain Management? []  Learn about my pain    [x]  Receive Medication   []  Physical Therapy     []  Treat Depression   [x]  Receive Injections    []  Treat Sleep   []  Deal with Anxiety and Stress   []  Treat Opoid Dependence/Addiction   []  Other:      HPI:   Jaylan Manzano is a 65 y.o. male is here today for    Chief Complaint: Low back pain, neck pain     HPI   2 month FU. Continues to have pain in low back-constant aching and pain in posterior neck aching and stabbing pain. Feels that pain is most bothersome in low back- mainly axial. Pain is up and down. Has been going to [facility] and continues home exercises and trying to lose weight. Continues to FU with Cardiologist. [facility] states he had to taeke time off due to blood pressure issues   Pain increases with bending, lifting, twisting , walking, standing, getting up and down, and housework or working at job. Percocet prn remains very effective     Medications reviewed. Patient denies side effects with medications.  Patient states he is taking medications as prescribed. Jaredes receiving pain medications from other sources. He denies any ER visits since last visit. Pain scale with out pain medications or at its worst is 8-9/10. Pain scale with pain medications or at its best is 4/10. Last dose of Percocet was today   Drug screen reviewed from 10/10/2022 and was not detected. Educated in detail that needs to be in UDS or will not prescribe. Pill count completed  today and WNL: Yes      The patientis allergic to adhesive tape and keppra [levetiracetam]. Subjective:      Review of Systems   Constitutional:  Positive for activity change and fatigue. Negative for appetite change, diaphoresis, fever and unexpected weight change. HENT: Negative. Eyes: Negative. Respiratory:  Positive for chest tightness, shortness of breath and wheezing. Negative for cough. Continues to use tobacco, COPD   Cardiovascular:  Positive for leg swelling. Negative for chest pain. Gastrointestinal: Negative. Musculoskeletal:  Positive for arthralgias, back pain, gait problem, joint swelling, myalgias, neck pain and neck stiffness. No assist devices today. Uses walker at home    Skin:  Negative for wound. Neurological:  Positive for dizziness, weakness and numbness. Psychiatric/Behavioral:  Negative for dysphoric mood and sleep disturbance. The patient is not nervous/anxious. Objective:     Vitals:    12/12/22 1157   BP: 118/82   Weight: (!) 321 lb (145.6 kg)   Height: 6' 3\" (1.905 m)       Physical Exam  Constitutional:       General: He is not in acute distress. Appearance: He is obese. He is ill-appearing. HENT:      Head: Normocephalic and atraumatic. Mouth/Throat:      Mouth: Mucous membranes are moist.   Cardiovascular:      Rate and Rhythm: Normal rate and regular rhythm. Pulses: Normal pulses. Heart sounds: Normal heart sounds.    Pulmonary:      Effort: Pulmonary effort is normal.      Comments: Decreased, some short of breath at rest   Chest:      Chest wall: No tenderness. Abdominal:      General: Abdomen is flat. Palpations: Abdomen is soft. Musculoskeletal:         General: Swelling and tenderness present. Right wrist: Tenderness present. Decreased range of motion. Left wrist: Tenderness and bony tenderness present. Decreased range of motion. Cervical back: Rigidity, tenderness and bony tenderness present. Muscular tenderness present. Thoracic back: Tenderness and bony tenderness present. Decreased range of motion. Lumbar back: Tenderness and bony tenderness present. Decreased range of motion. Negative right straight leg raise test and negative left straight leg raise test.        Back:       Right lower leg: Tenderness present. Edema present. Left lower leg: Tenderness present. Edema present. Right ankle: Decreased range of motion. Left ankle: Decreased range of motion. Skin:     Coloration: Skin is pale. Skin is not jaundiced. Neurological:      General: No focal deficit present. Mental Status: He is alert and oriented to person, place, and time. Sensory: Sensory deficit present. Motor: Weakness present. Coordination: Coordination abnormal.      Gait: Gait abnormal.      Deep Tendon Reflexes:      Reflex Scores:       Tricep reflexes are 1+ on the right side and 1+ on the left side. Bicep reflexes are 1+ on the right side and 1+ on the left side. Brachioradialis reflexes are 1+ on the right side and 1+ on the left side. Patellar reflexes are 1+ on the right side and 1+ on the left side. Achilles reflexes are 1+ on the right side and 1+ on the left side. Comments: Decreased sensation bilateral lower extremity and upper extremity    Psychiatric:         Mood and Affect: Mood normal.     ANJUM  Patricks test  positive  Yeoman's  or Gaenslen's positive       Assessment:     1.  Spondylosis of lumbar region without myelopathy or radiculopathy    2. Lumbar pain    3. Closed compression fracture of L3 lumbar vertebra with routine healing, subsequent encounter    4. Closed compression fracture of L5 lumbar vertebra with nonunion, subsequent encounter    5. S/P kyphoplasty    6. Neuropathy    7. Neck pain    8. History of fusion of cervical spine    9. Aortic valve stenosis, etiology of cardiac valve disease unspecified    10. CHF with unknown LVEF ([address]    11. Chronic pain syndrome            Plan:      OARRS reviewed. Current MED: 33.75  Patient was offered naloxone for home. Discussed long term side effects of medications, tolerance, dependency and addiction. Previous UDS reviewed  UDS preformed today for compliance. Patient told can not receive any pain medications from any other source. No evidence of abuse, diversion or aberrant behavior. Medications and/or procedures to improve function and quality of life- patient understanding with this and that may not be pain free  Discussed with patient about safe storage of medications at home  Discussed possible weaning of medication dosing dependent on treatment/procedure results. Discussed with patient about risks with procedure including infection, reaction to medication, increased pain, or bleeding. Not a candidate for any procedures d/t extensive cardiac issues. Discussed possible L-facet MBB in future when medically appropriate    Medications remain effective- continue Percocet 7.5/325 TID prn- filled 12/2/2022  Continue home exercises and stretching at [facility]   Discussed UDS and that medications need to be in UDS or will npt prescribed. UDS taken today     Meds. Prescribed:   No orders of the defined types were placed in this encounter. Return in about 2 months (around 2/12/2023), or if symptoms worsen or fail to improve, for follow up  for medications.                Electronically signed by MEGHAN Payton CNP on12/12/2022 at 12:21 PM

## 2022-12-29 DIAGNOSIS — G89.4 CHRONIC PAIN SYNDROME: ICD-10-CM

## 2022-12-29 RX ORDER — OXYCODONE AND ACETAMINOPHEN 7.5; 325 MG/1; MG/1
1 TABLET ORAL EVERY 8 HOURS PRN
Qty: 90 TABLET | Refills: 0 | Status: SHIPPED | OUTPATIENT
Start: 2023-01-01 | End: 2023-01-31

## 2022-12-29 NOTE — TELEPHONE ENCOUNTER
Yaa Koroma called requesting a refill on the following medications:  Requested Prescriptions     Pending Prescriptions Disp Refills    oxyCODONE-acetaminophen (PERCOCET) 7.5-325 MG per tablet 90 tablet 0     Sig: Take 1 tablet by mouth every 8 hours as needed for Pain for up to 30 days. Intended supply: 30 days     Pharm[facility] verified:[facility]      Date of last visit:   Date of next visit (if applicable): 2/01/7581

## 2022-12-29 NOTE — TELEPHONE ENCOUNTER
OARRS reviewed. UDS: + for Trazodone, Oxycodone  Last seen: 12/12/2022.  Follow-up:   Future Appointments   Date Time Provider Kenny Nury   2/13/2023 11:45 AM MEGHAN Khan - CNP [facility] - [place] [address]

## 2023-01-30 DIAGNOSIS — G89.4 CHRONIC PAIN SYNDROME: ICD-10-CM

## 2023-01-30 RX ORDER — OXYCODONE AND ACETAMINOPHEN 7.5; 325 MG/1; MG/1
1 TABLET ORAL EVERY 8 HOURS PRN
Qty: 90 TABLET | Refills: 0 | Status: SHIPPED | OUTPATIENT
Start: 2023-01-30 | End: 2023-03-01

## 2023-01-30 NOTE — TELEPHONE ENCOUNTER
OARRS reviewed. UDS: + for  Trazodone and Oxycodone. Last seen: 12/12/2022.  Follow-up:   Future Appointments   Date Time Provider Kenny Nury   2/13/2023 11:45 AM MEGHAN Vaughan - CNP [facility] - [place] [facility] [address]

## 2023-01-30 NOTE — TELEPHONE ENCOUNTER
Glenda Body called requesting a refill on the following medications:  Requested Prescriptions     Pending Prescriptions Disp Refills    oxyCODONE-acetaminophen (PERCOCET) 7.5-325 MG per tablet 90 tablet 0     Sig: Take 1 tablet by mouth every 8 hours as needed for Pain for up to 30 days. Intended supply: 30 days     Pharmacy verified: [email or web address]   . pv      Date of last visit: 12/12/2022  Date of next visit (if applicable): 2/85/5720

## 2023-02-13 ENCOUNTER — OFFICE VISIT (OUTPATIENT)
Dept: PHYSICAL MEDICINE AND REHAB | Age: 58
End: 2023-02-13
Payer: MEDICARE

## 2023-02-13 VITALS
WEIGHT: 315 LBS | BODY MASS INDEX: 39.17 KG/M2 | DIASTOLIC BLOOD PRESSURE: 82 MMHG | SYSTOLIC BLOOD PRESSURE: 124 MMHG | HEIGHT: 75 IN

## 2023-02-13 DIAGNOSIS — G89.4 CHRONIC PAIN SYNDROME: ICD-10-CM

## 2023-02-13 DIAGNOSIS — G62.9 NEUROPATHY: ICD-10-CM

## 2023-02-13 DIAGNOSIS — Z98.890 S/P KYPHOPLASTY: ICD-10-CM

## 2023-02-13 DIAGNOSIS — S32.030D CLOSED COMPRESSION FRACTURE OF L3 LUMBAR VERTEBRA WITH ROUTINE HEALING, SUBSEQUENT ENCOUNTER: ICD-10-CM

## 2023-02-13 DIAGNOSIS — Z98.1 HISTORY OF FUSION OF CERVICAL SPINE: ICD-10-CM

## 2023-02-13 DIAGNOSIS — M54.2 NECK PAIN: ICD-10-CM

## 2023-02-13 DIAGNOSIS — S32.050K: ICD-10-CM

## 2023-02-13 DIAGNOSIS — I50.9 CHF WITH UNKNOWN LVEF (HCC): ICD-10-CM

## 2023-02-13 DIAGNOSIS — M54.50 LUMBAR PAIN: ICD-10-CM

## 2023-02-13 DIAGNOSIS — M47.816 SPONDYLOSIS OF LUMBAR REGION WITHOUT MYELOPATHY OR RADICULOPATHY: Primary | ICD-10-CM

## 2023-02-13 DIAGNOSIS — I35.0 AORTIC VALVE STENOSIS, ETIOLOGY OF CARDIAC VALVE DISEASE UNSPECIFIED: ICD-10-CM

## 2023-02-13 PROCEDURE — 3074F SYST BP LT 130 MM HG: CPT | Performed by: NURSE PRACTITIONER

## 2023-02-13 PROCEDURE — 3079F DIAST BP 80-89 MM HG: CPT | Performed by: NURSE PRACTITIONER

## 2023-02-13 PROCEDURE — 3017F COLORECTAL CA SCREEN DOC REV: CPT | Performed by: NURSE PRACTITIONER

## 2023-02-13 PROCEDURE — 4004F PT TOBACCO SCREEN RCVD TLK: CPT | Performed by: NURSE PRACTITIONER

## 2023-02-13 PROCEDURE — 99214 OFFICE O/P EST MOD 30 MIN: CPT | Performed by: NURSE PRACTITIONER

## 2023-02-13 PROCEDURE — G8427 DOCREV CUR MEDS BY ELIG CLIN: HCPCS | Performed by: NURSE PRACTITIONER

## 2023-02-13 PROCEDURE — G8417 CALC BMI ABV UP PARAM F/U: HCPCS | Performed by: NURSE PRACTITIONER

## 2023-02-13 PROCEDURE — G8484 FLU IMMUNIZE NO ADMIN: HCPCS | Performed by: NURSE PRACTITIONER

## 2023-02-13 ASSESSMENT — ENCOUNTER SYMPTOMS
CHEST TIGHTNESS: 1
BACK PAIN: 1
WHEEZING: 1
GASTROINTESTINAL NEGATIVE: 1
SHORTNESS OF BREATH: 1
EYES NEGATIVE: 1
COUGH: 0

## 2023-02-13 NOTE — PROGRESS NOTES
[facility] [address]  Dept: [phone]  Dept Fax: [phone]: [phone]    Visit Date: 2/13/2023    Functionality Assessment/Goals Worksheet     On a scale of 0 (Does not Interfere) to 10 (Completely Interferes)     1. Which number describes how during the past week pain has interfered with       the following:  A. General Activity:  6  B. Mood: 6  C. Walking Ability:  8  D. Normal Work (Includes both work outside the home and housework):  8  E. Relations with Other People:   9  F. Sleep:   8  G. Enjoyment of Life:   6    2. Patient Prefers to Take their Pain Medications:     [x]  On a regular basis   []  Only when necessary    []  Does not take pain medications    3. What are the Patient's Goals/Expectations for Visiting Pain Management? []  Learn about my pain    [x]  Receive Medication   []  Physical Therapy     []  Treat Depression   [x]  Receive Injections    []  Treat Sleep   []  Deal with Anxiety and Stress   []  Treat Opoid Dependence/Addiction   []  Other:      HPI:   Brandi Squires is a 65 y.o. male is here today for    Chief Complaint: Low back pain, neck pain     Romana caregiver present     HPI   2 month FU. Continues to have pain in low back and  posterior neck aching and stabbing pain. Feels that pain is most bothersome in low back- dull stabbing and aching pain- mainly axial. Pain is up and down. States unsteady on his feet using start cane and continues to go to [facility] for home exercises and stretching 2-3 days per week. Continues to FU with Cardiologist.   Pain increases with bending, lifting, twisting , walking, standing, getting up and down, and housework or working at job, car rides for long periods     Percocet prn continues to help make pain tolerable. States blood pressures have been better controlled    Medications reviewed. Patient  gets constipation  side effects with medications. Patient states he is taking medications as prescribed. Hedenies receiving pain medications from other sources. He denies any ER visits since last visit. Pain scale with out pain medications or at its worst is 8/10. Pain scale with pain medications or at its best is 4/10. Last dose of Percocet was today   Drug screen reviewed from 12/12/2022 and was appropriate  Pill count completed  today and WNL: Yes      The patientis allergic to adhesive tape and keppra [levetiracetam]. Subjective:      Review of Systems   Constitutional:  Positive for activity change and fatigue. Negative for appetite change, diaphoresis, fever and unexpected weight change. HENT: Negative. Eyes: Negative. Respiratory:  Positive for chest tightness, shortness of breath and wheezing. Negative for cough. Continues to use tobacco, COPD   Cardiovascular:  Positive for leg swelling. Negative for chest pain. Gastrointestinal: Negative. Musculoskeletal:  Positive for arthralgias, back pain, gait problem, joint swelling, myalgias, neck pain and neck stiffness. Ambulating with strait cane    Skin:  Negative for wound. Neurological:  Positive for dizziness, weakness and numbness. Psychiatric/Behavioral:  Negative for dysphoric mood and sleep disturbance. The patient is not nervous/anxious. Objective:     Vitals:    02/13/23 1143   BP: 124/82   Weight: (!) 321 lb (145.6 kg)   Height: 6' 3\" (1.905 m)       Physical Exam  Constitutional:       General: He is not in acute distress. Appearance: Normal appearance. He is obese. He is not ill-appearing. HENT:      Head: Normocephalic and atraumatic. Mouth/Throat:      Mouth: Mucous membranes are moist.   Cardiovascular:      Rate and Rhythm: Normal rate and regular rhythm. Pulses: Normal pulses. Heart sounds: Normal heart sounds.    Pulmonary:      Effort: Pulmonary effort is normal. Comments: Decreased, some short of breath at rest   Chest:      Chest wall: No tenderness.   Abdominal:      General: Abdomen is flat.      Palpations: Abdomen is soft.   Musculoskeletal:         General: Swelling and tenderness present.      Right wrist: Tenderness present. Decreased range of motion.      Left wrist: Tenderness and bony tenderness present. Decreased range of motion.      Cervical back: Rigidity, tenderness and bony tenderness present. Muscular tenderness present.      Thoracic back: Tenderness and bony tenderness present. Decreased range of motion.      Lumbar back: Tenderness and bony tenderness present. Decreased range of motion. Negative right straight leg raise test and negative left straight leg raise test.        Back:       Right lower leg: Tenderness present. Edema present.      Left lower leg: Tenderness present. Edema present.      Right ankle: Decreased range of motion.      Left ankle: Decreased range of motion.   Skin:     Coloration: Skin is pale. Skin is not jaundiced.          Neurological:      General: No focal deficit present.      Mental Status: He is alert and oriented to person, place, and time.      Sensory: Sensory deficit present.      Motor: Weakness present.      Coordination: Coordination abnormal.      Gait: Gait abnormal.      Deep Tendon Reflexes:      Reflex Scores:       Tricep reflexes are 1+ on the right side and 1+ on the left side.       Bicep reflexes are 1+ on the right side and 1+ on the left side.       Brachioradialis reflexes are 1+ on the right side and 1+ on the left side.       Patellar reflexes are 1+ on the right side and 1+ on the left side.       Achilles reflexes are 1+ on the right side and 1+ on the left side.     Comments: Decreased sensation bilateral lower extremity and upper extremity    Psychiatric:         Mood and Affect: Mood normal.     ANJUM  Patricks test  positive  Yeoman's  or Gaenslen's positive       Assessment:     1. Spondylosis  of lumbar region without myelopathy or radiculopathy    2. Lumbar pain    3. Closed compression fracture of L3 lumbar vertebra with routine healing, subsequent encounter    4. Closed compression fracture of L5 lumbar vertebra with nonunion, subsequent encounter    5. S/P kyphoplasty    6. Neuropathy    7. Neck pain    8. History of fusion of cervical spine    9. CHF with unknown LVEF ([facility] [address]    10. Chronic pain syndrome    11. Aortic valve stenosis, etiology of cardiac valve disease unspecified            Plan:      OARRS reviewed. Current MED:33.75  Patient was offered naloxone for home. Discussed long term side effects of medications, tolerance, dependency and addiction. Previous UDS reviewed  UDS preformed today for compliance. Patient told can not receive any pain medications from any other source. No evidence of abuse, diversion or aberrant behavior. Medications and/or procedures to improve function and quality of life- patient understanding with this and that may not be pain free  Discussed with patient about safe storage of medications at home  Discussed possible weaning of medication dosing dependent on treatment/procedure results. Not a candidate for any procedures d/t extensive cardiac issues. Discussed possible L-facet MBB in future when medically appropriate   Patient is working on losing weight. Discussed smoking cessation   Medications remain effective- continue Percocet 7.5/325 TID prn- filled 1/30/2022  Continue home exercises and stretching at [facility]  Is compliant. Meds. Prescribed:   No orders of the defined types were placed in this encounter. Return in about 10 weeks (around 4/24/2023), or if symptoms worsen or fail to improve, for follow up  for medications.                Electronically signed by MEGHAN Jordan CNP on2/13/2023 at 12:27 PM

## 2023-02-16 NOTE — FLOWSHEET NOTE
Pt was with family at the time of the visit. He seemed weak but wanted prayer to heal and he was anointed and received communion. 05/01/19 1902   Encounter Summary   Services provided to: Patient and family together   Referral/Consult From: [facility] Family members   Place of [address] [place] [address] Visiting Yes  (5/1 )   Complexity of Encounter Low   Length of Encounter 15 minutes   Routine   Type Initial   Assessment Approachable;Calm   Intervention [place];Prayer;Nurtured hope   Outcome Acceptance;Expressed gratitude; Hopeful;Encouraged;Receptive   Sacraments   Sacrament of Sick-Anointing Anointed
interest in learning

## 2023-02-27 DIAGNOSIS — G89.4 CHRONIC PAIN SYNDROME: ICD-10-CM

## 2023-02-27 RX ORDER — OXYCODONE AND ACETAMINOPHEN 7.5; 325 MG/1; MG/1
1 TABLET ORAL EVERY 8 HOURS PRN
Qty: 90 TABLET | Refills: 0 | Status: SHIPPED | OUTPATIENT
Start: 2023-03-01 | End: 2023-03-31

## 2023-02-27 NOTE — TELEPHONE ENCOUNTER
OARRS reviewed. UDS: + for  Trazodone, Oxycodone. Last seen: 2/13/2023.  Follow-up:   Future Appointments   Date Time Provider Kenny Arrington   5/2/2023 10:00 AM MEGHAN Bangura - CNP N [facility] Pain [address] [facility]

## 2023-02-27 NOTE — TELEPHONE ENCOUNTER
Caprice Tobar called requesting a refill on the following medications:  Requested Prescriptions     Pending Prescriptions Disp Refills    oxyCODONE-acetaminophen (PERCOCET) 7.5-325 MG per tablet 90 tablet 0     Sig: Take 1 tablet by mouth every 8 hours as needed for Pain for up to 30 days. Intended supply: 30 days     Sarah Mclain [address]  . pv      Date of last visit: 2-  Date of next visit (if applicable): 6/3/3233

## 2023-03-28 ENCOUNTER — TELEPHONE (OUTPATIENT)
Dept: PHYSICAL MEDICINE AND REHAB | Age: 58
End: 2023-03-28

## 2023-03-28 DIAGNOSIS — S32.050K: ICD-10-CM

## 2023-03-28 DIAGNOSIS — M47.816 SPONDYLOSIS OF LUMBAR REGION WITHOUT MYELOPATHY OR RADICULOPATHY: Primary | ICD-10-CM

## 2023-03-28 DIAGNOSIS — G89.4 CHRONIC PAIN SYNDROME: ICD-10-CM

## 2023-03-28 DIAGNOSIS — Z98.1 HISTORY OF FUSION OF CERVICAL SPINE: ICD-10-CM

## 2023-03-28 DIAGNOSIS — S32.030D CLOSED COMPRESSION FRACTURE OF L3 LUMBAR VERTEBRA WITH ROUTINE HEALING, SUBSEQUENT ENCOUNTER: ICD-10-CM

## 2023-03-28 RX ORDER — OXYCODONE AND ACETAMINOPHEN 7.5; 325 MG/1; MG/1
1 TABLET ORAL EVERY 8 HOURS PRN
Qty: 90 TABLET | Refills: 0 | Status: SHIPPED | OUTPATIENT
Start: 2023-04-01 | End: 2023-04-26 | Stop reason: SDUPTHER

## 2023-04-25 DIAGNOSIS — G89.4 CHRONIC PAIN SYNDROME: ICD-10-CM

## 2023-04-25 NOTE — TELEPHONE ENCOUNTER
[facility] called requesting a refill on the following medications:  Requested Prescriptions     Pending Prescriptions Disp Refills    oxyCODONE-acetaminophen (PERCOCET) 7.5-325 MG per tablet 90 tablet 0     Sig: Take 1 tablet by mouth every 8 hours as needed for Pain for up to 30 days. Intended supply: 30 days     [address]   [facility]      Date of last visit: 2-13-23  Date of next visit (if applicable): 4/9/8976

## 2023-04-26 RX ORDER — OXYCODONE AND ACETAMINOPHEN 7.5; 325 MG/1; MG/1
1 TABLET ORAL EVERY 8 HOURS PRN
Qty: 90 TABLET | Refills: 0 | Status: SHIPPED | OUTPATIENT
Start: 2023-04-26 | End: 2023-05-26

## 2023-04-26 NOTE — TELEPHONE ENCOUNTER
OARRS reviewed. UDS: + for  Trazodone, Oxycodone, EtG/EtS. Last seen: 2/13/2023.  Follow-up:   Future Appointments   Date Time Provider Kenny Nury   5/2/2023 10:00 AM MEGHAN Rodríguez - CNP N [facility] Pain [address]

## 2023-05-01 RX ORDER — FUROSEMIDE 20 MG/1
TABLET ORAL
Qty: 180 TABLET | Refills: 3 | OUTPATIENT
Start: 2023-05-01

## 2023-05-02 ENCOUNTER — OFFICE VISIT (OUTPATIENT)
Dept: PHYSICAL MEDICINE AND REHAB | Age: 58
End: 2023-05-02
Payer: MEDICARE

## 2023-05-02 VITALS
DIASTOLIC BLOOD PRESSURE: 82 MMHG | HEIGHT: 75 IN | BODY MASS INDEX: 39.17 KG/M2 | SYSTOLIC BLOOD PRESSURE: 124 MMHG | WEIGHT: 315 LBS

## 2023-05-02 DIAGNOSIS — S32.050K: ICD-10-CM

## 2023-05-02 DIAGNOSIS — Z98.890 S/P KYPHOPLASTY: ICD-10-CM

## 2023-05-02 DIAGNOSIS — G89.4 CHRONIC PAIN SYNDROME: ICD-10-CM

## 2023-05-02 DIAGNOSIS — Z98.1 HISTORY OF FUSION OF CERVICAL SPINE: ICD-10-CM

## 2023-05-02 DIAGNOSIS — G62.9 NEUROPATHY: ICD-10-CM

## 2023-05-02 DIAGNOSIS — I35.0 AORTIC VALVE STENOSIS, ETIOLOGY OF CARDIAC VALVE DISEASE UNSPECIFIED: ICD-10-CM

## 2023-05-02 DIAGNOSIS — I50.9 CHF WITH UNKNOWN LVEF (HCC): ICD-10-CM

## 2023-05-02 DIAGNOSIS — S32.030D CLOSED COMPRESSION FRACTURE OF L3 LUMBAR VERTEBRA WITH ROUTINE HEALING, SUBSEQUENT ENCOUNTER: ICD-10-CM

## 2023-05-02 DIAGNOSIS — M54.50 LUMBAR PAIN: ICD-10-CM

## 2023-05-02 DIAGNOSIS — M47.816 SPONDYLOSIS OF LUMBAR REGION WITHOUT MYELOPATHY OR RADICULOPATHY: Primary | ICD-10-CM

## 2023-05-02 PROCEDURE — 4004F PT TOBACCO SCREEN RCVD TLK: CPT | Performed by: NURSE PRACTITIONER

## 2023-05-02 PROCEDURE — G8427 DOCREV CUR MEDS BY ELIG CLIN: HCPCS | Performed by: NURSE PRACTITIONER

## 2023-05-02 PROCEDURE — 99214 OFFICE O/P EST MOD 30 MIN: CPT | Performed by: NURSE PRACTITIONER

## 2023-05-02 PROCEDURE — 3017F COLORECTAL CA SCREEN DOC REV: CPT | Performed by: NURSE PRACTITIONER

## 2023-05-02 PROCEDURE — 3074F SYST BP LT 130 MM HG: CPT | Performed by: NURSE PRACTITIONER

## 2023-05-02 PROCEDURE — 3079F DIAST BP 80-89 MM HG: CPT | Performed by: NURSE PRACTITIONER

## 2023-05-02 PROCEDURE — G8417 CALC BMI ABV UP PARAM F/U: HCPCS | Performed by: NURSE PRACTITIONER

## 2023-05-02 ASSESSMENT — ENCOUNTER SYMPTOMS
COUGH: 0
EYES NEGATIVE: 1
WHEEZING: 1
SHORTNESS OF BREATH: 1
CHEST TIGHTNESS: 0
BACK PAIN: 1
GASTROINTESTINAL NEGATIVE: 1

## 2023-05-02 NOTE — PROGRESS NOTES
leg raise test.        Back:       Right lower leg: Tenderness present. Edema present. Left lower leg: Tenderness present. Edema present. Right ankle: Decreased range of motion. Left ankle: Decreased range of motion. Skin:     Coloration: Skin is pale. Skin is not jaundiced. Neurological:      General: No focal deficit present. Mental Status: He is alert and oriented to person, place, and time. Sensory: Sensory deficit present. Motor: Weakness present. Coordination: Coordination abnormal.      Gait: Gait abnormal.      Deep Tendon Reflexes:      Reflex Scores:       Tricep reflexes are 1+ on the right side and 1+ on the left side. Bicep reflexes are 1+ on the right side and 1+ on the left side. Brachioradialis reflexes are 1+ on the right side and 1+ on the left side. Patellar reflexes are 1+ on the right side and 1+ on the left side. Achilles reflexes are 1+ on the right side and 1+ on the left side. Comments: Decreased sensation bilateral lower extremity and upper extremity    Psychiatric:         Mood and Affect: Mood normal.     ANJUM  Patricks test  positive  Yeoman's  or Gaenslen's positive         Assessment:     1. Spondylosis of lumbar region without myelopathy or radiculopathy    2. Closed compression fracture of L3 lumbar vertebra with routine healing, subsequent encounter    3. Closed compression fracture of L5 lumbar vertebra with nonunion, subsequent encounter    4. Lumbar pain    5. S/P kyphoplasty    6. Neuropathy    7. CHF with unknown LVEF ([facility] [address]    8. Aortic valve stenosis, etiology of cardiac valve disease unspecified    9. Chronic pain syndrome    10. History of fusion of cervical spine            Plan:      OARRS reviewed. Current MED: 33.75  Patient was offered naloxone for home. Discussed long term side effects of medications, tolerance, dependency and addiction.   Previous UDS reviewed  UDS preformed today for

## 2023-05-09 NOTE — PROGRESS NOTES
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   - upon note review, Toprol-XL was decreased in the beginning of December from 75 mg to 25 mg  With this decrease, patient felt as though his heart was jumping out of his chest and it was difficult to breathe thus beta-blocker was increased back to 75 mg daily with improvement in symptoms  Unclear if this was rapid atrial fibrillation  · Paroxysmal atrial fibrillation   - status post TI cardioversion with restoration of sinus rhytm, 8/15/22    - status post TI cardioversion with restoration of sinus rhythm, 8/1/22    - antiarrhythmic Rx, amiodarone 200 mg daily  - outpatient AV cassy blocker Rx, Toprol XL 75 mg daily  - anticoagulation with Xarelto 20 mg daily  · Chronic combined systolic and diastolic congestive heart failure   - TTE 1/10/23: LVEF 45%, mild biatrial dilatation, mild MR     - outpatient diuretic Rx, furosemide 20 mg daily  · Nonischemic cardiomyopathy   - stress test 4/7/22: EF 32%, global function moderately reduced  Stress ECG negative for ischemia  · Hypertension    - outpatient Rx, furosemide 20 mg daily, lisinopril 10 mg daily, Toprol XL 75 mg daily    · Chronic left bundle branch block  · History of LA thrombus, resolved on follow-up TI 8/1/22  · Dyslipidemia   · Obstructive sleep apnea  · Obesity with history of gastric bypass surgery  · History of GIB in the setting of NSAID use, 2020  · History of tobacco use  · History of prior alcohol abuse     PLAN/ DISCUSSION:     · At this time, would recommend to hold outpatient AV cassy blocker, Toprol Received call back to fax clinical for inpt rehab. XL    · Continue amiodarone 200 mg daily, Xarelto 20 mg daily  · We will repeat limited echocardiogram at this time  · Recommendations forthcoming; will discuss with primary cardiologist, Dr Eran Sanabria whether to pursue inpatient versus outpatient EP evaluation  As noted below, patient had seen EP in the past as well  · Continue to monitor on telemetry  · Monitor volume status closely with strict intake/output, daily weight  · Monitor renal function and electrolytes; maintain potassium > 4, magnesium > 2    · Check TSH, free T4  History Of Present Illness:  65year old male presented to [facility] with complaints of dizziness, lightheadedness, shortness of breath, chest pressure  Per patient, he has had these complaints for some time, however more recently, over the past month, these symptoms have worsened thus prompting him to present to the emergency department for evaluation  Per patient, he has experienced both dizziness and lightheadedness  It was difficult for him to discern between the two, but with further questioning, he seems to have experienced both  He notes that at times the room spins endorsing dizziness  At times, he feels woozy and off balance  With each, he has experienced falls and near syncopal episodes prompting him to rest his head down and to rest for resolution of the symptoms  He denied any palpitations during these episodes  To note, patient states he is fallen down stairs and even has fallen off ladders  The patient states that he is not able to walk more than a few stairs without becoming short of breath  He noted that today he had three steps to get into his job and he was severely short of breath after walking just three stairs  With this, he endorses intermittent chest pressure  Upon assessment and evaluation, patient resting comfortably on the stretcher in the emergency department  At this time, patient is asymptomatic    He denies shortness of breath, chest pain or pressure, dizziness, lightheadedness, palpitations  Please see significant cardiac history and problems enumerated above  Patient follows with primary Cardiologist, Dr Alison Quinones, as an outpatient with most recent note reviewed from December 2022  During visit, patient was in sinus bradycardia thus metoprolol was decreased from 75 mg daily to 25 mg daily  Patient with intermittent lightheadedness  Prior to this visit, patient was seen by the EP AP in August 2022  There was plan to consider Bi V ICD if EF not improved despite adequate medication therapy  There was not a plan for AV node ablation at that time due to rate control  After 2-3 months of appropriate Bi V pacing, plan for rhythm control  At that time, would consider comprehensive ablation  Past Medical History:        Past Medical History:   Diagnosis Date   • A-fib ([place] [address] )    • Acute ulcer of stomach    • CHF (congestive heart failure) ([facility] )    • GERD (gastroesophageal reflux disease)    • Rib fractures       Past Surgical History:   Procedure Laterality Date   • COLONOSCOPY     • ESOPHAGOGASTRODUODENOSCOPY     • GASTRIC BYPASS     • MANDIBLE FRACTURE SURGERY          Allergy:        No Known Allergies    Medications:       Prior to Admission medications    Medication Sig Start Date End Date Taking? Authorizing Provider   amiodarone 200 mg tablet TAKE 1 TABLET DAILY 2/1/23   Sridhar Avendano PA-C   atorvastatin (LIPITOR) 40 mg tablet Take 1 tablet (40 mg total) by mouth daily with dinner 5/25/22   Imani Myers DO   ergocalciferol (VITAMIN D2) 50,000 units Take 1 capsule (50,000 Units total) by mouth 2 (two) times a week 4/3/23 7/2/23  BINU Garrett   fexofenadine (ALLEGRA) 180 MG tablet Take 1 tablet (180 mg total) by mouth in the morning   5/16/22   Sherice Vincent MD   furosemide (LASIX) 20 mg tablet Take 1 tablet (20 mg total) by mouth daily 2/2/23 4/13/23  Imani Myers DO   lisinopril (ZESTRIL) 10 mg tablet Take 1 tablet (10 mg total) by mouth daily 10/3/22   Imani Myers,    metoprolol succinate (TOPROL-XL) 25 mg 24 hr tablet Take 1 tablet (25 mg total) by mouth daily 22   Imani Myers, DO   Multiple Vitamins-Minerals (MULTIVITAMIN ADULT EXTRA C PO) Take 1 capsule by mouth    Historical Provider, MD   pantoprazole (PROTONIX) 40 mg tablet TAKE 1 TABLET EVERY 12 HOURS 23   Ihab Abdelaal, DO   potassium chloride (K-DUR,KLOR-CON) 20 mEq tablet TAKE 2 TABLETS BY MOUTH IN AM, 1 TABLET IN PM 22   Imani Myers, DO   rivaroxaban (Xarelto) 20 mg tablet Take 1 tablet (20 mg total) by mouth daily with breakfast 22   Imani Myers,    Metoprolol Tartrate 75 MG TABS Take 1 tablet (75 mg total) by mouth every 12 (twelve) hours 9/23/22 10/3/22  Imani Myers DO       Family History:     Family History   Problem Relation Age of Onset   • Seizures Father         Social History:       Social History     Socioeconomic History   • Marital status: /Civil Union     Spouse name: None   • Number of children: None   • Years of education: None   • Highest education level: None   Occupational History   • None   Tobacco Use   • Smoking status: Former     Types: Cigars     Start date: 0     Quit date:      Years since quittin 3   • Smokeless tobacco: Former     Types: Chew   Vaping Use   • Vaping Use: Never used   Substance and Sexual Activity   • Alcohol use:  Yes     Alcohol/week: 7 0 standard drinks     Types: 7 Cans of beer per week     Comment: Hasn't had any beer since 22   • Drug use: No   • Sexual activity: None   Other Topics Concern   • None   Social History Narrative    Always uses seat belt    Feels safe at home    No guns in the home     Social Determinants of Health     Financial Resource Strain: Not on file   Food Insecurity: Not on file   Transportation Needs: Not on file   Physical Activity: Not on file   Stress: Not on file   Social Connections: Not on file   Intimate Partner Violence: Not on file   Housing Stability: Not on file ROS:  Negative except otherwise aforementioned above  Remainder review of systems is negative    Exam:  General:  alert, oriented and in no distress, cooperative  Head: Normocephalic, atraumatic  Eyes:  EOMI  Pupils - equal, round, reactive to accomodation  No icterus  Normal Conjunctiva  Oropharynx: moist and normal-appearing mucosa  Neck: supple, symmetrical, trachea midline   Heart:  Irregular rate with controlled rhythm, faint murmur appreciated  Respiratory effort / Chest Inspection: unlabored  Lungs:  normal air entry, lungs clear to auscultation and no rales, rhonchi or wheezing   Abdomen: flat, normal findings: bowel sounds normal and soft, non-tender  Lower Limbs:  no pitting edema  Musculoskeletal: ROM grossly normal      DATA:      ECG:                               Echocardiogram completed on 1/10/23:         •  Left Ventricle: Left ventricular cavity size is normal  Wall thickness is normal  The left ventricular ejection fraction is 45%  Systolic function is mildly reduced  Wall motion cannot be accurately assessed due to poor endocardial definition  Diastolic function is normal   •  Left Atrium: The atrium is mildly dilated  •  Right Atrium: The atrium is mildly dilated  •  Mitral Valve: There is mild regurgitation  Ischemic Testing:         Weights: Wt Readings from Last 3 Encounters:   04/13/23 134 kg (296 lb)   03/16/23 131 kg (289 lb 8 oz)   03/13/23 134 kg (295 lb 12 8 oz)   , There is no height or weight on file to calculate BMI           Lab Studies:             Results from last 7 days   Lab Units 05/09/23  0810   WBC Thousand/uL 8 58   HEMOGLOBIN g/dL 15 1   HEMATOCRIT % 44 4   PLATELETS Thousands/uL 224   ,   Results from last 7 days   Lab Units 05/09/23  0810   POTASSIUM mmol/L 4 6   CHLORIDE mmol/L 107   CO2 mmol/L 27   BUN mg/dL 19   CREATININE mg/dL 1 16   CALCIUM mg/dL 8 7

## 2023-05-30 DIAGNOSIS — G89.4 CHRONIC PAIN SYNDROME: ICD-10-CM

## 2023-05-30 RX ORDER — OXYCODONE AND ACETAMINOPHEN 7.5; 325 MG/1; MG/1
1 TABLET ORAL EVERY 8 HOURS PRN
Qty: 90 TABLET | Refills: 0 | Status: SHIPPED | OUTPATIENT
Start: 2023-05-30 | End: 2023-05-31 | Stop reason: SDUPTHER

## 2023-05-30 NOTE — TELEPHONE ENCOUNTER
OARRS reviewed. UDS: + for  Trazodone, Oxycodone, EtG/EtS. Last seen: 5/2/2023.  Follow-up:   Future Appointments   Date Time Provider Kenny Arrington   7/3/2023 10:00 AM Leigh Boxer, APRN - CNP N [facility] Pain [facility] - [address] [facility]

## 2023-05-30 NOTE — TELEPHONE ENCOUNTER
Sara Vallejo called requesting a refill on the following medications:  Requested Prescriptions     Pending Prescriptions Disp Refills    oxyCODONE-acetaminophen (PERCOCET) 7.5-325 MG per tablet 90 tablet 0     Sig: Take 1 tablet by mouth every 8 hours as needed for Pain for up to 30 days. Intended supply: 30 days     Pharmacy verified:[facility]  .       Date of last visit: 5-2-23  Date of next visit (if applicable): 8/1/3049

## 2023-05-31 ENCOUNTER — TELEPHONE (OUTPATIENT)
Dept: PHYSICAL MEDICINE AND REHAB | Age: 58
End: 2023-05-31

## 2023-05-31 DIAGNOSIS — G89.4 CHRONIC PAIN SYNDROME: ICD-10-CM

## 2023-05-31 RX ORDER — OXYCODONE AND ACETAMINOPHEN 7.5; 325 MG/1; MG/1
1 TABLET ORAL EVERY 8 HOURS PRN
Qty: 90 TABLET | Refills: 0 | Status: SHIPPED | OUTPATIENT
Start: 2023-05-31 | End: 2023-06-26 | Stop reason: SDUPTHER

## 2023-05-31 NOTE — TELEPHONE ENCOUNTER
Patient says prescription for Percocet 7.5-325 MG will need to be sent to [facility] [place] due to [facility] is out of stock for this medication.  Please follow up with patient to advise    Patient will be out of this medication 6-1-23

## 2023-05-31 NOTE — TELEPHONE ENCOUNTER
Script sent yesterday sent to [facility] and out of stock. Request send to [facility]. Canceling at [facility] and resetting up to go to [facility].

## 2023-06-06 NOTE — PROGRESS NOTES
Heart Failure Clinic       Visit Date: 5/4/2022  Cardiologist:  Dr. Argelia Rainey  Primary Care Physician: Dr. Dwight Reilly MD    Gilbert Steinberg is a 65 y.o. male who presents today for:  Chief Complaint   Patient presents with    Congestive Heart Failure       HPI:     TYPE HF: HFrEF 28 (2021)  Cause: nonsichemic, valvular (moderate as noted on 11/2020 echo)  Device: lifevest  HX: COPD, CKD, Hep C, HLD, HTN, Cirrhosis d/t ETOH abuse, Brain tumor  Dry Wt:  Unknown      Gilbert Steinberg is a 65 y.o. male who presents to the office for a f/u patient visit in the heart failure clinic. Accompanied by Loretta Perkins, friend. Concerns today: no significant weight gain or weight loss. Still wearing oxygen most nights. States that he eats at [employer] and eats out after Dr. Caballero Offer. Still experiencing feeling lightheaded and dizzy, along with SOB with some improvement. Visit on 3/21/22: total of 80lbs lost since starting diuretics. Notes improvement of SOB w/ water loss and new inhaler. Improvement of leg swelling and bloating. Still wearing oxygen at night that was prescribed to him by his PCP prior to hospital admission. His diet consists primarily of soup and rickey noodles. Also eats a lot of frozen meat loaf  Weight trend: 318 at D/C; 322 today; 360 prior to admit     Following Pulmonary   Acute hypoxic respiratory failure due to decompensated chronic systolic congestive heart failure VS COPD VS other etiologies. -COPD exacerbation due to acute bronchitis with  bacterial Vs viral etiology.  -Acute decompensation of chronicsystolic congestive heart failure.  -Abnormal chest x-ray- ? Left-sided pleural effusion-Improved on a chest x-ray performed on 11 March 2022.  -Severe chronic obstructive pulmonary disease.  -Chronic use of tobacco smoking for 40 years with 1 pack of cigarettes per day.   -Type 2 diabetes mellitus  -Essential hypertension.  -Legally blind    Hospitalization: 3/6/22-3/15/22    The patient is a 65 y.o. male who presents with insidious onset of shortness of breath, legs swelling and weight gain in the last few weeks. He endorses significant orthopnea and PND. He has cough,wheezes. He is on a diuretic at home. His weight gain persisted and the SOB got worse. He reports increased fatigue. He had a fall at home on account of increasing weakness and fatigue, bruised his right knee. No head injury and no loss of consciousness with the fall. Subsequently presented to ER. ER evaluation showed evidence of fluid overload with elevated BNP, pulmonary congestion      Activity: ADLs performed with limitations.  Becomes SOB, has to sit down and take multiple breaks  Diet: educated today    Patient has:  Chest Pain: no  SOB: yes  Orthopnea/PND: yes improved    TAPAN: oxygen at night, negative for TAPAN  Edema: yes improved  Fatigue: no  Abdominal bloating: yes improved  Cough: no  Appetite: good  Home weight: daily in the morning  Home blood pressure: no, going to get a cuff    Past Medical History:   Diagnosis Date    Acute kidney injury ([address] 12/2020    due to Enterococous Bacteremia , fluid overload, low sodium    Amputation of right great toe ([address] 02/18/2021    Asthma     Brain tumor ([address]     Cirrhosis (HCC)     ETOH abuse    CKD (chronic kidney disease)     l.brown-osu spetie    COPD (chronic obstructive pulmonary disease) (HCC)     Diabetes mellitus (HCC)     type 2-insulin lantus and humalog    Falling     Glaucoma     Hepatitis C     History of blood transfusion     s/p nasal surgery    Hyperlipidemia     Hypertension     Nallu    Legally blind     Obesity     Pain management     karol marzec-percocet Rx    Right foot infection 11/2021    Seizures ([address]      Past Surgical History:   Procedure Laterality Date    BACK SURGERY      CARDIAC CATHETERIZATION  08/03/2021    EYE SURGERY      FIBULA FRACTURE SURGERY Left 03/21/2019    LEFT FIBULAR SHAFT ORIF performed by Mk Tinoco DPM at [address] Right     Steel pins in place    FRACTURE SURGERY      NASAL SINUS SURGERY Bilateral 11/29/2020    FLEXIBLE BRONCHOSCOPY WITH BRONCHOALVEOLAR LAVAGE WITH CULTURES. NASAL ENDOSCOPY WITH POSSIBLE CAUTERY ADN NASAL PACKING performed by Kristen Luna MD at [address]  01/2020    SPINE SURGERY N/A 02/19/2021    KYPHOPLASTY at T4, L2, L4 performed by Prabhjot Rios MD at [facility] N/A 8/24/2021    LUMBAR 3 AND LUMBAR 5 KYPHOPLASTY performed by Jacki Buck MD at [facility] Right 09/23/2020    [facility], REMOVAL OF HARDWARE RIGHT HALLUX performed by Bria Serrato DPM at [facility] N/A 04/25/2019    EGD BIOPSY performed by Sergio Hardy MD at [facility]     Family History   Problem Relation Age of Onset    Heart Attack Father     Heart Attack Brother         pneumonia    No Known Problems Mother     Cancer Sister         breast    No Known Problems Maternal Grandmother     No Known Problems Maternal Grandfather     Liver Cancer Paternal Grandmother     Heart Attack Paternal Grandfather     Heart Disease Brother         stents    Colon Cancer Neg Hx     Colon Polyps Neg Hx      Social History     Tobacco Use    Smoking status: Current Every Day Smoker     Packs/day: 1.00     Years: 40.00     Pack years: 40.00     Types: Cigarettes    Smokeless tobacco: Never Used    Tobacco comment: Currently smoking electronic cigarettes   Substance Use Topics    Alcohol use: Yes     Alcohol/week: 3.0 standard drinks     Types: 3 Cans of beer per week     Current Outpatient Medications   Medication Sig Dispense Refill    oxyCODONE-acetaminophen (PERCOCET) 7.5-325 MG per tablet Take 1 tablet by mouth every 8 hours as needed for Pain for up to 30 days.  90 tablet 0    albuterol (PROVENTIL) (2.5 MG/3ML) 0.083% nebulizer solution Take 3 mLs by nebulization every 6 hours as needed for Wheezing or Shortness of Breath 120 each 11    furosemide (LASIX) 20 MG tablet Take 2 tablets by mouth every morning AND 1 tablet every evening.  180 tablet 5    potassium chloride (KLOR-CON M) 20 MEQ extended release tablet Take 1 tablet by mouth daily 60 tablet 5    metoprolol succinate (TOPROL XL) 100 MG extended release tablet Take 1 tablet by mouth daily 30 tablet 3    aspirin 81 MG chewable tablet Take 1 tablet by mouth daily 30 tablet 3    hydrALAZINE (APRESOLINE) 50 MG tablet Take 1 tablet by mouth every 8 hours (Patient taking differently: Take 50 mg by mouth every 8 hours DO NOT TAKE IF SBP IS <110) 90 tablet 3    nicotine (NICODERM CQ) 21 MG/24HR Place 1 patch onto the skin daily 30 patch 3    sacubitril-valsartan (ENTRESTO) 24-26 MG per tablet Take 1 tablet by mouth 2 times daily 60 tablet 3    umeclidinium-vilanterol (ANORO ELLIPTA) 62.5-25 MCG/INH AEPB inhaler Inhale 1 puff into the lungs daily 1 each 11    dapagliflozin (FARXIGA) 10 MG tablet Take 1 tablet by mouth every morning 90 tablet 1    senna (SENOKOT) 8.6 MG tablet Take 2 tablets by mouth daily      tamsulosin (FLOMAX) 0.4 MG capsule Take 1 capsule by mouth nightly 30 capsule 0    DULoxetine (CYMBALTA) 60 MG extended release capsule Take 1 capsule by mouth daily 30 capsule 3    insulin lispro (HUMALOG) 100 UNIT/ML injection vial Inject 10 Units into the skin 3 times daily (before meals) Plus Sliding Scale (Patient taking differently: Inject 12 Units into the skin 3 times daily (before meals) Plus Sliding Scale ) 1 vial 0    insulin lispro (HUMALOG) 100 UNIT/ML injection vial Glucose: Dose:  No Insulin, 140-199 2 Units, 200-249 4 Units, 250-299 6 Units, 300-349 8 Units, 350-400 10 Units, Over 400 12 Units 1 vial 0    albuterol sulfate  (90 Base) MCG/ACT inhaler Inhale 1 puff into the lungs every 4-6 hours as needed      OXYGEN Inhale into the lungs daily as needed (nightly)       latanoprost (XALATAN) 0.005 % ophthalmic solution Place 1 drop into both eyes nightly 1 Bottle 3    rosuvastatin (CRESTOR) 20 MG tablet Take 20 mg by mouth nightly       insulin glargine (LANTUS) 100 UNIT/ML injection vial Inject 84 Units into the skin nightly 1 vial 3     No current facility-administered medications for this visit. Allergies   Allergen Reactions    Adhesive Tape Rash     Bandaid    Keppra [Levetiracetam] Rash       SUBJECTIVE:   Review of Systems   Constitutional: Negative for activity change, appetite change, diaphoresis and fatigue. Respiratory: Positive for shortness of breath. Negative for cough. Cardiovascular: Negative for chest pain, palpitations and leg swelling. Gastrointestinal: Negative for abdominal distention, nausea and vomiting. Neurological: Positive for dizziness. Negative for weakness, light-headedness and headaches. Hematological: Negative for adenopathy. Psychiatric/Behavioral: Negative for sleep disturbance. OBJECTIVE:   Today's Vitals:  /80   Pulse 88   Ht 6' 3\" (1.905 m)   Wt (!) 326 lb (147.9 kg)   SpO2 96%   BMI 40.75 kg/m²     Physical Exam  Vitals reviewed. Constitutional:       General: He is not in acute distress. Appearance: Normal appearance. He is well-developed. He is not diaphoretic. HENT:      Head: Normocephalic and atraumatic. Eyes:      Conjunctiva/sclera: Conjunctivae normal.   Cardiovascular:      Rate and Rhythm: Normal rate and regular rhythm. Heart sounds: Murmur heard. Pulmonary:      Effort: Pulmonary effort is normal. No respiratory distress. Breath sounds: Normal breath sounds. No wheezing or rales. Abdominal:      General: Bowel sounds are normal. There is no distension. Palpations: Abdomen is soft. Tenderness: There is no abdominal tenderness. Musculoskeletal:         General: Normal range of motion.       Cervical back: Normal range of motion and neck supple. Right lower leg: Edema (trace) present. Left lower leg: Edema (+1) present. Skin:     General: Skin is warm and dry. Capillary Refill: Capillary refill takes less than 2 seconds. Neurological:      Mental Status: He is alert and oriented to person, place, and time. Coordination: Coordination normal.   Psychiatric:         Behavior: Behavior normal.         Wt Readings from Last 3 Encounters:   05/04/22 (!) 326 lb (147.9 kg)   04/20/22 (!) 322 lb (146.1 kg)   04/06/22 (!) 324 lb 15.3 oz (147.4 kg)     BP Readings from Last 3 Encounters:   05/04/22 116/80   04/20/22 122/62   04/20/22 124/83     Pulse Readings from Last 3 Encounters:   05/04/22 88   04/20/22 100   04/20/22 67     Body mass index is 40.75 kg/m². ECHO:        Summary   Baseline/ pre-dobutamine / Rest / finding   ef 35 to 40% ( 37%)   LOBITO 1 cm2   peak and mean gradient of 35 and 23 mmhg respectively   Peak velocity of 3 m/sec      POST PEAK DOBUTAMINE INFUSION OF 20 MC/KG/MIN RESULTS   LV FUNCTION/EF/ IMPROVED TO 50% SHOWING GOOD CONTRACTILE RESERVE   NO SEGMENTAL WALL MOTION ABNORMALITY INDUCED   PEAK TRANSAORTIC VELOCITY OF 4.1M/SEC   PEAK AND MEAN TRANSAORTIC GRADIENT OF 70 MMHG AND 38 MMHG RESPECTIVELY   AORTIC VALVE AREA WORSENED TO 0.8 CM2   THIS IS CONSISTENT WITH SEVERE AORTIC STENOSIS      Signature      ----------------------------------------------------------------   Electronically signed by Logan Angelo MD (Interpreting   physician) on 03/14/2022 at 07:04 PM    Summary  Technically difficult examination. Left Ventricular size is Mildly increased . Normal left ventricular wall thickness. There was severe global hypokinesis of the left ventricle. Systolic function was severely reduced. Ejection fraction is visually estimated in the range of 35%. The left atrium is Moderately dilated. There is moderate-to-severe aortic stenosis with valve area of 0.9 to 1 sq cm.   The maximum aortic valve gradient is 40 mmHg, the mean gradient is 25  mmHg, and the peak velocity is 3.1 m/s.     Signature     ----------------------------------------------------------------  Electronically signed by Ashish Martinez MD (Interpreting  physician) on 03/07/2022 at 05:58 PM  ----------------------------------------------------------------      Summary   Technically difficult study due to poor acoustic windows.   Left ventricular size is normal and systolic function is severely reduced.   Ejection fraction was estimated at 35-40%. LV wall thickness is withinnormal limits.   Moderately dilated left atrium.   Thickened and calcified aortic valve leaflets with reduced excursion.   DOPPLER: Transaortic peak velocity 3 m/s, mean gradient of 20 mm Hg and   calculated LOBITO was 1.2 cm2. There is moderate aortic stenosis present.   Mild aortic regurgitation is noted.   Calcification of the mitral valve noted.   Mild mitral regurgitation is present.   Mild tricuspid regurgitation visualized.      Signature   ----------------------------------------------------------------   Electronically signed by Anastasia Sal MD (Interpreting   physician) on 07/22/2021 at 10:00 PM    CATH/STRESS:      Summary   Lexiscan EKG stress test is non-diagnostic for ischemia.   Calculated gated LVEF 47 %.   The T.I.D. ratio was 1.14 .   There was a small to moderate sized, moderate in intensity, fixed   myocardial perfusion defect of the inferior wall.   This study was negative for ischemia.      Recommendation   Clinical correlation is recommended.   Aggressive risk factor management.   Medical management.     Cath:8/2021  CORONARY ANATOMY:  Right Coronary: Dominant and patent  Left Main: Patent  Left Circumflex: Patent  Left Anterior Descending: Patent     LVEDP was measured at 14 mmHg. LVEF was estimated at 35%.   Mean gradient of 22mm Hg across aortic valve      Results reviewed:  BNP:   Lab Results   Component Value Date     (H) 03/28/2022     CBC:   Lab Results   Component Value Date    WBC 4.6 04/07/2022    RBC 4.07 04/07/2022    HGB 12.4 04/07/2022    HCT 39.2 04/07/2022     04/07/2022     CMP:    Lab Results   Component Value Date     04/19/2022    K 4.4 04/19/2022    K 4.4 04/06/2022     04/19/2022    CO2 22 04/19/2022    BUN 23 04/19/2022    CREATININE 1.08 04/19/2022    LABGLOM >60 04/19/2022    LABGLOM 48 04/07/2022    GLUCOSE 310 04/19/2022    CALCIUM 9.4 04/19/2022     Hepatic Function Panel:    Lab Results   Component Value Date    ALKPHOS 125 04/06/2022    ALT 36 04/06/2022    AST 48 04/06/2022    PROT 6.9 04/06/2022    BILITOT 0.5 04/06/2022    BILIDIR 0.4 04/06/2022    LABALBU 3.0 04/06/2022     Magnesium:    Lab Results   Component Value Date    MG 2.0 03/28/2022     PT/INR:    Lab Results   Component Value Date    INR 1.22 03/09/2022     Lipids:    Lab Results   Component Value Date    TRIG 73 03/07/2022    HDL 40 03/07/2022    LDLCALC 32 03/07/2022       ASSESSMENT AND PLAN:   The patient's condition/symptoms are Stable: No clinical evidence of fluid overload today. Continue current medical regimen without changes at present time. Diagnosis Orders   1. Acute systolic congestive heart failure, NYHA class 2 (HCC)  Brain Natriuretic Peptide    XR CHEST (2 VW)   2. Aortic valve stenosis, etiology of cardiac valve disease unspecified       Continue:  GDMT:   ACE/ARB/ARNI - Entresto 24/26 BID   BB - Toprol 100 daily   Diuretic - Lasix 40am 20pm  AA - caused bump in Cr  SGLT2 -  Farxiga  Vasodilator - hydralazine 50 Q 8  Other - ASA    Tolerating above noted HF meds, no ill side effects noted. Will continue to monitor kidney function and electrolytes. Will optimize as tolerated. Pt is compliant w/ medications.     Total visit time of 45  minutes has been spent with patient on education of symptoms, management, medication, and plan of care; as well as review of chart: labs, ECHO, radiology reports, etc.   I personally spent more then 50% of the appt time face to face with the patient. · Daily weights  · Fluid restriction of 2 Liters per day  · Limit sodium in diet to around 4093-8111 mg/day  · Monitor BP  · Activity as tolerated       HFrEF at 35-40% improved to 50% w/ dobutamine stress  Severe AS  Stable, lower leg swelling and cough noted today. Pt is compliant with his medications but does admit to eating out and still eating soup frequently. If pulmonary congestion still present on chest xray and/or BMP elevated I will increase lasix to 40 BID with repeat labs. BNP today  Chest xray today  TAVR on 5/17/22     BP/HR stable     Continue diet/fluid adherence  Continue daily wts. F/U w/ Cardiology,   F/U in clinic in 5/31      Patient was instructed to call the [facility] for any changes in symptoms as noted in AVS.      No follow-ups on file. or sooner if needed     Patient given educational materials - see patient instructions. We discussed the importance of weighing oneself and recording daily. We also discussed the importance of a low sodium diet, higher sodium foods to avoid and better low sodium food options. Patient verbalizes understanding of plan of care using teach back method, and is agreeable to the treatment plan.        Electronically signed by MEGHAN Yanes CNP on 5/4/2022 at 3:26 PM Erivedge Counseling- I discussed with the patient the risks of Erivedge including but not limited to nausea, vomiting, diarrhea, constipation, weight loss, changes in the sense of taste, decreased appetite, muscle spasms, and hair loss.  The patient verbalized understanding of the proper use and possible adverse effects of Erivedge.  All of the patient's questions and concerns were addressed.

## 2023-06-26 DIAGNOSIS — G89.4 CHRONIC PAIN SYNDROME: ICD-10-CM

## 2023-06-26 RX ORDER — OXYCODONE AND ACETAMINOPHEN 7.5; 325 MG/1; MG/1
1 TABLET ORAL EVERY 8 HOURS PRN
Qty: 90 TABLET | Refills: 0 | Status: SHIPPED | OUTPATIENT
Start: 2023-06-30 | End: 2023-07-30

## 2023-07-03 ENCOUNTER — OFFICE VISIT (OUTPATIENT)
Dept: PHYSICAL MEDICINE AND REHAB | Age: 58
End: 2023-07-03
Payer: MEDICARE

## 2023-07-03 VITALS
SYSTOLIC BLOOD PRESSURE: 122 MMHG | DIASTOLIC BLOOD PRESSURE: 76 MMHG | HEIGHT: 75 IN | BODY MASS INDEX: 39.17 KG/M2 | WEIGHT: 315 LBS

## 2023-07-03 DIAGNOSIS — Z98.890 S/P KYPHOPLASTY: ICD-10-CM

## 2023-07-03 DIAGNOSIS — M47.816 SPONDYLOSIS OF LUMBAR REGION WITHOUT MYELOPATHY OR RADICULOPATHY: Primary | ICD-10-CM

## 2023-07-03 DIAGNOSIS — M54.50 LUMBAR PAIN: ICD-10-CM

## 2023-07-03 DIAGNOSIS — G89.4 CHRONIC PAIN SYNDROME: ICD-10-CM

## 2023-07-03 DIAGNOSIS — I50.9 CHF WITH UNKNOWN LVEF (HCC): ICD-10-CM

## 2023-07-03 DIAGNOSIS — I35.0 AORTIC VALVE STENOSIS, ETIOLOGY OF CARDIAC VALVE DISEASE UNSPECIFIED: ICD-10-CM

## 2023-07-03 DIAGNOSIS — Z98.1 HISTORY OF FUSION OF CERVICAL SPINE: ICD-10-CM

## 2023-07-03 DIAGNOSIS — S32.050K: ICD-10-CM

## 2023-07-03 DIAGNOSIS — G62.9 NEUROPATHY: ICD-10-CM

## 2023-07-03 DIAGNOSIS — M54.2 NECK PAIN: ICD-10-CM

## 2023-07-03 DIAGNOSIS — S32.030D CLOSED COMPRESSION FRACTURE OF L3 LUMBAR VERTEBRA WITH ROUTINE HEALING, SUBSEQUENT ENCOUNTER: ICD-10-CM

## 2023-07-03 PROCEDURE — G8417 CALC BMI ABV UP PARAM F/U: HCPCS | Performed by: NURSE PRACTITIONER

## 2023-07-03 PROCEDURE — 3017F COLORECTAL CA SCREEN DOC REV: CPT | Performed by: NURSE PRACTITIONER

## 2023-07-03 PROCEDURE — 3074F SYST BP LT 130 MM HG: CPT | Performed by: NURSE PRACTITIONER

## 2023-07-03 PROCEDURE — 4004F PT TOBACCO SCREEN RCVD TLK: CPT | Performed by: NURSE PRACTITIONER

## 2023-07-03 PROCEDURE — 3078F DIAST BP <80 MM HG: CPT | Performed by: NURSE PRACTITIONER

## 2023-07-03 PROCEDURE — 99214 OFFICE O/P EST MOD 30 MIN: CPT | Performed by: NURSE PRACTITIONER

## 2023-07-03 PROCEDURE — G8427 DOCREV CUR MEDS BY ELIG CLIN: HCPCS | Performed by: NURSE PRACTITIONER

## 2023-07-03 ASSESSMENT — ENCOUNTER SYMPTOMS
SHORTNESS OF BREATH: 0
COUGH: 0
BACK PAIN: 1
WHEEZING: 0
EYES NEGATIVE: 1
CHEST TIGHTNESS: 0
GASTROINTESTINAL NEGATIVE: 1

## 2023-07-03 NOTE — PROGRESS NOTES
follow up  for medications.                Electronically signed by MEGHAN Johnson CNP on7/3/2023 at 10:19 AM

## 2023-07-26 DIAGNOSIS — G89.4 CHRONIC PAIN SYNDROME: ICD-10-CM

## 2023-07-26 DIAGNOSIS — Z98.1 HISTORY OF FUSION OF CERVICAL SPINE: Primary | ICD-10-CM

## 2023-07-26 DIAGNOSIS — M47.816 SPONDYLOSIS OF LUMBAR REGION WITHOUT MYELOPATHY OR RADICULOPATHY: ICD-10-CM

## 2023-07-26 RX ORDER — OXYCODONE AND ACETAMINOPHEN 7.5; 325 MG/1; MG/1
1 TABLET ORAL EVERY 8 HOURS PRN
Qty: 90 TABLET | Refills: 0 | Status: CANCELLED | OUTPATIENT
Start: 2023-07-26 | End: 2023-08-25

## 2023-07-26 NOTE — TELEPHONE ENCOUNTER
Alicia Hudson called requesting a refill on the following medications:  Requested Prescriptions     Pending Prescriptions Disp Refills    oxyCODONE-acetaminophen (PERCOCET) 7.5-325 MG per tablet 90 tablet 0     Sig: Take 1 tablet by mouth every 8 hours as needed for Pain for up to 30 days. Intended supply: 30 days     Pharmacy verified: [id number] in [address] [place] [address]  . pv      Date of last visit: 7/3/2023  Date of next visit (if applicable): 8/6/4535

## 2023-07-27 NOTE — TELEPHONE ENCOUNTER
OARRS reviewed. UDS: + for  etgs/ets, trazodone,oxycodone. Last seen: 7/3/2023. Follow-up:   Future Appointments   Date Time Provider [address]   9/5/2023 10:30 AM MEGHAN Roger - CNP [facility] [address]    Notif pt. Of positive urine and cannot be prescribed meds with alcohol.  Says will come in tomorrow am. Told to check with office in 5 days if results back

## 2023-08-02 RX ORDER — OXYCODONE AND ACETAMINOPHEN 7.5; 325 MG/1; MG/1
1 TABLET ORAL EVERY 8 HOURS PRN
Qty: 90 TABLET | Refills: 0 | Status: SHIPPED | OUTPATIENT
Start: 2023-08-02 | End: 2023-09-01

## 2023-08-02 NOTE — TELEPHONE ENCOUNTER
Refill setup . Information attached as pt had to come in for repeat uds- previous was positive for ETG/ETS  OARRS reviewed. UDS: + for  TRAZADONE, OXYCODONE. Last seen: 7/3/2023.  Follow-up:   Future Appointments   Date Time Provider [address] [place] [address]   9/5/2023 10:30 AM MEGHAN Conner - CNP N [facility] P - [facility]

## 2023-08-28 DIAGNOSIS — G89.4 CHRONIC PAIN SYNDROME: ICD-10-CM

## 2023-08-28 NOTE — TELEPHONE ENCOUNTER
Author Ground called requesting a refill on the following medications:  Requested Prescriptions     Pending Prescriptions Disp Refills    oxyCODONE-acetaminophen (PERCOCET) 7.5-325 MG per tablet 90 tablet 0     Sig: Take 1 tablet by mouth every 8 hours as needed for Pain for up to 30 days. Intended supply: 30 days     Pharmacy verified:[facility]   . pv      Date of last visit:   Date of next visit (if applicable): 9/3/9895

## 2023-08-29 RX ORDER — OXYCODONE AND ACETAMINOPHEN 7.5; 325 MG/1; MG/1
1 TABLET ORAL EVERY 8 HOURS PRN
Qty: 90 TABLET | Refills: 0 | Status: SHIPPED | OUTPATIENT
Start: 2023-09-01 | End: 2023-10-01

## 2023-08-29 NOTE — TELEPHONE ENCOUNTER
OARRS reviewed. UDS: + for  Trazodone, Oxycodone. Last seen: 7/3/2023.  Follow-up:   Future Appointments   Date Time Provider [address]   9/5/2023 10:30 AM MEGHAN Kimble - CNP [facility]

## 2023-09-05 ENCOUNTER — OFFICE VISIT (OUTPATIENT)
Dept: PHYSICAL MEDICINE AND REHAB | Age: 58
End: 2023-09-05
Payer: MEDICARE

## 2023-09-05 VITALS
HEIGHT: 75 IN | WEIGHT: 315 LBS | DIASTOLIC BLOOD PRESSURE: 62 MMHG | BODY MASS INDEX: 39.17 KG/M2 | SYSTOLIC BLOOD PRESSURE: 124 MMHG

## 2023-09-05 DIAGNOSIS — S32.050K: ICD-10-CM

## 2023-09-05 DIAGNOSIS — Z98.890 S/P KYPHOPLASTY: ICD-10-CM

## 2023-09-05 DIAGNOSIS — I50.9 CHF WITH UNKNOWN LVEF (HCC): ICD-10-CM

## 2023-09-05 DIAGNOSIS — M54.2 NECK PAIN: ICD-10-CM

## 2023-09-05 DIAGNOSIS — I35.0 AORTIC VALVE STENOSIS, ETIOLOGY OF CARDIAC VALVE DISEASE UNSPECIFIED: ICD-10-CM

## 2023-09-05 DIAGNOSIS — S32.030D CLOSED COMPRESSION FRACTURE OF L3 LUMBAR VERTEBRA WITH ROUTINE HEALING, SUBSEQUENT ENCOUNTER: ICD-10-CM

## 2023-09-05 DIAGNOSIS — G62.9 NEUROPATHY: ICD-10-CM

## 2023-09-05 DIAGNOSIS — M47.816 SPONDYLOSIS OF LUMBAR REGION WITHOUT MYELOPATHY OR RADICULOPATHY: Primary | ICD-10-CM

## 2023-09-05 DIAGNOSIS — Z98.1 HISTORY OF FUSION OF CERVICAL SPINE: ICD-10-CM

## 2023-09-05 DIAGNOSIS — G89.4 CHRONIC PAIN SYNDROME: ICD-10-CM

## 2023-09-05 DIAGNOSIS — M54.50 LUMBAR PAIN: ICD-10-CM

## 2023-09-05 PROCEDURE — 3074F SYST BP LT 130 MM HG: CPT | Performed by: NURSE PRACTITIONER

## 2023-09-05 PROCEDURE — 3078F DIAST BP <80 MM HG: CPT | Performed by: NURSE PRACTITIONER

## 2023-09-05 PROCEDURE — G8417 CALC BMI ABV UP PARAM F/U: HCPCS | Performed by: NURSE PRACTITIONER

## 2023-09-05 PROCEDURE — 4004F PT TOBACCO SCREEN RCVD TLK: CPT | Performed by: NURSE PRACTITIONER

## 2023-09-05 PROCEDURE — 99214 OFFICE O/P EST MOD 30 MIN: CPT | Performed by: NURSE PRACTITIONER

## 2023-09-05 PROCEDURE — 3017F COLORECTAL CA SCREEN DOC REV: CPT | Performed by: NURSE PRACTITIONER

## 2023-09-05 PROCEDURE — G8427 DOCREV CUR MEDS BY ELIG CLIN: HCPCS | Performed by: NURSE PRACTITIONER

## 2023-09-05 ASSESSMENT — ENCOUNTER SYMPTOMS
GASTROINTESTINAL NEGATIVE: 1
EYES NEGATIVE: 1
BACK PAIN: 1
CHEST TIGHTNESS: 0
SHORTNESS OF BREATH: 0
COUGH: 0
WHEEZING: 0

## 2023-09-05 NOTE — PROGRESS NOTES
constipation  side effects with medications. Patient states he is taking medications as prescribed. Hedenies receiving pain medications from other sources. He denies any ER visits since last visit. Pain scale with out pain medications or at its worst is 8/10. Pain scale with pain medications or at its best is 4/10. Last dose of Percocet was today   Drug screen reviewed from 7/28/2023 and was appropriate  Pill count completed  today and WNL: Yes      The patientis allergic to adhesive tape and keppra [levetiracetam]. Subjective:      Review of Systems   Constitutional:  Negative for activity change, appetite change, diaphoresis, fatigue, fever and unexpected weight change. HENT: Negative. Eyes: Negative. Respiratory:  Negative for cough, chest tightness, shortness of breath and wheezing. Continues to use tobacco, COPD   Cardiovascular:  Positive for leg swelling. Negative for chest pain. Gastrointestinal: Negative. Musculoskeletal:  Positive for arthralgias, back pain, gait problem, joint swelling, myalgias, neck pain and neck stiffness. Ambulating without assist devices. Uses strait cane prn and service dog is present    Skin:  Positive for wound. Left big toe wrapped with dressing    Neurological:  Positive for weakness and numbness. Negative for dizziness. Psychiatric/Behavioral:  Negative for dysphoric mood and sleep disturbance. The patient is not nervous/anxious. Objective:     Vitals:    09/05/23 1041   BP: 124/62   Weight: (!) 321 lb (145.6 kg)   Height: 6' 3\" (1.905 m)       Physical Exam  Constitutional:       General: He is not in acute distress. Appearance: Normal appearance. He is obese. He is not ill-appearing. HENT:      Head: Normocephalic and atraumatic. Mouth/Throat:      Mouth: Mucous membranes are moist.   Cardiovascular:      Rate and Rhythm: Normal rate and regular rhythm. Pulses: Normal pulses.       Heart sounds: Normal heart

## 2023-09-25 DIAGNOSIS — G89.4 CHRONIC PAIN SYNDROME: ICD-10-CM

## 2023-09-25 RX ORDER — OXYCODONE AND ACETAMINOPHEN 7.5; 325 MG/1; MG/1
1 TABLET ORAL EVERY 8 HOURS PRN
Qty: 90 TABLET | Refills: 0 | Status: SHIPPED | OUTPATIENT
Start: 2023-10-01 | End: 2023-10-31

## 2023-09-25 NOTE — TELEPHONE ENCOUNTER
OARRS reviewed. UDS: + for  trazodone and oxycodone. Last seen: 9/5/2023.  Follow-up:   Future Appointments   Date Time Provider [address]   11/6/2023 11:15 AM MEGHAN Johnson - CNP [facility]

## 2023-09-25 NOTE — TELEPHONE ENCOUNTER
It[facility] Live called requesting a refill on the following medications:  Requested Prescriptions     Pending Prescriptions Disp Refills    oxyCODONE-acetaminophen (PERCOCET) 7.5-325 MG per tablet 90 tablet 0     Sig: Take 1 tablet by mouth every 8 hours as needed for Pain for up to 30 days.  Intended supply: 30 days     Pharmacy verified:  [facility]      Date of last visit: 09-05-23  Date of next visit (if applicable): 52/0/3549

## 2023-10-26 DIAGNOSIS — G89.4 CHRONIC PAIN SYNDROME: ICD-10-CM

## 2023-10-26 RX ORDER — OXYCODONE AND ACETAMINOPHEN 7.5; 325 MG/1; MG/1
1 TABLET ORAL EVERY 8 HOURS PRN
Qty: 90 TABLET | Refills: 0 | Status: SHIPPED | OUTPATIENT
Start: 2023-10-31 | End: 2023-11-30

## 2023-10-26 NOTE — TELEPHONE ENCOUNTER
It[facility] Live called requesting a refill on the following medications:  Requested Prescriptions     Pending Prescriptions Disp Refills    oxyCODONE-acetaminophen (PERCOCET) 7.5-325 MG per tablet 90 tablet 0     Sig: Take 1 tablet by mouth every 8 hours as needed for Pain for up to 30 days. Intended supply: 30 days     Pharmacy verified: [facility] in [address] [place] [address]  . pv      Date of last visit: 9/5/2023  Date of next visit (if applicable): 55/1/9086

## 2023-11-06 ENCOUNTER — OFFICE VISIT (OUTPATIENT)
Dept: PHYSICAL MEDICINE AND REHAB | Age: 58
End: 2023-11-06
Payer: MEDICARE

## 2023-11-06 VITALS
BODY MASS INDEX: 39.17 KG/M2 | SYSTOLIC BLOOD PRESSURE: 124 MMHG | WEIGHT: 315 LBS | DIASTOLIC BLOOD PRESSURE: 80 MMHG | HEIGHT: 75 IN

## 2023-11-06 DIAGNOSIS — I50.9 CHF WITH UNKNOWN LVEF (HCC): ICD-10-CM

## 2023-11-06 DIAGNOSIS — S32.030D CLOSED COMPRESSION FRACTURE OF L3 LUMBAR VERTEBRA WITH ROUTINE HEALING, SUBSEQUENT ENCOUNTER: ICD-10-CM

## 2023-11-06 DIAGNOSIS — G62.9 NEUROPATHY: ICD-10-CM

## 2023-11-06 DIAGNOSIS — S32.050K: ICD-10-CM

## 2023-11-06 DIAGNOSIS — Z98.890 S/P KYPHOPLASTY: ICD-10-CM

## 2023-11-06 DIAGNOSIS — G89.4 CHRONIC PAIN SYNDROME: ICD-10-CM

## 2023-11-06 DIAGNOSIS — M54.50 LUMBAR PAIN: ICD-10-CM

## 2023-11-06 DIAGNOSIS — M47.816 SPONDYLOSIS OF LUMBAR REGION WITHOUT MYELOPATHY OR RADICULOPATHY: Primary | ICD-10-CM

## 2023-11-06 DIAGNOSIS — M54.2 NECK PAIN: ICD-10-CM

## 2023-11-06 DIAGNOSIS — Z98.1 HISTORY OF FUSION OF CERVICAL SPINE: ICD-10-CM

## 2023-11-06 DIAGNOSIS — I35.0 AORTIC VALVE STENOSIS, ETIOLOGY OF CARDIAC VALVE DISEASE UNSPECIFIED: ICD-10-CM

## 2023-11-06 PROCEDURE — G8417 CALC BMI ABV UP PARAM F/U: HCPCS | Performed by: NURSE PRACTITIONER

## 2023-11-06 PROCEDURE — 3017F COLORECTAL CA SCREEN DOC REV: CPT | Performed by: NURSE PRACTITIONER

## 2023-11-06 PROCEDURE — 99214 OFFICE O/P EST MOD 30 MIN: CPT | Performed by: NURSE PRACTITIONER

## 2023-11-06 PROCEDURE — 3074F SYST BP LT 130 MM HG: CPT | Performed by: NURSE PRACTITIONER

## 2023-11-06 PROCEDURE — 4004F PT TOBACCO SCREEN RCVD TLK: CPT | Performed by: NURSE PRACTITIONER

## 2023-11-06 PROCEDURE — G8428 CUR MEDS NOT DOCUMENT: HCPCS | Performed by: NURSE PRACTITIONER

## 2023-11-06 PROCEDURE — G8484 FLU IMMUNIZE NO ADMIN: HCPCS | Performed by: NURSE PRACTITIONER

## 2023-11-06 PROCEDURE — 3079F DIAST BP 80-89 MM HG: CPT | Performed by: NURSE PRACTITIONER

## 2023-11-06 ASSESSMENT — ENCOUNTER SYMPTOMS
EYES NEGATIVE: 1
COUGH: 0
BACK PAIN: 1
CHEST TIGHTNESS: 0
WHEEZING: 0
GASTROINTESTINAL NEGATIVE: 1
SHORTNESS OF BREATH: 0

## 2023-11-06 NOTE — PROGRESS NOTES
[address] [place] [address]  Dept: [phone]  Dept Fax: [id number]: [phone]    Visit Date: 11/6/2023    Functionality Assessment/Goals Worksheet     On a scale of 0 (Does not Interfere) to 10 (Completely Interferes)     1. Which number describes how during the past week pain has interfered with       the following:  A. General Activity:  4  B. Mood: 4  C. Walking Ability:  4  D. Normal Work (Includes both work outside the home and housework):  4  E. Relations with Other People:   4  F. Sleep:   4  G. Enjoyment of Life:   4    2. Patient Prefers to Take their Pain Medications:     []  On a regular basis   [x]  Only when necessary    []  Does not take pain medications    3. What are the Patient's Goals/Expectations for Visiting Pain Management? []  Learn about my pain    [x]  Receive Medication   []  Physical Therapy     []  Treat Depression   [x]  Receive Injections    []  Treat Sleep   []  Deal with Anxiety and Stress   []  Treat Opoid Dependence/Addiction   []  Other:        HPI:   Maya Swartz is a 63 y.o. male is here today for    Chief Complaint: Low back pain    HPI   2 month FU. Main complaints remains pain in low back- aching dull pain. Had a fall a week or 2 ago which aggravated pain. Overall feels pain is tolerable with Percocet prn and he feels it remains effective. States neck pain has been tolerable. Has continued ulcer at bottom of left toe- which is slowly improving   Pain increases with bending, lifting, twisting , walking, standing, getting up and down, and housework or working at job, weather changes. Remains on oral; antibiotics     Has been going to [facility] every other day to walk and participate in water exercises. Now increased from using 3 lb weights to 5 lb weights       Medications reviewed.  Patient denies side effects with

## 2023-11-27 DIAGNOSIS — G89.4 CHRONIC PAIN SYNDROME: ICD-10-CM

## 2023-11-27 NOTE — TELEPHONE ENCOUNTER
Nikki Jacobson called requesting a refill on the following medications:  Requested Prescriptions     Pending Prescriptions Disp Refills    oxyCODONE-acetaminophen (PERCOCET) 7.5-325 MG per tablet 90 tablet 0     Sig: Take 1 tablet by mouth every 8 hours as needed for Pain for up to 30 days. Intended supply: 30 days     Pharmacy verified:[facility]   . pv      Date of last visit:   Date of next visit (if applicable): 2/3/2422

## 2023-11-28 RX ORDER — OXYCODONE AND ACETAMINOPHEN 7.5; 325 MG/1; MG/1
1 TABLET ORAL EVERY 8 HOURS PRN
Qty: 90 TABLET | Refills: 0 | Status: SHIPPED | OUTPATIENT
Start: 2023-11-30 | End: 2023-12-30

## 2023-12-26 DIAGNOSIS — G89.4 CHRONIC PAIN SYNDROME: ICD-10-CM

## 2023-12-26 NOTE — TELEPHONE ENCOUNTER
Juancho Gonzales called requesting a refill on the following medications:  Requested Prescriptions     Pending Prescriptions Disp Refills    oxyCODONE-acetaminophen (PERCOCET) 7.5-325 MG per tablet 90 tablet 0     Sig: Take 1 tablet by mouth every 8 hours as needed for Pain for up to 30 days.  Intended supply: 30 days     Pharmacy verified:[facility] [place]  .pv      Date of last visit: 11/6/23  Date of next visit (if applicable): 1/1/4951

## 2023-12-27 RX ORDER — OXYCODONE AND ACETAMINOPHEN 7.5; 325 MG/1; MG/1
1 TABLET ORAL EVERY 8 HOURS PRN
Qty: 90 TABLET | Refills: 0 | Status: SHIPPED | OUTPATIENT
Start: 2023-12-30 | End: 2024-01-29

## 2023-12-27 NOTE — TELEPHONE ENCOUNTER
OARRS reviewed. UDS: + for  Trazodone, Oxycodone. Last seen: 11/6/2023.  Follow-up:   Future Appointments   Date Time Provider [address] [place] [address]   1/8/2024 11:30 AM Marlin Kilgore, MEGHAN - CNP [facility]

## 2024-01-06 NOTE — TELEPHONE ENCOUNTER
Chief Complaint:  Chief Complaint   Patient presents with    Vomiting       HPI  Colleen Fan is a 47 year old female presenting with episodes of vomiting since 01/04 when  restarted her medications glipizide and ropinirole prescribed by her PCP.  Believes she had similar problems in the past when on meds.  With continued with nausea and 4 episodes of nonbloody emesis today so seeks eval. .      She had been on glipizide and ropinirole in the past and had nausea and vomiting from them so she stopped taking it for 3-4 months. HOwever again restarted the medication on 1/4 with onset nausea and vomiting. She took another dose the morning of 1/5 and had continued symptoms, then stopped taking it. The patient tried zofran without relief. Patient took both medications at the same time.     The patient also reports urinary frequency since the vomiting started. She notes she had a UTI recently, she was treated for it and was back to baseline. Patient notes abdominal cramping but states she has had cramping like this before and takes a medication for it. NO new abdominal pain . NO change in stools.     Patient has a history of reflux and ulcers. She has a surgical history of cholecystectomy. She also notes she has a cyst on her left ovary, she is seeing a doctor for it on 1/10. NO changes     NO known COVID-19, FLU or RSV exposure.   NO known cough, new SOB or hemoptysis.  NO fevers or shaking chills.   NO loss of taste. NO loss of smell.   NO chest pain, severe headache or body aches.   NO abdominal pain or new back pain.    NO change in bowel habits.    NO skin rash or lesions.   Her last menstrual period was normal.     She ate a bun 1-2 hours ago and had 2 episodes of vomiting after.   She drank tea and water today.     I have personally reviewed old charts for info as below:  1/3/24 eval with PCP, Dr. Arauz, is reviewed.    +  ALLERGIES:   Allergen Reactions    Amoxicillin-Pot Clavulanate THROAT SWELLING     [facility] nurse   Jefry Gomez left message patient was having chest pain      Keisha notified of medication changes   Patient stated that he is getting Mom's meals from the hospital  And being compliant with fluid restrictions  Patient stated not currently having chest pain it is off and on    Patient notified if chest pain continues to go to ER Influenza Vaccines SWELLING and SHORTNESS OF BREATH    Amoxicillin SWELLING     Comments: tongue    Bactrim SWELLING    Bactrim Ds GI UPSET    Diazepam Other (See Comments) and ANXIETY     Reactions: twitch    Levaquin Other (See Comments)     Reactions: unknown    Nystatin      Possible adverse reaction.    Penicillins Other (See Comments)     Reactions: tongue swelling, pt states she attempted allergy testing for PCN    Percocet [Oxycodone-Acetaminophen] PRURITUS    Sulfa Antibiotics RASH       Discharge Medication List as of 1/6/2024  5:27 PM        CONTINUE these medications which have NOT CHANGED    Details   busPIRone (BUSPAR) 30 MG tablet TAKE 1 TABLET BY MOUTH TWICE DAILYEprescribe, Disp-60 tablet, R-11      clopidogrel (PLAVIX) 75 MG tablet Take 1 tablet by mouth daily.Eprescribe, Disp-90 tablet, R-1      SITagliptin (Januvia) 100 MG tablet Take 1 tablet by mouth daily.Eprescribe, Disp-30 tablet, R-3      rosuvastatin (CRESTOR) 40 MG tablet 1 at bedtime- stop atorvastatinEprescribe, Disp-90 tablet, R-1      tiZANidine (ZANAFLEX) 2 MG tablet Take 2 tablets by mouth nightly as needed for Muscle spasms.Eprescribe, Disp-90 tablet, R-1ZERO refills remain on this prescription. Your patient is requesting advance approval of refills for this medication to PREVENT ANY MISSED DOSES      traMADol (ULTRAM) 50 MG tablet Take 1 tablet by mouth daily as needed for Pain.Eprescribe, Disp-30 tablet, R-0      glipiZIDE (GLUCOTROL XL) 10 MG 24 hr tablet 1 qam acEprescribe, Disp-90 tablet, R-3      rOPINIRole (REQUIP) 2 MG tablet TAKE 1 TABLET BY MOUTH TWICE DAILYEprescribe, Disp-180 tablet, R-3**Patient requests 90 days supply**      lansoprazole (PREVACID) 30 MG capsule TAKE 1 CAPSULE BY MOUTH EVERY MORNING AND EVERY NIGHT AT BEDTIME. FOLLOW-UP IN AUGUST FOR FURTHER REFILLSEprescribe, Disp-180 capsule, R-1      LORazepam (Ativan) 1 MG tablet Take 1 tablet by mouth 1 time for 1 dose. Take 30min prior to your MRIEprescribe,  Disp-1 tablet, R-0      FLUoxetine (PROzac) 20 MG capsule TAKE 1 CAPSULE BY MOUTH DAILYEprescribe, Disp-60 capsule, R-2      aspirin 81 MG chewable tablet CHEW AND SWALLOW 1 TABLET(81 MG) BY MOUTH DAILYEprescribe, Disp-90 tablet, R-3      MULTIPLE VITAMIN PO Take 1 tablet by mouth daily.Historical Med      Ascorbic Acid (vitamin C) 500 MG tablet Take 500 mg by mouth daily.Historical Med      albuterol (ACCUNEB) 1.25 MG/3ML nebulizer solution Take 3 mLs by nebulization every 4 hours as needed for Wheezing or Shortness of Breath.Eprescribe, Nebulization, EVERY 4 HOURS PRN, Disp-75 mL, R-0Each vial = 3 mL (1.25 mg). Each box = 25 vials (75 mL).      Enter dispense quantity of 75 mL per box.      375 mL = Quantity Sufficient for 30 days with every 6 hour dosing.      ibuprofen (MOTRIN) 800 MG tablet Take 1 tablet by mouth nightly.Eprescribe, Disp-90 tablet, R-1      bismuth subsalicylate (PEPTO BISMOL) 262 MG/15ML suspension Take 15 mLs by mouth every 6 hours as needed.Historical Med      albuterol 108 (90 Base) MCG/ACT inhaler Inhale 2 puffs into the lungs every 4 hours as needed for Wheezing.Eprescribe, Disp-8.5 g, R-5      blood glucose (FREESTYLE LITE) test strip USE TO TEST BLOOD SUGAR 1 TO 2 TIMES PER DAY AS DIRECTEDDisp-300 strip, R-1, Eprescribe      fluticasone-salmeterol (Advair Diskus) 250-50 MCG/ACT inhaler Inhale 1 puff into the lungs 2 times daily.Eprescribe, Disp-1 each, R-6      ezetimibe (ZETIA) 10 MG tablet Take 1 tablet by mouth daily.Eprescribe, Disp-90 tablet, R-3      ipratropium-albuterol (DUONEB) 0.5-2.5 (3) MG/3ML nebulizer solution Take 3 mLs by nebulization every 6 hours as needed for Wheezing.Eprescribe, Nebulization, EVERY 6 HOURS PRN, Disp-360 mL, R-12Each vial = 3 mL. Each box = 30 vials (90 mL).      Enter dispense quantity of 90 mL per box.     360 mL = Quantity Sufficient  for 30 days with every 6 hour dosing.      dicyclomine (BENTYL) 10 MG capsule Take 10 mg by mouth 4 times daily as  needed.Historical Med      Lancets (FREESTYLE) Misc Use to test 4 times dailyDisp-100 each, R-11, Eprescribe      blood glucose meter Test blood sugar 1-2 times daily as directed. Meter: Free StyleDisp-1 kit, R-0, Eprescribe      DISPENSE Nebulizer for bronchial spasms and asthma. 1 nebulizer and tubing. Use as directedNormal, Disp-1 each, R-0             Past Medical History:  Past Medical History:   Diagnosis Date    Anxiety and depression     Asthma     Benign paroxysmal positional vertigo 2016    Diabetes mellitus (CMD)     Esophageal reflux     IGT (impaired glucose tolerance) 2015    Inflammatory bowel disease     diarrhea    Miscarriage     x5    Other and unspecified hyperlipidemia     Pain management contract signed 09/15/2022    Sleep apnea     no machine due to being a mild case       Past Surgical History:  Past Surgical History:   Procedure Laterality Date    Cholecystectomy      Colonoscopy w biopsy  2009    Egd  2023    Ercp w/ sphincterotomy and balloon dilation  2009    Esophagogastroduodenoscopy transoral flex w/bx single or mult  2009    EGD with Bx       Social History:  Social History     Tobacco Use    Smoking status: Former     Current packs/day: 0.00     Average packs/day: 1 pack/day for 6.0 years (6.0 ttl pk-yrs)     Types: Cigarettes     Start date: 1992     Quit date: 1998     Years since quittin.0    Smokeless tobacco: Never   Vaping Use    Vaping Use: never used   Substance Use Topics    Alcohol use: Yes     Comment: \"very seldom\" - once every 6-7 months 1 drink of [place] and orange juice    Drug use: No     Comment: Soda 1 per week     No family present.     Family History:  Family History   Problem Relation Age of Onset    Hypertension Sister     Diabetes Sister     Hypertension Brother     Asthma Brother     Diabetes Brother     Sleep Apnea Brother     Hypertension Sister     Diabetes Sister     Hypertension Brother     Asthma Brother      Diabetes Brother     Hypertension Sister     Asthma Sister     Diabetes Sister        Review of Systems  Review of system is otherwise negative, non-pertinent, or non-contributory unless otherwise noted in HPI.    Physical Exam  Nursing notes were reviewed      ED Triage Vitals [01/06/24 1556]   ED Triage Vitals Group      Temp 98.2 °F (36.8 °C)      Heart Rate 90      Resp 16      BP (!) 168/77      SpO2 98 %      EtCO2 mmHg       Height 5' 6\" (1.676 m)      Weight 269 lb 10 oz (122.3 kg)      Weight Scale Used Standing scale      BMI (Calculated) 43.52      IBW/kg (Calculated) 59.3     Well developed well nourished female (BMI= 43.52) who appears to be in no distress. Patient is nontoxic in appearance  Head: normocephalic and atraumatic  Eyes: PERRL, No icterus,  Ears: External ears are within normal limits  Nose: External nose is within normal limits   Oral: membranes moist and without intraoral lesions.  Neck: trachea is midline  Cardiac: Regular, rate and rhythm, S1, S2 and no murmur present  Chest wall:  no crepitus or deformity  Lungs:No respiratory distress, No use of accessory muscles, good aeration, CTA bilaterally    Abdomen: normal bowel sounds, soft nontender, no rebound, guarding or masses.  Back: no CVA tenderness  Skin: normal temperature and moisture, No cyanosis or jaundice. Normal capillary refill.    Neurologic: Patient is  alert and appropriately interactive. No gross motorsensory deficit.          Procedures    ED Course/MDM:    Labs:  Results for orders placed or performed during the hospital encounter of 01/06/24   Comprehensive Metabolic Panel   Result Value    Fasting Status     Sodium 134 (L)    Potassium 3.6    Chloride 105    Carbon Dioxide 24    Anion Gap 9    Glucose 154 (H)    BUN 9    Creatinine 0.88    Glomerular Filtration Rate 82     Comment: eGFR results = or >60 mL/min/1.73m2 = Normal kidney function. Estimated GFR calculated using the CKD-EPI-R (2021) equation that does not  include race in the creatinine calculation.    BUN/Cr 10    Calcium 8.6    Bilirubin, Total 0.4    GOT/AST 73 (H)    GPT/ALT 43    Alkaline Phosphatase 129 (H)    Albumin 3.5 (L)    Protein, Total 8.2    Globulin 4.7 (H)    A/G Ratio 0.7 (L)   Urinalysis & Reflex Microscopy With Culture If Indicated   Result Value    COLOR, URINALYSIS Yellow    APPEARANCE, URINALYSIS Cloudy    GLUCOSE, URINALYSIS Negative    BILIRUBIN, URINALYSIS Negative    KETONES, URINALYSIS Negative    SPECIFIC GRAVITY, URINALYSIS 1.018     Comment: Measured by refractometry    OCCULT BLOOD, URINALYSIS Negative    PH, URINALYSIS 6.0    PROTEIN, URINALYSIS Trace (A)    UROBILINOGEN, URINALYSIS 0.2    NITRITE, URINALYSIS Negative    LEUKOCYTE ESTERASE, URINALYSIS Trace (A)    SQUAMOUS EPITHELIAL, URINALYSIS 1 to 5    ERYTHROCYTES, URINALYSIS 1 to 2    LEUKOCYTES, URINALYSIS 1 to 5    BACTERIA, URINALYSIS Few (A)    HYALINE CASTS, URINALYSIS None Seen    MUCUS Present   CBC with Automated Differential (performable only)   Result Value    WBC 9.9    RBC 4.90    HGB 13.7    HCT 42.1    MCV 85.9    MCH 28.0    MCHC 32.5    RDW-CV 13.1    RDW-SD 40.4        NRBC 0    Neutrophil, Percent 66    Lymphocytes, Percent 27    Mono, Percent 6    Eosinophils, Percent 1    Basophils, Percent 0    Immature Granulocytes 0    Absolute Neutrophils 6.4    Absolute Lymphocytes 2.7    Absolute Monocytes 0.6    Absolute Eosinophils  0.1    Absolute Basophils 0.0    Absolute Immature Granulocytes 0.0   Urine, Bacterial Culture    Specimen: Urine clean catch   Result Value    Urine, Bacterial Culture      10,000 - 50,000 CFU/mL Mixed bacterial jorje with no predominating type       Radiology:  Imaging Results    None             Medications:  ED Medication Orders (From admission, onward)      Ordered Start     Status Ordering Provider    01/06/24 1613 01/06/24 1614  sodium chloride (NORMAL SALINE) 0.9 % bolus 1,000 mL  ONCE         Last MAR action: Completed  RO[facility]    01/06/24 1613 01/06/24 1613  ondansetron (ZOFRAN) injection 4 mg  ONCE PRN         Last MAR action: Given ROSEMARY [facility]            ED COURSE & MEDICAL DECISION MAKING    Patient here with nausea, vomiting and resuming med that has lead to similar episodes in the past.  Suspect similar med intolerance.  Less likely gastritis,  appendicitis, cholecystitis, other intra-abdominal or intrapelvic infections/inflammatory processes. Doubt bowel obstruction, liver or biliary tree etiology.  Doubt metabolic derangement.  Discussed with patient will hydrate, provide anti emetic in assess laboratory aches.     On re-evaluation pt resting more comfortably.    Discussed results with the patient and clinical impression.   Discussed treatment recommendations with the patient. .   Discussed symptomatic care and any meds.   Dscussed signs or sx that might prompt need for earlier recheck. Instructed to return to the ED if symptoms do not improve or worsen.  Voiced understanding.      Data Summary:  I have personally and independently reviewed all labs-see specific comments   CBC normal white count and no significant acute findings on her metabolic panel.  No significant electrolyte derangement or dehydration.  LFTs are similar to the past.  No DKA.  UA shows minor abnormalities with trace leukocyte esterase and bacteria.  However she is having dysuria frequency symptoms typical of prior UTI.  Did review her allergies.  Will place on Macrobid.  She is aware persistent culture results should be contacted.  She is also aware this may not be UTI as minor changes and had addressed watch and wait approach.  However,she prefers to proceed with treatment.    I have personally and independently reviewed previous notes including nursing documentation and  as listed in the HPI.       Diagnosis  1. Nausea and vomiting in adult        2. Acute cystitis without hematuria        3. Hyponatremia        4. Elevated  LFTs      stable          Follow Up:  No follow-up provider specified.        Summary of your Discharge Medications        Take these Medications        Details   nitrofurantoin (macrocrystal-monohydrate) 100 MG capsule  Commonly known as: Macrobid   Take 1 capsule by mouth in the morning and 1 capsule in the evening. Do all this for 5 days.     ondansetron 4 MG disintegrating tablet  Commonly known as: ZOFRAN ODT   Place 1 tablet onto the tongue every 8 hours as needed for Nausea.              Pt is discharged to home/self care in good condition.     Closure:  The patient understands that this is a provisional diagnosis. Provisional diagnosis can and do change. The diagnosis that she is discharged with today is based on the symptoms with which she presented today. If any new symptoms occur or worsen, she should seek immediate attention for re-evaluation     This chart was documented by Angely Loza, acting as a scribe for Shantel Lamar MD. 1/6/2024, 4:16 PM.      The documentation recorded by the scribe accurately and completely reflects the service(s) I personally performed and the decisions made by me.        Shantel Lamar MD  01/09/24 8149

## 2024-01-08 ENCOUNTER — OFFICE VISIT (OUTPATIENT)
Dept: PHYSICAL MEDICINE AND REHAB | Age: 59
End: 2024-01-08
Payer: MEDICARE

## 2024-01-08 VITALS
HEIGHT: 75 IN | DIASTOLIC BLOOD PRESSURE: 78 MMHG | SYSTOLIC BLOOD PRESSURE: 122 MMHG | WEIGHT: 315 LBS | BODY MASS INDEX: 39.17 KG/M2

## 2024-01-08 DIAGNOSIS — S32.030D CLOSED COMPRESSION FRACTURE OF L3 LUMBAR VERTEBRA WITH ROUTINE HEALING, SUBSEQUENT ENCOUNTER: ICD-10-CM

## 2024-01-08 DIAGNOSIS — Z98.890 S/P KYPHOPLASTY: ICD-10-CM

## 2024-01-08 DIAGNOSIS — I50.9 CHF WITH UNKNOWN LVEF (HCC): ICD-10-CM

## 2024-01-08 DIAGNOSIS — G62.9 NEUROPATHY: ICD-10-CM

## 2024-01-08 DIAGNOSIS — M54.2 NECK PAIN: ICD-10-CM

## 2024-01-08 DIAGNOSIS — Z98.1 HISTORY OF FUSION OF CERVICAL SPINE: ICD-10-CM

## 2024-01-08 DIAGNOSIS — M47.816 SPONDYLOSIS OF LUMBAR REGION WITHOUT MYELOPATHY OR RADICULOPATHY: Primary | ICD-10-CM

## 2024-01-08 DIAGNOSIS — S32.050K: ICD-10-CM

## 2024-01-08 DIAGNOSIS — M54.50 LUMBAR PAIN: ICD-10-CM

## 2024-01-08 PROCEDURE — 3017F COLORECTAL CA SCREEN DOC REV: CPT | Performed by: NURSE PRACTITIONER

## 2024-01-08 PROCEDURE — 3074F SYST BP LT 130 MM HG: CPT | Performed by: NURSE PRACTITIONER

## 2024-01-08 PROCEDURE — 4004F PT TOBACCO SCREEN RCVD TLK: CPT | Performed by: NURSE PRACTITIONER

## 2024-01-08 PROCEDURE — G8484 FLU IMMUNIZE NO ADMIN: HCPCS | Performed by: NURSE PRACTITIONER

## 2024-01-08 PROCEDURE — G8417 CALC BMI ABV UP PARAM F/U: HCPCS | Performed by: NURSE PRACTITIONER

## 2024-01-08 PROCEDURE — 99214 OFFICE O/P EST MOD 30 MIN: CPT | Performed by: NURSE PRACTITIONER

## 2024-01-08 PROCEDURE — G8427 DOCREV CUR MEDS BY ELIG CLIN: HCPCS | Performed by: NURSE PRACTITIONER

## 2024-01-08 PROCEDURE — 3078F DIAST BP <80 MM HG: CPT | Performed by: NURSE PRACTITIONER

## 2024-01-08 ASSESSMENT — ENCOUNTER SYMPTOMS
BACK PAIN: 1
CHEST TIGHTNESS: 0
EYES NEGATIVE: 1
RESPIRATORY NEGATIVE: 1
COUGH: 0
WHEEZING: 0
SHORTNESS OF BREATH: 0
GASTROINTESTINAL NEGATIVE: 1

## 2024-01-08 NOTE — PROGRESS NOTES
[facility] [place] [facility]  [facility]  [address]  [facility] [address]  Dept: [phone]  Dept Fax: [phone]  Loc: [phone]    Visit Date: 1/8/2024    Functionality Assessment/Goals Worksheet     On a scale of 0 (Does not Interfere) to 10 (Completely Interferes)     1.  Which number describes how during the past week pain has interfered with       the following:  A.  General Activity:  6  B.  Mood: 6  C.  Walking Ability:  6  D.  Normal Work (Includes both work outside the home and housework):  6  E.  Relations with Other People:   6  F.  Sleep:   6  G.  Enjoyment of Life:   6    2.  Patient Prefers to Take their Pain Medications:     []  On a regular basis   [x]  Only when necessary    []  Does not take pain medications    3.  What are the Patient's Goals/Expectations for Visiting Pain Management?     []  Learn about my pain    [x]  Receive Medication   []  Physical Therapy     []  Treat Depression   [x]  Receive Injections    []  Treat Sleep   []  Deal with Anxiety and Stress   []  Treat Opoid Dependence/Addiction   []  Other:        HPI:   Scottie Lizarraga is a 58 y.o. male is here today for    Chief Complaint: Low back pain, neck pain     HPI   2 month FU. Patient continues to have pain in low back and posterior neck- aching and dull pain and sometimes throbbing and stabbing. States pain is constant ache but noticing more spikes with cold and wet weather changes.     \"I try to stay in when it is cold\".     Still has Ulcer at bottom of left toe but this has improved. Band aid is on     States Percocet prn remains effective in decreasing pain down to a tolerable level.   Pain increases with bending, lifting, twisting , walking, standing, getting up and down, and housework or working at job cold and wet weather changes       Medications reviewed. Patient denies side effects with medications. Patient states he is taking

## 2024-01-25 DIAGNOSIS — G89.4 CHRONIC PAIN SYNDROME: ICD-10-CM

## 2024-01-25 RX ORDER — OXYCODONE AND ACETAMINOPHEN 7.5; 325 MG/1; MG/1
1 TABLET ORAL EVERY 8 HOURS PRN
Qty: 90 TABLET | Refills: 0 | Status: SHIPPED | OUTPATIENT
Start: 2024-01-29 | End: 2024-02-28

## 2024-01-25 NOTE — TELEPHONE ENCOUNTER
Scottie Lizarraga called requesting a refill on the following medications:  Requested Prescriptions     Pending Prescriptions Disp Refills    oxyCODONE-acetaminophen (PERCOCET) 7.5-325 MG per tablet 90 tablet 0     Sig: Take 1 tablet by mouth every 8 hours as needed for Pain for up to 30 days. Intended supply: 30 days     Pharmacy verified: [facility] in [place], [place]  .pv      Date of last visit: 1/08/2024  Date of next visit (if applicable): 3/11/2024

## 2024-01-25 NOTE — TELEPHONE ENCOUNTER
OARRS reviewed. UDS: + for Trazodone.   Last seen: 1/8/2024. Follow-up:   Future Appointments   Date Time Provider Department Center   3/11/2024 11:45 AM Vijay Kilgore, APRN - CNP [facility] [place]     *Percocet was not detected in UDS. Per OV on 1/8/24 patient states he took his last dose of Percocet on 1/8/24, the day UDS was performed. Please advise.

## 2024-01-25 NOTE — TELEPHONE ENCOUNTER
Please tell patient he will get one more chance and medications need to be in screen or I will not prescribe. Will send this refill and then please have him come in for surprise UDS and pill count in 2 weeks. (Secret call in)

## 2024-01-26 NOTE — TELEPHONE ENCOUNTER
I called patient and verbalized what Mihai Kilgore stated. Patient states he took the medication before he came to the appointment so it should have been detected. I reiterated what Mihai stated and patient voiced understanding.

## 2024-02-20 ENCOUNTER — TELEPHONE (OUTPATIENT)
Dept: PHYSICAL MEDICINE AND REHAB | Age: 59
End: 2024-02-20

## 2024-02-20 DIAGNOSIS — G89.4 CHRONIC PAIN SYNDROME: Primary | ICD-10-CM

## 2024-02-20 DIAGNOSIS — M54.2 NECK PAIN: ICD-10-CM

## 2024-02-20 DIAGNOSIS — M54.50 LUMBAR PAIN: ICD-10-CM

## 2024-02-20 NOTE — TELEPHONE ENCOUNTER
Per Mihai Kilgore, I called patient to come into the office today for a random UDS and pill count. Patient stated today is his birthday and he is in [place], I stated he has 24 hours to come into the office for the UDS and pill count. I stated he would have to be here tomorrow by 10:00am. Patient voiced understanding.

## 2024-02-22 ENCOUNTER — TELEPHONE (OUTPATIENT)
Dept: PHYSICAL MEDICINE AND REHAB | Age: 59
End: 2024-02-22

## 2024-02-22 NOTE — TELEPHONE ENCOUNTER
Agree UDS not appropriate and he did not come in for repeat screen and pill count and was given additional chances. Discharged for noncompliance.

## 2024-02-22 NOTE — TELEPHONE ENCOUNTER
Patient did not come in on 2/21/24 by 10:00 am as he previously agreed to for a random UDS and pill count. I informed Mihai Dawsonsabrina that he did not show for the random UDS and pill count. Mihai stated to call patient and let him know he has today to come into the office or he will be receiving a discharge letter from our office. I called patient advised him what Mihai stated. Patient states he is in [facility] and when I called him on 2/20/24 that's where he was. I stated that he told me he was in [place] for his birthday and verbalized he would be in within 24 hours. I stated that if he failed to come into the office today he would be getting discharged from the practice. Patient stated \"that's fine\". I said you are fine being discharged? He stated \"yes, I will get another pain doctor\". I verbalized understanding.

## 2024-07-09 NOTE — PROGRESS NOTES
[facility]  Kuldeep Guillaume M.D.  [phone]    History and Physical       NAME:  Dave Clements Jr.   :   1956   MRN:   [id number]       Consult Date: 2024 1:05 PM    History of Present Illness:        Pt presents today for difficulty swallowing. Reports dysphagia in the mid esophagus and chest - reports he began noticing this 6 months ago any time he ate nuts. Notrees like they got stuck/hung and would take a long while to go down. With all nuts. Denies this with other foods or drinks so far. Also reports lower abd pain during this time, but since he stopped eating nuts denies abd pain and dysphagia. Also has changed his lifestyle (no processed foods, less sugars, drinks collagen powder and takes more holistic supplements).     Only complaint now is that he reports lower pelvic/abd pain when he has the urge to urinate, relieved after her urinates. This has been going on for 1 month. Also reports urinary hesitancy. Denies back pain, numbness and tingling of back/legs/saddle area.     Denies CP, SOB, fevers, chills, nausea, vomiting, dysphagia, GERD (reflux or heartburn), loss of appetite, unintentional weight loss, change in bowel habits, rectal bleeding, dark tarry stools, constipation, diarrhea, rectal pain.   Denies cardiac stents, pacemakers/defibrillators, chronic NSAID use, steroids, blood thinners, diabetes meds, HUBERT on CPAP, supplemental O2 use. Denies daily alcohol use or tobacco use.       PMH:  Past Medical History:   Diagnosis Date    Hypertension        PSH:  Past Surgical History:   Procedure Laterality Date    COLONOSCOPY N/A 10/17/2022    COLONOSCOPY performed by Larry Talley MD at [facility] ENDOSCOPY    ORTHOPEDIC SURGERY  childhood    skin graft on toes of R foot       Allergies:  Allergies   Allergen Reactions    Erythromycin Shortness Of Breath    Wasp Venom Protein Swelling    Penicillins Other (See Comments)     childhood       Home Medications:  Cannot display prior  [facility]  PHYSICAL THERAPY MISSED TREATMENT NOTE  ACUTE CARE  [facility] NEUROSCIENCES 4A              Missed Treatment: On strict bedrest. Waiting neurosurgery consult and imaging. Will re-attempt at a later date.

## 2025-02-25 NOTE — PROGRESS NOTES
Office is requesting the latest office notes    Fax [phone]   [facility]  INPATIENT SPEECH THERAPY  [address]  DAILY NOTE    TIME   SLP Individual Minutes  Time In: 1000  Time Out: 1030  Minutes: 30  Timed Code Treatment Minutes: 30 Minutes       Date: 3/4/2021  Patient Name: Cheyanne Rivera      CSN: [id number]   : 1965  (55 y.o.)  Gender: male   Referring Physician:  Tricia Ventura PA-C  Diagnosis: Burst Fracture of Fourth Thoracic Vertebra  Secondary Diagnosis: Cognitive-Linguistic Deficit  Precautions: Fall Risk  Current Diet: Regular and Thin Liquids  Swallowing Strategies: Standard Universal Swallow Precautions  Date of Last MBS: Not Applicable    Pain:   - Pain location: low back - nursing aware of pain    Subjective:  Patient seen sitting upright in chair upon ST arrival. Patient agreeable to participate in skilled ST services this date; alert and pleasant throughout. Short-Term Goals:  SHORT TERM GOAL #1:   Goal 1: Pt will complete higher level attention tasks (divided, alternating) with fewer than 2 errors/redirections within 8 minute time span or task completion to increase ADL completion. INTERVENTIONS: Divided Attention assessed during working memory task (GOAL 2) d/t music playing while completing working memory task. Pt with good divided attention observed within working memory task this date. See Goal 2 for further details. SHORT TERM GOAL #2:   Goal 2: Pt will complete memory tasks (immediate/delayed, working) with 85% accuracy at a modified independent level to increase memory retention of medical and daily information  INTERVENTIONS:  Working Memory:   Pt was prompted to say \"elephant\" for even, \"octopus\" for odd, and \"reyes\" for face cards. Pt independently identified animal with corresponding card 51/52 cards, /52 given min cue, and completed task in 2 minutes 40 seconds. Memory Strategies (WRAP)  Patient able to independently recall 3/4 strategies.  Pt recalled 1/4 strategies (picture it) given total assist     Directions to [address] provided verbal directions in order to navigate to [address] [place] [address]. ST encouraged pt to utilize memory strategies to improve recall of directions following a shift in attention. Pt independently wrote directions down and repeated directions. ST provided min verbal cue for pt to visualize path as ST described directions to cafeteria for 3rd time. Following shift in attention, pt required min verbal cues to utilize written instructions to recall directions with 100% accuracy. SHORT TERM GOAL #3:    Goal 3: Pt will complete HIGH level/complex verbal reasoning, problem solving, and executive functioning with 85% accuracy given min cue to increase IADL contribution. INTERVENTIONS:   Complex Problem Solving/Reasonign Tasks within ADLs  18/22 Independently, 3/22 given min cues, 1/22 given mod cues   *Evidence of good problem solving and reasoning skills with in moderately complex problems that may occur with ADLs. SHORT TERM GOAL #4:   Goal 4: Pt will complete sequencing tasks (i.e transfers) with 85% accuracy given min cues in order to improve safety within transfers and particicpation within ADL/IADL  INTERVENTIONS: Did not address this date d/t focus on other goals. Prior Session  Sequencing Transfers  Pt verbally sequenced sit-to-stand and stand-to-sit transfers with 100% accuracy indepenently. *Demonstrated good recall from PT/OT sessions regarding steps to safely transfer from sit-to-stand and stand-to-sit this date.      Long-Term Goals:  Timeframe for Long-term Goals: 2 weeks    LONG TERM GOAL #1:   Goal 1: Pt will improve overall cognitive-linguistic skills to a Modified [place] or higher in order to safely discharge to least restrictive environment and complete ADL/IADL with least amount of supervision/assistance       Comprehension: 7 - Patient understands complex ideas (math/planning)  Expression: 7 - Patient expresses complex ideas/needs  Social Interaction: 6 - Patient requires medication for mood and/or effect  Problem Solvin - Independent with device (e.g. notes, schedules)  Memory: 6 - Patient requires device to recall (e.g. memory book)    EDUCATION:  Learner: Patient and Significant Other  Education:  Reviewed ST goals and Plan of Care  Evaluation of Education: Verbalizes understanding, Demonstrates with assistance and Needs further instruction    ASSESSMENT/PLAN:  Activity Tolerance:  Patient tolerance of  treatment: good. Assessment/Plan: Patient progressing toward established goals. Continues to require skilled care of licensed speech pathologist to progress toward achievement of established goals and plan of care.   Plan for Next Session: Problem Solving/Reasoning, Thought Organization, Memory      Caroline Dennis M.A., [phone]

## (undated) DEVICE — PACKING 440406 10PK POPE EPISTAXIS: Brand: MEROCEL®

## (undated) DEVICE — PACK PROCEDURE SURG SET UP SRMC

## (undated) DEVICE — SYRINGE A08E KIS INFLATION HP: Brand: KYPHON®  INFLATION SYRINGE

## (undated) DEVICE — 1010 S-DRAPE TOWEL DRAPE 10/BX: Brand: STERI-DRAPE™

## (undated) DEVICE — CEMENT CARTRIDGES CC02A CDS: Brand: KYPHON® CEMENT DELIVERY SYSTEM

## (undated) DEVICE — BAG,BANDED,W/RUBBERBAND,STERILE,30X36: Brand: MEDLINE

## (undated) DEVICE — Device

## (undated) DEVICE — STRIP,CLOSURE,WOUND,MEDI-STRIP,1/2X4: Brand: MEDLINE

## (undated) DEVICE — 3M™ STERI-STRIP™ COMPOUND BENZOIN TINCTURE 40 BAGS/CARTON 4 CARTONS/CASE C1544: Brand: 3M™ STERI-STRIP™

## (undated) DEVICE — IV START KIT: Brand: MEDLINE INDUSTRIES, INC.

## (undated) DEVICE — PADDING CAST W6INXL4YD COT LO LINTING WYTEX

## (undated) DEVICE — GLOVE ORANGE PI 7   MSG9070

## (undated) DEVICE — MARKER,SKIN,WI/RULER AND LABELS: Brand: MEDLINE

## (undated) DEVICE — COVER ARMBRD W13XL28.5IN IMPERV BLU FOR OP RM

## (undated) DEVICE — PRECISION THIN (9.0 X 0.38 X 18.5MM)

## (undated) DEVICE — COVERS JACKSON TABLE ULTRA COMFORT MED

## (undated) DEVICE — SUTURE PERMA-HAND SZ 2-0 L30IN NONABSORBABLE BLK L26MM SH K833H

## (undated) DEVICE — GLOVE SURG SZ 65 THK91MIL LTX FREE SYN POLYISOPRENE

## (undated) DEVICE — HYPODERMIC SAFETY NEEDLE: Brand: MAGELLAN

## (undated) DEVICE — IMPREGNATED GAUZE DRESSING: Brand: CUTICERIN 7.5X7.5CM CTN 50

## (undated) DEVICE — SPONGE GZ W4XL4IN COT 12 PLY TYP VII WVN C FLD DSGN

## (undated) DEVICE — SOLUTION IV 1000ML 0.9% SOD CHL PH 5 INJ USP VIAFLX PLAS

## (undated) DEVICE — DRAPE C ARM W36XL30IN RECTANG BND BG AND TAPE

## (undated) DEVICE — CONTAINER,SPECIMEN,PNEU TUBE,4OZ,OR STRL: Brand: MEDLINE

## (undated) DEVICE — SOLUTION IV 1000ML 0.45% SOD CHL PH 5 INJ USP VIAFLX PLAS

## (undated) DEVICE — GOWN,SIRUS,NON REINFRCD,LARGE,SET IN SL: Brand: MEDLINE

## (undated) DEVICE — TUBING, SUCTION, 1/4" X 20', STRAIGHT: Brand: MEDLINE INDUSTRIES, INC.

## (undated) DEVICE — GLOVE ORANGE PI 7 1/2   MSG9075

## (undated) DEVICE — PREP SOL PVP IODINE 4%  4 OZ/BTL

## (undated) DEVICE — GLOVE ORANGE PI 8   MSG9080

## (undated) DEVICE — FORCEP RAD JAW W/NEEDLE 160CM

## (undated) DEVICE — 450 ML BOTTLE OF 0.05% CHLORHEXIDINE GLUCONATE IN 99.95% STERILE WATER FOR IRRIGATION, USP AND APPLICATOR.: Brand: IRRISEPT ANTIMICROBIAL WOUND LAVAGE

## (undated) DEVICE — INTENDED FOR TISSUE SEPARATION, AND OTHER PROCEDURES THAT REQUIRE A SHARP SURGICAL BLADE TO PUNCTURE OR CUT.: Brand: BARD-PARKER ® CARBON RIB-BACK BLADES

## (undated) DEVICE — BONE TAMP KIT KEX152EB FF E2 15/2 OI: Brand: KYPHON EXPRESS II KYPHOPAK TRAY

## (undated) DEVICE — SPLINT ORTH W4XL30IN WHT FBRGLS 1 SIDE FELT PD CNFRM LO

## (undated) DEVICE — C-ARM: Brand: UNBRANDED

## (undated) DEVICE — APPLICATOR MEDICATED 26 CC SOLUTION CLR STRL CHLORAPREP

## (undated) DEVICE — BONE TAMP KIT KEX152NB AF E2 15/2: Brand: KYPHON EXPRESS II KYPHOPAK TRAY

## (undated) DEVICE — BANDAGE,GAUZE,4.5"X4.1YD,STERILE,LF: Brand: MEDLINE

## (undated) DEVICE — SINU FOAM: Brand: SINU-FOAM

## (undated) DEVICE — PACK-MAJOR

## (undated) DEVICE — LAPAROTOMY DRAPE WITH POUCHES: Brand: CONVERTORS

## (undated) DEVICE — PACK PROCEDURE SURG POD SC SRHP LF

## (undated) DEVICE — PERI-LOC VLP 3.5MM X 14MM LOCKING                                    SCREW SELF TAPPING
Type: IMPLANTABLE DEVICE | Site: ANKLE | Status: NON-FUNCTIONAL
Brand: PERI-LOC VLP
Removed: 2019-03-21

## (undated) DEVICE — PERI-LOC VLP 3.5MM X 16MM CORTEX                                    SCREW SELF TAPPING
Type: IMPLANTABLE DEVICE | Site: ANKLE | Status: NON-FUNCTIONAL
Brand: PERI-LOC VLP
Removed: 2019-03-21

## (undated) DEVICE — GAUZE,SPONGE,8"X4",12PLY,XRAY,STRL,LF: Brand: MEDLINE

## (undated) DEVICE — SET ADMIN 25ML L117IN PMP MOD CK VLV RLER CLMP 2 SMRTSITE

## (undated) DEVICE — SYRINGE,EAR/ULCER, 2 OZ, STERILE: Brand: MEDLINE

## (undated) DEVICE — CEMENT GUN AND BONE FILLER CDS3A SIZE 3: Brand: MEDTRONIC REUSABLE INSTRUMENTS

## (undated) DEVICE — BLADE LARYNSCP SZ 3 ENH DIR INTUB GLIDESCOPE MCGRATH MAC

## (undated) DEVICE — CATHETER ETER IV 22GA L1IN POLYUR STR RADPQ INTROCAN SFTY

## (undated) DEVICE — GOWN,SIRUS,NONRNF,SETINSLV,XL,20/CS: Brand: MEDLINE

## (undated) DEVICE — SOLUTION IV IRRIG POUR BRL 0.9% SODIUM CHL 2F7124

## (undated) DEVICE — GLOVE SURG SZ 8 L11.77IN FNGR THK9.8MIL STRW LTX POLYMER

## (undated) DEVICE — CURETTE A13A SIZE 2 T-TIP: Brand: KYPHON® EXPRESS ™ CURETTE

## (undated) DEVICE — GLOVE SURG SZ 85 L12IN THK75MIL DK GRN LTX FREE

## (undated) DEVICE — SHEET, T, LAPAROTOMY, STERILE: Brand: MEDLINE

## (undated) DEVICE — CONNECTOR TBNG AUX H2O JET DISP FOR OLY 160/180 SER

## (undated) DEVICE — NEEDLE SPNL L3.5IN PNK HUB S STL REG WALL FIT STYL W/ QNCKE

## (undated) DEVICE — SUTURE VCRL SZ 2-0 L27IN ABSRB UD L36MM CP-1 1/2 CIR REV J266H

## (undated) DEVICE — SPONGE LAP W18XL18IN WHT COT 4 PLY FLD STRUNG RADPQ DISP ST

## (undated) DEVICE — NEEDLE SPNL 22GA L35IN PNCL PNT ATRAUM TIP PENCAN

## (undated) DEVICE — SOLUTION SURG PREP POV IOD 7.5% 4 OZ

## (undated) DEVICE — DRESSING TRNSPAR W5XL4.5IN FLM SHT SEMIPERMEABLE WIND

## (undated) DEVICE — ADHESIVE SKIN CLSR 0.7ML TOP DERMBND ADV

## (undated) DEVICE — 2.0MM DRILL BIT W/ QUICK CONNECT - 157MM: Brand: PERI-LOC

## (undated) DEVICE — BIT DRILL SHORT QUICK CONN 2.7MM ST

## (undated) DEVICE — SYRINGE MED 10ML LUERLOCK TIP W/O SFTY DISP

## (undated) DEVICE — JELLY,LUBE,STERILE,FLIP TOP,TUBE,2-OZ: Brand: MEDLINE

## (undated) DEVICE — BONE CEMENT CX01B KYPHON XPEDE W MXR US: Brand: KYPHON® XPEDE™ BONE CEMENT AND KYPHON® MIXER PACK

## (undated) DEVICE — BANDAGE COMPR W6INXL12FT SMOOTH FOR LIMB EXSANG ESMARCH

## (undated) DEVICE — Device: Brand: LIGHT GUARD™ FLEXIBLE LIGHT HANDLE COVER (2 EACH/PKG)

## (undated) DEVICE — ENDO KIT: Brand: MEDLINE INDUSTRIES, INC.

## (undated) DEVICE — SET LNR RED GRN W/ BASE CLEANASCOPE

## (undated) DEVICE — KIT KEX152EB-CDS- 15/2 FF WITH CDS: Brand: KYPHPAK® FIRST FRACTURE TRAY

## (undated) DEVICE — TOWEL,OR,DSP,ST,BLUE,STD,4/PK,20PK/CS: Brand: MEDLINE

## (undated) DEVICE — CLOSURE SKIN FLX NONINVASIVE PRELOC TECHNOLOGY FOR 24IN

## (undated) DEVICE — BONE BIOPSY DEVICE F07A TAPERED SIZE 2: Brand: MEDTRONIC REUSABLE INSTRUMENTS

## (undated) DEVICE — Z DISCONTINUED BY MEDLINE USE 2711682 TRAY SKIN PREP DRY W/ PREM GLV

## (undated) DEVICE — BANDAGE COMPR M W6INXL10YD WHT BGE VELC E MTRX HK AND LOOP

## (undated) DEVICE — LIMB HOLDER, WRIST/ANKLE: Brand: DEROYAL

## (undated) DEVICE — 3M™ IOBAN™ 2 ANTIMICROBIAL INCISE DRAPE 6650EZ: Brand: IOBAN™ 2

## (undated) DEVICE — CONMED SCOPE SAVER BITE BLOCK, 20X27 MM: Brand: SCOPE SAVER

## (undated) DEVICE — MIXER A07A CEMENT

## (undated) DEVICE — SUTURE VCRL SZ 0 L27IN ABSRB UD L36MM CT-1 1/2 CIR J260H

## (undated) DEVICE — GARMENT,MEDLINE,DVT,INT,CALF,MED, GEN2: Brand: MEDLINE

## (undated) DEVICE — TUBING IV STOPCOCK 48 CM 3 W

## (undated) DEVICE — SUTURE MCRYL SZ 3-0 L27IN ABSRB UD L24MM PS-1 3/8 CIR PRIM Y936H

## (undated) DEVICE — PAD,NON-ADHERENT,3X8,STERILE,LF,1/PK: Brand: MEDLINE

## (undated) DEVICE — GAUZE,SPONGE,4"X4",12PLY,STERILE,LF,2'S: Brand: MEDLINE